# Patient Record
Sex: FEMALE | URBAN - METROPOLITAN AREA
[De-identification: names, ages, dates, MRNs, and addresses within clinical notes are randomized per-mention and may not be internally consistent; named-entity substitution may affect disease eponyms.]

---

## 2017-06-26 DIAGNOSIS — O09.93 SUPERVISION OF HIGH-RISK PREGNANCY, THIRD TRIMESTER: ICD-10-CM

## 2017-06-26 NOTE — TELEPHONE ENCOUNTER
Last clinic visit 6/2016.  Dur for labs  Please make a lab appointment for blood work and follow up clinic appointment in 1 week after that to discuss results.    Please confirm the dose of lantus with pt.  For Now I am sending 35 units/day ( based on last clinic viist) but check with pt.

## 2017-06-26 NOTE — TELEPHONE ENCOUNTER
Novolog Flexpen Inj      Last Written Prescription Date:  6/2/16  Last Fill Quantity: 1,   # refills: 3  Last Office Visit with [facility] or [facility] prescribing provider: 5/25/16  Future Office visit:

## 2017-06-26 NOTE — TELEPHONE ENCOUNTER
Elbert Yu         Last Written Prescription Date: 6/21/16  Last Fill Quantity: 3, # refills: 3  Last Office Visit with [facility], [facility] or [facility] prescribing provider:  06/21/2016        BP Readings from Last 3 Encounters:   06/21/16 118/62   06/14/16 97/62   06/04/16 125/83     Lab Results   Component Value Date    MICROL 20 05/23/2016     Lab Results   Component Value Date    UMALCR 28.39 05/23/2016     Creatinine   Date Value Ref Range Status   06/13/2016 0.42 (L) 0.52 - 1.04 mg/dL Final   ]  GFR Estimate   Date Value Ref Range Status   06/13/2016 >90  Non  GFR Calc   >60 mL/min/1.7m2 Final   06/12/2016 >90  Non  GFR Calc   >60 mL/min/1.7m2 Final   06/11/2016 >90  Non  GFR Calc   >60 mL/min/1.7m2 Final     GFR Estimate If Black   Date Value Ref Range Status   06/13/2016 >90   GFR Calc   >60 mL/min/1.7m2 Final   06/12/2016 >90   GFR Calc   >60 mL/min/1.7m2 Final   06/11/2016 >90   GFR Calc   >60 mL/min/1.7m2 Final     Lab Results   Component Value Date    CHOL 305 05/23/2016     Lab Results   Component Value Date    HDL 57 05/23/2016     Lab Results   Component Value Date     05/23/2016     Lab Results   Component Value Date    TRIG 316 05/23/2016     No results found for: CHOLHDLRATIO  Lab Results   Component Value Date    AST 19 06/12/2016     Lab Results   Component Value Date    ALT 12 06/12/2016     Lab Results   Component Value Date    A1C 9.2 06/01/2016    A1C 9.8 05/23/2016    A1C 7.4 04/23/2016    A1C 7.2 04/19/2016    A1C 8.4 03/02/2016     Potassium   Date Value Ref Range Status   06/13/2016 3.8 3.4 - 5.3 mmol/L Final       Labs showing if normal/abnormal  Lab Results   Component Value Date    UCRR 72 05/23/2016    MICROL 20 05/23/2016     Lab Results   Component Value Date    CHOL 305 (H) 05/23/2016    TRIG 316 (H) 05/23/2016    HDL 57 05/23/2016     (H) 05/23/2016     Lab Results    Component Value Date    A1C 9.2 (H) 06/01/2016    A1C 9.8 (H) 05/23/2016    A1C 7.4 (H) 04/23/2016

## 2017-06-28 NOTE — TELEPHONE ENCOUNTER
Last clinic visit 6/2016.  Dur for labs  Please make a lab appointment for blood work and follow up clinic appointment in 1 week after that to discuss results.     Please confirm the dose of lantus with pt.  For Now I am sending 35 units/day ( based on last clinic viist) but check with pt.       From Dr. Colunga.

## 2017-07-12 NOTE — TELEPHONE ENCOUNTER
Called pt's home and father answer and stated that [place], she left 2 weeks ago and will not be back for 3 months.    Qamar ACHARYA RN

## 2017-09-05 ENCOUNTER — HOSPITAL ENCOUNTER (OUTPATIENT)
Facility: CLINIC | Age: 27
Discharge: HOME | End: 2017-09-05
Attending: OBSTETRICS & GYNECOLOGY | Admitting: OBSTETRICS & GYNECOLOGY
Payer: COMMERCIAL

## 2017-09-05 VITALS — SYSTOLIC BLOOD PRESSURE: 122 MMHG | TEMPERATURE: 98.3 F | DIASTOLIC BLOOD PRESSURE: 75 MMHG

## 2017-09-05 LAB
GLUCOSE BLDC GLUCOMTR-MCNC: 58 MG/DL (ref 70–99)
GLUCOSE BLDC GLUCOMTR-MCNC: 65 MG/DL (ref 70–99)

## 2017-09-05 PROCEDURE — 25000125 ZZHC RX 250: Performed by: OBSTETRICS & GYNECOLOGY

## 2017-09-05 PROCEDURE — 25000132 ZZH RX MED GY IP 250 OP 250 PS 637: Performed by: OBSTETRICS & GYNECOLOGY

## 2017-09-05 PROCEDURE — 82962 GLUCOSE BLOOD TEST: CPT

## 2017-09-05 PROCEDURE — 99213 OFFICE O/P EST LOW 20 MIN: CPT

## 2017-09-05 RX ORDER — ONDANSETRON 4 MG/1
8 TABLET, ORALLY DISINTEGRATING ORAL EVERY 6 HOURS PRN
Status: DISCONTINUED | OUTPATIENT
Start: 2017-09-05 | End: 2017-09-06 | Stop reason: HOSPADM

## 2017-09-05 RX ORDER — ONDANSETRON 2 MG/ML
4 INJECTION INTRAMUSCULAR; INTRAVENOUS EVERY 6 HOURS PRN
Status: DISCONTINUED | OUTPATIENT
Start: 2017-09-05 | End: 2017-09-06 | Stop reason: HOSPADM

## 2017-09-05 RX ADMIN — LIDOCAINE HYDROCHLORIDE 30 ML: 20 SOLUTION ORAL; TOPICAL at 21:55

## 2017-09-05 RX ADMIN — ONDANSETRON 8 MG: 4 TABLET, ORALLY DISINTEGRATING ORAL at 21:51

## 2017-09-05 NOTE — IP AVS SNAPSHOT
MRN:[id number]                      After Visit Summary   9/5/2017    Anne Gunter    MRN: [id number]           Thank you!     Thank you for choosing [facility] for your care. Our goal is always to provide you with excellent care. Hearing back from our patients is one way we can continue to improve our services. Please take a few minutes to complete the written survey that you may receive in the mail after you visit. If you would like to speak to someone directly about your visit please contact Patient Relations at [phone]. Thank you!          Patient Information     Date Of Birth          1990        About your hospital stay     You were admitted on:  September 5, 2017 You last received care in the:  [facility]    You were discharged on:  September 5, 2017       Who to Call     For medical emergencies, please call 911.  For non-urgent questions about your medical care, please call your primary care provider or clinic, [phone]          Attending Provider     Provider Specialty    Romario Engel MD OB/Gyn       Primary Care Provider Office Phone # Fax #    Isiah Naidu [phone]      Further instructions from your care team       Discharge Instruction for Undelivered Patients      You were seen for:  Abdominal pain.  We Consulted: Dr Maren ACHARYA  You had (Test or Medicine):EFM, Zofran and GI Cocktail.    Diet:   Drink 8 to 12 glasses of liquids (milk, juice, water) every day.  To manager your diabetes, follow the guidelines for eating and drinking given to you by your Clinic Provider or Diabetes Educator.       Activity:  Call your doctor or nurse midwife if your baby is moving less than usual.     Call your provider if you notice:  Swelling in your face or increased swelling in your hands or legs.  Headaches that are not relieved by Tylenol (acetaminophen).  Changes in your vision (blurring: seeing spots or  "stars.)  Nausea (sick to your stomach) and vomiting (throwing up).   Weight gain of 5 pounds or more per week.  Heartburn that doesn't go away.  Signs of bladder infection: pain when you urinate (use the toilet), need to go more often and more urgently.  The bag of cobos (rupture of membranes) breaks, or you notice leaking in your underwear.  Bright red blood in your underwear.  Abdominal (lower belly) or stomach pain.  Second (plus) baby: Contractions (tightening) less than 10 minutes apart and getting stronger.  *If less than 34 weeks: Contractions (tightenings) more than 6 times in one hour.  Increase or change in vaginal discharge (note the color and amount)  Other: Establish prenatal care and make sure you see Diabetic Dr.    Follow-up:  Please make appointment to establish preantal care.   List of Dr provided with phone no.          Pending Results     No orders found from 9/3/2017 to 2017.            Admission Information     Date & Time Provider Department Dept. Phone    2017 Romario Engel MD [facility] Labor and Delivery [phone]      Your Vitals Were     Blood Pressure Temperature                122/75 98.3  F (36.8  C) (Oral)          MyChart Information     [employer] lets you send messages to your doctor, view your test results, renew your prescriptions, schedule appointments and more. To sign up, go to [email or web address] . Click on \"Log in\" on the left side of the screen, which will take you to the Welcome page. Then click on \"Sign up Now\" on the right side of the page.     You will be asked to enter the access code listed below, as well as some personal information. Please follow the directions to create your username and password.     Your access code is: [email or web address]  Expires: 2017 11:06 PM     Your access code will  in 90 days. If you need help or a new code, please call your [place] clinic or [phone].        Care EveryWhere ID     This is your Care " EveryWhere ID. This could be used by other organizations to access your [place] medical records  [id number]        Equal Access to Services     VALENTINO ONEILL : Hadii buster Root, walada ludannyadaha, qachristianota kaalmada georginanathanielnila, brea olivarezdeannstacy abreu. So [facility] [phone].    ATENCIÓN: Si habla español, tiene a monreal disposición servicios gratuitos de asistencia lingüística. Llame al [phone].    We comply with applicable federal civil rights laws and [place] laws. We do not discriminate on the basis of race, color, national origin, age, disability sex, sexual orientation or gender identity.               Review of your medicines      UNREVIEWED medicines. Ask your doctor about these medicines        Dose / Directions    folic acid 1 MG tablet   Commonly known as:  FOLVITE   Used for:  High-risk pregnancy, first trimester        Dose:  1 mg   Take 1 tablet (1 mg) by mouth daily   Quantity:  30 tablet   Refills:  0       insulin aspart 100 UNIT/ML injection   Commonly known as:  NovoLOG PEN   Used for:  Supervision of high-risk pregnancy, third trimester        Dose:  10 Units   Inject 10 Units Subcutaneous 3 times daily (with meals)   Quantity:  20 mL   Refills:  1       insulin glargine 100 UNIT/ML injection   Commonly known as:  LANTUS SOLOSTAR   Used for:  Uncontrolled type 1 diabetes mellitus without complication (H)        35 units at bedtime   Quantity:  30 mL   Refills:  1       LEVEMIR FLEXPEN/FLEXTOUCH 100 UNIT/ML injection   Generic drug:  insulin detemir        Dose:  30 Units   Inject 30 Units Subcutaneous At Bedtime   Refills:  0       metoclopramide 10 MG tablet   Commonly known as:  REGLAN   Used for:  Supervision of high-risk pregnancy, third trimester        Dose:  10 mg   Take 1 tablet (10 mg) by mouth 4 times daily (before meals and nightly)   Quantity:  120 tablet   Refills:  3       mirtazapine 15 MG ODT tab   Commonly known as:  REMERON SOL-TAB   Used for:   Supervision of high-risk pregnancy, third trimester        Dose:  15 mg   1 tablet (15 mg) by Orally disintegrating tablet route At Bedtime   Quantity:  60 tablet   Refills:  2       OLANZapine zydis 5 MG ODT tab   Commonly known as:  zyPREXA   Used for:  Supervision of high-risk pregnancy, third trimester        Dose:  5 mg   Take 1 tablet (5 mg) by mouth 2 times daily   Quantity:  60 tablet   Refills:  3       omeprazole 20 MG CR capsule   Commonly known as:  priLOSEC   Used for:  Supervision of high-risk pregnancy, third trimester        Dose:  20 mg   Take 1 capsule (20 mg) by mouth every morning (before breakfast)   Quantity:  30 capsule   Refills:  3       ondansetron 4 MG ODT tab   Commonly known as:  ZOFRAN-ODT        Dose:  4 mg   Take 4 mg by mouth every 8 hours as needed   Refills:  1       oxyCODONE 5 MG capsule   Commonly known as:  OXY-IR   Used for:  S/P         Dose:  5 mg   Take 1 capsule (5 mg) by mouth every 4 hours as needed for moderate to severe pain   Quantity:  24 capsule   Refills:  0       Prenatal Vitamin 27-0.8 MG Tabs   Used for:  High-risk pregnancy, first trimester        Dose:  1 tablet   Take 1 tablet by mouth daily   Quantity:  90 tablet   Refills:  3       prochlorperazine 5 MG tablet   Commonly known as:  COMPAZINE   Used for:  Supervision of high-risk pregnancy, third trimester        Dose:  5 mg   Take 1 tablet (5 mg) by mouth every 6 hours   Quantity:  120 tablet   Refills:  3                Protect others around you: Learn how to safely use, store and throw away your medicines at www.disposemymeds.org.             Medication List: This is a list of all your medications and when to take them. Check marks below indicate your daily home schedule. Keep this list as a reference.      Medications           Morning Afternoon Evening Bedtime As Needed    folic acid 1 MG tablet   Commonly known as:  FOLVITE   Take 1 tablet (1 mg) by mouth daily                                 insulin aspart 100 UNIT/ML injection   Commonly known as:  NovoLOG PEN   Inject 10 Units Subcutaneous 3 times daily (with meals)                                insulin glargine 100 UNIT/ML injection   Commonly known as:  LANTUS SOLOSTAR   35 units at bedtime                                LEVEMIR FLEXPEN/FLEXTOUCH 100 UNIT/ML injection   Inject 30 Units Subcutaneous At Bedtime   Generic drug:  insulin detemir                                metoclopramide 10 MG tablet   Commonly known as:  REGLAN   Take 1 tablet (10 mg) by mouth 4 times daily (before meals and nightly)                                mirtazapine 15 MG ODT tab   Commonly known as:  REMERON SOL-TAB   1 tablet (15 mg) by Orally disintegrating tablet route At Bedtime                                OLANZapine zydis 5 MG ODT tab   Commonly known as:  zyPREXA   Take 1 tablet (5 mg) by mouth 2 times daily                                omeprazole 20 MG CR capsule   Commonly known as:  priLOSEC   Take 1 capsule (20 mg) by mouth every morning (before breakfast)                                ondansetron 4 MG ODT tab   Commonly known as:  ZOFRAN-ODT   Take 4 mg by mouth every 8 hours as needed   Last time this was given:  8 mg on 9/5/2017  9:51 PM                                oxyCODONE 5 MG capsule   Commonly known as:  OXY-IR   Take 1 capsule (5 mg) by mouth every 4 hours as needed for moderate to severe pain                                Prenatal Vitamin 27-0.8 MG Tabs   Take 1 tablet by mouth daily                                prochlorperazine 5 MG tablet   Commonly known as:  COMPAZINE   Take 1 tablet (5 mg) by mouth every 6 hours

## 2017-09-06 NOTE — PLAN OF CARE
"27year old  at 2020 weeks gestation presents to [facility] 3 from ED with C/O upper Abdominal pain. Patient reports\"I'm having upper abdominal pain for last 2 days and today it got worse and having green color vomitus since this morning,and I'm 6 month pregnant\" Patient states\" I see OB Dr in [place]. Patient reports good fetal movement, denies leaking of fluid, vaginal bleeding, and regular contractions. EFM and toco explained and applied. Health history obtained, physical assessment completed. Will update on call and obtain further orders.  "

## 2017-09-06 NOTE — PLAN OF CARE
Data: [facility] at .   Reason for maternal/fetal assessment per patient is Abdominal Pain  . Patient is a .     Obstetric History       T0      L1     SAB0   TAB0   Ectopic0   Multiple0   Live Births0       # Outcome Date GA Lbr Walter/2nd Weight Sex Delivery Anes PTL Lv   2 Current            1  06/10/16 32w5d  2.37 kg (5 lb 3.6 oz) M CS-LTranv Gen           Medical History:   Past Medical History:   Diagnosis Date     Diabetes (H)      Microalbuminuria 2016     Type I diabetes mellitus, uncontrolled (H) 2016   . Gestational Age 24w6d. VSS. Cervix: not examined.  Fetal movement present. Patient denies cramping, backache, vaginal discharge, pelvic pressure, UTI symptoms, bloody show, vaginal bleeding, edema, headache, visual disturbances, epigastric or URQ pain, rupture of membranes.( see writer previous notes). Support persons  present.  Action: Verbal consent for EFM. Triage assessment completed. EFM applied for fetal well being Uterine assessment done .(see flow record). Patient instructed to report change in fetal movement, vaginal leaking of fluid or bleeding, abdominal pain, or any concerns related to the pregnancy to her nurse/physician.  Response: Dr. Engel informed of pt arrival and status. Plan per provider is to DC home and established prenatal care. List of OB Dr with phone no provided.Patient verbalized understanding of education and verbalized agreement with plan. Discharged on wheel chair at 2330.  Discharge teaching done with Hebrew  in person ID # [id number] Taz Henning.

## 2017-09-06 NOTE — PROVIDER NOTIFICATION
09/05/17 2300   Provider Notification   Provider Name/Title Dr Engel   Method of Notification Phone   Request Evaluate - Remote   Notification Reason Other (Comment);Pain   Pt tolerated apple sauce and apple juice,pain rate 1 to none now and feels better.DC orders received and establish prenatal care.POC d/w with pt and her SO.They voiced understandings.

## 2017-09-06 NOTE — PROVIDER NOTIFICATION
09/05/17 2130   Provider Notification   Provider Name/Title Dr Engel   Method of Notification Phone   Request Evaluate - Remote   Notification Reason Patient Arrived;Pain;Status Update;Other (Comment)   Paged MD re pt arrival,pain status, complains;upper abdominal pain for last two days.Pain is dull with spasm rate her pain 7/10,also C/O vomitus with green color started today.H/O Type 1 diabetes and insulin.Currently pt is visiting her family from [place] and had seen OB Dr in [place].MD read her history remotely also reviewed FHT strip remotelyMD gave orders(see orders). MD gave orders to discontinue EFM. POC d/w pt and her SO.They voiced understandings.

## 2017-09-06 NOTE — DISCHARGE INSTRUCTIONS
Discharge Instruction for Undelivered Patients      You were seen for:  Abdominal pain.  We Consulted: Dr Maren ACHARYA  You had (Test or Medicine):EFM, Zofran and GI Cocktail.    Diet:   Drink 8 to 12 glasses of liquids (milk, juice, water) every day.  To manager your diabetes, follow the guidelines for eating and drinking given to you by your Clinic Provider or Diabetes Educator.       Activity:  Call your doctor or nurse midwife if your baby is moving less than usual.     Call your provider if you notice:  Swelling in your face or increased swelling in your hands or legs.  Headaches that are not relieved by Tylenol (acetaminophen).  Changes in your vision (blurring: seeing spots or stars.)  Nausea (sick to your stomach) and vomiting (throwing up).   Weight gain of 5 pounds or more per week.  Heartburn that doesn't go away.  Signs of bladder infection: pain when you urinate (use the toilet), need to go more often and more urgently.  The bag of cobos (rupture of membranes) breaks, or you notice leaking in your underwear.  Bright red blood in your underwear.  Abdominal (lower belly) or stomach pain.  Second (plus) baby: Contractions (tightening) less than 10 minutes apart and getting stronger.  *If less than 34 weeks: Contractions (tightenings) more than 6 times in one hour.  Increase or change in vaginal discharge (note the color and amount)  Other: Establish prenatal care and make sure you see Diabetic Dr.    Follow-up:  Please make appointment to establish preantal care.   List of Dr provided with phone no.

## 2017-09-09 ENCOUNTER — HOSPITAL ENCOUNTER (INPATIENT)
Facility: CLINIC | Age: 27
LOS: 1 days | Discharge: ANOTHER HOSPITAL | End: 2017-09-10
Attending: OBSTETRICS & GYNECOLOGY | Admitting: OBSTETRICS & GYNECOLOGY
Payer: COMMERCIAL

## 2017-09-09 PROBLEM — Z36.89 ENCOUNTER FOR TRIAGE IN PREGNANT PATIENT: Status: ACTIVE | Noted: 2017-09-09

## 2017-09-09 PROBLEM — R73.9 HYPERGLYCEMIA: Status: ACTIVE | Noted: 2017-09-09

## 2017-09-09 LAB
ALBUMIN SERPL-MCNC: 3.2 G/DL (ref 3.4–5)
ALBUMIN UR-MCNC: NEGATIVE MG/DL
ALP SERPL-CCNC: 70 U/L (ref 40–150)
ALT SERPL W P-5'-P-CCNC: 9 U/L (ref 0–50)
ANION GAP SERPL CALCULATED.3IONS-SCNC: 12 MMOL/L (ref 3–14)
APPEARANCE UR: CLEAR
AST SERPL W P-5'-P-CCNC: 8 U/L (ref 0–45)
BASE DEFICIT BLDV-SCNC: 7.7 MMOL/L
BASOPHILS # BLD AUTO: 0 10E9/L (ref 0–0.2)
BASOPHILS NFR BLD AUTO: 0.3 %
BILIRUB SERPL-MCNC: 0.8 MG/DL (ref 0.2–1.3)
BILIRUB UR QL STRIP: NEGATIVE
BUN SERPL-MCNC: 7 MG/DL (ref 7–30)
CALCIUM SERPL-MCNC: 8.9 MG/DL (ref 8.5–10.1)
CHLORIDE SERPL-SCNC: 100 MMOL/L (ref 94–109)
CO2 SERPL-SCNC: 22 MMOL/L (ref 20–32)
COLOR UR AUTO: YELLOW
CREAT SERPL-MCNC: 0.44 MG/DL (ref 0.52–1.04)
DIFFERENTIAL METHOD BLD: ABNORMAL
EOSINOPHIL # BLD AUTO: 0 10E9/L (ref 0–0.7)
EOSINOPHIL NFR BLD AUTO: 0.4 %
ERYTHROCYTE [DISTWIDTH] IN BLOOD BY AUTOMATED COUNT: 15.5 % (ref 10–15)
GFR SERPL CREATININE-BSD FRML MDRD: >90 ML/MIN/1.7M2
GLUCOSE BLDC GLUCOMTR-MCNC: 234 MG/DL (ref 70–99)
GLUCOSE BLDC GLUCOMTR-MCNC: 266 MG/DL (ref 70–99)
GLUCOSE BLDC GLUCOMTR-MCNC: 306 MG/DL (ref 70–99)
GLUCOSE BLDC GLUCOMTR-MCNC: 309 MG/DL (ref 70–99)
GLUCOSE SERPL-MCNC: 250 MG/DL (ref 70–99)
GLUCOSE UR STRIP-MCNC: >499 MG/DL
HCO3 BLDV-SCNC: 17 MMOL/L (ref 21–28)
HCT VFR BLD AUTO: 39.7 % (ref 35–47)
HGB BLD-MCNC: 12.7 G/DL (ref 11.7–15.7)
HGB UR QL STRIP: NEGATIVE
IMM GRANULOCYTES # BLD: 0 10E9/L (ref 0–0.4)
IMM GRANULOCYTES NFR BLD: 0.4 %
KETONES BLD-SCNC: 5.3 MMOL/L (ref 0–0.6)
KETONES UR STRIP-MCNC: 80 MG/DL
LEUKOCYTE ESTERASE UR QL STRIP: NEGATIVE
LYMPHOCYTES # BLD AUTO: 1.6 10E9/L (ref 0.8–5.3)
LYMPHOCYTES NFR BLD AUTO: 14.2 %
MCH RBC QN AUTO: 26.1 PG (ref 26.5–33)
MCHC RBC AUTO-ENTMCNC: 32 G/DL (ref 31.5–36.5)
MCV RBC AUTO: 82 FL (ref 78–100)
MONOCYTES # BLD AUTO: 0.4 10E9/L (ref 0–1.3)
MONOCYTES NFR BLD AUTO: 3.8 %
NEUTROPHILS # BLD AUTO: 9.2 10E9/L (ref 1.6–8.3)
NEUTROPHILS NFR BLD AUTO: 80.9 %
NITRATE UR QL: NEGATIVE
NRBC # BLD AUTO: 0 10*3/UL
NRBC BLD AUTO-RTO: 0 /100
O2/TOTAL GAS SETTING VFR VENT: ABNORMAL %
OXYHGB MFR BLDV: 72 %
PCO2 BLDV: 33 MM HG (ref 40–50)
PH BLDV: 7.33 PH (ref 7.32–7.43)
PH UR STRIP: 5 PH (ref 5–7)
PLATELET # BLD AUTO: 214 10E9/L (ref 150–450)
PO2 BLDV: 40 MM HG (ref 25–47)
POTASSIUM SERPL-SCNC: 3.9 MMOL/L (ref 3.4–5.3)
PROT SERPL-MCNC: 8.2 G/DL (ref 6.8–8.8)
RBC # BLD AUTO: 4.87 10E12/L (ref 3.8–5.2)
RBC #/AREA URNS AUTO: 1 /HPF (ref 0–2)
SODIUM SERPL-SCNC: 134 MMOL/L (ref 133–144)
SOURCE: ABNORMAL
SP GR UR STRIP: 1.03 (ref 1–1.03)
UROBILINOGEN UR STRIP-MCNC: 0 MG/DL (ref 0–2)
WBC # BLD AUTO: 11.4 10E9/L (ref 4–11)
WBC #/AREA URNS AUTO: 1 /HPF (ref 0–2)

## 2017-09-09 PROCEDURE — 25000131 ZZH RX MED GY IP 250 OP 636 PS 637: Performed by: INTERNAL MEDICINE

## 2017-09-09 PROCEDURE — 25000125 ZZHC RX 250: Performed by: OBSTETRICS & GYNECOLOGY

## 2017-09-09 PROCEDURE — 00000146 ZZHCL STATISTIC GLUCOSE BY METER IP

## 2017-09-09 PROCEDURE — 99214 OFFICE O/P EST MOD 30 MIN: CPT | Performed by: OBSTETRICS & GYNECOLOGY

## 2017-09-09 PROCEDURE — 80053 COMPREHEN METABOLIC PANEL: CPT | Performed by: OBSTETRICS & GYNECOLOGY

## 2017-09-09 PROCEDURE — 82805 BLOOD GASES W/O2 SATURATION: CPT | Performed by: INTERNAL MEDICINE

## 2017-09-09 PROCEDURE — 25000128 H RX IP 250 OP 636: Performed by: INTERNAL MEDICINE

## 2017-09-09 PROCEDURE — 81001 URINALYSIS AUTO W/SCOPE: CPT | Performed by: OBSTETRICS & GYNECOLOGY

## 2017-09-09 PROCEDURE — 99222 1ST HOSP IP/OBS MODERATE 55: CPT | Performed by: INTERNAL MEDICINE

## 2017-09-09 PROCEDURE — 82010 KETONE BODYS QUAN: CPT | Performed by: INTERNAL MEDICINE

## 2017-09-09 PROCEDURE — 36415 COLL VENOUS BLD VENIPUNCTURE: CPT | Performed by: INTERNAL MEDICINE

## 2017-09-09 PROCEDURE — 85025 COMPLETE CBC W/AUTO DIFF WBC: CPT | Performed by: OBSTETRICS & GYNECOLOGY

## 2017-09-09 PROCEDURE — 25000132 ZZH RX MED GY IP 250 OP 250 PS 637: Performed by: OBSTETRICS & GYNECOLOGY

## 2017-09-09 PROCEDURE — 25000128 H RX IP 250 OP 636: Performed by: OBSTETRICS & GYNECOLOGY

## 2017-09-09 PROCEDURE — 36416 COLLJ CAPILLARY BLOOD SPEC: CPT | Performed by: OBSTETRICS & GYNECOLOGY

## 2017-09-09 PROCEDURE — 99207 ZZC CONSULT E&M CHANGED TO INITIAL LEVEL: CPT | Performed by: INTERNAL MEDICINE

## 2017-09-09 PROCEDURE — 12000029 ZZH R&B OB INTERMEDIATE

## 2017-09-09 RX ORDER — FENTANYL CITRATE 50 UG/ML
100 INJECTION, SOLUTION INTRAMUSCULAR; INTRAVENOUS ONCE
Status: COMPLETED | OUTPATIENT
Start: 2017-09-09 | End: 2017-09-09

## 2017-09-09 RX ORDER — NICOTINE POLACRILEX 4 MG
15-30 LOZENGE BUCCAL
Status: DISCONTINUED | OUTPATIENT
Start: 2017-09-09 | End: 2017-09-10 | Stop reason: HOSPADM

## 2017-09-09 RX ORDER — SODIUM CHLORIDE, SODIUM LACTATE, POTASSIUM CHLORIDE, CALCIUM CHLORIDE 600; 310; 30; 20 MG/100ML; MG/100ML; MG/100ML; MG/100ML
INJECTION, SOLUTION INTRAVENOUS CONTINUOUS
Status: DISCONTINUED | OUTPATIENT
Start: 2017-09-09 | End: 2017-09-09

## 2017-09-09 RX ORDER — ONDANSETRON 2 MG/ML
4 INJECTION INTRAMUSCULAR; INTRAVENOUS EVERY 6 HOURS PRN
Status: DISCONTINUED | OUTPATIENT
Start: 2017-09-09 | End: 2017-09-10 | Stop reason: HOSPADM

## 2017-09-09 RX ORDER — ACETAMINOPHEN 10 MG/ML
1000 INJECTION, SOLUTION INTRAVENOUS EVERY 6 HOURS
Status: DISCONTINUED | OUTPATIENT
Start: 2017-09-09 | End: 2017-09-10 | Stop reason: HOSPADM

## 2017-09-09 RX ORDER — DEXTROSE MONOHYDRATE 25 G/50ML
25-50 INJECTION, SOLUTION INTRAVENOUS
Status: DISCONTINUED | OUTPATIENT
Start: 2017-09-09 | End: 2017-09-10 | Stop reason: HOSPADM

## 2017-09-09 RX ADMIN — LIDOCAINE HYDROCHLORIDE 30 ML: 20 SOLUTION ORAL; TOPICAL at 19:15

## 2017-09-09 RX ADMIN — ONDANSETRON 4 MG: 2 INJECTION INTRAMUSCULAR; INTRAVENOUS at 15:59

## 2017-09-09 RX ADMIN — ACETAMINOPHEN 1000 MG: 10 INJECTION, SOLUTION INTRAVENOUS at 16:21

## 2017-09-09 RX ADMIN — ONDANSETRON 4 MG: 2 INJECTION INTRAMUSCULAR; INTRAVENOUS at 22:06

## 2017-09-09 RX ADMIN — FENTANYL CITRATE 100 MCG: 50 INJECTION INTRAMUSCULAR; INTRAVENOUS at 22:58

## 2017-09-09 RX ADMIN — SODIUM CHLORIDE 1000 ML: 9 INJECTION, SOLUTION INTRAVENOUS at 23:59

## 2017-09-09 RX ADMIN — ACETAMINOPHEN 1000 MG: 10 INJECTION, SOLUTION INTRAVENOUS at 22:10

## 2017-09-09 RX ADMIN — SODIUM CHLORIDE, POTASSIUM CHLORIDE, SODIUM LACTATE AND CALCIUM CHLORIDE 500 ML: 600; 310; 30; 20 INJECTION, SOLUTION INTRAVENOUS at 16:01

## 2017-09-09 RX ADMIN — SODIUM CHLORIDE 4 UNITS/HR: 9 INJECTION, SOLUTION INTRAVENOUS at 22:24

## 2017-09-09 NOTE — PLAN OF CARE
Pt here from ER @ 1430 with father who speaks english, pt declines  or phone is able to understand and communicate some english. Pt C/O abd pain and vomiting.  Pt's father states that Anne came to [place] 10 days ago and has an appt scheduled with [facility] on the 9/18/17 Dr Carter called for orders

## 2017-09-09 NOTE — PROVIDER NOTIFICATION
09/09/17 1810   Provider Notification   Provider Name/Title Raul   Method of Notification Phone   Notification Reason Lab/Diagnostic Study;Status Update;Pain   Updated that pt continues to moan in pain, states meds have not helped.  She has had appx 700mL of LR so far but still unable to void.  Hospitalist consult entered for uncontrolled BG.  GI cocktail ordered.  Will straight cath if unable to void.

## 2017-09-09 NOTE — PROVIDER NOTIFICATION
09/09/17 1842   Provider Notification   Provider Name/Title Hospitalist   Method of Notification Phone   Notification Reason Status Update   Hospitalist called, writer updated on pt current status in [facility].  Will have her seen this evening.

## 2017-09-09 NOTE — PROVIDER NOTIFICATION
09/09/17 1650   Provider Notification   Provider Name/Title Raul   Method of Notification Phone   Notification Reason Lab/Diagnostic Study;Status Update   Dr Carter updated on recent labs.  Updated that Tylenol and Zofran have been given, IVF still flushing.  Order to recheck BG at 1800.  Will try to obtain urine sample when pt is able to void.  Will update MD as needed.

## 2017-09-09 NOTE — PLAN OF CARE
"Pt's sister is here now and requested to speak with interrater phone.   #[id number] used.  The pt's sister states she has been with the pt in [place] for these \"episodes\"  She states her sister's ketones have been elevated on admissions, the staff kept pt NPO and IV rehydrated.  She could not give much more information.  Writer explained current cares of Zofran, Tylenol, IV flush, awaiting to be able to collect urine when pt can void.  Pt still moaning and occasionally retching, offered repositioning but she declined.  Re-emphasized POC to pt and encouraged to call when able to void.  Will continue to monitor.  "

## 2017-09-10 ENCOUNTER — HOSPITAL ENCOUNTER (INPATIENT)
Facility: CLINIC | Age: 27
LOS: 3 days | Discharge: HOME | End: 2017-09-13
Attending: OBSTETRICS & GYNECOLOGY | Admitting: OBSTETRICS & GYNECOLOGY
Payer: COMMERCIAL

## 2017-09-10 ENCOUNTER — APPOINTMENT (OUTPATIENT)
Dept: ULTRASOUND IMAGING | Facility: CLINIC | Age: 27
End: 2017-09-10
Attending: OBSTETRICS & GYNECOLOGY
Payer: COMMERCIAL

## 2017-09-10 VITALS
BODY MASS INDEX: 26.3 KG/M2 | WEIGHT: 167.55 LBS | OXYGEN SATURATION: 97 % | HEART RATE: 83 BPM | HEIGHT: 67 IN | DIASTOLIC BLOOD PRESSURE: 88 MMHG | RESPIRATION RATE: 18 BRPM | SYSTOLIC BLOOD PRESSURE: 141 MMHG | TEMPERATURE: 98.2 F

## 2017-09-10 DIAGNOSIS — O24.012 TYPE 1 DIABETES MELLITUS IN PREGNANCY, SECOND TRIMESTER: Primary | ICD-10-CM

## 2017-09-10 DIAGNOSIS — O09.93 SUPERVISION OF HIGH-RISK PREGNANCY, THIRD TRIMESTER: ICD-10-CM

## 2017-09-10 DIAGNOSIS — O21.0 HYPEREMESIS GRAVIDARUM: ICD-10-CM

## 2017-09-10 PROBLEM — E11.10 DIABETIC KETO-ACIDOSIS (H): Status: ACTIVE | Noted: 2017-09-10

## 2017-09-10 PROBLEM — E10.10 DKA, TYPE 1 (H): Status: ACTIVE | Noted: 2017-09-10

## 2017-09-10 LAB
ABO + RH BLD: NORMAL
ABO + RH BLD: NORMAL
ALBUMIN SERPL-MCNC: 2.4 G/DL (ref 3.4–5)
ALBUMIN SERPL-MCNC: 2.5 G/DL (ref 3.4–5)
ALP SERPL-CCNC: 58 U/L (ref 40–150)
ALP SERPL-CCNC: 62 U/L (ref 40–150)
ALT SERPL W P-5'-P-CCNC: 10 U/L (ref 0–50)
ALT SERPL W P-5'-P-CCNC: 11 U/L (ref 0–50)
AMYLASE SERPL-CCNC: 22 U/L (ref 30–110)
ANION GAP SERPL CALCULATED.3IONS-SCNC: 10 MMOL/L (ref 3–14)
ANION GAP SERPL CALCULATED.3IONS-SCNC: 10 MMOL/L (ref 3–14)
ANION GAP SERPL CALCULATED.3IONS-SCNC: 12 MMOL/L (ref 3–14)
ANION GAP SERPL CALCULATED.3IONS-SCNC: 13 MMOL/L (ref 3–14)
ANION GAP SERPL CALCULATED.3IONS-SCNC: 16 MMOL/L (ref 3–14)
ANION GAP SERPL CALCULATED.3IONS-SCNC: 18 MMOL/L (ref 3–14)
ANION GAP SERPL CALCULATED.3IONS-SCNC: 8 MMOL/L (ref 3–14)
ANION GAP SERPL CALCULATED.3IONS-SCNC: 9 MMOL/L (ref 3–14)
ANION GAP SERPL CALCULATED.3IONS-SCNC: 9 MMOL/L (ref 3–14)
AST SERPL W P-5'-P-CCNC: 11 U/L (ref 0–45)
AST SERPL W P-5'-P-CCNC: 7 U/L (ref 0–45)
BILIRUB DIRECT SERPL-MCNC: 0.1 MG/DL (ref 0–0.2)
BILIRUB DIRECT SERPL-MCNC: <0.1 MG/DL (ref 0–0.2)
BILIRUB SERPL-MCNC: 0.5 MG/DL (ref 0.2–1.3)
BILIRUB SERPL-MCNC: 0.7 MG/DL (ref 0.2–1.3)
BLD GP AB SCN SERPL QL: NORMAL
BLOOD BANK CMNT PATIENT-IMP: NORMAL
BUN SERPL-MCNC: 2 MG/DL (ref 7–30)
BUN SERPL-MCNC: 3 MG/DL (ref 7–30)
BUN SERPL-MCNC: 4 MG/DL (ref 7–30)
BUN SERPL-MCNC: 6 MG/DL (ref 7–30)
BUN SERPL-MCNC: 7 MG/DL (ref 7–30)
BUN SERPL-MCNC: 8 MG/DL (ref 7–30)
BUN SERPL-MCNC: 9 MG/DL (ref 7–30)
CALCIUM SERPL-MCNC: 7.3 MG/DL (ref 8.5–10.1)
CALCIUM SERPL-MCNC: 7.3 MG/DL (ref 8.5–10.1)
CALCIUM SERPL-MCNC: 7.4 MG/DL (ref 8.5–10.1)
CALCIUM SERPL-MCNC: 7.5 MG/DL (ref 8.5–10.1)
CALCIUM SERPL-MCNC: 7.7 MG/DL (ref 8.5–10.1)
CALCIUM SERPL-MCNC: 7.8 MG/DL (ref 8.5–10.1)
CALCIUM SERPL-MCNC: 8.3 MG/DL (ref 8.5–10.1)
CHLORIDE SERPL-SCNC: 104 MMOL/L (ref 94–109)
CHLORIDE SERPL-SCNC: 107 MMOL/L (ref 94–109)
CHLORIDE SERPL-SCNC: 108 MMOL/L (ref 94–109)
CHLORIDE SERPL-SCNC: 109 MMOL/L (ref 94–109)
CHLORIDE SERPL-SCNC: 110 MMOL/L (ref 94–109)
CHLORIDE SERPL-SCNC: 111 MMOL/L (ref 94–109)
CO2 SERPL-SCNC: 12 MMOL/L (ref 20–32)
CO2 SERPL-SCNC: 12 MMOL/L (ref 20–32)
CO2 SERPL-SCNC: 15 MMOL/L (ref 20–32)
CO2 SERPL-SCNC: 15 MMOL/L (ref 20–32)
CO2 SERPL-SCNC: 18 MMOL/L (ref 20–32)
CO2 SERPL-SCNC: 18 MMOL/L (ref 20–32)
CO2 SERPL-SCNC: 19 MMOL/L (ref 20–32)
CO2 SERPL-SCNC: 19 MMOL/L (ref 20–32)
CO2 SERPL-SCNC: 20 MMOL/L (ref 20–32)
CREAT SERPL-MCNC: 0.26 MG/DL (ref 0.52–1.04)
CREAT SERPL-MCNC: 0.32 MG/DL (ref 0.52–1.04)
CREAT SERPL-MCNC: 0.34 MG/DL (ref 0.52–1.04)
CREAT SERPL-MCNC: 0.36 MG/DL (ref 0.52–1.04)
CREAT SERPL-MCNC: 0.36 MG/DL (ref 0.52–1.04)
CREAT SERPL-MCNC: 0.37 MG/DL (ref 0.52–1.04)
CREAT SERPL-MCNC: 0.37 MG/DL (ref 0.52–1.04)
CREAT SERPL-MCNC: 0.39 MG/DL (ref 0.52–1.04)
CREAT SERPL-MCNC: 0.46 MG/DL (ref 0.52–1.04)
CREAT UR-MCNC: 48 MG/DL
ERYTHROCYTE [DISTWIDTH] IN BLOOD BY AUTOMATED COUNT: 15.5 % (ref 10–15)
GFR SERPL CREATININE-BSD FRML MDRD: >90 ML/MIN/1.7M2
GLUCOSE BLDC GLUCOMTR-MCNC: 133 MG/DL (ref 70–99)
GLUCOSE BLDC GLUCOMTR-MCNC: 136 MG/DL (ref 70–99)
GLUCOSE BLDC GLUCOMTR-MCNC: 137 MG/DL (ref 70–99)
GLUCOSE BLDC GLUCOMTR-MCNC: 137 MG/DL (ref 70–99)
GLUCOSE BLDC GLUCOMTR-MCNC: 138 MG/DL (ref 70–99)
GLUCOSE BLDC GLUCOMTR-MCNC: 138 MG/DL (ref 70–99)
GLUCOSE BLDC GLUCOMTR-MCNC: 140 MG/DL (ref 70–99)
GLUCOSE BLDC GLUCOMTR-MCNC: 141 MG/DL (ref 70–99)
GLUCOSE BLDC GLUCOMTR-MCNC: 141 MG/DL (ref 70–99)
GLUCOSE BLDC GLUCOMTR-MCNC: 142 MG/DL (ref 70–99)
GLUCOSE BLDC GLUCOMTR-MCNC: 143 MG/DL (ref 70–99)
GLUCOSE BLDC GLUCOMTR-MCNC: 143 MG/DL (ref 70–99)
GLUCOSE BLDC GLUCOMTR-MCNC: 144 MG/DL (ref 70–99)
GLUCOSE BLDC GLUCOMTR-MCNC: 144 MG/DL (ref 70–99)
GLUCOSE BLDC GLUCOMTR-MCNC: 149 MG/DL (ref 70–99)
GLUCOSE BLDC GLUCOMTR-MCNC: 159 MG/DL (ref 70–99)
GLUCOSE BLDC GLUCOMTR-MCNC: 161 MG/DL (ref 70–99)
GLUCOSE BLDC GLUCOMTR-MCNC: 163 MG/DL (ref 70–99)
GLUCOSE BLDC GLUCOMTR-MCNC: 164 MG/DL (ref 70–99)
GLUCOSE BLDC GLUCOMTR-MCNC: 168 MG/DL (ref 70–99)
GLUCOSE BLDC GLUCOMTR-MCNC: 168 MG/DL (ref 70–99)
GLUCOSE BLDC GLUCOMTR-MCNC: 172 MG/DL (ref 70–99)
GLUCOSE BLDC GLUCOMTR-MCNC: 221 MG/DL (ref 70–99)
GLUCOSE SERPL-MCNC: 141 MG/DL (ref 70–99)
GLUCOSE SERPL-MCNC: 142 MG/DL (ref 70–99)
GLUCOSE SERPL-MCNC: 143 MG/DL (ref 70–99)
GLUCOSE SERPL-MCNC: 155 MG/DL (ref 70–99)
GLUCOSE SERPL-MCNC: 159 MG/DL (ref 70–99)
GLUCOSE SERPL-MCNC: 169 MG/DL (ref 70–99)
GLUCOSE SERPL-MCNC: 173 MG/DL (ref 70–99)
GLUCOSE SERPL-MCNC: 178 MG/DL (ref 70–99)
GLUCOSE SERPL-MCNC: 257 MG/DL (ref 70–99)
HBA1C MFR BLD: 8.4 % (ref 4.3–6)
HCT VFR BLD AUTO: 31.7 % (ref 35–47)
HGB BLD-MCNC: 10.1 G/DL (ref 11.7–15.7)
KETONES BLD-SCNC: 0.3 MMOL/L (ref 0–0.6)
KETONES BLD-SCNC: 0.7 MMOL/L (ref 0–0.6)
KETONES BLD-SCNC: 0.7 MMOL/L (ref 0–0.6)
KETONES BLD-SCNC: 1.3 MMOL/L (ref 0–0.6)
KETONES BLD-SCNC: 1.9 MMOL/L (ref 0–0.6)
KETONES BLD-SCNC: 2.6 MMOL/L (ref 0–0.6)
KETONES BLD-SCNC: 5 MMOL/L (ref 0–0.6)
LACTATE BLD-SCNC: 1 MMOL/L (ref 0.7–2)
LIPASE SERPL-CCNC: 60 U/L (ref 73–393)
MAGNESIUM SERPL-MCNC: 1.8 MG/DL (ref 1.6–2.3)
MAGNESIUM SERPL-MCNC: 1.8 MG/DL (ref 1.6–2.3)
MCH RBC QN AUTO: 26.6 PG (ref 26.5–33)
MCHC RBC AUTO-ENTMCNC: 31.9 G/DL (ref 31.5–36.5)
MCV RBC AUTO: 84 FL (ref 78–100)
MRSA DNA SPEC QL NAA+PROBE: NEGATIVE
OSMOLALITY SERPL: 290 MMOL/KG (ref 275–295)
PHOSPHATE SERPL-MCNC: 2.8 MG/DL (ref 2.5–4.5)
PLATELET # BLD AUTO: 195 10E9/L (ref 150–450)
POTASSIUM SERPL-SCNC: 3.5 MMOL/L (ref 3.4–5.3)
POTASSIUM SERPL-SCNC: 3.7 MMOL/L (ref 3.4–5.3)
POTASSIUM SERPL-SCNC: 3.8 MMOL/L (ref 3.4–5.3)
POTASSIUM SERPL-SCNC: 3.9 MMOL/L (ref 3.4–5.3)
POTASSIUM SERPL-SCNC: 3.9 MMOL/L (ref 3.4–5.3)
POTASSIUM SERPL-SCNC: 4.1 MMOL/L (ref 3.4–5.3)
POTASSIUM SERPL-SCNC: 4.5 MMOL/L (ref 3.4–5.3)
PROT SERPL-MCNC: 6.4 G/DL (ref 6.8–8.8)
PROT SERPL-MCNC: 6.6 G/DL (ref 6.8–8.8)
PROT UR-MCNC: 0.29 G/L
PROT/CREAT 24H UR: 0.6 G/G CR (ref 0–0.2)
RBC # BLD AUTO: 3.79 10E12/L (ref 3.8–5.2)
SODIUM SERPL-SCNC: 134 MMOL/L (ref 133–144)
SODIUM SERPL-SCNC: 134 MMOL/L (ref 133–144)
SODIUM SERPL-SCNC: 136 MMOL/L (ref 133–144)
SODIUM SERPL-SCNC: 137 MMOL/L (ref 133–144)
SODIUM SERPL-SCNC: 138 MMOL/L (ref 133–144)
SODIUM SERPL-SCNC: 139 MMOL/L (ref 133–144)
SODIUM SERPL-SCNC: 139 MMOL/L (ref 133–144)
SPECIMEN EXP DATE BLD: NORMAL
SPECIMEN SOURCE: NORMAL
SPECIMEN SOURCE: NORMAL
T PALLIDUM IGG+IGM SER QL: NEGATIVE
WBC # BLD AUTO: 13 10E9/L (ref 4–11)
WET PREP SPEC: NORMAL

## 2017-09-10 PROCEDURE — 25800025 ZZH RX 258

## 2017-09-10 PROCEDURE — 93005 ELECTROCARDIOGRAM TRACING: CPT

## 2017-09-10 PROCEDURE — 86706 HEP B SURFACE ANTIBODY: CPT | Performed by: OBSTETRICS & GYNECOLOGY

## 2017-09-10 PROCEDURE — 82010 KETONE BODYS QUAN: CPT | Performed by: INTERNAL MEDICINE

## 2017-09-10 PROCEDURE — 82010 KETONE BODYS QUAN: CPT | Performed by: OBSTETRICS & GYNECOLOGY

## 2017-09-10 PROCEDURE — 82010 KETONE BODYS QUAN: CPT | Performed by: STUDENT IN AN ORGANIZED HEALTH CARE EDUCATION/TRAINING PROGRAM

## 2017-09-10 PROCEDURE — 84156 ASSAY OF PROTEIN URINE: CPT | Performed by: OBSTETRICS & GYNECOLOGY

## 2017-09-10 PROCEDURE — 83930 ASSAY OF BLOOD OSMOLALITY: CPT | Performed by: OBSTETRICS & GYNECOLOGY

## 2017-09-10 PROCEDURE — 25000128 H RX IP 250 OP 636: Performed by: STUDENT IN AN ORGANIZED HEALTH CARE EDUCATION/TRAINING PROGRAM

## 2017-09-10 PROCEDURE — 93010 ELECTROCARDIOGRAM REPORT: CPT | Performed by: INTERNAL MEDICINE

## 2017-09-10 PROCEDURE — 00000146 ZZHCL STATISTIC GLUCOSE BY METER IP

## 2017-09-10 PROCEDURE — 36415 COLL VENOUS BLD VENIPUNCTURE: CPT | Performed by: OBSTETRICS & GYNECOLOGY

## 2017-09-10 PROCEDURE — 80048 BASIC METABOLIC PNL TOTAL CA: CPT | Performed by: OBSTETRICS & GYNECOLOGY

## 2017-09-10 PROCEDURE — 87186 SC STD MICRODIL/AGAR DIL: CPT | Performed by: OBSTETRICS & GYNECOLOGY

## 2017-09-10 PROCEDURE — 83735 ASSAY OF MAGNESIUM: CPT | Performed by: STUDENT IN AN ORGANIZED HEALTH CARE EDUCATION/TRAINING PROGRAM

## 2017-09-10 PROCEDURE — 25000125 ZZHC RX 250: Performed by: PHYSICIAN ASSISTANT

## 2017-09-10 PROCEDURE — 25000128 H RX IP 250 OP 636

## 2017-09-10 PROCEDURE — 36415 COLL VENOUS BLD VENIPUNCTURE: CPT | Performed by: STUDENT IN AN ORGANIZED HEALTH CARE EDUCATION/TRAINING PROGRAM

## 2017-09-10 PROCEDURE — 25800025 ZZH RX 258: Performed by: OBSTETRICS & GYNECOLOGY

## 2017-09-10 PROCEDURE — 86850 RBC ANTIBODY SCREEN: CPT | Performed by: OBSTETRICS & GYNECOLOGY

## 2017-09-10 PROCEDURE — 87653 STREP B DNA AMP PROBE: CPT | Performed by: OBSTETRICS & GYNECOLOGY

## 2017-09-10 PROCEDURE — 87591 N.GONORRHOEAE DNA AMP PROB: CPT | Performed by: OBSTETRICS & GYNECOLOGY

## 2017-09-10 PROCEDURE — 86901 BLOOD TYPING SEROLOGIC RH(D): CPT | Performed by: OBSTETRICS & GYNECOLOGY

## 2017-09-10 PROCEDURE — 83735 ASSAY OF MAGNESIUM: CPT | Performed by: OBSTETRICS & GYNECOLOGY

## 2017-09-10 PROCEDURE — 83605 ASSAY OF LACTIC ACID: CPT | Performed by: OBSTETRICS & GYNECOLOGY

## 2017-09-10 PROCEDURE — 80076 HEPATIC FUNCTION PANEL: CPT | Performed by: OBSTETRICS & GYNECOLOGY

## 2017-09-10 PROCEDURE — 99223 1ST HOSP IP/OBS HIGH 75: CPT | Performed by: HOSPITALIST

## 2017-09-10 PROCEDURE — 87641 MR-STAPH DNA AMP PROBE: CPT | Performed by: SURGERY

## 2017-09-10 PROCEDURE — 25000128 H RX IP 250 OP 636: Performed by: OBSTETRICS & GYNECOLOGY

## 2017-09-10 PROCEDURE — 86900 BLOOD TYPING SEROLOGIC ABO: CPT | Performed by: OBSTETRICS & GYNECOLOGY

## 2017-09-10 PROCEDURE — 86762 RUBELLA ANTIBODY: CPT | Performed by: OBSTETRICS & GYNECOLOGY

## 2017-09-10 PROCEDURE — 80048 BASIC METABOLIC PNL TOTAL CA: CPT | Performed by: INTERNAL MEDICINE

## 2017-09-10 PROCEDURE — 99291 CRITICAL CARE FIRST HOUR: CPT | Mod: GC | Performed by: SURGERY

## 2017-09-10 PROCEDURE — 25000132 ZZH RX MED GY IP 250 OP 250 PS 637: Performed by: OBSTETRICS & GYNECOLOGY

## 2017-09-10 PROCEDURE — 99207 ZZC CONSULT E&M CHANGED TO INITIAL LEVEL: CPT | Performed by: HOSPITALIST

## 2017-09-10 PROCEDURE — 86780 TREPONEMA PALLIDUM: CPT | Performed by: OBSTETRICS & GYNECOLOGY

## 2017-09-10 PROCEDURE — S0028 INJECTION, FAMOTIDINE, 20 MG: HCPCS | Performed by: OBSTETRICS & GYNECOLOGY

## 2017-09-10 PROCEDURE — 87491 CHLMYD TRACH DNA AMP PROBE: CPT | Performed by: OBSTETRICS & GYNECOLOGY

## 2017-09-10 PROCEDURE — 83690 ASSAY OF LIPASE: CPT | Performed by: OBSTETRICS & GYNECOLOGY

## 2017-09-10 PROCEDURE — 36415 COLL VENOUS BLD VENIPUNCTURE: CPT | Performed by: INTERNAL MEDICINE

## 2017-09-10 PROCEDURE — 76700 US EXAM ABDOM COMPLETE: CPT

## 2017-09-10 PROCEDURE — 12000030 ZZH R&B OB INTERMEDIATE UMMC

## 2017-09-10 PROCEDURE — 87640 STAPH A DNA AMP PROBE: CPT | Performed by: SURGERY

## 2017-09-10 PROCEDURE — 84100 ASSAY OF PHOSPHORUS: CPT | Performed by: OBSTETRICS & GYNECOLOGY

## 2017-09-10 PROCEDURE — 25000125 ZZHC RX 250: Performed by: OBSTETRICS & GYNECOLOGY

## 2017-09-10 PROCEDURE — 85027 COMPLETE CBC AUTOMATED: CPT | Performed by: OBSTETRICS & GYNECOLOGY

## 2017-09-10 PROCEDURE — 80048 BASIC METABOLIC PNL TOTAL CA: CPT | Performed by: STUDENT IN AN ORGANIZED HEALTH CARE EDUCATION/TRAINING PROGRAM

## 2017-09-10 PROCEDURE — 40000275 ZZH STATISTIC RCP TIME EA 10 MIN

## 2017-09-10 PROCEDURE — 87389 HIV-1 AG W/HIV-1&-2 AB AG IA: CPT | Performed by: OBSTETRICS & GYNECOLOGY

## 2017-09-10 PROCEDURE — 82150 ASSAY OF AMYLASE: CPT | Performed by: OBSTETRICS & GYNECOLOGY

## 2017-09-10 PROCEDURE — 87040 BLOOD CULTURE FOR BACTERIA: CPT | Performed by: OBSTETRICS & GYNECOLOGY

## 2017-09-10 PROCEDURE — 87210 SMEAR WET MOUNT SALINE/INK: CPT | Performed by: OBSTETRICS & GYNECOLOGY

## 2017-09-10 PROCEDURE — 83036 HEMOGLOBIN GLYCOSYLATED A1C: CPT | Performed by: OBSTETRICS & GYNECOLOGY

## 2017-09-10 RX ORDER — HYDROMORPHONE HYDROCHLORIDE 1 MG/ML
INJECTION, SOLUTION INTRAMUSCULAR; INTRAVENOUS; SUBCUTANEOUS
Status: COMPLETED
Start: 2017-09-10 | End: 2017-09-10

## 2017-09-10 RX ORDER — NALOXONE HYDROCHLORIDE 0.4 MG/ML
.1-.4 INJECTION, SOLUTION INTRAMUSCULAR; INTRAVENOUS; SUBCUTANEOUS
Status: DISCONTINUED | OUTPATIENT
Start: 2017-09-10 | End: 2017-09-13 | Stop reason: HOSPADM

## 2017-09-10 RX ORDER — POTASSIUM CHLORIDE 1.5 G/1.58G
20-40 POWDER, FOR SOLUTION ORAL
Status: DISCONTINUED | OUTPATIENT
Start: 2017-09-10 | End: 2017-09-13 | Stop reason: HOSPADM

## 2017-09-10 RX ORDER — ONDANSETRON 2 MG/ML
4-8 INJECTION INTRAMUSCULAR; INTRAVENOUS EVERY 6 HOURS PRN
Status: DISCONTINUED | OUTPATIENT
Start: 2017-09-10 | End: 2017-09-10

## 2017-09-10 RX ORDER — DEXTROSE MONOHYDRATE 25 G/50ML
25-50 INJECTION, SOLUTION INTRAVENOUS
Status: DISCONTINUED | OUTPATIENT
Start: 2017-09-10 | End: 2017-09-13 | Stop reason: HOSPADM

## 2017-09-10 RX ORDER — POTASSIUM CHLORIDE 1500 MG/1
20-40 TABLET, EXTENDED RELEASE ORAL
Status: DISCONTINUED | OUTPATIENT
Start: 2017-09-10 | End: 2017-09-10

## 2017-09-10 RX ORDER — PROMETHAZINE HYDROCHLORIDE 25 MG/ML
12.5 INJECTION, SOLUTION INTRAMUSCULAR; INTRAVENOUS EVERY 6 HOURS PRN
Status: DISCONTINUED | OUTPATIENT
Start: 2017-09-10 | End: 2017-09-10

## 2017-09-10 RX ORDER — POTASSIUM CHLORIDE 750 MG/1
20-40 TABLET, EXTENDED RELEASE ORAL
Status: DISCONTINUED | OUTPATIENT
Start: 2017-09-10 | End: 2017-09-13 | Stop reason: HOSPADM

## 2017-09-10 RX ORDER — POTASSIUM CHLORIDE 1.5 G/1.58G
20-40 POWDER, FOR SOLUTION ORAL
Status: DISCONTINUED | OUTPATIENT
Start: 2017-09-10 | End: 2017-09-10

## 2017-09-10 RX ORDER — POTASSIUM CL/LIDO/0.9 % NACL 10MEQ/0.1L
10 INTRAVENOUS SOLUTION, PIGGYBACK (ML) INTRAVENOUS
Status: DISCONTINUED | OUTPATIENT
Start: 2017-09-10 | End: 2017-09-10

## 2017-09-10 RX ORDER — POTASSIUM CHLORIDE 7.45 MG/ML
10 INJECTION INTRAVENOUS
Status: DISCONTINUED | OUTPATIENT
Start: 2017-09-10 | End: 2017-09-13 | Stop reason: HOSPADM

## 2017-09-10 RX ORDER — NICOTINE POLACRILEX 4 MG
15-30 LOZENGE BUCCAL
Status: DISCONTINUED | OUTPATIENT
Start: 2017-09-10 | End: 2017-09-13 | Stop reason: HOSPADM

## 2017-09-10 RX ORDER — POTASSIUM CL/LIDO/0.9 % NACL 10MEQ/0.1L
10 INTRAVENOUS SOLUTION, PIGGYBACK (ML) INTRAVENOUS
Status: DISCONTINUED | OUTPATIENT
Start: 2017-09-10 | End: 2017-09-13 | Stop reason: HOSPADM

## 2017-09-10 RX ORDER — POTASSIUM CHLORIDE 7.45 MG/ML
10 INJECTION INTRAVENOUS
Status: DISCONTINUED | OUTPATIENT
Start: 2017-09-10 | End: 2017-09-10

## 2017-09-10 RX ORDER — ONDANSETRON 2 MG/ML
4 INJECTION INTRAMUSCULAR; INTRAVENOUS EVERY 6 HOURS PRN
Status: DISCONTINUED | OUTPATIENT
Start: 2017-09-10 | End: 2017-09-10

## 2017-09-10 RX ORDER — DEXTROSE MONOHYDRATE, SODIUM CHLORIDE, AND POTASSIUM CHLORIDE 50; 1.49; 4.5 G/1000ML; G/1000ML; G/1000ML
INJECTION, SOLUTION INTRAVENOUS
Status: COMPLETED
Start: 2017-09-10 | End: 2017-09-10

## 2017-09-10 RX ORDER — SODIUM CHLORIDE 9 MG/ML
INJECTION, SOLUTION INTRAVENOUS
Status: COMPLETED
Start: 2017-09-10 | End: 2017-09-10

## 2017-09-10 RX ORDER — DEXTROSE MONOHYDRATE, SODIUM CHLORIDE, AND POTASSIUM CHLORIDE 50; 1.49; 9 G/1000ML; G/1000ML; G/1000ML
INJECTION, SOLUTION INTRAVENOUS CONTINUOUS
Status: DISCONTINUED | OUTPATIENT
Start: 2017-09-10 | End: 2017-09-13 | Stop reason: HOSPADM

## 2017-09-10 RX ORDER — DEXTROSE MONOHYDRATE 25 G/50ML
25-50 INJECTION, SOLUTION INTRAVENOUS
Status: DISCONTINUED | OUTPATIENT
Start: 2017-09-10 | End: 2017-09-10 | Stop reason: HOSPADM

## 2017-09-10 RX ORDER — SODIUM CHLORIDE, SODIUM LACTATE, POTASSIUM CHLORIDE, CALCIUM CHLORIDE 600; 310; 30; 20 MG/100ML; MG/100ML; MG/100ML; MG/100ML
INJECTION, SOLUTION INTRAVENOUS
Status: COMPLETED
Start: 2017-09-10 | End: 2017-09-10

## 2017-09-10 RX ORDER — SODIUM CHLORIDE 9 MG/ML
INJECTION, SOLUTION INTRAVENOUS CONTINUOUS
Status: DISCONTINUED | OUTPATIENT
Start: 2017-09-10 | End: 2017-09-13 | Stop reason: HOSPADM

## 2017-09-10 RX ORDER — ONDANSETRON 2 MG/ML
4 INJECTION INTRAMUSCULAR; INTRAVENOUS EVERY 6 HOURS
Status: DISCONTINUED | OUTPATIENT
Start: 2017-09-11 | End: 2017-09-13

## 2017-09-10 RX ORDER — METOCLOPRAMIDE HYDROCHLORIDE 5 MG/ML
10 INJECTION INTRAMUSCULAR; INTRAVENOUS EVERY 6 HOURS PRN
Status: DISCONTINUED | OUTPATIENT
Start: 2017-09-10 | End: 2017-09-12

## 2017-09-10 RX ORDER — PYRIDOXINE HCL (VITAMIN B6) 50 MG
50 TABLET ORAL 2 TIMES DAILY
Status: DISCONTINUED | OUTPATIENT
Start: 2017-09-10 | End: 2017-09-13 | Stop reason: HOSPADM

## 2017-09-10 RX ORDER — HYDROMORPHONE HYDROCHLORIDE 1 MG/ML
0.5 INJECTION, SOLUTION INTRAMUSCULAR; INTRAVENOUS; SUBCUTANEOUS ONCE
Status: COMPLETED | OUTPATIENT
Start: 2017-09-10 | End: 2017-09-10

## 2017-09-10 RX ORDER — ONDANSETRON 2 MG/ML
4 INJECTION INTRAMUSCULAR; INTRAVENOUS EVERY 6 HOURS PRN
Status: DISCONTINUED | OUTPATIENT
Start: 2017-09-10 | End: 2017-09-12

## 2017-09-10 RX ORDER — POTASSIUM CHLORIDE 29.8 MG/ML
20 INJECTION INTRAVENOUS
Status: DISCONTINUED | OUTPATIENT
Start: 2017-09-10 | End: 2017-09-13 | Stop reason: HOSPADM

## 2017-09-10 RX ORDER — POTASSIUM CHLORIDE 29.8 MG/ML
20 INJECTION INTRAVENOUS
Status: DISCONTINUED | OUTPATIENT
Start: 2017-09-10 | End: 2017-09-10

## 2017-09-10 RX ORDER — MAGNESIUM SULFATE HEPTAHYDRATE 40 MG/ML
4 INJECTION, SOLUTION INTRAVENOUS EVERY 4 HOURS PRN
Status: DISCONTINUED | OUTPATIENT
Start: 2017-09-10 | End: 2017-09-13 | Stop reason: HOSPADM

## 2017-09-10 RX ORDER — NICOTINE POLACRILEX 4 MG
15-30 LOZENGE BUCCAL
Status: DISCONTINUED | OUTPATIENT
Start: 2017-09-10 | End: 2017-09-10 | Stop reason: HOSPADM

## 2017-09-10 RX ADMIN — Medication 1 MG: at 11:55

## 2017-09-10 RX ADMIN — POTASSIUM CHLORIDE, DEXTROSE MONOHYDRATE AND SODIUM CHLORIDE 1000 ML: 150; 5; 450 INJECTION, SOLUTION INTRAVENOUS at 02:37

## 2017-09-10 RX ADMIN — HYDROMORPHONE HYDROCHLORIDE 0.5 MG: 1 INJECTION, SOLUTION INTRAMUSCULAR; INTRAVENOUS; SUBCUTANEOUS at 02:02

## 2017-09-10 RX ADMIN — Medication 1000 ML: at 02:00

## 2017-09-10 RX ADMIN — Medication 10 MEQ: at 11:26

## 2017-09-10 RX ADMIN — ONDANSETRON 4 MG: 2 INJECTION INTRAMUSCULAR; INTRAVENOUS at 09:50

## 2017-09-10 RX ADMIN — POTASSIUM CHLORIDE, DEXTROSE MONOHYDRATE AND SODIUM CHLORIDE: 150; 5; 900 INJECTION, SOLUTION INTRAVENOUS at 09:53

## 2017-09-10 RX ADMIN — MAGNESIUM SULFATE HEPTAHYDRATE 3 G: 500 INJECTION, SOLUTION INTRAMUSCULAR; INTRAVENOUS at 11:28

## 2017-09-10 RX ADMIN — HUMAN INSULIN 3 UNITS/HR: 100 INJECTION, SOLUTION SUBCUTANEOUS at 03:43

## 2017-09-10 RX ADMIN — HUMAN INSULIN 1 UNITS/HR: 100 INJECTION, SOLUTION SUBCUTANEOUS at 14:09

## 2017-09-10 RX ADMIN — SODIUM CHLORIDE 1000 ML: 9 INJECTION, SOLUTION INTRAVENOUS at 02:00

## 2017-09-10 RX ADMIN — HYDROMORPHONE HYDROCHLORIDE 0.5 MG: 1 INJECTION, SOLUTION INTRAMUSCULAR; INTRAVENOUS; SUBCUTANEOUS at 20:46

## 2017-09-10 RX ADMIN — ONDANSETRON 4 MG: 2 INJECTION INTRAMUSCULAR; INTRAVENOUS at 11:41

## 2017-09-10 RX ADMIN — HYDROMORPHONE HYDROCHLORIDE 0.5 MG: 1 INJECTION, SOLUTION INTRAMUSCULAR; INTRAVENOUS; SUBCUTANEOUS at 03:37

## 2017-09-10 RX ADMIN — HYDROMORPHONE HYDROCHLORIDE 1 MG: 1 INJECTION, SOLUTION INTRAMUSCULAR; INTRAVENOUS; SUBCUTANEOUS at 11:55

## 2017-09-10 RX ADMIN — FAMOTIDINE 20 MG: 10 INJECTION, SOLUTION INTRAVENOUS at 04:45

## 2017-09-10 RX ADMIN — POTASSIUM CHLORIDE, DEXTROSE MONOHYDRATE AND SODIUM CHLORIDE: 150; 5; 900 INJECTION, SOLUTION INTRAVENOUS at 02:47

## 2017-09-10 RX ADMIN — METOCLOPRAMIDE 10 MG: 5 INJECTION, SOLUTION INTRAMUSCULAR; INTRAVENOUS at 22:18

## 2017-09-10 RX ADMIN — SODIUM CHLORIDE, POTASSIUM CHLORIDE, SODIUM LACTATE AND CALCIUM CHLORIDE 500 ML: 600; 310; 30; 20 INJECTION, SOLUTION INTRAVENOUS at 09:53

## 2017-09-10 RX ADMIN — PANTOPRAZOLE SODIUM 40 MG: 40 INJECTION, POWDER, FOR SOLUTION INTRAVENOUS at 20:34

## 2017-09-10 RX ADMIN — Medication 10 MEQ: at 16:32

## 2017-09-10 RX ADMIN — POTASSIUM CHLORIDE, DEXTROSE MONOHYDRATE AND SODIUM CHLORIDE: 150; 5; 900 INJECTION, SOLUTION INTRAVENOUS at 22:29

## 2017-09-10 RX ADMIN — FAMOTIDINE 20 MG: 10 INJECTION, SOLUTION INTRAVENOUS at 16:23

## 2017-09-10 RX ADMIN — HUMAN INSULIN 1 UNITS/HR: 100 INJECTION, SOLUTION SUBCUTANEOUS at 18:10

## 2017-09-10 RX ADMIN — PANTOPRAZOLE SODIUM 40 MG: 40 INJECTION, POWDER, FOR SOLUTION INTRAVENOUS at 14:22

## 2017-09-10 RX ADMIN — ONDANSETRON 4 MG: 2 INJECTION INTRAMUSCULAR; INTRAVENOUS at 08:13

## 2017-09-10 RX ADMIN — HYDROMORPHONE HYDROCHLORIDE 0.5 MG: 1 INJECTION, SOLUTION INTRAMUSCULAR; INTRAVENOUS; SUBCUTANEOUS at 18:12

## 2017-09-10 RX ADMIN — ONDANSETRON 4 MG: 2 INJECTION INTRAMUSCULAR; INTRAVENOUS at 18:02

## 2017-09-10 RX ADMIN — HUMAN INSULIN 1.5 UNITS/HR: 100 INJECTION, SOLUTION SUBCUTANEOUS at 17:09

## 2017-09-10 RX ADMIN — PANTOPRAZOLE SODIUM 40 MG: 40 INJECTION, POWDER, FOR SOLUTION INTRAVENOUS at 08:11

## 2017-09-10 RX ADMIN — LIDOCAINE HYDROCHLORIDE 30 ML: 20 SOLUTION ORAL; TOPICAL at 04:54

## 2017-09-10 ASSESSMENT — ACTIVITIES OF DAILY LIVING (ADL)
COGNITION: 0 - NO COGNITION ISSUES REPORTED
DRESS: 0-->INDEPENDENT
RETIRED_EATING: 0-->INDEPENDENT
AMBULATION: 0-->INDEPENDENT
FALL_HISTORY_WITHIN_LAST_SIX_MONTHS: NO
BATHING: 0-->INDEPENDENT
SWALLOWING: 0-->SWALLOWS FOODS/LIQUIDS WITHOUT DIFFICULTY
TOILETING: 0-->INDEPENDENT
TRANSFERRING: 0-->INDEPENDENT
RETIRED_COMMUNICATION: 0-->UNDERSTANDS/COMMUNICATES WITHOUT DIFFICULTY

## 2017-09-10 NOTE — PROVIDER NOTIFICATION
09/10/17 0108   Provider Notification   Provider Name/Title Dr. Murillo   Method of Notification Phone   Notification Reason Status Update   MD updated on pt status and transfer of care to [facility].

## 2017-09-10 NOTE — IP AVS SNAPSHOT
MRN:[id number]                      After Visit Summary   9/10/2017    Anne Gunter    MRN: [id number]           Thank you!     Thank you for choosing [facility] for your care. Our goal is always to provide you with excellent care. Hearing back from our patients is one way we can continue to improve our services. Please take a few minutes to complete the written survey that you may receive in the mail after you visit with us. Thank you!        Patient Information     Date Of Birth          1990        About your hospital stay     You were admitted on:  September 10, 2017 You last received care in the:  [facility]    You were discharged on:  September 13, 2017       Who to Call     For medical emergencies, please call 911.  For non-urgent questions about your medical care, please call your primary care provider or clinic, [phone]          Attending Provider     Provider Specialty    Jose Luevano MD OB/Gyn       Primary Care Provider Office Phone # Fax #    Isiah Naidu [phone]      After Care Instructions     Discharge Instructions       Check your blood sugars 30 minutes before meals, 1 hour after each meal, fasting in the morning and before bedtime.    Take Insulin Levemir 18 units twice a day at midnight and at noon daily.    Take 5U of Novolog insulin with each meal.    When checking your blood sugars 30 minutes before a meal you will give yourself insulin for correction prior to the meal in addition to the 5U with meals.  The insulin should be given as follows: 1 unit for every 50 points of blood glucose above 140.  For blood glucose 140-190 give 1 unit of Novolog insulin  191-240 give 2 units of Novolog insulin  241-290 give 3 units of Novolog insulin, etc.    Please call the clinic tomorrow at 8:30 am to make an appointment with MARINO. Call [phone].    Please return to the hospital with any concerns.                  Pending Results     Date and Time  "Order Name Status Description    9/10/2017 0320 Referral sensitivity Preliminary             Admission Information     Date & Time Provider Department Dept. Phone    9/10/2017 Jose Luevano MD [facility] [phone]      Your Vitals Were     Blood Pressure Temperature Respirations Height Weight Pulse Oximetry    105/66 98.7  F (37.1  C) (Oral) 18 1.7 m (5' 6.93\") 76.5 kg (168 lb 10.4 oz) 95%    BMI (Body Mass Index)                   26.47 kg/m2           [employer] customer [email or web address] Information     [employer] lets you send messages to your doctor, view your test results, renew your prescriptions, schedule appointments and more. To sign up, go to [email or web address] . Click on \"Log in\" on the left side of the screen, which will take you to the Welcome page. Then click on \"Sign up Now\" on the right side of the page.     You will be asked to enter the access code listed below, as well as some personal information. Please follow the directions to create your username and password.     Your access code is: [email or web address]  Expires: 2017 11:06 PM     Your access code will  in 90 days. If you need help or a new code, please call your [facility] clinic or [phone].        Care EveryWhere ID     This is your Care EveryWhere ID. This could be used by other organizations to access your [facility] medical records  [id number]        Equal Access to Services     VALENTINO ONEILL AH: Hadii buster souzao Socee, waaxda luqadaha, qaybta kaalmada adeegyada, waxedilson julianna lópez . So [facility] [phone].    ATENCIÓN: Si habla español, tiene a monreal disposición servicios gratuitos de asistencia lingüística. Llame al [phone].    We comply with applicable federal civil rights laws and [place] laws. We do not discriminate on the basis of race, color, national origin, age, disability sex, sexual orientation or gender identity.               Review of your medicines      START taking        Dose / Directions    famotidine 20 MG " tablet   Commonly known as:  PEPCID   Used for:  Type 1 diabetes mellitus in pregnancy, second trimester        Dose:  20 mg   Take 1 tablet (20 mg) by mouth 2 times daily   Quantity:  40 tablet   Refills:  1       pantoprazole 40 MG EC tablet   Commonly known as:  PROTONIX   Used for:  Type 1 diabetes mellitus in pregnancy, second trimester        Dose:  40 mg   Take 1 tablet (40 mg) by mouth 2 times daily   Quantity:  30 tablet   Refills:  1       polyethylene glycol Packet   Commonly known as:  MIRALAX/GLYCOLAX   Used for:  Type 1 diabetes mellitus in pregnancy, second trimester        Dose:  17 g   Take 17 g by mouth daily as needed for constipation (titrate to one bowel movement daily)   Quantity:  7 packet   Refills:  1       senna-docusate 8.6-50 MG per tablet   Commonly known as:  SENOKOT-S;PERICOLACE   Used for:  Hyperemesis gravidarum        Dose:  1 tablet   Take 1 tablet by mouth 2 times daily as needed for constipation   Quantity:  100 tablet   Refills:  0         CONTINUE these medicines which may have CHANGED, or have new prescriptions. If we are uncertain of the size of tablets/capsules you have at home, strength may be listed as something that might have changed.        Dose / Directions    * insulin aspart 100 UNIT/ML injection   Commonly known as:  NovoLOG PEN   This may have changed:  how much to take   Used for:  Supervision of high-risk pregnancy, third trimester        Dose:  5 Units   Inject 5 Units Subcutaneous 3 times daily (with meals)   Quantity:  20 mL   Refills:  0       * insulin aspart 100 UNIT/ML injection   Commonly known as:  NovoLOG PEN   This may have changed:    - how much to take  - when to take this  - additional instructions   Used for:  Type 1 diabetes mellitus in pregnancy, second trimester        Dose:  1-9 Units   Inject 1-9 Units Subcutaneous At Bedtime Correction Scale - custom DOSING    Do Not give Bedtime Correction Insulin if BG less than 200 -190 = 1 unit. BG  191-240 = 2 units. -290 = 3 units. -340 = 4 units.  Notify provider if glucose greater than or equal to 350 mg/dL after administration.   Quantity:  3 mL   Refills:  3       insulin detemir 100 UNIT/ML injection   Commonly known as:  LEVEMIR FLEXPEN/FLEXTOUCH   This may have changed:    - how much to take  - when to take this  - additional instructions  - Another medication with the same name was removed. Continue taking this medication, and follow the directions you see here.   Used for:  Type 1 diabetes mellitus in pregnancy, second trimester        Dose:  18 Units   Inject 18 Units Subcutaneous 2 times daily Take first dose at midnight, next dose at 12 pm.   Quantity:  3 mL   Refills:  3       metoclopramide 10 MG tablet   Commonly known as:  REGLAN   This may have changed:    - when to take this  - reasons to take this   Used for:  Type 1 diabetes mellitus in pregnancy, second trimester        Dose:  10 mg   Take 1 tablet (10 mg) by mouth 4 times daily as needed (4x Daily AC & HS prn nausea and abdominal pain)   Quantity:  60 tablet   Refills:  0       ondansetron 4 MG ODT tab   Commonly known as:  ZOFRAN ODT   This may have changed:  reasons to take this   Used for:  Hyperemesis gravidarum        Dose:  4 mg   Take 1 tablet (4 mg) by mouth every 8 hours as needed for nausea   Quantity:  20 tablet   Refills:  1       * Notice:  This list has 2 medication(s) that are the same as other medications prescribed for you. Read the directions carefully, and ask your doctor or other care provider to review them with you.      CONTINUE these medicines which have NOT CHANGED        Dose / Directions    folic acid 1 MG tablet   Commonly known as:  FOLVITE   Used for:  High-risk pregnancy, first trimester        Dose:  1 mg   Take 1 tablet (1 mg) by mouth daily   Quantity:  30 tablet   Refills:  0       mirtazapine 15 MG ODT tab   Commonly known as:  REMERON SOL-TAB   Used for:  Supervision of high-risk  pregnancy, third trimester        Dose:  15 mg   1 tablet (15 mg) by Orally disintegrating tablet route At Bedtime   Quantity:  60 tablet   Refills:  2       OLANZapine zydis 5 MG ODT tab   Commonly known as:  zyPREXA   Used for:  Supervision of high-risk pregnancy, third trimester        Dose:  5 mg   Take 1 tablet (5 mg) by mouth 2 times daily   Quantity:  60 tablet   Refills:  3       omeprazole 20 MG CR capsule   Commonly known as:  priLOSEC   Used for:  Supervision of high-risk pregnancy, third trimester        Dose:  20 mg   Take 1 capsule (20 mg) by mouth every morning (before breakfast)   Quantity:  30 capsule   Refills:  3       oxyCODONE 5 MG capsule   Commonly known as:  OXY-IR   Used for:  S/P         Dose:  5 mg   Take 1 capsule (5 mg) by mouth every 4 hours as needed for moderate to severe pain   Quantity:  24 capsule   Refills:  0       Prenatal Vitamin 27-0.8 MG Tabs   Used for:  High-risk pregnancy, first trimester        Dose:  1 tablet   Take 1 tablet by mouth daily   Quantity:  90 tablet   Refills:  3         STOP taking     insulin glargine 100 UNIT/ML injection   Commonly known as:  LANTUS SOLOSTAR           prochlorperazine 5 MG tablet   Commonly known as:  COMPAZINE                Where to get your medicines      These medications were sent to [facility] [place], [place] - [address]  [address] [address]     Phone:  [phone]     famotidine 20 MG tablet    insulin aspart 100 UNIT/ML injection    insulin aspart 100 UNIT/ML injection    insulin detemir 100 UNIT/ML injection    metoclopramide 10 MG tablet    ondansetron 4 MG ODT tab    pantoprazole 40 MG EC tablet    polyethylene glycol Packet    senna-docusate 8.6-50 MG per tablet                Protect others around you: Learn how to safely use, store and throw away your medicines at www.disposemymeds.org.             Medication List: This is a list of all your medications and when to  take them. Check marks below indicate your daily home schedule. Keep this list as a reference.      Medications           Morning Afternoon Evening Bedtime As Needed    famotidine 20 MG tablet   Commonly known as:  PEPCID   Take 1 tablet (20 mg) by mouth 2 times daily   Last time this was given:  20 mg on 9/13/2017  4:03 PM                                folic acid 1 MG tablet   Commonly known as:  FOLVITE   Take 1 tablet (1 mg) by mouth daily                                * insulin aspart 100 UNIT/ML injection   Commonly known as:  NovoLOG PEN   Inject 5 Units Subcutaneous 3 times daily (with meals)   Last time this was given:  6 Units on 9/13/2017 11:59 AM                                * insulin aspart 100 UNIT/ML injection   Commonly known as:  NovoLOG PEN   Inject 1-9 Units Subcutaneous At Bedtime Correction Scale - custom DOSING    Do Not give Bedtime Correction Insulin if BG less than 200 -190 = 1 unit. -240 = 2 units. -290 = 3 units. -340 = 4 units.  Notify provider if glucose greater than or equal to 350 mg/dL after administration.   Last time this was given:  6 Units on 9/13/2017 11:59 AM                                insulin detemir 100 UNIT/ML injection   Commonly known as:  LEVEMIR FLEXPEN/FLEXTOUCH   Inject 18 Units Subcutaneous 2 times daily Take first dose at midnight, next dose at 12 pm.   Last time this was given:  20 Units on 9/13/2017 11:09 AM                                metoclopramide 10 MG tablet   Commonly known as:  REGLAN   Take 1 tablet (10 mg) by mouth 4 times daily as needed (4x Daily AC & HS prn nausea and abdominal pain)                                mirtazapine 15 MG ODT tab   Commonly known as:  REMERON SOL-TAB   1 tablet (15 mg) by Orally disintegrating tablet route At Bedtime                                OLANZapine zydis 5 MG ODT tab   Commonly known as:  zyPREXA   Take 1 tablet (5 mg) by mouth 2 times daily                                omeprazole  20 MG CR capsule   Commonly known as:  priLOSEC   Take 1 capsule (20 mg) by mouth every morning (before breakfast)                                ondansetron 4 MG ODT tab   Commonly known as:  ZOFRAN ODT   Take 1 tablet (4 mg) by mouth every 8 hours as needed for nausea                                oxyCODONE 5 MG capsule   Commonly known as:  OXY-IR   Take 1 capsule (5 mg) by mouth every 4 hours as needed for moderate to severe pain                                pantoprazole 40 MG EC tablet   Commonly known as:  PROTONIX   Take 1 tablet (40 mg) by mouth 2 times daily   Last time this was given:  40 mg on 9/13/2017  7:54 PM                                polyethylene glycol Packet   Commonly known as:  MIRALAX/GLYCOLAX   Take 17 g by mouth daily as needed for constipation (titrate to one bowel movement daily)                                Prenatal Vitamin 27-0.8 MG Tabs   Take 1 tablet by mouth daily                                senna-docusate 8.6-50 MG per tablet   Commonly known as:  SENOKOT-S;PERICOLACE   Take 1 tablet by mouth 2 times daily as needed for constipation   Last time this was given:  1 tablet on 9/11/2017  8:19 PM                                * Notice:  This list has 2 medication(s) that are the same as other medications prescribed for you. Read the directions carefully, and ask your doctor or other care provider to review them with you.

## 2017-09-10 NOTE — PLAN OF CARE
Problem:  Labor (Adult,Obstetrics,Pediatric)  Goal: Signs and Symptoms of Listed Potential Problems Will be Absent or Manageable ( Labor)  Signs and symptoms of listed potential problems will be absent or manageable by discharge/transition of care (reference  Labor (Adult,Obstetrics,Pediatric) CPG).   Outcome: No Change  Pt denies feeling ctx or cramping. TOCO quiet majority of shift. Unable to tolerate anything PO. Continuing electrolyte and fluid replacement.

## 2017-09-10 NOTE — PLAN OF CARE
Pt noting pain, points at epigastric region. Abdomen palpates soft, non tender. No contractions noted. ICU nurse to give pain injection. Pt has had 4x episodes of emesis this shift. Plan to continue with transfer to [address] [place] [address].

## 2017-09-10 NOTE — PLAN OF CARE
Data: Patient presented to [facility] at 0154 via ambulance from [facility].  Reason for maternal/fetal assessment per patient is  Labor  .  Patient is a . Prenatal record reviewed.      Obstetric History       T0      L1     SAB0   TAB0   Ectopic0   Multiple0   Live Births1       # Outcome Date GA Lbr Walter/2nd Weight Sex Delivery Anes PTL Lv   2 Current            1  06/10/16 32w5d  2.37 kg (5 lb 3.6 oz) M CS-LTranv Gen  BARBARA      . Medical history:   Past Medical History:   Diagnosis Date     Diabetes (H)      Microalbuminuria 2016     Type I diabetes mellitus, uncontrolled (H) 2016   . Gestational Age 25w4d. VSS. Fetal movement present. Patient denies cramping, backache, vaginal discharge, pelvic pressure, UTI symptoms, GI problems, bloody show, vaginal bleeding, edema, headache, visual disturbances, rupture of membranes. Support persons are not present.  Action: Verbal consent for EFM. Triage assessment completed. EFM applied for FHR monitoring. Uterine assessment by TOCO. Fetal assessment: Presumed adequate fetal oxygenation documented (see flow record).   Response: Dr. Chavez informed of arrival. Plan per provider is admit pt, stabalize, and transfer to ICU on [place]. Patient verbalized agreement with plan. Patient transferred to room [facility] by ambulance, oriented to room and call light. Report given to Shona JOAQUIN at 0515. Update given upon arrival to EVAN at 0620.

## 2017-09-10 NOTE — PROVIDER NOTIFICATION
09/09/17 2035   Provider Notification   Provider Name/Title Hospitalist   Method of Notification At Bedside     Hospitalist at bedside assessing patient. MD plans to order insulin infusion and pain medication.

## 2017-09-10 NOTE — H&P
SURGICAL ICU ADMISSION NOTE  9/10/2017    PMH: DM, type I    ASSESSMENT: Ms. Gunter is a 27-year-old  at 25.4 wga admitted to SICU for management of DKA. Transferred from OSH for DKA early AM 9/10.    MAJOR CHANGES:     PLAN:   Neuro/ pain/ sedation:  - Monitor neurological status. Notify the MD for any acute changes in exam.  - dilaudid IV prn  - 4-8 Zofran and phenergan prn.     Pulmonary care:   - Supplemental oxygen to keep saturation above 92 %.     Cardiovascular:    - Monitor hemodynamic status.      GI care:   - NPO except ice chips and medications.  - Pepcid and protonix  - added doxylamine/pyroxidine with zofran and phenergan IV for nausea     Fluids/ Electrolytes/ Nutrition:   - D5 NS at 150 cc/hr for IV fluid hydration  - Potassium replacement protocol, last 3.7  - ketones 2.6 -> 0.7  - monitor electrolytes and urine output     Renal/ Fluid Balance:    -Urine output is 1615 since midnight  -Will continue to monitor intake and output.  -Will replete lytes. MIVF.     Endocrine:    - on insulin gtt. Glu 141 this AM with labs w/ labs improving. Will attempt to wean insulin gtt.  - hgbA1c 8.4  - Will give 500cc LR bolus for nausea potenitially 2/2 low fluid status.     ID/ Antibiotics:  -No indication for antibiotics.     Heme:     -Hemoglobin stable, 10.1     Prophylaxis:    -Mechanical prophylaxis for DVT.      Lines/ tubes/ drains:  - PIV x2     Disposition:  -Surgical ICU.    Patient seen, findings and plan discussed with surgical ICU staff Ana Haney  Gynecologic Oncology fellow   - - - - - - - - - - - - - - - - - - - - - - - - - - - - - - - - - - - - - - - - - - - - - - - - - - - - - - - - - - - - - - - - - - - - - -     PRIMARY TEAM: Dr. Nash ([facility])  PRIMARY PHYSICIAN: unknown    REASON FOR CRITICAL CARE ADMISSION: DKA at 25.4 wga    ADMITTING PHYSICIAN: Dr. Reich    HISTORY PRESENTING ILLNESS: Ms. Gunter is a 27-year-old  at 25.4 wga admitted to SICU for  management of DKA.  Presented initially at OSH ([facility]) with abdominal pain, n/v,  contractions and was diagnosed in DKA. Stabalized and transferred to [facility] [place] services since viable. Over on [facility], r/o labor and transferred to SICU for management of DKA.    +FM; FHTs: 145, mod, +accels; cat I strip; TOCO: no ctx  - VB  - LOF     She states that she has been feeling poorly for the past 1 week. She has had vomiting and minor abdominal pain for the past week. She denies fever, chills, diarrhea, running nose, cough, sore throat, SOB, chest pain, palpitations. No concerns for headache, vision changes. Positive for epigastric pain. Negative for RUQ pain.      Of note, patient recently [place] the [place] ago.      PREGNANCY HISTORY:  - Poorly controlled Type I DM: current insulin regimen per patient is: 30U levemir QHS, 10U/10U/5U of Novolog.   - History of PLTCS x1 for fetal distress at 32.5 wga    REVIEW OF SYSTEMS: As noted above. No headache, dizziness. No fever, chills. No chest pain or shortness of breath. No diarrhea or constipation. No urinary difficulties. No muscle or body aches.    PAST MEDICAL HISTORY:   Past Medical History:   Diagnosis Date     Diabetes (H)      Microalbuminuria 2016     Type I diabetes mellitus, uncontrolled (H) 2016       SURGICAL HISTORY:   Past Surgical History:   Procedure Laterality Date      SECTION N/A 6/10/2016    Procedure:  SECTION;  Surgeon: Richardson Duran MD;  Location:  OR       SOCIAL HISTORY: Tobacco - denies. Etoh - denies.    FAMILY HISTORY: No bleeding/clotting disorders nor problems with anesthesia.     ALLERGIES:    No Known Allergies    MEDICATIONS:    Current Facility-Administered Medications on File Prior to Encounter:  [DISCONTINUED] glucose 40 % gel 15-30 g   [DISCONTINUED] dextrose 50 % injection 25-50 mL   [DISCONTINUED] glucagon injection 1 mg   [DISCONTINUED] insulin 1 unit/1mL in saline  (NovoLIN, HumuLIN Regular) drip - DKA algorithm   [COMPLETED] lactated ringers BOLUS 500 mL   [COMPLETED] lidocaine (viscous) (XYLOCAINE) 2 % 15 mL, alum & mag hydroxide-simethicone (MYLANTA ES/MAALOX  ES) 15 mL GI Cocktail   [COMPLETED] fentaNYL (PF) (SUBLIMAZE) injection 100 mcg   [COMPLETED] 0.9% sodium chloride BOLUS   [DISCONTINUED] acetaminophen (OFIRMEV) infusion 1,000 mg   [DISCONTINUED] lactated ringers infusion   [DISCONTINUED] ondansetron (ZOFRAN) injection 4 mg   [DISCONTINUED] dextrose 10 % 1,000 mL infusion   [DISCONTINUED] insulin 1 unit/mL in saline (NovoLIN, HumuLIN Regular) drip - ADULT IV Infusion   [DISCONTINUED] glucose 40 % gel 15-30 g   [DISCONTINUED] dextrose 50 % injection 25-50 mL   [DISCONTINUED] glucagon injection 1 mg   [DISCONTINUED] 0.9% sodium chloride BOLUS   [DISCONTINUED] glucose 40 % gel 15-30 g   [DISCONTINUED] dextrose 50 % injection 25-50 mL   [DISCONTINUED] glucagon injection 1 mg   [DISCONTINUED] glucose 40 % gel 15-30 g   [DISCONTINUED] dextrose 50 % injection 25-50 mL   [DISCONTINUED] glucagon injection 1 mg   [DISCONTINUED] NaCl 0.45 % 1,000 mL with potassium chloride 30 mEq/L infusion   [DISCONTINUED] dextrose 5% and 0.45% NaCl 1,000 mL with potassium chloride 30 mEq/L infusion   [DISCONTINUED] insulin 1 unit/1mL in saline (NovoLIN, HumuLIN Regular) drip - DKA algorithm     Current Outpatient Prescriptions on File Prior to Encounter:  insulin detemir (LEVEMIR FLEXPEN/FLEXTOUCH) 100 UNIT/ML injection Inject 30 Units Subcutaneous At Bedtime   insulin aspart (NOVOLOG FLEXPEN) 100 UNIT/ML injection Inject 10 Units Subcutaneous 2 times daily (with meals)   insulin detemir (LEVEMIR FLEXPEN/FLEXTOUCH) 100 UNIT/ML injection Inject 30 Units Subcutaneous At Bedtime   insulin aspart (NOVOLOG PEN) 100 UNIT/ML injection Inject 10 Units Subcutaneous 3 times daily (with meals) (Patient taking differently: Inject 10 Units Subcutaneous 2 times daily (with meals) )   insulin glargine  (LANTUS SOLOSTAR) 100 UNIT/ML injection 35 units at bedtime   oxyCODONE (OXY-IR) 5 MG capsule Take 1 capsule (5 mg) by mouth every 4 hours as needed for moderate to severe pain   ondansetron (ZOFRAN-ODT) 4 MG disintegrating tablet Take 4 mg by mouth every 8 hours as needed    mirtazapine (REMERON SOL-TAB) 15 MG disintegrating tablet 1 tablet (15 mg) by Orally disintegrating tablet route At Bedtime   OLANZapine zydis (ZYPREXA) 5 MG disintegrating tablet Take 1 tablet (5 mg) by mouth 2 times daily   prochlorperazine (COMPAZINE) 5 MG tablet Take 1 tablet (5 mg) by mouth every 6 hours   metoclopramide (REGLAN) 10 MG tablet Take 1 tablet (10 mg) by mouth 4 times daily (before meals and nightly)   omeprazole (PRILOSEC) 20 MG capsule Take 1 capsule (20 mg) by mouth every morning (before breakfast)   Prenatal Vit-Fe Fumarate-FA (PRENATAL VITAMIN) 27-0.8 MG TABS Take 1 tablet by mouth daily   folic acid (FOLVITE) 1 MG tablet Take 1 tablet (1 mg) by mouth daily       PHYSICAL EXAMINATION:  Temp:  [97.8  F (36.6  C)-98.5  F (36.9  C)] 98.1  F (36.7  C)  Pulse:  [83] 83  Heart Rate:  [] 105  Resp:  [12-20] 16  BP: (111-159)/(73-95) 128/85  SpO2:  [97 %-100 %] 100 %     General: Alert, well-appearing in no acute distress.  HEENT: Normocephalic, atraumatic.  Chest wall: Symmetric thorax. No masses or tenderness to palpation.  Respiratory: Non-labored breathing. Lung sounds clear to auscultation bilaterally.   Cardiovascular: mildly tachycardic, regular rhythm.   Gastrointestinal: Gravid, abdomen soft, non-tender to palpation. No organomegaly or masses appreciated.  Genitourinary: No CVA tenderness. Exam per OB on [facility]: cl/th/high  Extremities: Moving all four extremities. No limb deformities. No pedal edema.   Skin: As noted above. No rashes or lesions appreciated.    LABS: Reviewed.   Arterial Blood Gases   No lab results found in last 7 days.  Complete Blood Count     Recent Labs  Lab 09/10/17  0310 09/09/17  1600    WBC 13.0* 11.4*   HGB 10.1* 12.7    214     Basic Metabolic Panel    Recent Labs  Lab 09/10/17  0451 09/10/17  0310 09/10/17  0015 09/09/17  1600    139 134 134   POTASSIUM 4.5 3.8 3.5 3.9   CHLORIDE 110* 111* 104 100   CO2 12* 15* 12* 22   BUN 7 8 9 7   CR 0.37* 0.39* 0.46* 0.44*   * 155* 257* 250*     Liver Function Tests    Recent Labs  Lab 09/10/17  0310 09/09/17  1600   AST 11 8   ALT 10 9   ALKPHOS 58 70   BILITOTAL 0.5 0.8   ALBUMIN 2.4* 3.2*     Pancreatic Enzymes    Recent Labs  Lab 09/10/17  0310   LIPASE 60*   AMYLASE 22*     Coagulation Profile  No lab results found in last 7 days.  Lactate  Invalid input(s): LACTATE    IMAGING: no recent imaging

## 2017-09-10 NOTE — H&P
CHIEF COMPLAINT:  Abdominal pelvic pain, nausea, vomiting intrauterine pregnancy 25 and 3/7 weeks' gestation.      HISTORY OF PRESENT ILLNESS:  Anne Gunter is a 27-year-old, [place] female,  2, para 0-1-0-1 with a longstanding history of type 1 diabetes diagnosed at age 8, last menstrual period reported as 2017, EDC of 2017 reportedly confirmed by ultrasound done in [place], who presents today to Labor and Delivery for evaluation of nausea, vomiting and epigastric abdominal pain that began this morning.  The patient said that she has not eaten since last evening, awoke this morning with nausea and vomiting and has been unable to keep anything down.  She denies fevers or chills.  Her previous pregnancy course was complicated by multiple admissions for DKA with suboptimal glycemic control, questionable compliance.  Currently, the patient states that she is taking NovoLog 10 units in the morning, 10 units at lunch, 5 units at supper and Levemir 30 units at bedtime.  It is difficult to get a reliable answer as to how often she is checking her blood sugars.  The patient states that she recently came back to the [place] from [place].  The patient has seen Dr. Colunga in the past for diabetic control; last office visit was 2016 and a visit on 2016 resulted in a failed appointment.  The patient was evaluated in Labor and Delivery on 2017 for nausea, vomiting and epigastric pain and was discharged home.  Her previous pregnancy was delivered by  section at 32 and 5/7 weeks, because of concern of multiple episodes of DKA, suboptimal and poor glycemic control and complications of diabetes and concerns for fetal well-being.      ALLERGIES:  None.      MEDICATIONS:  The patient states that she takes NovoLog 10 units in the morning, 10 units at lunch, 5 units at supper and Levemir 30 units at bedtime.      SOCIAL HISTORY:  Smoking none.  Alcohol none.  Drug use none.       PREVIOUS SURGERY:   section 06/10/2016 as noted.      FAMILY HISTORY:  Maternal grandmother diabetes.  Paternal grandmother cerebrovascular disease, coronary artery disease.  The patient states that her parents are alive and well.  No other reported history of diabetes in her family.        PAST MEDICAL HISTORY:  Remarkable for type 1 diabetes, uncontrolled, microalbuminuria.        SOCIAL HISTORY:  Unemployed.      OBSTETRICAL HISTORY:  As noted above.      REVIEW OF SYSTEMS:  Head, ears, eyes, nose, throat, heart, lung, abdomen, neuro, musculoskeletal, integumentary and extremity otherwise unremarkable.      PHYSICAL EXAMINATION:   IN GENERAL:  Pleasant female, moaning with upper abdominal discomfort.   VITAL SIGNS:  Her blood pressure is 124/84, temperature is 97.8, pulse is 83, respiratory rate is 20.  She weighs 76 kilograms and is 170 cm tall.   HEENT:  Pupils are equally correct and accommodate.  EOMs intact and symmetrical, there is no adenopathy.  Pharynx and thyroid appear to be normal.  Mucous membranes appear to be dry.  Thyroid is palpably within normal limits.   LUNGS:  Clear to auscultation and percussion, there is no CVA tenderness.   CARDIOVASCULAR:  Audible S1, S2, S3, S4, no murmur, no edema, negative Homans'.   ABDOMEN:  Bowel sounds, gravid uterus consistent with 25 weeks gestation.  There is tenderness in the upper abdomen bilaterally and in the epigastric area.  No rebound or guarding is able to be elicited today.  Gravid uterus appropriate for gestational age.  Fetal heart tones were reassuring.   NEUROLOGIC:  The patient does understand English, it is difficult to get a reliable history as we will ask the patient the same question twice and get different answers.  The patient was able to ambulate into the hospital.      LABORATORY DATA:  Most recent blood sugar at 6:11 this evening was 266.  CBC revealed a white count of 11,400 with hemoglobin of 12.7 with slight increase in  absolute neutrophils.  Comprehensive metabolic panel at 4:00 this afternoon revealed a blood sugar at 250, a creatinine of 0.44, albumin of 3.2, and otherwise was unremarkable.  Urinalysis; the patient was unable to give us a urine specimen; we have since hydrated her and will obtain a cath specimen to evaluate for ketonuria.      ASSESSMENT:  Intrauterine pregnancy 25 and 3/7 weeks gestation in a patient with type 1 diabetes, multiple episodes of diabetic ketoacidosis in prior pregnancy, suboptimal glycemic control, recently returned from [place] with a previous  section at 32 and 5/7 weeks for fetal concerns and recurrent diabetic ketoacidosis, now with abdominal discomfort, nausea and vomiting.      PLAN:  We are going to admit the patient to the hospital.  I will have the Hospitalist see the patient to aid in blood sugar control.  We will evaluate rule out DKA.  The patient has had an extensive GI workup in the past to try to elicit a cause of her abdominal discomfort; mention has been made of diabetic gastroparesis.  We will treat with antiemetic therapy and attempt to get the patient to resume oral intake.  We will recheck ultrasound to assess fetal growth and wellbeing.  The patient is scheduled for a first OB exam she states within the next week or two, although there is no recorded appointment in the Epic notes.         JENSEN CARTER MD             D: 2017 20:00   T: 2017 21:13   MT: [id number]      Name:     LORRAINE DAY   MRN:      [id number]        Account:      [id number]   :      1990           Admitted:     [id number]      Document: [id number]       cc: Jensen Carter MD

## 2017-09-10 NOTE — PROVIDER NOTIFICATION
09/09/17 2940   Provider Notification   Provider Name/Title Dr. Hamilton   Method of Notification Phone   Request Evaluate in Person   Notification Reason SVE   Dr. Hamilton will check pt cervix at bedside.

## 2017-09-10 NOTE — PLAN OF CARE
Initially when coming on shift pt noting some cramping and ctx. Nausea and vomiting relieved with zofran subsequently pt sleeping/resting, notes not feeling any cramping/ctx. [place] quiet. Will continue to monitor.

## 2017-09-10 NOTE — PLAN OF CARE
Problem: Goal Outcome Summary  Goal: Goal Outcome Summary  Outcome: No Change  Patient admitted with DKA.  Nausea and vomiting continues to be a problem despite 12 mg of zofran - 900 cc out this shift alone. Phenergan ordered and waiting for dose from pharmacy.  Mg and K replaced. 500 LR given for volume loss due to vomiting.   Insulin drip running at 2 all shift with bg 130-140s.   At 1145 patient began moaning uncontrollably, telling the  that her upper abdomen hurts. SICU resident notified and 1 mg of dilaudid given with adequate pain relief.   Patient was transferred at 1230 to [facility] with OB nurse.

## 2017-09-10 NOTE — PROVIDER NOTIFICATION
09/10/17 1332   Provider Notification   Provider Name/Title Bassem   Method of Notification At Bedside   Request Evaluate in Person   Notification Reason Patient Arrived;Status Update   Provider present to evaluate pt. In person. Plan per provider to proceed with ongoing maternal and fetal assessment. To add electrolyte, ketone and potassium labs, order abdominal ultrasound tomorrow am, add reglan for N/V, and decrease dilaudid dosage for pain PRN. OB high intensity insulin orders requested. Will proceed with ongoing assessment.

## 2017-09-10 NOTE — H&P
Transfer Note  September 10, 2017  Anne Gunter  [id number]        HPI: Anne Gunter is a 27 year old  at 25w4d by stated dates who presents as transfer of care from SICU. The patient is quite sleepy at time of evaluation but states pain is improved. Denies any currently. Has had continued nausea. No chest pain, SOB. Denies any complaints at this time. Feeling baby move. No vaginal bleeding, leaking fluid, contractions.     Of note further history/subjective information is difficult to obtain at this time given patient's sleepiness.     Her pregnancy is complicated by:  - Poorly controlled Type I DM: per patient report, already two hospitalizations for likely DKA this pregnancy in [place], with prior episodes of DKA in prior pregnancy with delivery at [facility]. Current insulin regimen per patient is: 30U levemir QHS, 10U/10U/5U of Novolog.   - [place] prior prenatal care in [place] - as in [place] at [place] await until delivery  - History of PLTCS x1 for fetal distress  - History of pre-term birth at 32w5d for fetal distress in the situation of DKA  - Hx of abdominal pain likely related to gastroparesis        OBHX:   1)  32w5d LTCS for fetal indications    MedicalHX:   DM  Microalbuminuria      SurgicalHX:   CS      Medications:    folic acid (FOLVITE) 1 MG tablet  Take 1 tablet (1 mg) by mouth daily     insulin aspart (NOVOLOG FLEXPEN) 100 UNIT/ML injection  Inject 10 Units Subcutaneous 2 times daily (with meals)     insulin aspart (NOVOLOG PEN) 100 UNIT/ML injection  Inject 10 Units Subcutaneous 3 times daily (with meals)     insulin detemir (LEVEMIR FLEXPEN/FLEXTOUCH) 100 UNIT/ML injection  Inject 30 Units Subcutaneous At Bedtime     insulin detemir (LEVEMIR FLEXPEN/FLEXTOUCH) 100 UNIT/ML injection  Inject 30 Units Subcutaneous At Bedtime     insulin glargine (LANTUS SOLOSTAR) 100 UNIT/ML injection  35 units at bedtime     metoclopramide (REGLAN) 10 MG tablet  Take 1 tablet (10  "mg) by mouth 4 times daily (before meals and nightly)     mirtazapine (REMERON SOL-TAB) 15 MG disintegrating tablet  1 tablet (15 mg) by Orally disintegrating tablet route At Bedtime     OLANZapine zydis (ZYPREXA) 5 MG disintegrating tablet  Take 1 tablet (5 mg) by mouth 2 times daily     omeprazole (PRILOSEC) 20 MG capsule  Take 1 capsule (20 mg) by mouth every morning (before breakfast)     ondansetron (ZOFRAN-ODT) 4 MG disintegrating tablet  Take 4 mg by mouth every 8 hours as needed      oxyCODONE (OXY-IR) 5 MG capsule  Take 1 capsule (5 mg) by mouth every 4 hours as needed for moderate to severe pain     Prenatal Vit-Fe Fumarate-FA (PRENATAL VITAMIN) 27-0.8 MG TABS  Take 1 tablet by mouth daily     prochlorperazine (COMPAZINE) 5 MG tablet  Take 1 tablet (5 mg) by mouth every 6 hours          Allergies:  No Known Allergies      Family History          FamilyHX:  Family History   Problem Relation Age of Onset     DIABETES Maternal Grandmother       CEREBROVASCULAR DISEASE Paternal Grandmother       Coronary Artery Disease Paternal Grandmother       Hypertension No family hx of       Hyperlipidemia No family hx of       Breast Cancer No family hx of       Colon Cancer No family hx of       Prostate Cancer No family hx of       Other Cancer No family hx of       Depression No family hx of       Anxiety Disorder No family hx of       MENTAL ILLNESS No family hx of       Substance Abuse No family hx of       Anesthesia Reaction No family hx of       Asthma No family hx of       OSTEOPOROSIS No family hx of       Genetic Disorder No family hx of       Thyroid Disease No family hx of       Obesity No family hx of              SocialHX: Denies alcohol, drug or tobacco use.     ROS:   10 pt ROS negative other than HPI    Physical Exam:  /67  Temp 99  F (37.2  C) (Oral)  Resp 18  Ht 1.7 m (5' 6.93\")  Wt 75 kg (165 lb 5.5 oz)  SpO2 98%  BMI 25.95 kg/m2     General: AAOx3, appears in mild distress from pain, " constant moaning while lying in bed  CV: RRR, normal S1/S2, no m/r/g  Lungs: CTAB, non-labored breathing, no wheezes, rales, or rhonchi in upper or lower lobes  Abdomen: soft, gravid, non tender, no guarding   SVE:  Close/long/high, posterior, firm per admitting provider  Extremities: Bilateral LE with trace edema      FHT: 135 baseline , moderate variability, present accels, absent decels  [place]: rare     Prenatal ultrasounds:  None available     Labs:   Admission on 09/09/2017, Discharged on 09/10/2017   Component Date Value Ref Range Status     Sodium 09/09/2017 134  133 - 144 mmol/L Final     Potassium 09/09/2017 3.9  3.4 - 5.3 mmol/L Final     Chloride 09/09/2017 100  94 - 109 mmol/L Final     Carbon Dioxide 09/09/2017 22  20 - 32 mmol/L Final     Anion Gap 09/09/2017 12  3 - 14 mmol/L Final     Glucose 09/09/2017 250* 70 - 99 mg/dL Final     Urea Nitrogen 09/09/2017 7  7 - 30 mg/dL Final     Creatinine 09/09/2017 0.44* 0.52 - 1.04 mg/dL Final     GFR Estimate 09/09/2017 >90  >60 mL/min/1.7m2 Final    Non  GFR Calc     GFR Estimate If Black 09/09/2017 >90  >60 mL/min/1.7m2 Final    African American GFR Calc     Calcium 09/09/2017 8.9  8.5 - 10.1 mg/dL Final     Bilirubin Total 09/09/2017 0.8  0.2 - 1.3 mg/dL Final     Albumin 09/09/2017 3.2* 3.4 - 5.0 g/dL Final     Protein Total 09/09/2017 8.2  6.8 - 8.8 g/dL Final     Alkaline Phosphatase 09/09/2017 70  40 - 150 U/L Final     ALT 09/09/2017 9  0 - 50 U/L Final     AST 09/09/2017 8  0 - 45 U/L Final     WBC 09/09/2017 11.4* 4.0 - 11.0 10e9/L Final     RBC Count 09/09/2017 4.87  3.8 - 5.2 10e12/L Final     Hemoglobin 09/09/2017 12.7  11.7 - 15.7 g/dL Final     Hematocrit 09/09/2017 39.7  35.0 - 47.0 % Final     MCV 09/09/2017 82  78 - 100 fl Final     MCH 09/09/2017 26.1* 26.5 - 33.0 pg Final     MCHC 09/09/2017 32.0  31.5 - 36.5 g/dL Final     RDW 09/09/2017 15.5* 10.0 - 15.0 % Final     Platelet Count 09/09/2017 214  150 - 450 10e9/L Final      Diff Method 09/09/2017 Automated Method   Final     % Neutrophils 09/09/2017 80.9  % Final     % Lymphocytes 09/09/2017 14.2  % Final     % Monocytes 09/09/2017 3.8  % Final     % Eosinophils 09/09/2017 0.4  % Final     % Basophils 09/09/2017 0.3  % Final     % Immature Granulocytes 09/09/2017 0.4  % Final     Nucleated RBCs 09/09/2017 0  0 /100 Final     Absolute Neutrophil 09/09/2017 9.2* 1.6 - 8.3 10e9/L Final     Absolute Lymphocytes 09/09/2017 1.6  0.8 - 5.3 10e9/L Final     Absolute Monocytes 09/09/2017 0.4  0.0 - 1.3 10e9/L Final     Absolute Eosinophils 09/09/2017 0.0  0.0 - 0.7 10e9/L Final     Absolute Basophils 09/09/2017 0.0  0.0 - 0.2 10e9/L Final     Abs Immature Granulocytes 09/09/2017 0.0  0 - 0.4 10e9/L Final     Absolute Nucleated RBC 09/09/2017 0.0   Final     Glucose 09/09/2017 234* 70 - 99 mg/dL Final     Color Urine 09/09/2017 Yellow   Final     Appearance Urine 09/09/2017 Clear   Final     Glucose Urine 09/09/2017 >499* NEG^Negative mg/dL Final     Bilirubin Urine 09/09/2017 Negative  NEG^Negative Final     Ketones Urine 09/09/2017 80* NEG^Negative mg/dL Final     Specific Gravity Urine 09/09/2017 1.028  1.003 - 1.035 Final     Blood Urine 09/09/2017 Negative  NEG^Negative Final     pH Urine 09/09/2017 5.0  5.0 - 7.0 pH Final     Protein Albumin Urine 09/09/2017 Negative  NEG^Negative mg/dL Final     Urobilinogen mg/dL 09/09/2017 0.0  0.0 - 2.0 mg/dL Final     Nitrite Urine 09/09/2017 Negative  NEG^Negative Final     Leukocyte Esterase Urine 09/09/2017 Negative  NEG^Negative Final     Source 09/09/2017 Catheterized Urine   Final     WBC Urine 09/09/2017 1  0 - 2 /HPF Final     RBC Urine 09/09/2017 1  0 - 2 /HPF Final     Glucose 09/09/2017 266* 70 - 99 mg/dL Final    Dr/RN Notified     Ph Venous 09/09/2017 7.33  7.32 - 7.43 pH Final     PCO2 Venous 09/09/2017 33* 40 - 50 mm Hg Final     PO2 Venous 09/09/2017 40  25 - 47 mm Hg Final     Bicarbonate Venous 09/09/2017 17* 21 - 28 mmol/L Final      FIO2 2017 Room Air   Final     Oxyhemoglobin Venous 2017 72  % Final     Base Deficit Venous 2017 7.7  mmol/L Final    Reference range:  -7.7 to 1.9     Ketone Quantitative 2017 5.3* 0.0 - 0.6 mmol/L Final    Comment: Critical Value called to and read back by  CATHERINE SALCEDO RN (LDR) ON 2017 AT 23:19 BY DNT       Glucose 2017 309* 70 - 99 mg/dL Final    /RN Notified     Ketone Quantitative 09/10/2017 5.0* 0.0 - 0.6 mmol/L Final    Comment: Critical Value called to and read back by  Cindi Larios (L&D) on 09.10.17 at 0020 by LA       Sodium 09/10/2017 134  133 - 144 mmol/L Final     Potassium 09/10/2017 3.5  3.4 - 5.3 mmol/L Final     Chloride 09/10/2017 104  94 - 109 mmol/L Final     Carbon Dioxide 09/10/2017 12* 20 - 32 mmol/L Final     Anion Gap 09/10/2017 18* 3 - 14 mmol/L Final     Glucose 09/10/2017 257* 70 - 99 mg/dL Final     Urea Nitrogen 09/10/2017 9  7 - 30 mg/dL Final     Creatinine 09/10/2017 0.46* 0.52 - 1.04 mg/dL Final     GFR Estimate 09/10/2017 >90  >60 mL/min/1.7m2 Final    Non  GFR Calc     GFR Estimate If Black 09/10/2017 >90  >60 mL/min/1.7m2 Final    African American GFR Calc     Calcium 09/10/2017 8.3* 8.5 - 10.1 mg/dL Final     Glucose 2017 306* 70 - 99 mg/dL Final    /RN Notified     Glucose 09/10/2017 221* 70 - 99 mg/dL Final    Dr/RN Notified       Assessment: 27 year old  at 25w4d by stated dates was admitted to [facility] as [facility] from [facility] for DKA in pregnancy with  contractions. She was transferred to the [facility] for ICU care given the DKA and has since had improvements in her labs with no further anion cap and improvement in her bicarb.  Now that she is stable she has returned to L&D for further evaluation and management of her DM, abdominal pain.     Plan:    DM I, diabetic DKA:  -Patient appears to be improving. Anion gap has closed (most recently 8-9 on BMPs). Ketone levels have also decreased from 2.6 at time  of admission to 0.3 on most recent check.   - Patient has remained on an insulin drip. Discussed patient with endocrinology. Plan will be for her to remain on drip overnight. They will review requirements tomorrow AM and make further recommendations for moving forward.  - In the meantime will check BMP and B hydroxybuterate q4h  - ERP for K/Na in place, patient also has dextrose infusion with 20 mEq KCl running at 150 cc/hr, plan to continue overnight  - Nutrition consult ord'd  - Continuous monitoring for now    Abdominal pain  - Do not feel patient is in  labor at this time. Cervix has been closed on exam x2.  - Feel most likely cause of pain is related to DKA/gastroparesis   - Abdominal US pending to rule out other concerning causes of epigastric pain  - Admit Lactate, ALT/AST, Amylase, Lipase unconcerning  - IV protonix/pepcid ord'd with dilaudid bumps q2h  PRN for pain  - Reglan/Zofran ord'd for nausea. Will add additional agents as necessary.      contractions, now improved  - Infectious work-up: GC/Chlamydia pending, Wet Prep, UA neg, GBS pending  - cervix closed on check x2     - FWB:  Cat I tracing, non-reactive, appropriate for gestational age. No concerns for fetal distress at this time                            - PNC: NOB labs collected: Rh pos, Rubella unknown, Hep B pending, HIV pending, GBS pending, placenta unknown, GC/Chlam pending.      Staffed with Dr. Bassem Dockery PGY3  9/10/2017 4:27 PM      Physician Attestation   I, Jose Luevano, saw this patient with the resident and agree with the resident s findings and plan of care as documented in the resident s note.      I personally reviewed vital signs, medications, labs, imaging and EFM.    Key findings: In summary, Anne Gunter is a  at 25w5d with pregnancy complicated by poorly controlled type 1 DM and current admission for DKA.  This was likely precipitated by her chronic nausea and abdominal pain,  inability to eat, and subsequent non-compliance with insulin.  I suspect her epigastric pain and nausea/vomiting is due to diabetic gastroparesis, as no other etiology has been identified.  Will plan to consult GI for further assistance in management, as she has not eaten anything since Friday.  Nutrition will also be consulted.  No current evidence of DKA and therefore will plan transition to SQ insulin once pain improved and patient tolerating PO.      Jose Luevano  Date of Service (when I saw the patient): 9/10/17    Time Spent on this Encounter   I, Jose Luevano, spent a total of 40 minutes bedside and on the inpatient unit today managing the care of Anne Gunter.  Over 50% of my time on the unit was spent counseling the patient and /or coordinating care regarding pregnancy complicated by type 1 DM with DKA during current admission. See note for details.    Jose Luevano

## 2017-09-10 NOTE — PLAN OF CARE
Problem: Goal Outcome Summary  Goal: Goal Outcome Summary  Outcome: No Change  Patient arrived from [facility] on [facility], very sleepy but easily awakened.  She arrived with  Labor and Delivery nurse from [facility].   L&D nurse connected patient to fetal monitor  And is here for continuous monitoring. Patient's vital signs are stable. Her glucose upon arrival  Is 147 and she is on Algorithm #2 of the DKA insulin protocol, with gtt at 2 units/hr currently.   She has a suero with good urine output.  IV gtt is D5NS + 20 meq Kcl at 150 cc/hr.  Labs are just drawn and pending.  Italian , Daniel, just arrived near 0630.  Report given to next nurse, Tram. Awaiting MD orders.

## 2017-09-10 NOTE — PROVIDER NOTIFICATION
09/09/17 0519   Provider Notification   Provider Name/Title Raul   Method of Notification Phone   Request Evaluate - Remote   Notification Reason Pain   Updated that pts pain continues despite IV Tylenol, nausea/emesis continues with Zofran.  Insulin drip just started per Dr Rene.  One dose IV Fentanyl ordered now.  Continuous monitoring ordered now.

## 2017-09-10 NOTE — PROVIDER NOTIFICATION
09/10/17 0101   Provider Notification   Provider Name/Title EMT   Method of Notification At Bedside   EMTs at bedside for pt transfer

## 2017-09-10 NOTE — PLAN OF CARE
Pt transferred off unit with EMS at 0118 headed for [facility] L&D. All belongings with patient and family. EFM discontinued, on transfer contractions palpate mild every 1/5-2.5 minutes, FHT category 1.  Insulin drip infusing at 5 ml/hr. Pt agreeable with plan. Report given to Korin JOAQUIN.

## 2017-09-10 NOTE — PROVIDER NOTIFICATION
09/10/17 0004   Provider Notification   Provider Name/Title Dr. Carter   Method of Notification At Bedside   Request Evaluate in Person   MD evaluating FHT tracing and contraction pattern at bedside.

## 2017-09-10 NOTE — PLAN OF CARE
Problem: Diabetes, Type 1 (Adult)  Goal: Signs and Symptoms of Listed Potential Problems Will be Absent or Manageable (Diabetes, Type 1)  Signs and symptoms of listed potential problems will be absent or manageable by discharge/transition of care (reference Diabetes, Type 1 (Adult) CPG).   Outcome: Improving  Switched to OB high intensity drip. Hourly BG. Pt unable to tolerate any PO liquids or foods. Frequent nausea and vomiting. Anti-emetics continued to be dosed around the clock. Abdominal pain/epigastric pain feeling improved post narcotic pain med administration. Pt reports feeling very weak and tired overall.

## 2017-09-10 NOTE — PROVIDER NOTIFICATION
09/09/17 7711   Provider Notification   Provider Name/Title Dr. Hamilton   Method of Notification At Bedside   SVE done per Dr Carter's request.  Pt is closed.

## 2017-09-10 NOTE — PROVIDER NOTIFICATION
09/09/17 0160   Provider Notification   Provider Name/Title Dr. Carter   Method of Notification Phone   Request Evaluate in Person   Notification Reason Uterine Activity   Dr. Carter will come to evaluate ASAP.  Dr. Carter requesting in house OB to check cervix.

## 2017-09-10 NOTE — H&P
L&D History and Physical   September 10, 2017  Anne Gunter  [id number]      HPI: Anne Gunter is a 27 year old  at 25w4d by stated dates who presents as transfer of care from [facility] for DKA with  contractions.  However on arrival with draining of bladder, contractions completely resolved.  Over 1L drained from bladder.  Patient denies contractions currently and prior to admission.  No vaginal bleeding or loss of fluid.  + fetal movement.     She states that she has been feeling poorly for the past 1 week.  She has had vomiting and minor abdominal pain for the past week.  She denies fever, chills, diarrhea, running nose, cough, sore throat, SOB, chest pain, palpitations.  No concerns for headache, vision changes. Positive for epigastric pain. Negative for RUQ pain.     Of note, patient recently immig[place] to the [place] 10 [place] ago.  States she plan to be here with her family until delivery.  Her mother and  are currently living in [place].  She was seen 2-3 days prior to leaving for the  and had an U/S done that dated her pregnancy.  She was also admitted around 20 days ago for a 1 week in the hospital for abdominal pain in relation to her DM.  She states that in [place], she was admitted twice to the hospital for abdominal pain related to her DM.  She is taking her insulin (see regimen below) and took it up until today.  She does state she brought enough insulin for her trip, however has already scheduled a diabetes/endocrinology appt.  Has yet to schedule OB visit. She plans to deliver at [facility].      Her pregnancy is complicated by:  - Poorly controlled Type I DM: per patient report, already two hospitalizations for likely DKA this pregnancy in [place], with prior episodes of DKA in prior pregnancy with delivery at [facility]. Current insulin regimen per patient is: 30U levemir QHS, 10U/10U/5U of Novolog.   - No prior prenatal care in [place] - as in past pregnancy, moves to [place] at certain time to  await until delivery  - History of PLTCS x1 for fetal distress  - History of pre-term birth at 32w5d for fetal distress in the situation of DKA  - Hx of abdominal pain likely related to gastroparesis        OBHX:   Obstetric History       T0      L1     SAB0   TAB0   Ectopic0   Multiple0   Live Births1       # Outcome Date GA Lbr Walter/2nd Weight Sex Delivery Anes PTL Lv   2 Current            1  06/10/16 32w5d  2.37 kg (5 lb 3.6 oz) M CS-LTranv Gen  BARBARA          MedicalHX:   Past Medical History:   Diagnosis Date     Diabetes (H)      Microalbuminuria 2016     Type I diabetes mellitus, uncontrolled (H) 2016       SurgicalHX:   Past Surgical History:   Procedure Laterality Date      SECTION N/A 6/10/2016    Procedure:  SECTION;  Surgeon: Richardson Duran MD;  Location:  OR       Medications:     Current Facility-Administered Medications on File Prior to Encounter:  glucose 40 % gel 15-30 g   Or   dextrose 50 % injection 25-50 mL   Or   glucagon injection 1 mg   [DISCONTINUED] insulin 1 unit/1mL in saline (NovoLIN, HumuLIN Regular) drip - DKA algorithm   [COMPLETED] lactated ringers BOLUS 500 mL   [COMPLETED] lidocaine (viscous) (XYLOCAINE) 2 % 15 mL, alum & mag hydroxide-simethicone (MYLANTA ES/MAALOX  ES) 15 mL GI Cocktail   [COMPLETED] fentaNYL (PF) (SUBLIMAZE) injection 100 mcg   [COMPLETED] 0.9% sodium chloride BOLUS   [DISCONTINUED] acetaminophen (OFIRMEV) infusion 1,000 mg   [DISCONTINUED] lactated ringers infusion   [DISCONTINUED] ondansetron (ZOFRAN) injection 4 mg   [DISCONTINUED] dextrose 10 % 1,000 mL infusion   [DISCONTINUED] insulin 1 unit/mL in saline (NovoLIN, HumuLIN Regular) drip - ADULT IV Infusion   [DISCONTINUED] glucose 40 % gel 15-30 g   [DISCONTINUED] dextrose 50 % injection 25-50 mL   [DISCONTINUED] glucagon injection 1 mg   [DISCONTINUED] 0.9% sodium chloride BOLUS   [DISCONTINUED] glucose 40 % gel 15-30 g   [DISCONTINUED] dextrose 50 %  injection 25-50 mL   [DISCONTINUED] glucagon injection 1 mg   [DISCONTINUED] glucose 40 % gel 15-30 g   [DISCONTINUED] dextrose 50 % injection 25-50 mL   [DISCONTINUED] glucagon injection 1 mg   [DISCONTINUED] NaCl 0.45 % 1,000 mL with potassium chloride 30 mEq/L infusion   [DISCONTINUED] dextrose 5% and 0.45% NaCl 1,000 mL with potassium chloride 30 mEq/L infusion   [DISCONTINUED] insulin 1 unit/1mL in saline (NovoLIN, HumuLIN Regular) drip - DKA algorithm     Current Outpatient Prescriptions on File Prior to Encounter:  insulin detemir (LEVEMIR FLEXPEN/FLEXTOUCH) 100 UNIT/ML injection Inject 30 Units Subcutaneous At Bedtime   insulin aspart (NOVOLOG FLEXPEN) 100 UNIT/ML injection Inject 10 Units Subcutaneous 2 times daily (with meals)   insulin detemir (LEVEMIR FLEXPEN/FLEXTOUCH) 100 UNIT/ML injection Inject 30 Units Subcutaneous At Bedtime   insulin aspart (NOVOLOG PEN) 100 UNIT/ML injection Inject 10 Units Subcutaneous 3 times daily (with meals) (Patient taking differently: Inject 10 Units Subcutaneous 2 times daily (with meals) )   insulin glargine (LANTUS SOLOSTAR) 100 UNIT/ML injection 35 units at bedtime   oxyCODONE (OXY-IR) 5 MG capsule Take 1 capsule (5 mg) by mouth every 4 hours as needed for moderate to severe pain   ondansetron (ZOFRAN-ODT) 4 MG disintegrating tablet Take 4 mg by mouth every 8 hours as needed    mirtazapine (REMERON SOL-TAB) 15 MG disintegrating tablet 1 tablet (15 mg) by Orally disintegrating tablet route At Bedtime   OLANZapine zydis (ZYPREXA) 5 MG disintegrating tablet Take 1 tablet (5 mg) by mouth 2 times daily   prochlorperazine (COMPAZINE) 5 MG tablet Take 1 tablet (5 mg) by mouth every 6 hours   metoclopramide (REGLAN) 10 MG tablet Take 1 tablet (10 mg) by mouth 4 times daily (before meals and nightly)   omeprazole (PRILOSEC) 20 MG capsule Take 1 capsule (20 mg) by mouth every morning (before breakfast)   Prenatal Vit-Fe Fumarate-FA (PRENATAL VITAMIN) 27-0.8 MG TABS Take 1 tablet  "by mouth daily   folic acid (FOLVITE) 1 MG tablet Take 1 tablet (1 mg) by mouth daily       Allergies:  No Known Allergies    FamilyHX:  Family History   Problem Relation Age of Onset     DIABETES Maternal Grandmother      CEREBROVASCULAR DISEASE Paternal Grandmother      Coronary Artery Disease Paternal Grandmother      Hypertension No family hx of      Hyperlipidemia No family hx of      Breast Cancer No family hx of      Colon Cancer No family hx of      Prostate Cancer No family hx of      Other Cancer No family hx of      Depression No family hx of      Anxiety Disorder No family hx of      MENTAL ILLNESS No family hx of      Substance Abuse No family hx of      Anesthesia Reaction No family hx of      Asthma No family hx of      OSTEOPOROSIS No family hx of      Genetic Disorder No family hx of      Thyroid Disease No family hx of      Obesity No family hx of        SocialHX:   Social History     Social History     Marital status:      Spouse name: N/A     Number of children: N/A     Years of education: N/A     Social History Main Topics     Smoking status: Never Smoker     Smokeless tobacco: Never Used     Alcohol use No     Drug use: No     Sexual activity: Yes     Partners: Male     Other Topics Concern     None     Social History Narrative       ROS:   General: Denies fever, chills  HEENT: Denies headache, blurry vision  CV: Denies chest pain, palpitation  Resp: Denies SOB, cough  GI: Denies constipation, diarrhea  : Denies dysuria, burning, or itching  Neuro: Denies numbness, tingling       Physical Exam:  Patient Vitals for the past 24 hrs:   BP SpO2 Height   09/10/17 0315 136/73 100 % -   09/10/17 0314 - - 1.7 m (5' 6.93\")   09/10/17 0156 111/82 - -     General: AAOx3, appears in mild distress from pain, constant moaning while lying in bed  CV: RRR, normal S1/S2, no m/r/g  Lungs: CTAB, non-labored breathing, no wheezes, rales, or rhonchi in upper or lower lobes  Abdomen: soft, gravid, tender in " epigastric region, no RUQ, no peritoneal signs, non-rigid  SSE: normal appearing genitalia, normal appearing discharge, with cervix visualized, appears slightly closed, but patient did not tolerate further exam, GC/Chlam, wet prep, GBS collected  SVE:  Close/long/high, posterior, firm  Extremities: Bilateral LE with trace edema     FHT: 140-150s, moderate variability, present accels, absent decels  [place]: on admission Ctx Q1-2 mins, following suero placement, no contractions present    Prenatal ultrasounds:  none    Labs:      Lab Results   Component Value Date    ABO A 09/10/2017    RH Pos 09/10/2017    AS Neg 09/10/2017    HEPBANG Nonreactive 01/12/2016    CHPCRT  04/23/2016     Negative   Negative for C. trachomatis rRNA by transcription mediated amplification.   A negative result by transcription mediated amplification does not preclude the   presence of C. trachomatis infection because results are dependent on proper   and adequate collection, absence of inhibitors, and sufficient rRNA to be   detected.      GCPCRT  04/23/2016     Negative   Negative for N. gonorrhoeae rRNA by transcription mediated amplification.   A negative result by transcription mediated amplification does not preclude the   presence of N. gonorrhoeae infection because results are dependent on proper   and adequate collection, absence of inhibitors, and sufficient rRNA to be   detected.      TREPAB Negative 06/10/2016    HGB 10.1 (L) 09/10/2017       GBS Status:   No results found for: GBS    Lab Results   Component Value Date    PAP NIL 01/15/2016       Component      Latest Ref Rng & Units 9/9/2017 9/10/2017 9/10/2017           12:15 AM  3:10 AM   WBC      4.0 - 11.0 10e9/L 11.4 (H)     RBC Count      3.8 - 5.2 10e12/L 4.87     Hemoglobin      11.7 - 15.7 g/dL 12.7     Hematocrit      35.0 - 47.0 % 39.7     MCV      78 - 100 fl 82     MCH      26.5 - 33.0 pg 26.1 (L)     MCHC      31.5 - 36.5 g/dL 32.0     RDW      10.0 - 15.0 % 15.5 (H)      Platelet Count      150 - 450 10e9/L 214     Diff Method       Automated Method     % Neutrophils      % 80.9     % Lymphocytes      % 14.2     % Monocytes      % 3.8     % Eosinophils      % 0.4     % Basophils      % 0.3     % Immature Granulocytes      % 0.4     Nucleated RBCs      0 /100 0     Absolute Neutrophil      1.6 - 8.3 10e9/L 9.2 (H)     Absolute Lymphocytes      0.8 - 5.3 10e9/L 1.6     Absolute Monocytes      0.0 - 1.3 10e9/L 0.4     Absolute Eosinophils      0.0 - 0.7 10e9/L 0.0     Absolute Basophils      0.0 - 0.2 10e9/L 0.0     Abs Immature Granulocytes      0 - 0.4 10e9/L 0.0     Absolute Nucleated RBC       0.0     Sodium      133 - 144 mmol/L 134 134 139   Potassium      3.4 - 5.3 mmol/L 3.9 3.5 3.8   Chloride      94 - 109 mmol/L 100 104 111 (H)   Carbon Dioxide      20 - 32 mmol/L 22 12 (L) 15 (L)   Anion Gap      3 - 14 mmol/L 12 18 (H) 13   Glucose      70 - 99 mg/dL 250 (H) 257 (H) 155 (H)   Urea Nitrogen      7 - 30 mg/dL 7 9 8   Creatinine      0.52 - 1.04 mg/dL 0.44 (L) 0.46 (L) 0.39 (L)   GFR Estimate      >60 mL/min/1.7m2 >90 >90 >90   GFR Estimate If Black      >60 mL/min/1.7m2 >90 >90 >90   Calcium      8.5 - 10.1 mg/dL 8.9 8.3 (L) 7.5 (L)   Bilirubin Total      0.2 - 1.3 mg/dL 0.8     Albumin      3.4 - 5.0 g/dL 3.2 (L)     Protein Total      6.8 - 8.8 g/dL 8.2     Alkaline Phosphatase      40 - 150 U/L 70     ALT      0 - 50 U/L 9     AST      0 - 45 U/L 8     Color Urine       Yellow     Appearance Urine       Clear     Glucose Urine      NEG:Negative mg/dL >499 (A)     Bilirubin Urine      NEG:Negative Negative     Ketones Urine      NEG:Negative mg/dL 80 (A)     Specific Gravity Urine      1.003 - 1.035 1.028     Blood Urine      NEG:Negative Negative     pH Urine      5.0 - 7.0 pH 5.0     Protein Albumin Urine      NEG:Negative mg/dL Negative     Urobilinogen mg/dL      0.0 - 2.0 mg/dL 0.0     Nitrite Urine      NEG:Negative Negative     Leukocyte Esterase Urine       NEG:Negative Negative     Source       Catheterized Urine     WBC Urine      0 - 2 /HPF 1     RBC Urine      0 - 2 /HPF 1     Ketone Quantitative      0.0 - 0.6 mmol/L 5.3 (HH) 5.0 (HH) 1.9 (HH)       Assessment: 27 year old  at 25w4d by stated dates admitted to [facility] as [facility] from [facility] for DKA in pregnancy with  contractions. Now without contractions following suero placement. No complaints of labor.  Cervix unchanged.  Pt with significant abdominal pain likely related to gastroparesis exacerbated by epigastric pain from DKA.  Given DKA in pregnancy that is slightly improving with intervention, will plan to transfer for higher level of care to [facility] ICU.  ICU accepted transfer. Once stabilized, ok to return to labor and delivery.     Plan:  - Plan to transfer to ICU for higher level cares of Diabetic Ketoacidosis in pregnancy: prior history of DKA in prior pregnancies with concern for 3rd episode of DKA in this current pregnancy. Currently on insulin with Hgb A1C 8.4%. Metabolic acidosis noted immediately prior to transfer with AG 18, Bicarb 12, VB with pH 7.3. BG downtrending at this time with insulin drip and IVF. >155.    - SARA to [facility] ICU - transfer accepted and appreciated for management of DKA   - Hospitalist consult to assist with DKA management until ICU transfer   - Insulin drip - at 5U/hr, will manage per DKA protocol   - 1L bolus of NS, followed by continuous infusion of 150mL/hr of D5NS w/20mEq of Potassium IVF   - Potassium replacement PRN (3.9>3.5, downtrending), Sodium replacement with NS PRN (Na low end of normal, 134)   - Q2hr BMPs, ketones until DKA resolves/transfer   - pain management: IV dilaudid 0.5mg Q1 PRN, epigastric pain likely related to history of gastroparesis from DM - IV protonix and pepcid    -  contractions: appears to have resolved with emptying of bladder with suero placement.  Unknown why patient would be retaining urine at this time, however  with >1L of urine drained on suero placement.    - Infectious work-up: GC/Chlamydia pending, Wet Prep, UA neg, GBS pending   - cervix unchanged over course of several hours   - continuous tocometry in ICU, L&D nurse to be present in ICU    -FWB:  Cat I tracing, non-reactive, appropriate for gestational age. No concerns for fetal distress at this time   - continuous FHT, will perform in ICU by L&D RN    - Elevated BP's in setting of abdominal pain with DKA: likely related to pain, however given repeat mild range BP's, UPC ordered. HELLP labs in work-up for DKA have been normal.    - UPC    - PNC: NOB labs collected: Rh pos, Rubella unknown, Hep B pending, HIV pending, GBS collected, placenta unknown, GC/Chlam pending.     Staffed with Dr. Valencia Tovar.     Fabiana Chavez MD MPH  OB/GYN PGY3  9/10/2017  3:31 AM    I personally examined and evaluated Anne Gunter on 9/10/2017 with the resident.  I discussed the patient with Dr. Chavez and agree with the assessment and plan of care as documented in the above note with edits by me. Additional comments: 27 year old  at 25w4d by stated 20 week ultrasound with poorly controlled DMI who presented to outside hospital pain with abdominal pain, nausea and vomiting and found to be in DKA with serum ketones 5.3, blood glucose 234, bicarb 17. She has received 2L IV fluid bolus, now running at 150 mL/hr. Electrolytes within normal limits on serial checks. [place] arrived [place] the [place] from [place] 10 [place] ago with her father and sister. She is known to us with admissions for DKA in prior pregnancy in 2016.   On exam, patient appears uncomfortable though not in any acute distress. She reports pain only in her epigastric area. Her abdomen is soft, gravid, and non-tender. FHR is appropriate for gestational age. She does also have mild range BPs but denies symptoms of preeclampsia and HELLP labs are all normal. Plan transfer to ICU for ongoing management of DKA. Patient to be  transferred with continuous fetal monitoring.     Valencia Tovar MD  8:48 AM

## 2017-09-10 NOTE — PLAN OF CARE
Spoke with Carlene from endocrine.  She will not be able to see the patient today but will put in orders for meal dose insulin and will see patient in the morning.

## 2017-09-10 NOTE — PROGRESS NOTES
There has been some confusion regarding the number of supplemental KCl doses the patient has received by the ERP. Previously had been told by RN that patient had received two doses. However, on chart review it appears that she only received one 10 mEq extra dose above maintenance. Will therefore plan to hold on extra KCl until recheck in two hours (along with LFTs and B hydroxybuterate)    Mariann Dockery PGY3  9/10/2017 5:02 PM

## 2017-09-10 NOTE — CONSULTS
[facility]  Hospitalist consult Note    Name: Anne Gunter      MRN: [id number]  YOB: 1990    Age: 27 year old  Date of admission: 2017  Primary care provider: Isiah Naidu            Assessment and Plan:   Anne Gunter is a 27 year old female with a history of pregnancy and diabetes mellitus type 1 who presents with hyperglycemia    1. Diabetes mellitus type 1.  Urine with ketones.  Noted to have hypoglycemia on .  Check Stat VBG.  If acidotic, would treat as diabetic ketoacidosis.  If not acidotic, would still start insulin drip to monitor blood sugars very closely.      2.  Pregnancy.  OB following.              Chief Complaint:   Abdominal pain         History of Present Illness:   Anne Gunter is a 27 year old female who presents with abdominal pain and nausea.  All information is obtained with an .  She is 25 weeks pregnant.  Having abdominal pain, nausea, and vomiting.  Reports similar previous symptoms when she was told she had ketones in her urine.  Symptoms previously resolved when ketones were treated.  Medications given so far today have not helped much with pain or nausea.  No other complaints.          Past Medical History:     Past Medical History:   Diagnosis Date     Diabetes (H)      Microalbuminuria 2016     Type I diabetes mellitus, uncontrolled (H) 2016             Past Surgical History:     Past Surgical History:   Procedure Laterality Date      SECTION N/A 6/10/2016    Procedure:  SECTION;  Surgeon: Richardson Duran MD;  Location:  OR             Social History:     Social History   Substance Use Topics     Smoking status: Never Smoker     Smokeless tobacco: Never Used     Alcohol use No             Family History:   The family history was fully reviewed and non-contributory in this case.         Allergies:   No Known Allergies          Medications:     Prior to Admission medications    Medication Sig Last  Dose Taking? Auth Provider   insulin detemir (LEVEMIR FLEXPEN/FLEXTOUCH) 100 UNIT/ML injection Inject 30 Units Subcutaneous At Bedtime  Yes Reported, Patient   insulin aspart (NOVOLOG FLEXPEN) 100 UNIT/ML injection Inject 10 Units Subcutaneous 2 times daily (with meals)  Yes Reported, Patient   insulin detemir (LEVEMIR FLEXPEN/FLEXTOUCH) 100 UNIT/ML injection Inject 30 Units Subcutaneous At Bedtime   Reported, Patient   insulin aspart (NOVOLOG PEN) 100 UNIT/ML injection Inject 10 Units Subcutaneous 3 times daily (with meals)  Patient taking differently: Inject 10 Units Subcutaneous 2 times daily (with meals)    Marla Colunga MD   insulin glargine (LANTUS SOLOSTAR) 100 UNIT/ML injection 35 units at bedtime   Marla Colunga MD   oxyCODONE (OXY-IR) 5 MG capsule Take 1 capsule (5 mg) by mouth every 4 hours as needed for moderate to severe pain   Jensen Carter MD   ondansetron (ZOFRAN-ODT) 4 MG disintegrating tablet Take 4 mg by mouth every 8 hours as needed    Reported, Patient   mirtazapine (REMERON SOL-TAB) 15 MG disintegrating tablet 1 tablet (15 mg) by Orally disintegrating tablet route At Bedtime   Valencia Tovar MD   OLANZapine zydis (ZYPREXA) 5 MG disintegrating tablet Take 1 tablet (5 mg) by mouth 2 times daily   Valencia Tovar MD   prochlorperazine (COMPAZINE) 5 MG tablet Take 1 tablet (5 mg) by mouth every 6 hours   Valencia Tovar MD   metoclopramide (REGLAN) 10 MG tablet Take 1 tablet (10 mg) by mouth 4 times daily (before meals and nightly)   Valencia Tovar MD   omeprazole (PRILOSEC) 20 MG capsule Take 1 capsule (20 mg) by mouth every morning (before breakfast)   Valencia Tovar MD   Prenatal Vit-Fe Fumarate-FA (PRENATAL VITAMIN) 27-0.8 MG TABS Take 1 tablet by mouth daily   Isiah Naidu MD   folic acid (FOLVITE) 1 MG tablet Take 1 tablet (1 mg) by mouth daily   Leoncio Linton MD             Review of Systems:   A Comprehensive  "greater than 10 system review of systems was carried out.  Pertinent positives and negatives are noted above.  Otherwise negative for contributory information.           Physical Exam:   Blood pressure 124/84, pulse 83, temperature 97.8  F (36.6  C), temperature source Oral, resp. rate 20, height 1.7 m (5' 6.93\"), weight 76 kg (167 lb 8.8 oz), unknown if currently breastfeeding.  Wt Readings from Last 1 Encounters:   09/09/17 76 kg (167 lb 8.8 oz)     Exam:  GENERAL: No apparent distress. All info through .  Awake, alert, and fully oriented.  HEENT: Normocephalic, atraumatic. Extraocular movements intact.  CARDIOVASCULAR: Regular rate and rhythm without murmurs or rubs. No S3.  PULMONARY: Clear to auscultation bilaterally.  ABDOMINAL: Protuberant.  Bowel sounds normoactive.   EXTREMITIES: No cyanosis or clubbing. No appreciable edema.  NEUROLOGICAL: CN 2-12 grossly intact, no focal neurological deficits.  DERMATOLOGICAL: No rash, ulcer, bruising, nor jaundice.          Data:       Laboratory:    Recent Labs  Lab 09/09/17  1600   WBC 11.4*   HGB 12.7   HCT 39.7   MCV 82          Recent Labs  Lab 09/09/17  1600      POTASSIUM 3.9   CHLORIDE 100   CO2 22   ANIONGAP 12   *   BUN 7   CR 0.44*   GFRESTIMATED >90   GFRESTBLACK >90   LILLIAM 8.9     No results for input(s): CULT in the last 168 hours.    Imaging:  No results found for this or any previous visit (from the past 24 hour(s)).      "

## 2017-09-10 NOTE — PROVIDER NOTIFICATION
09/09/17 5284   Provider Notification   Provider Name/Title Dr. Murillo   Method of Notification Phone   Request Evaluate - Remote   MD notified of contraction pattern, updated that OB needs to reevaluate for possible transfer to a different facility. Recent glucose 306 after insulin at 4 ml/hr for an hour, moved up to second algorithm at 9 ml/hr.     Clarification received to start DKA orders and have lab come draw DKA labs STAT while awaiting OB plan.

## 2017-09-10 NOTE — DISCHARGE SUMMARY
I have discussed this pt with the hospitalist and expressed my concern regarding DKA  He agrees to transfer to ICU.  Within the last 1/2 hr or so the pt began having uterine ctxs q 1.5-2 min that are painful.  I was seeing a pt in the ER and Dr Roth the [facility] in House MD agreed to check this pts cervix (RN unable to feel cervix) and her cervix was noted to be closed.  The uterine ctxs are a new finding.  Because of the concern of DKA I have not given betamethasone nor tocolytics.  I have discussed this pt with Dr Luevano of [facility] who would like to transfer this pt to [facility]  DKA Rx protocal has been started  Findings rev with pt and her sister

## 2017-09-10 NOTE — CONSULTS
[facility] Initial Visit      Anne Gunter MRN# [id number]   YOB: 1990 Age: 27 year old   Date of Admission: 9/10/2017  PCP is Isiah Naidu  Date of Service: 9/10/2017    Referring MD & Reason for Visit: I was asked by Jose Luevano MD, to evaluate this patient for DKA    [facility] Physician: Isatu Greenberg         Assessment and Recommendations:   Anne Gunter is a 27 F who is 25 weeks pregnancy with uncontrolled DM comes in for nausea and vomiting for one week. She had epigastric abdominal pain going to her back. She was noted to be in DKA. Initially anion gap was 12 but later on it widened to 16.     DKA:  Give one to two litres of NS. She got a litre at [facility] ER. She will be on DKA protocol. Then start her on D5% NS with 20KCL 150 ml per hr to increase BG so that we can give her insulin. Replace K with 40 Meq KCL. Monitor electrolytes every 2 hrs in the beginning and then every 4 hrs. Especially K. Check Mg and Phos and replace accordingly.  No new infection noted.     With worsening anion GAP, ideally she should be managed in ICU set up. I recommend primary team to consider sending her to ICU.     Epigastric Abdominal Pain: Likely from DKA. Check lipase. LFT were fine. Get usg abdomen. UA was negative. Start protonix 40 mg IV daily. NPO until DKA resolves.     Urine retention: has  Suero now. UA was negative. Monitor UO with suero. Once improved, given voiding trail.       I personally examined Anne today, and reviewed vital signs, medications and pertinent labs or imaging.  Thank you for this opportunity to be involved in your patient's care.      Isatu Greenberg  Internal Medicine Staff Hospitalist  [facility]  Pager: [phone]  Please page the numbers (based on time of day) found in the sticky note with questions or additional concerns.      Reason for Visit:     Nausea and vomiting, abdominal pain         History of Present  Illness:   History is obtained from the patient, and medical record.     27 F who is 25 weeks pregnant, comes in with nausea and vomiting, epigastric abdominal pain. Vomiting started one week ago. She was noted to have BG of 234, Bicarb of 22, Anion gap of 12 but ketones were positive in the blood. She subsequently started to widen the anion gap. She was having abdominal cramps. She was started on iv fluids and insulin ad she was sent over to labor and deliary for further care. Medicine consulted to manage DKA.     She denies cough or cold or congestion. Denies any fever. Was taking her insulin regularly. Denies dysuria, or diarrhea. No new med changes. Uses levemir and short acting insulin with meal. She does not know the name.               Past Medical History:     Past Medical History:   Diagnosis Date     Diabetes (H)      Microalbuminuria 2016     Type I diabetes mellitus, uncontrolled (H) 2016             Past Surgical History:     Past Surgical History:   Procedure Laterality Date      SECTION N/A 6/10/2016    Procedure:  SECTION;  Surgeon: Richardson Duran MD;  Location:  OR             Social History:     She does not smoke or drink ETOH           Family History:     Family History   Problem Relation Age of Onset     DIABETES Maternal Grandmother      CEREBROVASCULAR DISEASE Paternal Grandmother      Coronary Artery Disease Paternal Grandmother      Hypertension No family hx of      Hyperlipidemia No family hx of      Breast Cancer No family hx of      Colon Cancer No family hx of      Prostate Cancer No family hx of      Other Cancer No family hx of      Depression No family hx of      Anxiety Disorder No family hx of      MENTAL ILLNESS No family hx of      Substance Abuse No family hx of      Anesthesia Reaction No family hx of      Asthma No family hx of      OSTEOPOROSIS No family hx of      Genetic Disorder No family hx of      Thyroid Disease No family hx of       "Obesity No family hx of            Allergies:   No Known Allergies          Medications:     Current Facility-Administered Medications   Medication     dextrose 5% and 0.9% NaCl with potassium chloride 20 mEq infusion     glucose 40 % gel 15-30 g    Or     dextrose 50 % injection 25-50 mL    Or     glucagon injection 1 mg     potassium chloride SA (K-DUR/KLOR-CON M) CR tablet 20-40 mEq     potassium chloride (KLOR-CON) Packet 20-40 mEq     potassium chloride 10 mEq in 100 mL intermittent infusion     potassium chloride 10 mEq in 100 mL intermittent infusion with 10 mg lidocaine     potassium chloride 20 mEq in 50 mL intermittent infusion     0.9% sodium chloride BOLUS     insulin 1 unit/1mL in saline (NovoLIN, HumuLIN Regular) drip - DKA algorithm     Facility-Administered Medications Ordered in Other Encounters   Medication     glucose 40 % gel 15-30 g    Or     dextrose 50 % injection 25-50 mL    Or     glucagon injection 1 mg             Review of Systems:   A complete review of systems was performed and is negative other than what is stated in the HPI.          Physical Exam:   Blood pressure 136/73, height 1.7 m (5' 6.93\"), SpO2 100 %, unknown if currently breastfeeding.    GENERAL: Alert and oriented x 3. NAD.   HEENT: Dry mucosa, Anicteric sclera.   CV: RRR. S1, S2. No murmurs appreciated.   RESPIRATORY: Effort normal. Lungs CTAB with no wheezing, rales, rhonchi.   GI: Distended abdomen from pregnancy, no tenderness noted  NEUROLOGICAL: No focal deficits. Moves all extremities.    EXTREMITIES: No peripheral edema. Intact bilateral pedal pulses.   SKIN: No jaundice. No rashes.             Data:   All laboratory data reviewed.         "

## 2017-09-10 NOTE — PROGRESS NOTES
Pt given IV fentanyl and noted relief.  EFM and toco applied, ctx noted every 2 min that are palpable.  Will update MD.

## 2017-09-11 ENCOUNTER — OFFICE VISIT (OUTPATIENT)
Dept: INTERPRETER SERVICES | Facility: CLINIC | Age: 27
End: 2017-09-11

## 2017-09-11 ENCOUNTER — HOSPITAL ENCOUNTER (INPATIENT)
Dept: ULTRASOUND IMAGING | Facility: CLINIC | Age: 27
End: 2017-09-11
Attending: OBSTETRICS & GYNECOLOGY
Payer: COMMERCIAL

## 2017-09-11 LAB
ANION GAP SERPL CALCULATED.3IONS-SCNC: 8 MMOL/L (ref 3–14)
ANION GAP SERPL CALCULATED.3IONS-SCNC: 9 MMOL/L (ref 3–14)
ANION GAP SERPL CALCULATED.3IONS-SCNC: 9 MMOL/L (ref 3–14)
BUN SERPL-MCNC: 1 MG/DL (ref 7–30)
BUN SERPL-MCNC: 3 MG/DL (ref 7–30)
BUN SERPL-MCNC: 3 MG/DL (ref 7–30)
C TRACH DNA SPEC QL NAA+PROBE: NEGATIVE
CALCIUM SERPL-MCNC: 7.3 MG/DL (ref 8.5–10.1)
CALCIUM SERPL-MCNC: 7.4 MG/DL (ref 8.5–10.1)
CALCIUM SERPL-MCNC: 7.6 MG/DL (ref 8.5–10.1)
CHLORIDE SERPL-SCNC: 106 MMOL/L (ref 94–109)
CHLORIDE SERPL-SCNC: 107 MMOL/L (ref 94–109)
CHLORIDE SERPL-SCNC: 108 MMOL/L (ref 94–109)
CO2 SERPL-SCNC: 20 MMOL/L (ref 20–32)
CO2 SERPL-SCNC: 21 MMOL/L (ref 20–32)
CO2 SERPL-SCNC: 23 MMOL/L (ref 20–32)
CREAT SERPL-MCNC: 0.33 MG/DL (ref 0.52–1.04)
CREAT SERPL-MCNC: 0.33 MG/DL (ref 0.52–1.04)
CREAT SERPL-MCNC: 0.38 MG/DL (ref 0.52–1.04)
GFR SERPL CREATININE-BSD FRML MDRD: >90 ML/MIN/1.7M2
GLUCOSE BLDC GLUCOMTR-MCNC: 106 MG/DL (ref 70–99)
GLUCOSE BLDC GLUCOMTR-MCNC: 111 MG/DL (ref 70–99)
GLUCOSE BLDC GLUCOMTR-MCNC: 112 MG/DL (ref 70–99)
GLUCOSE BLDC GLUCOMTR-MCNC: 112 MG/DL (ref 70–99)
GLUCOSE BLDC GLUCOMTR-MCNC: 114 MG/DL (ref 70–99)
GLUCOSE BLDC GLUCOMTR-MCNC: 114 MG/DL (ref 70–99)
GLUCOSE BLDC GLUCOMTR-MCNC: 119 MG/DL (ref 70–99)
GLUCOSE BLDC GLUCOMTR-MCNC: 125 MG/DL (ref 70–99)
GLUCOSE BLDC GLUCOMTR-MCNC: 129 MG/DL (ref 70–99)
GLUCOSE BLDC GLUCOMTR-MCNC: 133 MG/DL (ref 70–99)
GLUCOSE BLDC GLUCOMTR-MCNC: 136 MG/DL (ref 70–99)
GLUCOSE BLDC GLUCOMTR-MCNC: 138 MG/DL (ref 70–99)
GLUCOSE BLDC GLUCOMTR-MCNC: 138 MG/DL (ref 70–99)
GLUCOSE BLDC GLUCOMTR-MCNC: 144 MG/DL (ref 70–99)
GLUCOSE BLDC GLUCOMTR-MCNC: 159 MG/DL (ref 70–99)
GLUCOSE BLDC GLUCOMTR-MCNC: 161 MG/DL (ref 70–99)
GLUCOSE BLDC GLUCOMTR-MCNC: 165 MG/DL (ref 70–99)
GLUCOSE BLDC GLUCOMTR-MCNC: 175 MG/DL (ref 70–99)
GLUCOSE BLDC GLUCOMTR-MCNC: 181 MG/DL (ref 70–99)
GLUCOSE BLDC GLUCOMTR-MCNC: 185 MG/DL (ref 70–99)
GLUCOSE BLDC GLUCOMTR-MCNC: 56 MG/DL (ref 70–99)
GLUCOSE BLDC GLUCOMTR-MCNC: 71 MG/DL (ref 70–99)
GLUCOSE BLDC GLUCOMTR-MCNC: 89 MG/DL (ref 70–99)
GLUCOSE BLDC GLUCOMTR-MCNC: 94 MG/DL (ref 70–99)
GLUCOSE BLDC GLUCOMTR-MCNC: 94 MG/DL (ref 70–99)
GLUCOSE BLDC GLUCOMTR-MCNC: 96 MG/DL (ref 70–99)
GLUCOSE SERPL-MCNC: 140 MG/DL (ref 70–99)
GLUCOSE SERPL-MCNC: 179 MG/DL (ref 70–99)
GLUCOSE SERPL-MCNC: 87 MG/DL (ref 70–99)
GP B STREP DNA SPEC QL NAA+PROBE: POSITIVE
HBV SURFACE AB SERPL IA-ACNC: 0.39 M[IU]/ML
HIV 1+2 AB+HIV1 P24 AG SERPL QL IA: NONREACTIVE
INTERPRETATION ECG - MUSE: NORMAL
KETONES BLD-SCNC: 0 MMOL/L (ref 0–0.6)
KETONES BLD-SCNC: 0.1 MMOL/L (ref 0–0.6)
KETONES BLD-SCNC: 0.7 MMOL/L (ref 0–0.6)
MAGNESIUM SERPL-MCNC: 2 MG/DL (ref 1.6–2.3)
N GONORRHOEA DNA SPEC QL NAA+PROBE: NEGATIVE
POTASSIUM SERPL-SCNC: 3.5 MMOL/L (ref 3.4–5.3)
POTASSIUM SERPL-SCNC: 3.9 MMOL/L (ref 3.4–5.3)
POTASSIUM SERPL-SCNC: 4 MMOL/L (ref 3.4–5.3)
RUBV IGG SERPL IA-ACNC: 14 IU/ML
SODIUM SERPL-SCNC: 136 MMOL/L (ref 133–144)
SODIUM SERPL-SCNC: 137 MMOL/L (ref 133–144)
SODIUM SERPL-SCNC: 138 MMOL/L (ref 133–144)
SPECIMEN SOURCE: ABNORMAL
SPECIMEN SOURCE: NORMAL
SPECIMEN SOURCE: NORMAL

## 2017-09-11 PROCEDURE — 00000146 ZZHCL STATISTIC GLUCOSE BY METER IP

## 2017-09-11 PROCEDURE — 25000131 ZZH RX MED GY IP 250 OP 636 PS 637: Performed by: PHYSICIAN ASSISTANT

## 2017-09-11 PROCEDURE — 25800025 ZZH RX 258: Performed by: OBSTETRICS & GYNECOLOGY

## 2017-09-11 PROCEDURE — 80048 BASIC METABOLIC PNL TOTAL CA: CPT | Performed by: OBSTETRICS & GYNECOLOGY

## 2017-09-11 PROCEDURE — 36415 COLL VENOUS BLD VENIPUNCTURE: CPT | Performed by: OBSTETRICS & GYNECOLOGY

## 2017-09-11 PROCEDURE — 25000125 ZZHC RX 250: Performed by: OBSTETRICS & GYNECOLOGY

## 2017-09-11 PROCEDURE — 82010 KETONE BODYS QUAN: CPT | Performed by: OBSTETRICS & GYNECOLOGY

## 2017-09-11 PROCEDURE — 83735 ASSAY OF MAGNESIUM: CPT | Performed by: OBSTETRICS & GYNECOLOGY

## 2017-09-11 PROCEDURE — 25000128 H RX IP 250 OP 636: Performed by: OBSTETRICS & GYNECOLOGY

## 2017-09-11 PROCEDURE — T1013 SIGN LANG/ORAL INTERPRETER: HCPCS | Mod: U3

## 2017-09-11 PROCEDURE — 12000032 ZZH R&B OB CRITICAL UMMC

## 2017-09-11 PROCEDURE — 25000128 H RX IP 250 OP 636: Performed by: STUDENT IN AN ORGANIZED HEALTH CARE EDUCATION/TRAINING PROGRAM

## 2017-09-11 PROCEDURE — 25000128 H RX IP 250 OP 636: Performed by: PHYSICIAN ASSISTANT

## 2017-09-11 PROCEDURE — S0028 INJECTION, FAMOTIDINE, 20 MG: HCPCS | Performed by: OBSTETRICS & GYNECOLOGY

## 2017-09-11 PROCEDURE — 25000132 ZZH RX MED GY IP 250 OP 250 PS 637: Performed by: OBSTETRICS & GYNECOLOGY

## 2017-09-11 PROCEDURE — 25000132 ZZH RX MED GY IP 250 OP 250 PS 637: Performed by: STUDENT IN AN ORGANIZED HEALTH CARE EDUCATION/TRAINING PROGRAM

## 2017-09-11 PROCEDURE — 25000128 H RX IP 250 OP 636

## 2017-09-11 PROCEDURE — 25000125 ZZHC RX 250: Performed by: PHYSICIAN ASSISTANT

## 2017-09-11 PROCEDURE — 76811 OB US DETAILED SNGL FETUS: CPT

## 2017-09-11 RX ORDER — DEXTROSE MONOHYDRATE, SODIUM CHLORIDE, AND POTASSIUM CHLORIDE 50; 1.49; 4.5 G/1000ML; G/1000ML; G/1000ML
INJECTION, SOLUTION INTRAVENOUS
Status: DISCONTINUED
Start: 2017-09-11 | End: 2017-09-11 | Stop reason: WASHOUT

## 2017-09-11 RX ORDER — AMOXICILLIN 250 MG
1 CAPSULE ORAL 2 TIMES DAILY PRN
Status: DISCONTINUED | OUTPATIENT
Start: 2017-09-11 | End: 2017-09-13 | Stop reason: HOSPADM

## 2017-09-11 RX ORDER — HYDROMORPHONE HYDROCHLORIDE 1 MG/ML
INJECTION, SOLUTION INTRAMUSCULAR; INTRAVENOUS; SUBCUTANEOUS
Status: COMPLETED
Start: 2017-09-11 | End: 2017-09-11

## 2017-09-11 RX ORDER — HYDROMORPHONE HYDROCHLORIDE 1 MG/ML
.3-.5 INJECTION, SOLUTION INTRAMUSCULAR; INTRAVENOUS; SUBCUTANEOUS
Status: DISCONTINUED | OUTPATIENT
Start: 2017-09-11 | End: 2017-09-13

## 2017-09-11 RX ORDER — POLYETHYLENE GLYCOL 3350 17 G/17G
17 POWDER, FOR SOLUTION ORAL DAILY
Status: DISCONTINUED | OUTPATIENT
Start: 2017-09-11 | End: 2017-09-13 | Stop reason: HOSPADM

## 2017-09-11 RX ORDER — POLYETHYLENE GLYCOL 3350 17 G/17G
17 POWDER, FOR SOLUTION ORAL DAILY PRN
Status: DISCONTINUED | OUTPATIENT
Start: 2017-09-11 | End: 2017-09-13 | Stop reason: HOSPADM

## 2017-09-11 RX ADMIN — HYDROMORPHONE HYDROCHLORIDE 0.5 MG: 1 INJECTION, SOLUTION INTRAMUSCULAR; INTRAVENOUS; SUBCUTANEOUS at 11:17

## 2017-09-11 RX ADMIN — HYDROMORPHONE HYDROCHLORIDE 0.5 MG: 1 INJECTION, SOLUTION INTRAMUSCULAR; INTRAVENOUS; SUBCUTANEOUS at 16:57

## 2017-09-11 RX ADMIN — ONDANSETRON 4 MG: 2 INJECTION INTRAMUSCULAR; INTRAVENOUS at 00:18

## 2017-09-11 RX ADMIN — SENNOSIDES AND DOCUSATE SODIUM 1 TABLET: 8.6; 5 TABLET ORAL at 20:19

## 2017-09-11 RX ADMIN — HYDROMORPHONE HYDROCHLORIDE 0.5 MG: 1 INJECTION, SOLUTION INTRAMUSCULAR; INTRAVENOUS; SUBCUTANEOUS at 03:56

## 2017-09-11 RX ADMIN — HYDROMORPHONE HYDROCHLORIDE 0.5 MG: 1 INJECTION, SOLUTION INTRAMUSCULAR; INTRAVENOUS; SUBCUTANEOUS at 00:40

## 2017-09-11 RX ADMIN — FAMOTIDINE 20 MG: 10 INJECTION, SOLUTION INTRAVENOUS at 16:58

## 2017-09-11 RX ADMIN — POTASSIUM CHLORIDE 20 MEQ: 1.5 POWDER, FOR SOLUTION ORAL at 20:56

## 2017-09-11 RX ADMIN — HUMAN INSULIN 3 UNITS/HR: 100 INJECTION, SOLUTION SUBCUTANEOUS at 21:10

## 2017-09-11 RX ADMIN — DEXTROSE MONOHYDRATE 25 ML: 25 INJECTION, SOLUTION INTRAVENOUS at 22:11

## 2017-09-11 RX ADMIN — PANTOPRAZOLE SODIUM 40 MG: 40 INJECTION, POWDER, FOR SOLUTION INTRAVENOUS at 20:19

## 2017-09-11 RX ADMIN — HYDROMORPHONE HYDROCHLORIDE 0.5 MG: 1 INJECTION, SOLUTION INTRAMUSCULAR; INTRAVENOUS; SUBCUTANEOUS at 09:00

## 2017-09-11 RX ADMIN — POTASSIUM CHLORIDE, DEXTROSE MONOHYDRATE AND SODIUM CHLORIDE: 150; 5; 900 INJECTION, SOLUTION INTRAVENOUS at 14:50

## 2017-09-11 RX ADMIN — Medication 10 MEQ: at 02:39

## 2017-09-11 RX ADMIN — DOXYLAMINE SUCCINATE 25 MG: 25 TABLET ORAL at 13:47

## 2017-09-11 RX ADMIN — PANTOPRAZOLE SODIUM 40 MG: 40 INJECTION, POWDER, FOR SOLUTION INTRAVENOUS at 08:02

## 2017-09-11 RX ADMIN — HYDROMORPHONE HYDROCHLORIDE 0.5 MG: 1 INJECTION, SOLUTION INTRAMUSCULAR; INTRAVENOUS; SUBCUTANEOUS at 22:13

## 2017-09-11 RX ADMIN — POTASSIUM CHLORIDE, DEXTROSE MONOHYDRATE AND SODIUM CHLORIDE: 150; 5; 900 INJECTION, SOLUTION INTRAVENOUS at 21:12

## 2017-09-11 RX ADMIN — ONDANSETRON 4 MG: 2 INJECTION INTRAMUSCULAR; INTRAVENOUS at 23:31

## 2017-09-11 RX ADMIN — INSULIN ASPART 4 UNITS: 100 INJECTION, SOLUTION INTRAVENOUS; SUBCUTANEOUS at 13:41

## 2017-09-11 RX ADMIN — HYDROMORPHONE HYDROCHLORIDE 0.5 MG: 1 INJECTION, SOLUTION INTRAMUSCULAR; INTRAVENOUS; SUBCUTANEOUS at 06:34

## 2017-09-11 RX ADMIN — HUMAN INSULIN 4 UNITS/HR: 100 INJECTION, SOLUTION SUBCUTANEOUS at 09:38

## 2017-09-11 RX ADMIN — Medication 50 MG: at 13:47

## 2017-09-11 RX ADMIN — SODIUM CHLORIDE: 9 INJECTION, SOLUTION INTRAVENOUS at 14:56

## 2017-09-11 RX ADMIN — METOCLOPRAMIDE 10 MG: 5 INJECTION, SOLUTION INTRAMUSCULAR; INTRAVENOUS at 05:00

## 2017-09-11 RX ADMIN — Medication 10 MEQ: at 01:26

## 2017-09-11 RX ADMIN — ONDANSETRON 4 MG: 2 INJECTION INTRAMUSCULAR; INTRAVENOUS at 18:18

## 2017-09-11 RX ADMIN — ONDANSETRON 4 MG: 2 INJECTION INTRAMUSCULAR; INTRAVENOUS at 12:24

## 2017-09-11 RX ADMIN — POTASSIUM CHLORIDE, DEXTROSE MONOHYDRATE AND SODIUM CHLORIDE: 150; 5; 900 INJECTION, SOLUTION INTRAVENOUS at 08:00

## 2017-09-11 RX ADMIN — FAMOTIDINE 20 MG: 10 INJECTION, SOLUTION INTRAVENOUS at 05:01

## 2017-09-11 RX ADMIN — ONDANSETRON 4 MG: 2 INJECTION INTRAMUSCULAR; INTRAVENOUS at 06:28

## 2017-09-11 NOTE — CONSULTS
GASTROENTEROLOGY CONSULTATION       DATE OF SERVICE:  09/11/2017.      REASON FOR CONSULTATION:  Epigastric abdominal pain and hepatomegaly.      CHIEF COMPLAINT:  We were asked by Dr. Luevano of Maternal Fetal Medicine to evaluate this patient with abdominal pain and hepatomegaly.  History is obtained from the patient and the medical record.      HISTORY OF PRESENT ILLNESS:  Anne Gunter is a 27-year-old female with a history of poorly controlled type 1 diabetes complicated by recurrent admissions for diabetic ketoacidosis, 25 weeks pregnant with her second child, now presenting with diabetic ketoacidosis.      The patient presented on 09/05 with nausea, vomiting and abdominal pain.  She was treated supportively and was eventually discharged home from the Emergency Department.  She again presented on 09/09/2017 with similar symptoms though worsening in severity.  Found to have diabetic ketoacidosis and was initiated on an insulin drip, IV fluids, with pain control and antiemetics.  She has since improved from a DKA standpoint.  However, notes persistent abdominal pain for which GI is consulted.        The patient reports a history of chronic abdominal pain during pregnancy.  She notes that with her first child, she experienced severe 10/10 epigastric abdominal pain with associated nausea and vomiting throughout the duration of her pregnancy.  Symptoms required chronic narcotic use during the pregnancy.  She was eventually seen by Gastroenterology and offered an upper endoscopy which she declined given concern regarding risks to her fetus.  She states that her current abdominal pain started after her fourth month gestation.  She notes the symptoms became severe in the last several days, prompting evaluation in the ER.  Symptoms are similar to those experienced during prior pregnancy, though reportedly less severe.  The pain is 5/10, localized to the epigastrium, nonradiating, not consistently associated with meals.  " No change in pain with bowel movements.  Associated nausea with nonbloody, nonbilious emesis last this morning.  Also with chills but no fever.  The patient otherwise denies melena, hematochezia, bright red blood per rectum, hematemesis, odynophagia, dysphagia and fevers.  Her last bowel movement was 2 days ago and the patient states she was constipated for most of last week.  She rarely takes NSAIDs.  Her last use was 2 months ago for tooth pain.  The patient reports that she is reluctant to pursue further testing given her pregnancy.      PAST MEDICAL HISTORY:     1.  Type 1 diabetes, complicated by recurrent DKA.  Hemoglobin A1c 8.4.   2.  Microalbuminuria.       PAST SURGICAL HISTORY:     1.   in 2016.   2.  No prior upper endoscopy or colonoscopy.      SOCIAL HISTORY:  The patient is .  Her  lives in [place].  She has 1 other child who is a boy.  She is visiting her family members here in [place] and was planning to return home in 10 days.  She denies alcohol, smoking and illicit drug use.      FAMILY HISTORY:  No family history of \"colon problems.\"  Otherwise, no known history of liver disease or gastrointestinal malignancies.  No known history of ulcerative colitis or Crohn's disease.      ALLERGIES:  No known drug allergies.      MEDICATIONS:  Insulin.  No other home meds.      REVIEW OF SYSTEMS:  A complete review of systems was performed and is negative except as noted above in the HPI.      PHYSICAL EXAMINATION:   VITALS:   /63  Temp 98.2  F (36.8  C) (Oral)  Resp 16  Ht 1.7 m (5' 6.93\")  Wt 75 kg (165 lb 5.5 oz)  SpO2 99%  BMI 25.95 kg/m2  CONSTITUTIONAL:  Cooperative, pleasant.  Not dyspneic.  Appears in mild distress.   EYES:  Sclerae anicteric.   HEENT:  Normal oropharynx without ulcers or exudate.  Mucous membranes moist.  Hearing intact.   NECK:  Supple.   CARDIOVASCULAR:  No edema.   RESPIRATORY:  Unlabored breathing.   LYMPH:  No lymphadenopathy.   ABDOMEN:  " Gravid, tender to palpation in the epigastrium, positive bowel sounds.  No peritoneal signs.   SKIN:  Warm and perfused.  No jaundice.   NEUROLOGIC:  Alert and oriented x3.  No asterixis.   PSYCHIATRIC:  Normal affect.   MUSCULOSKELETAL:  No gross deformities.      LABORATORY DATA:  Labs  reviewed and notable for a glucose of 179, positive serum ketones of 0.7, anion gap 9.  WBC 13, hemoglobin 10.1, platelets of 195.  Albumin of 2.5.  LFTs otherwise unremarkable.  Lipase of 60.  Amylase 22.      IMAGING:  Reviewed and notable for:   US abdomen Complete  1.  Mild hepatomegaly which is nonspecific.  Liver is not fatty or frankly cirrhotic.  No focal hepatic mass.   2.  Mild bilateral hydronephrosis which may relate to second trimester gestation.      IMPRESSION AND PLAN:  This is a 27-year-old female with a history of poorly controlled type 1 diabetes complicated by recurrent admissions for her diabetic ketoacidosis, currently 25 weeks pregnant with her second child who presents nausea/vomiting, abdominal.  Found to have diabetic ketoacidosis.      The patient's symptoms are most likely secondary to diabetic ketoacidosis which appears improved though not yet resolved. Metabolic acidosis may induce delayed gastric emptying and ileus.  There may be some component of constipation which may be exacerbated by narcotic use and zofran.  I am reassured that her symptoms are similar to those experienced during prior pregnancy.  Anticipate improvement with continued treatment of DKA.       RECOMMENDATIONS:   1.  Gastroparesis diet. Foods that are fatty, acidic, spicy, and roughage-based increase overall symptoms in individuals with gastroparesis.    2.  MiraLax once daily, titrate to bowel movement once daily.    3.  Continue with PPI, antiemetic regimen per primary team   4.  Treatment of DKA per primary team   5.  If no improvement in clinical picture in several days, further evaluation via endoscopy or imaging could be  considered.  However, the patient is reluctant to do so in the setting of pregnancy.   6.  Hepatomegaly, likely congestive in the setting of pregnancy.  No clear evidence of fatty liver though this may also be contributing given longstanding diabetes.  This is most likely an incidental finding.  No further workup is recommended.      Gastroenterology team will follow.  Further recommendations pending clinical course.      Thank you for involving us in this patient's care.  Please do not hesitate to contact the GI service with any questions or concerns.      The patient care plan discussed with Dr. Drew Salcido, GI staff physician.         DREW SALCIDO MD       As dictated by JOANNE DUMONT MD            D: 2017 14:22   T: 2017 15:09   MT: NICHO      Name:     LORRAINE DAY   MRN:      [id number]        Account:       [id number]   :      1990           Consult Date:  2017      Document: [id number]

## 2017-09-11 NOTE — CONSULTS
Diabetes/Hyperglycemia Management Consult    Chief Complaint type 1 diabetes in pregnancy, uncontrolled, admitted with DKA  Consult requested by: Dr. Jose Luevano  History of Present Illness   History obtained from pt using professional Welsh , and from chart review.  Ms. Anne Gunter is a 26 yo woman at 25w4d of pregnancy, with uncontrolled type 1 diabetes and history of multiple episodes of DKA during previous pregnancy, who transferred to [facility] on 9/10/17 [facility] with DKA and  contractions.  It appears that bicarb was in normal range when pt was first admitted to [facility] but within 8 hours had decreased from 22 to 12 and anion gap increased from 12 (closed) to 18.  DKA protocol was started on admission to L&D at [facility]. Contractions stopped.  Pt was transferred from L&D to ICU on [facility] for a brief period yesterday.  DKA protocol was continued during that time and AG closed, bicarb increased to 18-19 (normal during pregnancy).  Pt transferred back to [facility] yesterday afternoon.  At that time IV insulin algorithm was changed from adult DKA protocol to high intensity OB protocol.  Pt has been kept on D5 NS with KCl at 150ml/h.  She has had ongoing N/V and has not tried any po intake (although diet is allowed).  For brief period during the night one of pt's IVs was lost and dextrose fluids went off, but were restarted once IV access was established. Serum ketones did rise slightly this morning to 0.7 (from 0.1), likely related to no dextrose for a few hours.  Bicarb and AG remain in normal limits this morning.  Anne continues to have significant epigastric discomfort, which she says is the same pain she had during first pregnancy (she reports less severe this pregnancy), and nausea.  GI consult pending due to ongoing epigastric pain.  Concern for gastroparesis, pt is now on Reglan and PPI and H2 blocker.    Anne reports that she was following with a doctor (she is not sure if it was an  endocrinologist) in [place], but says she was not seeing him frequently.  She had been admitted to the hospital in [place] a couple times during this pregnancy with similar symptoms, likely DKA.  She says she saw her doctor in [place] after her last hospital admission about 20 days ago.  At that visit he added aspart at supper, which pt had not been taking prior to that.  Her doses prior to her last visit were the same as her pre-pregnancy insulin regimen: detemir 30 units qHS (although when not pregnant pt takes glargine), aspart 10 units with bfast, 10 units with lunch.  20 days ago her doctor added 5 units aspart with supper.  Pt says she takes this dose sometimes even if she doesn't eat supper.  She reports that after adding this evening aspart she has had a lot of low blood sugars.  Glucoses have been as low as the 50s.  But she is also having several high readings.  Her fasting glucoses are usually 120s-130s, which she considered good.  Her post prandial glucoses range mid 100s to 200s, occasionally as high as the 300s. Of note, when pt was admitted for DKA during her first pregnancy at ~25 weeks she required almost double the amount of basal insulin she has been taking outpatient now (her regimen then: detemir 28 units BID, meal aspart slightly lower at 8 units per meal).  When we followed pt during this first pregnancy it was difficult to determine how much insulin she was actually taking for her meals at home- this was an ongoing challenge during her 2 admissions and for her local outpatient endocrinologist, Dr. Colunga.  Her current hemoglobin A1c of 8.4% is higher than A1c at 25weeks during first pregnancy (A1c was then 7.4%).      Recent Labs  Lab 09/11/17  0623 09/11/17  0622 09/11/17  0513 09/11/17  0404 09/11/17  0324 09/11/17  0235 09/11/17  0220 09/11/17  0124  09/10/17  2251  09/10/17  1845  09/10/17  1606  09/10/17  1418   *  --   --   --   --  140*  --   --   --  173*  --  159*  --  169*   --  141*   BGM  --  161* 144* 129* 125*  --  138* 159*  < >  --   < >  --   < >  --   < >  --    < > = values in this interval not displayed.      Diabetes Type:  Type 1 diabetes  Diabetes Duration: 9 years  Usual Diabetes Regimen: prepregnancy: glargine 30 units HS, aspart 10 units with bfast, 10 units with lunch. During pregnancy: detemir 30 units qHS, aspart 10 units with bfast, 10 units with lunch, 5 units with supper  Ability to [place] Prescribed Regimen: unknown- in the past pt has not taken meal insulin consistently.  Diabetes Control:   Lab Results   Component Value Date    A1C 8.4 09/10/2017    A1C 9.2 06/01/2016    A1C 9.8 05/23/2016    A1C 7.4 04/23/2016    A1C 7.2 04/19/2016     Diabetes Complications: peripheral neuropathy.  Possible gastroparesis.  History of DKA: multiple episodes during both pregnancies  Able to Detect Hypoglycemia: yes  Usual Diabetes Care Provider: In the past saw Dr. Colunga outpatient while in [place] (and pt reports having an upcoming appointment with an endocrinologist at [facility]- likely Dr. Colunga again, but pt couldn't recall the name), also followed by doctor (endo?) in [place]  Factors Impacting Glucose Control: ongoing severe nausea and abdominal pain preventing po intake, current pregnancy, possible gastroparesis.      Review of Systems  10 point ROS completed with pertinent positives and negatives noted in the HPI    Past medical, family and social histories are reviewed and updated.    Past Medical History  Past Medical History:   Diagnosis Date     Diabetes (H)      Microalbuminuria 6/21/2016     Type I diabetes mellitus, uncontrolled (H) 6/21/2016       Family History  Family History   Problem Relation Age of Onset     DIABETES Maternal Grandmother      CEREBROVASCULAR DISEASE Paternal Grandmother      Coronary Artery Disease Paternal Grandmother      Hypertension No family hx of      Hyperlipidemia No family hx of      Breast Cancer No family hx of   "    Colon Cancer No family hx of      Prostate Cancer No family hx of      Other Cancer No family hx of      Depression No family hx of      Anxiety Disorder No family hx of      MENTAL ILLNESS No family hx of      Substance Abuse No family hx of      Anesthesia Reaction No family hx of      Asthma No family hx of      OSTEOPOROSIS No family hx of      Genetic Disorder No family hx of      Thyroid Disease No family hx of      Obesity No family hx of        Social History  Social History     Social History     Marital status:      Spouse name: N/A     Number of children: N/A     Years of education: N/A     Social History Main Topics     Smoking status: Never Smoker     Smokeless tobacco: Never Used     Alcohol use No     Drug use: No     Sexual activity: Yes     Partners: Male     Other Topics Concern     None     Social History Narrative   Anne had been living in [place] with her  and child.  She came to the [place] about 10 days ago.  She has some family in the area (but , child and mother are in [place]).      Physical Exam  BP 92/58  Temp 98  F (36.7  C) (Oral)  Resp 16  Ht 1.7 m (5' 6.93\")  Wt 75 kg (165 lb 5.5 oz)  SpO2 98%  BMI 25.95 kg/m2    General:  Resting in bed, at times appears uncomfortable, holding emesis bag.   HEENT: NC/AT, PER and anicteric, non-injected, oral mucous membranes moist.   Lungs: unlabored respiration, no cough  ABD: pregnant abdomen  Skin: warm and dry, no obvious lesions  MSK:  fluid movement of all extremities  Lymp:  no LE edema   Mental status: alert, oriented x3, communicating clearly  Psych:  calm, even mood    Laboratory  Recent Labs   Lab Test  09/11/17   0623  09/11/17   0235   NA  136  138   POTASSIUM  4.0  3.9   CHLORIDE  107  108   CO2  20  21   ANIONGAP  9  9   GLC  179*  140*   BUN  3*  3*   CR  0.38*  0.33*   LILLIAM  7.4*  7.3*     CBC RESULTS:   Recent Labs   Lab Test  09/10/17   0310   WBC  13.0*   RBC  3.79*   HGB  10.1*   HCT  31.7*   MCV  84   MCH "  26.6   MCHC  31.9   RDW  15.5*   PLT  195       Liver Function Studies -   Recent Labs   Lab Test  09/10/17   1845   PROTTOTAL  6.6*   ALBUMIN  2.5*   BILITOTAL  0.7   ALKPHOS  62   AST  7   ALT  11       Active Medications  Current Facility-Administered Medications   Medication     dextrose 5% and 0.9% NaCl with potassium chloride 20 mEq infusion     glucose 40 % gel 15-30 g    Or     dextrose 50 % injection 25-50 mL    Or     glucagon injection 1 mg     famotidine (PEPCID) injection 20 mg     pyridOXINE (VITAMIN B-6) tablet 50 mg     doxylamine (UNISOM) tablet 25 mg     magnesium sulfate 2 g in NS intermittent infusion ([facility]ar[facility]ium or [facility]V Cmpd)     magnesium sulfate 4 g in 100 mL sterile water (premade)     potassium chloride SA (K-DUR/KLOR-CON M) CR tablet 20-40 mEq     potassium chloride (KLOR-CON) Packet 20-40 mEq     potassium chloride 10 mEq in 100 mL intermittent infusion     potassium chloride 10 mEq in 100 mL intermittent infusion with 10 mg lidocaine     potassium chloride 20 mEq in 50 mL intermittent infusion     naloxone (NARCAN) injection 0.1-0.4 mg     ondansetron (ZOFRAN) injection 4 mg     nitrous oxide/oxygen 50/50 blend     No Tdap Needed - Assessment: Patient does not need Tdap vaccine     HYDROmorphone (DILAUDID) injection 0.3-0.5 mg     pantoprazole (PROTONIX) 40 mg IV push injection     metoclopramide (REGLAN) injection 10 mg     dextrose 10 % 1,000 mL infusion     0.9% sodium chloride infusion     insulin 1 unit/mL in saline (novoLIN-Regular) drip - High Intensity Infusion     insulin aspart (NovoLOG) inj (RAPID ACTING)     insulin aspart (NovoLOG) inj (RAPID ACTING)     ondansetron (ZOFRAN) injection 4 mg     No current outpatient prescriptions on file.       Current Diet    Active Diet Order      NPO for Medical/Clinical Reasons Except for: Meds, Ice Chips      Assessment  Ms. Anne Gunter is a 26 yo woman at 25w4d of pregnancy, with uncontrolled type 1 diabetes and history of multiple  episodes of DKA during previous pregnancy, who transferred to [facility] on 9/10/17 from [facility] with DKA and  contractions. DKA precipitated by persistent N/V, decreased po intake, and likely high glucoses (pt reports not missing any insulin doses). DKA has now resolved, however, po intake remains minimal and serum ketone level mo after pt was off dextrose fluids for a brief period only overnight.  IV Insulin rates 1.5-3 units/h today with glucoses near target range.  BG uncontrolled prior to admission (reported regimen significantly less than what she was on at 25 weeks during previous pregnancy).      Plan  -Continue high intensity OB IV insulin infusion.  Once pt is tolerating regular po intake and IV insulin rates are relatively stable with glucoses at/near target range we will transition to SC insulin  -meal aspart 1unit/7g CHO with meals and snacks.    Anne reports that she already has an appointment scheduled with diabetes provider at [facility] (presumably Dr. Colunga)- will need to find out the date of the appointment as she will need very close follow up after discharge.    We will continue to follow.    Carlene Jones [phone]-C [phone]    Diabetes Management Team job code: [phone]

## 2017-09-11 NOTE — PLAN OF CARE
VSS.  Patient complains of epigastric pain, nausea and vomiting.  Symptoms are temporarily relieved with PRN antiemetics and dilaudid but return within 2 hours.  Patient unable to eat or drink due to nausea and vomiting. IV site lost in right hand due to infiltration. New IV placed at 2230 and D5 with potassium resumed at 2245.  Will try to get a PICC line placed Monday.  Yates catheter patent with good output, catheter cares completed.  Patient incontinent of stool.  Patient cleaned up, turned and repositioned.  Blood sugar checked Q 1 hour and insulin gtt titrated per high intensity algorithm 1.  FHT category 1 tracing.  Few contractions seen, patient denies feeling.  Patient comfortable at this time; will continue to monitor.

## 2017-09-11 NOTE — PROGRESS NOTES
"MFM/Antepartum Note:    S: No adverse events o/n. Abdominal pain improved this AM. Is still vomiting, however notes this is improved. Endorses drinking small amount of water, however states after about 2 hours she has abdominal pain followed by vomiting. Endorses a lack of appetite. She feels baby moving, denies contractions, vaginal bleeding or other fluid loss.    O:  /87  Temp 98.7  F (37.1  C) (Oral)  Resp 16  Ht 1.7 m (5' 6.93\")  Wt 75 kg (165 lb 5.5 oz)  SpO2 98%  BMI 25.95 kg/m2  Gen: lying in bed, NAD. Improved engagement with team.  Heart: RRR. No M/R/G  Lungs: CTAB. No W/R/R  Abd: soft, nontender, gravid, non distended  Ext: no edema bilaterally, no calf tenderness    FHT: 135 baseline, moderate variability, accels present, no decels  Newsoms: irritable    Maternal Fetal U/S (17)  1) Single intrauterine pregnancy at 29 2/7 weeks gestation.  2) Appropriate interval fetal growth.  3) Visualized fetal anatomy appears normal.  4) Normal amniotic fluid volume.    Abdominal US (9/10/17)  1.  Mild hepatomegaly, which is nonspecific. The liver is not fatty or  frankly cirrhotic. No focal hepatic mass.  2.  Mild bilateral hydronephrosis/pelvocaliectasis, which may relate  to second trimester gestation.    Assessment: 27 year old  at 25w5d by stated dates was admitted to [facility] as SARA from [facility] for DKA in pregnancy with  contractions. She was transferred to the [facility] for ICU care given the DKA and has since had improvements in her labs with no further anion gap and improvement in her bicarb. Now that she is stable she has returned to L&D for further evaluation and management of her DM, abdominal pain and n/v.     DM I, diabetic DKA:  -Patient stable. Ketones slightly increased to 0.7 from 0.1, however likely 2/2 temporary (approx 3hr) loss of IV access o/n. Anion gap stable at 9, CO2 improved to 20 o/n. BMP stable. Patient has remained on an insulin drip. Discussed patient with " endocrinology. They will see her today and make recommendations regarding insulin.  Recheck labs in 12 hours.  Plan:  -Endo following  -Continue IV insulin per endo, encourage po intake and reassess insulin needs upon improved po intake  -ERP for K/Na in place  -Continue dextrose infusion with 20 mEq KCl running at 150 cc/hr pending endo recs, f/u w/ recs  -Continuous monitoring for now  -Recheck BMP/B hydroxybutyrate AM/PM, will space labs if continued improvement.    Abdominal pain:  Abdominal pain improved this AM. No evidence of  labor at this time. Cervix has been closed on exam x2. Abdominal US 9/10/17 with mild hepatomegaly, otherwise normal and transaminases have been WNL x2. Feel most likely cause of pain is related to DKA/gastroparesis.  Plan:  - IV protonix/pepcid q12h with dilaudid bumps q2h PRN for pain  - Zofran q6h w/ Reglan prn for nausea. Will add additional agents as necessary.  - GI consult for assistance in further work-up/management, assessment of hepatomegaly, f/u w/ recs    Nutrition:  -Patient endorses drinking small amount of water, still no nutrition po. Patient on D5 NS 150mL/hr. Discussed possible need for NJ tube if unable to tolerate PO.  Plan:  -Continue dextrose infusion with 20 mEq KCl running at 150 cc/hr pending endo recs, f/u w/ recs  -Nutrition consult ordered, f/u w/ recs     contractions  -Resolved. GBS (+), wet prep negative, UA clean. Cervix closed on check x2.  Plan:  - Infectious work-up: GC/Chlamydia pending  -Continue to monitor     - FWB:  Cat I tracing, appropriate for gestational age. No concerns for fetal distress at this time      - PNC: NOB labs collected: Rh pos, Rubella IgG positive, Hep B nonreactive, HIV nonreactive, GBS positive, placenta unknown, GC/Chlam pending.  Comprehensive scan completed today 2017 displays single intrauterine pregnancy at 29 2/7 weeks gestation, appropriate interval fetal growth, visualized fetal anatomy appears  normal, normal amniotic fluid volume.    Scribe Disclosure:   I, Satya Patiño, am serving as a scribe; to document services personally performed Dr. Luevano based on data collection and the provider's statements to me.     Provider Disclosure:  I agree with above History, Review of Systems, Physical exam and Plan.  I have reviewed the content of the documentation and have edited it as needed. I have personally performed the services documented here and the documentation accurately represents those services and the decisions I have made.      Electronically signed by:  Jose Luevano    Time Spent on this Encounter   I, Jose Luevano, spent a total of 15 minutes bedside and on the inpatient unit today managing the care of Anne Gunter.  Over 50% of my time on the unit was spent counseling the patient and /or coordinating care regarding pregnancy complicated by type 1 DM and recent DKA. See note for details.    Jose Luevano

## 2017-09-11 NOTE — PLAN OF CARE
Problem: Diabetes, Type 1 (Adult)  Goal: Signs and Symptoms of Listed Potential Problems Will be Absent or Manageable (Diabetes, Type 1)  Signs and symptoms of listed potential problems will be absent or manageable by discharge/transition of care (reference Diabetes, Type 1 (Adult) CPG).   Outcome: Improving  Insulin infusion adjusted to Algorithm 3 this morning and has remained at goal or slightly above today.  Abdominal symptoms ( pain and nausea/emesis) had resolved until 1630 when abdominal pain suddenly reoccurred, pain relieved by IV Dilaudid.  Yates dc'd and voided x1 since discontinued.  Discussion with MFM about PICC line placement and pt refused.  Discussion with GI about upper GI w/u and pt. Declined at this time.   Per STAR Jones PA-C, if able to tolerate PO intake, continue dextrose infusion for 1 h and then switch to non dextrose infusion.

## 2017-09-11 NOTE — PROGRESS NOTES
Strip review    FHR: baseline 135, moderate variability, no accels, no decels  Tulsita: No contractions    Appropriate for gestational age. Continue to monitor.    Jeannie Disla MD  OB/GYN PGY3

## 2017-09-11 NOTE — PROGRESS NOTES
"CLINICAL NUTRITION SERVICES - ASSESSMENT NOTE     Nutrition Prescription    RECOMMENDATIONS FOR MDs/PROVIDERS TO ORDER:  As appropriate per team discretion, recommend advance diet > NPO     Malnutrition Status:    Non-severe malnutrition in the context of acute illness     Recommendations already ordered by Registered Dietitian (RD):  None today    Future/Additional Recommendations:  Once diet adv > NPO, recommend ordered Ensure Clear (pt likes juice) to promote adequacy of PO intakes.      Recommend pt to follow with outpatient diabetes educator for ongoing support and continued education to improve glycemic control.        REASON FOR ASSESSMENT  Anne Gunter is a 27 year old female assessed by the dietitian for Admission Nutrition Risk Screen for new/uncontrolled diabetes and provider consult with the comment - \"DM I, pregnant, presented in DKA\"     NUTRITION HISTORY  Per chart review, PMH includes poorly controlled DM1, hx of abd pain 2/2 gastroparesis. Per pt report, she has had two hospitalizations for likely DKA this pregnancy at an OSH, with previous episode of DKA in previous pregnancy.     Pt was seen by several inpatient dietitians during previous pregnancy (Spring of 2016) due to poorly controlled DM1 and inadequate wt gain/poor PO. She was also followed by outpatient diabetes dietitian, until 5/2016 as pt no showed x3 appointments.    Per pt she was eating very well during the first trimester, paying little attention to BG/carbohydrate counting. However, for the past 2-3 weeks she has experienced worsening nausea inhibiting PO intakes. Lately she has only been eating 1 meal/day (e.g. Salad and soup) otherwise just drinking juice throughout the day.     CURRENT NUTRITION ORDERS  Diet: NPO x 1 day    LABS  HgbA1C 8.4 (H) 9/10/17, improved from 9.2 (H) on 6/1/16  BG improved since admit - 120s-170s over the past 24 hrs     MEDICATIONS  Insulin aspart TID w/ meals (1 Unit : 7 gm CHO)  Insulin drip " "    ANTHROPOMETRICS  Height: 170 cm (5' 6.929\")  Most Recent Weight: 75 kg (165 lb 5.5 oz)    IBW: 61 kg   BMI: Overweight BMI 25-29.9  Weight History: Difficult to determine  wt due to lack of recent wt records. However, pt reports 3-4# wt loss since pre-pregnancy. This is inappropriate weight gain, given would recommend 15-25 # wt gain based on pre-pregnancy BMI of 26.5 kg/m2 based on pt's report.   Wt Readings from Last 10 Encounters:   09/10/17 75 kg (165 lb 5.5 oz)   17 76 kg (167 lb 8.8 oz)   16 68.9 kg (152 lb)   16 79.6 kg (175 lb 7.8 oz)   16 74.4 kg (164 lb)   16 73.6 kg (162 lb 4.8 oz)   16 73 kg (161 lb)   16 73.4 kg (161 lb 14.4 oz)   16 74.2 kg (163 lb 8 oz)   16 71 kg (156 lb 9.6 oz)       Dosing Weight: 75 kg (actual based on admit wt on 9/10/17)    ASSESSED NUTRITION NEEDS  Estimated Energy Needs: 8967-6948 kcals/day (25 - 30 kcals/kg + 340 kcal/day)  Justification: Increased needs 2/2 second trimester of pregnancy and repletion   Estimated Protein Needs:  grams protein/day (1.2 - 1.5 grams of pro/kg)  Justification: Increased needs 2/2 second trimester of pregnancy and repletion  Estimated Fluid Needs: (1 mL/kcal)   Justification: Maintenance    PHYSICAL FINDINGS  See malnutrition section below.    MALNUTRITION  % Intake: </= 50% for >/= 5 days (severe)  % Weight Loss: Weight loss does not meet criteria  Subcutaneous Fat Loss: None observed  Muscle Loss: Temporal/orbital:  Mild  Fluid Accumulation/Edema: Trace BLE edema, Does not meet criteria  Malnutrition Diagnosis: Non-severe malnutrition in the context of acute illness     NUTRITION DIAGNOSIS  Inadequate oral intakes related to nausea inhibiting adequate PO intakes as evidenced by pt eating 1 meal/day for the past 2-3 weeks and 3-4# wt loss since pre-pregnancy.      INTERVENTIONS  Implementation  Collaboration with other providers regarding future recommendations    Nutrition " Education: Offered to review carbohydrate counting education, which pt declines. She reports being knowledgeable of carbohydrate-containing foods, how to count carbs and her insulin regimen, rather the reason she has poor glycemic control simply due to lack of compliance. Encouraged pt to comply with insulin regimen and carb counting for the safety of her pregnancy. Pt verbalized understanding.     Goals  1) BG </= 180    2) Weight maintenance, ideally gains > 75 kg.     3) Patient to consume % of nutritionally adequate meal trays TID, or the equivalent with supplements/snacks.     Monitoring/Evaluation  Progress toward goals will be monitored and evaluated per protocol.    Ernestina Farris RD, LD   Pager: [phone]

## 2017-09-12 ENCOUNTER — OFFICE VISIT (OUTPATIENT)
Dept: INTERPRETER SERVICES | Facility: CLINIC | Age: 27
End: 2017-09-12

## 2017-09-12 LAB
ANION GAP SERPL CALCULATED.3IONS-SCNC: 8 MMOL/L (ref 3–14)
BUN SERPL-MCNC: 2 MG/DL (ref 7–30)
CALCIUM SERPL-MCNC: 7.3 MG/DL (ref 8.5–10.1)
CHLORIDE SERPL-SCNC: 109 MMOL/L (ref 94–109)
CO2 SERPL-SCNC: 21 MMOL/L (ref 20–32)
CREAT SERPL-MCNC: 0.34 MG/DL (ref 0.52–1.04)
GFR SERPL CREATININE-BSD FRML MDRD: >90 ML/MIN/1.7M2
GLUCOSE BLDC GLUCOMTR-MCNC: 100 MG/DL (ref 70–99)
GLUCOSE BLDC GLUCOMTR-MCNC: 100 MG/DL (ref 70–99)
GLUCOSE BLDC GLUCOMTR-MCNC: 101 MG/DL (ref 70–99)
GLUCOSE BLDC GLUCOMTR-MCNC: 104 MG/DL (ref 70–99)
GLUCOSE BLDC GLUCOMTR-MCNC: 106 MG/DL (ref 70–99)
GLUCOSE BLDC GLUCOMTR-MCNC: 111 MG/DL (ref 70–99)
GLUCOSE BLDC GLUCOMTR-MCNC: 117 MG/DL (ref 70–99)
GLUCOSE BLDC GLUCOMTR-MCNC: 117 MG/DL (ref 70–99)
GLUCOSE BLDC GLUCOMTR-MCNC: 118 MG/DL (ref 70–99)
GLUCOSE BLDC GLUCOMTR-MCNC: 129 MG/DL (ref 70–99)
GLUCOSE BLDC GLUCOMTR-MCNC: 134 MG/DL (ref 70–99)
GLUCOSE BLDC GLUCOMTR-MCNC: 139 MG/DL (ref 70–99)
GLUCOSE BLDC GLUCOMTR-MCNC: 159 MG/DL (ref 70–99)
GLUCOSE BLDC GLUCOMTR-MCNC: 166 MG/DL (ref 70–99)
GLUCOSE BLDC GLUCOMTR-MCNC: 54 MG/DL (ref 70–99)
GLUCOSE BLDC GLUCOMTR-MCNC: 56 MG/DL (ref 70–99)
GLUCOSE BLDC GLUCOMTR-MCNC: 66 MG/DL (ref 70–99)
GLUCOSE BLDC GLUCOMTR-MCNC: 68 MG/DL (ref 70–99)
GLUCOSE BLDC GLUCOMTR-MCNC: 79 MG/DL (ref 70–99)
GLUCOSE BLDC GLUCOMTR-MCNC: 94 MG/DL (ref 70–99)
GLUCOSE BLDC GLUCOMTR-MCNC: 98 MG/DL (ref 70–99)
GLUCOSE BLDC GLUCOMTR-MCNC: 98 MG/DL (ref 70–99)
GLUCOSE BLDC GLUCOMTR-MCNC: 99 MG/DL (ref 70–99)
GLUCOSE SERPL-MCNC: 103 MG/DL (ref 70–99)
INTERPRETATION ECG - MUSE: NORMAL
KETONES BLD-SCNC: 0 MMOL/L (ref 0–0.6)
MAGNESIUM SERPL-MCNC: 2 MG/DL (ref 1.6–2.3)
POTASSIUM SERPL-SCNC: 3.7 MMOL/L (ref 3.4–5.3)
SODIUM SERPL-SCNC: 138 MMOL/L (ref 133–144)

## 2017-09-12 PROCEDURE — 25000128 H RX IP 250 OP 636: Performed by: OBSTETRICS & GYNECOLOGY

## 2017-09-12 PROCEDURE — 82010 KETONE BODYS QUAN: CPT | Performed by: OBSTETRICS & GYNECOLOGY

## 2017-09-12 PROCEDURE — 84132 ASSAY OF SERUM POTASSIUM: CPT | Performed by: OBSTETRICS & GYNECOLOGY

## 2017-09-12 PROCEDURE — 25000132 ZZH RX MED GY IP 250 OP 250 PS 637: Performed by: OBSTETRICS & GYNECOLOGY

## 2017-09-12 PROCEDURE — S0028 INJECTION, FAMOTIDINE, 20 MG: HCPCS | Performed by: OBSTETRICS & GYNECOLOGY

## 2017-09-12 PROCEDURE — 25000125 ZZHC RX 250: Performed by: OBSTETRICS & GYNECOLOGY

## 2017-09-12 PROCEDURE — 83735 ASSAY OF MAGNESIUM: CPT | Performed by: OBSTETRICS & GYNECOLOGY

## 2017-09-12 PROCEDURE — 12000032 ZZH R&B OB CRITICAL UMMC

## 2017-09-12 PROCEDURE — 80048 BASIC METABOLIC PNL TOTAL CA: CPT | Performed by: OBSTETRICS & GYNECOLOGY

## 2017-09-12 PROCEDURE — 25000128 H RX IP 250 OP 636: Performed by: PHYSICIAN ASSISTANT

## 2017-09-12 PROCEDURE — 00000146 ZZHCL STATISTIC GLUCOSE BY METER IP

## 2017-09-12 PROCEDURE — 36415 COLL VENOUS BLD VENIPUNCTURE: CPT | Performed by: OBSTETRICS & GYNECOLOGY

## 2017-09-12 PROCEDURE — 25800025 ZZH RX 258: Performed by: OBSTETRICS & GYNECOLOGY

## 2017-09-12 PROCEDURE — 25000131 ZZH RX MED GY IP 250 OP 636 PS 637: Performed by: CLINICAL NURSE SPECIALIST

## 2017-09-12 PROCEDURE — T1013 SIGN LANG/ORAL INTERPRETER: HCPCS | Mod: U3

## 2017-09-12 RX ORDER — METOCLOPRAMIDE HYDROCHLORIDE 5 MG/ML
10 INJECTION INTRAMUSCULAR; INTRAVENOUS EVERY 6 HOURS
Status: DISCONTINUED | OUTPATIENT
Start: 2017-09-12 | End: 2017-09-13

## 2017-09-12 RX ADMIN — HYDROMORPHONE HYDROCHLORIDE 0.5 MG: 1 INJECTION, SOLUTION INTRAMUSCULAR; INTRAVENOUS; SUBCUTANEOUS at 22:54

## 2017-09-12 RX ADMIN — INSULIN ASPART 6 UNITS: 100 INJECTION, SOLUTION INTRAVENOUS; SUBCUTANEOUS at 19:26

## 2017-09-12 RX ADMIN — DOXYLAMINE SUCCINATE 25 MG: 25 TABLET ORAL at 09:57

## 2017-09-12 RX ADMIN — DOXYLAMINE SUCCINATE 25 MG: 25 TABLET ORAL at 21:27

## 2017-09-12 RX ADMIN — Medication 50 MG: at 12:16

## 2017-09-12 RX ADMIN — METOCLOPRAMIDE 10 MG: 5 INJECTION, SOLUTION INTRAMUSCULAR; INTRAVENOUS at 20:25

## 2017-09-12 RX ADMIN — ONDANSETRON 4 MG: 2 INJECTION INTRAMUSCULAR; INTRAVENOUS at 12:14

## 2017-09-12 RX ADMIN — HYDROMORPHONE HYDROCHLORIDE 0.5 MG: 1 INJECTION, SOLUTION INTRAMUSCULAR; INTRAVENOUS; SUBCUTANEOUS at 02:32

## 2017-09-12 RX ADMIN — Medication 50 MG: at 20:24

## 2017-09-12 RX ADMIN — FAMOTIDINE 20 MG: 10 INJECTION, SOLUTION INTRAVENOUS at 17:25

## 2017-09-12 RX ADMIN — SODIUM CHLORIDE: 9 INJECTION, SOLUTION INTRAVENOUS at 15:47

## 2017-09-12 RX ADMIN — HYDROMORPHONE HYDROCHLORIDE 0.5 MG: 1 INJECTION, SOLUTION INTRAMUSCULAR; INTRAVENOUS; SUBCUTANEOUS at 00:14

## 2017-09-12 RX ADMIN — HUMAN INSULIN 5.5 UNITS/HR: 100 INJECTION, SOLUTION SUBCUTANEOUS at 11:40

## 2017-09-12 RX ADMIN — HYDROMORPHONE HYDROCHLORIDE 0.5 MG: 1 INJECTION, SOLUTION INTRAMUSCULAR; INTRAVENOUS; SUBCUTANEOUS at 06:39

## 2017-09-12 RX ADMIN — ONDANSETRON 4 MG: 2 INJECTION INTRAMUSCULAR; INTRAVENOUS at 18:19

## 2017-09-12 RX ADMIN — FAMOTIDINE 20 MG: 10 INJECTION, SOLUTION INTRAVENOUS at 04:40

## 2017-09-12 RX ADMIN — POTASSIUM CHLORIDE, DEXTROSE MONOHYDRATE AND SODIUM CHLORIDE: 150; 5; 900 INJECTION, SOLUTION INTRAVENOUS at 17:14

## 2017-09-12 RX ADMIN — HUMAN INSULIN 5.5 UNITS/HR: 100 INJECTION, SOLUTION SUBCUTANEOUS at 13:15

## 2017-09-12 RX ADMIN — PANTOPRAZOLE SODIUM 40 MG: 40 INJECTION, POWDER, FOR SOLUTION INTRAVENOUS at 20:29

## 2017-09-12 RX ADMIN — PANTOPRAZOLE SODIUM 40 MG: 40 INJECTION, POWDER, FOR SOLUTION INTRAVENOUS at 08:28

## 2017-09-12 RX ADMIN — ONDANSETRON 4 MG: 2 INJECTION INTRAMUSCULAR; INTRAVENOUS at 23:50

## 2017-09-12 RX ADMIN — INSULIN ASPART 3 UNITS: 100 INJECTION, SOLUTION INTRAVENOUS; SUBCUTANEOUS at 12:10

## 2017-09-12 RX ADMIN — INSULIN DETEMIR 25 UNITS: 100 INJECTION, SOLUTION SUBCUTANEOUS at 12:17

## 2017-09-12 RX ADMIN — ONDANSETRON 4 MG: 2 INJECTION INTRAMUSCULAR; INTRAVENOUS at 05:27

## 2017-09-12 RX ADMIN — METOCLOPRAMIDE 10 MG: 5 INJECTION, SOLUTION INTRAMUSCULAR; INTRAVENOUS at 08:52

## 2017-09-12 RX ADMIN — METOCLOPRAMIDE 10 MG: 5 INJECTION, SOLUTION INTRAMUSCULAR; INTRAVENOUS at 15:03

## 2017-09-12 RX ADMIN — HYDROMORPHONE HYDROCHLORIDE 0.5 MG: 1 INJECTION, SOLUTION INTRAMUSCULAR; INTRAVENOUS; SUBCUTANEOUS at 08:41

## 2017-09-12 RX ADMIN — POTASSIUM CHLORIDE, DEXTROSE MONOHYDRATE AND SODIUM CHLORIDE: 150; 5; 900 INJECTION, SOLUTION INTRAVENOUS at 10:28

## 2017-09-12 RX ADMIN — POTASSIUM CHLORIDE, DEXTROSE MONOHYDRATE AND SODIUM CHLORIDE: 150; 5; 900 INJECTION, SOLUTION INTRAVENOUS at 03:32

## 2017-09-12 RX ADMIN — INSULIN DETEMIR 15 UNITS: 100 INJECTION, SOLUTION SUBCUTANEOUS at 23:46

## 2017-09-12 NOTE — PLAN OF CARE
Problem: Goal Outcome Summary  Goal: Goal Outcome Summary  Outcome: Improving  Data: Blood sugars at or slightly above goal level. Currently in algorithm 2 on insulin gtt. Noted 2 episodes of hypoglycemia this shift, resolved with IV dextrose for 1st episode and 120 cc of juice for 2nd episode. Pt continues to complain of nausea and refusing PO medications. Was able to tolerate some PO intake evening 9/11/17. Did not vomit overnight. C/o dull abdominal pain, states decrease in pain with dilaudid.  Interventions: Finger stick blood glucose levels Q1h until x4 readings within . Per protocol, requires 2 additional readings to switch to q2h monitoring. Continue uterine/fetal assessment, provider ok with pt taking a couple hour break from monitoring overnight. Plan to monitor again at approx 0530.  Activity level: Bed rest with bathroom privileges. Preventive measures include: Medications, Positioning and Frequent voiding. Consults for Endocrine RN done, will come back to reassess pt..  Plan: Continue expectant management. Observe for and notify care provider of indications of hypoglycemia or hyperglycemia, or maternal/fetal compromise.

## 2017-09-12 NOTE — PROVIDER NOTIFICATION
"   09/12/17 0159   Provider Notification   Provider Name/Title Naif   Method of Notification Phone   Request Evaluate - Remote   Notification Reason Patient Request     Pt requesting to be off monitor for a couple hours. States monitor bands \"not helping with my pain\". Ok per provider to be off monitor until approx 0530.  "

## 2017-09-12 NOTE — PROVIDER NOTIFICATION
09/12/17 0107   Provider Notification   Provider Name/Title Naif   Method of Notification Phone   Request Evaluate - Remote   Notification Reason Lab/Diagnostic Study     Updated provider on hypoglycemic episode and BG rechecks. Plan to recheck again in 15 min (approx 2 hours after infusion was stopped). If above 80, will restart insulin gtt in algorithm 2 (one lower) and resume Q1h checks.

## 2017-09-12 NOTE — PROGRESS NOTES
Diabetes Consult Daily  Progress Note          Assessment/Plan:   Ms. Anne Gunter is a 28 yo woman at 25w4d of pregnancy, with uncontrolled type 1 diabetes and history of multiple episodes of DKA during previous pregnancy, who transferred to [facility] on 9/10/17 from [facility] with DKA and  contractions. DKA precipitated by persistent N/V, decreased po intake, and likely high glucoses (pt reports not missing any insulin doses). DKA has now resolved, however, po intake remains minimal and serum ketone level mo after pt was off dextrose fluids for only a brief period.    Since she is expected to remain on dextrose fluids for a few days, will start transition to subcutaneous insulin.  Will then need to coordinate the reduction/cessation of dextrose fluids with insulin adjustments.  Insulin needs lower overnight and rising this morning in setting of emesis (stress) and juice intake (not covered w/ aspart).     Plan  -give detemir 25 units now.  Continue the high intensity OB IV insulin infusion.  Planning to give BID detemir, so not stopping insulin infusion until after second dose detemir  -meal aspart 1unit/7g CHO with meals and snacks.  -once off insulin infusion, will order aspart correction scale and revise BG monitoring freq  -ketone monitoring frequency could be decreased while dextrose fluids continue.  Could then increase freq of monitoring when stopping the dextrose fluids.        We will continue to follow.       Outpatient diabetes follow up: likely with endocrinologist Dr. Colunga at [facility]  Plan discussed with patient, bedside RN, and primary team.           Interval History:   The last 24 hours progress and nursing notes reviewed.  Pt interviewed with help of professional .  Receiving D5NS + 20 KCl at 150 cc/h.  Ketones checked q12h and last two values were zero.  Hypoglycemia after drip up-titration following PO last evening.  Pt was  "symptomatic.  Tolerated PO yesterday afternoon/evening.  Emesis this morning with no food or pills for provocation.  Diet downgraded to full liquids.  Still, primary team's assessment is that Anne is doing better.  She is however, expected to continue to need fluids for several days.  Anne denies discomfort from fingersticks.  She is able to fall asleep after receiving sedating medications, but is awakened frequently for monitoring.  She is only up to bathroom, not ambulating further. Continues with abd pain treated with hydromorphone.        Recent Labs  Lab 09/12/17  1043 09/12/17  0941 09/12/17  0835 09/12/17  0630 09/12/17  0610 09/12/17  0530 09/12/17  0431  09/11/17  1855  09/11/17  0623  09/11/17  0235  09/10/17  2251  09/10/17  1845   GLC  --   --   --   --  103*  --   --   --  87  --  179*  --  140*  --  173*  --  159*   * 159* 159* 100*  --  99 98  < >  --   < >  --   < >  --   < >  --   < >  --    < > = values in this interval not displayed.            Review of Systems:   See interval hx          Medications:       Active Diet Order      Full Liquid Diet     Physical Exam:  Gen: Alert, resting in bed, in NAD   HEENT: NC/AT, mucous membranes are moist  Resp: Unlabored  Neuro:oriented x3, communicating clearly  BP 92/56  Temp 98.6  F (37  C) (Oral)  Resp 18  Ht 1.7 m (5' 6.93\")  Wt 76.5 kg (168 lb 10.4 oz)  SpO2 95%  BMI 26.47 kg/m2           Data:     Lab Results   Component Value Date    A1C 8.4 09/10/2017    A1C 9.2 06/01/2016    A1C 9.8 05/23/2016    A1C 7.4 04/23/2016    A1C 7.2 04/19/2016              CBC RESULTS:   Recent Labs   Lab Test  09/10/17   0310   WBC  13.0*   RBC  3.79*   HGB  10.1*   HCT  31.7*   MCV  84   MCH  26.6   MCHC  31.9   RDW  15.5*   PLT  195     Recent Labs   Lab Test  09/12/17   0610  09/11/17   1855   NA  138  137   POTASSIUM  3.7  3.5   CHLORIDE  109  106   CO2  21  23   ANIONGAP  8  8   GLC  103*  87   BUN  2*  1*   CR  0.34*  0.33*   LILLIAM  7.3*  7.6* "     Liver Function Studies -   Recent Labs   Lab Test  09/10/17   1845   PROTTOTAL  6.6*   ALBUMIN  2.5*   BILITOTAL  0.7   ALKPHOS  62   AST  7   ALT  11       I spent a total of 40 minutes bedside and on the inpatient unit managing the glycemic care of Anne Gunter. Over 50% of my time on the unit was spent counseling the patient and/or coordinating care regarding glucose management in pregnancy with poor oral intake.  See note for details.        Melba Hoffman APRN [id number]    Diabetes Management job code [id number]

## 2017-09-12 NOTE — PLAN OF CARE
Problem: Diabetes, Type 1 (Adult)  Goal: Signs and Symptoms of Listed Potential Problems Will be Absent or Manageable (Diabetes, Type 1)  Signs and symptoms of listed potential problems will be absent or manageable by discharge/transition of care (reference Diabetes, Type 1 (Adult) CPG).   Pt no longer in DKA and states she feels better. BG's have remained elevated in Algorithm 3. Tolerated Chicken noodle soup and toast well. Covered with Sub Q Nova log.  No nausea, vomiting or abdominal pain since last dose of dilaudid at 0841. Long acting insulin started in hopes of weaning insulin infusion. Dr Luevano updated. Will continue to monitor BG q 1 hour

## 2017-09-12 NOTE — PROGRESS NOTES
"MFM/Antepartum Note:    S: Endorses continued nausea. No emesis o/n, however patient had episode of vomiting shortly after morning rounds. Patient did eat a couple bites of pizza o/n. Still c/o abdominal pain controlled with dilaudid. 2 episodes of hypoglycemia o/n, resolved with IV dextrose 1st episode and po juice 2nd episode.Monitor was d/c'd for approx 3 hours o/n, resumed this AM. Endorses feeling baby moving, no contractions, no blood or other fluid loss. Denies fevers or chills.    O:  /63  Temp 98.1  F (36.7  C) (Oral)  Resp 18  Ht 1.7 m (5' 6.93\")  Wt 76.5 kg (168 lb 10.4 oz)  SpO2 99%  BMI 26.47 kg/m2  Gen: lying in bed, NAD, however clearly observable nausea.  Heart: RRR. No M/R/G  Lungs: CTAB  Abd: soft, mildly bloated, nontender, gravid  Ext: no edema bilaterally, no calf tenderness    B-hydroxybutyrate: 0.0  Ma.0  Glucose  Lab 17  0630 17  0610 17  0530 17  0431 17  0330 17  0228 17  0124   GLC  --  103*  --   --   --   --   --    *  --  99 98 104* 117* 117*   < > = values in this interval not displayed.    FHT: 130 baseline, minimal-moderate variability, 10x10 accels present, variable decels. Reassuring for gestational age.      Assessment: 27 year old, hospital day 4,  at 25w6d by stated dates was admitted to [facility] as SARA from [facility] for DKA in pregnancy with  contractions. She was transferred to the [place] for ICU care given the DKA and has since had improvements in her labs with no further anion gap and stable bicarb. She is clinically stable. She has been transferred to antepartum for further evaluation and management of her DM, abdominal pain and n/v.      DM I, diabetic DKA:  -Patient stable. Ketones stable at 0.0 x2, anion gap stable at 8, CO2 stable at 21, BMP stable. Patient has remained on an insulin drip. Endocrine following, recommend continued IV insulin until regular po intake and target glucoses reached, " then transition to SC insulin.  Plan:  -Endo following    -frequent gluc checks    -Continue IV insulin, encourage po intake and reassess insulin needs upon improved po intake.  Possible transition to SQ insulin per Endo.    -Continue dextrose infusion with 20 mEq KCl running at 150 cc/hr pending endo recs  -Recheck BMP in AM   -ERP for K/Na  -TID monitoring    Abdominal pain/nausea:  Abdominal pain returned o/n, continued nausea this AM. No evidence of  labor at this time. Cervix has been closed on exam x2. Abdominal US 9/10/17 with mild hepatomegaly, otherwise normal and transaminases have been WNL x2. GI consulted, feel most likely cause of pain is related to DKA/gastroparesis, recommended GP diet, continued anti-emetics, PPI, and begin miralax as tolerated, however patient declining miralax. If no improvement within several days, could consider endoscopy. Further hepatomegaly workup not recommended.  Plan:  - IV protonix/pepcid q12h with dilaudid bumps q2h PRN for pain  - Zofran q6h w/ Reglan prn for nausea. Will add additional agents as necessary.  - Miralax as tolerated, titrated to 1BM/day  - Liquid diet today    Nutrition:  -Patient ate some soup and couple bites of pizza o/n, however still minimal po with continued nausea. Patient on D5 NS 150mL/hr. Discussed possible need for NJ tube if unable to tolerate PO. Nutrition following, recommend continued po challenge.  Plan:  -Nutrition following  -Continue dextrose infusion with 20 mEq KCl running at 150 cc/hr  -Liquid diet     contractions  -Resolved. GBS (+), wet prep negative, UA clean. Cervix closed on check x2, GCchlam negative  Plan:  -Continue to monitor TID     - FWB:  Appropriate for gestational age. No fetal concerns at this time.  TID EFM.      - PNC: NOB labs collected: Rh pos, Rubella IgG positive, Hep B nonreactive, HIV nonreactive, GBS positive, placenta unknown, GC/Chlam negative. Comprehensive scan completed yesterday 2017  displays single intrauterine pregnancy at 29 2/7 weeks gestation, appropriate interval fetal growth, visualized fetal anatomy appears normal, normal amniotic fluid volume.    Scribe Disclosure:   I, Satya Patiño MS3, am serving as a scribe; to document services personally performed by  Jose Luevano MD -based on data collection and the provider's statements to me.     Provider Disclosure:  I agree with above History, Review of Systems, Physical exam and Plan.  I have reviewed the content of the documentation and have edited it as needed. I have personally performed the services documented here and the documentation accurately represents those services and the decisions I have made.      Electronically signed by:  Jose Luevano    Time Spent on this Encounter   I, Jose Luevano, spent a total of 15 minutes bedside and on the inpatient unit today managing the care of Anne Gunter.  Over 50% of my time on the unit was spent counseling the patient and /or coordinating care regarding pregnancy complicated type 1 DM and recent DKA. See note for details.    Jose Luevano

## 2017-09-12 NOTE — PROVIDER NOTIFICATION
09/11/17 2015   Provider Notification   Provider Name/Title MD Naif   Method of Notification In Department   Request Evaluate - Remote   Notification Reason Lab/Diagnostic Study     Notified provider of potassium level at 3.5. Pt tolerating some PO intake. Plan to give ordered amount of potassium replacement PO.

## 2017-09-13 ENCOUNTER — OFFICE VISIT (OUTPATIENT)
Dept: INTERPRETER SERVICES | Facility: CLINIC | Age: 27
End: 2017-09-13

## 2017-09-13 VITALS
RESPIRATION RATE: 18 BRPM | SYSTOLIC BLOOD PRESSURE: 105 MMHG | HEIGHT: 67 IN | TEMPERATURE: 98.7 F | DIASTOLIC BLOOD PRESSURE: 66 MMHG | BODY MASS INDEX: 26.47 KG/M2 | OXYGEN SATURATION: 95 % | WEIGHT: 168.65 LBS

## 2017-09-13 LAB
ABO + RH BLD: NORMAL
ABO + RH BLD: NORMAL
ANION GAP SERPL CALCULATED.3IONS-SCNC: 10 MMOL/L (ref 3–14)
BLD GP AB SCN SERPL QL: NORMAL
BLOOD BANK CMNT PATIENT-IMP: NORMAL
BUN SERPL-MCNC: 2 MG/DL (ref 7–30)
CALCIUM SERPL-MCNC: 7.1 MG/DL (ref 8.5–10.1)
CHLORIDE SERPL-SCNC: 109 MMOL/L (ref 94–109)
CO2 SERPL-SCNC: 19 MMOL/L (ref 20–32)
CREAT SERPL-MCNC: 0.35 MG/DL (ref 0.52–1.04)
GFR SERPL CREATININE-BSD FRML MDRD: >90 ML/MIN/1.7M2
GLUCOSE BLDC GLUCOMTR-MCNC: 101 MG/DL (ref 70–99)
GLUCOSE BLDC GLUCOMTR-MCNC: 104 MG/DL (ref 70–99)
GLUCOSE BLDC GLUCOMTR-MCNC: 109 MG/DL (ref 70–99)
GLUCOSE BLDC GLUCOMTR-MCNC: 109 MG/DL (ref 70–99)
GLUCOSE BLDC GLUCOMTR-MCNC: 110 MG/DL (ref 70–99)
GLUCOSE BLDC GLUCOMTR-MCNC: 110 MG/DL (ref 70–99)
GLUCOSE BLDC GLUCOMTR-MCNC: 149 MG/DL (ref 70–99)
GLUCOSE BLDC GLUCOMTR-MCNC: 59 MG/DL (ref 70–99)
GLUCOSE BLDC GLUCOMTR-MCNC: 69 MG/DL (ref 70–99)
GLUCOSE BLDC GLUCOMTR-MCNC: 73 MG/DL (ref 70–99)
GLUCOSE BLDC GLUCOMTR-MCNC: 83 MG/DL (ref 70–99)
GLUCOSE BLDC GLUCOMTR-MCNC: 90 MG/DL (ref 70–99)
GLUCOSE SERPL-MCNC: 168 MG/DL (ref 70–99)
MAGNESIUM SERPL-MCNC: 1.8 MG/DL (ref 1.6–2.3)
MAGNESIUM SERPL-MCNC: 1.9 MG/DL (ref 1.6–2.3)
POTASSIUM SERPL-SCNC: 3.6 MMOL/L (ref 3.4–5.3)
POTASSIUM SERPL-SCNC: 3.9 MMOL/L (ref 3.4–5.3)
SODIUM SERPL-SCNC: 138 MMOL/L (ref 133–144)
SPECIMEN EXP DATE BLD: NORMAL

## 2017-09-13 PROCEDURE — 25000132 ZZH RX MED GY IP 250 OP 250 PS 637: Performed by: OBSTETRICS & GYNECOLOGY

## 2017-09-13 PROCEDURE — 36415 COLL VENOUS BLD VENIPUNCTURE: CPT | Performed by: OBSTETRICS & GYNECOLOGY

## 2017-09-13 PROCEDURE — 83735 ASSAY OF MAGNESIUM: CPT | Performed by: OBSTETRICS & GYNECOLOGY

## 2017-09-13 PROCEDURE — 25000128 H RX IP 250 OP 636: Performed by: OBSTETRICS & GYNECOLOGY

## 2017-09-13 PROCEDURE — S0028 INJECTION, FAMOTIDINE, 20 MG: HCPCS | Performed by: OBSTETRICS & GYNECOLOGY

## 2017-09-13 PROCEDURE — 25000125 ZZHC RX 250: Performed by: OBSTETRICS & GYNECOLOGY

## 2017-09-13 PROCEDURE — 25800025 ZZH RX 258: Performed by: OBSTETRICS & GYNECOLOGY

## 2017-09-13 PROCEDURE — 86900 BLOOD TYPING SEROLOGIC ABO: CPT | Performed by: OBSTETRICS & GYNECOLOGY

## 2017-09-13 PROCEDURE — 80048 BASIC METABOLIC PNL TOTAL CA: CPT | Performed by: OBSTETRICS & GYNECOLOGY

## 2017-09-13 PROCEDURE — 84132 ASSAY OF SERUM POTASSIUM: CPT | Performed by: OBSTETRICS & GYNECOLOGY

## 2017-09-13 PROCEDURE — 00000146 ZZHCL STATISTIC GLUCOSE BY METER IP

## 2017-09-13 PROCEDURE — 86901 BLOOD TYPING SEROLOGIC RH(D): CPT | Performed by: OBSTETRICS & GYNECOLOGY

## 2017-09-13 PROCEDURE — T1013 SIGN LANG/ORAL INTERPRETER: HCPCS | Mod: U3

## 2017-09-13 PROCEDURE — 86850 RBC ANTIBODY SCREEN: CPT | Performed by: OBSTETRICS & GYNECOLOGY

## 2017-09-13 RX ORDER — HYDROMORPHONE HYDROCHLORIDE 2 MG/1
2 TABLET ORAL EVERY 4 HOURS PRN
Status: DISCONTINUED | OUTPATIENT
Start: 2017-09-13 | End: 2017-09-13 | Stop reason: HOSPADM

## 2017-09-13 RX ORDER — PANTOPRAZOLE SODIUM 40 MG/1
40 TABLET, DELAYED RELEASE ORAL 2 TIMES DAILY
Status: DISCONTINUED | OUTPATIENT
Start: 2017-09-13 | End: 2017-09-13 | Stop reason: HOSPADM

## 2017-09-13 RX ORDER — ONDANSETRON 8 MG/1
8 TABLET, FILM COATED ORAL ONCE
Status: CANCELLED | OUTPATIENT
Start: 2017-09-13 | End: 2017-09-13

## 2017-09-13 RX ORDER — ONDANSETRON 4 MG/1
4 TABLET, ORALLY DISINTEGRATING ORAL EVERY 8 HOURS PRN
Qty: 20 TABLET | Refills: 1 | Status: ON HOLD | OUTPATIENT
Start: 2017-09-13 | End: 2017-10-13

## 2017-09-13 RX ORDER — METOCLOPRAMIDE 10 MG/1
10 TABLET ORAL 4 TIMES DAILY PRN
Status: DISCONTINUED | OUTPATIENT
Start: 2017-09-13 | End: 2017-09-13 | Stop reason: HOSPADM

## 2017-09-13 RX ORDER — ONDANSETRON 8 MG/1
8 TABLET, FILM COATED ORAL 2 TIMES DAILY
Status: DISCONTINUED | OUTPATIENT
Start: 2017-09-13 | End: 2017-09-13 | Stop reason: HOSPADM

## 2017-09-13 RX ORDER — FAMOTIDINE 20 MG/1
20 TABLET, FILM COATED ORAL 2 TIMES DAILY
Qty: 40 TABLET | Refills: 1 | Status: SHIPPED | OUTPATIENT
Start: 2017-09-13 | End: 2017-12-06

## 2017-09-13 RX ORDER — POLYETHYLENE GLYCOL 3350 17 G/17G
17 POWDER, FOR SOLUTION ORAL DAILY PRN
Qty: 7 PACKET | Refills: 1 | Status: SHIPPED | OUTPATIENT
Start: 2017-09-13 | End: 2017-12-06

## 2017-09-13 RX ORDER — METOCLOPRAMIDE 10 MG/1
10 TABLET ORAL 4 TIMES DAILY PRN
Qty: 60 TABLET | Refills: 0 | Status: ON HOLD | OUTPATIENT
Start: 2017-09-13 | End: 2017-10-13

## 2017-09-13 RX ORDER — FAMOTIDINE 10 MG
20 TABLET ORAL 2 TIMES DAILY
Status: DISCONTINUED | OUTPATIENT
Start: 2017-09-13 | End: 2017-09-13 | Stop reason: HOSPADM

## 2017-09-13 RX ORDER — PANTOPRAZOLE SODIUM 40 MG/1
40 TABLET, DELAYED RELEASE ORAL EVERY MORNING
Status: DISCONTINUED | OUTPATIENT
Start: 2017-09-14 | End: 2017-09-13

## 2017-09-13 RX ORDER — FAMOTIDINE 10 MG
20 TABLET ORAL 2 TIMES DAILY
Status: DISCONTINUED | OUTPATIENT
Start: 2017-09-14 | End: 2017-09-13

## 2017-09-13 RX ORDER — AMOXICILLIN 250 MG
1 CAPSULE ORAL 2 TIMES DAILY PRN
Qty: 100 TABLET | Refills: 0 | Status: ON HOLD | OUTPATIENT
Start: 2017-09-13 | End: 2017-10-13

## 2017-09-13 RX ORDER — FAMOTIDINE 10 MG
20 TABLET ORAL DAILY
Status: CANCELLED | OUTPATIENT
Start: 2017-09-13 | End: 2017-09-14

## 2017-09-13 RX ORDER — PANTOPRAZOLE SODIUM 40 MG/1
40 TABLET, DELAYED RELEASE ORAL 2 TIMES DAILY
Qty: 30 TABLET | Refills: 1 | Status: ON HOLD | OUTPATIENT
Start: 2017-09-13 | End: 2017-10-13

## 2017-09-13 RX ADMIN — ONDANSETRON 4 MG: 2 INJECTION INTRAMUSCULAR; INTRAVENOUS at 06:11

## 2017-09-13 RX ADMIN — Medication: at 02:35

## 2017-09-13 RX ADMIN — PANTOPRAZOLE SODIUM 40 MG: 40 INJECTION, POWDER, FOR SOLUTION INTRAVENOUS at 08:15

## 2017-09-13 RX ADMIN — HYDROMORPHONE HYDROCHLORIDE 0.5 MG: 1 INJECTION, SOLUTION INTRAMUSCULAR; INTRAVENOUS; SUBCUTANEOUS at 07:41

## 2017-09-13 RX ADMIN — HYDROMORPHONE HYDROCHLORIDE 0.5 MG: 1 INJECTION, SOLUTION INTRAMUSCULAR; INTRAVENOUS; SUBCUTANEOUS at 02:33

## 2017-09-13 RX ADMIN — SODIUM CHLORIDE: 9 INJECTION, SOLUTION INTRAVENOUS at 14:20

## 2017-09-13 RX ADMIN — FAMOTIDINE 20 MG: 10 TABLET ORAL at 16:03

## 2017-09-13 RX ADMIN — Medication 50 MG: at 08:15

## 2017-09-13 RX ADMIN — METOCLOPRAMIDE 10 MG: 5 INJECTION, SOLUTION INTRAMUSCULAR; INTRAVENOUS at 02:31

## 2017-09-13 RX ADMIN — POTASSIUM CHLORIDE, DEXTROSE MONOHYDRATE AND SODIUM CHLORIDE: 150; 5; 900 INJECTION, SOLUTION INTRAVENOUS at 00:48

## 2017-09-13 RX ADMIN — POTASSIUM CHLORIDE, DEXTROSE MONOHYDRATE AND SODIUM CHLORIDE: 150; 5; 900 INJECTION, SOLUTION INTRAVENOUS at 14:36

## 2017-09-13 RX ADMIN — METOCLOPRAMIDE 10 MG: 5 INJECTION, SOLUTION INTRAMUSCULAR; INTRAVENOUS at 08:15

## 2017-09-13 RX ADMIN — FAMOTIDINE 20 MG: 10 INJECTION, SOLUTION INTRAVENOUS at 04:59

## 2017-09-13 RX ADMIN — Medication 50 MG: at 19:54

## 2017-09-13 RX ADMIN — PANTOPRAZOLE SODIUM 40 MG: 40 TABLET, DELAYED RELEASE ORAL at 19:54

## 2017-09-13 RX ADMIN — POTASSIUM CHLORIDE, DEXTROSE MONOHYDRATE AND SODIUM CHLORIDE: 150; 5; 900 INJECTION, SOLUTION INTRAVENOUS at 08:15

## 2017-09-13 NOTE — PLAN OF CARE
Per Melba Hoffman, pt to remain on drip until 0000 on 9/13, but pt should be managed using algorithm 2 starting at 2020 BG check given incidence of hypoglycemia.

## 2017-09-13 NOTE — PLAN OF CARE
"Problem: Goal Outcome Summary  Goal: Goal Outcome Summary  Outcome: No Change  Pt VSS. BP (!) 84/53  Temp 98.3  F (36.8  C) (Oral)  Resp 16  Ht 1.7 m (5' 6.93\")  Wt 76.5 kg (168 lb 10.4 oz)  SpO2 95%  BMI 26.47 kg/m2. Pt was taken of insulin infusion during shift. D/t BGs had been shut off previously d/t hypoglycemia which was treated with oral intake. See documentation.  Denies LOF/Bleeding. Continues to get scheduled medications for stomach issues. Pt taking Dilaudid for pain. Has had x1 this shift. Will continue to support pt as able.      "

## 2017-09-13 NOTE — PROGRESS NOTES
"MFM/Antepartum Note:    S: No acute events o/n. Notes single episode of abdominal pain w/out emesis 2/2 po intake for episode of hypoglycemia, however denies any abdominal pain, nausea or emesis at this time. Endorses feeling baby moving, no contractions, no blood or other fluid loss. Denies fevers or chills. Overall endorses feeling quite good.    O:  /65  Temp 98.1  F (36.7  C) (Oral)  Resp 16  Ht 1.7 m (5' 6.93\")  Wt 76.5 kg (168 lb 10.4 oz)  SpO2 95%  BMI 26.47 kg/m2  Gen: lying in bed, NAD. Clinical appearance improved.  Heart: RRR. No M/R/G  Lungs: CTAB  Abd: soft, non-distended, nontender, gravid    Labs:  BMP: Na: 138 K: 3.6  Cl: 109  CO2: 19 (L)  BUN: 2  Cr: 0.35 (L)  AG: 10  Ma.8  Glucose:  Lab 17  0634 17  0615 17  0224 17  0120 17  0014 17  2359 17  2344   *  --   --   --   --   --   --    BGM  --  149* 104* 110* 109* 83 69*     FHT: 130 baseline, minimal-moderate variability, 10x10 accels present, no decels. Reassuring for gestational age.  [place]: irritable    Assessment: 27 year old, hospital day 5,  at 26w0d by stated dates was admitted to [facility] as SARA from [facility] for DKA in pregnancy with  contractions. She was transferred to the [facility] for ICU care given the DKA and has since had improvements in her labs with no further anion gap and stable bicarb. She is clinically stable. She has been transferred to antepartum for further evaluation and management of her DM, abdominal pain and n/v. Patient stable, clinical status improving.     DM I, diabetic DKA:  -Mild increase in AG this AM (8-->10) and decrease in CO2 (21-->19). Patient has been transitioned to SQ detemir (received bid dosing yesterday) with d/c of insulin infusion o/n. Gluc 168 this AM and patient has yet to receive AM determir dose schedule for 1100. Expect labs to improve following stabilization of detemir and insulin needs, however will continue to monitor " for worsening of clinical status. Endocrine following, will manage per recs.  Plan:   -Endo following    -frequent gluc checks    -20mg detemir SQ bid per endo, encourage po intake and continuous assessment of insulin needs upon improved po intake    -Continue dextrose infusion with 20 mEq KCl running at 150 cc/hr pending endo recs    -AM B-hydroxybutyrate, increase freq when stopping dextrose fluids per endo  -Recheck BMP in AM  -ERP for K/Na    Abdominal pain/nausea:  Abdominal pain returned o/n 2/2 po intake for hypoglycemic episode, unclear of specific food that caused pain. No pain or nausea during AM rounds, improving po intake, overall improving. No evidence of  labor at this time. GI consult revealed benign work-up, likely 2/2 DKA and gastroparesis, continue to recommend anti-emetics, PPI, miralax as tolerated (patient declining), emphasis on correct dietary choices. Consider endoscopy if status worsens. Further hepatomegaly workup not recommended.  Plan:  - transition to po protonix and pepcid  - transition to po dilaudid q4h PRN for pain  - transition to po Zofran w/ Reglan prn for nausea  - Miralax as tolerated, titrated to 1BM/day  - Regular diet, with special instruction for low-fat, low-fiber foods. Broth based, bland is good.    Nutrition:  PO intake improving. No nausea this AM. Diet advanced to regular diet with special instruction for low-fat, low-fiber foods w/ good tolerance, however some pain o/n following po intake. Patient on D5 NS 150mL/hr, pending endo recs for d/c dextrose.    Plan:  -Nutrition following  -Continue dextrose infusion with 20 mEq KCl running at 150 cc/hr, pending endo recs  -Regular diet, with special instruction for low-fat, low-fiber foods. Broth based, bland is good.     contractions  -Resolved. GBS (+), wet prep negative, UA clean. Cervix closed on check x2, GCchlam negative  Plan:  -Continue to monitor     FWB:  Appropriate for gestational age. No fetal  concerns at this time. TID EFM.      PNC: NOB labs collected 9/10/17: Rh pos, Rubella IgG positive, Hep B nonreactive, HIV nonreactive, GBS positive, placenta posterior, GC/Chlam negative. Comprehensive scan completed 9/11/2017 displays single intrauterine pregnancy at 29 2/7 weeks gestation, appropriate interval fetal growth, visualized fetal anatomy appears normal, normal amniotic fluid volume.    Satya Patiño, MS3  [facility] Service    Scribe Disclosure:   I, Satya Patiño, am serving as a scribe; to document services personally performed by Dr. Jose Luevano- -based on data collection and the provider's statements to me.     Provider Disclosure:  I agree with above History, Review of Systems, Physical exam and Plan.  I have reviewed the content of the documentation and have edited it as needed. I have personally performed the services documented here and the documentation accurately represents those services and the decisions I have made.      Electronically signed by:  Jose Luevano      Time Spent on this Encounter   I, Jose Luevano, spent a total of 15 minutes bedside and on the inpatient unit today managing the care of Anne Gunter.  Over 50% of my time on the unit was spent counseling the patient and /or coordinating care regarding pregnancy complicated type 1 DM and recent DKA. See note for details.    Jose Luevano

## 2017-09-13 NOTE — PROGRESS NOTES
GI Fellow Brief Note    Chart reviewed.    Patient appears to be clinically improving.    GI will sign off at this time.    Continue current cares ( see GI consult note 9/11)    Please page with ? Or change in clinical status.     [place]  GI Fellow

## 2017-09-13 NOTE — PROGRESS NOTES
"                          Diabetes Consult Daily  Progress Note          Assessment/Plan:   Ms. Anne Gunter is a 28 yo woman at 25w4d of pregnancy, with uncontrolled type 1 diabetes and history of multiple episodes of DKA during previous pregnancy, who transferred to [facility] on 9/10/17 from [facility] with DKA and  contractions. DKA precipitated by persistent N/V, decreased po intake, and likely high glucoses (pt reports not missing any insulin doses). DKA has now resolved, however, po intake remains minimal and serum ketone level mo after pt was off dextrose fluids for only a brief period.      Variable glucose control during transition, including hypoglycemia.  Elevated fasting glucose today at least partially related to carb intake not covered w/ aspart.     Plan  -revise detemir to 20 units in AM (1100) and 20 units at night (2300)  -increase meal aspart 1unit/6.5g CHO with meals and snacks.  -pt to trial supplements (for increased nutritional value relative to simple juice)  -aspart 1 per 25 mg/dL correction for BG >100 AC, and >120 at HS  -BG monitor qAC, HS, 0200 and 2 hours post-meal  -may be prudent to repeat BMP this afternoon, particularly if BG trending high  -likely resume ketone monitoring when stopping the dextrose fluids.        We will continue to follow.       Outpatient diabetes follow up: likely with endocrinologist Dr. Colugna at [facility], pending of timing to return home  Plan discussed with Dr. Perez (endocrinology), patient, bedside RN, and dietician, paged to primary team           Interval History:   The last 24 hours progress and nursing notes reviewed.  Pt interviewed with help of professional .  Receiving D5NS + 20 KCl at 150 cc/h.  Bicarb 19 this morning from 21 y esterday.  Able to tolerate juice, soup, salad and chicken yesterday.  For BG low last evening again before midnight pt had several juices, including extra after treatment, per \"pt " "preference\".  Denies any increase abdominal discomfort r/t the volume of intake (different from what was reported to primary team earlier).  No emesis so far today.  Tired and sleeping late.  Some discomfort w/ detemir injection (pressure).  Discussed impact of juice on BG (high and fast spike). Pt points out last night her low BG didn't come up quickly.  She did understand when explained problem w/ juice if BG at target to start.  Willing to try supplements.  Says hasn't had before.  No concern about carb content of supplements to start since main priority is finding calories pt will tolerate.        Recent Labs  Lab 09/13/17  1025 09/13/17  0634 09/13/17  0615 09/13/17  0224 09/13/17  0120 09/13/17  0014 09/12/17  2359  09/12/17  0610  09/11/17  1855  09/11/17  0623  09/11/17  0235  09/10/17  2251   GLC  --  168*  --   --   --   --   --   --  103*  --  87  --  179*  --  140*  --  173*   *  --  149* 104* 110* 109* 83  < >  --   < >  --   < >  --   < >  --   < >  --    < > = values in this interval not displayed.            Review of Systems:   See interval hx          Medications:       Active Diet Order      Regular Diet Adult     Physical Exam:  Gen: Alert, resting in bed, in NAD, appears brighter, more engaged  HEENT: NC/AT, mucous membranes are moist  Resp: Unlabored  Neuro:oriented x3, communicating clearly  /65  Temp 98.1  F (36.7  C) (Oral)  Resp 16  Ht 1.7 m (5' 6.93\")  Wt 76.5 kg (168 lb 10.4 oz)  SpO2 95%  BMI 26.47 kg/m2           Data:     Lab Results   Component Value Date    A1C 8.4 09/10/2017    A1C 9.2 06/01/2016    A1C 9.8 05/23/2016    A1C 7.4 04/23/2016    A1C 7.2 04/19/2016            Recent Labs   Lab Test  09/13/17   0634  09/12/17   0610   NA  138  138   POTASSIUM  3.6  3.7   CHLORIDE  109  109   CO2  19*  21   ANIONGAP  10  8   GLC  168*  103*   BUN  2*  2*   CR  0.35*  0.34*   LILLIAM  7.1*  7.3*     CBC RESULTS:   Recent Labs   Lab Test  09/10/17   0310   WBC  13.0*   RBC  " 3.79*   HGB  10.1*   HCT  31.7*   MCV  84   MCH  26.6   MCHC  31.9   RDW  15.5*   PLT  195     I spent a total of 38 minutes bedside and on the inpatient unit managing the glycemic care of Anne Gunter. Over 50% of my time on the unit was spent counseling the patient and/or coordinating care regarding glucose management in pregnancy with poor oral intake.  See note for details.        Melba Hoffman APRN [id number]    Diabetes Management job code [id number]

## 2017-09-13 NOTE — PROVIDER NOTIFICATION
09/12/17 2338   Provider Notification   Provider Name/Title G3   Method of Notification Electronic Page   Request Evaluate - Remote   Notification Reason Status Update     Updated on BG status. Discussed plan with provider.

## 2017-09-13 NOTE — PROVIDER NOTIFICATION
09/12/17 1914   Provider Notification   Provider Name/Title Melba Hoffman Diabetes CNS   Method of Notification Electronic Page   Notification Reason Other (Comment)     Notified provider of pt hypoglycemia and difficulty returning pt to BG WDL.

## 2017-09-13 NOTE — PROGRESS NOTES
Currently 1:1 with pt treatment for hypoglycemia. Pt receiving oral liquids such as apple juice and oral intake such as crackers as she declined glucose gel. Will continue to monitor q 15 min until above 100 for BG.

## 2017-09-13 NOTE — DISCHARGE SUMMARY
[facility] DISCHARGE SUMMARY  [facility]  Anne Gunter  [id number]    Date of Admission: 9/10/2017  Date of Discharge: 17  Admission Diagnoses:   1. IUP @ 25w6d   2. Poorly controlled type I DM  3. DKA  4. History of C/S x1  5. History of  birth  6. Abdominal pain  7. Scant prenatal care  Discharge Diagnoses:   - Same, resolution of DKA  - Left AMA  - Preeclamsia without severe features    Procedures: IV insulin drip    Admission History: Anne Gunter is a 27 year old  at 25w4d by stated dates who presents as transfer of care from [facility] for DKA with  contractions.  However on arrival with draining of bladder, contractions completely resolved.  Over 1L drained from bladder.  Patient denies contractions currently and prior to admission.  No vaginal bleeding or loss of fluid.  + fetal movement.      She states that she has been feeling poorly for the past 1 week.  She has had vomiting and minor abdominal pain for the past week.  She denies fever, chills, diarrhea, running nose, cough, sore throat, SOB, chest pain, palpitations.  No concerns for headache, vision changes. Positive for epigastric pain. Negative for RUQ pain.      Of note, patient recently immig[place] to the [place] ago.  States she plan to be here with her family until delivery.  Her mother and  are currently living in [place].  She was seen 2-3 days prior to leaving for the  and had an U/S done that dated her pregnancy.  She was also admitted around 20 days ago for a 1 week in the hospital for abdominal pain in relation to her DM.  She states that in [place], she was admitted twice to the hospital for abdominal pain related to her DM.  She is taking her insulin (see regimen below) and took it up until today.  She does state she brought enough insulin for her trip, however has already scheduled a diabetes/endocrinology appt.  Has yet to schedule OB visit. She plans to deliver at [facility].      Pregnancy Complicated by:  - Poorly controlled Type I DM: per patient report, already two hospitalizations for likely DKA this pregnancy in [place], with prior episodes of DKA in prior pregnancy with delivery at [facility]. Current insulin regimen per patient is: 30U levemir QHS, 10U/10U/5U of Novolog.   - [place] prior prenatal care in [place] - as in past [place], [place] at certain [place] to await until delivery  - History of PLTCS x1 for fetal distress  - History of pre-term birth at 32w5d for fetal distress in the situation of DKA  - Hx of abdominal pain likely related to gastroparesis    SUMMARY OF HOSPITAL COURSE:   The patient was transferred to the SICU for management of acute DKA and metabolic acidosis.  Once stable she was transferred back to labor and delivery for further management. She was continued on an insulin drip with IVF and potassium replacement.  She had resolution of her metabolic acidosis and DKA. Endocrine followed along during her course. On HD#4 she was transitioned to subcutaneous insulin.    Abdominal pain: On initial presentation she was noted to be rosy but upon placement of suero contractions resolved.  labor work was negative with negative.   She had persistent epigastric discomfort and nausea. LFTs, amylase and lipase were obtained and noted to be normal. Abdominal US was performed and notable for mild hepatomegaly and mild bilateral hydronephrosis. GI consult was obtained, thought most likely related to DKA/gastroparesis, recommended gastroparesis diet, anti-emetics, PPI, miralax as tolerated and consider endoscopy if no improvement within several days, hepatomegaly nonconcerning. Abdominal pain steadily improved with increased po intake.    Elevated blood pressures:  She was noted to have several mild range blood pressures in the setting of abdominal pain.  HELLP labs were obtained and noted to be normal.  Urine protein to creatinine ratio was noted to be elevated at 0.6.  Meeting criteria for preeclampsia without severe features.    Fetal status: Through her admission fetal status was reassuring.  Growth ultrasound completed 2017 displayed single intrauterine pregnancy at 29 2/7 weeks gestation, appropriate interval fetal growth, visualized fetal anatomy appears normal, normal amniotic fluid volume.    On HD#3 patient decided to leave AMA despite counseling about medical need to stay. Discussed that she would need to sign out AMA as risks are: maternal and fetal morbidity and mortality. She could return in DKA which is an emergency, she could have hypoglycemia episodes at home.  Fetal status with poorly controlled blood sugars is grave as well with increased risk of fetal distress and stillbirth. Patient understood these risks and still insistent on leaving.  Patient signed AMA paperwork understanding these risks.  Discussed that patient could return at any time, either to L&D or the ED with concerns.      In regards to insulin management patient reports she has insulin at home that she is able to take.  Discussed with Endocrine Fellow over the phone recommendations if patient left AMA without an established insulin regimen. Recommendations were made for the followin U Levemir BID  5U Novolog with meals  SSI with premeal BG check. 1U novolog for every blood glucose 50 points higher than 140 (ie. 140-190 = 1 unit, 191-240= 2 units, etc.)     DISCHARGE INSTRUCTIONS:  - Patient to call MFM clinic tomorrow to schedule appointment.  Endocrine and MFM to call patient tomorrow to followup.  - Discussed standard labor precautions as well.      DISCHARGE MEDICATIONS:     Review of your medicines      START taking       Dose / Directions    famotidine 20 MG tablet   Commonly known as:  PEPCID   Used for:  Type 1 diabetes mellitus in pregnancy, second trimester        Dose:  20 mg   Take 1 tablet (20 mg) by mouth 2 times daily   Quantity:  40 tablet   Refills:  1       pantoprazole  40 MG EC tablet   Commonly known as:  PROTONIX   Used for:  Type 1 diabetes mellitus in pregnancy, second trimester        Dose:  40 mg   Take 1 tablet (40 mg) by mouth 2 times daily   Quantity:  30 tablet   Refills:  1       polyethylene glycol Packet   Commonly known as:  MIRALAX/GLYCOLAX   Used for:  Type 1 diabetes mellitus in pregnancy, second trimester        Dose:  17 g   Take 17 g by mouth daily as needed for constipation (titrate to one bowel movement daily)   Quantity:  7 packet   Refills:  1       senna-docusate 8.6-50 MG per tablet   Commonly known as:  SENOKOT-S;PERICOLACE   Used for:  Hyperemesis gravidarum        Dose:  1 tablet   Take 1 tablet by mouth 2 times daily as needed for constipation   Quantity:  100 tablet   Refills:  0         CONTINUE these medicines which may have CHANGED, or have new prescriptions. If we are uncertain of the size of tablets/capsules you have at home, strength may be listed as something that might have changed.       Dose / Directions    * insulin aspart 100 UNIT/ML injection   Commonly known as:  NovoLOG PEN   This may have changed:  how much to take   Used for:  Supervision of high-risk pregnancy, third trimester        Dose:  5 Units   Inject 5 Units Subcutaneous 3 times daily (with meals)   Quantity:  20 mL   Refills:  0       * insulin aspart 100 UNIT/ML injection   Commonly known as:  NovoLOG PEN   This may have changed:    - how much to take  - when to take this  - additional instructions   Used for:  Type 1 diabetes mellitus in pregnancy, second trimester        Dose:  1-9 Units   Inject 1-9 Units Subcutaneous At Bedtime Correction Scale - custom DOSING    Do Not give Bedtime Correction Insulin if BG less than 200 -190 = 1 unit. -240 = 2 units. -290 = 3 units. -340 = 4 units.  Notify provider if glucose greater than or equal to 350 mg/dL after administration.   Quantity:  3 mL   Refills:  3       insulin detemir 100 UNIT/ML injection    Commonly known as:  LEVEMIR FLEXPEN/FLEXTOUCH   This may have changed:    - how much to take  - when to take this  - additional instructions  - Another medication with the same name was removed. Continue taking this medication, and follow the directions you see here.   Used for:  Type 1 diabetes mellitus in pregnancy, second trimester        Dose:  18 Units   Inject 18 Units Subcutaneous 2 times daily Take first dose at midnight, next dose at 12 pm.   Quantity:  3 mL   Refills:  3       metoclopramide 10 MG tablet   Commonly known as:  REGLAN   This may have changed:    - when to take this  - reasons to take this   Used for:  Type 1 diabetes mellitus in pregnancy, second trimester        Dose:  10 mg   Take 1 tablet (10 mg) by mouth 4 times daily as needed (4x Daily AC & HS prn nausea and abdominal pain)   Quantity:  60 tablet   Refills:  0       ondansetron 4 MG ODT tab   Commonly known as:  ZOFRAN ODT   This may have changed:  reasons to take this   Used for:  Hyperemesis gravidarum        Dose:  4 mg   Take 1 tablet (4 mg) by mouth every 8 hours as needed for nausea   Quantity:  20 tablet   Refills:  1       * Notice:  This list has 2 medication(s) that are the same as other medications prescribed for you. Read the directions carefully, and ask your doctor or other care provider to review them with you.      CONTINUE these medicines which have NOT CHANGED       Dose / Directions    folic acid 1 MG tablet   Commonly known as:  FOLVITE   Used for:  High-risk pregnancy, first trimester        Dose:  1 mg   Take 1 tablet (1 mg) by mouth daily   Quantity:  30 tablet   Refills:  0       mirtazapine 15 MG ODT tab   Commonly known as:  REMERON SOL-TAB   Used for:  Supervision of high-risk pregnancy, third trimester        Dose:  15 mg   1 tablet (15 mg) by Orally disintegrating tablet route At Bedtime   Quantity:  60 tablet   Refills:  2       OLANZapine zydis 5 MG ODT tab   Commonly known as:  zyPREXA   Used for:   Supervision of high-risk pregnancy, third trimester        Dose:  5 mg   Take 1 tablet (5 mg) by mouth 2 times daily   Quantity:  60 tablet   Refills:  3       omeprazole 20 MG CR capsule   Commonly known as:  priLOSEC   Used for:  Supervision of high-risk pregnancy, third trimester        Dose:  20 mg   Take 1 capsule (20 mg) by mouth every morning (before breakfast)   Quantity:  30 capsule   Refills:  3       oxyCODONE 5 MG capsule   Commonly known as:  OXY-IR   Used for:  S/P         Dose:  5 mg   Take 1 capsule (5 mg) by mouth every 4 hours as needed for moderate to severe pain   Quantity:  24 capsule   Refills:  0       Prenatal Vitamin 27-0.8 MG Tabs   Used for:  High-risk pregnancy, first trimester        Dose:  1 tablet   Take 1 tablet by mouth daily   Quantity:  90 tablet   Refills:  3         STOP taking          insulin glargine 100 UNIT/ML injection   Commonly known as:  LANTUS SOLOSTAR           prochlorperazine 5 MG tablet   Commonly known as:  COMPAZINE                Where to get your medicines      These medications were sent to [place], [place] - [address]  [address] [address]     Phone:  [phone]      famotidine 20 MG tablet     insulin aspart 100 UNIT/ML injection     insulin aspart 100 UNIT/ML injection     insulin detemir 100 UNIT/ML injection     metoclopramide 10 MG tablet     ondansetron 4 MG ODT tab     pantoprazole 40 MG EC tablet     polyethylene glycol Packet     senna-docusate 8.6-50 MG per tablet             Jeannie Disla MD  OB/GYN PGY3    Jose Luevano

## 2017-09-13 NOTE — PLAN OF CARE
Problem: Diabetes, Type 1 (Adult)  Goal: Signs and Symptoms of Listed Potential Problems Will be Absent or Manageable (Diabetes, Type 1)  Signs and symptoms of listed potential problems will be absent or manageable by discharge/transition of care (reference Diabetes, Type 1 (Adult) CPG).   Outcome: Improving  Patient ate well for lunch today.  Has not asked for medication for pain or nausea since this morning.  Patient was seen by diabetic nnp and agreed to try different snacks from dietary.  Medications have been changed to oral and will continue to monitor blood glucose.

## 2017-09-13 NOTE — PROVIDER NOTIFICATION
09/13/17 0312   Provider Notification   Provider Name/Title Dr. Disla    Method of Notification Electronic Page   Request Evaluate - Remote   Notification Reason Status Update     Updated on BG and status since pt off of insulin infusion.

## 2017-09-13 NOTE — PROGRESS NOTES
Nutrition Brief Note    Spoke with endocrinology and pt would like to trial several different supplements. Sent various items/flavors today for pt to try. Also placed order for pt to order any supplement she would like via room service. Per discussion with endo, we will not restrict pts supplements or diet given need for increased calorie/protein intakes. Will continue to monitor BG trends and may restrict diet at a later date if this becomes necessary.     Will continue to follow per nutrition protocol.       Lucille Machuca RD, LD  Unit Pager: [phone]

## 2017-09-13 NOTE — PLAN OF CARE
Per lab, order in place to draw Mg. Writer spoke with Dr. Disla and per MD plan, would like to wait to draw Mg level until AM with BMP. Lab notified

## 2017-09-13 NOTE — PLAN OF CARE
Problem: Goal Outcome Summary  Goal: Goal Outcome Summary  Outcome: Declining  VSS. Denies ctx, cramping, vaginal bleeding, and leaking of fluid. Episode of hypoglycemia this shift, insulin drip back on following BG above 100. Pt placed on regular diet, however, writer explained the importance of ordering foods that are easy to digest such as broth based soups, toast, fruit and yogurt. Pt refused teaching after lengthy discussion about why high fat and fiber foods may contribute to abdominal pain. Pts family came to visit and brought broth based soup and fried chicken, pt and family again encouraged to avoid having pt eat fried foods and instead choose soup, however pt found to be eating fried chicken upon nurse reentering room 15 minutes later. Abdominal pain negligible throughout shift until 2230 when pt began feeling nauseated and having pain in abdomen, pt given 0.5 mg Dilaudid and had decrease in pain. Plan is for levemir at 0000 and insulin drip off at 0200. Continue with current plan of care.

## 2017-09-13 NOTE — PLAN OF CARE
"Problem: Diabetes, Type 1 (Adult)  Goal: Signs and Symptoms of Listed Potential Problems Will be Absent or Manageable (Diabetes, Type 1)  Signs and symptoms of listed potential problems will be absent or manageable by discharge/transition of care (reference Diabetes, Type 1 (Adult) CPG).   Outcome: No Change  Patient sleeping all morning.  Has not ordered breakfast or had morning insulin to cover meals.  Patient encouraged to order breakfast (lunch) at this time.  Patient states she's \"thinking about it\".      "

## 2017-09-14 DIAGNOSIS — E10.10 DKA, TYPE 1 (H): Primary | ICD-10-CM

## 2017-09-14 LAB
BACTERIA SPEC CULT: ABNORMAL
SPECIMEN SOURCE: ABNORMAL

## 2017-09-14 NOTE — PROGRESS NOTES
"Encounter performed with assistance of Urdu .    Patient requesting to discharge tonight.  Reports she must leave the hospital,\" it is too much pressure.\" Nothing would change her mind. Discussed that leaving tonight would be against medical advice at this point. Patient just transitioned of IV insulin and SQ insulin regimen has not currently been established.  Would require more time to determine her needs with the help of endocrine following. At this point patient is very high risk for return with DKA or consequence of hypoglycemia.  Patient is currently still on D5 IVF and eating only intermittent meals.  Insulin regimen has not been established for discharge to be safe at this point. Additionally patient requiring IV medications for pain as well.  Patient reporting that she will continue insulin at home and will follow up everyday if needed.      Discussed that she would need to sign out AMA as risks are: maternal and fetal morbidity and mortality. She could return in DKA which is an emergency, she could have hypoglycemia episodes at home.  Fetal status with poorly controlled blood sugars is grave as well with increased risk of fetal distress and stillbirth.     Patient understands these risks and still requesting to leave.  Patient signed AMA paperwork understanding these risks.  Discussed that patient could return at any time, either to L&D or the ED with concerns.     In regards to insulin management patient reports she has insulin at home that she is able to take.  Discussed with Endocrine Fellow over the phone recommendations if patient left AMA without an established insulin regimen. Recommendations were made for the followin U Levemir BID  5U Novolog with meals  SSI with premeal BG check. 1U novolog for every blood glucose 50 points higher than 140 (ie. 140-190 = 1 unit, 191-240= 2 units, etc.)    Sent prescriptions for antiemetics, zantac, Prilosec, protonix, miralax, and " ramila.    Patient to call MFM clinic tomorrow to schedule appointment.  Endocrine and MFM to call patient tomorrow to followup.    Discussed with Dr. Luevano and Endocrine Fellow.    Jeannie Disla MD  OB/GYN PGY3

## 2017-09-14 NOTE — PLAN OF CARE
Problem: Goal Outcome Summary  Goal: Goal Outcome Summary  Outcome: No Change  VSS. Denies ctx, bleeding, and LOF. BG WDL. Pt adamant to leave. Dr. Disla notified. Pt given instructions on when to return to clinic/hospital if symptoms that brought pt in persist, LOF, bleeding, ctx, uncontrolled blood glucose levels, nausea, vomiting, and abdominal pain. Pt also instructed on blood glucose management and directions to inpatient pharmacy to  prescribed antiemetics and insulins.  present for AMA paperwork and discharge instructions. Pt agrees to call [facility] clinic in AM and return to be seen on 9/14. Pt discharged ambulatory at 2115 following signing AMA paperwork.

## 2017-09-14 NOTE — PROGRESS NOTES
Diabetes Management Update Note    28yo pregnant female with poorly controlled DMI who was admitted as transfer from OSH for DKA    Paged by primary team with urgent request for discharge diabetes recommendations as patient was leaving AMA. The primary team physician discussed patients regimen with me over the phone, and updated me regarding glucose and insulin requirement trends since she has been off the insulin drip. Per primary team the patient reports that she has sufficient insulin at home and that she has supplies. Based on brief overview of the data, will recommend the following regimen.     -levemir 18u BID  -aspart 5u TID AC   -aspart correction scale 1 u :50mg/dL >140    Will touch base with daytime DM management team who worked with her and ask that they follow up on her via phone tomorrow. Also urged primary team to relay importance of close DM follow up during pregnancy.     Donna Olson MD  Fellow in Diabetes, Endocrinology, and Metabolism  PGY4  Pager [phone]    Will discuss with on call attending Dr. Kathleen

## 2017-09-14 NOTE — PROVIDER NOTIFICATION
09/13/17 2042   Provider Notification   Provider Name/Title Dr. Disla    Method of Notification At Bedside   Notification Reason Other (Comment)   Pt requesting to leave AMA. MD discussed with pt concerns over leaving. Pt signed AMA form

## 2017-09-18 ENCOUNTER — HOSPITAL ENCOUNTER (INPATIENT)
Facility: CLINIC | Age: 27
LOS: 5 days | Discharge: LEFT BEFORE DISCHARGE | End: 2017-09-23
Attending: OBSTETRICS & GYNECOLOGY | Admitting: OBSTETRICS & GYNECOLOGY
Payer: COMMERCIAL

## 2017-09-18 ENCOUNTER — HOSPITAL ENCOUNTER (OUTPATIENT)
Facility: CLINIC | Age: 27
Discharge: ANOTHER HOSPITAL | End: 2017-09-18
Attending: FAMILY MEDICINE | Admitting: FAMILY MEDICINE
Payer: COMMERCIAL

## 2017-09-18 VITALS — SYSTOLIC BLOOD PRESSURE: 130 MMHG | DIASTOLIC BLOOD PRESSURE: 81 MMHG

## 2017-09-18 DIAGNOSIS — O24.012 TYPE 1 DIABETES MELLITUS IN PREGNANCY, SECOND TRIMESTER: ICD-10-CM

## 2017-09-18 PROBLEM — R10.9 ABDOMINAL PAIN AFFECTING PREGNANCY, ANTEPARTUM: Status: ACTIVE | Noted: 2017-09-18

## 2017-09-18 PROBLEM — O26.899 ABDOMINAL PAIN AFFECTING PREGNANCY, ANTEPARTUM: Status: ACTIVE | Noted: 2017-09-18

## 2017-09-18 LAB
ABO + RH BLD: NORMAL
ABO + RH BLD: NORMAL
ALBUMIN SERPL-MCNC: 2.6 G/DL (ref 3.4–5)
ALBUMIN SERPL-MCNC: 3.1 G/DL (ref 3.4–5)
ALP SERPL-CCNC: 64 U/L (ref 40–150)
ALP SERPL-CCNC: 66 U/L (ref 40–150)
ALT SERPL W P-5'-P-CCNC: 12 U/L (ref 0–50)
ALT SERPL W P-5'-P-CCNC: 13 U/L (ref 0–50)
AMYLASE SERPL-CCNC: 44 U/L (ref 30–110)
ANION GAP SERPL CALCULATED.3IONS-SCNC: 10 MMOL/L (ref 3–14)
ANION GAP SERPL CALCULATED.3IONS-SCNC: 8 MMOL/L (ref 3–14)
AST SERPL W P-5'-P-CCNC: 7 U/L (ref 0–45)
AST SERPL W P-5'-P-CCNC: 8 U/L (ref 0–45)
BASOPHILS # BLD AUTO: 0 10E9/L (ref 0–0.2)
BASOPHILS NFR BLD AUTO: 0.1 %
BILIRUB SERPL-MCNC: 0.5 MG/DL (ref 0.2–1.3)
BILIRUB SERPL-MCNC: 0.5 MG/DL (ref 0.2–1.3)
BLD GP AB SCN SERPL QL: NORMAL
BLOOD BANK CMNT PATIENT-IMP: NORMAL
BUN SERPL-MCNC: 8 MG/DL (ref 7–30)
BUN SERPL-MCNC: 8 MG/DL (ref 7–30)
CALCIUM SERPL-MCNC: 7.9 MG/DL (ref 8.5–10.1)
CALCIUM SERPL-MCNC: 8.7 MG/DL (ref 8.5–10.1)
CHLORIDE SERPL-SCNC: 102 MMOL/L (ref 94–109)
CHLORIDE SERPL-SCNC: 105 MMOL/L (ref 94–109)
CO2 SERPL-SCNC: 22 MMOL/L (ref 20–32)
CO2 SERPL-SCNC: 25 MMOL/L (ref 20–32)
CREAT SERPL-MCNC: 0.34 MG/DL (ref 0.52–1.04)
CREAT SERPL-MCNC: 0.36 MG/DL (ref 0.52–1.04)
DIFFERENTIAL METHOD BLD: ABNORMAL
EOSINOPHIL # BLD AUTO: 0 10E9/L (ref 0–0.7)
EOSINOPHIL NFR BLD AUTO: 0 %
ERYTHROCYTE [DISTWIDTH] IN BLOOD BY AUTOMATED COUNT: 15.3 % (ref 10–15)
ERYTHROCYTE [DISTWIDTH] IN BLOOD BY AUTOMATED COUNT: 15.5 % (ref 10–15)
GFR SERPL CREATININE-BSD FRML MDRD: >90 ML/MIN/1.7M2
GFR SERPL CREATININE-BSD FRML MDRD: >90 ML/MIN/1.7M2
GLUCOSE BLDC GLUCOMTR-MCNC: 141 MG/DL (ref 70–99)
GLUCOSE BLDC GLUCOMTR-MCNC: 177 MG/DL (ref 70–99)
GLUCOSE BLDC GLUCOMTR-MCNC: 181 MG/DL (ref 70–99)
GLUCOSE BLDC GLUCOMTR-MCNC: 186 MG/DL (ref 70–99)
GLUCOSE BLDC GLUCOMTR-MCNC: 205 MG/DL (ref 70–99)
GLUCOSE BLDC GLUCOMTR-MCNC: 228 MG/DL (ref 70–99)
GLUCOSE BLDC GLUCOMTR-MCNC: 240 MG/DL (ref 70–99)
GLUCOSE SERPL-MCNC: 196 MG/DL (ref 70–99)
GLUCOSE SERPL-MCNC: 251 MG/DL (ref 70–99)
HCT VFR BLD AUTO: 33.5 % (ref 35–47)
HCT VFR BLD AUTO: 34.4 % (ref 35–47)
HGB BLD-MCNC: 10.7 G/DL (ref 11.7–15.7)
HGB BLD-MCNC: 11.1 G/DL (ref 11.7–15.7)
IMM GRANULOCYTES # BLD: 0 10E9/L (ref 0–0.4)
IMM GRANULOCYTES NFR BLD: 0.3 %
KETONES BLD-SCNC: 0.7 MMOL/L (ref 0–0.6)
KETONES BLD-SCNC: 1.7 MMOL/L (ref 0–0.6)
LIPASE SERPL-CCNC: 100 U/L (ref 73–393)
LYMPHOCYTES # BLD AUTO: 1 10E9/L (ref 0.8–5.3)
LYMPHOCYTES NFR BLD AUTO: 7.4 %
MAGNESIUM SERPL-MCNC: 1.9 MG/DL (ref 1.6–2.3)
MCH RBC QN AUTO: 26 PG (ref 26.5–33)
MCH RBC QN AUTO: 26.6 PG (ref 26.5–33)
MCHC RBC AUTO-ENTMCNC: 31.9 G/DL (ref 31.5–36.5)
MCHC RBC AUTO-ENTMCNC: 32.3 G/DL (ref 31.5–36.5)
MCV RBC AUTO: 81 FL (ref 78–100)
MCV RBC AUTO: 83 FL (ref 78–100)
MONOCYTES # BLD AUTO: 0.2 10E9/L (ref 0–1.3)
MONOCYTES NFR BLD AUTO: 1.7 %
NEUTROPHILS # BLD AUTO: 12.3 10E9/L (ref 1.6–8.3)
NEUTROPHILS NFR BLD AUTO: 90.5 %
NRBC # BLD AUTO: 0 10*3/UL
NRBC BLD AUTO-RTO: 0 /100
PLATELET # BLD AUTO: 228 10E9/L (ref 150–450)
PLATELET # BLD AUTO: 250 10E9/L (ref 150–450)
POTASSIUM SERPL-SCNC: 3.7 MMOL/L (ref 3.4–5.3)
POTASSIUM SERPL-SCNC: 3.8 MMOL/L (ref 3.4–5.3)
PROT SERPL-MCNC: 7.2 G/DL (ref 6.8–8.8)
PROT SERPL-MCNC: 7.9 G/DL (ref 6.8–8.8)
RBC # BLD AUTO: 4.12 10E12/L (ref 3.8–5.2)
RBC # BLD AUTO: 4.17 10E12/L (ref 3.8–5.2)
SODIUM SERPL-SCNC: 134 MMOL/L (ref 133–144)
SODIUM SERPL-SCNC: 138 MMOL/L (ref 133–144)
SPECIMEN EXP DATE BLD: NORMAL
WBC # BLD AUTO: 13.6 10E9/L (ref 4–11)
WBC # BLD AUTO: 13.7 10E9/L (ref 4–11)

## 2017-09-18 PROCEDURE — 96365 THER/PROPH/DIAG IV INF INIT: CPT

## 2017-09-18 PROCEDURE — 40000647 ZZH STATISTIC GLUCOSE MONITORING

## 2017-09-18 PROCEDURE — 82962 GLUCOSE BLOOD TEST: CPT

## 2017-09-18 PROCEDURE — 83690 ASSAY OF LIPASE: CPT | Performed by: OBSTETRICS & GYNECOLOGY

## 2017-09-18 PROCEDURE — 82010 KETONE BODYS QUAN: CPT | Performed by: FAMILY MEDICINE

## 2017-09-18 PROCEDURE — 85025 COMPLETE CBC W/AUTO DIFF WBC: CPT | Performed by: OBSTETRICS & GYNECOLOGY

## 2017-09-18 PROCEDURE — 80053 COMPREHEN METABOLIC PANEL: CPT | Performed by: FAMILY MEDICINE

## 2017-09-18 PROCEDURE — 25000128 H RX IP 250 OP 636

## 2017-09-18 PROCEDURE — 85027 COMPLETE CBC AUTOMATED: CPT | Performed by: FAMILY MEDICINE

## 2017-09-18 PROCEDURE — 86850 RBC ANTIBODY SCREEN: CPT | Performed by: OBSTETRICS & GYNECOLOGY

## 2017-09-18 PROCEDURE — 82010 KETONE BODYS QUAN: CPT | Performed by: OBSTETRICS & GYNECOLOGY

## 2017-09-18 PROCEDURE — 36415 COLL VENOUS BLD VENIPUNCTURE: CPT | Performed by: FAMILY MEDICINE

## 2017-09-18 PROCEDURE — 96376 TX/PRO/DX INJ SAME DRUG ADON: CPT

## 2017-09-18 PROCEDURE — 96366 THER/PROPH/DIAG IV INF ADDON: CPT

## 2017-09-18 PROCEDURE — 25000131 ZZH RX MED GY IP 250 OP 636 PS 637: Performed by: FAMILY MEDICINE

## 2017-09-18 PROCEDURE — 99222 1ST HOSP IP/OBS MODERATE 55: CPT | Performed by: FAMILY MEDICINE

## 2017-09-18 PROCEDURE — 99215 OFFICE O/P EST HI 40 MIN: CPT | Mod: 25

## 2017-09-18 PROCEDURE — 83735 ASSAY OF MAGNESIUM: CPT | Performed by: OBSTETRICS & GYNECOLOGY

## 2017-09-18 PROCEDURE — 96375 TX/PRO/DX INJ NEW DRUG ADDON: CPT

## 2017-09-18 PROCEDURE — 96374 THER/PROPH/DIAG INJ IV PUSH: CPT

## 2017-09-18 PROCEDURE — 36415 COLL VENOUS BLD VENIPUNCTURE: CPT | Performed by: OBSTETRICS & GYNECOLOGY

## 2017-09-18 PROCEDURE — 25000128 H RX IP 250 OP 636: Performed by: OBSTETRICS & GYNECOLOGY

## 2017-09-18 PROCEDURE — 96372 THER/PROPH/DIAG INJ SC/IM: CPT

## 2017-09-18 PROCEDURE — 86901 BLOOD TYPING SEROLOGIC RH(D): CPT | Performed by: OBSTETRICS & GYNECOLOGY

## 2017-09-18 PROCEDURE — 25000128 H RX IP 250 OP 636: Performed by: FAMILY MEDICINE

## 2017-09-18 PROCEDURE — 80053 COMPREHEN METABOLIC PANEL: CPT | Performed by: OBSTETRICS & GYNECOLOGY

## 2017-09-18 PROCEDURE — 00000146 ZZHCL STATISTIC GLUCOSE BY METER IP

## 2017-09-18 PROCEDURE — 25000125 ZZHC RX 250: Performed by: OBSTETRICS & GYNECOLOGY

## 2017-09-18 PROCEDURE — 12000032 ZZH R&B OB CRITICAL UMMC

## 2017-09-18 PROCEDURE — 82150 ASSAY OF AMYLASE: CPT | Performed by: OBSTETRICS & GYNECOLOGY

## 2017-09-18 PROCEDURE — 86900 BLOOD TYPING SEROLOGIC ABO: CPT | Performed by: OBSTETRICS & GYNECOLOGY

## 2017-09-18 PROCEDURE — S0028 INJECTION, FAMOTIDINE, 20 MG: HCPCS | Performed by: OBSTETRICS & GYNECOLOGY

## 2017-09-18 RX ORDER — ONDANSETRON 2 MG/ML
4 INJECTION INTRAMUSCULAR; INTRAVENOUS EVERY 6 HOURS PRN
Status: DISCONTINUED | OUTPATIENT
Start: 2017-09-18 | End: 2017-09-23 | Stop reason: HOSPADM

## 2017-09-18 RX ORDER — POLYETHYLENE GLYCOL 3350 17 G/17G
17 POWDER, FOR SOLUTION ORAL DAILY
Status: DISCONTINUED | OUTPATIENT
Start: 2017-09-19 | End: 2017-09-23 | Stop reason: HOSPADM

## 2017-09-18 RX ORDER — DEXTROSE MONOHYDRATE 25 G/50ML
25-50 INJECTION, SOLUTION INTRAVENOUS
Status: DISCONTINUED | OUTPATIENT
Start: 2017-09-18 | End: 2017-09-18 | Stop reason: HOSPADM

## 2017-09-18 RX ORDER — SODIUM CHLORIDE 9 MG/ML
INJECTION, SOLUTION INTRAVENOUS CONTINUOUS
Status: DISCONTINUED | OUTPATIENT
Start: 2017-09-18 | End: 2017-09-18

## 2017-09-18 RX ORDER — POTASSIUM CHLORIDE 750 MG/1
20-40 TABLET, EXTENDED RELEASE ORAL
Status: DISCONTINUED | OUTPATIENT
Start: 2017-09-18 | End: 2017-09-22

## 2017-09-18 RX ORDER — ACETAMINOPHEN 10 MG/ML
1000 INJECTION, SOLUTION INTRAVENOUS EVERY 6 HOURS
Status: DISCONTINUED | OUTPATIENT
Start: 2017-09-18 | End: 2017-09-23 | Stop reason: HOSPADM

## 2017-09-18 RX ORDER — HYDROMORPHONE HYDROCHLORIDE 1 MG/ML
.3-.5 INJECTION, SOLUTION INTRAMUSCULAR; INTRAVENOUS; SUBCUTANEOUS
Status: DISCONTINUED | OUTPATIENT
Start: 2017-09-18 | End: 2017-09-23 | Stop reason: HOSPADM

## 2017-09-18 RX ORDER — SODIUM CHLORIDE, SODIUM LACTATE, POTASSIUM CHLORIDE, CALCIUM CHLORIDE 600; 310; 30; 20 MG/100ML; MG/100ML; MG/100ML; MG/100ML
INJECTION, SOLUTION INTRAVENOUS CONTINUOUS
Status: DISCONTINUED | OUTPATIENT
Start: 2017-09-18 | End: 2017-09-18 | Stop reason: HOSPADM

## 2017-09-18 RX ORDER — NICOTINE POLACRILEX 4 MG
15-30 LOZENGE BUCCAL
Status: DISCONTINUED | OUTPATIENT
Start: 2017-09-18 | End: 2017-09-23 | Stop reason: HOSPADM

## 2017-09-18 RX ORDER — DEXTROSE, SODIUM CHLORIDE, SODIUM LACTATE, POTASSIUM CHLORIDE, AND CALCIUM CHLORIDE 5; .6; .31; .03; .02 G/100ML; G/100ML; G/100ML; G/100ML; G/100ML
INJECTION, SOLUTION INTRAVENOUS CONTINUOUS
Status: DISCONTINUED | OUTPATIENT
Start: 2017-09-18 | End: 2017-09-18 | Stop reason: HOSPADM

## 2017-09-18 RX ORDER — NALOXONE HYDROCHLORIDE 0.4 MG/ML
.1-.4 INJECTION, SOLUTION INTRAMUSCULAR; INTRAVENOUS; SUBCUTANEOUS
Status: DISCONTINUED | OUTPATIENT
Start: 2017-09-18 | End: 2017-09-23 | Stop reason: HOSPADM

## 2017-09-18 RX ORDER — POTASSIUM CHLORIDE 29.8 MG/ML
20 INJECTION INTRAVENOUS
Status: DISCONTINUED | OUTPATIENT
Start: 2017-09-18 | End: 2017-09-22

## 2017-09-18 RX ORDER — POTASSIUM CL/LIDO/0.9 % NACL 10MEQ/0.1L
10 INTRAVENOUS SOLUTION, PIGGYBACK (ML) INTRAVENOUS
Status: DISCONTINUED | OUTPATIENT
Start: 2017-09-18 | End: 2017-09-22

## 2017-09-18 RX ORDER — MAGNESIUM SULFATE HEPTAHYDRATE 40 MG/ML
4 INJECTION, SOLUTION INTRAVENOUS EVERY 4 HOURS PRN
Status: DISCONTINUED | OUTPATIENT
Start: 2017-09-18 | End: 2017-09-22

## 2017-09-18 RX ORDER — ACETAMINOPHEN 325 MG/1
325-975 TABLET ORAL EVERY 4 HOURS PRN
Status: DISCONTINUED | OUTPATIENT
Start: 2017-09-18 | End: 2017-09-18

## 2017-09-18 RX ORDER — DEXTROSE, SODIUM CHLORIDE, SODIUM LACTATE, POTASSIUM CHLORIDE, AND CALCIUM CHLORIDE 5; .6; .31; .03; .02 G/100ML; G/100ML; G/100ML; G/100ML; G/100ML
INJECTION, SOLUTION INTRAVENOUS CONTINUOUS
Status: DISCONTINUED | OUTPATIENT
Start: 2017-09-18 | End: 2017-09-18

## 2017-09-18 RX ORDER — LIDOCAINE 40 MG/G
CREAM TOPICAL
Status: DISCONTINUED | OUTPATIENT
Start: 2017-09-18 | End: 2017-09-23 | Stop reason: HOSPADM

## 2017-09-18 RX ORDER — HYDROMORPHONE HYDROCHLORIDE 1 MG/ML
.3-.5 INJECTION, SOLUTION INTRAMUSCULAR; INTRAVENOUS; SUBCUTANEOUS
Status: DISCONTINUED | OUTPATIENT
Start: 2017-09-18 | End: 2017-09-18 | Stop reason: HOSPADM

## 2017-09-18 RX ORDER — SODIUM CHLORIDE 9 MG/ML
INJECTION, SOLUTION INTRAVENOUS CONTINUOUS
Status: DISCONTINUED | OUTPATIENT
Start: 2017-09-18 | End: 2017-09-18 | Stop reason: HOSPADM

## 2017-09-18 RX ORDER — SODIUM CHLORIDE 9 MG/ML
INJECTION, SOLUTION INTRAVENOUS CONTINUOUS
Status: DISCONTINUED | OUTPATIENT
Start: 2017-09-18 | End: 2017-09-23 | Stop reason: HOSPADM

## 2017-09-18 RX ORDER — POTASSIUM CHLORIDE 1.5 G/1.58G
20-40 POWDER, FOR SOLUTION ORAL
Status: DISCONTINUED | OUTPATIENT
Start: 2017-09-18 | End: 2017-09-22

## 2017-09-18 RX ORDER — METOCLOPRAMIDE HYDROCHLORIDE 5 MG/ML
10 INJECTION INTRAMUSCULAR; INTRAVENOUS EVERY 6 HOURS PRN
Status: DISCONTINUED | OUTPATIENT
Start: 2017-09-18 | End: 2017-09-20

## 2017-09-18 RX ORDER — DEXTROSE MONOHYDRATE 25 G/50ML
25-50 INJECTION, SOLUTION INTRAVENOUS
Status: DISCONTINUED | OUTPATIENT
Start: 2017-09-18 | End: 2017-09-18

## 2017-09-18 RX ORDER — NICOTINE POLACRILEX 4 MG
15-30 LOZENGE BUCCAL
Status: DISCONTINUED | OUTPATIENT
Start: 2017-09-18 | End: 2017-09-18 | Stop reason: HOSPADM

## 2017-09-18 RX ORDER — ONDANSETRON 2 MG/ML
8 INJECTION INTRAMUSCULAR; INTRAVENOUS ONCE
Status: COMPLETED | OUTPATIENT
Start: 2017-09-18 | End: 2017-09-18

## 2017-09-18 RX ORDER — DEXTROSE MONOHYDRATE 25 G/50ML
25-50 INJECTION, SOLUTION INTRAVENOUS
Status: DISCONTINUED | OUTPATIENT
Start: 2017-09-18 | End: 2017-09-23 | Stop reason: HOSPADM

## 2017-09-18 RX ORDER — NICOTINE POLACRILEX 4 MG
15-30 LOZENGE BUCCAL
Status: DISCONTINUED | OUTPATIENT
Start: 2017-09-18 | End: 2017-09-18

## 2017-09-18 RX ORDER — HYDROMORPHONE HCL/0.9% NACL/PF 0.2MG/0.2
0.2 SYRINGE (ML) INTRAVENOUS
Status: DISCONTINUED | OUTPATIENT
Start: 2017-09-18 | End: 2017-09-18

## 2017-09-18 RX ORDER — LIDOCAINE 40 MG/G
CREAM TOPICAL
Status: DISCONTINUED | OUTPATIENT
Start: 2017-09-18 | End: 2017-09-18

## 2017-09-18 RX ORDER — HYDROMORPHONE HYDROCHLORIDE 1 MG/ML
INJECTION, SOLUTION INTRAMUSCULAR; INTRAVENOUS; SUBCUTANEOUS
Status: COMPLETED
Start: 2017-09-18 | End: 2017-09-18

## 2017-09-18 RX ORDER — POTASSIUM CHLORIDE 7.45 MG/ML
10 INJECTION INTRAVENOUS
Status: DISCONTINUED | OUTPATIENT
Start: 2017-09-18 | End: 2017-09-22

## 2017-09-18 RX ADMIN — SODIUM CHLORIDE 2 UNITS/HR: 9 INJECTION, SOLUTION INTRAVENOUS at 18:37

## 2017-09-18 RX ADMIN — SODIUM CHLORIDE, POTASSIUM CHLORIDE, SODIUM LACTATE AND CALCIUM CHLORIDE: 600; 310; 30; 20 INJECTION, SOLUTION INTRAVENOUS at 16:10

## 2017-09-18 RX ADMIN — HYDROMORPHONE HYDROCHLORIDE 0.5 MG: 1 INJECTION, SOLUTION INTRAMUSCULAR; INTRAVENOUS; SUBCUTANEOUS at 21:05

## 2017-09-18 RX ADMIN — SODIUM CHLORIDE 1000 ML: 9 INJECTION, SOLUTION INTRAVENOUS at 21:41

## 2017-09-18 RX ADMIN — FAMOTIDINE 20 MG: 10 INJECTION, SOLUTION INTRAVENOUS at 21:48

## 2017-09-18 RX ADMIN — ACETAMINOPHEN 1000 MG: 10 INJECTION, SOLUTION INTRAVENOUS at 21:34

## 2017-09-18 RX ADMIN — HYDROMORPHONE HYDROCHLORIDE 0.5 MG: 1 INJECTION, SOLUTION INTRAMUSCULAR; INTRAVENOUS; SUBCUTANEOUS at 16:02

## 2017-09-18 RX ADMIN — HYDROMORPHONE HYDROCHLORIDE 0.5 MG: 1 INJECTION, SOLUTION INTRAMUSCULAR; INTRAVENOUS; SUBCUTANEOUS at 17:05

## 2017-09-18 RX ADMIN — INSULIN ASPART 4 UNITS: 100 INJECTION, SOLUTION INTRAVENOUS; SUBCUTANEOUS at 17:45

## 2017-09-18 RX ADMIN — HYDROMORPHONE HYDROCHLORIDE 0.5 MG: 1 INJECTION, SOLUTION INTRAMUSCULAR; INTRAVENOUS; SUBCUTANEOUS at 18:26

## 2017-09-18 RX ADMIN — HYDROMORPHONE HYDROCHLORIDE 0.5 MG: 1 INJECTION, SOLUTION INTRAMUSCULAR; INTRAVENOUS; SUBCUTANEOUS at 19:42

## 2017-09-18 RX ADMIN — HYDROMORPHONE HYDROCHLORIDE 0.5 MG: 1 INJECTION, SOLUTION INTRAMUSCULAR; INTRAVENOUS; SUBCUTANEOUS at 23:16

## 2017-09-18 RX ADMIN — SODIUM CHLORIDE, SODIUM LACTATE, POTASSIUM CHLORIDE, CALCIUM CHLORIDE AND DEXTROSE MONOHYDRATE: 5; 600; 310; 30; 20 INJECTION, SOLUTION INTRAVENOUS at 18:06

## 2017-09-18 RX ADMIN — SODIUM CHLORIDE: 9 INJECTION, SOLUTION INTRAVENOUS at 21:22

## 2017-09-18 RX ADMIN — SODIUM CHLORIDE: 9 INJECTION, SOLUTION INTRAVENOUS at 18:06

## 2017-09-18 RX ADMIN — INSULIN ASPART 2 UNITS: 100 INJECTION, SOLUTION INTRAVENOUS; SUBCUTANEOUS at 15:52

## 2017-09-18 RX ADMIN — POTASSIUM CHLORIDE: 2 INJECTION, SOLUTION, CONCENTRATE INTRAVENOUS at 21:20

## 2017-09-18 RX ADMIN — SODIUM CHLORIDE 500 ML: 9 INJECTION, SOLUTION INTRAVENOUS at 23:16

## 2017-09-18 RX ADMIN — POTASSIUM CHLORIDE 10 MEQ: 10 INJECTION, SOLUTION INTRAVENOUS at 22:54

## 2017-09-18 RX ADMIN — ONDANSETRON 8 MG: 2 INJECTION INTRAMUSCULAR; INTRAVENOUS at 19:25

## 2017-09-18 NOTE — PROVIDER NOTIFICATION
09/18/17 1628   Provider Notification   Provider Name/Title Dr Mejia   Method of Notification Phone   Notification Reason Lab/Diagnostic Study     Dr Mejia updated re: lab results. MD states she is discussing plan of care with M. Dr Mejia will come to see patient shortly.

## 2017-09-18 NOTE — PLAN OF CARE
1447:  , 26w5d, here for severe pain in upper abdomen.  Patient moaning and writhing in pain.  Patients brother interpreting for her.  Brother of patient states that she was discharged from [facility] 3 days ago for DKA.  Patient states this pain started again this morning.  EUM and EFM placed.  Unable to collect admission database as patient continues to moan consistently in pain.  Patient vomiting dark brown emesis.  Patient asking for pain medication.  Dr. Mejia called to come evaluate patient for her severe pain and hx of DKA last week.    1515:  Dr. Mejia here to see patient.  Orders received to start IV, dilaudid for pain, and to collect blood to rule out DKA.  Report given to Cathi Resendez RN.  Elaine Hines RN

## 2017-09-18 NOTE — H&P
[facility] Labor and Delivery History and Physical    Anne Gunter MRN# [id number]   Age: 27 year old YOB: 1990     Date of Admission:  2017    Primary care provider: Isiah Naidu           Chief Complaint:   Anne Gunter is a 27 year old female who is  @ 26w5d pregnant and being admitted for abdominal pain (history of gastroparesis) with uncontrolled diabetes, history of DKA with multiple admissions.           Pregnancy history:     OBSTETRIC HISTORY:    Obstetric History       T0      L1     SAB0   TAB0   Ectopic0   Multiple0   Live Births1       # Outcome Date GA Lbr Walter/2nd Weight Sex Delivery Anes PTL Lv   2 Current            1  06/10/16 32w5d  2.37 kg (5 lb 3.6 oz) M CS-LTranv Gen  BARBARA          EDC: Estimated Date of Delivery: Dec 20, 2017    Prenatal Labs:   Lab Results   Component Value Date    ABO A 2017    RH Pos 2017    AS Neg 2017    HEPBANG Nonreactive 2016    CHPCRT Negative 09/10/2017    GCPCRT Negative 09/10/2017    TREPAB Negative 09/10/2017    HGB 10.1 (L) 09/10/2017       GBS Status:   Lab Results   Component Value Date    GBS Positive (A) 09/10/2017       Active Problem List  Patient Active Problem List   Diagnosis     High-risk pregnancy, first trimester     Hyperemesis     Hyperemesis gravidarum     DKA (diabetic ketoacidoses) (H)     DKA, type 1, not at goal (H)     Encounter for long-term (current) use of insulin (H)     Nausea with vomiting     Abdominal pain     Hematemesis     Group B Streptococcus urinary tract infection affecting pregnancy in first trimester     Supervision of high-risk pregnancy     Indication for care in labor or delivery     Indication for care in labor and delivery, antepartum     Epigastric pain     S/P      Type 1 diabetes mellitus in pregnancy, second trimester     Microalbuminuria     Indication for care or intervention in labor or delivery     Encounter for  triage in pregnant patient     Hyperglycemia     Diabetic keto-acidosis (H)     DKA, type 1 (H)       Medication Prior to Admission  Prescriptions Prior to Admission   Medication Sig Dispense Refill Last Dose     insulin aspart (NOVOLOG PEN) 100 UNIT/ML injection Inject 5 Units Subcutaneous 3 times daily (with meals) 20 mL 0      insulin aspart (NOVOLOG PEN) 100 UNIT/ML injection Inject 1-9 Units Subcutaneous At Bedtime Correction Scale - custom DOSING     Do Not give Bedtime Correction Insulin if BG less than 200  -190 = 1 unit.  -240 = 2 units.  -290 = 3 units.  -340 = 4 units.    Notify provider if glucose greater than or equal to 350 mg/dL after administration. 3 mL 3      insulin detemir (LEVEMIR FLEXPEN/FLEXTOUCH) 100 UNIT/ML injection Inject 18 Units Subcutaneous 2 times daily Take first dose at midnight, next dose at 12 pm. 3 mL 3      polyethylene glycol (MIRALAX/GLYCOLAX) Packet Take 17 g by mouth daily as needed for constipation (titrate to one bowel movement daily) 7 packet 1      senna-docusate (SENOKOT-S;PERICOLACE) 8.6-50 MG per tablet Take 1 tablet by mouth 2 times daily as needed for constipation 100 tablet 0      metoclopramide (REGLAN) 10 MG tablet Take 1 tablet (10 mg) by mouth 4 times daily as needed (4x Daily AC & HS prn nausea and abdominal pain) 60 tablet 0      famotidine (PEPCID) 20 MG tablet Take 1 tablet (20 mg) by mouth 2 times daily 40 tablet 1      pantoprazole (PROTONIX) 40 MG EC tablet Take 1 tablet (40 mg) by mouth 2 times daily 30 tablet 1      ondansetron (ZOFRAN ODT) 4 MG ODT tab Take 1 tablet (4 mg) by mouth every 8 hours as needed for nausea 20 tablet 1      oxyCODONE (OXY-IR) 5 MG capsule Take 1 capsule (5 mg) by mouth every 4 hours as needed for moderate to severe pain 24 capsule 0      mirtazapine (REMERON SOL-TAB) 15 MG disintegrating tablet 1 tablet (15 mg) by Orally disintegrating tablet route At Bedtime 60 tablet 2 6/1/2016 at Bedtime      OLANZapine zydis (ZYPREXA) 5 MG disintegrating tablet Take 1 tablet (5 mg) by mouth 2 times daily 60 tablet 3 6/1/2016 at PM     omeprazole (PRILOSEC) 20 MG capsule Take 1 capsule (20 mg) by mouth every morning (before breakfast) 30 capsule 3 6/1/2016 at AM     Prenatal Vit-Fe Fumarate-FA (PRENATAL VITAMIN) 27-0.8 MG TABS Take 1 tablet by mouth daily 90 tablet 3 6/1/2016 at AM     folic acid (FOLVITE) 1 MG tablet Take 1 tablet (1 mg) by mouth daily 30 tablet 0 6/1/2016 at AM   .        Maternal Past Medical History:     Past Medical History:   Diagnosis Date     Diabetes (H)      Microalbuminuria 6/21/2016     Type I diabetes mellitus, uncontrolled (H) 6/21/2016                       Family History:   This patient has no significant family history            Social History:   This patient has no significant social history         Review of Systems:   C: NEGATIVE for fever, chills, change in weight  I: NEGATIVE for worrisome rashes, moles or lesions  E: NEGATIVE for vision changes or irritation  E/M: NEGATIVE for ear, mouth and throat problems  R: NEGATIVE for significant cough or SOB  B: NEGATIVE for masses, tenderness or discharge  CV: NEGATIVE for chest pain, palpitations or peripheral edema  GI: NEGATIVE for nausea, abdominal pain, heartburn, or change in bowel habits  : NEGATIVE for frequency, dysuria, or hematuria  M: NEGATIVE for significant arthralgias or myalgia  N: NEGATIVE for weakness, dizziness or paresthesias  E: NEGATIVE for temperature intolerance, skin/hair changes  H: NEGATIVE for bleeding problems  P: NEGATIVE for changes in mood or affect          Physical Exam:   Vitals were reviewed  All vitals stable  No data found.    Constitutional: Awake, alert, cooperative, no apparent distress, and appears stated age.  Eyes: Lids and lashes normal, pupils equal, round and reactive to light, extra ocular muscles intact, sclera clear, conjunctiva normal.  ENT: Normocephalic, without obvious abnormality,  atramatic, sinuses nontender on palpation, external ears without lesions, oral pharynx with moist mucus membranes, tonsils without erythema or exudates, gums normal and good dentition.  Neck: Supple, symmetrical, trachea midline, no adenopathy, thyroid symmetric, not enlarged and no tenderness, skin normal.  Hematologic / Lymphatic: No cervical lymphadenopathy and no supraclavicular lymphadenopathy.  Back: Symmetric, no curvature, spinous processes are non-tender on palpation, paraspinous muscles are non-tender on palpation, no costal vertebral tenderness.  Lungs: No increased work of breathing, good air exchange, clear to auscultation bilaterally, no crackles or wheezing.  Cardiovascular: Regular rate and rhythm, normal S1 and S2, no S3 or S4, and no murmur noted.  Chest / Breast: Breasts symmetrical, skin without lesion(s), no nipple retraction or dimpling, no nipple discharge, no masses palpated, no axillary or supraclavicular adenopathy.  Abdomen: No scars, normal bowel sounds, soft, non-distended, non-tender, no masses palpated, no hepatosplenomegally.  Genitourinary: No urethral discharge, normal external genitalia, no hernia.  Musculoskeletal: No redness, warmth, or swelling of the joints.  Full range of motion noted.  Motor strength is 5 out of 5 all extremities bilaterally.  Tone is normal.  Neurologic: Awake, alert, oriented to name, place and time.  Cranial nerves II-XII are grossly intact.  Motor is 5 out of 5 bilaterally.  Cerebellar finger to nose, heel to shin intact.  Sensory is intact.  Babinski down going, Romberg negative, and gait is normal.  Neuropsychiatric: Normal affect, mood, orientation, memory and insight.  Skin: No rashes, erythema, pallor, petechia or purpura.   Presentation:Cephalic  Fetal Heart Rate Tracing: Tier 1 (normal)  Tocometer: none                       Assessment:   Anne Gunter is a 27 year old female who is  @ 26w5d pregnant and being admitted for abdominal pain  (history of gastroparesis) with uncontrolled diabetes, history of DKA with multiple admissions.           Plan:   Observation  Labs to rule out DKA   IV dilaudid for pain control, add IV famotidine and IV protonix   Depending on labs will consult IM and MFM if relevant.   Consider transfer to [place] if needed.      Sabiha Mejia, DO

## 2017-09-18 NOTE — PLAN OF CARE
discsused plan of care with patient to transfer to [place]. Pt agreeable to transfer. Anne requests that I call her father and let him know she is being transferred. Vinayak ([phone]), Anne's father, was updated and plans to meet her at [place].

## 2017-09-18 NOTE — PROGRESS NOTES
Transfer of care note:        S:  Uncontrolled type 1 diabetes, unable to take insulin and eat 2/2 to chronic abdominal pain   O:/81     FHT's Category 1   A:  26 y/o  @ 26w5d wks EGA with Uncontrolled diabetes, unable to take insulin and eat 2/2 to chronic abdominal pain, history of DKA, now with ketones.     P:  Start insulin gtt prior to transfer, IV dilaudid for pain, Abdominal pain has refused upper GI, continue IV protonix.   Transfer to antepartum unit for continued care, endocrinology on staff      Dr. Sabiha Mejia, DO    Obstetrics and Gynecology  [facility]

## 2017-09-18 NOTE — PLAN OF CARE
Dr Neves at beside to attempt IV placement. Difficulty placing IV- myself nor flying squad RN successful.

## 2017-09-19 ENCOUNTER — OFFICE VISIT (OUTPATIENT)
Dept: INTERPRETER SERVICES | Facility: CLINIC | Age: 27
End: 2017-09-19

## 2017-09-19 LAB
ANION GAP SERPL CALCULATED.3IONS-SCNC: 12 MMOL/L (ref 3–14)
ANION GAP SERPL CALCULATED.3IONS-SCNC: 13 MMOL/L (ref 3–14)
ANION GAP SERPL CALCULATED.3IONS-SCNC: 8 MMOL/L (ref 3–14)
BUN SERPL-MCNC: 4 MG/DL (ref 7–30)
BUN SERPL-MCNC: 5 MG/DL (ref 7–30)
BUN SERPL-MCNC: 7 MG/DL (ref 7–30)
CALCIUM SERPL-MCNC: 6.5 MG/DL (ref 8.5–10.1)
CALCIUM SERPL-MCNC: 7.6 MG/DL (ref 8.5–10.1)
CALCIUM SERPL-MCNC: 7.8 MG/DL (ref 8.5–10.1)
CHLORIDE SERPL-SCNC: 107 MMOL/L (ref 94–109)
CHLORIDE SERPL-SCNC: 107 MMOL/L (ref 94–109)
CHLORIDE SERPL-SCNC: 115 MMOL/L (ref 94–109)
CO2 SERPL-SCNC: 18 MMOL/L (ref 20–32)
CO2 SERPL-SCNC: 19 MMOL/L (ref 20–32)
CO2 SERPL-SCNC: 19 MMOL/L (ref 20–32)
CREAT SERPL-MCNC: 0.33 MG/DL (ref 0.52–1.04)
CREAT SERPL-MCNC: 0.34 MG/DL (ref 0.52–1.04)
CREAT SERPL-MCNC: 0.34 MG/DL (ref 0.52–1.04)
GFR SERPL CREATININE-BSD FRML MDRD: >90 ML/MIN/1.7M2
GLUCOSE BLDC GLUCOMTR-MCNC: 101 MG/DL (ref 70–99)
GLUCOSE BLDC GLUCOMTR-MCNC: 108 MG/DL (ref 70–99)
GLUCOSE BLDC GLUCOMTR-MCNC: 119 MG/DL (ref 70–99)
GLUCOSE BLDC GLUCOMTR-MCNC: 123 MG/DL (ref 70–99)
GLUCOSE BLDC GLUCOMTR-MCNC: 126 MG/DL (ref 70–99)
GLUCOSE BLDC GLUCOMTR-MCNC: 131 MG/DL (ref 70–99)
GLUCOSE BLDC GLUCOMTR-MCNC: 132 MG/DL (ref 70–99)
GLUCOSE BLDC GLUCOMTR-MCNC: 137 MG/DL (ref 70–99)
GLUCOSE BLDC GLUCOMTR-MCNC: 137 MG/DL (ref 70–99)
GLUCOSE BLDC GLUCOMTR-MCNC: 71 MG/DL (ref 70–99)
GLUCOSE BLDC GLUCOMTR-MCNC: 77 MG/DL (ref 70–99)
GLUCOSE BLDC GLUCOMTR-MCNC: 78 MG/DL (ref 70–99)
GLUCOSE BLDC GLUCOMTR-MCNC: 78 MG/DL (ref 70–99)
GLUCOSE BLDC GLUCOMTR-MCNC: 81 MG/DL (ref 70–99)
GLUCOSE BLDC GLUCOMTR-MCNC: 82 MG/DL (ref 70–99)
GLUCOSE BLDC GLUCOMTR-MCNC: 83 MG/DL (ref 70–99)
GLUCOSE BLDC GLUCOMTR-MCNC: 85 MG/DL (ref 70–99)
GLUCOSE BLDC GLUCOMTR-MCNC: 88 MG/DL (ref 70–99)
GLUCOSE BLDC GLUCOMTR-MCNC: 91 MG/DL (ref 70–99)
GLUCOSE BLDC GLUCOMTR-MCNC: 93 MG/DL (ref 70–99)
GLUCOSE SERPL-MCNC: 142 MG/DL (ref 70–99)
GLUCOSE SERPL-MCNC: 361 MG/DL (ref 70–99)
GLUCOSE SERPL-MCNC: 90 MG/DL (ref 70–99)
KETONES BLD-SCNC: 0 MMOL/L (ref 0–0.6)
KETONES BLD-SCNC: 0 MMOL/L (ref 0–0.6)
KETONES BLD-SCNC: 1.5 MMOL/L (ref 0–0.6)
MAGNESIUM SERPL-MCNC: 2.3 MG/DL (ref 1.6–2.3)
POTASSIUM SERPL-SCNC: 3.7 MMOL/L (ref 3.4–5.3)
POTASSIUM SERPL-SCNC: 4 MMOL/L (ref 3.4–5.3)
POTASSIUM SERPL-SCNC: 4.7 MMOL/L (ref 3.4–5.3)
SODIUM SERPL-SCNC: 138 MMOL/L (ref 133–144)
SODIUM SERPL-SCNC: 139 MMOL/L (ref 133–144)
SODIUM SERPL-SCNC: 141 MMOL/L (ref 133–144)

## 2017-09-19 PROCEDURE — 82010 KETONE BODYS QUAN: CPT | Performed by: OBSTETRICS & GYNECOLOGY

## 2017-09-19 PROCEDURE — 25000125 ZZHC RX 250: Performed by: OBSTETRICS & GYNECOLOGY

## 2017-09-19 PROCEDURE — 00000146 ZZHCL STATISTIC GLUCOSE BY METER IP

## 2017-09-19 PROCEDURE — 83735 ASSAY OF MAGNESIUM: CPT | Performed by: OBSTETRICS & GYNECOLOGY

## 2017-09-19 PROCEDURE — 25000128 H RX IP 250 OP 636: Performed by: OBSTETRICS & GYNECOLOGY

## 2017-09-19 PROCEDURE — T1013 SIGN LANG/ORAL INTERPRETER: HCPCS | Mod: U3

## 2017-09-19 PROCEDURE — 80048 BASIC METABOLIC PNL TOTAL CA: CPT | Performed by: OBSTETRICS & GYNECOLOGY

## 2017-09-19 PROCEDURE — 12000032 ZZH R&B OB CRITICAL UMMC

## 2017-09-19 PROCEDURE — 36415 COLL VENOUS BLD VENIPUNCTURE: CPT | Performed by: OBSTETRICS & GYNECOLOGY

## 2017-09-19 PROCEDURE — S0028 INJECTION, FAMOTIDINE, 20 MG: HCPCS | Performed by: OBSTETRICS & GYNECOLOGY

## 2017-09-19 RX ORDER — SODIUM CHLORIDE 9 MG/ML
INJECTION, SOLUTION INTRAVENOUS CONTINUOUS
Status: DISCONTINUED | OUTPATIENT
Start: 2017-09-19 | End: 2017-09-23 | Stop reason: HOSPADM

## 2017-09-19 RX ADMIN — SODIUM CHLORIDE: 9 INJECTION, SOLUTION INTRAVENOUS at 22:58

## 2017-09-19 RX ADMIN — PANTOPRAZOLE SODIUM 40 MG: 40 INJECTION, POWDER, FOR SOLUTION INTRAVENOUS at 07:58

## 2017-09-19 RX ADMIN — METOCLOPRAMIDE HYDROCHLORIDE 10 MG: 5 INJECTION INTRAMUSCULAR; INTRAVENOUS at 11:23

## 2017-09-19 RX ADMIN — ONDANSETRON 4 MG: 2 INJECTION INTRAMUSCULAR; INTRAVENOUS at 14:48

## 2017-09-19 RX ADMIN — HYDROMORPHONE HYDROCHLORIDE 0.5 MG: 1 INJECTION, SOLUTION INTRAMUSCULAR; INTRAVENOUS; SUBCUTANEOUS at 02:02

## 2017-09-19 RX ADMIN — HYDROMORPHONE HYDROCHLORIDE 0.3 MG: 1 INJECTION, SOLUTION INTRAMUSCULAR; INTRAVENOUS; SUBCUTANEOUS at 11:24

## 2017-09-19 RX ADMIN — HUMAN INSULIN 6 UNITS/HR: 100 INJECTION, SOLUTION SUBCUTANEOUS at 18:02

## 2017-09-19 RX ADMIN — HYDROMORPHONE HYDROCHLORIDE 0.3 MG: 1 INJECTION, SOLUTION INTRAMUSCULAR; INTRAVENOUS; SUBCUTANEOUS at 17:33

## 2017-09-19 RX ADMIN — POTASSIUM CHLORIDE: 2 INJECTION, SOLUTION, CONCENTRATE INTRAVENOUS at 11:05

## 2017-09-19 RX ADMIN — Medication 2 G: at 01:21

## 2017-09-19 RX ADMIN — FAMOTIDINE 20 MG: 10 INJECTION, SOLUTION INTRAVENOUS at 09:38

## 2017-09-19 RX ADMIN — METOCLOPRAMIDE HYDROCHLORIDE 10 MG: 5 INJECTION INTRAMUSCULAR; INTRAVENOUS at 02:02

## 2017-09-19 RX ADMIN — POTASSIUM CHLORIDE: 2 INJECTION, SOLUTION, CONCENTRATE INTRAVENOUS at 04:36

## 2017-09-19 RX ADMIN — Medication 10 MEQ: at 04:35

## 2017-09-19 RX ADMIN — Medication 10 MEQ: at 15:37

## 2017-09-19 RX ADMIN — HUMAN INSULIN 6 UNITS/HR: 100 INJECTION, SOLUTION SUBCUTANEOUS at 05:44

## 2017-09-19 RX ADMIN — HYDROMORPHONE HYDROCHLORIDE 0.5 MG: 1 INJECTION, SOLUTION INTRAMUSCULAR; INTRAVENOUS; SUBCUTANEOUS at 06:57

## 2017-09-19 RX ADMIN — FAMOTIDINE 20 MG: 10 INJECTION, SOLUTION INTRAVENOUS at 21:00

## 2017-09-19 RX ADMIN — HYDROMORPHONE HYDROCHLORIDE 0.5 MG: 1 INJECTION, SOLUTION INTRAMUSCULAR; INTRAVENOUS; SUBCUTANEOUS at 19:11

## 2017-09-19 RX ADMIN — POTASSIUM CHLORIDE: 2 INJECTION, SOLUTION, CONCENTRATE INTRAVENOUS at 18:01

## 2017-09-19 RX ADMIN — Medication 10 MEQ: at 16:40

## 2017-09-19 RX ADMIN — METOCLOPRAMIDE HYDROCHLORIDE 10 MG: 5 INJECTION INTRAMUSCULAR; INTRAVENOUS at 17:32

## 2017-09-19 RX ADMIN — HYDROMORPHONE HYDROCHLORIDE 0.5 MG: 1 INJECTION, SOLUTION INTRAMUSCULAR; INTRAVENOUS; SUBCUTANEOUS at 22:58

## 2017-09-19 RX ADMIN — ONDANSETRON 4 MG: 2 INJECTION INTRAMUSCULAR; INTRAVENOUS at 06:57

## 2017-09-19 RX ADMIN — ACETAMINOPHEN 1000 MG: 10 INJECTION, SOLUTION INTRAVENOUS at 09:10

## 2017-09-19 RX ADMIN — ACETAMINOPHEN 1000 MG: 10 INJECTION, SOLUTION INTRAVENOUS at 14:58

## 2017-09-19 RX ADMIN — HYDROMORPHONE HYDROCHLORIDE 0.3 MG: 1 INJECTION, SOLUTION INTRAMUSCULAR; INTRAVENOUS; SUBCUTANEOUS at 14:47

## 2017-09-19 RX ADMIN — METOCLOPRAMIDE HYDROCHLORIDE 10 MG: 5 INJECTION INTRAMUSCULAR; INTRAVENOUS at 23:35

## 2017-09-19 RX ADMIN — ACETAMINOPHEN 1000 MG: 10 INJECTION, SOLUTION INTRAVENOUS at 02:56

## 2017-09-19 RX ADMIN — HYDROMORPHONE HYDROCHLORIDE 0.3 MG: 1 INJECTION, SOLUTION INTRAMUSCULAR; INTRAVENOUS; SUBCUTANEOUS at 09:09

## 2017-09-19 RX ADMIN — Medication 10 MEQ: at 05:43

## 2017-09-19 RX ADMIN — POTASSIUM CHLORIDE 10 MEQ: 10 INJECTION, SOLUTION INTRAVENOUS at 00:01

## 2017-09-19 NOTE — PROGRESS NOTES
[facility]  MATERNAL CHILD HEALTH   SOCIAL WORK PROGRESS NOTE      DATA:     Pt admitted to Antepartum unit.   Pt not feeling well at the moment. Syriac  arranged for 8 am tomorrow. SW will plan to see patient then in hopes of pt feeling better by then.    INTERVENTION:     SW spoke with bedside RN.     ASSESSMENT:     Through consultation with bedside RN it was determined that tomorrow is a more appropriate time to complete antepartum assessment.    PLAN:     SW to follow for needs and support during hospitalization.      Kjerstin Rydeen, [facility]   Social Worker  Maternal Child Health   Direct: [phone]  Pager: [phone]

## 2017-09-19 NOTE — H&P
"[facility]  OB History and Physical      Anne Gunter MRN# [id number]   Age: 27 year old YOB: 1990     CC:  Suspect DKA    HPI:  Ms. Anne Gunter is a 27 year old  at 26w5d by stated dates consistent with 25w5d ultrasound, who presents as S[facility] from [facility] with severe abdominal pain. She reports that for the last few days she has had \"the flu.\"  She reports nausea and vomiting, last able to tolerate PO last night.  She reports stomach/upper abdominal pain. Additionally reports diarrhea with 3 loos stools today. She denies contractions, vaginal bleeding, and loss of fluid.   + normal fetal movement.  Reports fevers and chills at home, but denies currently.  Denies headaches, chest pain, or shortness of breath.    Reports she has been taking her insulin as prescribed when she left AMA. 18 U Levemir BID (last yesterday evening), 5 U aspart with meals, and SSI additionally.    At [facility] arrived with blood glucose 251.  She received 6 units of subcutaneous insulin aspart and then was started on an insulin drip prior to transfer.  Ketones were elevated at 1.7, blood glucose was 171 at last check prior to transfer. Anion gap was 10.    Pregnancy Complications:   - Poorly controlled Type I DM: Recent admission 9/10- for DKA. Patient left AMA on  1 day after transition to SQ insulin.  Discharged on regimen of 18 U Levemir BID, 5 U Novolog with meals, and SSI. Per patient report, already two hospitalizations for likely DKA this pregnancy in [place], with prior episodes of DKA in prior pregnancy with delivery at [facility].   - No prior prenatal care in [place]   - History of  PLTCS x1 at 32w5d for fetal distress in setting of DKA  - Hx of abdominal pain likely related to gastroparesis  - During last admission met criteria for preeclampsia without severe features based on several mild range blood pressures and UPC 0.6.  BPs elevated in the setting of abdominal " pain    Prenatal Labs:   Lab Results   Component Value Date    ABO A 2017    RH Pos 2017    AS Neg 2017    HEPBANG Nonreactive 2016    CHPCRT Negative 09/10/2017    GCPCRT Negative 09/10/2017    TREPAB Negative 09/10/2017    HGB 11.1 (L) 2017     GBS Status:   Lab Results   Component Value Date    GBS Positive (A) 09/10/2017       Ultrasounds  17: GA 25w5d, EDMOND 18.5 cm, cephalic, placenta posterior no previa. EFW 815g 44%ile    OB History  Obstetric History       T0      L1     SAB0   TAB0   Ectopic0   Multiple0   Live Births1       # Outcome Date GA Lbr Walter/2nd Weight Sex Delivery Anes PTL Lv   2 Current            1  06/10/16 32w5d  2.37 kg (5 lb 3.6 oz) M CS-LTranv Gen  BARBARA        PMHx:   Past Medical History:   Diagnosis Date     Diabetes (H)      Microalbuminuria 2016     Type I diabetes mellitus, uncontrolled (H) 2016     PSHx:   Past Surgical History:   Procedure Laterality Date      SECTION N/A 6/10/2016    Procedure:  SECTION;  Surgeon: Richardson Duran MD;  Location:  OR     Meds:   Prescriptions Prior to Admission   Medication Sig Dispense Refill Last Dose     insulin aspart (NOVOLOG PEN) 100 UNIT/ML injection Inject 5 Units Subcutaneous 3 times daily (with meals) 20 mL 0      insulin aspart (NOVOLOG PEN) 100 UNIT/ML injection Inject 1-9 Units Subcutaneous At Bedtime Correction Scale - custom DOSING     Do Not give Bedtime Correction Insulin if BG less than 200  -190 = 1 unit.  -240 = 2 units.  -290 = 3 units.  -340 = 4 units.    Notify provider if glucose greater than or equal to 350 mg/dL after administration. 3 mL 3      insulin detemir (LEVEMIR FLEXPEN/FLEXTOUCH) 100 UNIT/ML injection Inject 18 Units Subcutaneous 2 times daily Take first dose at midnight, next dose at 12 pm. 3 mL 3      polyethylene glycol (MIRALAX/GLYCOLAX) Packet Take 17 g by mouth daily as needed for constipation (titrate  to one bowel movement daily) 7 packet 1      senna-docusate (SENOKOT-S;PERICOLACE) 8.6-50 MG per tablet Take 1 tablet by mouth 2 times daily as needed for constipation 100 tablet 0      metoclopramide (REGLAN) 10 MG tablet Take 1 tablet (10 mg) by mouth 4 times daily as needed (4x Daily AC & HS prn nausea and abdominal pain) 60 tablet 0      famotidine (PEPCID) 20 MG tablet Take 1 tablet (20 mg) by mouth 2 times daily 40 tablet 1      pantoprazole (PROTONIX) 40 MG EC tablet Take 1 tablet (40 mg) by mouth 2 times daily 30 tablet 1      ondansetron (ZOFRAN ODT) 4 MG ODT tab Take 1 tablet (4 mg) by mouth every 8 hours as needed for nausea 20 tablet 1      oxyCODONE (OXY-IR) 5 MG capsule Take 1 capsule (5 mg) by mouth every 4 hours as needed for moderate to severe pain 24 capsule 0      mirtazapine (REMERON SOL-TAB) 15 MG disintegrating tablet 1 tablet (15 mg) by Orally disintegrating tablet route At Bedtime 60 tablet 2 6/1/2016 at Bedtime     OLANZapine zydis (ZYPREXA) 5 MG disintegrating tablet Take 1 tablet (5 mg) by mouth 2 times daily 60 tablet 3 6/1/2016 at PM     omeprazole (PRILOSEC) 20 MG capsule Take 1 capsule (20 mg) by mouth every morning (before breakfast) 30 capsule 3 6/1/2016 at AM     Prenatal Vit-Fe Fumarate-FA (PRENATAL VITAMIN) 27-0.8 MG TABS Take 1 tablet by mouth daily 90 tablet 3 6/1/2016 at AM     folic acid (FOLVITE) 1 MG tablet Take 1 tablet (1 mg) by mouth daily 30 tablet 0 6/1/2016 at AM     Allergies:  No Known Allergies   FmHx:   Family History   Problem Relation Age of Onset     DIABETES Maternal Grandmother      CEREBROVASCULAR DISEASE Paternal Grandmother      Coronary Artery Disease Paternal Grandmother      Hypertension No family hx of      Hyperlipidemia No family hx of      Breast Cancer No family hx of      Colon Cancer No family hx of      Prostate Cancer No family hx of      Other Cancer No family hx of      Depression No family hx of      Anxiety Disorder No family hx of       MENTAL ILLNESS No family hx of      Substance Abuse No family hx of      Anesthesia Reaction No family hx of      Asthma No family hx of      OSTEOPOROSIS No family hx of      Genetic Disorder No family hx of      Thyroid Disease No family hx of      Obesity No family hx of      SocHx: She denies any tobacco, alcohol, or other drug use during this pregnancy.    ROS:   Complete 10-point ROS negative except as noted in HPI.    PE:  Vit: Patient Vitals for the past 4 hrs:   BP Temp Temp src Resp   17 2037 128/87 98.6  F (37  C) Axillary 20      Gen: Lying in bed, appears fatigued, uncomfortable, pale  CV: rrr, no mrg   Pulm: Ctab, no wheezes or crackles  Abd: Soft, gravid, non-tender,   Ext: scant LE edema b/l  Cx: deferred            FHT: Baseline 120, moderate variability, + accelerations, possible 1 isolated ?early decel w/ sporadic contraction   [place]: 0 contractions in 10 minutes      Assessment/Plan  Ms. Anne Gunter is a 27 year old , at 26w5d by stated dates consistent with 25w5d ultrasound, who presents with severe abdominal pain, elevate blood glucose and positive ketones admitted for medical management and stabilization. Improving blood glucose since admission to [facility], plan to continue insulin drip overnight and consult endocrine in AM. Patient has persistent severe abdominal pain, likely more related to gastroparesis and upper GI discomfort. GI consult on previous admission without acute concerns but considered upper GI should symptoms persist, will consult in AM. Does not appear to be labor, pain is epigastric and no contractions on monitoring.    Type I DM:  - Blood glucoses already improving on insulin drip. Continue OB high dose insulin protocol.  - Plan to keep on insulin drip overnight. Endocrine consult in the AM  - Ketones and BMP pending, plan to repeat likely in 4 hours then in AM  - K+ and Mag replacement protocols  - 1L NS bolus then IV D5 NS with 20 mEq KCL at 150 ml/hr      Abdominal Pain: Likely related to gastroparesis in the setting of poorly controlled type DM  - IV tylenol and IV dilaudid PRN  - GI consult in the AM. Discussed with patient that they may consider endoscopy, so NPO overnight.  - IV Protonix and Pepcid  - Nausea and Vomiting: PRN IV Zofran and reglan  - If continued diarrhea, can consider stool sample    ?Preeclampsia without severe features (based on blood pressure elevation and Pr:Cr ratio of 0.6):  - Diagnosed on previous admission, though mild BPs in setting of persistent abdominal pain and unclear if Anne had a baseline Protein:creatinine ratio earlier in pregnancy  - continue to monitor     FWB: Category I currently, appropriate for gestational age  - Will keep on continuous monitoring overnight, can consider TID monitoring tomorrow if stable  - GBS positive    The patient was discussed with Dr. Mckee who is in agreement with the treatment plan.    Jeannie Disla MD  OBGYN PGY-3  9:00 PM 2017    Maternal-Fetal Medicine Admit Attestation Note    I saw and examined the patient and agree with the findings assessment and plan as documented by Dr. Jeannie Disla note.  In brief, 27 year old  at 26w6d admitted for recurrent severe abdominal pain, gastroparesis, hyperglycemia with elevated ketones in the setting of poorly controlled type 1 diabetes melitus.    Plan:  Dextrose infusion with insulin drip  NPO  Narcotic for pain control  Fetal monitoring  Endocrinology consultation  Consider GI consult for upper GI as recommended in the previous admission if patient is agreeable to consider this evaluation    Melba Mckee MD  Specialist in Maternal-Fetal Medicine  2017

## 2017-09-19 NOTE — CONSULTS
"Diabetes/Hyperglycemia Management Consult    Chief Complaint type 1 diabetes in pregnancy, uncontrolled, history of DKA  Consult requested by: Dr. Melba Mckee  History of Present Illness:  Anne Gunter is a 27 year old woman at 26w6d of pregnancy, with uncontrolled type 1 diabetes known to our diabetes service from several past hospitalizations, most recently last week.  She discharged against medical advice last Wednesday night.  She says she left because she was feeling better and because being in the hospital was stressful. She had been on subcutaneous insulin since Tuesday afternoon, but was still receiving D5NS +20KCl at 150 cc/h.  On telephone follow up facilitated by  services on Thursday, Mrs Gunter reported she had not had any hypoglycemia and that she was tolerating food without vomiting.  Today, she reports her glucose was mostly \"good\",  mg/dL.  She recalls glucose dropped to less than 70 on Thursday later in the day and dropped to <70 twice on Friday.  On Friday and Saturday, she measured glucose in the 200s about twice.  She says was eating fine on Thursday, then on Friday felt she had a \"cold\" in her stomach.  Due to the \"cold\".  Her eating declined.  Saturday she felt worse, but then Sunday she was able to eat without vomiting.  Yesterday morning, she felt worse again, so she did not eat and because she couldn't eat, she did not take her noon dose of detemir.    Yesterday afternoon, she presented to [facility] around 1400 with symptoms of abdominal pain and vomiting.  Labs initially concerning for presence of ketonemia and hyperglycemia but not acidotic and anion gap normal.  Still she was transferred to [facility] last night at 26w5d, on IV insulin and fluids.  She continues on D5NS + 20 KCl at 150 cc/h and glucose is in target with 2 units regular insulin per hour.  She does not have an active diet order since she continues with nausea and abdominal pain.  She vomited once this " morning.  Fetal monitoring has been reassuring.  After [facility] rounding with Mrs Gunter this morning, they identified several concerns.  She seems not to understand the acute risk to her and her baby and is, rather, focused on the end of her pregnancy when she believes her GI symptoms will resolve.  She declined a nasojejunal feeding tube.    It is unclear whether her family members are privy to the information she is receiving about the dangers to her and baby's well-being.  TPN had not been discussed.  Mrs Gunter recalls being on IV nutrition in the past, but no records in our system per dietician, so may be only IV fluids patient recalls.  She had orders to try oral nutritional supplements last week, but did not end up trying them before she left [place].        Recent Labs  Lab 09/19/17  1400 09/19/17  1300 09/19/17  1201 09/19/17  1101 09/19/17  1003 09/19/17  0905  09/19/17  0652  09/19/17  0247  09/18/17  2125  09/18/17  1545  09/13/17  0634   GLC  --   --  90  --   --   --   --  361*  --  142*  --  196*  --  251*  --  168*   BGM 93 91 88 101* 119* 123*  < >  --   < >  --   < >  --   < >  --   < >  --    < > = values in this interval not displayed.      Diabetes Type:  Type 1 diabetes  Diabetes Duration: 19 years  Usual Diabetes Regimen: prepregnancy: glargine 30 units HS, aspart 10 units with bfast, 10 units with lunch.   Prior to this admission:  During pregnancy: detemir 18 units at noon and midnight, aspart 5 units with meals, aspart 1 per 50 >140  BG monitor before meals and one hour post-meal  Ability to [place] Prescribed Regimen: unclear, no glucose logbooks or glucometer to review  Diabetes Control:   Lab Results   Component Value Date    A1C 8.4 09/10/2017    A1C 9.2 06/01/2016    A1C 9.8 05/23/2016    A1C 7.4 04/23/2016    A1C 7.2 04/19/2016     Diabetes Complications: peripheral neuropathy.  Possible gastroparesis (no gastric emptying study record here)  History of DKA: multiple episodes during both  pregnancies  Able to Detect Hypoglycemia: yes  Usual Diabetes Care Provider: In the past saw Dr. Colunga outpatient while in [place], also followed by doctor (endo?) in [place]  Factors Impacting Glucose Control:  nausea and abdominal pain preventing po intake, current pregnancy, possible gastroparesis.      Review of Systems  10 point ROS completed with pertinent positives and negatives noted in the HPI    Past medical, family and social histories are reviewed and updated.    Past Medical History  Past Medical History:   Diagnosis Date     Diabetes (H)      Microalbuminuria 6/21/2016     Type I diabetes mellitus, uncontrolled (H) 6/21/2016       Family History  Family History   Problem Relation Age of Onset     DIABETES Maternal Grandmother      CEREBROVASCULAR DISEASE Paternal Grandmother      Coronary Artery Disease Paternal Grandmother      Hypertension No family hx of      Hyperlipidemia No family hx of      Breast Cancer No family hx of      Colon Cancer No family hx of      Prostate Cancer No family hx of      Other Cancer No family hx of      Depression No family hx of      Anxiety Disorder No family hx of      MENTAL ILLNESS No family hx of      Substance Abuse No family hx of      Anesthesia Reaction No family hx of      Asthma No family hx of      OSTEOPOROSIS No family hx of      Genetic Disorder No family hx of      Thyroid Disease No family hx of      Obesity No family hx of        Social History  Social History     Social History     Marital status:      Spouse name: N/A     Number of children: N/A     Years of education: N/A     Social History Main Topics     Smoking status: Never Smoker     Smokeless tobacco: Never Used     Alcohol use No     Drug use: No     Sexual activity: Yes     Partners: Male     Social History Narrative   Anne had been living in [place] with her  and child.  She came to the [place] within the last two weeks.  She has some family in the area (Father and possibly  sister).      Physical Exam  /71  Pulse 83  Temp 98.1  F (36.7  C) (Oral)  Resp 16  Wt 76.5 kg (168 lb 11.2 oz)  BMI 26.48 kg/m2     76 kg per H&P on 9/9      General:  appears uncomfortable, crying at times, pale, holding emesis bag.   HEENT: NC/AT, PER and anicteric, non-injected, oral mucous membranes moist.   Lungs: unlabored respiration, no cough  ABD: gravid abdomen  : Yates catheter in place, clear yellow urine  Skin: warm and dry, no obvious lesions  MSK:  fluid movement of all extremities  Lymp:  no LE edema   Mental status: alert, oriented x3, communicating clearly  Psych:  calm, even mood    Laboratory  Recent Labs   Lab Test  09/19/17   1201  09/19/17   0652   NA  138  141   POTASSIUM  3.7  4.7   CHLORIDE  107  115*   CO2  19*  18*   ANIONGAP  12  8   GLC  90  361*   BUN  4*  5*   CR  0.33*  0.34*   LILLIAM  7.8*  6.5*     CBC RESULTS:   Recent Labs   Lab Test  09/18/17 2125   WBC  13.6*   RBC  4.12   HGB  10.7*   HCT  33.5*   MCV  81   MCH  26.0*   MCHC  31.9   RDW  15.3*   PLT  228     Liver Function Studies -   Recent Labs   Lab Test  09/18/17 2125   PROTTOTAL  7.2   ALBUMIN  2.6*   BILITOTAL  0.5   ALKPHOS  64   AST  7   ALT  13       Active Medications  Current Facility-Administered Medications   Medication     ondansetron (ZOFRAN) injection 4 mg     lidocaine 1 % 1 mL     lidocaine (LMX4) kit     sodium chloride (PF) 0.9% PF flush 3 mL     sodium chloride (PF) 0.9% PF flush 3 mL     dextrose 5% and 0.9% NaCl 1,000 mL with potassium chloride 20 mEq/L infusion     metoclopramide (REGLAN) injection 10 mg     No Tdap Needed - Assessment: Patient does not need Tdap vaccine     pantoprazole (PROTONIX) 40 mg IV push injection     famotidine (PEPCID) injection 20 mg     polyethylene glycol (MIRALAX/GLYCOLAX) Packet 17 g     acetaminophen (OFIRMEV) infusion 1,000 mg     dextrose 10 % 1,000 mL infusion     0.9% sodium chloride infusion     insulin 1 unit/mL in saline (novoLIN-Regular) drip -  High Intensity Infusion     glucose 40 % gel 15-30 g    Or     dextrose 50 % injection 25-50 mL    Or     glucagon injection 1 mg     HYDROmorphone (PF) (DILAUDID) injection 0.3-0.5 mg     naloxone (NARCAN) injection 0.1-0.4 mg     potassium chloride SA (K-DUR/KLOR-CON M) CR tablet 20-40 mEq     potassium chloride (KLOR-CON) Packet 20-40 mEq     potassium chloride 10 mEq in 100 mL intermittent infusion     potassium chloride 10 mEq in 100 mL intermittent infusion with 10 mg lidocaine     potassium chloride 20 mEq in 50 mL intermittent infusion     magnesium sulfate 2 g in NS intermittent infusion (PharMEDium or FV Cmpd)     magnesium sulfate 4 g in 100 mL sterile water (premade)       Current Diet  None        Assessment  Mrs. Anne Gunter is a 28 yo woman at 26w6d of pregnancy, with uncontrolled type 1 diabetes and history of multiple episodes of DKA during previous pregnancy, who transferred to [facility] from [facility] with suspected DKA.  Agree with M that without a durable nutrition plan, Mrs Rodríguez is likely to experience DKA again.    Plan  -Continue to reinforce need to take detemir irrespective of eating, and need to call to report hyperglycemia or hypoglycemia-- doses can be adjusted via telephone.  -If nausea with inability to eat develops, along with hyperglycemia needs to present sooner for treatment.  -Needs consistent nutrition plan, but she has declined enteral feeds so far.  Trial of Ensure/Magic cup when allowed PO.   If TPN is employed, regular insulin would be added to the bag.  -Continue high intensity OB IV insulin infusion.    -When a diet is initiated, start aspart for carbohydrates 1 unit per 6 grams carbohydrate  -Wait for patient to demonstrate tolerance of some nutrition before transitioning to detemir    Outpatient diabetes follow up scheduled with Dr. Colunga on October 12.        I spent a total of 120 minutes bedside and on the inpatient unit managing the glycemic care of Anne Gunter.  Over 50% of my time on the unit was spent counseling the patient and coordinating care regarding acute and future management of diabetes.  See note for details.    Melba Hoffman APRN [id number]    Diabetes Management Team job code: [id number]

## 2017-09-19 NOTE — PHARMACY
Spoke with MD, previously used both protonix and pepcid, wants to continue. Needs to use IV APAP at  This time will reevaluate daily.

## 2017-09-19 NOTE — PROGRESS NOTES
MFM/Antepartum Note:     S:  Patient continues to have significant abdominal pain, nausea and vomiting. States that she was felt well at home for a couple days after discharging but that the pain returned and has continued to worsen. Has not eaten in past couple of days. Does report using Levamir BID as well as Aspart with meals.  Declines any discussion of NJ tube or EGD. States that she is scared and would not accept these interventions. Feeling baby move. No LOF, VB.      O:  /71  Pulse 83  Temp 98.1  F (36.7  C) (Oral)  Resp 16     Gen:                Lying in bed, appears fatigued, uncomfortable, pale  CV/pulm:  No increased work of breathing, well perfused  Abd:                Soft, gravid, mild tenderness in epigastrium  Ext:                 scant LE edema b/l  Cx:                  deferred     Labs:  Ketones neg this AM  BMP: K 4.7, Cr 0.34, AG 8-- glucose of 361 inaccurate as BMP collected from arm with dextrose infusion  Glucose: 82>77>123 this AM    FHT: 130 baseline, moderate variability, 10x10 accels present, no decels. Reassuring for gestational age.  [place]: quiet     Assessment:   Ms. Anne Gunter is a 27 year old , at 26w6d by stated dates consistent with 25w5d ultrasound, admitted overnight with severe abdominal pain, elevated blood glucose and + ketones. She is currently admitted for medical management and stabilization. Improving blood glucose since admission with insulin drip, endocrine will see later today.  Patient does have continued nausea, vomiting and significant abdominal pain, thought to be related to gastroparesis, s/p GI consult with previous admission. No concerns for  labor at this time.     Type I DM:  - Blood glucoses already improving on high dose insulin drip.   - Endocrine consult today and will transition to sub-Q when recommended by their team.  - Ketones negative. BMP overall reassuring. Will repeat this afternoon.   - K+ and Mag replacement protocols  ord'd  - s/p 1L NS bolus with IV D5 NS with 20 mEq KCL at 150 ml/hr      Abdominal Pain: Likely related to gastroparesis in the setting of poorly controlled type DM  - IV tylenol and IV dilaudid PRN  - Discussed patient with GI who recommend behavioral modifications discussed during previous admission (small, frequent meals, low fat/low carb diet,no carbonated drinks, etc). They do not feel EGD is necessary and on discussion with the patient she is not agreeable at this time regardless. Will cancel formal consult to GI and call if worsening symptoms or if patient becomes agreeable to discussion of NJ tube (see below) or EGD.   - Did discuss possibility of NJ tube with patient as this may help nutritional status and provide continuous slow infusion of nutrition. However, she is also not agreeable to this intervention at this time.   - IV Protonix and Pepcid  - Nausea and Vomiting: PRN IV Zofran and reglan  - If continued diarrhea, can consider stool sample  - Given patient's seeming indifference to the severity of her condition (and it's effects on herself and baby), as well as her fear regarding interventions that may improve her and her baby's health will plan to consult  psych as well as social work. Patient had previously been on psychiatric medications with previous pregnancy but declined formal psych consult on previous admit.      ?Preeclampsia without severe features:  - Diagnosed on previous admission, though mild BPs in setting of persistent abdominal pain  - continue to monitor      FWB: Category I currently, appropriate for gestational age  - Will keep on continuous monitoring today and this evening will change to TID monitoring if stable  - GBS positive      PNC: NOB labs collected 9/10/17: Rh pos, Rubella IgG positive, Hep B nonreactive, HIV nonreactive, GBS positive, placenta posterior, GC/Chlam negative. Comprehensive scan completed 2017 displays single intrauterine pregnancy at 29 2/7  weeks gestation, appropriate interval fetal growth, visualized fetal anatomy appears normal, normal amniotic fluid volume.    Mariann Dockery PGY3  2017 10:21 AM     Physician Attestation   I, Melba Mckee, saw this patient with the resident and agree with the resident s findings and plan of care as documented in the resident s note.      I personally reviewed vital signs, medications, labs and imaging.    Key findings: Patient is readmitted in the setting of a repeat episode of severe abdominal pain and hyperglycemia. Not in DKA during this admission, but if had not presented in a timely manner anticipated she would have progressed to DKA. We discussed the very significant concerns that we have regarding the risks of hyperglycemia and DKA in pregnancy. We discussed the high risk for stillbirth associated with DKA. I am concerned that if she is discharged again on the same regimen and dietary and behavioral modifications that she will again return with similar symptoms and DKA. We discussed the option for placement of a NJ tube with tube feeding to provide consistent enteral nutrition and allow for optimization of insulin regimen for treatment of her diabetes. I believe that this would decrease the chance of recurrent severe abdominal pain and the resulting non-compliance with treatment regimen and DKA. Anne states that she is not willing to consider a feeding tube when asked why she stated that she is fearful of interventions. She states that a feeding tube was discussed in the prior pregnancy and she was not willing to consider it at that time either. Anne stated that she knows that her symptoms will improve after pregnancy and she is waiting for that to occur. Anne also declines an EGD for similar reasons.    Plan to continue to address our teams concerns with Anne. Endocrinology,  Psychology and SW consultations at this time. Will continue IV narcotic for pain control.    Melba Singh  Rylee  Date of Service (when I saw the patient): 09/19/17

## 2017-09-19 NOTE — PROGRESS NOTES
CLINICAL NUTRITION SERVICES - ASSESSMENT NOTE     Nutrition Prescription    RECOMMENDATIONS FOR MDs/PROVIDERS TO ORDER:  1. Advance diet as tolerated    2. If unable to adv diet and/or PO intakes inadequate, consider need to place FT and start TF.  If this becomes plan of care, please consult Registered Dietitian to Assess and Order TF per MNT protocol.    -- Would recommend goal TwoCal HN @ 45 ml/hr (1080 ml/day) to provide 2160 kcals (33 kcals/kg), 91 g PRO ( 1.4 g/kg), 756 ml free H2O, 237 g CHO and 5 g Fiber daily  -- Patient is at refeeding risk. Would start TF @ 10 mL/hr and adv by 10 mL q 8-12 hours as tolerated to goal rate.  Would only start and adv TF rate if K+/Mg++ >/= nrml and phos >1.9.    3. If pt is NOT agreeable to TF, would recommend starting TPN as follows:  PN @ 40 mL/hr continuous to provide 960 mL (or volume per MD/pharmacy), 200 g dextrose, GIR 2.1, 90 g AA (1.4 g/kg protein), and 250 mL 20% lipids x 5 days/wk (26% kcals from fat) to provide 1390 kcals (21 kcal/kg) to meet 100% assessed kcal and pro needs.   -- Recommend weekly TG, liver function labs.     Malnutrition Status:    Non-severe malnutrition in the context of acute illness    Recommendations already ordered by Registered Dietitian (RD):  None today    Future/Additional Recommendations:  1. Once diet advances, will send sample tray of various nutritional supplements: Ensure Plus (strawberry), Glucerna (berry), Ensure Clear (mixed berry) and Magic Cup (castillo).       REASON FOR ASSESSMENT  Anne Gunter is a/an 27 year old female assessed by the dietitian for Admission Nutrition Risk Screen for new/uncontrolled diabetes    NUTRITION HISTORY  - Per chart review, PMH includes poorly controlled DM1, hx of abd pain 2/2 gastroparesis. Per pt report, she has had two hospitalizations for likely DKA this pregnancy at an OSH and a third time from 9/10-9/13 when she left AMA.    - Pt reports a poor appetite 2/2 N/V and abdominal pain. Since  "D/C last week, she has been eating mostly vegetables and some fruits. Initially, pt denied eating meat but when asked what she consumes for protein, she stated she may have chicken or seafood. This seems to be her baseline and she was eating well PTA onset of abdominal pain several days ago. She does seem to like ice cream and berry flavored items.   - During her last admission, a variety of nutritional supplements was sent but pt did not try any of them.     CURRENT NUTRITION ORDERS  Diet: NPO  Intake/Tolerance: pt last ate PTA    LABS  Labs reviewed  - Last 24 hr B-361  - A1C 8.4 (9/10)    MEDICATIONS  Medications reviewed  - MIVF D5 @ 150 mL/hr (provides 180 g dextrose, 612 kcal)  - Insulin drip 6 units/hr    ANTHROPOMETRICS  Height: 0 cm (Data Unavailable)  Ht Readings from Last 1 Encounters:   09/10/17 1.7 m (5' 6.93\")   Most Recent Weight: 76.5 kg (168 lb 11.2 oz)    Likely Prepregnancy Weight: 75 kg   IBW: 61.4 kg (122% IBW using prepregnancy wt)  BMI: Overweight BMI 25-29.9  Weight History: pt reports UBW before pregnancy was 70 kg (154 lbs). Per last RD note on , pt weighed 165 lbs at admission and at this time pt felt she had lost 3-4 lbs since becoming pregnant. Difficult to determine prepregnancy weight and actual wt gain so far this pregnancy. Will use 75 kg as most likely prepregnancy weight (also used in previous RD note) and recorded wt hx supports this. Based on this, she has gained 1.5 kg (3.3 lbs) during this pregnancy which is below the recommended wt gain of 15-25 lbs based on likely prepregnancy BMI of 26.0 kg/m2.  Wt Readings from Last 5 Encounters:   17 76.5 kg (168 lb 11.2 oz)   17 76.5 kg (168 lb 10.4 oz)   17 76 kg (167 lb 8.8 oz)   16 68.9 kg (152 lb)   16 79.6 kg (175 lb 7.8 oz)     Dosing Weight: 65 kg - adjusted from prepregnancy wt    ASSESSED NUTRITION NEEDS  Estimated Energy Needs: 8951-5348 kcals/day (25 - 30 kcals/kg + 340 kcal/day; PN energy " needs: 20-25 kcal/kg)  Justification: Increased needs 2/2 second trimester of pregnancy and repletion   Estimated Protein Needs: 78-98 grams protein/day (1.2 - 1.5 grams of pro/kg)  Justification: Increased needs 2/2 second trimester of pregnancy and repletion  Estimated Fluid Needs: 1 mL/kcal  Justification: Maintenance    PHYSICAL FINDINGS  See malnutrition section below.  26w6d pregnant     MALNUTRITION  % Intake: </= 50% for >/= 5 days (severe)  % Weight Loss: Weight loss does not meet criteria, however since pt is pregnant likely non-severe  Subcutaneous Fat Loss: None observed  Muscle Loss: None observed  Fluid Accumulation/Edema: None noted  Malnutrition Diagnosis: Non-severe malnutrition in the context of acute illness    NUTRITION DIAGNOSIS  Inadequate oral intake related to N/V as evidenced by NPO x1 day and inadequate wt gain during pregnancy      INTERVENTIONS  Implementation  Discussed nutrition history and PO since admission. Discussed menu ordering and snacks available on the unit once pt is able to eat. During this visit, pt became nauseous and vomited. Discussed oral nutrition supplements and pt willing to try a variety once she is feeling better. Encouraged adequate PO of food and fluids.    Collaboration with other providers - spoke with endocrinology about POC and potential for nutrition support (pt is currently declining TF and TPN)    Goals  1. Diet adv v nutrition support within 2-3 days.  2. Weight gains of 0.3 kg (0.7 lbs) per week (15-25 lbs total wt gain during this pregnancy)   3. BG </= 180     Monitoring/Evaluation  Progress toward goals will be monitored and evaluated per protocol.      Lucille Machuca RD, LD  Unit Pager: [phone]

## 2017-09-19 NOTE — PLAN OF CARE
"Problem: Diabetes in Pregnancy (Adult,Obstetrics,Pediatric)  Goal: Signs and Symptoms of Listed Potential Problems Will be Absent or Manageable (Diabetes in Pregnancy)  Signs and symptoms of listed potential problems will be absent or manageable by discharge/transition of care (reference Diabetes in Pregnancy (Adult,Obstetrics,Pediatric) CPG).   Outcome: No Change  VSS. Afebrile. FHT- category 1.  Rare contractions. Abd pain controled with scheduled IV acetaminophen and IV dilaudid PRN. N/V episodes decreased overnight -Responded well to IV Reglan. K and Mg replacement given per protocol. D5 NS with K infusing continuously.  Fluid bolus x 2 of NS for a total of 1500 mL. Insulin gtt currently on algorithm 4. Yates draining clear urine. No diarrhea overnight. NPO at this time. Has swabs for oral cares.  Appeared to sleep well in between cares.  Pt continues to believe that current hospitalization is the result of \"the flu\".      "

## 2017-09-19 NOTE — PLAN OF CARE
Problem: Diabetes in Pregnancy (Adult,Obstetrics,Pediatric)  Goal: Signs and Symptoms of Listed Potential Problems Will be Absent or Manageable (Diabetes in Pregnancy)  Signs and symptoms of listed potential problems will be absent or manageable by discharge/transition of care (reference Diabetes in Pregnancy (Adult,Obstetrics,Pediatric) CPG).  Outcome: No Change  Pt arrived via ambulance from OSH at 2023 and was admitted to [facility].  Dr. Disla notified of patient arrival and orders placed.  LR, NS, and insulin were infusing upon arrival.  Pt stated that she had epigastric pain that was intolerable.  Pain relieved after 0.5 dilaudid, and suero placement.  600 mL concentrated urine drained immediately when suero place. FHT- category 1.  Rare contractions.  Will continue to monitor overnight.

## 2017-09-19 NOTE — PLAN OF CARE
Ambulance here. Report given to EMS. EMS took pumps and will return them later this evening. Report given to Adri JOAQUIN on antepartum unit at [place].

## 2017-09-20 ENCOUNTER — OFFICE VISIT (OUTPATIENT)
Dept: INTERPRETER SERVICES | Facility: CLINIC | Age: 27
End: 2017-09-20

## 2017-09-20 LAB
ANION GAP SERPL CALCULATED.3IONS-SCNC: 11 MMOL/L (ref 3–14)
ANION GAP SERPL CALCULATED.3IONS-SCNC: 12 MMOL/L (ref 3–14)
BUN SERPL-MCNC: 2 MG/DL (ref 7–30)
BUN SERPL-MCNC: 2 MG/DL (ref 7–30)
CALCIUM SERPL-MCNC: 5.8 MG/DL (ref 8.5–10.1)
CALCIUM SERPL-MCNC: 8.1 MG/DL (ref 8.5–10.1)
CHLORIDE SERPL-SCNC: 105 MMOL/L (ref 94–109)
CHLORIDE SERPL-SCNC: 93 MMOL/L (ref 94–109)
CO2 SERPL-SCNC: 16 MMOL/L (ref 20–32)
CO2 SERPL-SCNC: 19 MMOL/L (ref 20–32)
CREAT SERPL-MCNC: 0.25 MG/DL (ref 0.52–1.04)
CREAT SERPL-MCNC: 0.43 MG/DL (ref 0.52–1.04)
GFR SERPL CREATININE-BSD FRML MDRD: >90 ML/MIN/1.7M2
GFR SERPL CREATININE-BSD FRML MDRD: >90 ML/MIN/1.7M2
GLUCOSE BLDC GLUCOMTR-MCNC: 100 MG/DL (ref 70–99)
GLUCOSE BLDC GLUCOMTR-MCNC: 103 MG/DL (ref 70–99)
GLUCOSE BLDC GLUCOMTR-MCNC: 103 MG/DL (ref 70–99)
GLUCOSE BLDC GLUCOMTR-MCNC: 108 MG/DL (ref 70–99)
GLUCOSE BLDC GLUCOMTR-MCNC: 109 MG/DL (ref 70–99)
GLUCOSE BLDC GLUCOMTR-MCNC: 110 MG/DL (ref 70–99)
GLUCOSE BLDC GLUCOMTR-MCNC: 111 MG/DL (ref 70–99)
GLUCOSE BLDC GLUCOMTR-MCNC: 114 MG/DL (ref 70–99)
GLUCOSE BLDC GLUCOMTR-MCNC: 114 MG/DL (ref 70–99)
GLUCOSE BLDC GLUCOMTR-MCNC: 122 MG/DL (ref 70–99)
GLUCOSE BLDC GLUCOMTR-MCNC: 124 MG/DL (ref 70–99)
GLUCOSE BLDC GLUCOMTR-MCNC: 134 MG/DL (ref 70–99)
GLUCOSE BLDC GLUCOMTR-MCNC: 69 MG/DL (ref 70–99)
GLUCOSE BLDC GLUCOMTR-MCNC: 74 MG/DL (ref 70–99)
GLUCOSE BLDC GLUCOMTR-MCNC: 78 MG/DL (ref 70–99)
GLUCOSE BLDC GLUCOMTR-MCNC: 79 MG/DL (ref 70–99)
GLUCOSE BLDC GLUCOMTR-MCNC: 79 MG/DL (ref 70–99)
GLUCOSE BLDC GLUCOMTR-MCNC: 84 MG/DL (ref 70–99)
GLUCOSE BLDC GLUCOMTR-MCNC: 85 MG/DL (ref 70–99)
GLUCOSE BLDC GLUCOMTR-MCNC: 87 MG/DL (ref 70–99)
GLUCOSE BLDC GLUCOMTR-MCNC: 91 MG/DL (ref 70–99)
GLUCOSE BLDC GLUCOMTR-MCNC: 92 MG/DL (ref 70–99)
GLUCOSE BLDC GLUCOMTR-MCNC: 92 MG/DL (ref 70–99)
GLUCOSE BLDC GLUCOMTR-MCNC: 97 MG/DL (ref 70–99)
GLUCOSE SERPL-MCNC: 143 MG/DL (ref 70–99)
GLUCOSE SERPL-MCNC: 328 MG/DL (ref 70–99)
POTASSIUM SERPL-SCNC: 3.9 MMOL/L (ref 3.4–5.3)
POTASSIUM SERPL-SCNC: 4.1 MMOL/L (ref 3.4–5.3)
SODIUM SERPL-SCNC: 121 MMOL/L (ref 133–144)
SODIUM SERPL-SCNC: 135 MMOL/L (ref 133–144)

## 2017-09-20 PROCEDURE — 36415 COLL VENOUS BLD VENIPUNCTURE: CPT | Performed by: OBSTETRICS & GYNECOLOGY

## 2017-09-20 PROCEDURE — T1013 SIGN LANG/ORAL INTERPRETER: HCPCS | Mod: U3

## 2017-09-20 PROCEDURE — 25000125 ZZHC RX 250: Performed by: OBSTETRICS & GYNECOLOGY

## 2017-09-20 PROCEDURE — 25800025 ZZH RX 258: Performed by: OBSTETRICS & GYNECOLOGY

## 2017-09-20 PROCEDURE — 25000132 ZZH RX MED GY IP 250 OP 250 PS 637: Performed by: OBSTETRICS & GYNECOLOGY

## 2017-09-20 PROCEDURE — 80048 BASIC METABOLIC PNL TOTAL CA: CPT | Performed by: OBSTETRICS & GYNECOLOGY

## 2017-09-20 PROCEDURE — 80048 BASIC METABOLIC PNL TOTAL CA: CPT | Performed by: STUDENT IN AN ORGANIZED HEALTH CARE EDUCATION/TRAINING PROGRAM

## 2017-09-20 PROCEDURE — 00000146 ZZHCL STATISTIC GLUCOSE BY METER IP

## 2017-09-20 PROCEDURE — 12000032 ZZH R&B OB CRITICAL UMMC

## 2017-09-20 PROCEDURE — 25000128 H RX IP 250 OP 636: Performed by: OBSTETRICS & GYNECOLOGY

## 2017-09-20 PROCEDURE — 90832 PSYTX W PT 30 MINUTES: CPT | Performed by: PSYCHOLOGIST

## 2017-09-20 PROCEDURE — 87340 HEPATITIS B SURFACE AG IA: CPT | Performed by: OBSTETRICS & GYNECOLOGY

## 2017-09-20 PROCEDURE — S0028 INJECTION, FAMOTIDINE, 20 MG: HCPCS | Performed by: OBSTETRICS & GYNECOLOGY

## 2017-09-20 PROCEDURE — 36415 COLL VENOUS BLD VENIPUNCTURE: CPT | Performed by: STUDENT IN AN ORGANIZED HEALTH CARE EDUCATION/TRAINING PROGRAM

## 2017-09-20 RX ORDER — METOCLOPRAMIDE HYDROCHLORIDE 5 MG/ML
10 INJECTION INTRAMUSCULAR; INTRAVENOUS EVERY 6 HOURS
Status: DISCONTINUED | OUTPATIENT
Start: 2017-09-20 | End: 2017-09-23 | Stop reason: HOSPADM

## 2017-09-20 RX ADMIN — SODIUM CHLORIDE: 9 INJECTION, SOLUTION INTRAVENOUS at 21:33

## 2017-09-20 RX ADMIN — HUMAN INSULIN 2 UNITS/HR: 100 INJECTION, SOLUTION SUBCUTANEOUS at 21:53

## 2017-09-20 RX ADMIN — HYDROMORPHONE HYDROCHLORIDE 0.5 MG: 1 INJECTION, SOLUTION INTRAMUSCULAR; INTRAVENOUS; SUBCUTANEOUS at 06:18

## 2017-09-20 RX ADMIN — METOCLOPRAMIDE HYDROCHLORIDE 10 MG: 5 INJECTION INTRAMUSCULAR; INTRAVENOUS at 06:21

## 2017-09-20 RX ADMIN — METOCLOPRAMIDE 10 MG: 5 INJECTION, SOLUTION INTRAMUSCULAR; INTRAVENOUS at 12:33

## 2017-09-20 RX ADMIN — HYDROMORPHONE HYDROCHLORIDE 0.5 MG: 1 INJECTION, SOLUTION INTRAMUSCULAR; INTRAVENOUS; SUBCUTANEOUS at 10:39

## 2017-09-20 RX ADMIN — HYDROMORPHONE HYDROCHLORIDE 0.5 MG: 1 INJECTION, SOLUTION INTRAMUSCULAR; INTRAVENOUS; SUBCUTANEOUS at 01:18

## 2017-09-20 RX ADMIN — SODIUM CHLORIDE: 9 INJECTION, SOLUTION INTRAVENOUS at 12:29

## 2017-09-20 RX ADMIN — FAMOTIDINE 20 MG: 10 INJECTION, SOLUTION INTRAVENOUS at 21:17

## 2017-09-20 RX ADMIN — ACETAMINOPHEN 1000 MG: 10 INJECTION, SOLUTION INTRAVENOUS at 06:24

## 2017-09-20 RX ADMIN — PANTOPRAZOLE SODIUM 40 MG: 40 INJECTION, POWDER, FOR SOLUTION INTRAVENOUS at 07:55

## 2017-09-20 RX ADMIN — ONDANSETRON 4 MG: 2 INJECTION INTRAMUSCULAR; INTRAVENOUS at 17:27

## 2017-09-20 RX ADMIN — HYDROMORPHONE HYDROCHLORIDE 0.5 MG: 1 INJECTION, SOLUTION INTRAMUSCULAR; INTRAVENOUS; SUBCUTANEOUS at 23:40

## 2017-09-20 RX ADMIN — POTASSIUM CHLORIDE: 2 INJECTION, SOLUTION, CONCENTRATE INTRAVENOUS at 22:26

## 2017-09-20 RX ADMIN — PROCHLORPERAZINE EDISYLATE 10 MG: 5 INJECTION INTRAMUSCULAR; INTRAVENOUS at 19:48

## 2017-09-20 RX ADMIN — HYDROMORPHONE HYDROCHLORIDE 0.5 MG: 1 INJECTION, SOLUTION INTRAMUSCULAR; INTRAVENOUS; SUBCUTANEOUS at 17:27

## 2017-09-20 RX ADMIN — POTASSIUM CHLORIDE: 2 INJECTION, SOLUTION, CONCENTRATE INTRAVENOUS at 07:56

## 2017-09-20 RX ADMIN — ONDANSETRON 4 MG: 2 INJECTION INTRAMUSCULAR; INTRAVENOUS at 01:25

## 2017-09-20 RX ADMIN — HUMAN INSULIN 4 UNITS/HR: 100 INJECTION, SOLUTION SUBCUTANEOUS at 09:51

## 2017-09-20 RX ADMIN — ACETAMINOPHEN 1000 MG: 10 INJECTION, SOLUTION INTRAVENOUS at 12:35

## 2017-09-20 RX ADMIN — POTASSIUM CHLORIDE 20 MEQ: 1.5 POWDER, FOR SOLUTION ORAL at 21:17

## 2017-09-20 RX ADMIN — POTASSIUM CHLORIDE: 2 INJECTION, SOLUTION, CONCENTRATE INTRAVENOUS at 01:30

## 2017-09-20 RX ADMIN — SODIUM CHLORIDE: 9 INJECTION, SOLUTION INTRAVENOUS at 01:35

## 2017-09-20 RX ADMIN — SODIUM CHLORIDE: 9 INJECTION, SOLUTION INTRAVENOUS at 03:13

## 2017-09-20 RX ADMIN — ACETAMINOPHEN 1000 MG: 10 INJECTION, SOLUTION INTRAVENOUS at 18:38

## 2017-09-20 RX ADMIN — CALCIUM GLUCONATE 1 G/HR: 94 INJECTION, SOLUTION INTRAVENOUS at 08:27

## 2017-09-20 RX ADMIN — ONDANSETRON 4 MG: 2 INJECTION INTRAMUSCULAR; INTRAVENOUS at 08:14

## 2017-09-20 RX ADMIN — HYDROMORPHONE HYDROCHLORIDE 0.5 MG: 1 INJECTION, SOLUTION INTRAMUSCULAR; INTRAVENOUS; SUBCUTANEOUS at 08:09

## 2017-09-20 RX ADMIN — HYDROMORPHONE HYDROCHLORIDE 0.5 MG: 1 INJECTION, SOLUTION INTRAMUSCULAR; INTRAVENOUS; SUBCUTANEOUS at 19:48

## 2017-09-20 RX ADMIN — ACETAMINOPHEN 1000 MG: 10 INJECTION, SOLUTION INTRAVENOUS at 00:24

## 2017-09-20 RX ADMIN — DEXTROSE MONOHYDRATE 25 ML: 25 INJECTION, SOLUTION INTRAVENOUS at 06:15

## 2017-09-20 RX ADMIN — METOCLOPRAMIDE 10 MG: 5 INJECTION, SOLUTION INTRAMUSCULAR; INTRAVENOUS at 18:35

## 2017-09-20 RX ADMIN — FAMOTIDINE 20 MG: 10 INJECTION, SOLUTION INTRAVENOUS at 09:03

## 2017-09-20 RX ADMIN — POTASSIUM CHLORIDE: 2 INJECTION, SOLUTION, CONCENTRATE INTRAVENOUS at 15:47

## 2017-09-20 NOTE — PLAN OF CARE
Problem: Diabetes in Pregnancy (Adult,Obstetrics,Pediatric)  Goal: Signs and Symptoms of Listed Potential Problems Will be Absent or Manageable (Diabetes in Pregnancy)  Signs and symptoms of listed potential problems will be absent or manageable by discharge/transition of care (reference Diabetes in Pregnancy (Adult,Obstetrics,Pediatric) CPG).   Cont insulin pump continues with periods of turning off. Spoke to endocrine and order placed re: nsg judgment to titrate. Restarted and reduced by 0.5.

## 2017-09-20 NOTE — DISCHARGE SUMMARY
"[facility] DISCHARGE SUMMARY  [facility]  Anne Gunter  [id number]    Date of Admission: 2017  Date of Discharge: 2017, against medical advice  Admission Diagnoses:   1. IUP @ 26w5d   2. Poorly controlled type I DM  3. Elevated blood sugars and elevated ketones  4. History of C/S x1  5. History of  birth  6. Abdominal pain  7. Scant prenatal care  Discharge Diagnoses:   Same, improved but not controlled blood sugars    Procedures: Insulin drip  Primary Care Provider: Isiah Naidu    Admission History:   Ms. Anne Gunter is a 27 year old  at 26w5d by stated dates consistent with 25w5d ultrasound, who presents as [facility] from [facility] with severe abdominal pain. She reports that for the last few days she has had \"the flu.\"  She reports nausea and vomiting, last able to tolerate PO last night.  She reports stomach/upper abdominal pain. Additionally reports diarrhea with 3 loos stools today. She denies contractions, vaginal bleeding, and loss of fluid.   + normal fetal movement.  Reports fevers and chills at home, but denies currently.  Denies headaches, chest pain, or shortness of breath.     Reports she has been taking her insulin as prescribed when she left AMA. 18 U Levemir BID (last yesterday evening), 5 U aspart with meals, and SSI additionally.     At [facility] arrived with blood glucose 251.  She received 6 units of subcutaneous insulin aspart and then was started on an insulin drip prior to transfer.  Ketones were elevated at 1.7, blood glucose was 171 at last check prior to transfer. Anion gap was 10.     Pregnancy Complications:   - Poorly controlled Type I DM: Recent admission 9/10- for DKA. Patient left AMA on  1 day after transition to SQ insulin.  Discharged on regimen of 18 U Levemir BID, 5 U Novolog with meals, and SSI. Per patient report, already two hospitalizations for likely DKA this pregnancy in [place], with prior episodes of DKA in " prior pregnancy with delivery at [facility].   - No prior prenatal care in [place]   - History of  PLTCS x1 at 32w5d for fetal distress in setting of DKA  - Hx of abdominal pain likely related to gastroparesis  - During last admission met criteria for preeclampsia without severe features based on several mild range blood pressures and UPC 0.6.  BPs elevated in the setting of abdominal pain    SUMMARY OF HOSPITAL COURSE:  She was started on an insulin drip at [facility] and transferred to [facility] for further cares.  She was started on an insulin drip at admission. This was discontinued on HD#6. Ketosis resolved. BMP was monitored until day 5, at which point it was consistently normal.     Abdominal Pain: thought to be due to gastroparesis. She was initially treated with IV Dilaudid and tylenol. Eventually she no longer required IV Dilaudid, and the pain improved.     Nausea: treated with Reglan, Zofran. She was also given Pepcid, Protonix. On the day of discharge she was tolerating full meals.      Endocrine was consulted for blood sugar control. Please see day-of-discharge recommendations below. We counseled Ms Gunter that it was not safe for her to discharge at this point, but she insisted on discharge so tentative recommendations were made for against medical advice discharge.     She did not have evidence of refeeding syndrome. Labs were monitored closely and repleted as needed.     She had an elevated urine protein to creatinine ratio but blood pressures were normal-range.     LABS FROM HOSPITAL ADMISSION:  Hemoglobin   Date Value Ref Range Status   2017 10.7 (L) 11.7 - 15.7 g/dL Final   2017 11.1 (L) 11.7 - 15.7 g/dL Final   09/10/2017 10.1 (L) 11.7 - 15.7 g/dL Final     ABO   Date Value Ref Range Status   2017 A  Final       IMAGING FROM HOSPITAL ADMISSION:  None    DISCHARGE INSTRUCTIONS:  If pt leaves AMA, recommend the following insulin regimen for home:  -Levemir 14 units BID-  If unable to  tolerate po intake at home (Sick Day plan) she could reduce dose to 11 units BID (rather than skip a dose)  -Novolog with meals: 5 units per meal  -Check blood sugar fasting and 1 or 2 hours post prandial    DISCHARGE MEDICATIONS:     Review of your medicines      START taking       Dose / Directions    blood glucose monitoring test strip   Commonly known as:  no brand specified   Used for:  Type 1 diabetes mellitus in pregnancy, second trimester        Use to test blood sugars 4 times daily or as directed   Quantity:  100 strip   Refills:  3         CONTINUE these medicines which may have CHANGED, or have new prescriptions. If we are uncertain of the size of tablets/capsules you have at home, strength may be listed as something that might have changed.       Dose / Directions    insulin detemir 100 UNIT/ML injection   Commonly known as:  LEVEMIR FLEXPEN/FLEXTOUCH   This may have changed:  how much to take   Used for:  Type 1 diabetes mellitus in pregnancy, second trimester        Dose:  14 Units   Inject 14 Units Subcutaneous 2 times daily Take first dose at midnight, next dose at 12 pm.   Quantity:  3 mL   Refills:  3         CONTINUE these medicines which have NOT CHANGED       Dose / Directions    famotidine 20 MG tablet   Commonly known as:  PEPCID   Used for:  Type 1 diabetes mellitus in pregnancy, second trimester        Dose:  20 mg   Take 1 tablet (20 mg) by mouth 2 times daily   Quantity:  40 tablet   Refills:  1       folic acid 1 MG tablet   Commonly known as:  FOLVITE   Used for:  High-risk pregnancy, first trimester        Dose:  1 mg   Take 1 tablet (1 mg) by mouth daily   Quantity:  30 tablet   Refills:  0       * insulin aspart 100 UNIT/ML injection   Commonly known as:  NovoLOG PEN   Used for:  Supervision of high-risk pregnancy, third trimester        Dose:  5 Units   Inject 5 Units Subcutaneous 3 times daily (with meals)   Quantity:  20 mL   Refills:  0       * insulin aspart 100 UNIT/ML  injection   Commonly known as:  NovoLOG PEN   Used for:  Type 1 diabetes mellitus in pregnancy, second trimester        Dose:  1-9 Units   Inject 1-9 Units Subcutaneous At Bedtime Correction Scale - custom DOSING    Do Not give Bedtime Correction Insulin if BG less than 200 -190 = 1 unit. -240 = 2 units. -290 = 3 units. -340 = 4 units.  Notify provider if glucose greater than or equal to 350 mg/dL after administration.   Quantity:  3 mL   Refills:  3       metoclopramide 10 MG tablet   Commonly known as:  REGLAN   Used for:  Type 1 diabetes mellitus in pregnancy, second trimester        Dose:  10 mg   Take 1 tablet (10 mg) by mouth 4 times daily as needed (4x Daily AC & HS prn nausea and abdominal pain)   Quantity:  60 tablet   Refills:  0       mirtazapine 15 MG ODT tab   Commonly known as:  REMERON SOL-TAB   Used for:  Supervision of high-risk pregnancy, third trimester        Dose:  15 mg   1 tablet (15 mg) by Orally disintegrating tablet route At Bedtime   Quantity:  60 tablet   Refills:  2       OLANZapine zydis 5 MG ODT tab   Commonly known as:  zyPREXA   Used for:  Supervision of high-risk pregnancy, third trimester        Dose:  5 mg   Take 1 tablet (5 mg) by mouth 2 times daily   Quantity:  60 tablet   Refills:  3       omeprazole 20 MG CR capsule   Commonly known as:  priLOSEC   Used for:  Supervision of high-risk pregnancy, third trimester        Dose:  20 mg   Take 1 capsule (20 mg) by mouth every morning (before breakfast)   Quantity:  30 capsule   Refills:  3       ondansetron 4 MG ODT tab   Commonly known as:  ZOFRAN ODT   Used for:  Hyperemesis gravidarum        Dose:  4 mg   Take 1 tablet (4 mg) by mouth every 8 hours as needed for nausea   Quantity:  20 tablet   Refills:  1       oxyCODONE 5 MG capsule   Commonly known as:  OXY-IR   Used for:  S/P         Dose:  5 mg   Take 1 capsule (5 mg) by mouth every 4 hours as needed for moderate to severe pain   Quantity:  24  capsule   Refills:  0       pantoprazole 40 MG EC tablet   Commonly known as:  PROTONIX   Used for:  Type 1 diabetes mellitus in pregnancy, second trimester        Dose:  40 mg   Take 1 tablet (40 mg) by mouth 2 times daily   Quantity:  30 tablet   Refills:  1       polyethylene glycol Packet   Commonly known as:  MIRALAX/GLYCOLAX   Used for:  Type 1 diabetes mellitus in pregnancy, second trimester        Dose:  17 g   Take 17 g by mouth daily as needed for constipation (titrate to one bowel movement daily)   Quantity:  7 packet   Refills:  1       Prenatal Vitamin 27-0.8 MG Tabs   Used for:  High-risk pregnancy, first trimester        Dose:  1 tablet   Take 1 tablet by mouth daily   Quantity:  90 tablet   Refills:  3       senna-docusate 8.6-50 MG per tablet   Commonly known as:  SENOKOT-S;PERICOLACE   Used for:  Hyperemesis gravidarum        Dose:  1 tablet   Take 1 tablet by mouth 2 times daily as needed for constipation   Quantity:  100 tablet   Refills:  0       * Notice:  This list has 2 medication(s) that are the same as other medications prescribed for you. Read the directions carefully, and ask your doctor or other care provider to review them with you.         Where to get your medicines      These medications were sent to [facility] [place], [place] - [address]  [address] [address]     Phone:  [phone]      blood glucose monitoring test strip     insulin detemir 100 UNIT/ML injection           Criss Jernigan, PGY3  OB/Gyn  9/23/2017, 4:52 PM    I agree with the above discharge summary.   Sonia Hart DO FACOG  Maternal Fetal Medicine Specialist  Pager: [phone]  Mobile: [phone]

## 2017-09-20 NOTE — PLAN OF CARE
Problem: Goal Outcome Summary  Goal: Goal Outcome Summary  Outcome: No Change  Cont to use algorithm 4, pump is off attimes. Pt had episode of N&V x1. Zofran and dilaudid given. Scheduled Reglan and Tylenol continue. Pt did order dinner, but said everything tastes terrible (vegetable soup, brown rice).  Pt still plans on trying green salad. RN encouraged pt to have family bring in some foods from home that might be more appealing.

## 2017-09-20 NOTE — PLAN OF CARE
Problem: Diabetes in Pregnancy (Adult,Obstetrics,Pediatric)  Goal: Signs and Symptoms of Listed Potential Problems Will be Absent or Manageable (Diabetes in Pregnancy)  Signs and symptoms of listed potential problems will be absent or manageable by discharge/transition of care (reference Diabetes in Pregnancy (Adult,Obstetrics,Pediatric) CPG).   Outcome: No Change  Pt with flat affect overnight. Able to get some sleep when not in pain. BPs 90s/50s. Afebrile. O2 upper 90s%. See flowsheets/paper chart for FHR and contraction patterns. Occasional ctx, no bleeding or LOF. +FM. Blood sugars checked per orders overnight and insulin infusion adjusted per protocol. BS of 69 this morning, patient reported feeling dizzy. Insulin stopped and 25 mL D50W given IV (see MAR).  on recheck. Insulin infusion restarted after consulting Dr. Disla, currently running at 4 units/hour. IV tylenol given q6h and dilaudid upon pt request for abdominal pain (see MAR). Reglan and zofran given per patient request for nausea. No episodes of emesis overnight. Adequate output with suero in place. Remains NPO. Up ad ramila but did not move from bed overnight, SCDs in place. Lab drawn this morning, awaiting results. Psych and social work to see patient this morning. Since pt agreeable to GI consult last evening, likely GI consult today.

## 2017-09-20 NOTE — PLAN OF CARE
Problem: Goal Outcome Summary  Goal: Goal Outcome Summary  Outcome: No Change  VSS, afebrile, baby active, Category 1 tracing.  Continues to have abdominal pain, medicated with dilaudid  and Acetaminophen with adequate relief. Held the 2100 dose of Acetaminophen as giving it would have been a 5gm total in 24 hours.  Antiemetics given for nausea, one emesis this evening.  Continues on OB High-intensity  insulin gtt, and NPO.  Her Sister was here tonight and she speaks good english, after discussing the current condition with patient, the patient is now agreeable to having a GI consult.  Notified Dr Disla.

## 2017-09-20 NOTE — PLAN OF CARE
Problem: Goal Outcome Summary  Goal: Goal Outcome Summary  Outcome: No Change  VSS, afebrile, FHT's category 1-2 tracing. Denies S/S of PTL, had some periods of uterine irritability and some Uc's this afternoon.  Abdominal pain controlled with scheduled Acetaminophen, and prn dilaudid. She did have 2 episodes of emesis, on the day shift, continues to need the prn Reglan and Zofran.  She had Potassium replacement and a BMP ordered for the AM.  Endocrine saw, dietary saw patient,  psych consult will be tomorrow am. Interpretors are scheduled for 0800 and 1000 am.  No BM today, SCD's on, NPO, has swabs for oral cares.  This morning she wouldn't consider an endoscopy or NG feeding tube, so GI was not consulted.  States, fear is what is holding her back from considering it. Continue Insulin GTT.  Continue with plan of care.

## 2017-09-20 NOTE — PROGRESS NOTES
Diabetes Consult Daily  Progress Note          Assessment/Plan:   Mrs. Anne Gunter is a 26 yo woman at 26w6d of pregnancy, with uncontrolled type 1 diabetes and history of multiple episodes of DKA during previous pregnancy, who transferred to [facility] from [facility] with suspected DKA.  Agree with MFM that without a durable nutrition plan, Mrs Rodríguez is likely to experience DKA again.    IV insulin continues to be appropriate for pt's current clinical status.     Plan  -Continue high intensity OB IV insulin infusion-- okay for RN to titrate 0.2-0.5 above or below algorithm to help with stability (seems to need between  alg 3 and alg 4).  -When a diet is initiated, start aspart for carbohydrates 1 unit per 6 grams carbohydrate    -Needs consistent nutrition plan, but she has declined enteral feeds so far.  Trial of Ensure/Magic cup when allowed PO.   If TPN is employed, regular insulin would be added to the bag.      -Wait for patient to demonstrate tolerance of some nutrition before transitioning to detemir     Outpatient planning-  -Continue to reinforce need to take detemir irrespective of eating, and need to call to report hyperglycemia or hypoglycemia-- doses can be adjusted via telephone.  -If nausea with inability to eat develops, along with hyperglycemia needs to present sooner for treatment.    Outpatient diabetes follow up scheduled with Dr. Colunga on October 12.             Plan discussed with bedside RN, and primary team .           Interval History:   The last 24 hours progress and nursing notes reviewed.  Pt more amenable to discussion of nutrition support but wants to try PO first.  Vomiting still today, even with multiple antiemetics.  Tolerates 2 units/h for short stretches, then drop off for dropping BG, then up to 4 units/h.  Appears would steadily tolerate just under 2 units/h, to maintain BG .    Note labs again drawn w/ D5NS@150 via PIV this morning.  Recheck  values 1000 much nearer expected.        Recent Labs  Lab 09/20/17  1133 09/20/17  1042 09/20/17  1005 09/20/17  0930 09/20/17  0833 09/20/17  0729 09/20/17  0635 09/20/17  0627  09/19/17  1201  09/19/17  0652  09/19/17  0247  09/18/17 2125   GLC  --   --  143*  --   --   --  328*  --   --  90  --  361*  --  142*  --  196*   * 108*  --  114* 79 91  --  124*  < > 88  < >  --   < >  --   < >  --    < > = values in this interval not displayed.            Review of Systems:   See interval hx          Medications:     None       Physical Exam:  Gen: resting in bed, in NAD   HEENT: NC/AT, mucous membranes are moist  Resp: Unlabored  Neuro: arouse to voice, drowsy  /72  Pulse 102  Temp 98.4  F (36.9  C) (Oral)  Resp 16  Wt 76.5 kg (168 lb 11.2 oz)  SpO2 100%  BMI 26.48 kg/m2           Data:     Lab Results   Component Value Date    A1C 8.4 09/10/2017    A1C 9.2 06/01/2016    A1C 9.8 05/23/2016    A1C 7.4 04/23/2016    A1C 7.2 04/19/2016              CBC RESULTS:   Recent Labs   Lab Test  09/18/17 2125   WBC  13.6*   RBC  4.12   HGB  10.7*   HCT  33.5*   MCV  81   MCH  26.0*   MCHC  31.9   RDW  15.3*   PLT  228     Recent Labs   Lab Test  09/20/17   1005  09/20/17   0635   NA  135  121*   POTASSIUM  3.9  4.1   CHLORIDE  105  93*   CO2  19*  16*   ANIONGAP  11  12   GLC  143*  328*   BUN  2*  2*   CR  0.43*  0.25*   LILLIAM  8.1*  5.8*     Liver Function Studies -   Recent Labs   Lab Test  09/18/17 2125   PROTTOTAL  7.2   ALBUMIN  2.6*   BILITOTAL  0.5   ALKPHOS  64   AST  7   ALT  13             Melba Hoffman APRN -[id number]    Diabetes Management job code [id number]

## 2017-09-20 NOTE — CONSULTS
"                                                                                        Mental Health Inpatient Consult      PATIENT'S NAME: Anne Gunter  MRN:   [id number]  :   1990  DATE OF SERVICE: 2017  START TIME: 10:00  END TIME: 10:35  CONSULT LENGTH: 35 minutes      Identifying Information:  Patient is a 27-year-old, [place],  female.  Patient is 27 weeks pregnant.  Patient was referred for a consultation by Dr. Melba Mckee at [facility]. Patient is currently unemployed. Patient was alone during this consult; however, an [place]  was present to assist.      Reason for Consult:  The reason for this consultation is: \"Poorly controlled Diabetes Mellitus (DM), flat affect, poor involvement in care.\" Client was assessed for the presence of mental health symptoms, risk issues, and recommendations for treatment. Consultation was ended prior to providing the client with psychoeducational handouts and mental health assessments due to the client becoming ill and requesting to end the session.       Patient's Report of Presenting Concern:  Patient reports that she lives in [place] with her  and [id number] son, but came to the [place] approximately 2 weeks ago in preparation for the birth of her child. She reported her family lives in [place] and thus she had planned to stay with them until the birth of her child. Client reported she had not planned on coming to the [place] so soon, but the [employer] requested she come early to attend a meeting. While here, she began to experience physical symptoms including nausea, pain, and uncontrolled DM. She was reportedly informed that she would need to remain in the hospital until she gives birth. Client stated she has been feeling sad and anxious about being away from her  and son for so long. She described this as situational sadness and anxiety based on her current experiences and " "sated that although she feels she is \"sometimes sad,\" she is \"not very depressed.\" She denied feeling hopeless, but did describe feeling overwhelmed. She also reported a history of anxiety that has been relatively consistent for several years and has worsened since being admitted. Client reported she worries about many things, particularly about her son's and 's well-being. She has difficulty controlling the worry which leads to disturbed sleep, decreased appetite, feelings of irritability, and muscle tension. Client stated her current worries are related to being away from her family and worrying that she will get sick again and will be hospitalized again in the future.     Review of Symptoms:    Patient was assessed for the following symptoms and reports the following:    Depression: Feeling Overwhelmed, Sadness, Loss of interest, Sleep disturbance, Appetite decrease  Laura:  No symptoms  Psychosis: No symptoms; reported hearing her mother call her name even though her mother was sleeping; this occurred on two occasions; no other evidence of psychosis  Anxiety: Worries Triggers: being away from family, health issues   Panic:  No symptoms  Post Traumatic Stress Disorder: No symptoms  Obsessive Compulsive Disorder: No symptoms  Eating Disorder: No symptoms  ADD / ADHD: No symptoms      Mental Health History:  Patient previously received the following mental health diagnosis: Postpartum Depression (2016).  Patient has not received mental health services in the past.  She reported she was prescribed Lexapro following her last pregnancy, but did not take the medication. She stated she does not believe in taking medications for mental health issues, but rather believes in counseling as being the most effective. She was reportedly scheduled to participate in individual therapy; however, by the time the appointment was scheduled she was on her way back to [place]. Client denied that symptoms persisted in [place] once " she was with her family. Psychiatric Hospitalizations: None.  Patient is currently receiving the following mental health services: none.  Patient reported no family history of mental health issues.    Chemical Use Review:  Patient denies using alcohol.  Patient denies using tobacco.  Patient denies using marijuana.  Patient denies using caffeine.  Patient denies using street drugs.  Patient denies the non-medical use of prescription or over the counter drugs.    Patient denies a history of substance use problems.   Patient has not received chemical dependency treatment in the past. Patient is not currently receiving any chemical dependency treatment. Patient reported no family history of chemical health issues.      Medical Issues:  Patient reports the following current medical concerns: Type I Diabetes, stomach pain, nausea, vomiting.    Patient reports current meds as:   Current Facility-Administered Medications   Medication     metoclopramide (REGLAN) injection 10 mg     prochlorperazine (COMPAZINE) injection 10 mg     - MEDICATION INSTRUCTIONS -     0.9% sodium chloride infusion     ondansetron (ZOFRAN) injection 4 mg     lidocaine 1 % 1 mL     lidocaine (LMX4) kit     sodium chloride (PF) 0.9% PF flush 3 mL     sodium chloride (PF) 0.9% PF flush 3 mL     dextrose 5% and 0.9% NaCl 1,000 mL with potassium chloride 20 mEq/L infusion     No Tdap Needed - Assessment: Patient does not need Tdap vaccine     pantoprazole (PROTONIX) 40 mg IV push injection     famotidine (PEPCID) injection 20 mg     polyethylene glycol (MIRALAX/GLYCOLAX) Packet 17 g     acetaminophen (OFIRMEV) infusion 1,000 mg     dextrose 10 % 1,000 mL infusion     0.9% sodium chloride infusion     insulin 1 unit/mL in saline (novoLIN-Regular) drip - High Intensity Infusion     glucose 40 % gel 15-30 g    Or     dextrose 50 % injection 25-50 mL    Or     glucagon injection 1 mg     HYDROmorphone (PF) (DILAUDID) injection 0.3-0.5 mg     naloxone  (NARCAN) injection 0.1-0.4 mg     potassium chloride SA (K-DUR/KLOR-CON M) CR tablet 20-40 mEq     potassium chloride (KLOR-CON) Packet 20-40 mEq     potassium chloride 10 mEq in 100 mL intermittent infusion     potassium chloride 10 mEq in 100 mL intermittent infusion with 10 mg lidocaine     potassium chloride 20 mEq in 50 mL intermittent infusion     magnesium sulfate 2 g in NS intermittent infusion (PharMEDium or FV Cmpd)     magnesium sulfate 4 g in 100 mL sterile water (premade)       Patient reports they were not  taking psychiatric medications prior to pregnancy       no known allergies to medications    Medical History:  Type I Diabetes  High risk pregnancy      Medication Adherence:  N/A - Patient does not have prescribed psychiatric medications    Follow-up: NA; Client declines to consider psychiatric medications at this time.    Safety Issues and Plan for Safety and Risk Management:    Patient denies a history of suicidal ideation, suicide attempts, self-injurious behavior, homicidal ideation, homicidal behavior and and other safety concerns    Patient denies current fears or concerns for personal safety.  Patient denies current or recent suicidal ideation or behaviors.  Patient denies current or recent homicidal ideation or behaviors.  Patient denies any current or recent ideation and/or behaviors to harm her baby  Patient denies current or recent self injurious behavior or ideation.  Patient denies other safety concerns.  Patient reports there are no firearms in the house      Report to child / adult protection services was NA.    Mental Status Assessment:  Appearance:   Pale, Appeared to be in pain, Grimaced at times, Tired   Eye Contact:   Fair   Psychomotor Behavior: Hunched over    Attitude:   Cooperative   Orientation:   All  Speech   Rate / Production: Normal    Volume:  Normal   Mood:    Anxious  Sad   Affect:    Appropriate   Thought Content:  Clear   Thought Form:  Coherent  Goal Directed   Logical   Insight:    Fair     Diagnostic Criteria:  A. Excessive anxiety and worry about a number of events or activities (such as work or school performance).   B. The person finds it difficult to control the worry.  C. Select 3 or more symptoms (required for diagnosis). Only one item is required in children.   - Being easily fatigued.    - Difficulty concentrating or mind going blank.    - Irritability.    - Muscle tension.    - Sleep disturbance (difficulty falling or staying asleep, or restless unsatisfying sleep).   D. The focus of the anxiety and worry is not confined to features of an Axis I disorder.  E. The anxiety, worry, or physical symptoms cause clinically significant distress or impairment in social, occupational, or other important areas of functioning.   F. The disturbance is not due to the direct physiological effects of a substance (e.g., a drug of abuse, a medication) or a general medical condition (e.g., hyperthyroidism) and does not occur exclusively during a Mood Disorder, a Psychotic Disorder, or a Pervasive Developmental Disorder.      Functional Status:  Patient's symptoms are causing reduced functional status in the following areas: Follow through with Medical recommendations - see EPIC for more detail      DSM5 Diagnoses: (Sustained by DSM5 Criteria Listed Above)  Behavioral and Medical Diagnosis: 300.02 (F41.1) Generalized Anxiety Disorder;  Psychosocial & Contextual Factors:  and son in [place]; speaks Persian; has some family support here  WHODAS2.0 Score: Did not complete at this session    SUMMARY:    Interventions:  Review of client's medical records  Clinical Interview with   Assessed for safety risk issues  Provided support and validation  Explored current stressors and concerns and assisted client with problem-solving and coping strategies  Provided psychoeducation on  mood and anxiety disorders and treatment  Reviewed treatment recommendations - please  see below for additional information       RECOMMENDATIONS:   1) Individual therapy is recommended to help target symptoms of anxiety and depression  Patient appears to be a good candidate for Cognitive-Behavioral Therapy interventions.  Either myself or one of my colleagues from [facility]'s  Mental Health Team will continue to follow patient and provide weekly individual therapy sessions while patient is antepartum. Client will likely benefit from psychoeducation regarding  mood disorders and anxiety, as well as about Generalized Anxiety Disorder. A Wellness Plan may also be beneficial in the future, as client was unable to complete today due to illness.     2) Continued monitoring of patient's mood and symptoms.  Encourage practice of self-care strategies and distraction. Client indicated she is currently feeling too ill to participate in groups, work on crafts, etc; however, she did indicate she would be interested in distraction techniques once she is feeling better.       3) Recommend and encourage patient continue to participate in individual therapy post discharge from the hospital, given the current symptoms she is reporting, in addition to her history of having experienced previous episodes of depression and anxiety.  If she would like to see a [facility] provider while she remains in the [place], she may schedule outpatient follow-up with [facility] (call [phone] and inform  that you are pregnant or have recently had a baby) or contact insurance company for additional referrals.       4) A medication evaluation to determine if medications may be appropriate to target patient's symptoms is recommended if patient is interested in exploring this option, if symptoms do not improve with therapy, and/or if symptoms worsen.  Patient reported she is not interested in medications at this time, but understands she may talk with her doctor about  this option in the future if she changes her mind.          The following referral(s) will be initiated: regular follow-up from [facility]'s  mental health team for individual therapy while patient is on antepartum.      Provider routed the results of this consultation and treatment recommendations to referring provider.        Michela Gillette, RAJIV   2017

## 2017-09-20 NOTE — PROGRESS NOTES
Met with patient at bedside with  this afternoon. Patient reports mild abdominal pain and mild nausea. She has tolerated water and crackers during the day today. Counseled patient on her poor nutritional status and the risk this poses to her baby and herself. Discussed with patient her ability to eat sufficient calories and if not able, our recommendation to place a NJ tube for nutrition. Patient reports willingness to advance diet as she can overnight and think further about NJ tube placement. Plan to assess intake overnight and discuss further recommendation for NJ tube in the AM if patient continues to be unable to take significant PO.    Mariann Dockery PGY3  9/20/2017 4:33 PM

## 2017-09-20 NOTE — PLAN OF CARE
The EMR was down for 4 hours on 9/20/2017 from 0230 to ~0615.    Maya TERESA RN was responsible for completing the paper charting during this time period.     The following information was re-entered into the system by Maya Vides: I/O, MAR, vital signs, blood sugars, and partial flowsheet data.    The following information will remain in the paper chart: pain evaluation, FHR and contraction monitoring.     Maya Vides  9/20/2017

## 2017-09-20 NOTE — PROGRESS NOTES
MFM/Antepartum Note:      S: Patient continues to have nausea. Denies recent vomiting. Pain is present but improved. Does want to try some water this morning. Is feeling more amenable to considering NJ tube for feeding. Feeling baby move. No LOF, VB.    O:  /72  Pulse 102  Temp 98.4  F (36.9  C) (Oral)  Resp 16  Wt 76.5 kg (168 lb 11.2 oz)  SpO2 100%  BMI 26.48 kg/m2      Gen:               Lying in bed, appears fatigued, uncomfortable, pale  CV/pulm:         No increased work of breathing, well perfused  Abd:               Soft, gravid, mild tenderness in epigastrium  Ext:                scant LE edema b/l  Cx:                 deferred      Labs:  Ketones neg this AM  BMP: unclear accuracy of results given drawn from arm with IVF running. Will therefore repeat after calcium infusion  Glucose: 92>69>124 this AM     FHT: 135 baseline, moderate variability, 10x10 accels present, no decels. Reassuring for gestational age.  [place]: quiet      Assessment:   Ms. Anne Gunter is a 27 year old , at 27w0d by stated dates consistent with 25w5d ultrasound, admitted with severe abdominal pain, elevated blood glucose and + ketones. She is currently admitted for medical management and stabilization. Improving blood glucose since admission with insulin drip, endocrine will make recommendations for insulin today. Patient does have continued nausea, vomiting and significant abdominal pain, thought to be related to gastroparesis, s/p GI consult with previous admission. No concerns for  labor at this time.     Type I DM:  - Blood glucoses improved but labile with drip.   - Endocrine to see today to make recs regarding sub Q insulin.   - Ketones negative. BMP will be repeated after calcium (noted to be critically low this morning on initial study)  - K+ and Mag replacement protocols ord'd  - s/p 1L NS bolus on admission with IV D5 NS with 20 mEq KCL at 150 ml/hr       Abdominal Pain/Malnutrition: Likely  related to gastroparesis in the setting of poorly controlled type DM  - IV tylenol and IV dilaudid PRN  - Again discussed NJ tube with patient who is more agreeable today. She will try PO intake today. If going well will continue to advance. If continues to be unable to tolerate significant PO intake will recommended NJ tube placement with initiation of TF tomorrow. She seems agreeable to this plan.  - IV Protonix and Pepcid  - Nausea and Vomiting: IV Zofran and scheduled reglan  - If continued diarrhea, can consider stool sample  -  psych unable to see yesterday. Will follow up their note today,      Question of Preeclampsia without severe features: Elevated blood pressure was in the setting of abdominal pain. Proteinuria was present leading to diagnosis of preeclampsia, but do not have a baseline from earlier in pregnancy and patient has significant risk of having pre-existing proteinuria (previous value was 0.38 2016 in previous pregnancy without preeclampsia).  - continue to monitor   - Repeat urine protein:creatinine ratio in approximately 1 month, or earlier if there is a change in clinical status      FWB: Category I currently, appropriate for gestational age  - TID monitoring  - GBS positive      PNC: NOB labs collected 9/10/17: Rh pos, Rubella IgG positive, Hep B nonreactive, HIV nonreactive, GBS positive, placenta posterior, GC/Chlam negative. Comprehensive scan completed 2017 displays single intrauterine pregnancy at 29 2/7 weeks gestation, appropriate interval fetal growth, visualized fetal anatomy appears normal, normal amniotic fluid volume.     Mariann Dockery PGY3  2017 9:14 AM     Physician Attestation   I, Melba Mckee, saw this patient with the resident and agree with the resident s findings and plan of care as documented in the resident s note.      I personally reviewed vital signs, medications, labs and imaging.    Key findings: Continue nausea and abdominal  pain, but some improvement since admission. Blood sugars controlled on insulin drip. Labs again drawn today from arm where insulin drip was running and have to be re-drawn - I care to be completed by Apoorva. Appreciate Nutrition and Endocrinology consultation. Will attempt to advance diet today as patient desires a trial of oral intake prior to making a decision of whether to proceed with NJ and enteral nutrition. We discussed the need to remain inpatient until she can either demonstrate tolerance of a regular diet and adherence to the dietary modifications related to her known gastroparesis, as well as a stable insulin regimen. If she is unable to tolerate a full diet due to her gastroparesis then we would strongly recommend proceeding with NJ and TF. If Anne continues to refuse NJ and TF then a last resort would be TPN through central line.     Melba Mckee  Date of Service (when I saw the patient): 09/20/17

## 2017-09-20 NOTE — PLAN OF CARE
Problem: Diabetes in Pregnancy (Adult,Obstetrics,Pediatric)  Goal: Signs and Symptoms of Listed Potential Problems Will be Absent or Manageable (Diabetes in Pregnancy)  Signs and symptoms of listed potential problems will be absent or manageable by discharge/transition of care (reference Diabetes in Pregnancy (Adult,Obstetrics,Pediatric) CPG).   Outcome: No Change  Cont to use algorithm 4 tho pump has been off at times last 12 hours. MDs in to discuss tube for nutrition if pt unable to tolerate po. Pt reports she is thirsty and wants only water due to current nausea. Reglan at 0630 and zofran just given and pt reports some relief of symptoms. Will contact endocrine to determine best algorithm for pt. Cont cares.

## 2017-09-21 ENCOUNTER — OFFICE VISIT (OUTPATIENT)
Dept: INTERPRETER SERVICES | Facility: CLINIC | Age: 27
End: 2017-09-21

## 2017-09-21 LAB
GLUCOSE BLDC GLUCOMTR-MCNC: 101 MG/DL (ref 70–99)
GLUCOSE BLDC GLUCOMTR-MCNC: 104 MG/DL (ref 70–99)
GLUCOSE BLDC GLUCOMTR-MCNC: 108 MG/DL (ref 70–99)
GLUCOSE BLDC GLUCOMTR-MCNC: 111 MG/DL (ref 70–99)
GLUCOSE BLDC GLUCOMTR-MCNC: 114 MG/DL (ref 70–99)
GLUCOSE BLDC GLUCOMTR-MCNC: 123 MG/DL (ref 70–99)
GLUCOSE BLDC GLUCOMTR-MCNC: 132 MG/DL (ref 70–99)
GLUCOSE BLDC GLUCOMTR-MCNC: 137 MG/DL (ref 70–99)
GLUCOSE BLDC GLUCOMTR-MCNC: 141 MG/DL (ref 70–99)
GLUCOSE BLDC GLUCOMTR-MCNC: 151 MG/DL (ref 70–99)
GLUCOSE BLDC GLUCOMTR-MCNC: 155 MG/DL (ref 70–99)
GLUCOSE BLDC GLUCOMTR-MCNC: 56 MG/DL (ref 70–99)
GLUCOSE BLDC GLUCOMTR-MCNC: 56 MG/DL (ref 70–99)
GLUCOSE BLDC GLUCOMTR-MCNC: 57 MG/DL (ref 70–99)
GLUCOSE BLDC GLUCOMTR-MCNC: 58 MG/DL (ref 70–99)
GLUCOSE BLDC GLUCOMTR-MCNC: 58 MG/DL (ref 70–99)
GLUCOSE BLDC GLUCOMTR-MCNC: 61 MG/DL (ref 70–99)
GLUCOSE BLDC GLUCOMTR-MCNC: 63 MG/DL (ref 70–99)
GLUCOSE BLDC GLUCOMTR-MCNC: 64 MG/DL (ref 70–99)
GLUCOSE BLDC GLUCOMTR-MCNC: 65 MG/DL (ref 70–99)
GLUCOSE BLDC GLUCOMTR-MCNC: 66 MG/DL (ref 70–99)
GLUCOSE BLDC GLUCOMTR-MCNC: 70 MG/DL (ref 70–99)
GLUCOSE BLDC GLUCOMTR-MCNC: 72 MG/DL (ref 70–99)
GLUCOSE BLDC GLUCOMTR-MCNC: 76 MG/DL (ref 70–99)
GLUCOSE BLDC GLUCOMTR-MCNC: 82 MG/DL (ref 70–99)
GLUCOSE BLDC GLUCOMTR-MCNC: 89 MG/DL (ref 70–99)
GLUCOSE BLDC GLUCOMTR-MCNC: 92 MG/DL (ref 70–99)
GLUCOSE BLDC GLUCOMTR-MCNC: 93 MG/DL (ref 70–99)
GLUCOSE BLDC GLUCOMTR-MCNC: 93 MG/DL (ref 70–99)
GLUCOSE BLDC GLUCOMTR-MCNC: 95 MG/DL (ref 70–99)
HBV SURFACE AG SERPL QL IA: NONREACTIVE
POTASSIUM SERPL-SCNC: 3.7 MMOL/L (ref 3.4–5.3)

## 2017-09-21 PROCEDURE — 25000125 ZZHC RX 250: Performed by: OBSTETRICS & GYNECOLOGY

## 2017-09-21 PROCEDURE — 12000032 ZZH R&B OB CRITICAL UMMC

## 2017-09-21 PROCEDURE — T1013 SIGN LANG/ORAL INTERPRETER: HCPCS | Mod: U3

## 2017-09-21 PROCEDURE — 25000128 H RX IP 250 OP 636: Performed by: OBSTETRICS & GYNECOLOGY

## 2017-09-21 PROCEDURE — 36416 COLLJ CAPILLARY BLOOD SPEC: CPT | Performed by: OBSTETRICS & GYNECOLOGY

## 2017-09-21 PROCEDURE — 84132 ASSAY OF SERUM POTASSIUM: CPT | Performed by: OBSTETRICS & GYNECOLOGY

## 2017-09-21 PROCEDURE — 25000131 ZZH RX MED GY IP 250 OP 636 PS 637: Performed by: CLINICAL NURSE SPECIALIST

## 2017-09-21 PROCEDURE — S0028 INJECTION, FAMOTIDINE, 20 MG: HCPCS | Performed by: OBSTETRICS & GYNECOLOGY

## 2017-09-21 PROCEDURE — 00000146 ZZHCL STATISTIC GLUCOSE BY METER IP

## 2017-09-21 PROCEDURE — 25000132 ZZH RX MED GY IP 250 OP 250 PS 637: Performed by: OBSTETRICS & GYNECOLOGY

## 2017-09-21 PROCEDURE — 25800025 ZZH RX 258: Performed by: OBSTETRICS & GYNECOLOGY

## 2017-09-21 RX ORDER — NICOTINE POLACRILEX 4 MG
LOZENGE BUCCAL
Status: DISPENSED
Start: 2017-09-21 | End: 2017-09-21

## 2017-09-21 RX ADMIN — METOCLOPRAMIDE 10 MG: 5 INJECTION, SOLUTION INTRAMUSCULAR; INTRAVENOUS at 00:40

## 2017-09-21 RX ADMIN — DEXTROSE 30 G: 15 GEL ORAL at 08:15

## 2017-09-21 RX ADMIN — POTASSIUM CHLORIDE: 2 INJECTION, SOLUTION, CONCENTRATE INTRAVENOUS at 12:15

## 2017-09-21 RX ADMIN — SODIUM CHLORIDE: 9 INJECTION, SOLUTION INTRAVENOUS at 02:30

## 2017-09-21 RX ADMIN — ONDANSETRON 4 MG: 2 INJECTION INTRAMUSCULAR; INTRAVENOUS at 04:28

## 2017-09-21 RX ADMIN — METOCLOPRAMIDE 10 MG: 5 INJECTION, SOLUTION INTRAMUSCULAR; INTRAVENOUS at 06:48

## 2017-09-21 RX ADMIN — HYDROMORPHONE HYDROCHLORIDE 0.5 MG: 1 INJECTION, SOLUTION INTRAMUSCULAR; INTRAVENOUS; SUBCUTANEOUS at 04:28

## 2017-09-21 RX ADMIN — INSULIN ASPART 2 UNITS: 100 INJECTION, SOLUTION INTRAVENOUS; SUBCUTANEOUS at 09:18

## 2017-09-21 RX ADMIN — HYDROMORPHONE HYDROCHLORIDE 0.5 MG: 1 INJECTION, SOLUTION INTRAMUSCULAR; INTRAVENOUS; SUBCUTANEOUS at 05:36

## 2017-09-21 RX ADMIN — FAMOTIDINE 20 MG: 10 INJECTION, SOLUTION INTRAVENOUS at 20:54

## 2017-09-21 RX ADMIN — HUMAN INSULIN 1.5 UNITS/HR: 100 INJECTION, SOLUTION SUBCUTANEOUS at 22:59

## 2017-09-21 RX ADMIN — HYDROMORPHONE HYDROCHLORIDE 0.5 MG: 1 INJECTION, SOLUTION INTRAMUSCULAR; INTRAVENOUS; SUBCUTANEOUS at 10:29

## 2017-09-21 RX ADMIN — PANTOPRAZOLE SODIUM 40 MG: 40 INJECTION, POWDER, FOR SOLUTION INTRAVENOUS at 08:03

## 2017-09-21 RX ADMIN — HUMAN INSULIN 3 UNITS/HR: 100 INJECTION, SOLUTION SUBCUTANEOUS at 13:54

## 2017-09-21 RX ADMIN — DEXTROSE MONOHYDRATE 25 ML: 25 INJECTION, SOLUTION INTRAVENOUS at 05:28

## 2017-09-21 RX ADMIN — METOCLOPRAMIDE 10 MG: 5 INJECTION, SOLUTION INTRAMUSCULAR; INTRAVENOUS at 12:30

## 2017-09-21 RX ADMIN — POTASSIUM CHLORIDE: 2 INJECTION, SOLUTION, CONCENTRATE INTRAVENOUS at 05:28

## 2017-09-21 RX ADMIN — ACETAMINOPHEN 1000 MG: 10 INJECTION, SOLUTION INTRAVENOUS at 00:40

## 2017-09-21 RX ADMIN — ACETAMINOPHEN 1000 MG: 10 INJECTION, SOLUTION INTRAVENOUS at 12:38

## 2017-09-21 RX ADMIN — HYDROMORPHONE HYDROCHLORIDE 0.5 MG: 1 INJECTION, SOLUTION INTRAMUSCULAR; INTRAVENOUS; SUBCUTANEOUS at 17:31

## 2017-09-21 RX ADMIN — DEXTROSE MONOHYDRATE 25 ML: 25 INJECTION, SOLUTION INTRAVENOUS at 00:33

## 2017-09-21 RX ADMIN — FAMOTIDINE 20 MG: 10 INJECTION, SOLUTION INTRAVENOUS at 08:46

## 2017-09-21 RX ADMIN — HYDROMORPHONE HYDROCHLORIDE 0.5 MG: 1 INJECTION, SOLUTION INTRAMUSCULAR; INTRAVENOUS; SUBCUTANEOUS at 23:50

## 2017-09-21 RX ADMIN — ACETAMINOPHEN 1000 MG: 10 INJECTION, SOLUTION INTRAVENOUS at 06:52

## 2017-09-21 RX ADMIN — INSULIN ASPART 5 UNITS: 100 INJECTION, SOLUTION INTRAVENOUS; SUBCUTANEOUS at 20:51

## 2017-09-21 RX ADMIN — ONDANSETRON 4 MG: 2 INJECTION INTRAMUSCULAR; INTRAVENOUS at 17:34

## 2017-09-21 RX ADMIN — ACETAMINOPHEN 1000 MG: 10 INJECTION, SOLUTION INTRAVENOUS at 18:16

## 2017-09-21 RX ADMIN — HYDROMORPHONE HYDROCHLORIDE 0.5 MG: 1 INJECTION, SOLUTION INTRAMUSCULAR; INTRAVENOUS; SUBCUTANEOUS at 18:35

## 2017-09-21 NOTE — PROVIDER NOTIFICATION
09/21/17 0310   Provider Notification   Provider Name/Title Dr. Disla   Method of Notification In Department   Request Evaluate - Remote   Notification Reason Other (Comment)     Discussed with provider about titratable insulin level. If pt becomes hypoglycemic again, plan to restart drip at 1.3 units once BG stabilized above 100 (based on prior rate of 1.5 minus 0.2 and if BG reading between ).

## 2017-09-21 NOTE — PROGRESS NOTES
Diabetes Consult Daily  Progress Note          Assessment/Plan:   Mrs. Anne Gunter is a 26 yo woman at 26w6d of pregnancy, with uncontrolled type 1 diabetes and history of multiple episodes of DKA during previous pregnancy, who transferred to [facility] from [facility] with suspected DKA.  Agree with MFM that without a durable nutrition plan, Mrs Rodríguez is likely to experience DKA again.    IV insulin continues to be appropriate for pt's current clinical status.     Plan  -MFM will reduce fluids to half, D5NS+20KCl at 75 cc/h now, and check BMP and ketones tomorrow morning  -Continue high intensity OB IV insulin infusion-- okay for RN to titrate 0.2-1 units above or below algorithm to help with stability.  Anticipate need to move to lower algorithm with dextrose reduction. (there is a sticky  Note and a med instruction with this information)  -start aspart for carbohydrates 1 unit per 9 grams carbohydrate, less aggressive since dextrose fluids reduced    -Pt plans rial of Ensure/Magic cup today.  If needs enteral feeds, would be managed with subcut insulin adjustment. If TPN is employed, regular insulin would be added to the bag.      -Wait for patient to demonstrate tolerance of nutrition plan before transitioning to detemir     Outpatient planning-  -Continue to reinforce need to take detemir irrespective of eating, and need to call to report hyperglycemia or hypoglycemia-- doses can be adjusted via telephone.  -If nausea with inability to eat develops, along with hyperglycemia needs to present sooner for treatment.    Outpatient diabetes follow up scheduled with Dr. Colunga on October 12.             Plan discussed with bedside RN, and primary team .           Interval History:   The last 24 hours progress and nursing notes reviewed.  Tolerated small bfast without vomiting and only mild pain.  Hungry and will eat again once more time has passed.  No symptoms low when BG 66.  Reviewed  troubleshooting plan:  Verbalizes understanding of need to always take detemir (irrespective of eating), to call if fearful of taking dose (so it may be reviewed and reduced if needed), and to call any day she has BG <70 or >180 twice.    Insulin drip off and on, quite unsteady.  But, since not off more than once hour, algorithm hasn't been reduced.  RN needs more leeway to titrate.        Recent Labs  Lab 09/21/17  1026 09/21/17  0908 09/21/17  0846 09/21/17  0832 09/21/17  0814 09/21/17  0754  09/20/17  1005  09/20/17  0635  09/19/17  1201  09/19/17  0652  09/19/17  0247  09/18/17  2125   GLC  --   --   --   --   --   --   --  143*  --  328*  --  90  --  361*  --  142*  --  196*   * 132* 95 70 56* 63*  < >  --   < >  --   < > 88  < >  --   < >  --   < >  --    < > = values in this interval not displayed.            Review of Systems:   See interval hx          Medications:       Active Diet Order      Regular Diet Adult     Physical Exam:  Gen: resting in bed, in NAD, comfort appears much improved  HEENT: NC/AT, mucous membranes are moist  Resp: Unlabored  Neuro: arouse to voice, oriented x3, engaged  BP (!) 86/51  Pulse 75  Temp 97.8  F (36.6  C) (Oral)  Resp 16  Wt 77.7 kg (171 lb 6.4 oz)  SpO2 100%  BMI 26.9 kg/m2           Data:     Lab Results   Component Value Date    A1C 8.4 09/10/2017    A1C 9.2 06/01/2016    A1C 9.8 05/23/2016    A1C 7.4 04/23/2016    A1C 7.2 04/19/2016              Recent Labs   Lab Test  09/21/17   0742  09/20/17   1005  09/20/17   0635   NA   --   135  121*   POTASSIUM  3.7  3.9  4.1   CHLORIDE   --   105  93*   CO2   --   19*  16*   ANIONGAP   --   11  12   GLC   --   143*  328*   BUN   --   2*  2*   CR   --   0.43*  0.25*   LILLIAM   --   8.1*  5.8*     CBC RESULTS:   Recent Labs   Lab Test  09/18/17   2125   WBC  13.6*   RBC  4.12   HGB  10.7*   HCT  33.5*   MCV  81   MCH  26.0*   MCHC  31.9   RDW  15.3*   PLT  228     I spent a total of 35 minutes bedside and on the  inpatient unit managing the glycemic care of Anne Gunter. Over 50% of my time on the unit was spent counseling the patient and/or coordinating care.  See note for details.      Melba Hoffman APRN -[id number]    Diabetes Management job code [id number]

## 2017-09-21 NOTE — PROVIDER NOTIFICATION
09/21/17 0049   Point of Care Testing   Puncture Site fingertip   Bedside Glucose (mg/dl )  104 mg/dl   Blood Glucose Intervention restart drip     Need to restart insulin drip after hypoglycemic episode. Pt continues to be in algorithm 4 - per Endocrine, RN ok to titrate insulin with 0.2-0.5 units to achieve glucose stability. With these parameters, plan to restart at 1.5 units/hr.

## 2017-09-21 NOTE — PROGRESS NOTES
[facility] Antepartum Progress Note    2017  Hospital Day #4  Anne Gunter  GA: 27w1d    S: Overnight, episodes of hypoglycemia. Continues to have intermittent abdominal pain and nausea. Denies recent vomiting. Ate salad and soup last evening, nothing this morning. She is still considering NJ tube for feeding, but states that she does not wish to proceed with this today and desires continued trial of oral intake. Fetal movement is present. Denies HA, LOF, VB, swelling.    O:  BP (!) 86/51  Pulse 75  Temp 97.8  F (36.6  C) (Oral)  Resp 16  Wt 76.4 kg (168 lb 8 oz)  SpO2 100%  BMI 26.45 kg/m2      Gen:               Lying in bed, appears fatigued, pale  CV/pulm:        No increased work of breathing, well perfused  Abd:               Soft, gravid, mild tenderness in epigastrium  Ext:                Scant LE edema b/l    Recent Labs  Lab 17  0645 17  0544 17  0528 17  0510 17  0453 17  0353  17  1005  17  0635  17  1201  17  0652  17  0247  17  2125   GLC  --   --   --   --   --   --   --  143*  --  328*  --  90  --  361*  --  142*  --  196*   * 137* 65* 58* 65* 93  < >  --   < >  --   < > 88  < >  --   < >  --   < >  --    < > = values in this interval not displayed.     FHT: 135 baseline, moderate variability, 10x10 accels present, no decels. Reassuring for gestational age.  [place]: Quiet      A/P:   Ms. Anne Gunter is a 27 year old , at 27w1d by stated dates consistent with 25w5d ultrasound, admitted with severe abdominal pain, elevated blood glucose, and elevated serum ketones. She is currently admitted for medical management and stabilization. Labile blood glucose since admission, requiring insulin drip with episodes of rescue glucose. Endocrine is following closely. Patient does have continued nausea and abdominal pain likely 2/2 diabetic gastroparesis, which is worsened by hyperglycemia and DKA, as well as with  non-adherence to dietary modifications. GI consult with previous admission. No concerns for  labor at this time.     Type I DM  Serum ketones elevated on admission, now resolved. S/p 1L NS bolus on admission with IV D5 NS with 20 mEq KCL at 150 ml/hr. She has required calcium and potassium replacement. Labile blood glucose since admission, requiring insulin drip with episodes of rescue glucose. She has been between algorithms 3 and 4. Endocrine is following closely.   - Appreciate endocrine recommendations  - Continue IV D5/NS/KCl20 mIVF  - Continue insulin drip algorithm  - BG checks  - Hypoglycemic protocol  - Daily K, Phos, Mag to monitor for refeeding syndrome  - K and Mag replacement protocols      Abdominal Pain/Malnutrition   Likely related to gastroparesis in the setting of poorly controlled type DM. No formal gastric emptying study. We strongly emphasized the importance of adequate, consistent nutrition in pregnancy, especially given her history of Type 1 DM and gastroparesis. We discussed that we are concerned that given her non-adherence to diet that she will not be able to tolerate enough nutrition orally, and that her oral nutrition will continue to be inconsistent, resulting in recurrent DKA. The risks of DKA were discussed with Anne, including the approximate 30% risk for fetal demise.    Again discussed NJ tube, however, Anne will not agree to proceed with this today. We discussed the alternative of TPN if she is un-willing to proceed with NJ tube placement, but she will not consider a PICC line and TPN is therefore not an option at this time. She will try PO intake today, specifically food brought by her sister, as well as supplement shakes. If going well will continue to advance. If continues to be unable to tolerate significant PO intake we will again recommend NJ tube placement with initiation of TF tomorrow.  - Scheduled IV Reglan, Protonix, and Pepcid  - Zofran prn  - Scheduled Tylenol  IV and PRN Dilaudid IV  - Encourage liquid and/or bland, low-fiber, low-fat, low-residue diet  - Continue to assess patient's willingness to placement of NJ with enteral feeding throughout the day    Anxiety   psychology has been consulted (2017). Per their assessment, patient has KELLI and would benefit from CBT.   - Weekly follow up by  psychology    Question of Preeclampsia without severe features   Elevated blood pressure was in the setting of abdominal pain. Proteinuria was present leading to diagnosis of preeclampsia, but do not have a baseline from earlier in pregnancy and patient has significant risk of having pre-existing proteinuria (previous value was 0.38 2016 in previous pregnancy without preeclampsia).  - Continue to monitor BPs  - Repeat urine protein:creatinine ratio in approximately 1 month, or earlier if there is a change in clinical status      FWB  Category I currently, appropriate for gestational age.  - TID monitoring      PNC  NOB labs collected 9/10/17: Rh pos, Rubella IgG positive, Hep B nonreactive, HIV nonreactive, GBS positive, placenta posterior, GC/Chlam negative. Comprehensive scan completed 2017 displays single intrauterine pregnancy at 29 2/7 weeks gestation, appropriate interval fetal growth, visualized fetal anatomy appears normal, normal amniotic fluid volume.  - GBS+, penicillin if delivery imminent    Amanda Jackson  MS-4, [employer]  Maternal Fetal Medicine, [facility]    Scribe Disclosure:   I, Amanda Jackson, am serving as a scribe; to document services personally performed by Dr. Mckee based on data collection and the provider's statements to me.     Provider Disclosure:  I agree with above History, Review of Systems, Physical exam and Plan.  I have reviewed the content of the documentation and have edited it as needed. I have personally performed the services documented here and the documentation accurately  represents those services and the decisions I have made.      Electronically signed by: Mebla Mckee MD  Specialist in Maternal-Fetal Medicine

## 2017-09-21 NOTE — CONSULTS
[facility]  MATERNAL CHILD HEALTH   SOCIAL WORK PROGRESS NOTE      DATA:     SW met with pt yesterday morning, communicating with Korean .   Pt, nAne is currently 27 weeks gestation. She was admitted on 9/18, transferring from [facility] where she transferred due to abdominal pain.     Anne's  and son, just over a year of age, remain in [place]. Anne is staying with her parents and reports good support.   Anne acknowledges that pregnancies are very difficult for her. She states that although she misses her son she would be unable to care for him if he was here due to her health and not feeling well.     Anne states that psychologically she is not doing well. She has concerns with the amount of time that it will be for her to feel well and recover. She denies previous experience with depression and anxiety. She states that her baseline when she is feeling well is a happy energetic person.     INTERVENTION:     This  reviewed the chart and coordinated with the health care team. This  introduced myself and my role as their Maternal-Child Health , including role and scope of practice. I met with the patient today to assess for needs, offer support, assess for coping and review hospital and community resources.   Provided supportive counseling related to extended hospitalization and NICU admission.    Shared information on parking, boarding rooms.  Validated and normalized expressed emotions.   Provided emotional support and active listening.    ASSESSMENT:     Anne appears to not be feeling well with little energy. She seems receptive and appreciative of SW check in. She acknowledged an interest in SW checking in and will plan on verbalizing if she is up for a conversation.   She appears to be coping as best as she can due to the complications of the pregnancy draining on her.     PLAN:     SW to follow for needs  and support during hospitalization.      Kjerstin Rydeen, [facility]   Social Worker  Maternal Child Health   Direct: [phone]  Pager: [phone]

## 2017-09-21 NOTE — PLAN OF CARE
"Problem: Diabetes in Pregnancy (Adult,Obstetrics,Pediatric)  Goal: Signs and Symptoms of Listed Potential Problems Will be Absent or Manageable (Diabetes in Pregnancy)  Signs and symptoms of listed potential problems will be absent or manageable by discharge/transition of care (reference Diabetes in Pregnancy (Adult,Obstetrics,Pediatric) CPG).   Outcome: No Change  Episodes of hypoglycemia today and ins gtt has been off twice. Pt prefers to drink juice as gel was too sweet and had bad taste and pt reports too painful for IV dextrose. \"feels full\" r/t juice but will cont to drink as necessary to raise BG. Ins and IV fluid orders have been adjusted. Pt denies symptoms of hypoglycemia throughout. Cont close monitoring          "

## 2017-09-21 NOTE — PROGRESS NOTES
"CLINICAL NUTRITION SERVICES - BRIEF NOTE      Diet advanced to regular yesterday evening. Per nursing, \"Pt did order dinner, but said everything tastes terrible (vegetable soup, brown rice).  Pt still plans on trying green salad. RN encouraged pt to have family bring in some foods from home that might be more appealing.\"      Insulin drip was restarted this AM 2/2 hypoglycemic episode.    Implementation   Ordered trial of following nutrition supplements - Ensure Clear, Ensure Plus, Magic Cup, Glucerna   PRN nutrition supplement order   Calorie counts 9/21-9/23    Future Recommendations   If pt unable to demonstrate intakes >/= 2/3 assessed needs (i.e. 1500 kcal, 65 gm protein daily) per calorie counts, consider initiation of nutrition support (see 9/19 reassessment note for recs).       Progress toward goals will be monitored and evaluated per protocol.     Ernestina Farris RD, LD   Pager: [phone]    "

## 2017-09-21 NOTE — PROVIDER NOTIFICATION
09/21/17 0545   Provider Notification   Provider Name/Title Dr. Disla   Method of Notification Phone   Request Evaluate - Remote   Notification Reason Other (Comment)     Notified provider of need to restart insulin gtt. Algorithm 4 indicated gtt rate at 6 and Algorithm 3 indicated gtt rate at 4. Will split the difference and run insulin at 5 units.

## 2017-09-21 NOTE — PROVIDER NOTIFICATION
09/21/17 0103   Provider Notification   Provider Name/Title Dr. Disla   Method of Notification Phone   Request Evaluate - Remote   Notification Reason Lab/Diagnostic Study     Notified provider of hypoglycemic episode and resolution with stopping insulin drip and IV dextrose. Informed of restarted insulin drip at 1.5 per endocrine order (titratable per RN discretion). MD aware and OK.

## 2017-09-21 NOTE — PLAN OF CARE
Problem: Goal Outcome Summary  Goal: Goal Outcome Summary  Outcome: No Change  VSS. BPs on low end of normal. Pt is afebrile. Denies ctxs, LOF or bleeding. C/o intermittent abdominal pain overnight - states good relief after dilaudid. C/o 2 episodes of nausea, no vomiting - received both compazine and zofran overnight. Insulin gtt on and off throughout shift. Pt had 2 episodes of hypoglycemia (treated with IV dextrose and juice). Per endocrine order, attempts made to titrate rate closer for BG stability. MD approving all rates outside of standard algorithm protocol. Received potassium replacement x1 r/t lab level at 1000 being 3.9. See flowsheets for FHT. Noted flat affect from pt.  Pt sleeping for several hours overnight. Will continue to monitor.

## 2017-09-22 ENCOUNTER — OFFICE VISIT (OUTPATIENT)
Dept: INTERPRETER SERVICES | Facility: CLINIC | Age: 27
End: 2017-09-22

## 2017-09-22 LAB
ANION GAP SERPL CALCULATED.3IONS-SCNC: 9 MMOL/L (ref 3–14)
BUN SERPL-MCNC: 4 MG/DL (ref 7–30)
CALCIUM SERPL-MCNC: 7.6 MG/DL (ref 8.5–10.1)
CHLORIDE SERPL-SCNC: 108 MMOL/L (ref 94–109)
CO2 SERPL-SCNC: 21 MMOL/L (ref 20–32)
CREAT SERPL-MCNC: 0.42 MG/DL (ref 0.52–1.04)
GFR SERPL CREATININE-BSD FRML MDRD: >90 ML/MIN/1.7M2
GLUCOSE BLDC GLUCOMTR-MCNC: 101 MG/DL (ref 70–99)
GLUCOSE BLDC GLUCOMTR-MCNC: 105 MG/DL (ref 70–99)
GLUCOSE BLDC GLUCOMTR-MCNC: 106 MG/DL (ref 70–99)
GLUCOSE BLDC GLUCOMTR-MCNC: 112 MG/DL (ref 70–99)
GLUCOSE BLDC GLUCOMTR-MCNC: 122 MG/DL (ref 70–99)
GLUCOSE BLDC GLUCOMTR-MCNC: 124 MG/DL (ref 70–99)
GLUCOSE BLDC GLUCOMTR-MCNC: 126 MG/DL (ref 70–99)
GLUCOSE BLDC GLUCOMTR-MCNC: 126 MG/DL (ref 70–99)
GLUCOSE BLDC GLUCOMTR-MCNC: 127 MG/DL (ref 70–99)
GLUCOSE BLDC GLUCOMTR-MCNC: 134 MG/DL (ref 70–99)
GLUCOSE BLDC GLUCOMTR-MCNC: 141 MG/DL (ref 70–99)
GLUCOSE BLDC GLUCOMTR-MCNC: 142 MG/DL (ref 70–99)
GLUCOSE BLDC GLUCOMTR-MCNC: 143 MG/DL (ref 70–99)
GLUCOSE BLDC GLUCOMTR-MCNC: 143 MG/DL (ref 70–99)
GLUCOSE BLDC GLUCOMTR-MCNC: 145 MG/DL (ref 70–99)
GLUCOSE BLDC GLUCOMTR-MCNC: 146 MG/DL (ref 70–99)
GLUCOSE BLDC GLUCOMTR-MCNC: 156 MG/DL (ref 70–99)
GLUCOSE BLDC GLUCOMTR-MCNC: 164 MG/DL (ref 70–99)
GLUCOSE BLDC GLUCOMTR-MCNC: 183 MG/DL (ref 70–99)
GLUCOSE BLDC GLUCOMTR-MCNC: 43 MG/DL (ref 70–99)
GLUCOSE BLDC GLUCOMTR-MCNC: 43 MG/DL (ref 70–99)
GLUCOSE BLDC GLUCOMTR-MCNC: 52 MG/DL (ref 70–99)
GLUCOSE BLDC GLUCOMTR-MCNC: 56 MG/DL (ref 70–99)
GLUCOSE BLDC GLUCOMTR-MCNC: 61 MG/DL (ref 70–99)
GLUCOSE BLDC GLUCOMTR-MCNC: 67 MG/DL (ref 70–99)
GLUCOSE BLDC GLUCOMTR-MCNC: 74 MG/DL (ref 70–99)
GLUCOSE BLDC GLUCOMTR-MCNC: 79 MG/DL (ref 70–99)
GLUCOSE BLDC GLUCOMTR-MCNC: 92 MG/DL (ref 70–99)
GLUCOSE BLDC GLUCOMTR-MCNC: 93 MG/DL (ref 70–99)
GLUCOSE BLDC GLUCOMTR-MCNC: 95 MG/DL (ref 70–99)
GLUCOSE SERPL-MCNC: 141 MG/DL (ref 70–99)
KETONES BLD-SCNC: 0 MMOL/L (ref 0–0.6)
MAGNESIUM SERPL-MCNC: 1.9 MG/DL (ref 1.6–2.3)
PHOSPHATE SERPL-MCNC: 3.1 MG/DL (ref 2.5–4.5)
POTASSIUM SERPL-SCNC: 4 MMOL/L (ref 3.4–5.3)
SODIUM SERPL-SCNC: 138 MMOL/L (ref 133–144)

## 2017-09-22 PROCEDURE — 25000128 H RX IP 250 OP 636: Performed by: OBSTETRICS & GYNECOLOGY

## 2017-09-22 PROCEDURE — 25000131 ZZH RX MED GY IP 250 OP 636 PS 637: Performed by: PHYSICIAN ASSISTANT

## 2017-09-22 PROCEDURE — 36415 COLL VENOUS BLD VENIPUNCTURE: CPT | Performed by: OBSTETRICS & GYNECOLOGY

## 2017-09-22 PROCEDURE — 25000125 ZZHC RX 250: Performed by: OBSTETRICS & GYNECOLOGY

## 2017-09-22 PROCEDURE — 80048 BASIC METABOLIC PNL TOTAL CA: CPT | Performed by: OBSTETRICS & GYNECOLOGY

## 2017-09-22 PROCEDURE — 00000146 ZZHCL STATISTIC GLUCOSE BY METER IP

## 2017-09-22 PROCEDURE — 82010 KETONE BODYS QUAN: CPT | Performed by: OBSTETRICS & GYNECOLOGY

## 2017-09-22 PROCEDURE — 25800025 ZZH RX 258: Performed by: OBSTETRICS & GYNECOLOGY

## 2017-09-22 PROCEDURE — 84100 ASSAY OF PHOSPHORUS: CPT | Performed by: OBSTETRICS & GYNECOLOGY

## 2017-09-22 PROCEDURE — S0028 INJECTION, FAMOTIDINE, 20 MG: HCPCS | Performed by: OBSTETRICS & GYNECOLOGY

## 2017-09-22 PROCEDURE — 12000032 ZZH R&B OB CRITICAL UMMC

## 2017-09-22 PROCEDURE — 83735 ASSAY OF MAGNESIUM: CPT | Performed by: OBSTETRICS & GYNECOLOGY

## 2017-09-22 PROCEDURE — T1013 SIGN LANG/ORAL INTERPRETER: HCPCS | Mod: U3

## 2017-09-22 RX ADMIN — PANTOPRAZOLE SODIUM 40 MG: 40 INJECTION, POWDER, FOR SOLUTION INTRAVENOUS at 06:38

## 2017-09-22 RX ADMIN — INSULIN DETEMIR 10 UNITS: 100 INJECTION, SOLUTION SUBCUTANEOUS at 13:22

## 2017-09-22 RX ADMIN — FAMOTIDINE 20 MG: 10 INJECTION, SOLUTION INTRAVENOUS at 21:10

## 2017-09-22 RX ADMIN — METOCLOPRAMIDE 10 MG: 5 INJECTION, SOLUTION INTRAMUSCULAR; INTRAVENOUS at 08:47

## 2017-09-22 RX ADMIN — DEXTROSE MONOHYDRATE 25 ML: 25 INJECTION, SOLUTION INTRAVENOUS at 03:37

## 2017-09-22 RX ADMIN — METOCLOPRAMIDE 10 MG: 5 INJECTION, SOLUTION INTRAMUSCULAR; INTRAVENOUS at 02:05

## 2017-09-22 RX ADMIN — INSULIN ASPART 10 UNITS: 100 INJECTION, SOLUTION INTRAVENOUS; SUBCUTANEOUS at 17:53

## 2017-09-22 RX ADMIN — METOCLOPRAMIDE 10 MG: 5 INJECTION, SOLUTION INTRAMUSCULAR; INTRAVENOUS at 14:53

## 2017-09-22 RX ADMIN — ACETAMINOPHEN 1000 MG: 10 INJECTION, SOLUTION INTRAVENOUS at 20:57

## 2017-09-22 RX ADMIN — FAMOTIDINE 20 MG: 10 INJECTION, SOLUTION INTRAVENOUS at 10:50

## 2017-09-22 RX ADMIN — ACETAMINOPHEN 1000 MG: 10 INJECTION, SOLUTION INTRAVENOUS at 13:20

## 2017-09-22 RX ADMIN — HYDROMORPHONE HYDROCHLORIDE 0.5 MG: 1 INJECTION, SOLUTION INTRAMUSCULAR; INTRAVENOUS; SUBCUTANEOUS at 14:49

## 2017-09-22 RX ADMIN — HYDROMORPHONE HYDROCHLORIDE 0.5 MG: 1 INJECTION, SOLUTION INTRAMUSCULAR; INTRAVENOUS; SUBCUTANEOUS at 04:02

## 2017-09-22 RX ADMIN — Medication: at 23:52

## 2017-09-22 RX ADMIN — HUMAN INSULIN 3 UNITS/HR: 100 INJECTION, SOLUTION SUBCUTANEOUS at 00:59

## 2017-09-22 RX ADMIN — POTASSIUM CHLORIDE: 2 INJECTION, SOLUTION, CONCENTRATE INTRAVENOUS at 06:18

## 2017-09-22 RX ADMIN — HYDROMORPHONE HYDROCHLORIDE 0.5 MG: 1 INJECTION, SOLUTION INTRAMUSCULAR; INTRAVENOUS; SUBCUTANEOUS at 06:51

## 2017-09-22 RX ADMIN — HYDROMORPHONE HYDROCHLORIDE 0.5 MG: 1 INJECTION, SOLUTION INTRAMUSCULAR; INTRAVENOUS; SUBCUTANEOUS at 08:44

## 2017-09-22 RX ADMIN — ACETAMINOPHEN 1000 MG: 10 INJECTION, SOLUTION INTRAVENOUS at 00:04

## 2017-09-22 RX ADMIN — INSULIN ASPART 3 UNITS: 100 INJECTION, SOLUTION INTRAVENOUS; SUBCUTANEOUS at 11:36

## 2017-09-22 RX ADMIN — ACETAMINOPHEN 1000 MG: 10 INJECTION, SOLUTION INTRAVENOUS at 06:56

## 2017-09-22 RX ADMIN — METOCLOPRAMIDE 10 MG: 5 INJECTION, SOLUTION INTRAMUSCULAR; INTRAVENOUS at 21:05

## 2017-09-22 RX ADMIN — SODIUM CHLORIDE: 9 INJECTION, SOLUTION INTRAVENOUS at 00:58

## 2017-09-22 RX ADMIN — HYDROMORPHONE HYDROCHLORIDE 0.5 MG: 1 INJECTION, SOLUTION INTRAMUSCULAR; INTRAVENOUS; SUBCUTANEOUS at 02:04

## 2017-09-22 RX ADMIN — SODIUM CHLORIDE: 9 INJECTION, SOLUTION INTRAVENOUS at 10:01

## 2017-09-22 NOTE — PROVIDER NOTIFICATION
09/21/17 1941   Provider Notification   Provider Name/Title Dr. Disla   Method of Notification In Department   Request Evaluate - Remote   Notification Reason Lab/Diagnostic Study;Other (Comment)   Dr. Disla notified of low BG check of 54.  Per MD continue to check per protocol, if insulin needs to be restarted in 1 hour, start one Algorithm lower at Algorithm 3.

## 2017-09-22 NOTE — PROGRESS NOTES
[facility] Antepartum Progress Note    2017  Hospital Day #5  Anne Gunter  GA: 27w2d    S: Still with intermittent episodes of hypoglycemia on insulin drip.  Feeling some nausea this morning, but able to eat 3 meals yesterday without nausea or pain.  Continues to request pain medication via IV every 2-3 hours.  Denies abdominal pain currently.  Denies chest pain, shortness of breath, leg pain, headache.  Feels improved over the last several days and starting to inquire about going home.  Feeling fetal movement.  Denies contractions, vaginal bleeding, loss of fluid.    O:  /56  Pulse 76  Temp 97.7  F (36.5  C) (Oral)  Resp 16  Wt 77.7 kg (171 lb 6.4 oz)  SpO2 100%  BMI 26.9 kg/m2      Gen:               Lying in bed, appears fatigued, pale  CV/pulm:        No increased work of breathing, well perfused  Abd:               Soft, gravid, mild tenderness in epigastrium  Ext:                Scant LE edema b/l    Recent Labs  Lab 17  0735 17  0644 17  0632 17  0551 17  0532 17  0503 17  0424  17  1005  17  0635  17  1201  17  0652  17  0247   GLC  --   --  141*  --   --   --   --   --  143*  --  328*  --  90  --  361*  --  142*   * 156*  --  92 67* 74 124*  < >  --   < >  --   < > 88  < >  --   < >  --    < > = values in this interval not displayed.     FHT: 125 baseline, moderate variability, 10x10 accels present, no decels. Reassuring for gestational age.  Halls: Quiet      A/P:   Ms. Anne Gunter is a 27 year old , at 27w2d by stated dates consistent with 25w5d ultrasound, admitted with severe abdominal pain, elevated blood glucose, and elevated serum ketones. She is currently admitted for medical management and stabilization. Labile blood glucose since admission, requiring insulin drip with episodes of rescue glucose. Endocrine is following closely. Patient does have continued nausea and abdominal pain likely 2/2 diabetic  gastroparesis, which is worsened by hyperglycemia and DKA, as well as with non-adherence to dietary modifications. GI consult with previous admission. No concerns for  labor at this time.     1.  Type I DM  Serum ketones elevated on admission, now resolved.  Endocrine is following closely.   - Appreciate endocrine recommendations  - Will work with endocrine to dc insulin drip today and change to subcutaneous insulin regimen  - DC D5 now to better calculate insulin needs as the patient eats throughout the day  - BG checks at least fasting, qAC, post prandial and qhs  - Hypoglycemic protocol  - Daily K, Phos, Mag to monitor for refeeding syndrome for now until endocrine ok with DC   - K and Mag replacement protocols      2.  Abdominal Pain/Malnutrition   Likely related to gastroparesis in the setting of poorly controlled type DM. No formal gastric emptying study. We strongly emphasized the importance of adequate, consistent nutrition in pregnancy, especially given her history of Type 1 DM and gastroparesis. We discussed that we are concerned that given her non-adherence to diet that she will not be able to tolerate enough nutrition orally, and that her oral nutrition will continue to be inconsistent, resulting in recurrent DKA. The risks of DKA were discussed with Anne, including the approximate 30% risk for fetal demise.    - As pt able to tolerate food yesterday, will continue to advance diet for now.  If intolerance of oral intake will revisit plans for NJ tube  - Scheduled IV Reglan, Protonix, and Pepcid; aim to change this to oral later this afternoon or tomorrow  - Zofran prn; last line for nausea as this will slow GI motility adding constipation to the picture.  - Scheduled Tylenol IV and PRN Dilaudid IV; also working toward changing these to po in the afternoon today vs tomorrow AM  - Encourage liquid and/or bland, low-fiber, low-fat, low-residue diet; reiterated the importance of easy-to-digest foods  with the patient    2.  Anxiety   psychology has been consulted (2017). Per their assessment, patient has KELLI and would benefit from CBT.   - Weekly follow up by  psychology  - Social work recommends working toward relationship with family as well to have trusted health care advocate for the patient.  Will continue to reach out to sister and father as they are living here with her and offer to facetime or conference call with her  in [place] as he is able    3.  Question of Preeclampsia without severe features   Elevated blood pressure was in the setting of abdominal pain. Proteinuria was present leading to diagnosis of preeclampsia, but do not have a baseline from earlier in pregnancy and patient has significant risk of having pre-existing proteinuria (previous value was 0.38 2016 in previous pregnancy without preeclampsia).  - Continue to monitor BPs  - Repeat urine protein:creatinine ratio in approximately 1 month, or earlier if there is a change in clinical status      4.  FWB  Reassuring for gestational age  - TID monitoring      5. PNC  NOB labs collected 9/10/17: Rh pos, Rubella IgG positive, Hep B nonreactive, HIV nonreactive, GBS positive, placenta posterior, GC/Chlam negative.   - GBS+, penicillin if delivery imminent  - begin weekly BPP next week  - Tdap next week if still inpatient  - reiterated the importance of inpatient admission at this time, and encouraged the patient to have patience as her clinical situation continues to improve to avoid risk of readmission    The patient was seen and evaluated with Dr. Melba Mckee.    Adri Hernandez MD  [facility] Fellow  17  09:51    Physician Attestation   I, Melba Mckee, saw this patient with the resident and agree with the resident s findings and plan of care as documented in the resident s note.      I personally reviewed vital signs, medications and labs.    Key findings: Tolerated approximately 2000 calories by mouth  yesterday. States she plans to continue to attempt to eat enough today. Will plan to transition off of insulin drip today (change to sq insulin). Emphasized the importance of continued inpatient management to optimize her nutrition and diabetic regimen in order to decrease the risk for recurrent DKA. Anne states that she will agree to continue with inpatient management at this time.    Melba Mckee  Date of Service (when I saw the patient): 09/22/17

## 2017-09-22 NOTE — PROVIDER NOTIFICATION
09/22/17 0508   Provider Notification   Provider Name/Title Dr Disla   Method of Notification Phone   Notification Reason Other (Comment)   Blood glucose levels reviewed. Pt continues to bottom out- treated- restarted in algorithm 3 per protocol ( but titrated down per endocrines ok for RN to titrate) - and then bottoms out again. Per Naif- ok to start in algorithm 1 when pump is restarted.

## 2017-09-22 NOTE — PROVIDER NOTIFICATION
09/22/17 0045   Provider Notification   Provider Name/Title Dr Disla   Method of Notification Phone   Notification Reason Lab/Diagnostic Study   informed of low BGs

## 2017-09-22 NOTE — PROVIDER NOTIFICATION
09/22/17 0130   Provider Notification   Provider Name/Title Dr Disla   Method of Notification Phone   Order clarification- Continue [id number] even when insulin drip is off.

## 2017-09-22 NOTE — PLAN OF CARE
Problem: Goal Outcome Summary  Goal: Goal Outcome Summary  Outcome: No Change  Pt Bg noted to be 61 when RN went in for hourly BG check. Insulin pump was turned off. Pt drank 15CHO of apple juice ( Pt does not like the taste of glucose gel- declines). Upon 12 minute recheck- BG was 43. Pt ingested 43 CHO (2 apple juice, ashlie crackers, and half peanut butter cup- ashlie crackers and peanut butter per Pt request) and provider was notified. 15 minute recheck BG was still at 43- provider was notified. Pt continued to decline glucose gel so 25ml of glucose via IV was given. Recheck of BG was 127- insulin pump restarted in algorithm 3 but using endocrines ok to allow titration of rate 0.5-1 ml/hr above or below algorythm, RN restarted insulin infusion at 3 units/hr. Pt denied feeling symptomatic at any point during this hypoglycemic episode. FHTs checked to be in the 140s

## 2017-09-22 NOTE — PROGRESS NOTES
"                          Diabetes Consult Daily  Progress Note          Assessment/Plan:   Mrs. Anne Gunter is a 26 yo woman at 26w6d of pregnancy, with uncontrolled type 1 diabetes and history of multiple episodes of DKA during previous pregnancy, who transferred to [facility] from [facility] with suspected DKA.  Agree with M that without a durable nutrition plan, Mrs Rodríguez is likely to experience DKA again.    IV insulin rates currently 0.2-1 unit/h this am, no intake yet.  Overall po intake improving, so no plans for FT.  Will start to transition off IV insulin.     Plan  - Dextrose removed from IV fluids this morning.  -detemir 10 units to be given around 12pm, IV insulin infusion will continue after pt gets this first dose of detemir. We will order second dose of detemir tonight (she usually takes around MN at home), then IV insulin can stop 2 hours after this second dose.  -meal aspart: decreased to 1unit/10g CHO with meals and snacks (will determine fixed dose at the time of discharge.    Discussed with pt having a \"sick day\" plan for her detemir-- giving her some guidance on how to decrease detemir doses if she is not tolerating po intake at home.  Emphasized the need for her to always take her detemir to avoid DKA, but she can decrease dose if needed.    Outpatient diabetes follow up scheduled with Dr. Colunga on October 12.      Plan discussed with bedside RN, and primary team .  ADDENDUM: IV insulin rates since getting detemir 10 units is only 0.2units/h with BG trending down to the 90s.  We will finish transition off IV Insulin tonight (trying to get close to pt's usual dosing times for detemir, 12pm and MN):  -detemir 4 units ordered for 10pm with instructions to stop IV insulin 2hours after detemir 4 units is given.  -tomorrow will adjust dose if needed, otherwise continue detemir 10 units q24h at 12pm  -continue meal aspart 1unit/10g CHO with meals and snacks  -correction aspart: 1unit/50 for BG >100 " ac, >120 at HS-- this will start tomorrow given that IV insulin will run until MN.  -glucose monitoring once off IV Insulin: ac, 2 hrs post prandial, hs and 0200 (hourly BG checks until then).    Plan reviewed with bedside RN.           Interval History:   The last 24 hours progress and nursing notes reviewed.  Anne was less nauseated yesterday and was able to eat quite a bit.  Because of this, decision was made not to pursue feeding tube at this time, and primary team would like to work towards transitioning off IV insulin.  Dextrose fluids were stopped this morning (D5NS with KCl at 75ml/h).  Anne reports that nausea is a little worse today, but improved with medication and she was sipping broth when I visited.  IV insulin rates were quite variable yesterday- appears like algorithm was too high so pt would have low glucoses, gtt would go off, then BG would spike high and pt would get high amount of IV insulin.  Today she is in algorithm 1 and glucoses and IV insulin rates a bit more stable-- overall needing less insulin than anticipated (based on pt tolerating detemir 18 units BID + aspart prior to admission).    BMP- bicarb and AG normal this morning, serum ketones 0.        Recent Labs  Lab 09/22/17  0957 09/22/17  0855 09/22/17  0735 09/22/17  0644 09/22/17  0632 09/22/17  0551 09/22/17  0532  09/20/17  1005  09/20/17  0635  09/19/17  1201  09/19/17  0652  09/19/17  0247   GLC  --   --   --   --  141*  --   --   --  143*  --  328*  --  90  --  361*  --  142*   * 134* 141* 156*  --  92 67*  < >  --   < >  --   < > 88  < >  --   < >  --    < > = values in this interval not displayed.            Review of Systems:   See interval hx          Medications:       Active Diet Order      Regular Diet Adult     Physical Exam:  Gen: Alert, engaged, resting in bed, in NAD  HEENT: NC/AT, mucous membranes are moist  Resp: Unlabored  Neuro: arouse to voice, oriented x3, engaged  /56  Pulse 76  Temp 97.3  F  (36.3  C) (Oral)  Resp 20  Wt 80.2 kg (176 lb 14.4 oz)  SpO2 100%  BMI 27.77 kg/m2           Data:     Lab Results   Component Value Date    A1C 8.4 09/10/2017    A1C 9.2 06/01/2016    A1C 9.8 05/23/2016    A1C 7.4 04/23/2016    A1C 7.2 04/19/2016            Recent Labs   Lab Test  09/22/17   0632  09/21/17   0742  09/20/17   1005   NA  138   --   135   POTASSIUM  4.0  3.7  3.9   CHLORIDE  108   --   105   CO2  21   --   19*   ANIONGAP  9   --   11   GLC  141*   --   143*   BUN  4*   --   2*   CR  0.42*   --   0.43*   LILLIAM  7.6*   --   8.1*     CBC RESULTS:   Recent Labs   Lab Test  09/18/17 2125   WBC  13.6*   RBC  4.12   HGB  10.7*   HCT  33.5*   MCV  81   MCH  26.0*   MCHC  31.9   RDW  15.3*   PLT  228                                I spent a total of 35 minutes bedside and on the inpatient unit managing the glycemic care of Anne Gunter. Over 50% of my time on the unit was spent counseling the patient and coordinating care with bedside RN and primary team regarding glucose management in pregnancy.  See note for details.      Carlene Jones PA-C [phone]  Diabetes Management job code [id number]

## 2017-09-22 NOTE — PLAN OF CARE
Problem: Patient Care Overview  Goal: Plan of Care/Patient Progress Review  Outcome: Improving  Since moving to algorithm 1 glucoses have been more stable. Insulin at .2u now. IVs changed to NS. Nausea and pain resolved now with dilaudid and reglan. Encouraged to order food. Sleeping. Exhausted from being all night. Plan is to switch to SQ insulin.

## 2017-09-22 NOTE — PLAN OF CARE
Patient reporting increase in pain.  IV Dilaudid administered into IV line.  Patient reporting pain at site.  IV infiltrated, and removed; reported not feeling any relief. Provider notified of loss of IV site, will start new site.  Pain medication as needed.

## 2017-09-22 NOTE — PLAN OF CARE
Problem: Diabetes in Pregnancy (Adult,Obstetrics,Pediatric)  Goal: Signs and Symptoms of Listed Potential Problems Will be Absent or Manageable (Diabetes in Pregnancy)  Signs and symptoms of listed potential problems will be absent or manageable by discharge/transition of care (reference Diabetes in Pregnancy (Adult,Obstetrics,Pediatric) CPG).   Outcome: No Change  Patient with labile blood sugar checks. Two episodes of hypoglycemia this evening, insulin pump off and restarted (see Results Review and MAR).  Patient denies symptoms of hypoglycemia.  Pain well controlled, received 2 bumps of IV Dilaudid (see MAR). Sister here to visit this evening, brought patient food.  FHR WNL.  Continue with cares as ordered.

## 2017-09-22 NOTE — PLAN OF CARE
Problem: Patient Care Overview  Goal: Plan of Care/Patient Progress Review  Outcome: Improving  Dilaudid and reglan given for upper abdominal pain and nausea with relief.

## 2017-09-22 NOTE — PLAN OF CARE
Problem: Goal Outcome Summary  Goal: Goal Outcome Summary  VSS. BGs labile- see previous notes. Pt reports IV dilaudid and IV tylenol effective for pain. FHTs AGA. Uterus quiet on toco. Continue to monitor blood sugars carefully and titrate as needed.

## 2017-09-23 VITALS
RESPIRATION RATE: 20 BRPM | WEIGHT: 171.8 LBS | HEART RATE: 79 BPM | OXYGEN SATURATION: 97 % | SYSTOLIC BLOOD PRESSURE: 101 MMHG | BODY MASS INDEX: 26.96 KG/M2 | TEMPERATURE: 97.5 F | DIASTOLIC BLOOD PRESSURE: 59 MMHG

## 2017-09-23 LAB
GLUCOSE BLDC GLUCOMTR-MCNC: 146 MG/DL (ref 70–99)
GLUCOSE BLDC GLUCOMTR-MCNC: 161 MG/DL (ref 70–99)
GLUCOSE BLDC GLUCOMTR-MCNC: 169 MG/DL (ref 70–99)
GLUCOSE BLDC GLUCOMTR-MCNC: 182 MG/DL (ref 70–99)
GLUCOSE BLDC GLUCOMTR-MCNC: 206 MG/DL (ref 70–99)
GLUCOSE BLDC GLUCOMTR-MCNC: 230 MG/DL (ref 70–99)
GLUCOSE BLDC GLUCOMTR-MCNC: 242 MG/DL (ref 70–99)
MAGNESIUM SERPL-MCNC: 1.8 MG/DL (ref 1.6–2.3)

## 2017-09-23 PROCEDURE — 00000146 ZZHCL STATISTIC GLUCOSE BY METER IP

## 2017-09-23 PROCEDURE — S0028 INJECTION, FAMOTIDINE, 20 MG: HCPCS | Performed by: OBSTETRICS & GYNECOLOGY

## 2017-09-23 PROCEDURE — 25000128 H RX IP 250 OP 636: Performed by: OBSTETRICS & GYNECOLOGY

## 2017-09-23 PROCEDURE — 25000125 ZZHC RX 250: Performed by: OBSTETRICS & GYNECOLOGY

## 2017-09-23 PROCEDURE — 36415 COLL VENOUS BLD VENIPUNCTURE: CPT | Performed by: OBSTETRICS & GYNECOLOGY

## 2017-09-23 PROCEDURE — 83735 ASSAY OF MAGNESIUM: CPT | Performed by: OBSTETRICS & GYNECOLOGY

## 2017-09-23 RX ORDER — BLOOD-GLUCOSE METER
EACH MISCELLANEOUS
Qty: 1 KIT | Refills: 0 | Status: SHIPPED | OUTPATIENT
Start: 2017-09-23 | End: 2017-11-17

## 2017-09-23 RX ADMIN — METOCLOPRAMIDE 10 MG: 5 INJECTION, SOLUTION INTRAMUSCULAR; INTRAVENOUS at 02:51

## 2017-09-23 RX ADMIN — HYDROMORPHONE HYDROCHLORIDE 0.5 MG: 1 INJECTION, SOLUTION INTRAMUSCULAR; INTRAVENOUS; SUBCUTANEOUS at 01:41

## 2017-09-23 RX ADMIN — ONDANSETRON 4 MG: 2 INJECTION INTRAMUSCULAR; INTRAVENOUS at 00:01

## 2017-09-23 RX ADMIN — ACETAMINOPHEN 1000 MG: 10 INJECTION, SOLUTION INTRAVENOUS at 15:24

## 2017-09-23 RX ADMIN — ACETAMINOPHEN 1000 MG: 10 INJECTION, SOLUTION INTRAVENOUS at 02:52

## 2017-09-23 RX ADMIN — HYDROMORPHONE HYDROCHLORIDE 0.5 MG: 1 INJECTION, SOLUTION INTRAMUSCULAR; INTRAVENOUS; SUBCUTANEOUS at 08:37

## 2017-09-23 RX ADMIN — HYDROMORPHONE HYDROCHLORIDE 0.5 MG: 1 INJECTION, SOLUTION INTRAMUSCULAR; INTRAVENOUS; SUBCUTANEOUS at 00:01

## 2017-09-23 RX ADMIN — ACETAMINOPHEN 1000 MG: 10 INJECTION, SOLUTION INTRAVENOUS at 09:21

## 2017-09-23 RX ADMIN — ONDANSETRON 4 MG: 2 INJECTION INTRAMUSCULAR; INTRAVENOUS at 08:40

## 2017-09-23 RX ADMIN — METOCLOPRAMIDE 10 MG: 5 INJECTION, SOLUTION INTRAMUSCULAR; INTRAVENOUS at 09:33

## 2017-09-23 RX ADMIN — PANTOPRAZOLE SODIUM 40 MG: 40 INJECTION, POWDER, FOR SOLUTION INTRAVENOUS at 08:40

## 2017-09-23 RX ADMIN — SODIUM CHLORIDE: 9 INJECTION, SOLUTION INTRAVENOUS at 09:21

## 2017-09-23 RX ADMIN — HYDROMORPHONE HYDROCHLORIDE 0.5 MG: 1 INJECTION, SOLUTION INTRAMUSCULAR; INTRAVENOUS; SUBCUTANEOUS at 03:26

## 2017-09-23 RX ADMIN — FAMOTIDINE 20 MG: 10 INJECTION, SOLUTION INTRAVENOUS at 09:32

## 2017-09-23 NOTE — PROGRESS NOTES
Diabetes Consult Daily  Progress Note          Assessment/Plan:   Mrs. Anne Gunter is a 26 yo woman at 26w6d of pregnancy, with uncontrolled type 1 diabetes and history of multiple episodes of DKA during previous pregnancy, who transferred to [facility] from [facility] with suspected DKA.  Agree with M that without a durable nutrition plan, Mrs Rodríguez is likely to experience DKA again.    Insufficient basal insulin doses leading to glucose of 206 this morning.  IV insulin requirements yesterday were quite low during the day and then suddenly mo just before supper.  We based basal insulin doses on lower insulin rates during the day, but clearly she needs more.  Uncertain if the increase in pain last night contributed to higher glucoses.     Plan  - Daytime detemir 10 units increased to 14 units q24h and dosing time moved from 1300 to 1000 today, Evening detemir: 4 units currently ordered, we will adjust this dose based on glucoses this afternoon/evening.  -meal aspart: increased from 1unit/10g CHO to 1unit/8g CHO with meals and snacks  -correction aspart: continue 1unit/50 for BG >100 ac, >120 HS-- may need to increase if glucoses not coming down.  -monitor glucose ac, 2 hours post prandial, HS and 0200.    Outpatient diabetes follow up scheduled with Dr. Colunga on October 12.      Plan discussed with pt and bedside RN.  ADDENDUM: Per RN pt wants to leave, resident met with pt to explain why she needs to stay.  Last admission pt left AMA. BG now up to 230 post prandial, but pt had extra carbs (~16g CHO) and did not inform RN.  If pt leaves AMA, recommend the following insulin regimen for home:  -Levemir 14 units BID-  If unable to tolerate po intake at home (Sick Day plan) she could reduce dose to 11 units BID (rather than skip a dose)  -Novolog with meals: 5 units per meal  -Check blood sugar fasting and 1 or 2 hours post prandial           Interval History:   The last 24 hours progress and  nursing notes reviewed.  IV insulin rates were only at 0.2 units/h yesterday afternoon with glucoses trending down to the 90s after pt got detemir 10 units yesterday at 1300.  Based on these rates evening dose of detemir 4 units was ordered. Around suppertime (pre-supper), glucoses started running higher, and pt tolerating 1+ units/h until the IV insulin went off around MN.  Uncertain what caused this discrepancy in requirements from afternoon to evening.  She did have an increase in pain the first half of the night per RN notes.  When I saw her this morning she reported that pain was much improved and she was going to drink some juice.  Glucose was up to 206 this morning.    Anne had broth yesterday morning, and then a larger lunch and supper.   So far today just juice (but seems like she tends to not eat much until midday).        Recent Labs  Lab 09/23/17  0819 09/23/17  0144 09/23/17  0005 09/22/17  2307 09/22/17  2205 09/22/17  2108  09/22/17  0632  09/20/17  1005  09/20/17  0635  09/19/17  1201  09/19/17  0652  09/19/17  0247   GLC  --   --   --   --   --   --   --  141*  --  143*  --  328*  --  90  --  361*  --  142*   * 161* 146* 143* 143* 146*  < >  --   < >  --   < >  --   < > 88  < >  --   < >  --    < > = values in this interval not displayed.            Review of Systems:   See interval hx          Medications:       Active Diet Order      Regular Diet Adult     Physical Exam:  Gen: Alert, resting in bed, in NAD  HEENT: NC/AT, mucous membranes are moist  Resp: Unlabored  Neuro: arouse to voice, oriented x3  /58  Pulse 79  Temp 97.8  F (36.6  C) (Oral)  Resp 20  Wt 80.2 kg (176 lb 14.4 oz)  SpO2 97%  BMI 27.77 kg/m2           Data:     Lab Results   Component Value Date    A1C 8.4 09/10/2017    A1C 9.2 06/01/2016    A1C 9.8 05/23/2016    A1C 7.4 04/23/2016    A1C 7.2 04/19/2016            Recent Labs   Lab Test  09/22/17   0632  09/21/17   0742  09/20/17   1005   NA  138   --   135    POTASSIUM  4.0  3.7  3.9   CHLORIDE  108   --   105   CO2  21   --   19*   ANIONGAP  9   --   11   GLC  141*   --   143*   BUN  4*   --   2*   CR  0.42*   --   0.43*   LILLIAM  7.6*   --   8.1*     CBC RESULTS:   Recent Labs   Lab Test  09/18/17 2125   WBC  13.6*   RBC  4.12   HGB  10.7*   HCT  33.5*   MCV  81   MCH  26.0*   MCHC  31.9   RDW  15.3*   PLT  228       Carlene Jones PA-C [phone]  Diabetes Management job code [id number]

## 2017-09-23 NOTE — PROGRESS NOTES
[facility] Antepartum Progress Note    2017  Hospital Day #6  Anne Gunter  GA: 27w3d    S: No acute events overnight. Did not sleep well. Feeling some nausea this morning, but able to eat 3 meals yesterday without nausea or pain.  Continues to request pain medication via IV every 1.5 - 4 hours.  Denies abdominal pain currently. Denies chest pain, shortness of breath, leg pain, headache. Feeling fetal movement. Denies contractions, vaginal bleeding, loss of fluid.    O:  /58  Pulse 79  Temp 97.8  F (36.6  C) (Oral)  Resp 20  Wt 80.2 kg (176 lb 14.4 oz)  SpO2 97%  BMI 27.77 kg/m2      Gen:               Lying in bed, appears fatigued, pale  CV/pulm:        No increased work of breathing, well perfused  Abd:               Soft, gravid, mild tenderness in epigastrium  Ext:                Scant LE edema b/l    Recent Labs  Lab 17  0819 17  0144 17  0005 17  2307 17  2205 17  2108  17  0632  17  1005  17  0635  17  1201  17  0652  17  0247   GLC  --   --   --   --   --   --   --  141*  --  143*  --  328*  --  90  --  361*  --  142*   * 161* 146* 143* 143* 146*  < >  --   < >  --   < >  --   < > 88  < >  --   < >  --    < > = values in this interval not displayed.     FHT: 125 baseline, moderate variability, 10x10 accels present, no decels. Reassuring for gestational age.  [place]: Quiet      A/P:   Ms. Anne Gunter is a 27 year old  at 27w3d by stated dates consistent with 25w5d ultrasound, admitted with severe abdominal pain, elevated blood glucose, and elevated serum ketones. She is currently admitted for medical management and stabilization. Labile blood glucose since admission requiring insulin drip with episodes of rescue glucose, now off insulin drip. Endocrine is following closely. Patient does have continued nausea and abdominal pain likely 2/2 diabetic gastroparesis, which is worsened by hyperglycemia and DKA, as well  as with non-adherence to dietary modifications. GI consult with previous admission. No concerns for  labor at this time.     1.  Type I DM  Serum ketones elevated on admission, now resolved. Endocrine is following closely. D5 stopped , started subq Detemir , IV drip stopped .  - Appreciate endocrine recommendations  - BG checks at least fasting, qAC, post prandial and qhs  - Detemir 10 U q24h at 12pm per endo, start   - Meal aspart 1 U/10 g CHO w/food, per endo  - Correction aspart: 1U/50 BG >100 AC, >120 HS, start  per endo  - Hypoglycemic protocol  - K and Mag replacement protocols      2.  Abdominal Pain/Malnutrition   Likely related to gastroparesis in the setting of poorly controlled type DM. No formal gastric emptying study. We strongly emphasized the importance of adequate, consistent nutrition in pregnancy, especially given her history of Type 1 DM and gastroparesis. We discussed that we are concerned that given her non-adherence to diet that she will not be able to tolerate enough nutrition orally, and that her oral nutrition will continue to be inconsistent, resulting in recurrent DKA. The risks of DKA were discussed with Anne, including the approximate 30% risk for fetal demise. Reiterated the importance of easy-to-digest foods with the patient  - As pt able to tolerate food yesterday, will continue to advance diet for now.  If intolerance of oral intake will revisit plans for [place] tube  - Aim to change meds to oral  - Scheduled IV Reglan, Protonix, and Pepcid  - Zofran prn (last line for nausea as this will slow GI motility adding constipation to the picture)  - Scheduled Tylenol IV and PRN Dilaudid IV  - Encourage liquid and/or bland, low-fiber, low-fat, low-residue diet     2.  Anxiety   psychology has been consulted (2017). Per their assessment, patient has KELLI and would benefit from CBT. Reiterated the importance of inpatient admission at this time, and  encouraged the patient to have patience as her clinical situation continues to improve to avoid risk of readmission.  - Weekly follow up by  psychology  - Social work recommends working toward relationship with family as well to have trusted health care advocate for the patient.  Will continue to reach out to sister and father as they are living here with her and offer to facetime or conference call with her  in [place] as he is able    3.  Question of Preeclampsia without severe features   Elevated blood pressure on admission was in the setting of abdominal pain. Proteinuria was present leading to diagnosis of preeclampsia, but do not have a baseline from earlier in pregnancy and patient has significant risk of having pre-existing proteinuria (previous value was 0.38 2016 in previous pregnancy without preeclampsia).  - Continue to monitor BPs  - Repeat urine protein:creatinine ratio in approximately 1 month, or earlier if there is a change in clinical status      4.  FWB  Reassuring for gestational age  - TID monitoring      5. [facility]  NOB labs collected 9/10/17: Rh pos, Rubella IgG positive, Hep B nonreactive, HIV nonreactive, GBS positive, placenta posterior, GC/Chlam negative.   - GBS+, penicillin if delivery imminent  - Begin weekly BPP next week @28w  - Tdap next week if still inpatient    Amanda Jackson  MS-4, [employer]  Maternal Fetal Medicine, [facility]    Scribe Disclosure:   I, Amanda Jackson, am serving as a scribe; to document services personally performed by Dr. Hart based on data collection and the provider's statements to me.     Attestation:   The documentation recorded by the scribe accurately reflects the services I personally performed and the decisions made by me.   Sonia Hart DO  Maternal Fetal Medicine Specialist         Time Spent on this Encounter   Sonia THOMPSON DO, spent a total of 15 minutes face to face or  coordinating care of Anne Gunter.  Over 50% of my time on the unit was spent counseling the patient and/or coordinating care regarding difficult to manage type 1 DM, gastroparesis.

## 2017-09-23 NOTE — PLAN OF CARE
Problem: Diabetes in Pregnancy (Adult,Obstetrics,Pediatric)  Goal: Signs and Symptoms of Listed Potential Problems Will be Absent or Manageable (Diabetes in Pregnancy)  Signs and symptoms of listed potential problems will be absent or manageable by discharge/transition of care (reference Diabetes in Pregnancy (Adult,Obstetrics,Pediatric) CPG).   Outcome: Declining  Data: Patient VSS, afebrile today. EFM normal baseline with moderate variability with accelerations present today this mornig. Hyperglycemia present with every fingerstick. Patient mood was pleasant and cooperative in the morning, but patient became upset and wanting to go home this afternoon.  Assessment/Intervention Insulin Coverage provided as ordered for blood glucose. Provider notified of patient desire to leave. Provider came to talk with patient using iPad . Patient refusing monitoring and other nursing cares at the end of day shift [id number].   Plan: New shift nurse arrived and patient seems more amenable to cares but still desires to be discharged home. Doctor Delon will return to talk with patient. See notes by Carlene from Endocrine regarding plan if patient discharged AMA.

## 2017-09-23 NOTE — PROVIDER NOTIFICATION
09/23/17 1337   Point of Care Testing   Puncture Site fingertip   Bedside Glucose (mg/dl )  230 mg/dl   Blood Glucose Intervention 2hr pp   Patient ate more carbs than she received coverage for during the meal. Patient was going to save some of the carbs for later, but did not wait to eat. Then she also ate something that wasn't from the current meal tray.

## 2017-09-23 NOTE — PROVIDER NOTIFICATION
09/23/17 1405   Provider Notification   Provider Name/Title Dr. Jernigan   Method of Notification At Bedside   Request Evaluate in Person   Notification Reason Patient Request   Provider here to attend to concern of patient and using iPad .

## 2017-09-23 NOTE — PLAN OF CARE
Problem: Diabetes in Pregnancy (Adult,Obstetrics,Pediatric)  Goal: Signs and Symptoms of Listed Potential Problems Will be Absent or Manageable (Diabetes in Pregnancy)  Signs and symptoms of listed potential problems will be absent or manageable by discharge/transition of care (reference Diabetes in Pregnancy (Adult,Obstetrics,Pediatric) CPG).   Outcome: No Change  Patient still desires to leave AMA; MD and charge RN aware.  present to discuss home care information. Patient encouraged to return or call with any further symptoms of DKA, decreased fetal movement, or other pregnancy complications. Patient received new blood glucose meter and strips for home testing. Patient reports she is aware of the risks of leaving AMA. Leaving unit ambulatory with sister at 1856.

## 2017-09-23 NOTE — PLAN OF CARE
Problem: Diabetes in Pregnancy (Adult,Obstetrics,Pediatric)  Goal: Signs and Symptoms of Listed Potential Problems Will be Absent or Manageable (Diabetes in Pregnancy)  Signs and symptoms of listed potential problems will be absent or manageable by discharge/transition of care (reference Diabetes in Pregnancy (Adult,Obstetrics,Pediatric) CPG).   Outcome: No Change  Resident, Arabic , and this RN present for lengthy discussion about patient's desire to leave AMA this evening. Despite care team's recommendations and expressed concern for the safety of the patient and her baby, patient continues to state that she does not want to remain in the hospital for further treatment and that she understands the risks she is taking. She does agree to stay through her last postprandial blood glucose check around 1800. RN will review discharge plan with patient while  is still present.

## 2017-09-23 NOTE — DISCHARGE INSTRUCTIONS
Discharge Instruction for Undelivered Patients      You were seen for: high blood sugar  We Consulted: Maternal Fetal Medicine (Dr. Hart, attending, and Dr. Jernigan, resident)  You had (Test or Medicine): blood sugar monitoring, fetal monitoring     Diet:   Drink 8 to 12 glasses of liquids (milk, juice, water) every day.  You may eat meals and snacks.  To manager your diabetes, follow the guidelines for eating and drinking given to you by your Clinic Provider or Diabetes Educator.       Activity:  Count fetal kicks everyday (see handout)  Call your doctor or nurse midwife if your baby is moving less than usual.     Call your provider if you notice:  Swelling in your face or increased swelling in your hands or legs.  Headaches that are not relieved by Tylenol (acetaminophen).  Changes in your vision (blurring: seeing spots or stars.)  Nausea (sick to your stomach) and vomiting (throwing up).   Weight gain of 5 pounds or more per week.  Heartburn that doesn't go away.  Signs of bladder infection: pain when you urinate (use the toilet), need to go more often and more urgently.  The bag of cobos (rupture of membranes) breaks, or you notice leaking in your underwear.  Bright red blood in your underwear.  Abdominal (lower belly) or stomach pain.  Second (plus) baby: Contractions (tightening) less than 10 minutes apart and getting stronger.  *If less than 34 weeks: Contractions (tightenings) more than 6 times in one hour.  Increase or change in vaginal discharge (note the color and amount)  Other: See diabetes education sheet  -Levemir 14 units BID-  If unable to tolerate po intake at home (Sick Day plan) you could reduce dose to 11 units BID (rather than skip a dose)  -Novolog with meals: 5 units per meal  -Check blood sugar fasting and 1 or 2 hours post prandial    Follow-up:  Make an appointment to be seen in clinic this week

## 2017-09-23 NOTE — PROVIDER NOTIFICATION
09/23/17 7448   Provider Notification   Provider Name/Title Dr. Jernigan   Method of Notification Phone   Request Evaluate in Person   Notification Reason Patient Request   Patient is crying and reports wanting to go home. Requested provider to see patient and requested .

## 2017-09-23 NOTE — PLAN OF CARE
Problem: Diabetes in Pregnancy (Adult,Obstetrics,Pediatric)  Goal: Signs and Symptoms of Listed Potential Problems Will be Absent or Manageable (Diabetes in Pregnancy)  Signs and symptoms of listed potential problems will be absent or manageable by discharge/transition of care (reference Diabetes in Pregnancy (Adult,Obstetrics,Pediatric) CPG).   Outcome: Improving  Patient up much of first half of night with pain and nausea. Denies contractions, bleeding, loss of fluid. EFM not done yet as patient requesting to sleep longer. Provider aware. Will call out when ready. VSS. Insulin drip off with switch to subcutaneous insulin. Continue expectant management.

## 2017-09-23 NOTE — PROVIDER NOTIFICATION
09/23/17 0930   Provider Notification   Provider Name/Title Dr. Hart   Method of Notification At Bedside   Request Evaluate in Person   Notification Reason Status Update   Provider here for morning rounds and EFM strip review.

## 2017-09-24 ENCOUNTER — HOSPITAL ENCOUNTER (INPATIENT)
Facility: CLINIC | Age: 27
LOS: 5 days | Discharge: LEFT BEFORE DISCHARGE | End: 2017-09-29
Attending: OBSTETRICS & GYNECOLOGY | Admitting: OBSTETRICS & GYNECOLOGY
Payer: COMMERCIAL

## 2017-09-24 DIAGNOSIS — E10.10 DIABETIC KETOACIDOSIS WITHOUT COMA ASSOCIATED WITH TYPE 1 DIABETES MELLITUS (H): ICD-10-CM

## 2017-09-24 DIAGNOSIS — O09.91 HIGH-RISK PREGNANCY, FIRST TRIMESTER: Primary | ICD-10-CM

## 2017-09-24 DIAGNOSIS — R11.2 NAUSEA AND VOMITING, INTRACTABILITY OF VOMITING NOT SPECIFIED, UNSPECIFIED VOMITING TYPE: ICD-10-CM

## 2017-09-24 PROBLEM — O24.919 DIABETES IN PREGNANCY: Status: ACTIVE | Noted: 2017-09-24

## 2017-09-24 LAB
ABO + RH BLD: NORMAL
ABO + RH BLD: NORMAL
ALBUMIN SERPL-MCNC: 2.4 G/DL (ref 3.4–5)
ALBUMIN UR-MCNC: 10 MG/DL
ALP SERPL-CCNC: 58 U/L (ref 40–150)
ALT SERPL W P-5'-P-CCNC: 13 U/L (ref 0–50)
ANION GAP SERPL CALCULATED.3IONS-SCNC: 10 MMOL/L (ref 3–14)
ANION GAP SERPL CALCULATED.3IONS-SCNC: 10 MMOL/L (ref 3–14)
APPEARANCE UR: CLEAR
AST SERPL W P-5'-P-CCNC: 6 U/L (ref 0–45)
BACTERIA #/AREA URNS HPF: ABNORMAL /HPF
BASOPHILS # BLD AUTO: 0 10E9/L (ref 0–0.2)
BASOPHILS NFR BLD AUTO: 0 %
BILIRUB SERPL-MCNC: 0.3 MG/DL (ref 0.2–1.3)
BILIRUB UR QL STRIP: NEGATIVE
BLD GP AB SCN SERPL QL: NORMAL
BLOOD BANK CMNT PATIENT-IMP: NORMAL
BUN SERPL-MCNC: 7 MG/DL (ref 7–30)
BUN SERPL-MCNC: 8 MG/DL (ref 7–30)
CALCIUM SERPL-MCNC: 7.4 MG/DL (ref 8.5–10.1)
CALCIUM SERPL-MCNC: 7.7 MG/DL (ref 8.5–10.1)
CHLORIDE SERPL-SCNC: 107 MMOL/L (ref 94–109)
CHLORIDE SERPL-SCNC: 109 MMOL/L (ref 94–109)
CO2 SERPL-SCNC: 22 MMOL/L (ref 20–32)
CO2 SERPL-SCNC: 22 MMOL/L (ref 20–32)
COLOR UR AUTO: YELLOW
CREAT SERPL-MCNC: 0.39 MG/DL (ref 0.52–1.04)
CREAT SERPL-MCNC: 0.46 MG/DL (ref 0.52–1.04)
DIFFERENTIAL METHOD BLD: ABNORMAL
EOSINOPHIL # BLD AUTO: 0 10E9/L (ref 0–0.7)
EOSINOPHIL NFR BLD AUTO: 0.2 %
ERYTHROCYTE [DISTWIDTH] IN BLOOD BY AUTOMATED COUNT: 15.6 % (ref 10–15)
GFR SERPL CREATININE-BSD FRML MDRD: >90 ML/MIN/1.7M2
GFR SERPL CREATININE-BSD FRML MDRD: >90 ML/MIN/1.7M2
GLUCOSE BLDC GLUCOMTR-MCNC: 102 MG/DL (ref 70–99)
GLUCOSE BLDC GLUCOMTR-MCNC: 104 MG/DL (ref 70–99)
GLUCOSE BLDC GLUCOMTR-MCNC: 109 MG/DL (ref 70–99)
GLUCOSE BLDC GLUCOMTR-MCNC: 146 MG/DL (ref 70–99)
GLUCOSE BLDC GLUCOMTR-MCNC: 153 MG/DL (ref 70–99)
GLUCOSE BLDC GLUCOMTR-MCNC: 156 MG/DL (ref 70–99)
GLUCOSE BLDC GLUCOMTR-MCNC: 171 MG/DL (ref 70–99)
GLUCOSE BLDC GLUCOMTR-MCNC: 175 MG/DL (ref 70–99)
GLUCOSE BLDC GLUCOMTR-MCNC: 178 MG/DL (ref 70–99)
GLUCOSE BLDC GLUCOMTR-MCNC: 184 MG/DL (ref 70–99)
GLUCOSE BLDC GLUCOMTR-MCNC: 189 MG/DL (ref 70–99)
GLUCOSE SERPL-MCNC: 116 MG/DL (ref 70–99)
GLUCOSE SERPL-MCNC: 184 MG/DL (ref 70–99)
GLUCOSE UR STRIP-MCNC: >1000 MG/DL
HCT VFR BLD AUTO: 28.3 % (ref 35–47)
HGB BLD-MCNC: 9.2 G/DL (ref 11.7–15.7)
HGB UR QL STRIP: NEGATIVE
HYALINE CASTS #/AREA URNS LPF: 3 /LPF (ref 0–2)
IMM GRANULOCYTES # BLD: 0 10E9/L (ref 0–0.4)
IMM GRANULOCYTES NFR BLD: 0.2 %
KETONES BLD-SCNC: 0.1 MMOL/L (ref 0–0.6)
KETONES BLD-SCNC: 0.6 MMOL/L (ref 0–0.6)
KETONES UR STRIP-MCNC: 10 MG/DL
LEUKOCYTE ESTERASE UR QL STRIP: NEGATIVE
LYMPHOCYTES # BLD AUTO: 1.7 10E9/L (ref 0.8–5.3)
LYMPHOCYTES NFR BLD AUTO: 19.2 %
MAGNESIUM SERPL-MCNC: 1.9 MG/DL (ref 1.6–2.3)
MCH RBC QN AUTO: 26.7 PG (ref 26.5–33)
MCHC RBC AUTO-ENTMCNC: 32.5 G/DL (ref 31.5–36.5)
MCV RBC AUTO: 82 FL (ref 78–100)
MONOCYTES # BLD AUTO: 0.4 10E9/L (ref 0–1.3)
MONOCYTES NFR BLD AUTO: 4 %
NEUTROPHILS # BLD AUTO: 6.8 10E9/L (ref 1.6–8.3)
NEUTROPHILS NFR BLD AUTO: 76.4 %
NITRATE UR QL: POSITIVE
NRBC # BLD AUTO: 0 10*3/UL
NRBC BLD AUTO-RTO: 0 /100
PH UR STRIP: 7 PH (ref 5–7)
PLATELET # BLD AUTO: 357 10E9/L (ref 150–450)
POTASSIUM SERPL-SCNC: 3.7 MMOL/L (ref 3.4–5.3)
POTASSIUM SERPL-SCNC: 3.9 MMOL/L (ref 3.4–5.3)
PROT SERPL-MCNC: 6.7 G/DL (ref 6.8–8.8)
RBC # BLD AUTO: 3.45 10E12/L (ref 3.8–5.2)
RBC #/AREA URNS AUTO: 1 /HPF (ref 0–2)
SODIUM SERPL-SCNC: 139 MMOL/L (ref 133–144)
SODIUM SERPL-SCNC: 141 MMOL/L (ref 133–144)
SOURCE: ABNORMAL
SP GR UR STRIP: 1.02 (ref 1–1.03)
SPECIMEN EXP DATE BLD: NORMAL
TRANS CELLS #/AREA URNS HPF: <1 /HPF (ref 0–1)
TROPONIN I SERPL-MCNC: <0.015 UG/L (ref 0–0.04)
UROBILINOGEN UR STRIP-MCNC: NORMAL MG/DL (ref 0–2)
WBC # BLD AUTO: 8.9 10E9/L (ref 4–11)
WBC #/AREA URNS AUTO: 2 /HPF (ref 0–2)

## 2017-09-24 PROCEDURE — 25000125 ZZHC RX 250: Performed by: OBSTETRICS & GYNECOLOGY

## 2017-09-24 PROCEDURE — 36415 COLL VENOUS BLD VENIPUNCTURE: CPT | Performed by: OBSTETRICS & GYNECOLOGY

## 2017-09-24 PROCEDURE — 93005 ELECTROCARDIOGRAM TRACING: CPT

## 2017-09-24 PROCEDURE — 12000032 ZZH R&B OB CRITICAL UMMC

## 2017-09-24 PROCEDURE — 86850 RBC ANTIBODY SCREEN: CPT | Performed by: OBSTETRICS & GYNECOLOGY

## 2017-09-24 PROCEDURE — S0028 INJECTION, FAMOTIDINE, 20 MG: HCPCS | Performed by: OBSTETRICS & GYNECOLOGY

## 2017-09-24 PROCEDURE — 80048 BASIC METABOLIC PNL TOTAL CA: CPT | Performed by: OBSTETRICS & GYNECOLOGY

## 2017-09-24 PROCEDURE — 81001 URINALYSIS AUTO W/SCOPE: CPT | Performed by: OBSTETRICS & GYNECOLOGY

## 2017-09-24 PROCEDURE — 84484 ASSAY OF TROPONIN QUANT: CPT | Performed by: OBSTETRICS & GYNECOLOGY

## 2017-09-24 PROCEDURE — 93010 ELECTROCARDIOGRAM REPORT: CPT | Performed by: INTERNAL MEDICINE

## 2017-09-24 PROCEDURE — 87088 URINE BACTERIA CULTURE: CPT | Performed by: OBSTETRICS & GYNECOLOGY

## 2017-09-24 PROCEDURE — 25800025 ZZH RX 258: Performed by: OBSTETRICS & GYNECOLOGY

## 2017-09-24 PROCEDURE — 87186 SC STD MICRODIL/AGAR DIL: CPT | Performed by: OBSTETRICS & GYNECOLOGY

## 2017-09-24 PROCEDURE — 82010 KETONE BODYS QUAN: CPT | Performed by: OBSTETRICS & GYNECOLOGY

## 2017-09-24 PROCEDURE — 85025 COMPLETE CBC W/AUTO DIFF WBC: CPT | Performed by: OBSTETRICS & GYNECOLOGY

## 2017-09-24 PROCEDURE — 25000128 H RX IP 250 OP 636: Performed by: OBSTETRICS & GYNECOLOGY

## 2017-09-24 PROCEDURE — 25000125 ZZHC RX 250

## 2017-09-24 PROCEDURE — 86901 BLOOD TYPING SEROLOGIC RH(D): CPT | Performed by: OBSTETRICS & GYNECOLOGY

## 2017-09-24 PROCEDURE — 86900 BLOOD TYPING SEROLOGIC ABO: CPT | Performed by: OBSTETRICS & GYNECOLOGY

## 2017-09-24 PROCEDURE — 83735 ASSAY OF MAGNESIUM: CPT | Performed by: OBSTETRICS & GYNECOLOGY

## 2017-09-24 PROCEDURE — 80053 COMPREHEN METABOLIC PANEL: CPT | Performed by: OBSTETRICS & GYNECOLOGY

## 2017-09-24 PROCEDURE — 00000146 ZZHCL STATISTIC GLUCOSE BY METER IP

## 2017-09-24 PROCEDURE — 87086 URINE CULTURE/COLONY COUNT: CPT | Performed by: OBSTETRICS & GYNECOLOGY

## 2017-09-24 RX ORDER — DEXTROSE MONOHYDRATE, SODIUM CHLORIDE, AND POTASSIUM CHLORIDE 50; 1.49; 9 G/1000ML; G/1000ML; G/1000ML
INJECTION, SOLUTION INTRAVENOUS CONTINUOUS
Status: DISPENSED | OUTPATIENT
Start: 2017-09-24 | End: 2017-09-28

## 2017-09-24 RX ORDER — ACETAMINOPHEN 10 MG/ML
1000 INJECTION, SOLUTION INTRAVENOUS EVERY 8 HOURS
Status: DISCONTINUED | OUTPATIENT
Start: 2017-09-24 | End: 2017-09-28

## 2017-09-24 RX ORDER — METOCLOPRAMIDE HYDROCHLORIDE 5 MG/ML
10 INJECTION INTRAMUSCULAR; INTRAVENOUS EVERY 6 HOURS
Status: DISCONTINUED | OUTPATIENT
Start: 2017-09-24 | End: 2017-09-29

## 2017-09-24 RX ORDER — POTASSIUM CHLORIDE 29.8 MG/ML
20 INJECTION INTRAVENOUS
Status: DISCONTINUED | OUTPATIENT
Start: 2017-09-24 | End: 2017-09-29 | Stop reason: HOSPADM

## 2017-09-24 RX ORDER — HYDROMORPHONE HYDROCHLORIDE 1 MG/ML
.3-.5 INJECTION, SOLUTION INTRAMUSCULAR; INTRAVENOUS; SUBCUTANEOUS
Status: DISCONTINUED | OUTPATIENT
Start: 2017-09-24 | End: 2017-09-25

## 2017-09-24 RX ORDER — PROMETHAZINE HYDROCHLORIDE 25 MG/ML
25 INJECTION INTRAMUSCULAR; INTRAVENOUS ONCE
Status: DISCONTINUED | OUTPATIENT
Start: 2017-09-24 | End: 2017-09-24

## 2017-09-24 RX ORDER — LIDOCAINE 40 MG/G
CREAM TOPICAL
Status: DISCONTINUED | OUTPATIENT
Start: 2017-09-24 | End: 2017-09-29 | Stop reason: HOSPADM

## 2017-09-24 RX ORDER — SODIUM CHLORIDE 9 MG/ML
INJECTION, SOLUTION INTRAVENOUS CONTINUOUS
Status: DISCONTINUED | OUTPATIENT
Start: 2017-09-24 | End: 2017-09-29 | Stop reason: HOSPADM

## 2017-09-24 RX ORDER — ONDANSETRON 2 MG/ML
4 INJECTION INTRAMUSCULAR; INTRAVENOUS EVERY 6 HOURS PRN
Status: DISCONTINUED | OUTPATIENT
Start: 2017-09-24 | End: 2017-09-29 | Stop reason: HOSPADM

## 2017-09-24 RX ORDER — DEXTROSE, SODIUM CHLORIDE, SODIUM LACTATE, POTASSIUM CHLORIDE, AND CALCIUM CHLORIDE 5; .6; .31; .03; .02 G/100ML; G/100ML; G/100ML; G/100ML; G/100ML
INJECTION, SOLUTION INTRAVENOUS CONTINUOUS
Status: DISCONTINUED | OUTPATIENT
Start: 2017-09-24 | End: 2017-09-24

## 2017-09-24 RX ORDER — MAGNESIUM SULFATE HEPTAHYDRATE 40 MG/ML
4 INJECTION, SOLUTION INTRAVENOUS EVERY 4 HOURS PRN
Status: DISCONTINUED | OUTPATIENT
Start: 2017-09-24 | End: 2017-09-29 | Stop reason: HOSPADM

## 2017-09-24 RX ORDER — ACETAMINOPHEN 650 MG/1
650 SUPPOSITORY RECTAL EVERY 6 HOURS PRN
Status: DISCONTINUED | OUTPATIENT
Start: 2017-09-24 | End: 2017-09-29 | Stop reason: HOSPADM

## 2017-09-24 RX ORDER — POTASSIUM CHLORIDE 7.45 MG/ML
10 INJECTION INTRAVENOUS
Status: DISCONTINUED | OUTPATIENT
Start: 2017-09-24 | End: 2017-09-29 | Stop reason: HOSPADM

## 2017-09-24 RX ORDER — DEXTROSE MONOHYDRATE 25 G/50ML
25-50 INJECTION, SOLUTION INTRAVENOUS
Status: DISCONTINUED | OUTPATIENT
Start: 2017-09-24 | End: 2017-09-29 | Stop reason: HOSPADM

## 2017-09-24 RX ORDER — POTASSIUM CL/LIDO/0.9 % NACL 10MEQ/0.1L
10 INTRAVENOUS SOLUTION, PIGGYBACK (ML) INTRAVENOUS
Status: DISCONTINUED | OUTPATIENT
Start: 2017-09-24 | End: 2017-09-29 | Stop reason: HOSPADM

## 2017-09-24 RX ORDER — PROMETHAZINE HYDROCHLORIDE 25 MG/1
25 SUPPOSITORY RECTAL EVERY 8 HOURS PRN
Status: DISCONTINUED | OUTPATIENT
Start: 2017-09-24 | End: 2017-09-25

## 2017-09-24 RX ORDER — HYDROMORPHONE HCL/0.9% NACL/PF 0.2MG/0.2
0.2 SYRINGE (ML) INTRAVENOUS
Status: DISCONTINUED | OUTPATIENT
Start: 2017-09-24 | End: 2017-09-24

## 2017-09-24 RX ORDER — HYDROMORPHONE HYDROCHLORIDE 1 MG/ML
.3-.5 INJECTION, SOLUTION INTRAMUSCULAR; INTRAVENOUS; SUBCUTANEOUS
Status: DISCONTINUED | OUTPATIENT
Start: 2017-09-24 | End: 2017-09-24

## 2017-09-24 RX ORDER — POTASSIUM CHLORIDE 1.5 G/1.58G
20-40 POWDER, FOR SOLUTION ORAL
Status: DISCONTINUED | OUTPATIENT
Start: 2017-09-24 | End: 2017-09-29 | Stop reason: HOSPADM

## 2017-09-24 RX ORDER — POTASSIUM CHLORIDE 750 MG/1
20-40 TABLET, EXTENDED RELEASE ORAL
Status: DISCONTINUED | OUTPATIENT
Start: 2017-09-24 | End: 2017-09-29 | Stop reason: HOSPADM

## 2017-09-24 RX ORDER — MORPHINE SULFATE 10 MG/ML
10 INJECTION, SOLUTION INTRAMUSCULAR; INTRAVENOUS ONCE
Status: COMPLETED | OUTPATIENT
Start: 2017-09-24 | End: 2017-09-24

## 2017-09-24 RX ORDER — POLYETHYLENE GLYCOL 3350 17 G/17G
17 POWDER, FOR SOLUTION ORAL DAILY PRN
Status: DISCONTINUED | OUTPATIENT
Start: 2017-09-24 | End: 2017-09-29 | Stop reason: HOSPADM

## 2017-09-24 RX ORDER — HYDROMORPHONE HCL/0.9% NACL/PF 0.2MG/0.2
0.2 SYRINGE (ML) INTRAVENOUS ONCE
Status: COMPLETED | OUTPATIENT
Start: 2017-09-24 | End: 2017-09-24

## 2017-09-24 RX ORDER — NICOTINE POLACRILEX 4 MG
15-30 LOZENGE BUCCAL
Status: DISCONTINUED | OUTPATIENT
Start: 2017-09-24 | End: 2017-09-29 | Stop reason: HOSPADM

## 2017-09-24 RX ORDER — NALOXONE HYDROCHLORIDE 0.4 MG/ML
.1-.4 INJECTION, SOLUTION INTRAMUSCULAR; INTRAVENOUS; SUBCUTANEOUS
Status: DISCONTINUED | OUTPATIENT
Start: 2017-09-24 | End: 2017-09-29 | Stop reason: HOSPADM

## 2017-09-24 RX ADMIN — Medication 0.2 MG: at 19:00

## 2017-09-24 RX ADMIN — DEXTROSE MONOHYDRATE, SODIUM CHLORIDE, AND POTASSIUM CHLORIDE: 50; 9; 1.49 INJECTION, SOLUTION INTRAVENOUS at 17:09

## 2017-09-24 RX ADMIN — LIDOCAINE HYDROCHLORIDE: 20 JELLY TOPICAL at 15:59

## 2017-09-24 RX ADMIN — SODIUM CHLORIDE: 9 INJECTION, SOLUTION INTRAVENOUS at 14:31

## 2017-09-24 RX ADMIN — MORPHINE SULFATE 10 MG: 10 INJECTION, SOLUTION INTRAMUSCULAR; INTRAVENOUS at 13:58

## 2017-09-24 RX ADMIN — HYDROMORPHONE HYDROCHLORIDE 0.5 MG: 1 INJECTION, SOLUTION INTRAMUSCULAR; INTRAVENOUS; SUBCUTANEOUS at 21:24

## 2017-09-24 RX ADMIN — FAMOTIDINE 20 MG: 10 INJECTION, SOLUTION INTRAVENOUS at 15:59

## 2017-09-24 RX ADMIN — HYDROMORPHONE HYDROCHLORIDE 0.5 MG: 1 INJECTION, SOLUTION INTRAMUSCULAR; INTRAVENOUS; SUBCUTANEOUS at 14:52

## 2017-09-24 RX ADMIN — HUMAN INSULIN 1.5 UNITS/HR: 100 INJECTION, SOLUTION SUBCUTANEOUS at 14:59

## 2017-09-24 RX ADMIN — ONDANSETRON 4 MG: 2 INJECTION INTRAMUSCULAR; INTRAVENOUS at 18:04

## 2017-09-24 RX ADMIN — POTASSIUM CHLORIDE 10 MEQ: 10 INJECTION, SOLUTION INTRAVENOUS at 21:55

## 2017-09-24 RX ADMIN — HUMAN INSULIN 1.5 UNITS/HR: 100 INJECTION, SOLUTION SUBCUTANEOUS at 15:33

## 2017-09-24 RX ADMIN — ACETAMINOPHEN 1000 MG: 10 INJECTION, SOLUTION INTRAVENOUS at 19:16

## 2017-09-24 RX ADMIN — SODIUM CHLORIDE 1000 ML: 9 INJECTION, SOLUTION INTRAVENOUS at 16:54

## 2017-09-24 RX ADMIN — METOCLOPRAMIDE 10 MG: 5 INJECTION, SOLUTION INTRAMUSCULAR; INTRAVENOUS at 21:51

## 2017-09-24 RX ADMIN — SODIUM CHLORIDE: 9 INJECTION, SOLUTION INTRAVENOUS at 16:03

## 2017-09-24 RX ADMIN — Medication 0.2 MG: at 18:02

## 2017-09-24 RX ADMIN — METOCLOPRAMIDE 10 MG: 5 INJECTION, SOLUTION INTRAMUSCULAR; INTRAVENOUS at 15:44

## 2017-09-24 NOTE — PROGRESS NOTES
Attempted to start PIV three times without success. Will call CRNA. Patient had large bile colored emesis while this nurse writer in the room. RN caring for patient informed.

## 2017-09-24 NOTE — LETTER
September 28, 2017       TO: Anne Gunter  [address]    [place] [address]           Dear ,    We are writing to inform you of your test results.    Your duodenal biopsies are normal. We do not see any evidence of Celiac Disease on these specimens.    Please review these findings with your PCP further for now.    It has been a pleasure participating in your care - please be in touch if there are any further questions that arise.  During business hours, you may reach the Clinic Nurse Triage Line at [phone]  For urgent/emergent questions after business hours, you may reach the on-call GI Fellow by contacting the [facility]  at [phone].    I recommend signing up for [employer] access if you have not already done so and are comfortable with using a computer. This allows for online access to your lab results and also helps you communicate efficiently with my clinic should any questions arise in your care.    Sincerely,    Nile Sanchez MD    [facility] - Department of Medicine  Division of Gastroenterology

## 2017-09-24 NOTE — PROGRESS NOTES
Pt. Became cold and clammy.  Blood sugar 171.  Pt was hypotensive and tachycardia.  IV flush of Na Cl started.  Pt orientated to person, place and date.  She is drowsy and pale.  Dr. Hart and Dr. Disla in the room during this process.

## 2017-09-24 NOTE — PROGRESS NOTES
Diabetes Consult Daily  Progress Note          Assessment/Plan:   Mrs. Anne Gunter is a 28 yo woman at 26w6d of pregnancy, with uncontrolled type 1 diabetes and history of multiple episodes of DKA during previous pregnancy, who transferred to [facility] from [facility] with suspected DKA.  Agree with MFM that without a durable nutrition plan, Mrs Rodríguez is likely to experience DKA again.    After leaving AMA last night, pt reports that glucoses were very high this morning (300s) after following insulin plan for home (detemir 14 units BID, aspart 5 units/meal), this was in context of recurrence of acute abdominal pain and nausea this morning.  On readmission BG in high 100s, pt is NPO.     Plan  -Restarted IV insulin infusion this afternoon.  She will stay on this for now in addition to D5 NS at 125ml/h.  Request that IV Insulin infusion continue until pt is clinically improved, tolerating po, off dextrose fluids and we have a stable stretch of insulin data to inform what doses pt requires in form of SC injections.  -Once diet is restarted we will need to order meal aspart: 1unit/6g CHO with meals and snacks.    Outpatient diabetes follow up scheduled with Dr. Colunga on October 12.      Plan discussed with pt and primary team.             Interval History:   The last 24 hours progress and nursing notes reviewed.  Visited with pt using professional .  Anne left AMA last night, explained to primary team that she just feels much more depressed in hospital than at home.  There was much discussion and Dr. Jernigan reviewed risks to pt and the baby if she left, including recurrence of DKA and risk of death.  Pt still chose to leave.  Just prior to leaving she mentioned to Dr. Jernigan that she did not have test strips at home and had not checked glucoses while at home 9/13-18. This is counter to what she had told our team earlier this admission (she reported having a couple low glucoses and some  >200 while home).  Primary team prescribed test strips and new glucometer when she left last night.  Pt reports that she took detemir 14 units last night and aspart 5 units with her supper (as recommended by our team).  She had salad, vanessa bread, hummus for supper.  When she woke up at 10am this morning the pain and nausea were back.  She reports that glucose was up to 364, so she took aspart 8 units (did not question how she chose this dose), glucose came down to 324 per pt.  Given worsening symptoms at home, and inability to tolerate po her sister brought her back to the hospital midday.  Glucsoe on admission was 184.  Bicarb, AG and ketones in normal range.  Pt feels extremely thirsty.  She had episode of dizziness and hypotension after receiving pain meds.  When I saw her the pain was gone, she had just had episode of vomiting earlier. She is NPO.    It took several attempts to get IV placement.  IV insulin is now running with D5NS at 125ml/h.  Pt is not eating.  Per Dr. Hart, they will likely reconsult GI, and they will also consider PICC placement given difficulty with IV placement.        Recent Labs  Lab 09/24/17  1532 09/24/17  1522 09/24/17  1452 09/24/17  1255 09/24/17  1241 09/23/17  1752 09/23/17  1549  09/22/17  0632  09/20/17  1005  09/20/17  0635  09/19/17  1201  09/19/17  0652   GLC  --  184*  --   --   --   --   --   --  141*  --  143*  --  328*  --  90  --  361*   *  --  171* 189* 184* 169* 242*  < >  --   < >  --   < >  --   < > 88  < >  --    < > = values in this interval not displayed.            Review of Systems:   See interval hx          Medications:       Active Diet Order      NPO for Medical/Clinical Reasons Except for: Meds     Physical Exam:  Gen: Drowsy, appears comfortable currently,  at bedside.  HEENT: NC/AT, mucous membranes are moist  Resp: Unlabored  Neuro: alert and oriented, able to answer questions appropriately  BP (!) 87/57  Pulse 97  Temp 97.5  F  (36.4  C) (Oral)  Resp 14  SpO2 99%           Data:     Lab Results   Component Value Date    A1C 8.4 09/10/2017    A1C 9.2 06/01/2016    A1C 9.8 05/23/2016    A1C 7.4 04/23/2016    A1C 7.2 04/19/2016            Recent Labs   Lab Test  09/24/17   1522  09/22/17   0632   NA  139  138   POTASSIUM  3.9  4.0   CHLORIDE  107  108   CO2  22  21   ANIONGAP  10  9   GLC  184*  141*   BUN  8  4*   CR  0.46*  0.42*   LILLIAM  7.7*  7.6*     CBC RESULTS:   Recent Labs   Lab Test  09/24/17   1522   WBC  8.9   RBC  3.45*   HGB  9.2*   HCT  28.3*   MCV  82   MCH  26.7   MCHC  32.5   RDW  15.6*   PLT  357       I spent a total of 35 minutes bedside and on the inpatient unit managing the glycemic care of Anne Gunter. Over 50% of my time on the unit was spent counseling the patient and coordinating care regarding management of type 1 diabetes in pregnancy with ongoing N/V and inability to tolerate po intake.  See note for details.      Carlene Jones PA-C [phone]  Diabetes Management job code [id number]

## 2017-09-24 NOTE — PROGRESS NOTES
Met with patient given significant ongoing pain. Discussed with patient that given her sleepiness and O2 desats with previous narcotic doses this afternoon, we are being more cautious with dosing. Patient had been on 0.3-0.5 mg q2h with previous admission. Will plan to give an extra dose of 0.2 mg now as she recently received 0.2 mg and then otherwise resume previous dosing. Will also give dose of IV tylenol as well. Patient states understanding and is agreeable with plan. VSS currently with improvement to BPs to normal range from hypotensive episode this afternoon.     Mariann Dockery PGY3  9/24/2017 7:00 PM     RN reports that pharmacy has lost IV tylenol in tube system and does not have more to send at this time. Will order rectal tylenol PRN. Patient is not agreeable to this at this time but knows it is available to her if needed.    Mariann Dockery PGY3  9/24/2017 7:13 PM
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
## 2017-09-24 NOTE — IP AVS SNAPSHOT
MRN:[id number]                      After Visit Summary   9/24/2017    Anne Gunter    MRN: [id number]           Thank you!     Thank you for choosing [facility] for your care. Our goal is always to provide you with excellent care. Hearing back from our patients is one way we can continue to improve our services. Please take a few minutes to complete the written survey that you may receive in the mail after you visit with us. Thank you!        Patient Information     Date Of Birth          1990        Designated Caregiver       Most Recent Value    Caregiver    Will someone help with your care after discharge? no      About your hospital stay     You were admitted on:  September 24, 2017 You last received care in the:  [facility]    You were discharged on:  September 29, 2017       Who to Call     For medical emergencies, please call 911.  For non-urgent questions about your medical care, please call your primary care provider or clinic, [phone]  For questions related to your surgery, please call your surgery clinic        Attending Provider     Provider Specialty    Sonia Hart DO OB/Gyn    Edd Lopez MD OB/Gyn       Primary Care Provider Office Phone # Fax #    Isiah Jakob Naiud [phone]      After Care Instructions     Diet       Follow this diet upon discharge: Orders Placed This Encounter      Room Service      Adult Formula Drip Feeding: Continuous TwoCal HN; Nasoduodenal tube; Goal Rate: 50; mL/hr; Medication - Tube Feeding Flush Frequency: At least 15-30 mL water before and after medication administration and with tube clogging;       Snacks/Supplements Adult: Glucerna (Adult); With Meals      Regular Diet Adult            Discharge Instructions       Take the following insulin:  -Levemir 14 units BID-  If unable to tolerate po intake at home (Sick Day plan) she could reduce dose to 11 units BID (rather than skip a dose)  -Novolog with meals:  5 units per meal  -Check blood sugar fasting and 1 or 2 hours post prandial  - For any blood sugars greater than 180 call or present to L&D.    Tube Feeds: Home Infusion with visit to teach you how to run your tube feeds Saturday or Sunday.  In the meantime please flush your tube as directed once in the morning and once at night with 30 mL of water.                  Follow-up Appointments     Adult [facility] Follow-up and recommended labs and tests       Follow-up with Endocrinology and MFM early next week.    Appointments on [place] and/or [facility] (with [facility] or [facility] provider or service). Call [phone] if you haven't heard regarding these appointments within 7 days of discharge.                  Your next 10 appointments already scheduled     Oct 02, 2017  2:00 PM CDT   Enteral Tube Feeding - [address] with Adri Garcia RN   [facility], [place], Patient [facility] ([facility]    [address] [address]              Appointment is located at [address] [place] [address]            Oct 12, 2017  2:30 PM CDT   [facility] Visit with Marla Colunga MD   [facility]    [address]  [facility] [address] [phone]              Additional Services     Home infusion referral       [facility]  Phone # [phone]  Fax # [phone]     1 feeding pump device   1 month supply feeding bags for administration of the formula   TwoCal HN @ 50 mL/hr    Agency to assess for nursing needs. Skilled nursing visits for education and assessment of diabetic control.                  Further instructions from your care team         Patient leaving against medical advice.    Vital instructions for care:      Diet:   Gastroparesis Diet (liquids, low-fiber, low-fat foods), Tube feeds -TwoCal @50 mL/hr continuous     Activity:  As  tolerated    Call your provider if you notice:  Swelling in your face or increased swelling in your hands or legs.  Headaches that are not relieved by Tylenol (acetaminophen).  Changes in your vision (blurring: seeing spots or stars.)  Nausea (sick to your stomach) and vomiting (throwing up).   Weight gain of 5 pounds or more per week.  Heartburn that doesn't go away.  Signs of bladder infection: pain when you urinate (use the toilet), need to go more often and more urgently.  The bag of cobos (rupture of membranes) breaks, or you notice leaking in your underwear.  Bright red blood in your underwear.  Abdominal (lower belly) or stomach pain.  For first baby: Contractions (tightening) less than 5 minutes apart for one hour or more.  Second (plus) baby: Contractions (tightening) less than 10 minutes apart and getting stronger.  *If less than 34 weeks: Contractions (tightenings) more than 6 times in one hour.  Increase or change in vaginal discharge (note the color and amount)    -Levemir 14 units BID-  If unable to tolerate oral intake at home (Sick Day plan) reduce dose to 11 units BID (rather than skip a dose)  -Novolog with meals: 5 units per meal  -Check blood sugar fasting and 1 or 2 hours post prandial    Follow-up:  With MFM and endocrinology  Home care will contact you to arrange education and care for NJ tube          Pending Results     No orders found from 9/22/2017 to 9/25/2017.            Statement of Approval     Ordered          09/29/17 1941  I have reviewed and agree with all the recommendations and orders detailed in this document.  EFFECTIVE NOW     Comments:  Patient is leaving against medical advice   Approved and electronically signed by:  Melissa Michele MD             Admission Information     Date & Time Provider Department Dept. Phone    9/24/2017 Edd Lopez MD  [facility] [phone]      Your Vitals Were     Blood Pressure Pulse Temperature Respirations Weight Pulse Oximetry     " 86 98.2  F (36.8  C) (Oral) 18 75 kg (165 lb 6.4 oz) 100%    BMI (Body Mass Index)                   25.96 kg/m2           [facility] Information     [facility] lets you send messages to your doctor, view your test results, renew your prescriptions, schedule appointments and more. To sign up, go to [email or web address] . Click on \"Log in\" on the left side of the screen, which will take you to the Welcome page. Then click on \"Sign up Now\" on the right side of the page.     You will be asked to enter the access code listed below, as well as some personal information. Please follow the directions to create your username and password.     Your access code is: [email or web address]  Expires: 2017 11:06 PM     Your access code will  in 90 days. If you need help or a new code, please call your [facility] clinic or [phone].        Care EveryWhere ID     This is your Care EveryWhere ID. This could be used by other organizations to access your [facility] medical records  [id number]        Equal Access to Services     JUANA ONEILL : Hadii buster Root, walada jarrett, qaybta kaalmada adecele, brea lópez . So [facility] [phone].    ATENCIÓN: Si habla español, tiene a monreal disposición servicios gratuitos de asistencia lingüística. Llame al [phone].    We comply with applicable federal civil rights laws and [place] laws. We do not discriminate on the basis of race, color, national origin, age, disability, sex, sexual orientation, or gender identity.               Review of your medicines      START taking        Dose / Directions    alum & mag hydroxide-simethicone 400-400-40 MG/5ML Susp suspension   Commonly known as:  MYLANTA ES/MAALOX  ES   Used for:  Nausea and vomiting, intractability of vomiting not specified, unspecified vomiting type        Dose:  30 mL   Take 30 mLs by mouth 4 times daily (with meals and nightly)   Quantity:  355 mL   Refills:  1         CONTINUE " these medicines which may have CHANGED, or have new prescriptions. If we are uncertain of the size of tablets/capsules you have at home, strength may be listed as something that might have changed.        Dose / Directions    insulin aspart 100 UNIT/ML injection   Commonly known as:  NovoLOG PEN   This may have changed:  Another medication with the same name was removed. Continue taking this medication, and follow the directions you see here.   Used for:  Supervision of high-risk pregnancy, third trimester        Dose:  5 Units   Inject 5 Units Subcutaneous 3 times daily (with meals)   Quantity:  20 mL   Refills:  0         CONTINUE these medicines which have NOT CHANGED        Dose / Directions    blood glucose monitoring lancets   Used for:  Type 1 diabetes mellitus in pregnancy, second trimester        Use to test blood sugar 4 times daily or as directed.   Quantity:  100 each   Refills:  3       blood glucose monitoring meter device kit   Used for:  Type 1 diabetes mellitus in pregnancy, second trimester        Use to test blood sugar 4 times daily or as directed.   Quantity:  1 kit   Refills:  0       * blood glucose monitoring test strip   Commonly known as:  no brand specified   Used for:  Type 1 diabetes mellitus in pregnancy, second trimester        Use to test blood sugars 4 times daily or as directed   Quantity:  100 strip   Refills:  3       * blood glucose monitoring test strip   Commonly known as:  no brand specified   Used for:  Type 1 diabetes mellitus in pregnancy, second trimester        Use to test blood sugar 4 times daily or as directed.   Quantity:  100 strip   Refills:  3       famotidine 20 MG tablet   Commonly known as:  PEPCID   Used for:  Type 1 diabetes mellitus in pregnancy, second trimester        Dose:  20 mg   Take 1 tablet (20 mg) by mouth 2 times daily   Quantity:  40 tablet   Refills:  1       folic acid 1 MG tablet   Commonly known as:  FOLVITE   Used for:  High-risk pregnancy,  first trimester        Dose:  1 mg   Take 1 tablet (1 mg) by mouth daily   Quantity:  30 tablet   Refills:  0       insulin detemir 100 UNIT/ML injection   Commonly known as:  LEVEMIR FLEXPEN/FLEXTOUCH   Used for:  Type 1 diabetes mellitus in pregnancy, second trimester        Dose:  14 Units   Inject 14 Units Subcutaneous 2 times daily Take first dose at midnight, next dose at 12 pm.   Quantity:  3 mL   Refills:  3       metoclopramide 10 MG tablet   Commonly known as:  REGLAN   Used for:  Type 1 diabetes mellitus in pregnancy, second trimester        Dose:  10 mg   Take 1 tablet (10 mg) by mouth 4 times daily as needed (4x Daily AC & HS prn nausea and abdominal pain)   Quantity:  60 tablet   Refills:  0       mirtazapine 15 MG ODT tab   Commonly known as:  REMERON SOL-TAB   Used for:  Supervision of high-risk pregnancy, third trimester        Dose:  15 mg   1 tablet (15 mg) by Orally disintegrating tablet route At Bedtime   Quantity:  60 tablet   Refills:  2       OLANZapine zydis 5 MG ODT tab   Commonly known as:  zyPREXA   Used for:  Supervision of high-risk pregnancy, third trimester        Dose:  5 mg   Take 1 tablet (5 mg) by mouth 2 times daily   Quantity:  60 tablet   Refills:  3       omeprazole 20 MG CR capsule   Commonly known as:  priLOSEC   Used for:  Supervision of high-risk pregnancy, third trimester        Dose:  20 mg   Take 1 capsule (20 mg) by mouth every morning (before breakfast)   Quantity:  30 capsule   Refills:  3       ondansetron 4 MG ODT tab   Commonly known as:  ZOFRAN ODT   Used for:  Hyperemesis gravidarum        Dose:  4 mg   Take 1 tablet (4 mg) by mouth every 8 hours as needed for nausea   Quantity:  20 tablet   Refills:  1       pantoprazole 40 MG EC tablet   Commonly known as:  PROTONIX   Used for:  Type 1 diabetes mellitus in pregnancy, second trimester        Dose:  40 mg   Take 1 tablet (40 mg) by mouth 2 times daily   Quantity:  30 tablet   Refills:  1       polyethylene  glycol Packet   Commonly known as:  MIRALAX/GLYCOLAX   Used for:  Type 1 diabetes mellitus in pregnancy, second trimester        Dose:  17 g   Take 17 g by mouth daily as needed for constipation (titrate to one bowel movement daily)   Quantity:  7 packet   Refills:  1       Prenatal Vitamin 27-0.8 MG Tabs   Used for:  High-risk pregnancy, first trimester        Dose:  1 tablet   Take 1 tablet by mouth daily   Quantity:  90 tablet   Refills:  3       senna-docusate 8.6-50 MG per tablet   Commonly known as:  SENOKOT-S;PERICOLACE   Used for:  Hyperemesis gravidarum        Dose:  1 tablet   Take 1 tablet by mouth 2 times daily as needed for constipation   Quantity:  100 tablet   Refills:  0       * Notice:  This list has 2 medication(s) that are the same as other medications prescribed for you. Read the directions carefully, and ask your doctor or other care provider to review them with you.      STOP taking     oxyCODONE 5 MG capsule   Commonly known as:  OXY-IR                Where to get your medicines      Some of these will need a paper prescription and others can be bought over the counter. Ask your nurse if you have questions.     Bring a paper prescription for each of these medications     alum & mag hydroxide-simethicone 400-400-40 MG/5ML Susp suspension                Protect others around you: Learn how to safely use, store and throw away your medicines at www.disposemymeds.org.             Medication List: This is a list of all your medications and when to take them. Check marks below indicate your daily home schedule. Keep this list as a reference.      Medications           Morning Afternoon Evening Bedtime As Needed    alum & mag hydroxide-simethicone 400-400-40 MG/5ML Susp suspension   Commonly known as:  MYLANTA ES/MAALOX  ES   Take 30 mLs by mouth 4 times daily (with meals and nightly)                                blood glucose monitoring lancets   Use to test blood sugar 4 times daily or as  directed.                                blood glucose monitoring meter device kit   Use to test blood sugar 4 times daily or as directed.                                * blood glucose monitoring test strip   Commonly known as:  no brand specified   Use to test blood sugars 4 times daily or as directed                                * blood glucose monitoring test strip   Commonly known as:  no brand specified   Use to test blood sugar 4 times daily or as directed.                                famotidine 20 MG tablet   Commonly known as:  PEPCID   Take 1 tablet (20 mg) by mouth 2 times daily                                folic acid 1 MG tablet   Commonly known as:  FOLVITE   Take 1 tablet (1 mg) by mouth daily                                insulin aspart 100 UNIT/ML injection   Commonly known as:  NovoLOG PEN   Inject 5 Units Subcutaneous 3 times daily (with meals)   Last time this was given:  5 Units on 9/29/2017  2:06 PM                                insulin detemir 100 UNIT/ML injection   Commonly known as:  LEVEMIR FLEXPEN/FLEXTOUCH   Inject 14 Units Subcutaneous 2 times daily Take first dose at midnight, next dose at 12 pm.                                metoclopramide 10 MG tablet   Commonly known as:  REGLAN   Take 1 tablet (10 mg) by mouth 4 times daily as needed (4x Daily AC & HS prn nausea and abdominal pain)                                mirtazapine 15 MG ODT tab   Commonly known as:  REMERON SOL-TAB   1 tablet (15 mg) by Orally disintegrating tablet route At Bedtime                                OLANZapine zydis 5 MG ODT tab   Commonly known as:  zyPREXA   Take 1 tablet (5 mg) by mouth 2 times daily                                omeprazole 20 MG CR capsule   Commonly known as:  priLOSEC   Take 1 capsule (20 mg) by mouth every morning (before breakfast)                                ondansetron 4 MG ODT tab   Commonly known as:  ZOFRAN ODT   Take 1 tablet (4 mg) by mouth every 8 hours as needed  for nausea                                pantoprazole 40 MG EC tablet   Commonly known as:  PROTONIX   Take 1 tablet (40 mg) by mouth 2 times daily                                polyethylene glycol Packet   Commonly known as:  MIRALAX/GLYCOLAX   Take 17 g by mouth daily as needed for constipation (titrate to one bowel movement daily)                                Prenatal Vitamin 27-0.8 MG Tabs   Take 1 tablet by mouth daily                                senna-docusate 8.6-50 MG per tablet   Commonly known as:  SENOKOT-S;PERICOLACE   Take 1 tablet by mouth 2 times daily as needed for constipation                                * Notice:  This list has 2 medication(s) that are the same as other medications prescribed for you. Read the directions carefully, and ask your doctor or other care provider to review them with you.

## 2017-09-24 NOTE — H&P
[facility]  Antepartum History and Physical  2017      Anne Gunter MRN# [id number]   Age: 27 year old YOB: 1990     CC: Abdominal pain    HPI:  Ms. Anne Gunter is a 27 year old  at 27w4d by stated dates consistent with 25w5d ultrasound, who presents to [facility] antepartum with severe abdominal pain. She ate salad and vanessa bread last night, gave herself 14U levemir insulin plus 5U for meal coverage, and slept throughout the night. She woke at 10 am with nausea and began vomiting. She vomited (bilious, non-bloody) three times and developed severe epigastric abdominal pain. Her sister checked her BG at 11:30 am, which was in the 364, gave 14U + 8U, then brought her straight to [facility] antepartum. She also reports constipation and low back pain. Denies fever, chills, headache, chest pain, shortness of breath, diarrhea. Also denies contractions, vaginal bleeding, and loss of fluid. Positive fetal movement.    Of note, patient left AMA last evening less than <24 hours after being transitioned off insulin drip.    Due to difficulty in obtaining IV access (required MDA with ultrasound guidance after several failed attempts) and patient in significant pain, patient received IM morphine for pain control.  It did nothing to relieve patient's pain.  Once IV access was established the patient received 0.5mg of IV dilaudid which worked well, but patient also became hypotensive and diaphoretic.  She had normal respirations and was never obtunded.  O2 sat 100%. Symptoms resolved with IV fluid bolus and time.      Pregnancy Complications:   - Poorly controlled Type I DM: Recent admission 9/10- for DKA (patient left AMA on , one day after transition to SQ insulin) and again, - for abdominal pain, but left AMA for unknown reasons. Discharged on regimen of 14U Levemir BID, 5 U Novolog with meals, and SSI. Per patient report, already two hospitalizations for likely  DKA this pregnancy in [place], with prior episodes of DKA in prior pregnancy with delivery at [facility].   - No prior prenatal care in [place]   - History of  PLTCS x1 at 32w5d for fetal distress in setting of DKA  - Hx of abdominal pain likely related to gastroparesis  - During recent admission met criteria for preeclampsia without severe features based on several mild range blood pressures and UPC 0.6.  BPs at that time were elevated in the setting of abdominal pain.    Prenatal Labs:   Lab Results   Component Value Date    ABO A 2017    RH Pos 2017    AS Neg 2017    HEPBANG Nonreactive 2017    CHPCRT Negative 09/10/2017    GCPCRT Negative 09/10/2017    TREPAB Negative 09/10/2017    HGB 10.7 (L) 2017     Component      Latest Ref Rng & Units 2017   WBC      4.0 - 11.0 10e9/L 8.9   RBC Count      3.8 - 5.2 10e12/L 3.45 (L)   Hemoglobin      11.7 - 15.7 g/dL 9.2 (L)   Hematocrit      35.0 - 47.0 % 28.3 (L)   MCV      78 - 100 fl 82   MCH      26.5 - 33.0 pg 26.7   MCHC      31.5 - 36.5 g/dL 32.5   RDW      10.0 - 15.0 % 15.6 (H)   Platelet Count      150 - 450 10e9/L 357   Diff Method       Automated Method   % Neutrophils      % 76.4   % Lymphocytes      % 19.2   % Monocytes      % 4.0   % Eosinophils      % 0.2   % Basophils      % 0.0   % Immature Granulocytes      % 0.2   Nucleated RBCs      0 /100 0   Absolute Neutrophil      1.6 - 8.3 10e9/L 6.8   Absolute Lymphocytes      0.8 - 5.3 10e9/L 1.7   Absolute Monocytes      0.0 - 1.3 10e9/L 0.4   Absolute Eosinophils      0.0 - 0.7 10e9/L 0.0   Absolute Basophils      0.0 - 0.2 10e9/L 0.0   Abs Immature Granulocytes      0 - 0.4 10e9/L 0.0   Absolute Nucleated RBC       0.0   Sodium      133 - 144 mmol/L 139   Potassium      3.4 - 5.3 mmol/L 3.9   Chloride      94 - 109 mmol/L 107   Carbon Dioxide      20 - 32 mmol/L 22   Anion Gap      3 - 14 mmol/L 10   Glucose      70 - 99 mg/dL 184 (H)   Urea Nitrogen      7 - 30 mg/dL 8    Creatinine      0.52 - 1.04 mg/dL 0.46 (L)   GFR Estimate      >60 mL/min/1.7m2 >90   GFR Estimate If Black      >60 mL/min/1.7m2 >90   Calcium      8.5 - 10.1 mg/dL 7.7 (L)   Bilirubin Total      0.2 - 1.3 mg/dL 0.3   Albumin      3.4 - 5.0 g/dL 2.4 (L)   Protein Total      6.8 - 8.8 g/dL 6.7 (L)   Alkaline Phosphatase      40 - 150 U/L 58   ALT      0 - 50 U/L 13   AST      0 - 45 U/L 6   Ketone Quantitative      0.0 - 0.6 mmol/L 0.6       GBS Status:   Lab Results   Component Value Date    GBS Positive (A) 09/10/2017     Ultrasounds  17: GA 25w5d, EDMOND 18.5 cm, cephalic, placenta posterior no previa. EFW 815g 44%ile    OB History  Obstetric History       T0      L1     SAB0   TAB0   Ectopic0   Multiple0   Live Births1       # Outcome Date GA Lbr Walter/2nd Weight Sex Delivery Anes PTL Lv   2 Current            1  06/10/16 32w5d  2.37 kg (5 lb 3.6 oz) M CS-LTranv Gen  BARBARA        PMHx:   Past Medical History:   Diagnosis Date     Diabetes (H)      Microalbuminuria 2016     Type I diabetes mellitus, uncontrolled (H) 2016     PSHx:   Past Surgical History:   Procedure Laterality Date      SECTION N/A 6/10/2016    Procedure:  SECTION;  Surgeon: Richardson Duran MD;  Location:  OR     Meds:   Prescriptions Prior to Admission   Medication Sig Dispense Refill Last Dose     insulin detemir (LEVEMIR FLEXPEN/FLEXTOUCH) 100 UNIT/ML injection Inject 14 Units Subcutaneous 2 times daily Take first dose at midnight, next dose at 12 pm. 3 mL 3 2017 at 1100     blood glucose monitoring (NO BRAND SPECIFIED) test strip Use to test blood sugars 4 times daily or as directed 100 strip 3 2017 at Unknown time     blood glucose monitoring (NO BRAND SPECIFIED) test strip Use to test blood sugar 4 times daily or as directed. 100 strip 3 2017 at Unknown time     blood glucose monitoring (ONETOUCH VERIO) meter device kit Use to test blood sugar 4 times daily or as directed.  1 kit 0 9/23/2017 at Unknown time     insulin aspart (NOVOLOG PEN) 100 UNIT/ML injection Inject 5 Units Subcutaneous 3 times daily (with meals) 20 mL 0 9/24/2017 at 1100     polyethylene glycol (MIRALAX/GLYCOLAX) Packet Take 17 g by mouth daily as needed for constipation (titrate to one bowel movement daily) 7 packet 1 9/23/2017 at Unknown time     senna-docusate (SENOKOT-S;PERICOLACE) 8.6-50 MG per tablet Take 1 tablet by mouth 2 times daily as needed for constipation 100 tablet 0 9/23/2017 at Unknown time     metoclopramide (REGLAN) 10 MG tablet Take 1 tablet (10 mg) by mouth 4 times daily as needed (4x Daily AC & HS prn nausea and abdominal pain) 60 tablet 0 9/23/2017 at Unknown time     pantoprazole (PROTONIX) 40 MG EC tablet Take 1 tablet (40 mg) by mouth 2 times daily 30 tablet 1 9/23/2017 at Unknown time     ondansetron (ZOFRAN ODT) 4 MG ODT tab Take 1 tablet (4 mg) by mouth every 8 hours as needed for nausea 20 tablet 1 9/23/2017 at Unknown time     oxyCODONE (OXY-IR) 5 MG capsule Take 1 capsule (5 mg) by mouth every 4 hours as needed for moderate to severe pain 24 capsule 0 9/23/2017 at Unknown time     OLANZapine zydis (ZYPREXA) 5 MG disintegrating tablet Take 1 tablet (5 mg) by mouth 2 times daily 60 tablet 3 9/23/2017 at Unknown time     omeprazole (PRILOSEC) 20 MG capsule Take 1 capsule (20 mg) by mouth every morning (before breakfast) 30 capsule 3 9/23/2017 at Unknown time     Prenatal Vit-Fe Fumarate-FA (PRENATAL VITAMIN) 27-0.8 MG TABS Take 1 tablet by mouth daily 90 tablet 3 9/23/2017 at Unknown time     folic acid (FOLVITE) 1 MG tablet Take 1 tablet (1 mg) by mouth daily 30 tablet 0 9/23/2017 at Unknown time     blood glucose monitoring (ONE TOUCH DELICA) lancets Use to test blood sugar 4 times daily or as directed. 100 each 3      insulin aspart (NOVOLOG PEN) 100 UNIT/ML injection Inject 1-9 Units Subcutaneous At Bedtime Correction Scale - custom DOSING     Do Not give Bedtime Correction Insulin  if BG less than 200  -190 = 1 unit.  -240 = 2 units.  -290 = 3 units.  -340 = 4 units.    Notify provider if glucose greater than or equal to 350 mg/dL after administration. (Patient taking differently: Inject 1 Units Subcutaneous At Bedtime Correction Scale - custom DOSING     Do Not give Bedtime Correction Insulin if BG less than 200  -190 = 1 unit.  -240 = 2 units.  -290 = 3 units.  -340 = 4 units.    Notify provider if glucose greater than or equal to 350 mg/dL after administration.) 3 mL 3 t at 0200     famotidine (PEPCID) 20 MG tablet Take 1 tablet (20 mg) by mouth 2 times daily 40 tablet 1      mirtazapine (REMERON SOL-TAB) 15 MG disintegrating tablet 1 tablet (15 mg) by Orally disintegrating tablet route At Bedtime 60 tablet 2 6/1/2016 at Bedtime     Allergies:  No Known Allergies   FmHx:   Family History   Problem Relation Age of Onset     DIABETES Maternal Grandmother      CEREBROVASCULAR DISEASE Paternal Grandmother      Coronary Artery Disease Paternal Grandmother      Hypertension No family hx of      Hyperlipidemia No family hx of      Breast Cancer No family hx of      Colon Cancer No family hx of      Prostate Cancer No family hx of      Other Cancer No family hx of      Depression No family hx of      Anxiety Disorder No family hx of      MENTAL ILLNESS No family hx of      Substance Abuse No family hx of      Anesthesia Reaction No family hx of      Asthma No family hx of      OSTEOPOROSIS No family hx of      Genetic Disorder No family hx of      Thyroid Disease No family hx of      Obesity No family hx of      SocHx: She denies any tobacco, alcohol, or other drug use during this pregnancy    ROS:   Complete 10-point ROS negative except as noted in HPI    PE: Vitals: /79  Pulse 97  Resp 22  SpO2 97%     Gen: Lying in bed, writhing and moaning, appears in moderate distress   CV: RRR, no MRG  Pulm: LS clear, no wheezes or crackles  Abd: Tender  to epigastrium, voluntary guarding, soft, gravid, non-distended  Ext: B/l peripheral pulses present, non-edematous  Cx: Deferred            FHT: Baseline 120, moderate variability, accelerations present, appropriate for gestational age  [place]: Irritable    Assessment/Plan:  Ms. Anne Gunter is a 27 year old  at 27w4d by stated dates consistent with 25w5d ultrasound, admitted with severe abdominal pain and elevated blood glucose. She is currently admitted for medical management and stabilization. Patient has abdominal pain likely 2/2 diabetic gastroparesis, which is worsened by hyperglycemia as well as with non-adherence to dietary modifications. GI consult with previous admission. No concerns for  labor at this time. GI consult on previous admission without acute concerns but considered upper GI should symptoms persist, will consult in AM. Does not appear to be labor.  No signs of DKA this admission.      Type I DM  Started IV x 2 (with extreme difficulty, will need to consider alternative access with midline/PICC, etc).  and NS fluids, insulin drip, then D5/NS/KCl. Clinically stable. Will address PICC access in the morning. Will obtain labs to rule out DKA and other causes of abdominal pain (see below).  - Endocrine consult  - D5/NS/KCl  - Insulin drip  - Ketones and CMP  - BG checks q hour while not taking oral intake and on insulin drip.   - Hypoglycemic protocol  - K and Mag replacement protocols  - Consider PICC in AM    Abdominal Pain/Malnutrition   Likely related to gastroparesis in the setting of poorly controlled type DM. No formal gastric emptying study. Will consult GI again tomorrow for further recommendations on gastroparesis treatment as well as feeding options and other possible causes of her abdominal pain such as PUD, ischemia, etc. In the meantime, patient should remain NPO. She responded well to IV Dilaudid during recent admissions, so will continue pain management. Prefer Zofran as  last line anti-emetic due to decreased motility side effect.  - GI consult in AM  - NPO  - EKG  - Troponin  - IV tylenol and IV dilaudid PRN  - IV Protonix and Pepcid  - IV Reglan  - Nausea and Vomiting: PRN IV Compazine, Phenergan, Zofran    Anxiety   psychology has been consulted (2017) on recent admission. Per their assessment, patient has KELLI and would benefit from CBT. Social work recommends working toward relationship with family as well to have trusted health care advocate for the patient.   - Consult  psychology (for follow up)  - Will continue to reach out to sister and father as they are living here with her and offer to facetime or conference call with her  in [place] as he is able      FWB  Reassuring for gestational age.  - TID monitoring      PNC  NOB labs collected 9/10/17: Rh pos, Rubella IgG positive, Hep B nonreactive, HIV nonreactive, GBS positive, placenta posterior, GC/Chlam negative.   - GBS+, penicillin if delivery imminent  - Begin weekly BPP next week @28w  - Tdap next week if still inpatient    Amanda Jackson  MS-4, [employer]  Maternal Fetal Medicine, [facility]    Scribe Disclosure:   I, Amanda Jackson, am serving as a scribe; to document services personally performed by Dr. Hart based on data collection and the provider's statements to me.     Attestation:   The documentation recorded by the scribe accurately reflects the services I personally performed and the decisions made by me.   Sonia Hart DO  Maternal Fetal Medicine Specialist           Time Spent on this Encounter   ISonia DO, spent a total of 45 minutes face to face or coordinating care of Anne Gunter.  Over 50% of my time on the unit was spent counseling the patient and/or coordinating care regarding poorly controlled type 1 DM, gastroparesis, high risk pregnancy.

## 2017-09-25 ENCOUNTER — APPOINTMENT (OUTPATIENT)
Dept: ULTRASOUND IMAGING | Facility: CLINIC | Age: 27
End: 2017-09-25
Payer: COMMERCIAL

## 2017-09-25 LAB
ANION GAP SERPL CALCULATED.3IONS-SCNC: 9 MMOL/L (ref 3–14)
BUN SERPL-MCNC: 5 MG/DL (ref 7–30)
CALCIUM SERPL-MCNC: 7.2 MG/DL (ref 8.5–10.1)
CHLORIDE SERPL-SCNC: 109 MMOL/L (ref 94–109)
CO2 SERPL-SCNC: 21 MMOL/L (ref 20–32)
CREAT SERPL-MCNC: 0.38 MG/DL (ref 0.52–1.04)
GFR SERPL CREATININE-BSD FRML MDRD: >90 ML/MIN/1.7M2
GLUCOSE BLDC GLUCOMTR-MCNC: 103 MG/DL (ref 70–99)
GLUCOSE BLDC GLUCOMTR-MCNC: 103 MG/DL (ref 70–99)
GLUCOSE BLDC GLUCOMTR-MCNC: 104 MG/DL (ref 70–99)
GLUCOSE BLDC GLUCOMTR-MCNC: 104 MG/DL (ref 70–99)
GLUCOSE BLDC GLUCOMTR-MCNC: 107 MG/DL (ref 70–99)
GLUCOSE BLDC GLUCOMTR-MCNC: 108 MG/DL (ref 70–99)
GLUCOSE BLDC GLUCOMTR-MCNC: 109 MG/DL (ref 70–99)
GLUCOSE BLDC GLUCOMTR-MCNC: 112 MG/DL (ref 70–99)
GLUCOSE BLDC GLUCOMTR-MCNC: 117 MG/DL (ref 70–99)
GLUCOSE BLDC GLUCOMTR-MCNC: 122 MG/DL (ref 70–99)
GLUCOSE BLDC GLUCOMTR-MCNC: 132 MG/DL (ref 70–99)
GLUCOSE BLDC GLUCOMTR-MCNC: 78 MG/DL (ref 70–99)
GLUCOSE BLDC GLUCOMTR-MCNC: 86 MG/DL (ref 70–99)
GLUCOSE BLDC GLUCOMTR-MCNC: 88 MG/DL (ref 70–99)
GLUCOSE BLDC GLUCOMTR-MCNC: 88 MG/DL (ref 70–99)
GLUCOSE BLDC GLUCOMTR-MCNC: 89 MG/DL (ref 70–99)
GLUCOSE BLDC GLUCOMTR-MCNC: 93 MG/DL (ref 70–99)
GLUCOSE BLDC GLUCOMTR-MCNC: 94 MG/DL (ref 70–99)
GLUCOSE BLDC GLUCOMTR-MCNC: 95 MG/DL (ref 70–99)
GLUCOSE BLDC GLUCOMTR-MCNC: 97 MG/DL (ref 70–99)
GLUCOSE BLDC GLUCOMTR-MCNC: 97 MG/DL (ref 70–99)
GLUCOSE BLDC GLUCOMTR-MCNC: 98 MG/DL (ref 70–99)
GLUCOSE BLDC GLUCOMTR-MCNC: 98 MG/DL (ref 70–99)
GLUCOSE SERPL-MCNC: 95 MG/DL (ref 70–99)
INTERPRETATION ECG - MUSE: NORMAL
KETONES BLD-SCNC: 0.2 MMOL/L (ref 0–0.6)
POTASSIUM SERPL-SCNC: 3.6 MMOL/L (ref 3.4–5.3)
SODIUM SERPL-SCNC: 139 MMOL/L (ref 133–144)

## 2017-09-25 PROCEDURE — 90715 TDAP VACCINE 7 YRS/> IM: CPT | Performed by: OBSTETRICS & GYNECOLOGY

## 2017-09-25 PROCEDURE — 25000128 H RX IP 250 OP 636: Performed by: OBSTETRICS & GYNECOLOGY

## 2017-09-25 PROCEDURE — 25000125 ZZHC RX 250: Performed by: OBSTETRICS & GYNECOLOGY

## 2017-09-25 PROCEDURE — 80048 BASIC METABOLIC PNL TOTAL CA: CPT | Performed by: OBSTETRICS & GYNECOLOGY

## 2017-09-25 PROCEDURE — 12000032 ZZH R&B OB CRITICAL UMMC

## 2017-09-25 PROCEDURE — 25800025 ZZH RX 258: Performed by: OBSTETRICS & GYNECOLOGY

## 2017-09-25 PROCEDURE — S0028 INJECTION, FAMOTIDINE, 20 MG: HCPCS | Performed by: OBSTETRICS & GYNECOLOGY

## 2017-09-25 PROCEDURE — 00000146 ZZHCL STATISTIC GLUCOSE BY METER IP

## 2017-09-25 PROCEDURE — 82010 KETONE BODYS QUAN: CPT | Performed by: OBSTETRICS & GYNECOLOGY

## 2017-09-25 PROCEDURE — 76700 US EXAM ABDOM COMPLETE: CPT

## 2017-09-25 PROCEDURE — 36415 COLL VENOUS BLD VENIPUNCTURE: CPT | Performed by: OBSTETRICS & GYNECOLOGY

## 2017-09-25 RX ORDER — HYDROMORPHONE HCL/0.9% NACL/PF 0.2MG/0.2
.1-.2 SYRINGE (ML) INTRAVENOUS EVERY 12 HOURS PRN
Status: DISCONTINUED | OUTPATIENT
Start: 2017-09-25 | End: 2017-09-25

## 2017-09-25 RX ORDER — HYDROMORPHONE HYDROCHLORIDE 1 MG/ML
0.3 INJECTION, SOLUTION INTRAMUSCULAR; INTRAVENOUS; SUBCUTANEOUS
Status: DISCONTINUED | OUTPATIENT
Start: 2017-09-25 | End: 2017-09-25

## 2017-09-25 RX ORDER — HYDROMORPHONE HCL/0.9% NACL/PF 0.2MG/0.2
.1-.2 SYRINGE (ML) INTRAVENOUS
Status: DISCONTINUED | OUTPATIENT
Start: 2017-09-25 | End: 2017-09-29 | Stop reason: HOSPADM

## 2017-09-25 RX ORDER — PROMETHAZINE HYDROCHLORIDE 25 MG/1
25 SUPPOSITORY RECTAL EVERY 8 HOURS
Status: DISCONTINUED | OUTPATIENT
Start: 2017-09-25 | End: 2017-09-29

## 2017-09-25 RX ADMIN — Medication 10 MEQ: at 12:23

## 2017-09-25 RX ADMIN — Medication 0.2 MG: at 22:52

## 2017-09-25 RX ADMIN — ONDANSETRON 4 MG: 2 INJECTION INTRAMUSCULAR; INTRAVENOUS at 13:42

## 2017-09-25 RX ADMIN — ONDANSETRON 4 MG: 2 INJECTION INTRAMUSCULAR; INTRAVENOUS at 06:00

## 2017-09-25 RX ADMIN — Medication 10 MEQ: at 10:38

## 2017-09-25 RX ADMIN — ACETAMINOPHEN 1000 MG: 10 INJECTION, SOLUTION INTRAVENOUS at 17:59

## 2017-09-25 RX ADMIN — Medication 0.2 MG: at 19:45

## 2017-09-25 RX ADMIN — METOCLOPRAMIDE 10 MG: 5 INJECTION, SOLUTION INTRAMUSCULAR; INTRAVENOUS at 03:34

## 2017-09-25 RX ADMIN — METOCLOPRAMIDE 10 MG: 5 INJECTION, SOLUTION INTRAMUSCULAR; INTRAVENOUS at 16:18

## 2017-09-25 RX ADMIN — HYDROMORPHONE HYDROCHLORIDE 0.5 MG: 1 INJECTION, SOLUTION INTRAMUSCULAR; INTRAVENOUS; SUBCUTANEOUS at 02:43

## 2017-09-25 RX ADMIN — ONDANSETRON 4 MG: 2 INJECTION INTRAMUSCULAR; INTRAVENOUS at 00:10

## 2017-09-25 RX ADMIN — METOCLOPRAMIDE 10 MG: 5 INJECTION, SOLUTION INTRAMUSCULAR; INTRAVENOUS at 09:38

## 2017-09-25 RX ADMIN — HYDROMORPHONE HYDROCHLORIDE 0.3 MG: 1 INJECTION, SOLUTION INTRAMUSCULAR; INTRAVENOUS; SUBCUTANEOUS at 18:18

## 2017-09-25 RX ADMIN — FAMOTIDINE 20 MG: 10 INJECTION, SOLUTION INTRAVENOUS at 02:37

## 2017-09-25 RX ADMIN — DEXTROSE MONOHYDRATE, SODIUM CHLORIDE, AND POTASSIUM CHLORIDE: 50; 9; 1.49 INJECTION, SOLUTION INTRAVENOUS at 20:25

## 2017-09-25 RX ADMIN — HUMAN INSULIN 1 UNITS/HR: 100 INJECTION, SOLUTION SUBCUTANEOUS at 14:03

## 2017-09-25 RX ADMIN — HYDROMORPHONE HYDROCHLORIDE 0.3 MG: 1 INJECTION, SOLUTION INTRAMUSCULAR; INTRAVENOUS; SUBCUTANEOUS at 13:46

## 2017-09-25 RX ADMIN — ONDANSETRON 4 MG: 2 INJECTION INTRAMUSCULAR; INTRAVENOUS at 19:43

## 2017-09-25 RX ADMIN — ACETAMINOPHEN 1000 MG: 10 INJECTION, SOLUTION INTRAVENOUS at 02:50

## 2017-09-25 RX ADMIN — PANTOPRAZOLE SODIUM 40 MG: 40 INJECTION, POWDER, FOR SOLUTION INTRAVENOUS at 07:56

## 2017-09-25 RX ADMIN — HUMAN INSULIN 3 UNITS/HR: 100 INJECTION, SOLUTION SUBCUTANEOUS at 20:30

## 2017-09-25 RX ADMIN — HUMAN INSULIN 2 UNITS/HR: 100 INJECTION, SOLUTION SUBCUTANEOUS at 16:28

## 2017-09-25 RX ADMIN — ACETAMINOPHEN 1000 MG: 10 INJECTION, SOLUTION INTRAVENOUS at 10:10

## 2017-09-25 RX ADMIN — DEXTROSE MONOHYDRATE, SODIUM CHLORIDE, AND POTASSIUM CHLORIDE: 50; 9; 1.49 INJECTION, SOLUTION INTRAVENOUS at 01:39

## 2017-09-25 RX ADMIN — HYDROMORPHONE HYDROCHLORIDE 0.5 MG: 1 INJECTION, SOLUTION INTRAMUSCULAR; INTRAVENOUS; SUBCUTANEOUS at 00:06

## 2017-09-25 RX ADMIN — SODIUM CHLORIDE: 9 INJECTION, SOLUTION INTRAVENOUS at 15:30

## 2017-09-25 RX ADMIN — Medication 0.2 MG: at 21:36

## 2017-09-25 RX ADMIN — HYDROMORPHONE HYDROCHLORIDE 0.3 MG: 1 INJECTION, SOLUTION INTRAMUSCULAR; INTRAVENOUS; SUBCUTANEOUS at 05:58

## 2017-09-25 RX ADMIN — FAMOTIDINE 20 MG: 10 INJECTION, SOLUTION INTRAVENOUS at 14:53

## 2017-09-25 RX ADMIN — HYDROMORPHONE HYDROCHLORIDE 0.3 MG: 1 INJECTION, SOLUTION INTRAMUSCULAR; INTRAVENOUS; SUBCUTANEOUS at 09:32

## 2017-09-25 RX ADMIN — METOCLOPRAMIDE 10 MG: 5 INJECTION, SOLUTION INTRAMUSCULAR; INTRAVENOUS at 21:41

## 2017-09-25 RX ADMIN — CLOSTRIDIUM TETANI TOXOID ANTIGEN (FORMALDEHYDE INACTIVATED), CORYNEBACTERIUM DIPHTHERIAE TOXOID ANTIGEN (FORMALDEHYDE INACTIVATED), BORDETELLA PERTUSSIS TOXOID ANTIGEN (GLUTARALDEHYDE INACTIVATED), BORDETELLA PERTUSSIS FILAMENTOUS HEMAGGLUTININ ANTIGEN (FORMALDEHYDE INACTIVATED), BORDETELLA PERTUSSIS PERTACTIN ANTIGEN, AND BORDETELLA PERTUSSIS FIMBRIAE 2/3 ANTIGEN 0.5 ML: 5; 2; 2.5; 5; 3; 5 INJECTION, SUSPENSION INTRAMUSCULAR at 10:53

## 2017-09-25 RX ADMIN — HYDROMORPHONE HYDROCHLORIDE 0.3 MG: 1 INJECTION, SOLUTION INTRAMUSCULAR; INTRAVENOUS; SUBCUTANEOUS at 16:17

## 2017-09-25 RX ADMIN — DEXTROSE MONOHYDRATE, SODIUM CHLORIDE, AND POTASSIUM CHLORIDE: 50; 9; 1.49 INJECTION, SOLUTION INTRAVENOUS at 09:40

## 2017-09-25 NOTE — CONSULTS
GASTROENTEROLOGY CONSULTATION      Date of Admission:  2017  Consulting physician: Adri Hernandez MD          ASSESSMENT AND RECOMMENDATIONS:     Assessment:  26 yo F   at 27 wks with h/o DM1, DKA presenting with abdominal pain for 1 day. Most likely gastritis, PUD, esophagitis. Possible component of gastroparesis too, but unlikely for gastroparesis to cause that degree of pain. Never had EGD before. Does have history of iron deficiency anemia. LFTs normal, no h/o gallstone disease.      Recommendations    Check CBC w diff, amylase, lipase,  iron studies - ferritin, TIBC, folate, vitamin B12, vitamin A, D, E and K, TSH, tTG     USG abdomen with doppler - assess gallbladder, liver and mesenteric flow.     Will consider EGD under general anesthesia at [place] if patient agrees, will coordinate with primary team.     Continue PRN and scheduled antiemetics, PPI IV BID.     Nutrition consult for gastroparesis diet.     Will avoid NJ tube if agrees to EGD, consider it after the procedure.       Thank you for involving us in this patient's care. Please do not hesitate to contact the GI service with any questions or concerns.   Pt care plan discussed with Dr. Sanchez and primary team, GI staff physician.    Felice Persaud  GI fellow    ATTENDING ATTESTATION:    REFERRING PROVIDER: Tanner  DATE SEEN: 17    Patient was discussed, seen, and examined by me, Nile Sanchez. The plan of care and pertinent data/imaging were also reviewed with the GI Consult team and GI Fellow as well. Agree with the joint assessment and plan as delineated above.    Please contact me with any further questions.    Nile Sanchez MD    [facility] - Department of Medicine  Division of Gastroenterology  [phone]           Chief Complaint:     Abdominal pain.          HPI:     26 yo F   at 27 wks with h/o DM1, DKA presenting with abdominal pain for 1 day. Pain is located in epigastric  location, around 10/10, no radiation, no relieving or aggravating factors. Denied any melena, having normal BM daily. Denied any NSAIDs, h/o PUD or gallstones. She had multiple admission for DKA and similar abdominal pain. She had similar abdominal pain around 2 wks ago when admitted with DKA. She thinks she had similar pain during her last pregnancy. She multiple visits to ED and left AMA too. Currently she is on IV Dilaudid for pain control. She is not able to eat anything for past couple of days, currently refusing NJ tube placement and PICC line.            Past Medical History:     Reviewed and edited as appropriate  Past Medical History:   Diagnosis Date     Diabetes (H)      Microalbuminuria 2016     Type I diabetes mellitus, uncontrolled (H) 2016            Past Surgical History:   Reviewed and edited as appropriate   Past Surgical History:   Procedure Laterality Date      SECTION N/A 6/10/2016    Procedure:  SECTION;  Surgeon: Richardson Duran MD;  Location: [facility] OR            Previous Endoscopy:   No results found for this or any previous visit.         Social History:   Reviewed and edited as appropriate  Social History     Social History     Marital status:      Spouse name: N/A     Number of children: N/A     Years of education: N/A     Occupational History     Not on file.     Social History Main Topics     Smoking status: Never Smoker     Smokeless tobacco: Never Used     Alcohol use No     Drug use: No     Sexual activity: Yes     Partners: Male     Other Topics Concern     Not on file     Social History Narrative            Family History:   Reviewed and edited as appropriate  Family History   Problem Relation Age of Onset     DIABETES Maternal Grandmother      CEREBROVASCULAR DISEASE Paternal Grandmother      Coronary Artery Disease Paternal Grandmother      Hypertension No family hx of      Hyperlipidemia No family hx of      Breast Cancer No family hx of       Colon Cancer No family hx of      Prostate Cancer No family hx of      Other Cancer No family hx of      Depression No family hx of      Anxiety Disorder No family hx of      MENTAL ILLNESS No family hx of      Substance Abuse No family hx of      Anesthesia Reaction No family hx of      Asthma No family hx of      OSTEOPOROSIS No family hx of      Genetic Disorder No family hx of      Thyroid Disease No family hx of      Obesity No family hx of            Allergies:   Reviewed and edited as appropriate   No Known Allergies         Medications:     Current Facility-Administered Medications   Medication     HYDROmorphone (PF) (DILAUDID) injection 0.3 mg     promethazine (PHENERGAN) Suppository 25 mg     [START ON 9/26/2017] enoxaparin (LOVENOX) injection 40 mg     lidocaine 1 % 1 mL     lidocaine (LMX4) kit     sodium chloride (PF) 0.9% PF flush 3 mL     sodium chloride (PF) 0.9% PF flush 3 mL     pantoprazole (PROTONIX) 40 mg IV push injection     metoclopramide (REGLAN) injection 10 mg     polyethylene glycol (MIRALAX/GLYCOLAX) Packet 17 g     dextrose 10 % 1,000 mL infusion     0.9% sodium chloride infusion     insulin 1 unit/mL in saline (novoLIN-Regular) drip - High Intensity Infusion     glucose 40 % gel 15-30 g    Or     dextrose 50 % injection 25-50 mL    Or     glucagon injection 1 mg     naloxone (NARCAN) injection 0.1-0.4 mg     famotidine (PEPCID) injection 20 mg     ondansetron (ZOFRAN) injection 4 mg     prochlorperazine (COMPAZINE) injection 5-10 mg     dextrose 5% and 0.9% NaCl with potassium chloride 20 mEq infusion     potassium chloride SA (K-DUR/KLOR-CON M) CR tablet 20-40 mEq     potassium chloride (KLOR-CON) Packet 20-40 mEq     potassium chloride 10 mEq in 100 mL intermittent infusion     potassium chloride 10 mEq in 100 mL intermittent infusion with 10 mg lidocaine     potassium chloride 20 mEq in 50 mL intermittent infusion     magnesium sulfate 2 g in NS intermittent infusion (PharMEDium or  FV Cmpd)     magnesium sulfate 4 g in 100 mL sterile water (premade)     acetaminophen (OFIRMEV) infusion 1,000 mg     acetaminophen (TYLENOL) Suppository 650 mg             Review of Systems:   A complete review of systems was performed and is negative except as noted in the HPI           Physical Exam:     /63  Pulse 97  Temp 98.9  F (37.2  C) (Oral)  Resp 16  Wt 75.5 kg (166 lb 6.4 oz)  SpO2 100%  BMI 26.12 kg/m2  Wt:   Wt Readings from Last 2 Encounters:   09/25/17 75.5 kg (166 lb 6.4 oz)   09/23/17 77.9 kg (171 lb 12.8 oz)        Constitutional: cooperative, mild distress  Eyes: Sclera anicteric/ no pallor  Ears/nose/mouth/throat:  mucus membranes moist,  CV: Normal S1, S2, no murmurs.  Respiratory: clear to auscultation b/l, no murmur  Abd: Nondistended, +bs, mild tenderness in epigastric region, no peritoneal signs.  Skin: warm, perfused, no jaundice  Neuro: AAO x 3, no focal motor sensory deficits           Data:   Labs and imaging below were independently reviewed and interpreted    BMP  Recent Labs  Lab 09/25/17  0617 09/24/17  2048 09/24/17  1522 09/22/17  0632    141 139 138   POTASSIUM 3.6 3.7 3.9 4.0   CHLORIDE 109 109 107 108   LILLIAM 7.2* 7.4* 7.7* 7.6*   CO2 21 22 22 21   BUN 5* 7 8 4*   CR 0.38* 0.39* 0.46* 0.42*   GLC 95 116* 184* 141*     CBC  Recent Labs  Lab 09/24/17  1522 09/18/17  2125 09/18/17  1545   WBC 8.9 13.6* 13.7*   RBC 3.45* 4.12 4.17   HGB 9.2* 10.7* 11.1*   HCT 28.3* 33.5* 34.4*   MCV 82 81 83   MCH 26.7 26.0* 26.6   MCHC 32.5 31.9 32.3   RDW 15.6* 15.3* 15.5*    228 250     INRNo lab results found in last 7 days.  LFTs  Recent Labs  Lab 09/24/17  1522 09/18/17  2125 09/18/17  1545   ALKPHOS 58 64 66   AST 6 7 8   ALT 13 13 12   BILITOTAL 0.3 0.5 0.5   PROTTOTAL 6.7* 7.2 7.9   ALBUMIN 2.4* 2.6* 3.1*      PANC  Recent Labs  Lab 09/18/17  2125   LIPASE 100   AMYLASE 44       Imaging: reviewed

## 2017-09-25 NOTE — PROGRESS NOTES
"[facility] Antepartum Progress Note    9/25/2017  Hospital Day #2  Anne Gunter  GA: 27w5d    S: Patient did not sleep well due to nausea, vomiting, and abdominal pain. She requests water to drink, then vomits after. She reports she would rather have water to vomit rather than retching/vomiting bile. She is not willing to use mouth swabs. She reports the pain medication helps control her nausea and vomiting. She requests max dose IV Dilaudid every 2-3 hours on top of IV scheduled Tylenol. When asked what would help her feel better, she replies \"nothing.\" She reports she does not have stomach problems outside of pregnancy. Denies fever, headache, chest pain, shortness of breath, leg pain. Feeling fetal movement. Denies contractions, vaginal bleeding, loss of fluid.    O:  BP (!) 88/46  Pulse 97  Temp 98.4  F (36.9  C) (Oral)  Resp 16  Wt 75.5 kg (166 lb 6.4 oz)  SpO2 100%  BMI 26.12 kg/m2    Gen:                Sitting up in bed, vomiting, pale, in moderate distress  CV:                 B/l peripheral pulses present  Pulm:              Breathing comfortably on RA  Abd:                Tender to epigastrium, voluntary guarding, soft, gravid, non-distended  Ext:                 Non-edematous    Recent Labs  Lab 09/25/17  0945 09/25/17  0836 09/25/17  0735 09/25/17  0617 09/25/17  0539 09/25/17  0436 09/25/17  0334  09/24/17  2048  09/24/17  1522  09/22/17  0632  09/20/17  1005  09/20/17  0635   GLC  --   --   --  95  --   --   --   --  116*  --  184*  --  141*  --  143*  --  328*   * 88 97  --  95 104* 103*  < >  --   < >  --   < >  --   < >  --   < >  --    < > = values in this interval not displayed.    BMP 9/252017   Na 139  K 3.6  Cl 109  Bicarb 21  AG 9  BUN 5  Cr. 0.38  Ca 7.2  Ketone (serum) 0.2    Medications:  Infusion  - Insulin 1u/hr  - D5/NS/KCL 20 @ 125 cc/hr  - KCL 10 mEq   Scheduled   - Reglan  - Pepcid  - Phenergen suppository  - Protonix  - IV acetaminophen  PRN  - IV hydromorphone  - IV " ondansetron  - IV prochlorperazine    FHT:               Baseline 120, moderate variability, accelerations present, appropriate for gestational age  [place]:              Irritable     Assessment/Plan:  Ms. Anne Gunter is a 27 year old  at 27w5d by stated dates consistent with 25w5d ultrasound admitted to antepartum with severe abdominal pain and elevated blood glucose. She is admitted for medical management and stabilization. Patient has abdominal pain likely 2/2 diabetic gastroparesis, which is worsened by hyperglycemia as well as with non-adherence to dietary modifications. GI consult on previous admission without acute concerns but considered upper GI should symptoms persist.      Type I DM  On arrival, started IV x 2 (with extreme difficulty, will need to consider alternative access with midline/PICC, etc.) and NS fluids, insulin drip, then D5/NS/KCl.  Received 10 mEq KCl IV replacement overnight and another today. Will address PICC this afternoon when patient is able to participate in conversation. Not in DKA, ketones have normalized, electrolytes WNL. BGs 90s-100s. Requiring 1U/hr overnight and today. Clinically stable. Endocrine is following closely.  - Appreciate endocrine recommendations  - Nutrition consult  - D5/NS/KCl  - Insulin drip until clinically improved, tolerating PO, off D5, per endo  - BG checks q hour while not taking oral intake and on insulin drip  - Hypoglycemic protocol  - K and Mag replacement protocols  - Consider PICC     Abdominal Pain/Malnutrition   Likely related to gastroparesis in the setting of poorly controlled type DM. No formal gastric emptying study. Awaiting GI recommendations for further direction on gastroparesis treatment as well as feeding options and other possible causes of her abdominal pain such as PUD, etc. In the meantime, patient should remain NPO. She responded well to IV Dilaudid during recent admissions, so will continue this pain management. Prefer  Zofran as last line anti-emetic due to decreased motility side effect.   - Please do not allow to drink water (NPO) as she vomits after this intake, which further increases her abdominal pain.  - GI consult   - IV tylenol and IV dilaudid PRN  - IV Protonix and Pepcid  - IV Reglan  - Nausea and Vomiting: PRN IV Compazine, Phenergan, Zofran     Anxiety   psychology was consulted (2017) on recent admission and re-consulted for follow up for this admission. Per their assessment, patient has KELLI and would benefit from CBT. Social work recommends working toward relationship with family as well to have trusted health care advocate for the patient.   - Consult  psychology (for follow up)  - Appreciate social work recommendations  - Will continue to reach out to sister and father as they are living here with her and offer to facetime or conference call with her  in [place] as he is able      FWB  Reassuring for gestational age.  - TID monitoring      PNC  NOB labs collected 9/10/17: Rh pos, Rubella IgG positive, Hep B nonreactive, HIV nonreactive, GBS positive, placenta posterior, GC/Chlam negative.   - GBS+, penicillin if delivery imminent  - Begin weekly BPP next week @28w  - Tdap this week if still inpatient     Amanda Jackson  MS-4, [employer]  Maternal Fetal Medicine, [facility]    Scribe Disclosure:   I, Amanda Jackson, am serving as a scribe; to document services personally performed by Dr. Hart based on data collection and the provider's statements to me.     Attestation:   The documentation recorded by the scribe accurately reflects the services I personally performed and the decisions made by me.   Sonia Hart DO  Maternal Fetal Medicine Specialist           Time Spent on this Encounter   ISonia DO, spent a total of 20 minutes face to face or coordinating care of Anne Gunter.  Over 50% of my time on the unit was spent  counseling the patient and/or coordinating care regarding management of abd pain, N/V, dehyration.

## 2017-09-25 NOTE — PLAN OF CARE
Problem: Diabetes, Type 1 (Adult)  Goal: Signs and Symptoms of Listed Potential Problems Will be Absent, Minimized or Managed (Diabetes, Type 1)  Signs and symptoms of listed potential problems will be absent, minimized or managed by discharge/transition of care (reference Diabetes, Type 1 (Adult) CPG).  Outcome: Improving  Pt blood glucose well managed with insulin infusion.      Abdominal pain managed with IV dilaudid and IV tylenol, pt with inconsistent pain control.      N/V continue intermittently.      VSS.      EFM category 1, [place] with irritability.      Continue to monitor closely per protocol.

## 2017-09-25 NOTE — PROGRESS NOTES
SW unable to see today due to pt not feeling well and other consulting providers needing time. SW extended [place]  until 9:30am tomorrow so I can complete my assessment with her.     Marilu Hernandez [facility]   Social Worker  Maternal Child Health    Phone: [phone]  Pager:  [phone]

## 2017-09-25 NOTE — PROGRESS NOTES
CLINICAL NUTRITION SERVICES - ASSESSMENT NOTE     Nutrition Prescription    RECOMMENDATIONS FOR MDs/PROVIDERS TO ORDER:  1. As appropriate per team discretion, recommend advance diet > NPO     2. Highly recommend pt to follow with outpatient diabetes educator/RD for ongoing support and continued education to improve glycemic control.     Malnutrition Status:    Non-severe malnutrition in the context of acute illness.     Recommendations already ordered by Registered Dietitian (RD):  None today.     Future/Additional Recommendations:  1. Once diet adv > NPO, recommend ordering Glucerna (vanilla) @ lunch and Magic Cup (vanilla) @ dinner to promote po intake. If/when diet advancement > NPO, recommend ordering calorie counts to assess adequacy of po, given pts hx of inadequate po during pregnancy with wt loss.     2. If unable to adv diet and/or PO intakes inadequate, consider need to place FT and start TF.  If this becomes plan of care, please consult Registered Dietitian to Assess and Order TF per MNT protocol.    -- Would recommend goal TwoCal HN @ 50 ml/hr (1200 ml/day) to provide 2400 kcals (32 Kacls/kg/day), 101 g PRO (1.3 gms/kg/day), 840 ml free H2O, 263 g CHO and 6 g Fiber daily.  -- Given wt loss and likely inadequate po, patient is at refeeding risk. Would start TF @ 10 mL/hr and adv by 10 mL q 8-12 hours as tolerated to goal rate.  Would only start and adv TF rate if K+/Mg++ >/= nrml and phos >1.9.      3. If pt is NOT agreeable to TF, would recommend starting TPN as follows:  PN @ 40 mL/hr continuous to provide 960 mL (or volume per MD/pharmacy), 200 g dextrose, GIR 2.1, 90 g AA (1.4 g/kg protein), and 250 mL 20% lipids x 5 days/wk (26% kcals from fat) to provide 1390 kcals (21 kcal/kg) to meet 100% assessed kcal and pro needs.   -- Recommend weekly TG, liver function labs.     4. Provide education for carb counting/ gestational diabetes diet education.      REASON FOR ASSESSMENT  Anne Gunter is a/an 27  "year old female assessed by the dietitian for Admission Nutrition Risk Screen for new/uncontrolled diabetes and Provider Order - \"calorie counting. Needs consistent diet. Has been hospitalized several times with DKA in pregnancy.     NUTRITION HISTORY  Pt with hx of  at 27w4d, who presents to [facility] antepartum with severe abdominal pain and elevated BG. Per chart, pt ate salad and vanessa bread  evening, gave insulin and woke up with nausea and vomiting. Chart reports, vomited three times last night with severe abdominal pian. Per chart, pt with hx of abdominal pain likely r/t diabetic gastroparesis, which is worsened by hyperglycemia as well as non-adherence to dietary modifications. Pt also with poorly controlled type 1 DM. Recent admission 9/10- for DKA, pt left  and admitted again - for abdominal pain, BG management and left AMA for unknown reasons. Discharged on diabetes medication regimen of 14 U Levemir BID, 5 U Novolog with meals, and SSI. Per chart, pt reports already two hospitalizations for likely DKA this pregnancy in [place] with episodes of DKA in prior pregnancy with delivery at [facility].     Nutrition hx includes: Per past RD note , pt was seen by several inpatient dietitians during previous pregnancy (Spring of 2016) due to poorly controlled DM1 and inadequate wt gain/poor PO. She was also followed by outpatient diabetes dietitian, until 2016 as pt no showed x3 appointments. Patient also seen by RD during recent previous hospital admissions in 2017. Pt declined review of carb counting education and reports she is knowledgeable of CHO-containing foods, how to count carbs, and her insulin regimen, rather the reason for poor glycemic control simply due to lack of compliance. Pt was encouraged to comply with insulin regimen and carb counting for safety of pregnancy. In past hospitalization -, pt also with poor appetite, inadequate po intake, and was ordered trial " "of supplements, Ensure Clear, Ensure Plus, Magic Cup, and Glucerna. Per discussion with Anne via , she reported she liked them all and would be open to trying any of them again in vanilla flavor. Pt was also on calorie counts during past hospitalization given poor po and recs for consideration of nutrition support.     Per discussion with Anne, she has been taking her insulin at meal times and checking her BG, but she is not counting her carbs. She also reports her appetite/po intake is variable based on how she is feeling. She reports in the hospital her appetite is poor, and when she is at home her appetite comes and goes. She reports consuming 1-2 meals/day and rarely snacks between meals. For beverages, she likes to drink juice.     CURRENT NUTRITION ORDERS  Diet: NPO   Intake/Tolerance: Pt recently admitted yesterday, NPO since admission.     LABS  Labs reviewed  BG  mg/dL   Hgb A1c 8.4 (H)     MEDICATIONS  Medications reviewed  Insulin drip, endocrine consulted; Per chart, once diet advanced will restart meal aspart: 1 unit/6g CHO  D5 IVF @ 125 mL/hr to provide 3000 mL, 150 g dex to provide 510 kcals (7 kcal/kg)    ANTHROPOMETRICS  Height: No height obtained; Per RD note 9/19: Ht: 1.7 m (5' 6.93\")   Most Recent Weight: 75.5 kg (166 lb 6.4 oz)    Suspected Pre-Pregnancy Wt: 75 kg   IBW: 61.4 kg (122% IBW using pre-pregnancy wt)   BMI: Overweight BMI 25-29.9  Weight History: Past RD notes 9/19 using 75 kg as most likely pre-pregnancy weight and recorded wt hx supported this. Based on pre-pregnancy wt of 75 kg, pt has net gained 0.5 kg (1.1 lbs) during this pregnancy which is below the recommended wt gain of 15-25 lbs based on likely prepregnancy BMI of 26.0 kg/m2. Pt also with 2# wt loss (1.5%) over the past week since past RD note 9/19.   Wt Readings from Last 10 Encounters:   09/25/17 75.5 kg (166 lb 6.4 oz)   09/23/17 77.9 kg (171 lb 12.8 oz)   09/12/17 76.5 kg (168 lb 10.4 oz)   09/09/17 " 76 kg (167 lb 8.8 oz)   06/21/16 68.9 kg (152 lb)   06/11/16 79.6 kg (175 lb 7.8 oz)   06/03/16 74.4 kg (164 lb)   05/25/16 73.6 kg (162 lb 4.8 oz)   05/24/16 73 kg (161 lb)   05/23/16 73.4 kg (161 lb 14.4 oz)     Dosing Weight: 75 kg - based on UBW prior to pregnancy      ASSESSED NUTRITION NEEDS  Estimated Energy Needs: 0660-9495 kcals/day (25 kcal/kg + 340-450 kcal/day; PN energy needs: 20-25 kcal/kg)  Justification: Increased needs 2/2 second trimester of pregnancy and repletion   Estimated Protein Needs: 78-98 grams protein/day (1.2 - 1.5 grams of pro/kg)  Justification: Increased needs 2/2 second trimester of pregnancy and repletion  Estimated Fluid Needs: 1 mL/kcal  Justification: Maintenance    PHYSICAL FINDINGS  See malnutrition section below.    MALNUTRITION  % Intake: </= 50% for >/= 5 days (severe)  % Weight Loss: Weight loss does not meet criteria, however since pt is pregnant likely non-severe  Subcutaneous Fat Loss: None observed  Muscle Loss: None observed  Fluid Accumulation/Edema: None noted  Malnutrition Diagnosis: Non-severe malnutrition in the context of acute illness.     NUTRITION DIAGNOSIS  Inadequate oral intake related to nausea/vomiting, abdominal pain and current nturition orders as evidenced by NPO diet order and inadequate weight gain during pregnancy.       INTERVENTIONS  Implementation  Nutrition Education: Pt declined review of carb counting and diabetes diet education today as she was feeling unwell (nauseous/vomiting during visit with writer). Discussed nutrition hx. Discussed once diet advances, trying nutritional supplements to increase po intake with nausea/vomiting and hx of wt loss during pregnancy. Pt agreed to try any of the supplements in vanilla flavor.   Collaboration with other providers - Spoke with RN about POC and discussed pt declined education today, but will try back when pt is feeling better.      Goals  1. Diet adv v nutrition support within 2-3 days.  2. Weight  gains of 0.3 kg (0.7 lbs) per week (15-25 lbs total wt gain during this pregnancy)   3. BG </= 180     Monitoring/Evaluation  Progress toward goals will be monitored and evaluated per protocol.    Yesenia Covarrubias RD, LD  Unit Pager: [phone]

## 2017-09-25 NOTE — PROGRESS NOTES
Brief GI consult note:    Patient seen and examined.       26 yo F  presented with abdominal pain and vomiting. She has h/o DM1 and is non compliant.     Recommendations:    Check CBC w diff, amylase, lipase,  iron studies - ferritin, TIBC, folate, vitamin B12, vitamin A, D, E and K, TSH, tTG .    USG abdomen with doppler - assess gallbladder, liver and mesenteric flow.     Will consider EGD under general anesthesia at [place] if patient agrees, will coordinate with primary team.     Continue PRN and scheduled antiemetics, PPI IV BID.     Will avoid NJ tube if agrees to EGD, consider it after the procedure.     Felice Persaud  GI fellow.

## 2017-09-25 NOTE — PLAN OF CARE
Problem: Diabetes, Type 1 (Adult)  Goal: Signs and Symptoms of Listed Potential Problems Will be Absent, Minimized or Managed (Diabetes, Type 1)  Signs and symptoms of listed potential problems will be absent, minimized or managed by discharge/transition of care (reference Diabetes, Type 1 (Adult) CPG).   Outcome: No Change  Data: Blood sugars within normal limits.  Interventions: Finger stick blood glucose levels every hour. Continue uterine/fetal assessment TID. Activity level: Bed rest. Preventive measures include: Medications and Positioning. Consults for Dietary/Nutrition Services pending.  Plan: Continue expectant management. Observe for and notify care provider of indications of hypoglycemia or hyperglycemia, or maternal/fetal compromise.     Pain controlled with dilaudid and IV Tylenol. Dilaudid dose adjusted r/t asymptomatic low BP. Pt reports some relief and periodically resting.      Blood sugars well controlled on Algorithm 2.      Nausea and vomiting intermittently with IV reglan, pepcid and zofran providing some relief.      FHT category 1 with irritability. Will continue to monitor closely per protocol

## 2017-09-25 NOTE — PLAN OF CARE
Problem: Diabetes, Type 1 (Adult)  Goal: Signs and Symptoms of Listed Potential Problems Will be Absent, Minimized or Managed (Diabetes, Type 1)  Signs and symptoms of listed potential problems will be absent, minimized or managed by discharge/transition of care (reference Diabetes, Type 1 (Adult) CPG).   Outcome: No Change  Pt remained on 1 unit/hour with BG's . Continues to have occasional abdominal pain, nausea and vomiting. Pt slept most all of shift. Asking for water to rinse mouth and drinking some water if not monitored closely. Ice pack to face, instead of ice-water glass. Seems to have abdominal pain flair-ups when providers come in, this AM at 0930 and afternoon at 1340. GI consult with [place]  present now in progress.

## 2017-09-25 NOTE — PROGRESS NOTES
Diabetes Consult Daily  Progress Note          Assessment/Plan:   Mrs. Anne Gunter is a 26 yo woman at 26w6d of pregnancy, with uncontrolled type 1 diabetes and history of multiple episodes of DKA during previous pregnancy, who transferred to [facility] from [facility] with suspected DKA.  Agree with M that without a durable nutrition plan, Mrs Rodríguez is likely to experience DKA again.    IV insulin rates stable at 1unit/h all day today with BG 90s-low 100s, pt NPO and D5NS at 125ml/h.     Plan  -Continue IV insulin infusion.  Request that IV Insulin infusion continue until pt is clinically improved, tolerating po, off dextrose fluids and we have a stable stretch of insulin data to inform what doses pt requires in form of SC injections.  -Once diet is restarted we will need to order meal aspart: 1unit/6g CHO with meals and snacks.    Outpatient diabetes follow up scheduled with Dr. Colunga on October 12.      Plan discussed with pt, bedside RN, and primary team.             Interval History:   The last 24 hours progress and nursing notes reviewed.  Visited with pt using professional .  Anne remains on IV Insulin infusion with D5NS at 125ml/h and NPO.  Per RN and notes, she sipped on water intermittently during the night and this usually lead to vomiting.  Today told she cannot have water, just ice.  GI consulting now.  When I visited she reported that pain was controlled and nausea was gone, but she admitted that it had been worse earlier.         Recent Labs  Lab 09/25/17  1351 09/25/17  1250 09/25/17  1148 09/25/17  1047 09/25/17  0945 09/25/17  0836  09/25/17  0617  09/24/17  2048  09/24/17  1522  09/22/17  0632  09/20/17  1005  09/20/17  0635   GLC  --   --   --   --   --   --   --  95  --  116*  --  184*  --  141*  --  143*  --  328*   * 98 86 109* 103* 88  < >  --   < >  --   < >  --   < >  --   < >  --   < >  --    < > = values in this interval not displayed.             Review of Systems:   See interval hx          Medications:       Active Diet Order      NPO for Medical/Clinical Reasons Except for: Meds     Physical Exam:  Gen: Drowsy, appears comfortable, NAD.  HEENT: NC/AT, mucous membranes are moist  Resp: Unlabored  Neuro: alert and oriented, able to answer questions appropriately  /63  Pulse 97  Temp 98.9  F (37.2  C) (Oral)  Resp 16  Wt 75.5 kg (166 lb 6.4 oz)  SpO2 100%  BMI 26.12 kg/m2           Data:     Lab Results   Component Value Date    A1C 8.4 09/10/2017    A1C 9.2 06/01/2016    A1C 9.8 05/23/2016    A1C 7.4 04/23/2016    A1C 7.2 04/19/2016            Recent Labs   Lab Test  09/25/17   0617  09/24/17   2048   NA  139  141   POTASSIUM  3.6  3.7   CHLORIDE  109  109   CO2  21  22   ANIONGAP  9  10   GLC  95  116*   BUN  5*  7   CR  0.38*  0.39*   LILLIAM  7.2*  7.4*     CBC RESULTS:   Recent Labs   Lab Test  09/24/17   1522   WBC  8.9   RBC  3.45*   HGB  9.2*   HCT  28.3*   MCV  82   MCH  26.7   MCHC  32.5   RDW  15.6*   PLT  357       Carlene Jones PA-C [phone]  Diabetes Management job code [id number]

## 2017-09-25 NOTE — PLAN OF CARE
Problem: Patient Care Overview  Goal: Plan of Care/Patient Progress Review  Outcome: No Change  Patient refusing to have suero catheter taken out. Dr. Hernandez notified and talked with patient. Compromise made to have suero catheter taken out at 0600 Tuesday morning when she goes on monitor. Patient in agreement.

## 2017-09-25 NOTE — DISCHARGE SUMMARY
Antepartum Discharge Summary  Anne Gunter   [id number]     Date of Admit: 17  Date of DC: 2017, left against medical advice    Admit Diagnosis:  IUP at 27w4d  Type I diabetes  Gastroparesis  Nausea vomiting in pregnancy  H/o DKA in pregnancy  Scant prenatal care    Discharge Diagnosis:  Same  Esophagitis, grade C-D mucosal tears    Primary MFM: Sonia Hart    Procedures:  EGD  NJ tube placement  Abdominal US  EKG x2    Brief History of Admission:  Ms. Anne Gunter is a 27 year old  at 27w4d by stated dates consistent with 25w5d ultrasound, who presents to [facility] antepartum with severe abdominal pain. She ate salad and vanessa bread last night, gave herself 14U levemir insulin plus 5U for meal coverage, and slept throughout the night. She woke at 10 am with nausea and began vomiting. She vomited (bilious, non-bloody) three times and developed severe epigastric abdominal pain. Her sister checked her BG at 11:30 am, which was in the 364, gave 14U + 8U, then brought her straight to [facility] antepartum. She also reports constipation and low back pain. Denies fever, chills, headache, chest pain, shortness of breath, diarrhea. Also denies contractions, vaginal bleeding, and loss of fluid. Positive fetal movement.     Of note the patient has had multiple admissions in the past two weeks with uncontrolled abdominal pain, nausea, vomiting and elevated blood sugars. She most recently left AMA <24 hours prior to this admission.      Pregnancy Complications:   - Poorly controlled Type I DM: Recent admission 9/10- for DKA. Patient left AMA on  1 day after transition to SQ insulin.  Discharged on regimen of 18 U Levemir BID, 5 U Novolog with meals, and SSI. Again admitted from - when the patient left AMA. Per patient report, already two hospitalizations for likely DKA this pregnancy in [place], with prior episodes of DKA in prior pregnancy with delivery at [facility].   - No prior prenatal care in [facility]    - History of  PLTCS x1 at 32w5d for fetal distress in setting of DKA  - Hx of abdominal pain likely related to gastroparesis  - During last admission met criteria for preeclampsia without severe features based on several mild range blood pressures and UPC 0.6.  BPs elevated in the setting of abdominal pain    Hospital course:  Type I DM: Blood glucoses were elevated on admission but no ketones were noted. She was started on an an insulin drip that was titrated as needed for blood sugars. Endocrine was consulted and followed closely.    Abdominal Pain: Thought to be due to gastroparesis. She was initially treated with IV Dilaudid and tylenol. Abdominal ultrasound was notable for hepatosplenomegaly. GI was consulted and recommended EGD. Underwent EGD on HD#4. EGD was notable for grade B/C esophagitis in the distal esophagus. After the procedure abdominal pain and nausea significantly improved. Eventually she no longer required IV Dilaudid.      Nausea: Had significant nausea with emesis on admission. Treated with IV Reglan, Zofran. She was also given Pepcid, Protonix. Nausea improved with placement of NJ tube. Nutrition was introduced with tube feeds after NJ placement. Tube feeds were increased to goal by day of discharge. In addition, she was tolerating full liquid diet. Plan for discharge was for the patient to continue tube feeds and additionally eat appropriate meals.    FEN: Electrolytes were replaced during her stay.     On HD#6 patient adamantly requesting to leave. See progress note regarding extensive counseling to patient.  Patient signed AMA paperwork and left the hospital     Imaging:  Abdominal US 17:  1. Mild increase in hepatomegaly without evidence for hepatic mass, hepatic steatosis, or cirrhosis. 2. Mild splenomegaly. 3. Mild stable bilateral pelvocaliectasis which can be seen in a gravid state.    Transabdominal US 17: BPP 8/8, normal amiotic fluid, cephalic presentation,  posterior placenta    AXR 9/27/17: Feeding tube placement confirmation. Impression: Successful postpyloric feeding tube placement with tip in  the fourth portion of the duodenum    Transabdominal US 9/29/17: BPP 8/8, normal amiotic fluid, breech presentation, posterior placenta    Discharge medications: Patient left AMA    Instructions:  In discussion with Endocrine:  Pt left AMA, recommend the following insulin regimen for home:  -Levemir 14 units BID-  If unable to tolerate po intake at home (Sick Day plan) she could reduce dose to 11 units BID (rather than skip a dose)  -Novolog with meals: 5 units per meal  -Check blood sugar fasting and 1 or 2 hours post prandial    Diet: Gastroparesis Diet (liquids, low-fiber, low-fat foods), Tube feeds -TwoCal @50 mL/hr continuous  Activity: As tolerated  Follow up: With MFM and endocrinology    Jeannie Disla MD  OBGYN PGY3

## 2017-09-26 ENCOUNTER — ANESTHESIA EVENT (OUTPATIENT)
Dept: SURGERY | Facility: CLINIC | Age: 27
End: 2017-09-26
Payer: COMMERCIAL

## 2017-09-26 LAB
AMYLASE SERPL-CCNC: 50 U/L (ref 30–110)
BASOPHILS # BLD AUTO: 0 10E9/L (ref 0–0.2)
BASOPHILS NFR BLD AUTO: 0.2 %
DEPRECATED CALCIDIOL+CALCIFEROL SERPL-MC: 5 UG/L (ref 20–75)
DIFFERENTIAL METHOD BLD: ABNORMAL
DIFFERENTIAL METHOD BLD: NORMAL
EOSINOPHIL # BLD AUTO: 0.1 10E9/L (ref 0–0.7)
EOSINOPHIL NFR BLD AUTO: 0.8 %
ERYTHROCYTE [DISTWIDTH] IN BLOOD BY AUTOMATED COUNT: 15.3 % (ref 10–15)
ERYTHROCYTE [DISTWIDTH] IN BLOOD BY AUTOMATED COUNT: NORMAL % (ref 10–15)
FERRITIN SERPL-MCNC: 5 NG/ML (ref 12–150)
FOLATE SERPL-MCNC: 12 NG/ML
GLUCOSE BLDC GLUCOMTR-MCNC: 102 MG/DL (ref 70–99)
GLUCOSE BLDC GLUCOMTR-MCNC: 102 MG/DL (ref 70–99)
GLUCOSE BLDC GLUCOMTR-MCNC: 104 MG/DL (ref 70–99)
GLUCOSE BLDC GLUCOMTR-MCNC: 118 MG/DL (ref 70–99)
GLUCOSE BLDC GLUCOMTR-MCNC: 132 MG/DL (ref 70–99)
GLUCOSE BLDC GLUCOMTR-MCNC: 74 MG/DL (ref 70–99)
GLUCOSE BLDC GLUCOMTR-MCNC: 77 MG/DL (ref 70–99)
GLUCOSE BLDC GLUCOMTR-MCNC: 82 MG/DL (ref 70–99)
GLUCOSE BLDC GLUCOMTR-MCNC: 84 MG/DL (ref 70–99)
GLUCOSE BLDC GLUCOMTR-MCNC: 85 MG/DL (ref 70–99)
GLUCOSE BLDC GLUCOMTR-MCNC: 87 MG/DL (ref 70–99)
GLUCOSE BLDC GLUCOMTR-MCNC: 89 MG/DL (ref 70–99)
GLUCOSE BLDC GLUCOMTR-MCNC: 91 MG/DL (ref 70–99)
GLUCOSE BLDC GLUCOMTR-MCNC: 94 MG/DL (ref 70–99)
GLUCOSE BLDC GLUCOMTR-MCNC: 95 MG/DL (ref 70–99)
HCT VFR BLD AUTO: 28.6 % (ref 35–47)
HCT VFR BLD AUTO: NORMAL % (ref 35–47)
HGB BLD-MCNC: 9.4 G/DL (ref 11.7–15.7)
HGB BLD-MCNC: NORMAL G/DL (ref 11.7–15.7)
IMM GRANULOCYTES # BLD: 0 10E9/L (ref 0–0.4)
IMM GRANULOCYTES NFR BLD: 0.3 %
IRON SATN MFR SERPL: 9 % (ref 15–46)
IRON SERPL-MCNC: 40 UG/DL (ref 35–180)
LIPASE SERPL-CCNC: 119 U/L (ref 73–393)
LYMPHOCYTES # BLD AUTO: 1.6 10E9/L (ref 0.8–5.3)
LYMPHOCYTES NFR BLD AUTO: 26.3 %
MCH RBC QN AUTO: 26.2 PG (ref 26.5–33)
MCH RBC QN AUTO: NORMAL PG (ref 26.5–33)
MCHC RBC AUTO-ENTMCNC: 32.9 G/DL (ref 31.5–36.5)
MCHC RBC AUTO-ENTMCNC: NORMAL G/DL (ref 31.5–36.5)
MCV RBC AUTO: 80 FL (ref 78–100)
MCV RBC AUTO: NORMAL FL (ref 78–100)
MONOCYTES # BLD AUTO: 0.3 10E9/L (ref 0–1.3)
MONOCYTES NFR BLD AUTO: 5.2 %
NEUTROPHILS # BLD AUTO: 4.1 10E9/L (ref 1.6–8.3)
NEUTROPHILS NFR BLD AUTO: 67.2 %
NRBC # BLD AUTO: 0 10*3/UL
NRBC BLD AUTO-RTO: 0 /100
PLATELET # BLD AUTO: 233 10E9/L (ref 150–450)
PLATELET # BLD AUTO: NORMAL 10E9/L (ref 150–450)
POTASSIUM SERPL-SCNC: 3.3 MMOL/L (ref 3.4–5.3)
RBC # BLD AUTO: 3.59 10E12/L (ref 3.8–5.2)
RBC # BLD AUTO: NORMAL 10E12/L (ref 3.8–5.2)
TIBC SERPL-MCNC: 446 UG/DL (ref 240–430)
TSH SERPL DL<=0.005 MIU/L-ACNC: 3.64 MU/L (ref 0.4–4)
VIT B12 SERPL-MCNC: 167 PG/ML (ref 193–986)
WBC # BLD AUTO: 6.1 10E9/L (ref 4–11)
WBC # BLD AUTO: NORMAL 10E9/L (ref 4–11)

## 2017-09-26 PROCEDURE — 25000125 ZZHC RX 250: Performed by: OBSTETRICS & GYNECOLOGY

## 2017-09-26 PROCEDURE — 36415 COLL VENOUS BLD VENIPUNCTURE: CPT | Performed by: OBSTETRICS & GYNECOLOGY

## 2017-09-26 PROCEDURE — S0028 INJECTION, FAMOTIDINE, 20 MG: HCPCS | Performed by: OBSTETRICS & GYNECOLOGY

## 2017-09-26 PROCEDURE — 12000032 ZZH R&B OB CRITICAL UMMC

## 2017-09-26 PROCEDURE — 83540 ASSAY OF IRON: CPT | Performed by: OBSTETRICS & GYNECOLOGY

## 2017-09-26 PROCEDURE — 82607 VITAMIN B-12: CPT | Performed by: OBSTETRICS & GYNECOLOGY

## 2017-09-26 PROCEDURE — 84446 ASSAY OF VITAMIN E: CPT | Performed by: OBSTETRICS & GYNECOLOGY

## 2017-09-26 PROCEDURE — 00000146 ZZHCL STATISTIC GLUCOSE BY METER IP

## 2017-09-26 PROCEDURE — 84597 ASSAY OF VITAMIN K: CPT | Performed by: OBSTETRICS & GYNECOLOGY

## 2017-09-26 PROCEDURE — 84132 ASSAY OF SERUM POTASSIUM: CPT | Performed by: OBSTETRICS & GYNECOLOGY

## 2017-09-26 PROCEDURE — 25800025 ZZH RX 258: Performed by: OBSTETRICS & GYNECOLOGY

## 2017-09-26 PROCEDURE — 83516 IMMUNOASSAY NONANTIBODY: CPT | Performed by: OBSTETRICS & GYNECOLOGY

## 2017-09-26 PROCEDURE — 85025 COMPLETE CBC W/AUTO DIFF WBC: CPT | Performed by: OBSTETRICS & GYNECOLOGY

## 2017-09-26 PROCEDURE — 83690 ASSAY OF LIPASE: CPT | Performed by: OBSTETRICS & GYNECOLOGY

## 2017-09-26 PROCEDURE — 25000128 H RX IP 250 OP 636: Performed by: OBSTETRICS & GYNECOLOGY

## 2017-09-26 PROCEDURE — 83550 IRON BINDING TEST: CPT | Performed by: OBSTETRICS & GYNECOLOGY

## 2017-09-26 PROCEDURE — 82746 ASSAY OF FOLIC ACID SERUM: CPT | Performed by: OBSTETRICS & GYNECOLOGY

## 2017-09-26 PROCEDURE — 82306 VITAMIN D 25 HYDROXY: CPT | Performed by: OBSTETRICS & GYNECOLOGY

## 2017-09-26 PROCEDURE — 84443 ASSAY THYROID STIM HORMONE: CPT | Performed by: OBSTETRICS & GYNECOLOGY

## 2017-09-26 PROCEDURE — 82150 ASSAY OF AMYLASE: CPT | Performed by: OBSTETRICS & GYNECOLOGY

## 2017-09-26 PROCEDURE — 84590 ASSAY OF VITAMIN A: CPT | Performed by: OBSTETRICS & GYNECOLOGY

## 2017-09-26 PROCEDURE — 82728 ASSAY OF FERRITIN: CPT | Performed by: OBSTETRICS & GYNECOLOGY

## 2017-09-26 RX ADMIN — DEXTROSE MONOHYDRATE, SODIUM CHLORIDE, AND POTASSIUM CHLORIDE: 50; 9; 1.49 INJECTION, SOLUTION INTRAVENOUS at 08:31

## 2017-09-26 RX ADMIN — Medication 0.2 MG: at 11:01

## 2017-09-26 RX ADMIN — METOCLOPRAMIDE 10 MG: 5 INJECTION, SOLUTION INTRAMUSCULAR; INTRAVENOUS at 21:00

## 2017-09-26 RX ADMIN — Medication 0.2 MG: at 18:41

## 2017-09-26 RX ADMIN — Medication 0.2 MG: at 12:21

## 2017-09-26 RX ADMIN — Medication 0.2 MG: at 00:36

## 2017-09-26 RX ADMIN — ONDANSETRON 4 MG: 2 INJECTION INTRAMUSCULAR; INTRAVENOUS at 11:01

## 2017-09-26 RX ADMIN — Medication 0.2 MG: at 22:08

## 2017-09-26 RX ADMIN — Medication 0.2 MG: at 14:58

## 2017-09-26 RX ADMIN — ACETAMINOPHEN 1000 MG: 10 INJECTION, SOLUTION INTRAVENOUS at 17:30

## 2017-09-26 RX ADMIN — Medication 10 MEQ: at 20:53

## 2017-09-26 RX ADMIN — HUMAN INSULIN 1.5 UNITS/HR: 100 INJECTION, SOLUTION SUBCUTANEOUS at 05:37

## 2017-09-26 RX ADMIN — ACETAMINOPHEN 1000 MG: 10 INJECTION, SOLUTION INTRAVENOUS at 09:49

## 2017-09-26 RX ADMIN — ENOXAPARIN SODIUM 40 MG: 40 INJECTION SUBCUTANEOUS at 07:50

## 2017-09-26 RX ADMIN — ACETAMINOPHEN 1000 MG: 10 INJECTION, SOLUTION INTRAVENOUS at 02:56

## 2017-09-26 RX ADMIN — Medication 10 MEQ: at 19:48

## 2017-09-26 RX ADMIN — Medication 0.2 MG: at 23:33

## 2017-09-26 RX ADMIN — Medication 0.2 MG: at 06:08

## 2017-09-26 RX ADMIN — METOCLOPRAMIDE 10 MG: 5 INJECTION, SOLUTION INTRAMUSCULAR; INTRAVENOUS at 09:50

## 2017-09-26 RX ADMIN — Medication 0.2 MG: at 07:36

## 2017-09-26 RX ADMIN — PANTOPRAZOLE SODIUM 40 MG: 40 INJECTION, POWDER, FOR SOLUTION INTRAVENOUS at 07:44

## 2017-09-26 RX ADMIN — Medication 10 MEQ: at 17:36

## 2017-09-26 RX ADMIN — ONDANSETRON 4 MG: 2 INJECTION INTRAMUSCULAR; INTRAVENOUS at 01:39

## 2017-09-26 RX ADMIN — PROCHLORPERAZINE EDISYLATE 10 MG: 5 INJECTION INTRAMUSCULAR; INTRAVENOUS at 03:36

## 2017-09-26 RX ADMIN — Medication 0.2 MG: at 02:55

## 2017-09-26 RX ADMIN — DEXTROSE MONOHYDRATE, SODIUM CHLORIDE, AND POTASSIUM CHLORIDE: 50; 9; 1.49 INJECTION, SOLUTION INTRAVENOUS at 04:37

## 2017-09-26 RX ADMIN — FAMOTIDINE 20 MG: 10 INJECTION, SOLUTION INTRAVENOUS at 02:57

## 2017-09-26 RX ADMIN — Medication 0.2 MG: at 21:00

## 2017-09-26 RX ADMIN — METOCLOPRAMIDE 10 MG: 5 INJECTION, SOLUTION INTRAMUSCULAR; INTRAVENOUS at 15:50

## 2017-09-26 RX ADMIN — FAMOTIDINE 20 MG: 10 INJECTION, SOLUTION INTRAVENOUS at 14:50

## 2017-09-26 RX ADMIN — METOCLOPRAMIDE 10 MG: 5 INJECTION, SOLUTION INTRAMUSCULAR; INTRAVENOUS at 04:36

## 2017-09-26 RX ADMIN — HUMAN INSULIN 3 UNITS/HR: 100 INJECTION, SOLUTION SUBCUTANEOUS at 03:34

## 2017-09-26 RX ADMIN — Medication 0.2 MG: at 19:48

## 2017-09-26 RX ADMIN — SODIUM CHLORIDE: 9 INJECTION, SOLUTION INTRAVENOUS at 16:42

## 2017-09-26 RX ADMIN — DEXTROSE MONOHYDRATE, SODIUM CHLORIDE, AND POTASSIUM CHLORIDE: 50; 9; 1.49 INJECTION, SOLUTION INTRAVENOUS at 16:43

## 2017-09-26 RX ADMIN — Medication 10 MEQ: at 18:41

## 2017-09-26 NOTE — PROGRESS NOTES
GASTROENTEROLOGY PROGRESS NOTE    ASSESSMENT:  26 yo F   at 27 wks with h/o DM1, DKA presenting with abdominal pain for 1 day. Most likely gastritis, PUD, esophagitis. Possible component of gastroparesis too, but unlikely for gastroparesis to cause that degree of pain. Never had EGD before. Does have history of iron deficiency anemia. LFTs normal, no h/o gallstone disease. US abdomen within normal limits, TSH normal. Discussion with patient and primary team in conjunction with the patient and we agree to proceed with upper endoscopy for further evaluation. Patient agreed with the plan.    RECOMMENDATIONS:  --EGD tomorrow under general anesthesia, Dr. Hackett from [facility] will be present during the procedure   --Patient ok with the plan  --Patient schedule for 10:15am in OR 18. Please transferred her to be here 2 hours before the procedure   --Please call with questions  --Continue ongoing supportive cares      The patient was discussed and plan agreed upon with GI staff.    Ernesto Colmenares MD  GI Fellow  _______________________________________________________________  S: Patient continues with ongoing nausea, and abdominal pain     O:  Blood pressure 104/70, pulse 83, temperature 98.1  F (36.7  C), temperature source Oral, resp. rate 18, weight 75.5 kg (166 lb 6.4 oz), SpO2 97 %, unknown if currently breastfeeding.    Gen:Alert and oriented  HEENT: Mucosa moist  CV: RRR  Lungs: Clear to auscultation   Abd: Slightly tender to palpation  Skin: No jaundice   MS: Normal ROM in 4 ext  Neuro: No focal deficits  Psych: Flat affect       LABS:  BMP  Recent Labs  Lab 17  0638 17  0617 17  2048 17  1522 17  0632   NA  --  139 141 139 138   POTASSIUM 3.3* 3.6 3.7 3.9 4.0   CHLORIDE  --  109 109 107 108   LILLIAM  --  7.2* 7.4* 7.7* 7.6*   CO2  --  21 22 22 21   BUN  --  5* 7 8 4*   CR  --  0.38* 0.39* 0.46* 0.42*   GLC  --  95 116* 184* 141*     CBC  Recent Labs  Lab 17  0808 17  0638  09/24/17  1522   WBC 6.1 Canceled, Test credited 8.9   RBC 3.59* Canceled, Test credited 3.45*   HGB 9.4* Canceled, Test credited 9.2*   HCT 28.6* Canceled, Test credited 28.3*   MCV 80 Canceled, Test credited 82   MCH 26.2* Canceled, Test credited 26.7   MCHC 32.9 Canceled, Test credited 32.5   RDW 15.3* Canceled, Test credited 15.6*    Canceled, Test credited 357     INRNo lab results found in last 7 days.  LFTs  Recent Labs  Lab 09/24/17  1522   ALKPHOS 58   AST 6   ALT 13   BILITOTAL 0.3   PROTTOTAL 6.7*   ALBUMIN 2.4*      PANC  Recent Labs  Lab 09/26/17  0638   LIPASE 119   AMYLASE 50

## 2017-09-26 NOTE — PLAN OF CARE
Problem: Patient Care Overview  Goal: Plan of Care/Patient Progress Review  Outcome: No Change  Patient has good pain relief from Dilaudid for about 1 hour and then she is asking for more pain medication. Is sleeping not long after Dilaudid given. Continue to monitor.

## 2017-09-26 NOTE — PROGRESS NOTES
MFM Antepartum Progress Note    9/26/2017  Hospital Day #3  Anne Gunter  GA: 27w6d    S: Patient did not sleep well due to nausea, vomiting, and abdominal pain. Overnight she requested IV Dilaudid every 1-3 hours. The IV Dilaudid helps her pain and makes her sleep. When asked if she would accept a feeding tube today, she agreed. She reports heartburn and abdominal pain, and denies appetite. Denies fever, headache, chest pain, shortness of breath, leg pain. Feeling fetal movement. Denies contractions, vaginal bleeding, loss of fluid.    O:  Vitals:    09/26/17 0605 09/26/17 0759 09/26/17 1306 09/26/17 1532   BP: 119/66 121/77 104/70 117/75   Pulse:   83    Resp: 18   18   Temp: 97.9  F (36.6  C)  98.1  F (36.7  C) 97.6  F (36.4  C)   TempSrc: Oral  Oral Oral   SpO2:       Weight:          Gen:               Awake, but drowsy, pale, sitting up in bed  CV:                 B/l peripheral pulses present  Pulm:              Breathing comfortably on RA  Abd:                Tender to epigastrium, soft, gravid  Ext:                 Trace edema of distal extremities    Recent Labs  Lab 09/26/17  0742 09/26/17  0632 09/26/17  0534 09/26/17  0434 09/26/17  0333 09/26/17  0235  09/25/17  0617  09/24/17  2048  09/24/17  1522  09/22/17  0632  09/20/17  1005  09/20/17  0635   GLC  --   --   --   --   --   --   --  95  --  116*  --  184*  --  141*  --  143*  --  328*   BGM 85 94 102* 132* 118* 77  < >  --   < >  --   < >  --   < >  --   < >  --   < >  --    < > = values in this interval not displayed.    Labs 09/26/2017  K 3.3 L  Amylase 50  Lipase 119  TSH 3.64  WBC 6.1  HGB 9.4    MCV 80  RDW 15.3    Pending Labs  Tissue transglutaminase  Vitamins A,D,E,K  Vitamin B12  Folate  Ferritin  Iron panel    Medications  Infusion  - Insulin 1.5 U/hr  - D5/NS/KCL 20 @ 125 cc/hr  - KCL 10 mEq   Scheduled   - Reglan  - Pepcid  - Phenergen suppository  - Protonix  - IV acetaminophen  - Enoxaparin  PRN  - IV hydromorphone  - IV  ondansetron  - IV prochlorperazine    FHT:               Baseline 120, moderate variability, accelerations present, appropriate for gestational age  [place]:              Irritable     Assessment/Plan:  Ms. Anne Gunter is a 27 year old  at 27w6d by stated dates consistent with 25w5d ultrasound admitted to antepartum with severe abdominal pain and elevated blood glucose. She is admitted for medical management and stabilization. Patient has abdominal pain likely 2/2 diabetic gastroparesis, which is worsened by hyperglycemia as well as with non-adherence to dietary modifications. GI and Endocrine are following.      Type I DM  On arrival, started IV x 2 (with extreme difficulty, will need to consider alternative access with midline/PICC, etc.) and NS fluids, insulin drip, then D5/NS/KCl.  Received 10 mEq KCl IV replacement x 2. Will continues to address PICC when patient is able to participate in conversation. Not in DKA, ketones have normalized, electrolytes WNL. BGs 70s-130s. Requiring insulin IV at 1.5U/hr currently. Clinically stable. Endocrine is following closely.  - Appreciate endocrine recommendations  - D5/NS/KCl  - Insulin drip until clinically improved, tolerating PO or off D5, per endo. Plan to continue drip until full NJ tube feeds are established.  - BG checks q hour while not taking oral intake and on insulin drip  - Hypoglycemic protocol  - K and Mag replacement protocols  - Consider PICC     Abdominal Pain/Malnutrition   Likely related to gastroparesis in the setting of poorly controlled type DM. No formal gastric emptying study. Awaiting GI recommendations for further direction on gastroparesis treatment as well as feeding options and other possible causes of her abdominal pain such as PUD, etc. In the meantime, patient should remain NPO. Will start tube feeds as tolerated once GI weighs in. She responded well to IV Dilaudid during recent admissions, so will continue this pain management, but  attempt to decrease her need for narcotic medications. Prefer Zofran as last line anti-emetic due to decreased motility side effect. GI continues to follow.  - Please do not allow to drink water (NPO) as she vomits after this intake, which further increases her abdominal pain.  - Appreciate GI recommendations  - Appreciate nutrition recommendations  - IV tylenol and IV dilaudid PRN  - IV Protonix and Pepcid  - IV Reglan  - Nausea and Vomiting: PRN IV Compazine, Phenergan, Zofran     Anxiety   psychology was consulted (2017) on recent admission and re-consulted for follow up for this admission. Per their assessment, patient has KELLI and would benefit from CBT. Social work recommends working toward relationship with family as well to have trusted health care advocate for the patient.   - Consult  psychology (for follow up)  - Appreciate social work recommendations  - Will continue to reach out to sister and father as they are living here with her and offer to facetime or conference call with her  in [place] as he is able      FWB  Reassuring for gestational age.  - TID monitoring      PNC  NOB labs collected 9/10/17: Rh pos, Rubella IgG positive, Hep B nonreactive, HIV nonreactive, GBS positive, placenta posterior, GC/Chlam negative.   - GBS+, penicillin if delivery imminent  - Begin weekly BPP @28w (17)  - Tdap this week if still inpatient    GI team at bedside today to discuss EGD.  Patient agrees and signs informed consent with .  Plan will be for EGD tomorrow on [place] at 10:15 with Dr. Sanchez.  Anesthesia plan likely general.  Discussed placement of NJ tube with Dr. Win after EGD.       Amanda Jackson  MS-4, [employer]  Maternal Fetal Medicine, [facility]    Scribe Disclosure:   I, Amanda Jackson, am serving as a scribe; to document services personally performed by Dr. Hart based on data collection and the provider's statements to  me.     Attestation:   The documentation recorded by the scribe accurately reflects the services I personally performed and the decisions made by me.   Sonia Hart DO  Maternal Fetal Medicine Specialist           Time Spent on this Encounter   I, Sonia Hart DO, spent a total of 30 minutes face to face or coordinating care of Anne Gunter.  Over 50% of my time on the unit was spent counseling the patient and/or coordinating care regarding planning for EGD, coordinating NJ tube, discussion of goals for improving patient's health.

## 2017-09-26 NOTE — ANESTHESIA PREPROCEDURE EVALUATION
Anesthesia Evaluation     . Pt has had prior anesthetic. Type: General           ROS/MED HX    ENT/Pulmonary:  - neg pulmonary ROS     Neurologic:  - neg neurologic ROS     Cardiovascular:     (+) hypertension----. : . . . :. . Previous cardiac testing [place]:results:Interpretation Summary  Left ventricular function, chamber size, wall motion, and wall thickness are  normal.The EF is 60-65%.  Right ventricular function, chamber size, wall motion, and thickness are  normal.date: results:ECG reviewed date: results:Sinus tachy date: results:          METS/Exercise Tolerance:     Hematologic:  - neg hematologic  ROS       Musculoskeletal:  - neg musculoskeletal ROS       GI/Hepatic:  - neg GI/hepatic ROS       Renal/Genitourinary:  - ROS Renal section negative       Endo: Comment: Pump is running at 4U/Hr    (+) type I DM, Using insulin - using insulin pump Previously admitted for DM/DKA Diabetic complications: gastroparesis, .      Psychiatric: Comment: Narcotic abuser    (+) psychiatric history anxiety, depression and other (comment)      Infectious Disease:  - neg infectious disease ROS       Malignancy:      - no malignancy   Other:    (+) Possibly pregnant C-spine cleared: N/A, no H/O Chronic Pain,H/O chronic opiod use , no other significant disability                  Procedure: Procedure(s):  Upper Endoscopy  - Wound Class:     HPI: Anne Gunter is a 27 year old female with the following history who presents with gastroparesis and is now undergoing EGD.    PMHx/PSHx:  Past Medical History:   Diagnosis Date     Diabetes (H)      Microalbuminuria 2016     Type I diabetes mellitus, uncontrolled (H) 2016       Past Surgical History:   Procedure Laterality Date      SECTION N/A 6/10/2016    Procedure:  SECTION;  Surgeon: Richardson Duran MD;  Location:  OR         No current facility-administered medications on file prior to encounter.   Current Outpatient Prescriptions on File Prior  to Encounter:  insulin detemir (LEVEMIR FLEXPEN/FLEXTOUCH) 100 UNIT/ML injection Inject 14 Units Subcutaneous 2 times daily Take first dose at midnight, next dose at 12 pm.   blood glucose monitoring (NO BRAND SPECIFIED) test strip Use to test blood sugars 4 times daily or as directed   blood glucose monitoring (NO BRAND SPECIFIED) test strip Use to test blood sugar 4 times daily or as directed.   blood glucose monitoring (ONETOUCH VERIO) meter device kit Use to test blood sugar 4 times daily or as directed.   insulin aspart (NOVOLOG PEN) 100 UNIT/ML injection Inject 5 Units Subcutaneous 3 times daily (with meals)   polyethylene glycol (MIRALAX/GLYCOLAX) Packet Take 17 g by mouth daily as needed for constipation (titrate to one bowel movement daily)   senna-docusate (SENOKOT-S;PERICOLACE) 8.6-50 MG per tablet Take 1 tablet by mouth 2 times daily as needed for constipation   metoclopramide (REGLAN) 10 MG tablet Take 1 tablet (10 mg) by mouth 4 times daily as needed (4x Daily AC & HS prn nausea and abdominal pain)   pantoprazole (PROTONIX) 40 MG EC tablet Take 1 tablet (40 mg) by mouth 2 times daily   ondansetron (ZOFRAN ODT) 4 MG ODT tab Take 1 tablet (4 mg) by mouth every 8 hours as needed for nausea   oxyCODONE (OXY-IR) 5 MG capsule Take 1 capsule (5 mg) by mouth every 4 hours as needed for moderate to severe pain   OLANZapine zydis (ZYPREXA) 5 MG disintegrating tablet Take 1 tablet (5 mg) by mouth 2 times daily   omeprazole (PRILOSEC) 20 MG capsule Take 1 capsule (20 mg) by mouth every morning (before breakfast)   Prenatal Vit-Fe Fumarate-FA (PRENATAL VITAMIN) 27-0.8 MG TABS Take 1 tablet by mouth daily   folic acid (FOLVITE) 1 MG tablet Take 1 tablet (1 mg) by mouth daily   blood glucose monitoring (ONE TOUCH DELICA) lancets Use to test blood sugar 4 times daily or as directed.   insulin aspart (NOVOLOG PEN) 100 UNIT/ML injection Inject 1-9 Units Subcutaneous At Bedtime Correction Scale - custom DOSING   Do Not  give Bedtime Correction Insulin if BG less than 200BG 140-190 = 1 unit.-240 = 2 units.-290 = 3 units.-340 = 4 units.Notify provider if glucose greater than or equal to 350 mg/dL after administration. (Patient taking differently: Inject 1 Units Subcutaneous At Bedtime Correction Scale - custom DOSING     Do Not give Bedtime Correction Insulin if BG less than 200  -190 = 1 unit.  -240 = 2 units.  -290 = 3 units.  -340 = 4 units.    Notify provider if glucose greater than or equal to 350 mg/dL after administration.)   famotidine (PEPCID) 20 MG tablet Take 1 tablet (20 mg) by mouth 2 times daily   mirtazapine (REMERON SOL-TAB) 15 MG disintegrating tablet 1 tablet (15 mg) by Orally disintegrating tablet route At Bedtime       Social Hx:   Social History   Substance Use Topics     Smoking status: Never Smoker     Smokeless tobacco: Never Used     Alcohol use No       Allergies: No Known Allergies      NPO Status: Per ASA Guidelines    Labs:    Blood Bank:  Lab Results   Component Value Date    ABO A 09/24/2017    RH Pos 09/24/2017    AS Neg 09/24/2017     BMP:  Recent Labs   Lab Test  09/26/17   0638  09/25/17   0617   NA   --   139   POTASSIUM  3.3*  3.6   CHLORIDE   --   109   CO2   --   21   BUN   --   5*   CR   --   0.38*   GLC   --   95   LILLIAM   --   7.2*     CBC:   Recent Labs   Lab Test  09/26/17   0808   WBC  6.1   RBC  3.59*   HGB  9.4*   HCT  28.6*   MCV  80   MCH  26.2*   MCHC  32.9   RDW  15.3*   PLT  233     Coags:  No results for input(s): INR, PTT, FIBR in the last 35243 hours.                           Anesthesia Plan      History & Physical Review  History and physical reviewed and following examination; no interval change.    ASA Status:  3 .    NPO Status:  > 8 hours    Plan for General, RSI and ETT with Intravenous induction. Maintenance will be Balanced.    PONV prophylaxis:  Ondansetron (or other 5HT-3) (propofol gtt)  Additional equipment: Videolaryngoscope  LTA kit for intubation      Postoperative Care  Postoperative pain management:  IV analgesics and Oral pain medications.      Consents  Anesthetic plan, risks, benefits and alternatives discussed with:  Patient.  Use of blood products discussed: Yes.   Blood product refusal consent signed.   Reason for refusal: .

## 2017-09-26 NOTE — PROGRESS NOTES
Brief MFM update:    Discussed with GI team (Dr. Sanchez). The team is willing to offer EGD for complete work up of severe epigastric pain in the setting of pregnancy, poorly controlled type 1 DM, gastroparesis.      GI team reviewed R/B of procedure with patient through in person Lao .  The procedure will take place on the [facility] OR tomorrow at 10:15 am.          I am trying to coordinate how patient can get an NJ tube placed while in OR or on [facility].  Dr. Sanchez does not place NJ tubes.  Discussed with General Radiology and Dr. Win has OR privileges and may be willing to perform the procedure.     Paged Dr. Win ([phone]) and he will work on arranging an NJ placement while the patient is in the OR tomorrow on [place].     Sonia Hart DO FACOG  Maternal Fetal Medicine Specialist  Pager: [phone]  Mobile: [phone]

## 2017-09-26 NOTE — PROGRESS NOTES
Brief MFM note:    Patient has been without nutrition since Sunday.  Again encouraged patient that a feeding tube is necessary at this point.  Patient was continually declining yesterday despite counseling.  She appears weak, pale, fatigued and still having abdominal pain and bilious vomiting throughout the night.      Through the assistance of an in person Telugu  the patient agreed to the placement of an NJ tube.     Sonia Hart DO [facility]  Maternal Fetal Medicine Specialist  Pager: [phone]  Mobile: [phone]

## 2017-09-26 NOTE — PROVIDER NOTIFICATION
09/26/17 0155   Provider Notification   Provider Name/Title Dr. Dockery   Method of Notification Phone   Notification Reason Other (Comment)   Clarification of IV fluid orders with insulin infusion, NPO status. Plan to continue with current IV fluids D5LR + KCl, NS w/ insulin infusion and hold conditional order for D10 at time being.

## 2017-09-26 NOTE — PLAN OF CARE
Problem: Patient Care Overview  Goal: Plan of Care/Patient Progress Review  Outcome: No Change  Pt stable, VS WDL. Blood glucose monitored q1hr throughout night while on insulin drip, maintained on algorithm 3. Pt c/o upper abdominal pain, nausea/vomiting throughout night. Utilizing IV dilaudid for pain management, able to sleep throughout night, requested pain medication q2-3 hours with addition of antiemetics. Yates catheter removed at 0610. FHR appropriate for gestational age.

## 2017-09-26 NOTE — CONSULTS
Mental Health Inpatient Consult      PATIENT'S NAME: Anne Gunter  MRN:   [id number]  :   1990  DATE OF SERVICE: 2017      Called over to antepartum unit to inform care team that I would come over at 1:00 with an  to provide follow-up  consult. Spoke with Apoorva, the patient's nurse, who stated the patient was feeling very unwell and unable to speak much. She also reported that the patient would be going down for a procedure at 12:30 and thus 1:00 would not work to provide follow-up. Nurse recommended to follow-up tomorrow. Will attempt again tomorrow and continue to check in with care team about patient's ability to meet. In the meanwhile, if an urgent mental health need arises, please call [phone] to inform staff of urgent need and staff will respond as soon as possible.  was cancelled for today.      Michela Gillette LP   2017

## 2017-09-26 NOTE — PROVIDER NOTIFICATION
09/25/17 1931   Provider Notification   Provider Name/Title Dr. Dockery   Method of Notification In Department   Request Evaluate - Remote   Notification Reason Pain   Patient seems to be needing more pain medication about 1.25 hours after giving Dilauded 0.3 mg. Will change to 0.1-0.2 mg every hour and see if that works better. Patient informed of new dosing and timing.

## 2017-09-26 NOTE — PLAN OF CARE
Problem: Pain, Acute (Adult)  Goal: Identify Related Risk Factors and Signs and Symptoms  Related risk factors and signs and symptoms are identified upon initiation of Human Response Clinical Practice Guideline (CPG).   Outcome: No Change  Ltd communication this morning. Attempts to engage pt in conversation unsuccessful. Enc pt to get OOB to void, pt thus far declining. Pt noted to be in pain earlier, but denied shortly after dose of dilaudid. Has been sleeping and appears more comfortable since that time. Exhausted, pale, allow for rest, discuss meds as given on schedule. Pt will call for pain meds if necessary. Interp here to discuss NG tube placement scheduled for 1300 and interp will be present for procedure.

## 2017-09-26 NOTE — PROGRESS NOTES
Diabetes Consult Daily  Progress Note          Assessment/Plan:    Anne Gunter is a 26 yo woman at 26w6d of pregnancy, with uncontrolled type 1 diabetes and history of multiple episodes of DKA during previous pregnancy, who transferred to [facility] from [facility] with suspected DKA.  Agree with M that without a durable nutrition plan, Mrs Rodríguez is likely to experience DKA again.       Type 1 diabetic:   pt NPO and D5NS at 125ml/h.   Plan  -Continue IV insulin infusion, d/c dextrose fluids once TF have started   -Will transitioned to Sub-q once TF at goal and tolerating  -Once diet is restarted we will need to order meal aspart: 1unit/6g CHO with meals and snacks.         Outpatient diabetes follow up scheduled with Dr. Colunga on October 12.       Plan discussed with pt, bedside RN, and primary team.                                Interval History:   The last 24 hours progress and nursing notes reviewed.  Continues on IV insulin, continues to have abdominal pain, nausea and vomiting  Attending care conference with Arabic  , primary team and GI  Plan for EGD ( for complete work-up of severe epigastric pain in the setting of pregnancy, poorly controlled type 1 diabetes and gastroparesis) and feeding tube tomorrow with the starting of Enteral feedings  Recommended that feeding tube be bridled in place.       Recent Labs  Lab 09/26/17  1252 09/26/17  1046 09/26/17  0948 09/26/17  0847 09/26/17  0742 09/26/17  0632  09/25/17  0617  09/24/17  2048  09/24/17  1522  09/22/17  0632  09/20/17  1005  09/20/17  0635   GLC  --   --   --   --   --   --   --  95  --  116*  --  184*  --  141*  --  143*  --  328*   * 104* 89 91 85 94  < >  --   < >  --   < >  --   < >  --   < >  --   < >  --    < > = values in this interval not displayed.            Review of Systems:   See interval hx          Medications:       Active Diet Order      NPO for Medical/Clinical Reasons Except for: Meds      Physical Exam:  Gen: Alert, resting in bed, in NAD, pale   HEENT:  mucous membranes are dry  Resp: Unlabored  Neuro:oriented x3, communicating clearly  /70  Pulse 83  Temp 98.1  F (36.7  C) (Oral)  Resp 18  Wt 75.5 kg (166 lb 6.4 oz)  SpO2 97%  BMI 26.12 kg/m2           Data:     Lab Results   Component Value Date    A1C 8.4 09/10/2017    A1C 9.2 06/01/2016    A1C 9.8 05/23/2016    A1C 7.4 04/23/2016    A1C 7.2 04/19/2016              CBC RESULTS:   Recent Labs   Lab Test  09/26/17   0808   WBC  6.1   RBC  3.59*   HGB  9.4*   HCT  28.6*   MCV  80   MCH  26.2*   MCHC  32.9   RDW  15.3*   PLT  233     Recent Labs   Lab Test  09/26/17   0638  09/25/17   0617  09/24/17   2048   NA   --   139  141   POTASSIUM  3.3*  3.6  3.7   CHLORIDE   --   109  109   CO2   --   21  22   ANIONGAP   --   9  10   GLC   --   95  116*   BUN   --   5*  7   CR   --   0.38*  0.39*   LILLIAM   --   7.2*  7.4*     Liver Function Studies -   Recent Labs   Lab Test  09/24/17   1522   PROTTOTAL  6.7*   ALBUMIN  2.4*   BILITOTAL  0.3   ALKPHOS  58   AST  6   ALT  13     No results found for: INR      I spent a total of 35minutes bedside and on the inpatient unit managing the glycemic care of Anne Gunter. Over 50% of my time on the unit was spent counseling the patient  and/or coordinating care.      Fouzia Solorio [phone]  Diabetes Management job code [id number]

## 2017-09-26 NOTE — PROVIDER NOTIFICATION
09/26/17 7217   Comments   Comments updated Dr. Gillette re: today's procedure rescheduled for tomorrow at main campus and unknown time of return to [facility]

## 2017-09-27 ENCOUNTER — ANESTHESIA (OUTPATIENT)
Dept: SURGERY | Facility: CLINIC | Age: 27
End: 2017-09-27
Payer: COMMERCIAL

## 2017-09-27 ENCOUNTER — OFFICE VISIT (OUTPATIENT)
Dept: INTERPRETER SERVICES | Facility: CLINIC | Age: 27
End: 2017-09-27

## 2017-09-27 ENCOUNTER — APPOINTMENT (OUTPATIENT)
Dept: GENERAL RADIOLOGY | Facility: CLINIC | Age: 27
End: 2017-09-27
Attending: OBSTETRICS & GYNECOLOGY
Payer: COMMERCIAL

## 2017-09-27 ENCOUNTER — HOSPITAL ENCOUNTER (INPATIENT)
Dept: ULTRASOUND IMAGING | Facility: CLINIC | Age: 27
End: 2017-09-27
Attending: OBSTETRICS & GYNECOLOGY
Payer: COMMERCIAL

## 2017-09-27 LAB
BACTERIA SPEC CULT: ABNORMAL
BUN SERPL-MCNC: 2 MG/DL (ref 7–30)
CALCIUM SERPL-MCNC: 7.8 MG/DL (ref 8.5–10.1)
CHLORIDE SERPL-SCNC: 106 MMOL/L (ref 94–109)
CREAT SERPL-MCNC: 0.34 MG/DL (ref 0.52–1.04)
GFR SERPL CREATININE-BSD FRML MDRD: >90 ML/MIN/1.7M2
GLUCOSE BLDC GLUCOMTR-MCNC: 102 MG/DL (ref 70–99)
GLUCOSE BLDC GLUCOMTR-MCNC: 104 MG/DL (ref 70–99)
GLUCOSE BLDC GLUCOMTR-MCNC: 105 MG/DL (ref 70–99)
GLUCOSE BLDC GLUCOMTR-MCNC: 110 MG/DL (ref 70–99)
GLUCOSE BLDC GLUCOMTR-MCNC: 112 MG/DL (ref 70–99)
GLUCOSE BLDC GLUCOMTR-MCNC: 115 MG/DL (ref 70–99)
GLUCOSE BLDC GLUCOMTR-MCNC: 115 MG/DL (ref 70–99)
GLUCOSE BLDC GLUCOMTR-MCNC: 118 MG/DL (ref 70–99)
GLUCOSE BLDC GLUCOMTR-MCNC: 125 MG/DL (ref 70–99)
GLUCOSE BLDC GLUCOMTR-MCNC: 131 MG/DL (ref 70–99)
GLUCOSE BLDC GLUCOMTR-MCNC: 137 MG/DL (ref 70–99)
GLUCOSE BLDC GLUCOMTR-MCNC: 152 MG/DL (ref 70–99)
GLUCOSE BLDC GLUCOMTR-MCNC: 153 MG/DL (ref 70–99)
GLUCOSE BLDC GLUCOMTR-MCNC: 169 MG/DL (ref 70–99)
GLUCOSE BLDC GLUCOMTR-MCNC: 69 MG/DL (ref 70–99)
GLUCOSE BLDC GLUCOMTR-MCNC: 77 MG/DL (ref 70–99)
GLUCOSE BLDC GLUCOMTR-MCNC: 78 MG/DL (ref 70–99)
GLUCOSE BLDC GLUCOMTR-MCNC: 79 MG/DL (ref 70–99)
GLUCOSE BLDC GLUCOMTR-MCNC: 84 MG/DL (ref 70–99)
GLUCOSE BLDC GLUCOMTR-MCNC: 85 MG/DL (ref 70–99)
GLUCOSE BLDC GLUCOMTR-MCNC: 87 MG/DL (ref 70–99)
GLUCOSE BLDC GLUCOMTR-MCNC: 88 MG/DL (ref 70–99)
GLUCOSE BLDC GLUCOMTR-MCNC: 92 MG/DL (ref 70–99)
GLUCOSE BLDC GLUCOMTR-MCNC: 99 MG/DL (ref 70–99)
GLUCOSE SERPL-MCNC: 131 MG/DL (ref 70–99)
Lab: ABNORMAL
MAGNESIUM SERPL-MCNC: 1.8 MG/DL (ref 1.6–2.3)
PHOSPHATE SERPL-MCNC: 2.6 MG/DL (ref 2.5–4.5)
POTASSIUM SERPL-SCNC: 3.6 MMOL/L (ref 3.4–5.3)
SODIUM SERPL-SCNC: 137 MMOL/L (ref 133–144)
SPECIMEN SOURCE: ABNORMAL
TTG IGA SER-ACNC: <1 U/ML
TTG IGG SER-ACNC: <1 U/ML
UPPER GI ENDOSCOPY: NORMAL

## 2017-09-27 PROCEDURE — 36415 COLL VENOUS BLD VENIPUNCTURE: CPT | Performed by: OBSTETRICS & GYNECOLOGY

## 2017-09-27 PROCEDURE — 25000125 ZZHC RX 250: Performed by: INTERNAL MEDICINE

## 2017-09-27 PROCEDURE — 12000032 ZZH R&B OB CRITICAL UMMC

## 2017-09-27 PROCEDURE — 00000146 ZZHCL STATISTIC GLUCOSE BY METER IP

## 2017-09-27 PROCEDURE — 25000125 ZZHC RX 250: Performed by: STUDENT IN AN ORGANIZED HEALTH CARE EDUCATION/TRAINING PROGRAM

## 2017-09-27 PROCEDURE — 84295 ASSAY OF SERUM SODIUM: CPT | Performed by: OBSTETRICS & GYNECOLOGY

## 2017-09-27 PROCEDURE — 71000016 ZZH RECOVERY PHASE 1 LEVEL 3 FIRST HR: Performed by: INTERNAL MEDICINE

## 2017-09-27 PROCEDURE — 36000053 ZZH SURGERY LEVEL 2 EA 15 ADDTL MIN - UMMC: Performed by: INTERNAL MEDICINE

## 2017-09-27 PROCEDURE — 0DB98ZX EXCISION OF DUODENUM, VIA NATURAL OR ARTIFICIAL OPENING ENDOSCOPIC, DIAGNOSTIC: ICD-10-PCS | Performed by: INTERNAL MEDICINE

## 2017-09-27 PROCEDURE — 84132 ASSAY OF SERUM POTASSIUM: CPT | Performed by: OBSTETRICS & GYNECOLOGY

## 2017-09-27 PROCEDURE — 44500 INTRO GASTROINTESTINAL TUBE: CPT

## 2017-09-27 PROCEDURE — 37000009 ZZH ANESTHESIA TECHNICAL FEE, EACH ADDTL 15 MIN: Performed by: INTERNAL MEDICINE

## 2017-09-27 PROCEDURE — 25000132 ZZH RX MED GY IP 250 OP 250 PS 637: Performed by: OBSTETRICS & GYNECOLOGY

## 2017-09-27 PROCEDURE — S0028 INJECTION, FAMOTIDINE, 20 MG: HCPCS | Performed by: OBSTETRICS & GYNECOLOGY

## 2017-09-27 PROCEDURE — 82947 ASSAY GLUCOSE BLOOD QUANT: CPT | Performed by: OBSTETRICS & GYNECOLOGY

## 2017-09-27 PROCEDURE — 82310 ASSAY OF CALCIUM: CPT | Performed by: OBSTETRICS & GYNECOLOGY

## 2017-09-27 PROCEDURE — T1013 SIGN LANG/ORAL INTERPRETER: HCPCS | Mod: U3

## 2017-09-27 PROCEDURE — 25000128 H RX IP 250 OP 636: Performed by: OBSTETRICS & GYNECOLOGY

## 2017-09-27 PROCEDURE — 25000125 ZZHC RX 250: Performed by: OBSTETRICS & GYNECOLOGY

## 2017-09-27 PROCEDURE — 76819 FETAL BIOPHYS PROFIL W/O NST: CPT

## 2017-09-27 PROCEDURE — 25000125 ZZHC RX 250: Performed by: RADIOLOGY

## 2017-09-27 PROCEDURE — 25800025 ZZH RX 258: Performed by: OBSTETRICS & GYNECOLOGY

## 2017-09-27 PROCEDURE — 37000008 ZZH ANESTHESIA TECHNICAL FEE, 1ST 30 MIN: Performed by: INTERNAL MEDICINE

## 2017-09-27 PROCEDURE — 27210794 ZZH OR GENERAL SUPPLY STERILE: Performed by: INTERNAL MEDICINE

## 2017-09-27 PROCEDURE — 84100 ASSAY OF PHOSPHORUS: CPT | Performed by: OBSTETRICS & GYNECOLOGY

## 2017-09-27 PROCEDURE — 27210995 ZZH RX 272: Performed by: INTERNAL MEDICINE

## 2017-09-27 PROCEDURE — 82565 ASSAY OF CREATININE: CPT | Performed by: OBSTETRICS & GYNECOLOGY

## 2017-09-27 PROCEDURE — 82435 ASSAY OF BLOOD CHLORIDE: CPT | Performed by: OBSTETRICS & GYNECOLOGY

## 2017-09-27 PROCEDURE — 84520 ASSAY OF UREA NITROGEN: CPT | Performed by: OBSTETRICS & GYNECOLOGY

## 2017-09-27 PROCEDURE — 40000170 ZZH STATISTIC PRE-PROCEDURE ASSESSMENT II: Performed by: INTERNAL MEDICINE

## 2017-09-27 PROCEDURE — 88305 TISSUE EXAM BY PATHOLOGIST: CPT | Performed by: INTERNAL MEDICINE

## 2017-09-27 PROCEDURE — 83735 ASSAY OF MAGNESIUM: CPT | Performed by: OBSTETRICS & GYNECOLOGY

## 2017-09-27 PROCEDURE — 25000128 H RX IP 250 OP 636: Performed by: STUDENT IN AN ORGANIZED HEALTH CARE EDUCATION/TRAINING PROGRAM

## 2017-09-27 PROCEDURE — 36000051 ZZH SURGERY LEVEL 2 1ST 30 MIN - UMMC: Performed by: INTERNAL MEDICINE

## 2017-09-27 RX ORDER — PROPOFOL 10 MG/ML
INJECTION, EMULSION INTRAVENOUS PRN
Status: DISCONTINUED | OUTPATIENT
Start: 2017-09-27 | End: 2017-09-27

## 2017-09-27 RX ORDER — FENTANYL CITRATE 50 UG/ML
25-50 INJECTION, SOLUTION INTRAMUSCULAR; INTRAVENOUS
Status: DISCONTINUED | OUTPATIENT
Start: 2017-09-27 | End: 2017-09-27 | Stop reason: HOSPADM

## 2017-09-27 RX ORDER — HYDROMORPHONE HYDROCHLORIDE 1 MG/ML
.3-.5 INJECTION, SOLUTION INTRAMUSCULAR; INTRAVENOUS; SUBCUTANEOUS EVERY 5 MIN PRN
Status: DISCONTINUED | OUTPATIENT
Start: 2017-09-27 | End: 2017-09-27 | Stop reason: HOSPADM

## 2017-09-27 RX ORDER — FENTANYL CITRATE 50 UG/ML
INJECTION, SOLUTION INTRAMUSCULAR; INTRAVENOUS PRN
Status: DISCONTINUED | OUTPATIENT
Start: 2017-09-27 | End: 2017-09-27

## 2017-09-27 RX ORDER — ONDANSETRON 2 MG/ML
INJECTION INTRAMUSCULAR; INTRAVENOUS PRN
Status: DISCONTINUED | OUTPATIENT
Start: 2017-09-27 | End: 2017-09-27

## 2017-09-27 RX ORDER — ONDANSETRON 4 MG/1
4 TABLET, ORALLY DISINTEGRATING ORAL EVERY 30 MIN PRN
Status: DISCONTINUED | OUTPATIENT
Start: 2017-09-27 | End: 2017-09-27 | Stop reason: HOSPADM

## 2017-09-27 RX ORDER — HYDRALAZINE HYDROCHLORIDE 20 MG/ML
2.5-5 INJECTION INTRAMUSCULAR; INTRAVENOUS EVERY 10 MIN PRN
Status: DISCONTINUED | OUTPATIENT
Start: 2017-09-27 | End: 2017-09-27 | Stop reason: HOSPADM

## 2017-09-27 RX ORDER — LABETALOL HYDROCHLORIDE 5 MG/ML
5-10 INJECTION, SOLUTION INTRAVENOUS
Status: DISCONTINUED | OUTPATIENT
Start: 2017-09-27 | End: 2017-09-27 | Stop reason: HOSPADM

## 2017-09-27 RX ORDER — CYANOCOBALAMIN 1000 UG/ML
1000 INJECTION, SOLUTION INTRAMUSCULAR; SUBCUTANEOUS
Status: DISCONTINUED | OUTPATIENT
Start: 2017-09-27 | End: 2017-09-29 | Stop reason: HOSPADM

## 2017-09-27 RX ORDER — SODIUM CHLORIDE 9 MG/ML
INJECTION, SOLUTION INTRAVENOUS CONTINUOUS
Status: DISCONTINUED | OUTPATIENT
Start: 2017-09-28 | End: 2017-09-29 | Stop reason: HOSPADM

## 2017-09-27 RX ORDER — LIDOCAINE HYDROCHLORIDE 20 MG/ML
INJECTION, SOLUTION INFILTRATION; PERINEURAL PRN
Status: DISCONTINUED | OUTPATIENT
Start: 2017-09-27 | End: 2017-09-27

## 2017-09-27 RX ORDER — ONDANSETRON 2 MG/ML
4 INJECTION INTRAMUSCULAR; INTRAVENOUS EVERY 30 MIN PRN
Status: DISCONTINUED | OUTPATIENT
Start: 2017-09-27 | End: 2017-09-27 | Stop reason: HOSPADM

## 2017-09-27 RX ORDER — SODIUM CHLORIDE, SODIUM LACTATE, POTASSIUM CHLORIDE, CALCIUM CHLORIDE 600; 310; 30; 20 MG/100ML; MG/100ML; MG/100ML; MG/100ML
INJECTION, SOLUTION INTRAVENOUS CONTINUOUS PRN
Status: DISCONTINUED | OUTPATIENT
Start: 2017-09-27 | End: 2017-09-27

## 2017-09-27 RX ORDER — PROPOFOL 10 MG/ML
INJECTION, EMULSION INTRAVENOUS CONTINUOUS PRN
Status: DISCONTINUED | OUTPATIENT
Start: 2017-09-27 | End: 2017-09-27

## 2017-09-27 RX ORDER — ALUMINA, MAGNESIA, AND SIMETHICONE 2400; 2400; 240 MG/30ML; MG/30ML; MG/30ML
30 SUSPENSION ORAL
Status: DISCONTINUED | OUTPATIENT
Start: 2017-09-27 | End: 2017-09-29 | Stop reason: HOSPADM

## 2017-09-27 RX ORDER — SODIUM CHLORIDE, SODIUM LACTATE, POTASSIUM CHLORIDE, CALCIUM CHLORIDE 600; 310; 30; 20 MG/100ML; MG/100ML; MG/100ML; MG/100ML
INJECTION, SOLUTION INTRAVENOUS CONTINUOUS
Status: DISCONTINUED | OUTPATIENT
Start: 2017-09-27 | End: 2017-09-27 | Stop reason: HOSPADM

## 2017-09-27 RX ADMIN — ONDANSETRON 4 MG: 2 INJECTION INTRAMUSCULAR; INTRAVENOUS at 07:50

## 2017-09-27 RX ADMIN — DEXTROSE MONOHYDRATE 25 ML: 25 INJECTION, SOLUTION INTRAVENOUS at 16:18

## 2017-09-27 RX ADMIN — PROPOFOL 150 MCG/KG/MIN: 10 INJECTION, EMULSION INTRAVENOUS at 11:19

## 2017-09-27 RX ADMIN — Medication 0.2 MG: at 03:12

## 2017-09-27 RX ADMIN — PROPOFOL 150 MG: 10 INJECTION, EMULSION INTRAVENOUS at 11:17

## 2017-09-27 RX ADMIN — PROPOFOL 60 MG: 10 INJECTION, EMULSION INTRAVENOUS at 12:52

## 2017-09-27 RX ADMIN — FENTANYL CITRATE 50 MCG: 50 INJECTION, SOLUTION INTRAMUSCULAR; INTRAVENOUS at 13:07

## 2017-09-27 RX ADMIN — Medication 0.2 MG: at 07:30

## 2017-09-27 RX ADMIN — Medication 80 MG: at 11:17

## 2017-09-27 RX ADMIN — Medication 0.2 MG: at 01:10

## 2017-09-27 RX ADMIN — SODIUM CHLORIDE, POTASSIUM CHLORIDE, SODIUM LACTATE AND CALCIUM CHLORIDE: 600; 310; 30; 20 INJECTION, SOLUTION INTRAVENOUS at 11:39

## 2017-09-27 RX ADMIN — HUMAN INSULIN 4 UNITS/HR: 100 INJECTION, SOLUTION SUBCUTANEOUS at 17:13

## 2017-09-27 RX ADMIN — CYANOCOBALAMIN 1000 MCG: 1000 INJECTION, SOLUTION INTRAMUSCULAR at 21:02

## 2017-09-27 RX ADMIN — Medication 0.2 MG: at 02:10

## 2017-09-27 RX ADMIN — FENTANYL CITRATE 75 MCG: 50 INJECTION, SOLUTION INTRAMUSCULAR; INTRAVENOUS at 11:17

## 2017-09-27 RX ADMIN — Medication 0.1 MG: at 19:45

## 2017-09-27 RX ADMIN — LIDOCAINE HYDROCHLORIDE 5 ML: 20 SOLUTION ORAL; TOPICAL at 13:21

## 2017-09-27 RX ADMIN — MULTIVITAMIN 15 ML: LIQUID ORAL at 18:27

## 2017-09-27 RX ADMIN — DEXTROSE MONOHYDRATE, SODIUM CHLORIDE, AND POTASSIUM CHLORIDE 125 ML/HR: 50; 9; 1.49 INJECTION, SOLUTION INTRAVENOUS at 00:57

## 2017-09-27 RX ADMIN — Medication 0.1 MG: at 19:47

## 2017-09-27 RX ADMIN — METOCLOPRAMIDE 10 MG: 5 INJECTION, SOLUTION INTRAMUSCULAR; INTRAVENOUS at 22:46

## 2017-09-27 RX ADMIN — ONDANSETRON 4 MG: 2 INJECTION INTRAMUSCULAR; INTRAVENOUS at 01:17

## 2017-09-27 RX ADMIN — HUMAN INSULIN 1.5 UNITS/HR: 100 INJECTION, SOLUTION SUBCUTANEOUS at 11:53

## 2017-09-27 RX ADMIN — Medication 0.2 MG: at 04:12

## 2017-09-27 RX ADMIN — SODIUM CHLORIDE 10 ML/HR: 9 INJECTION, SOLUTION INTRAVENOUS at 03:50

## 2017-09-27 RX ADMIN — Medication 0.2 MG: at 08:37

## 2017-09-27 RX ADMIN — PHENYLEPHRINE HYDROCHLORIDE 150 MCG: 10 INJECTION, SOLUTION INTRAMUSCULAR; INTRAVENOUS; SUBCUTANEOUS at 12:04

## 2017-09-27 RX ADMIN — Medication 0.2 MG: at 06:19

## 2017-09-27 RX ADMIN — FAMOTIDINE 20 MG: 10 INJECTION, SOLUTION INTRAVENOUS at 03:01

## 2017-09-27 RX ADMIN — LIDOCAINE HYDROCHLORIDE 80 MG: 20 INJECTION, SOLUTION INFILTRATION; PERINEURAL at 11:17

## 2017-09-27 RX ADMIN — PROPOFOL 40 MG: 10 INJECTION, EMULSION INTRAVENOUS at 12:57

## 2017-09-27 RX ADMIN — PANTOPRAZOLE SODIUM 40 MG: 40 INJECTION, POWDER, FOR SOLUTION INTRAVENOUS at 07:34

## 2017-09-27 RX ADMIN — DEXTROSE MONOHYDRATE, SODIUM CHLORIDE, AND POTASSIUM CHLORIDE: 50; 9; 1.49 INJECTION, SOLUTION INTRAVENOUS at 08:22

## 2017-09-27 RX ADMIN — HUMAN INSULIN 4 UNITS/HR: 100 INJECTION, SOLUTION SUBCUTANEOUS at 00:43

## 2017-09-27 RX ADMIN — PHENYLEPHRINE HYDROCHLORIDE 100 MCG: 10 INJECTION, SOLUTION INTRAMUSCULAR; INTRAVENOUS; SUBCUTANEOUS at 12:14

## 2017-09-27 RX ADMIN — PROPOFOL 40 MG: 10 INJECTION, EMULSION INTRAVENOUS at 12:31

## 2017-09-27 RX ADMIN — ACETAMINOPHEN 1000 MG: 10 INJECTION, SOLUTION INTRAVENOUS at 18:23

## 2017-09-27 RX ADMIN — ACETAMINOPHEN 1000 MG: 10 INJECTION, SOLUTION INTRAVENOUS at 02:04

## 2017-09-27 RX ADMIN — FENTANYL CITRATE 25 MCG: 50 INJECTION, SOLUTION INTRAMUSCULAR; INTRAVENOUS at 11:53

## 2017-09-27 RX ADMIN — Medication 0.2 MG: at 05:13

## 2017-09-27 RX ADMIN — METOCLOPRAMIDE 10 MG: 5 INJECTION, SOLUTION INTRAMUSCULAR; INTRAVENOUS at 16:34

## 2017-09-27 RX ADMIN — DEXTROSE MONOHYDRATE, SODIUM CHLORIDE, AND POTASSIUM CHLORIDE: 50; 9; 1.49 INJECTION, SOLUTION INTRAVENOUS at 18:11

## 2017-09-27 RX ADMIN — Medication 0.2 MG: at 22:51

## 2017-09-27 RX ADMIN — PHENYLEPHRINE HYDROCHLORIDE 100 MCG: 10 INJECTION, SOLUTION INTRAMUSCULAR; INTRAVENOUS; SUBCUTANEOUS at 11:58

## 2017-09-27 RX ADMIN — ENOXAPARIN SODIUM 40 MG: 40 INJECTION SUBCUTANEOUS at 18:44

## 2017-09-27 RX ADMIN — METOCLOPRAMIDE 10 MG: 5 INJECTION, SOLUTION INTRAMUSCULAR; INTRAVENOUS at 03:52

## 2017-09-27 RX ADMIN — ONDANSETRON 4 MG: 2 INJECTION INTRAMUSCULAR; INTRAVENOUS at 12:58

## 2017-09-27 RX ADMIN — Medication 0.2 MG: at 21:12

## 2017-09-27 RX ADMIN — PROPOFOL 50 MG: 10 INJECTION, EMULSION INTRAVENOUS at 11:38

## 2017-09-27 ASSESSMENT — PAIN DESCRIPTION - DESCRIPTORS: DESCRIPTORS: CONSTANT;CRAMPING

## 2017-09-27 NOTE — PLAN OF CARE
Problem: Patient Care Overview  Goal: Plan of Care/Patient Progress Review   To [facility] for EGD procedure and placement of NJ procedure.  NST reactive in PACU and returned to PSCU at 1540 via EMS. Abdominal pain  improved upon return, has required no further Dilaudid.  Blood glucose labile since procedure.  Continue with q 1h BG and adjustment of insulin infusion.    Report to AGNIESZKA Damian RN.

## 2017-09-27 NOTE — PROVIDER NOTIFICATION
09/27/17 0708   FHR   Monitor External US   Variability 6-25 BPM   Baseline Rate (Fetus A) 125 bpm   Baseline Classification Normal   Accelerations Present   Decelerations None   20 minute period of time when patient was high fowlers and having emesis. Will monitor 20 extra minutes because of the lapse in time.

## 2017-09-27 NOTE — PROGRESS NOTES
MFM Antepartum Progress Note    9/26/2017  Hospital Day #4  Anne Gunter  GA: 28w0d    S: Patient had a rough night with N/V of bilious material.  She is also requesting pain meds q 1 hour.  +FM.  Has not had BM since prior to re-admission on 9/24/17.      Understands plan for procedures today.   present.     O:  Vitals:    09/27/17 1400 09/27/17 1415 09/27/17 1430 09/27/17 1500   BP: 119/80 112/77 124/61 (!) 130/93   Pulse:       Resp: 16 18 16 16   Temp:  97.9  F (36.6  C)     TempSrc:  Oral     SpO2: 99% 100% 100% 100%   Weight:          Gen:               Awake, but drowsy, pale, sitting up in bed  CV:                 B/l peripheral pulses present  Pulm:              Breathing comfortably on RA  Abd:                Tender to epigastrium, soft, gravid  Ext:                 Trace edema of distal extremities    Recent Labs  Lab 09/27/17  1433 09/27/17  1408 09/27/17  1334 09/27/17  1239 09/27/17  1148 09/27/17  0956  09/27/17  0025  09/25/17  0617  09/24/17  2048  09/24/17  1522  09/22/17  0632   GLC  --   --   --   --   --   --   --  131*  --  95  --  116*  --  184*  --  141*   * 115* 152* 169* 153* 78  < >  --   < >  --   < >  --   < >  --   < >  --    < > = values in this interval not displayed.    Component      Latest Ref Rng & Units 9/27/2017 9/27/2017          12:25 AM  1:34 PM   Creatinine      0.52 - 1.04 mg/dL 0.34 (L)    GFR Estimate      >60 mL/min/1.7m2 >90    GFR Estimate If Black      >60 mL/min/1.7m2 >90    Potassium      3.4 - 5.3 mmol/L 3.6    Magnesium      1.6 - 2.3 mg/dL 1.8    Phosphorus      2.5 - 4.5 mg/dL 2.6    Urea Nitrogen      7 - 30 mg/dL 2 (L)    Calcium      8.5 - 10.1 mg/dL 7.8 (L)    Chloride      94 - 109 mmol/L 106    Glucose      70 - 99 mg/dL 131 (H) 152 (H)       Pending Labs  Tissue transglutaminase  Vitamins A,D,E,K  Vitamin B12  Folat  Ferritin  Iron panel    Medications  Infusion  - Insulin 1.5 U/hr  - D5/NS/KCL 20 @ 125 cc/hr  - KCL 10 mEq   Scheduled    - Reglan  - Pepcid  - Phenergen suppository  - Protonix  - IV acetaminophen  - Enoxaparin  PRN  - IV hydromorphone  - IV ondansetron  - IV prochlorperazine    FHT:               Baseline 120, moderate variability, accelerations present, appropriate for gestational age  Disputanta:              Irritable     Assessment/Plan:  Ms. Anne Gunter is a 27 year old  at 28w0d by stated dates consistent with 25w5d ultrasound admitted to antepartum with severe abdominal pain and elevated blood glucose. She is admitted for medical management and stabilization. Patient has abdominal pain likely 2/2 diabetic gastroparesis, which is worsened by hyperglycemia as well as with non-adherence to dietary modifications. GI and Endocrine are following.      Type I DM  On arrival, started IV x 2 (with extreme difficulty, will need to consider alternative access with midline/PICC, etc.) and NS fluids, insulin drip, then D5/NS/KCl.  Received 10 mEq KCl IV replacement x 2. Will continues to address PICC when patient is able to participate in conversation. Not in DKA, ketones have normalized, electrolytes WNL. BGs 70s-130s. Requiring insulin IV at 1.5U/hr currently. Clinically stable. Endocrine is following closely.  - Appreciate endocrine recommendations  - D5/NS/KCl  - Insulin drip until clinically improved, tolerating PO or off D5, per endo. Plan to continue drip until full NJ tube feeds are established.  - BG checks q hour while not taking oral intake and on insulin drip  - Hypoglycemic protocol  - K and Mag replacement protocols  - Consider PICC     Abdominal Pain/Malnutrition   Likely related to gastroparesis in the setting of poorly controlled type DM. No formal gastric emptying study. Awaiting GI recommendations for further direction on gastroparesis treatment as well as feeding options and other possible causes of her abdominal pain such as PUD, etc. In the meantime, patient should remain NPO. Will start tube feeds as tolerated  once GI weighs in. She responded well to IV Dilaudid during recent admissions, so will continue this pain management, but attempt to decrease her need for narcotic medications. Prefer Zofran as last line anti-emetic due to decreased motility side effect. GI continues to follow.  - Please do not allow to drink water (NPO) as she vomits after this intake, which further increases her abdominal pain.  - Appreciate GI recommendations  - Appreciate nutrition recommendations  - IV tylenol and IV dilaudid PRN  - IV Protonix and Pepcid  - IV Reglan  - Nausea and Vomiting: PRN IV Compazine, Phenergan, Zofran     Anxiety   psychology was consulted (2017) on recent admission and re-consulted for follow up for this admission. Per their assessment, patient has KELLI and would benefit from CBT. Social work recommends working toward relationship with family as well to have trusted health care advocate for the patient.   - Consult  psychology (for follow up)  - Appreciate social work recommendations  - Will continue to reach out to sister and father as they are living here with her and offer to facetime or conference call with her  in [place] as he is able      FWB  Reassuring for gestational age.  - TID monitoring      PNC  NOB labs collected 9/10/17: Rh pos, Rubella IgG positive, Hep B nonreactive, HIV nonreactive, GBS positive, placenta posterior, GC/Chlam negative.   - GBS+, penicillin if delivery imminent  - Begin weekly BPP @28w (17)  - Tdap this week if still inpatient      Patient is scheduled for upper GI and NJ tube placement under general anesthesia.  Plan to start tube feeds per nutrition/pharmacy.     Time Spent on this Encounter   I, Sonia Hart DO, spent a total of 15 minutes face to face or coordinating care of Anne Gunter.  Over 50% of my time on the unit was spent counseling the patient and/or coordinating care regarding planning for procedures today.     Sonia  DO JESSICA Hart  Maternal Fetal Medicine Specialist  Pager: [phone]  Mobile: [phone]

## 2017-09-27 NOTE — CONSULTS
Consulted with  mental health team regarding follow-up for patient. Will continue to attempt to provide weekly therapy sessions with the understanding that these meetings may depend on client's current medical status. Called over to unit today to inform them that I will attempt to meet with the patient tomorrow since she had medical procedures today. Will schedule an  for sometime in the afternoon. Will call unit prior to meeting to determine if client is on the unit and able and willing to meet.

## 2017-09-27 NOTE — PROGRESS NOTES
"GASTROENTEROLOGY PROGRESS NOTE    ASSESSMENT:  26 yo F   at 27 wks with h/o DM1, DKA presenting with abdominal pain for 1 day. Most likely gastritis, PUD, esophagitis. Possible component of gastroparesis too, but unlikely for gastroparesis to cause that degree of pain. Never had EGD before. Does have history of iron deficiency anemia. LFTs normal, no h/o gallstone disease. US abdomen within normal limits, TSH normal. Underwent EGD  with findings c/w LA grade B-C esophagitis in distal esophagus. IR will place NJ tube.    RECOMMENDATIONS:  - Await pathology results.   - Recommend continued optimization of acid suppression   regimen. May require high-dose PPI BID for at least 2-3 months given the severity of esophagitis noted today (partially healing), and may require ongoing acid suppression of some magnitude during duration of pregnancy (can attempt progressive weaning in the months following delivery).   - Recommend consideration for a \"coating\" medication after PO intake to help provide additional mucosal protection as esophagitis heals over the next 6-8 weeks; typically sucralfate suspension, Maalox, or Pepto-Bismol used BID-QID after meals is recommended. Will defer to OB/MFM team regarding risk/safety of use in pregnancy.   - Could also consider using viscous lidocaine QID or as-needed after PO intakes for comfort as well (and if low-risk for fetus), particularly as this might provide some additional comfort and reduce narcotic pain medication dependence (worsening propensity for reflux with narcotic-induced intestinal dysmotilty).   - Recommend weaning narcotic pain medications as able, consider adjunct multimodal therapies with Pain and Health Psychology as able.   - Plan for fluoroscopic placement of NJ feeding tube in OR under GETA following diagnostic EGD by Radiology, enteral feeding recommendations per Nutrition. Continue to encourage soft/liquid bland foods (i.e. Boost/Ensure) as able/tolerated. "   - Noted background B12 deficiency on recent lab testing, concerning for other micronutrient deficiencies (particularly B vitamins) if present. Recommend MVI and B vitamin replacement accordingly, ongoing efforts by primary team and Nutrition.   - The findings and recommendations were discussed with the referring physician immediately following procedure, present in OR for entirety of case.       The patient was discussed and plan agreed upon with GI staff.    Ernesto Colmenares MD  GI Fellow  _______________________________________________________________  S: Patient continues with ongoing nausea/vomiting, and abdominal pain     O:  Blood pressure 137/87, pulse 85, temperature 98.1  F (36.7  C), temperature source Oral, resp. rate 20, weight 74.3 kg (163 lb 12.8 oz), SpO2 98 %, unknown if currently breastfeeding.    Gen:Alert and oriented  HEENT: Mucosa moist  CV: RRR  Lungs: Clear to auscultation   Abd: Slightly tender to palpation  Skin: No jaundice   MS: Normal ROM in 4 ext  Neuro: No focal deficits  Psych: Flat affect       LABS:  BMP    Recent Labs  Lab 09/27/17  0025 09/26/17  0638 09/25/17  0617 09/24/17  2048 09/24/17  1522 09/22/17  0632     --  139 141 139 138   POTASSIUM 3.6 3.3* 3.6 3.7 3.9 4.0   CHLORIDE 106  --  109 109 107 108   LILLIAM 7.8*  --  7.2* 7.4* 7.7* 7.6*   CO2  --   --  21 22 22 21   BUN 2*  --  5* 7 8 4*   CR 0.34*  --  0.38* 0.39* 0.46* 0.42*   *  --  95 116* 184* 141*     CBC    Recent Labs  Lab 09/26/17  0808 09/26/17  0638 09/24/17  1522   WBC 6.1 Canceled, Test credited 8.9   RBC 3.59* Canceled, Test credited 3.45*   HGB 9.4* Canceled, Test credited 9.2*   HCT 28.6* Canceled, Test credited 28.3*   MCV 80 Canceled, Test credited 82   MCH 26.2* Canceled, Test credited 26.7   MCHC 32.9 Canceled, Test credited 32.5   RDW 15.3* Canceled, Test credited 15.6*    Canceled, Test credited 357     INRNo lab results found in last 7 days.  LFTs    Recent Labs  Lab  09/24/17  1522   ALKPHOS 58   AST 6   ALT 13   BILITOTAL 0.3   PROTTOTAL 6.7*   ALBUMIN 2.4*      PANC    Recent Labs  Lab 09/26/17  0638   LIPASE 119   AMYLASE 50

## 2017-09-27 NOTE — PLAN OF CARE
Problem: Patient Care Overview  Goal: Plan of Care/Patient Progress Review  Outcome: No Change  Type 1 Diabetes Mellitus Note  Data: Patient continues to be on and off insulin drip overnight-blood sugars still unstable at this time.  No episodes of hypoglycemia during night. Other VS WNL overnight.  Patient requested pain medication almost on the dot every hour overnight for upper abdominal pain-see MAR. Also has had 4 episodes of vomiting bile overnight at this time.   Interventions:  Finger stick blood glucose levels Q1H while on drip. Diet NPO.  Continue uterine/fetal assessment TID. Activity level: Bed rest with bathroom privileges.  labor preventive measures include: Positioning and Frequent voiding. Consults for Endocrine RN and Dietary/Nutrition Services completed however, may continue to be seen by both services after endoscopy today  Plan:  Going to [place] at 0815 for endoscopy and possible NG/NJ tube placement. Continue expectant management. Observe for and notify care provider of indications of hypoglycemia or hyperglycemia, or maternal/fetal compromise.

## 2017-09-27 NOTE — PROGRESS NOTES
Gastroenterology Endoscopy Suite Brief Operative Note    Procedure:  Upper endoscopy   Post-operative diagnosis:  LA grade B-C Esophagitis in distal esophagus   Staff Physician:  Dr. Nile Sanchez   Fellow/Assistant(s):  Dr. Ernesto Colmenares    Specimens:  Please see final procedure note for further details.   Findings:   -LA Grade B-C esophagitis in distal esophagus   -emetogenic patch in the proximal stomach but otherwise normal   -Normal duodenum, biopsies taken to r/o celiac disease in the setting of MARIYA   Complications:  None.   Condition:  Stable   Recommendations  Diet:  ADAT  PPI:  PPI IV BID  Anti-coagulants/platelets:  N/A  Octreotide:  N/A  Discharge Planning:   -Ok to ADAT   -Consider carafate QID if ok with OB/GYN, to improve symptoms from esophagitis  -IR will place NJ tube  -Please refer to procedure note for further recommendations  -Findings discussed with patient through an , she understand our recommendations and agreed with the plan

## 2017-09-27 NOTE — PROGRESS NOTES
Dr. Hart paged to confirm intra op monitoring. Dr. Hart will be present in OR room. 4 mg Zofran given IV for Nausea/vomitting.

## 2017-09-27 NOTE — PROGRESS NOTES
Pt arrived to  via [facility] transport with OB RN and . Hourly glucoses resumed and Insulin infusion stopped per OB high intensity protocol. (RN has protocol with her) PACU notified about needing Fetal heart tone Monitor for post op recovery.

## 2017-09-27 NOTE — PLAN OF CARE
Problem: Patient Care Overview  Goal: Plan of Care/Patient Progress Review  Outcome: No Change  Pt with good blood sugar control with insulin infusion.      Continues to have inconsistent control of abdominal pain with IV dilaudid and IV tylenol.      Pt continues to show knowledge deficit in caring for herself.      Plan for endoscopy tomorrow.

## 2017-09-27 NOTE — PLAN OF CARE
Problem: Patient Care Overview  Goal: Plan of Care/Patient Progress Review  Outcome: No Change  Patient drank about 120 cc of water and then threw it up. RN explained to her that she is throwing up the water that she drank. She said the doctor this morning said I could have water. She was referring to Gastroenterology. Offered patient antiemetic and she declined. Continue to monitor.

## 2017-09-27 NOTE — ANESTHESIA POSTPROCEDURE EVALUATION
Patient: Anne Gunter    Procedure(s):  Upper Endoscopy with biopsies - Wound Class: II-Clean Contaminated  nasojejunal feeding tube placement with upper endoscopy assistance by Dr. Sanchez and Dr. Cristino Bennett, Radiology - Wound Class: I-Clean    Diagnosis:Severe Abdominal Pain   Diagnosis Additional Information: No value filed.    Anesthesia Type:  General, RSI, ETT    Note:  Anesthesia Post Evaluation    Patient location during evaluation: PACU  Patient participation: Able to fully participate in evaluation  Level of consciousness: awake and alert  Pain management: adequate  Airway patency: patent  Cardiovascular status: acceptable  Respiratory status: acceptable and room air  Hydration status: acceptable  PONV: none     Anesthetic complications: None    Comments: Denies N/V post-op, denies epigastric pain        Last vitals:  Vitals:    09/27/17 1320 09/27/17 1330 09/27/17 1345   BP: 116/81 105/65 93/78   Pulse:      Resp: 18 20 20   Temp: 36.8  C (98.3  F)     SpO2: (!) 81% 100% 100%         Electronically Signed By: Peri Saleh MD  September 27, 2017  2:16 PM

## 2017-09-27 NOTE — PHARMACY-CONSULT NOTE
Pharmacy Tube Feeding Consult    Medication reviewed for administration by feeding tube and for potential food/drug interactions.    Recommendation: All medications are ordered IV or in liquid form currently. Recommend flushing before and after Maalox if given.     Pharmacy will continue to follow as new medications are ordered.

## 2017-09-27 NOTE — PROGRESS NOTES
Pt seen at bedside in N OR Pre-op area this morning, reviewed indications/risks/benefits for diagnostic EGD for severe abd pain + persistent N/V/malnutrition in the midst of pregnancy again today. Informed consent for diagnostic EGD confirmed again via [place] , consent available in chart. M Dr. Hart will be available in OR during the procedure as well for patient safety.    OR brief completed w/ team, awaiting final assessments and transfer to OR 18. [facility] will be coordinating with Radiology re: NJT placement following procedure, further recommendations/advisement from their groups re: arrangement for placement following EGD.    Noted low B12 level which warrants concern for other B-vitamin deficiences w/ could potentiate N/V (particularly in light of overall malnutrition picture). Would advise considering IV MVI replacement + formal B1/B6/B12 replacement while admitted (could be done in conjunction w/ guidance from Nutrition).    Nile Sanchez MD   of Medicine  [facility] - Department of Medicine  Division of Gastroenterology

## 2017-09-27 NOTE — PLAN OF CARE
Transport arranged for patient going to [facility] 3C @ 0815 through [facility] at [facility].   0800  scheduled; she&RN will accompany pt on transport for procedure.   for night shift is Wendy @ phone extension #[phone]

## 2017-09-28 ENCOUNTER — OFFICE VISIT (OUTPATIENT)
Dept: INTERPRETER SERVICES | Facility: CLINIC | Age: 27
End: 2017-09-28

## 2017-09-28 LAB
A-TOCOPHEROL VIT E SERPL-MCNC: 11.1 MG/L (ref 5.5–18)
ANION GAP SERPL CALCULATED.3IONS-SCNC: 11 MMOL/L (ref 3–14)
ANION GAP SERPL CALCULATED.3IONS-SCNC: 9 MMOL/L (ref 3–14)
ANNOTATION COMMENT IMP: NORMAL
BETA+GAMMA TOCOPHEROL SERPL-MCNC: 0.6 MG/L (ref 0–6)
BUN SERPL-MCNC: 2 MG/DL (ref 7–30)
BUN SERPL-MCNC: 3 MG/DL (ref 7–30)
CALCIUM SERPL-MCNC: 7.3 MG/DL (ref 8.5–10.1)
CALCIUM SERPL-MCNC: 7.8 MG/DL (ref 8.5–10.1)
CHLORIDE SERPL-SCNC: 105 MMOL/L (ref 94–109)
CHLORIDE SERPL-SCNC: 106 MMOL/L (ref 94–109)
CO2 SERPL-SCNC: 22 MMOL/L (ref 20–32)
CO2 SERPL-SCNC: 22 MMOL/L (ref 20–32)
COPATH REPORT: NORMAL
CREAT SERPL-MCNC: 0.37 MG/DL (ref 0.52–1.04)
CREAT SERPL-MCNC: 0.4 MG/DL (ref 0.52–1.04)
GFR SERPL CREATININE-BSD FRML MDRD: >90 ML/MIN/1.7M2
GFR SERPL CREATININE-BSD FRML MDRD: >90 ML/MIN/1.7M2
GLUCOSE BLDC GLUCOMTR-MCNC: 100 MG/DL (ref 70–99)
GLUCOSE BLDC GLUCOMTR-MCNC: 104 MG/DL (ref 70–99)
GLUCOSE BLDC GLUCOMTR-MCNC: 115 MG/DL (ref 70–99)
GLUCOSE BLDC GLUCOMTR-MCNC: 125 MG/DL (ref 70–99)
GLUCOSE BLDC GLUCOMTR-MCNC: 129 MG/DL (ref 70–99)
GLUCOSE BLDC GLUCOMTR-MCNC: 141 MG/DL (ref 70–99)
GLUCOSE BLDC GLUCOMTR-MCNC: 144 MG/DL (ref 70–99)
GLUCOSE BLDC GLUCOMTR-MCNC: 150 MG/DL (ref 70–99)
GLUCOSE BLDC GLUCOMTR-MCNC: 151 MG/DL (ref 70–99)
GLUCOSE BLDC GLUCOMTR-MCNC: 178 MG/DL (ref 70–99)
GLUCOSE BLDC GLUCOMTR-MCNC: 65 MG/DL (ref 70–99)
GLUCOSE BLDC GLUCOMTR-MCNC: 68 MG/DL (ref 70–99)
GLUCOSE BLDC GLUCOMTR-MCNC: 72 MG/DL (ref 70–99)
GLUCOSE BLDC GLUCOMTR-MCNC: 73 MG/DL (ref 70–99)
GLUCOSE BLDC GLUCOMTR-MCNC: 75 MG/DL (ref 70–99)
GLUCOSE BLDC GLUCOMTR-MCNC: 80 MG/DL (ref 70–99)
GLUCOSE BLDC GLUCOMTR-MCNC: 84 MG/DL (ref 70–99)
GLUCOSE BLDC GLUCOMTR-MCNC: 86 MG/DL (ref 70–99)
GLUCOSE BLDC GLUCOMTR-MCNC: 88 MG/DL (ref 70–99)
GLUCOSE BLDC GLUCOMTR-MCNC: 90 MG/DL (ref 70–99)
GLUCOSE BLDC GLUCOMTR-MCNC: 92 MG/DL (ref 70–99)
GLUCOSE BLDC GLUCOMTR-MCNC: 95 MG/DL (ref 70–99)
GLUCOSE SERPL-MCNC: 107 MG/DL (ref 70–99)
GLUCOSE SERPL-MCNC: 72 MG/DL (ref 70–99)
INTERPRETATION ECG - MUSE: NORMAL
MAGNESIUM SERPL-MCNC: 1.9 MG/DL (ref 1.6–2.3)
PHOSPHATE SERPL-MCNC: 2.8 MG/DL (ref 2.5–4.5)
PHYTONADIONE SERPL-MCNC: 0.27 NMOL/L (ref 0.22–4.88)
POTASSIUM SERPL-SCNC: 3.2 MMOL/L (ref 3.4–5.3)
POTASSIUM SERPL-SCNC: 3.5 MMOL/L (ref 3.4–5.3)
POTASSIUM SERPL-SCNC: 3.6 MMOL/L (ref 3.4–5.3)
RETINYL PALMITATE SERPL-MCNC: <0.02 MG/L (ref 0–0.1)
SODIUM SERPL-SCNC: 136 MMOL/L (ref 133–144)
SODIUM SERPL-SCNC: 139 MMOL/L (ref 133–144)
VIT A SERPL-MCNC: 0.3 MG/L (ref 0.3–1.2)

## 2017-09-28 PROCEDURE — 36416 COLLJ CAPILLARY BLOOD SPEC: CPT | Performed by: OBSTETRICS & GYNECOLOGY

## 2017-09-28 PROCEDURE — 93010 ELECTROCARDIOGRAM REPORT: CPT | Performed by: INTERNAL MEDICINE

## 2017-09-28 PROCEDURE — 93005 ELECTROCARDIOGRAM TRACING: CPT

## 2017-09-28 PROCEDURE — 12000032 ZZH R&B OB CRITICAL UMMC

## 2017-09-28 PROCEDURE — 25000128 H RX IP 250 OP 636: Performed by: OBSTETRICS & GYNECOLOGY

## 2017-09-28 PROCEDURE — 00000146 ZZHCL STATISTIC GLUCOSE BY METER IP

## 2017-09-28 PROCEDURE — 83735 ASSAY OF MAGNESIUM: CPT | Performed by: OBSTETRICS & GYNECOLOGY

## 2017-09-28 PROCEDURE — 25000125 ZZHC RX 250: Performed by: OBSTETRICS & GYNECOLOGY

## 2017-09-28 PROCEDURE — 80048 BASIC METABOLIC PNL TOTAL CA: CPT | Performed by: OBSTETRICS & GYNECOLOGY

## 2017-09-28 PROCEDURE — 90834 PSYTX W PT 45 MINUTES: CPT | Performed by: PSYCHOLOGIST

## 2017-09-28 PROCEDURE — 84132 ASSAY OF SERUM POTASSIUM: CPT | Performed by: OBSTETRICS & GYNECOLOGY

## 2017-09-28 PROCEDURE — 25000132 ZZH RX MED GY IP 250 OP 250 PS 637: Performed by: OBSTETRICS & GYNECOLOGY

## 2017-09-28 PROCEDURE — T1013 SIGN LANG/ORAL INTERPRETER: HCPCS | Mod: U3

## 2017-09-28 PROCEDURE — 84100 ASSAY OF PHOSPHORUS: CPT | Performed by: OBSTETRICS & GYNECOLOGY

## 2017-09-28 RX ORDER — POTASSIUM CHLORIDE 1.5 G/1.58G
20 POWDER, FOR SOLUTION ORAL ONCE
Status: COMPLETED | OUTPATIENT
Start: 2017-09-28 | End: 2017-09-28

## 2017-09-28 RX ADMIN — METOCLOPRAMIDE 10 MG: 5 INJECTION, SOLUTION INTRAMUSCULAR; INTRAVENOUS at 10:13

## 2017-09-28 RX ADMIN — ACETAMINOPHEN 1000 MG: 10 INJECTION, SOLUTION INTRAVENOUS at 02:34

## 2017-09-28 RX ADMIN — Medication 0.2 MG: at 00:32

## 2017-09-28 RX ADMIN — MULTIVITAMIN 15 ML: LIQUID ORAL at 09:30

## 2017-09-28 RX ADMIN — SODIUM CHLORIDE: 9 INJECTION, SOLUTION INTRAVENOUS at 04:16

## 2017-09-28 RX ADMIN — HUMAN INSULIN 10 UNITS/HR: 100 INJECTION, SOLUTION SUBCUTANEOUS at 19:28

## 2017-09-28 RX ADMIN — METOCLOPRAMIDE 10 MG: 5 INJECTION, SOLUTION INTRAMUSCULAR; INTRAVENOUS at 04:19

## 2017-09-28 RX ADMIN — HUMAN INSULIN 1.5 UNITS/HR: 100 INJECTION, SOLUTION SUBCUTANEOUS at 06:04

## 2017-09-28 RX ADMIN — SODIUM CHLORIDE: 9 INJECTION, SOLUTION INTRAVENOUS at 04:40

## 2017-09-28 RX ADMIN — PANTOPRAZOLE SODIUM 40 MG: 40 INJECTION, POWDER, FOR SOLUTION INTRAVENOUS at 09:42

## 2017-09-28 RX ADMIN — HUMAN INSULIN 1.5 UNITS/HR: 100 INJECTION, SOLUTION SUBCUTANEOUS at 02:11

## 2017-09-28 RX ADMIN — ENOXAPARIN SODIUM 40 MG: 40 INJECTION SUBCUTANEOUS at 21:31

## 2017-09-28 RX ADMIN — ACETAMINOPHEN 1000 MG: 10 INJECTION, SOLUTION INTRAVENOUS at 10:18

## 2017-09-28 RX ADMIN — POTASSIUM CHLORIDE 20 MEQ: 1.5 POWDER, FOR SOLUTION ORAL at 17:31

## 2017-09-28 RX ADMIN — HUMAN INSULIN 6 UNITS/HR: 100 INJECTION, SOLUTION SUBCUTANEOUS at 23:05

## 2017-09-28 NOTE — PROVIDER NOTIFICATION
09/28/17 0604   Provider Notification   Provider Name/Title Dr. Dockery   Method of Notification In Department   Request Evaluate - Remote   Notification Reason Other (Comment)   Patient refusing to be placed on toco/EFM. Encouraged patient and educated about monitoring and importance. Patient refusing and will be monitored upon awaking.

## 2017-09-28 NOTE — CONSULTS
"[facility]   Mental Health Inpatient Consult Follow-up      Client Name: Anne Gunter                 Date:   2017                Consult Start Time:  3:05       Session End Time:   3:45                Session Length:        40    Attendees:    Client and                     DATA    Current Stressors / Issues:  This is a follow-up from patient's initial  mental health inpatient consult on 17, which was requested by Dr. Melba Mckee at [facility], due to concerns about \"poorly controlled Diabetes Mellitus (DM), flat affect, poor involvement in care,\" where it was recommended that the client receive weekly individual therapy sessions. Patient reported feeling better physically, but continued to report anxiety and now is also reporting severe depression.    Treatment Objective(s) Addressed in This Session:  Reassess symptoms and risk issues  Provide education on relaxation strategies  Provide education regarding  mental health disorders  Attempt to elicit commitment to following medical recommendations    Intervention and Summary of Session:    Interventions: Psychoeducation on  mood and anxiety disorders, Validation, Cognitive Restructuring, Pros/Cons, Diaphragmatic breathing, PHQ-9=22 (severe depression) and KELLI-7=20 (severe anxiety)     Patient reported that she is feeling a bit better physically as she had an EGD and NJ tube placement yesterday. She reported, however, that she has been feeling increasingly depressed and anxious due to being in the hospital and wanting to go home. Patient stated her belief that if she just was able to go home that her depression and anxiety would remit. Educated on the nature of  mood and anxiety disorders and the risk of her symptoms not subsiding when she goes home. Discussed her pattern of feeling better, leaving the hospital AMA, and then returning after only a few days. Patient stated she " "is aware that this is a risk to her and the baby, but said she did this during her first pregnancy and just sees it as a normal part of her pregnancy process. She also reported that she does not believe anything that can be done in the hospital will prevent future episodes of illness, so \"what's the point?\" of staying in the hospital. Encouraged her to speak more to her care team about this as this may be an incorrect assumption. Attempted to elicit what might help her to stay in the hospital, but patient stated \"nothing.\" She repeatedly stated she would be leaving the hospital either today or tomorrow if able. She acknowledged that her medical team, , and other family members all would like her to stay in the hospital, but that she \"doesn't care.\" She stated she wants the \"feeling of freedom. The ability to do whatever I want.\"  Attempted to engage client in problem-solving around ways to feel more comfortable in the hospital and to assist to reduce her feelings of anxiety and depression while she is here. Offered strategies for distraction and for calming. In response, patient stated \"nothing will make me happy while I'm in the hospital. I will be depressed and stressed the whole time I am here. I don't like it here. There is nothing anyone can do. I will leave.\" Patient acknowledged she enjoys activities such as drawing, music, and reading when she is not in the hospital, but stated she was unwilling to try these at this time. She was open to reviewing diaphragmatic breathing strategies, as well as to guided imagery in the future. She also reported she is willing to continue receiving weekly mental health support while she is here.           ASSESSMENT: Current Emotional / Mental Status (status of significant symptoms):                          Risk status (Self / Other harm or suicidal ideation)    Client denies current fears or concerns for personal safety.  Client reports the following current or recent " "suicidal ideation or behaviors: had passive thoughts of \"being better off dead\" when she was experiencing extreme pain. She indicated she has not had any suicidal thoughts since her pain has decreased. She also adamantly denied that she has ever had plans or intent to harm herself. She has no history suicidal ideation, plans, intent, or attempts. Client stated her commitment to safety..  Client denies current or recent homicidal ideation or behaviors.  Client denies current or recent self injurious behavior or ideation.  Client denies other safety concerns.    Writer has reviewed safety planning with patient. Reviewed with patient that if she develops any thoughts to hurt herself or others, that she should promptly inform her care team providers (if in the hospital) or once discharged, go to the emergency department or call 911. Client expressed understanding of this and agreed to access emergency resources if safety concerns arise.      Appearance:   Appropriate    Eye Contact:   Fair    Psychomotor Behavior: Normal    Attitude:   Cooperative with the session, but defiant regarding medical recommendations   Orientation:   All   Speech    Rate / Production: Normal     Volume:  Normal    Mood:    Anxious  Depressed  Normal   Affect:    Constricted    Thought Content:  Clear    Thought Form:  Coherent    Insight:    Fair                 Collateral Reports Completed:              Routed note to Care Team Member(s)         Recommendations and Plan: (Homework, other)  Continued individual therapy follow-up is recommended to help monitor patient's mood and target symptoms of depression and anxiety, which patient is agreeable to.  Either this writer or one of my colleagues from [facility]'s  Mental Health Team will continue to follow patient and provide weekly individual therapy sessions while patient is antepartum. It was recommended that she seek counseling if she chooses to leave the hospital. " Client is unwilling to consider medication for anxiety or depression at this time as she believes her symptoms are situational.     Please do not hesitate to contact [facility]'s  mental health inpatient consult team if you have any questions or concerns.  We may be reached via placing a  mental health inpatient consult order, or by contacting [phone].         Michela Gillette LP        2017  [facility]    50 minutes was spent providing this follow-up consult.  Over 80% of the time was spent in direct contact with patient, 20% of the time was spent providing documentation.

## 2017-09-28 NOTE — PLAN OF CARE
Problem: Patient Care Overview  Goal: Plan of Care/Patient Progress Review  Outcome: Improving  Pt recently had a low BG, 65, after apple juice it was 68, last check: 115. No reports of pain or nausea. Trying a full liquid diet at this time.

## 2017-09-28 NOTE — PROGRESS NOTES
CLINICAL NUTRITION SERVICES - Brief note (see assessment 9/25 for full details)     Received Provider Consult - Registered Dietitian to Assess and Order TF per Medical Nutrition Therapy Protocol    Postpyloric feeding tube (ND-tube) placed 9/27, placement confirmed, and tube feeds initiated/ordered as follows: TwoCal HN @ 10 mL/hr with advancement schedule to increase by 10 mL q 8 hrs as tolerated to goal of 50 mL/hr, continuous and pending lytes of K+ and Mg++ >/= normal and Phos >1.9 as pt at high risk for refeeding given inadequate nutrition and wt loss over the past week.   -Goal feeds of TwoCal HN @ 50 mL/hr to provide 1200 ml/day, 2400 kcals (32 kcals/kg/day), 101 g PRO (1.3 gms/kg/day), 840 ml free H2O, 263 g CHO and 6 g Fiber daily to meet 100% est kcal and protein needs  -TF flushes of 30 mL q 4 hrs and receiving IVF of NaCl @ 50 mL/hr, Flushes + TF + IVF = 2220 mL total/day to meet hydration needs  -Per RN notes, pt currently tolerating feeds at 20 mL/hr     Labs as of 9/28:  K+ 3.2 (L) - replacement protocols in place   Mg++ 1.9 WNL   Phos 2.8 WNL    Medications:  15 mL Certavite via TF ordered to meet micronutrient needs   Insulin drip continues at 1.5 mL/hr   IVF NaCl @ 50 mL/hr = 1200 mL/day     Diet Order:  Remains NPO     Procedures:  Upper endoscopy 9/27. Per GI note, esophagitis in distal esophagus, awaiting pathology results.   Per GI note, recommending encouraging soft/liquid diet, bland foods as able/tolerated    Nutritional Recommendations:  -Continue tube feeds as ordered. Continue to increase tube feeds per advancement schedule of Two Jorge Luis HN 10 mL/hr q 8 hrs to goal of 50 mL/hr, continuous pending tolerance and acceptable lytes   -Continue to replace lytes (K+, Mg++, Phos) per protocol as needed and do not advance TF if K+/Mg++ are < normal and/or Phos <2 mg/dL.   -Continue 15 mL Certavite to meet micronutrient needs vs prenatal vitamin   -Recommend endocrine to continue to follow for insulin  recommendations with feeds    Future Recommendations:  -If IVF are discontinued and pt with poor po intake of fluid, recommend increasing free water flushes via TF to 100-115 mL q 2 hrs pending tolerance and adjust as needed pending hydration status/po intake of fluid. TF + free water flushes would total 1394-3527 mL/day to meet hydration needs.   -As appropriate per team discretion, advance diet > NPO as tolerated  -If able to advance diet, recommend ordering Glucerna (vanilla) @ lunch and Magic Cup (vanilla) @ dinner to promote po intake. -If/when diet advancement > NPO and pt consuming at least 50% of meals TID, recommend ordering calorie counts to assess adequacy of po and ability to wean/adjust tube feeds.   -When able to advance diet, provide education for carb counting/gestational diabetes diet education.     Yesenia Covarrubias RD, LD  Unit Pager: [phone]

## 2017-09-28 NOTE — PROGRESS NOTES
Diabetes Consult Daily  Progress Note          Assessment/Plan:    Anne Gunter is a 26 yo woman at 26w6d of pregnancy, with uncontrolled type 1 diabetes and history of multiple episodes of DKA during previous pregnancy, who transferred to [facility] from [facility] with suspected DKA.  Agree with MFM that without a durable nutrition plan, Mrs Rodríguez is likely to experience DKA again.         Type 1 diabetic:      Plan  -Continue IV insulin, will transition to sub-q once TF at goal and tolerating for ~ 24 hours   -Continuous TF: TwoCal HN at 20 cc/hour, rate increased to 30 cc at ~ 1200 today  -Once diet is restarted we will need to order meal aspart: 1unit/6g CHO with meals and snacks.          Outpatient diabetes follow up scheduled with Dr. Colunga on October 12.( may need to be cancelled if patient remains inpatient)                             Plan discussed with bedside RN            Interval History:   The last 24 hours progress and nursing notes reviewed.  Underwent EGD 9/27 with findings c/w LA grade B-C esophagitis in distal esophagus. Pathology results pending  Continues on IV insulin as TF is increased to goal    Postpyloric feeding tube( ND), started at 10 cc/hour with advancement scheduled to increase by 10 cc every 8 hours to goal.  Goal feeds of TwoCal HN @ 50 mL/hr to provide 1200 ml/day, 2400 kcals (32 kcals/kg/day), 101 g PRO (1.3 gms/kg/day), 840 ml free H2O, 263 g CHO and 6 g Fiber daily to meet 100% est kcal and protein needs    Recent Labs  Lab 09/28/17  1117 09/28/17  1026 09/28/17  0918 09/28/17  0803 09/28/17  0703 09/28/17  0657 09/28/17  0603  09/28/17  0102  09/27/17  0025  09/25/17  0617  09/24/17  2048  09/24/17  1522   GLC  --   --   --   --   --  107*  --   --  72  --  131*  --  95  --  116*  --  184*   * 129* 75 86 80  --  88  < >  --   < >  --   < >  --   < >  --   < >  --    < > = values in this interval not displayed.            Review of Systems:   See  interval hx          Medications:       Active Diet Order      Clear Liquid Diet     Physical Exam:  Gen: NAD   HEENT: nasal feeding tube  Resp: Unlabored  Neuro: sleeping  BP 90/57  Pulse 85  Temp 97.7  F (36.5  C) (Oral)  Resp 20  Wt 74.3 kg (163 lb 12.8 oz)  SpO2 100%  BMI 25.71 kg/m2           Data:     Lab Results   Component Value Date    A1C 8.4 09/10/2017    A1C 9.2 06/01/2016    A1C 9.8 05/23/2016    A1C 7.4 04/23/2016    A1C 7.2 04/19/2016              CBC RESULTS:   Recent Labs   Lab Test  09/26/17   0808   WBC  6.1   RBC  3.59*   HGB  9.4*   HCT  28.6*   MCV  80   MCH  26.2*   MCHC  32.9   RDW  15.3*   PLT  233     Recent Labs   Lab Test  09/28/17   0657  09/28/17   0102   NA  139  136   POTASSIUM  3.2*  3.5   CHLORIDE  106  105   CO2  22  22   ANIONGAP  11  9   GLC  107*  72   BUN  3*  2*   CR  0.40*  0.37*   LILLIAM  7.3*  7.8*     Liver Function Studies -   Recent Labs   Lab Test  09/24/17   1522   PROTTOTAL  6.7*   ALBUMIN  2.4*   BILITOTAL  0.3   ALKPHOS  58   AST  6   ALT  13     No results found for: INR      I spent a total of 25 minutes bedside and on the inpatient unit managing the glycemic care of Anne Gunter. Over 50% of my time on the unit was spent counseling the patient  and/or coordinating care.      Fouzia Solorio -[phone]  Diabetes Management job code [id number]

## 2017-09-28 NOTE — PLAN OF CARE
Problem: Diabetes, Type 1 (Adult)  Goal: Signs and Symptoms of Listed Potential Problems Will be Absent, Minimized or Managed (Diabetes, Type 1)  Signs and symptoms of listed potential problems will be absent, minimized or managed by discharge/transition of care (reference Diabetes, Type 1 (Adult) CPG).   Patient complaining of abdominal pain and sore throat. IV diluadid given and relief. Notified provider due to irregular heart rhythm and EKG and labs drawn. No new orders and continue to observe for symptoms of hypoglycemia, hyperglycemia, pain and intolerance of feeds. Increased rate to 20ml/hr and patient tolerating NJ feedings. Insulin drip and checking blood glucose levels every hour. Titrating drip as needed and per algorithm. Will continue to monitor and update provider with any changes or concerns.

## 2017-09-28 NOTE — PROGRESS NOTES
Called to patient's room for irregular heart beat. Patient is complaining of abdominal pain. No chest pain, SOB. Heart does generally irregular with occasional skipped beats, suspected PVCs. Given this new finding EKG and BMP were ordered. BMP un concerning. EKG with PVCs and short GA. Discussed with cardiology who does not have acute concerns about these findings. Will continue to monitor. Suspect upper abdominal discomfort is related to difficult placement of NJ tube.    Mariann Dockery PGY3  9/28/2017 1:41 AM

## 2017-09-28 NOTE — PROGRESS NOTES
In to discuss options for IV access with patient after right PIV failed.  Used telephone [place] .  Patient agreeable to PICC line placement.    Regi Parrish MD  PGY2 OBGYN

## 2017-09-28 NOTE — PLAN OF CARE
Problem: Patient Care Overview  Goal: Plan of Care/Patient Progress Review  Outcome: Declining  Pt returned from procedure on [place], denied pain and nausea. Confirmed correct NJ placement with radiology note/x-ray report and verbal confirmation from Dr. Hart on unit. Used NJ to give medication and flush with water and Pt ate small amount of ice chips and then reported nausea. Held tube feeding at that time. Started tube feedings at 10ml/hr at 1945 and pt has been tolerating without nausea or vomiting. Pt began to feel abdominal pain return this evening, has taken dilaudid x3, each dose about 90 minutes apart. Pt has refused oral maalox and lidocaine doses as well as phenergan suppository. Blood sugars have ranged from 69 (treated with IV dextrose) to 137 and has been in algorithm 3 throughout shift. Pt requesting to eat food tonight but explained that she is NPO and would likely make abd pain and nausea worse. Continue to monitor.

## 2017-09-28 NOTE — PLAN OF CARE
Problem: Patient Care Overview  Goal: Plan of Care/Patient Progress Review  Outcome: Improving  Pt NJ feedings currently at 20ml, will increase at noon. Has been sleeping on and off. Denies any pain at this time. Has drank water this AM and denies any nausea. BG has ranged from  in algorithm 3 currently. Pt said that she would be okay to be monitored when she woke up.

## 2017-09-28 NOTE — PROGRESS NOTES
[facility]te[facility] Progress Note    9/28/2017  Hospital Day #5  Anne Gunter  GA: 28w1d    S: Patient had EGD and NJ tube placement. She tolerated the procedure and had not vomiting since. She denies abdominal pain and nausea. She has an appetite and is requesting to eat drink water and eat food. She requested IV Dilaudid q1-2 hours last evening, but did not request any from 0300h until 0700h. +FM. No CNX, VB, or LOF. Has not had BM since prior to re-admission on 9/24/17.    O:  Vitals:    09/28/17 0010 09/28/17 0434 09/28/17 0842 09/28/17 0852   BP: 104/64 115/69 (!) 88/55 90/57   Pulse:       Resp: 20  20    Temp: 98.5  F (36.9  C) 98.5  F (36.9  C) 97.7  F (36.5  C)    TempSrc: Oral Oral Oral    SpO2:       Weight:          Gen:               Awake, alert, sitting up in bed, less pale  CV:                 B/l peripheral pulses present  Pulm:              Breathing comfortably on RA  Abd:                Non-tender, non-distended, soft, gravid  Ext:                 Trace edema of distal extremities    Recent Labs  Lab 09/28/17  0803 09/28/17  0703 09/28/17  0657 09/28/17  0603 09/28/17  0505 09/28/17  0405 09/28/17  0303  09/28/17  0102  09/27/17  0025  09/25/17  0617  09/24/17  2048  09/24/17  1522   GLC  --   --  107*  --   --   --   --   --  72  --  131*  --  95  --  116*  --  184*   BGM 86 80  --  88 72 100* 115*  < >  --   < >  --   < >  --   < >  --   < >  --    < > = values in this interval not displayed.    Component      Latest Ref Rng & Units 9/28/2017   Sodium      133 - 144 mmol/L 139   Potassium      3.4 - 5.3 mmol/L 3.2 (L)   Chloride      94 - 109 mmol/L 106   Carbon Dioxide      20 - 32 mmol/L 22   Anion Gap      3 - 14 mmol/L 11   Glucose      70 - 99 mg/dL 107 (H)   Urea Nitrogen      7 - 30 mg/dL 3 (L)   Creatinine      0.52 - 1.04 mg/dL 0.40 (L)   GFR Estimate      >60 mL/min/1.7m2 >90   GFR Estimate If Black      >60 mL/min/1.7m2 >90   Calcium      8.5 - 10.1 mg/dL 7.3 (L)   Phosphorus      2.5 -  4.5 mg/dL 2.8   Magnesium      1.6 - 2.3 mg/dL 1.9         Medications  Infusion  - Insulin 0-4 U/hr    Scheduled   - IV Reglan  - IV Pepcid  - Phenergen suppository prn  - IV Protonix  - Mylanta/Maalox PO  - Viscous lidocaine PO  - B12 inj q30d  - IV acetaminophen  - Enoxaparin  - Cerovite susp    PRN  - IV hydromorphone  - IV ondansetron  - IV prochlorperazine    FHT:               Baseline 120, moderate variability, accelerations present, appropriate for gestational age  [facility]:              Irritable     Assessment/Plan:  Ms. Anne Gunter is a 27 year old  at 28w1d by stated dates consistent with 25w5d ultrasound admitted to antepartum with severe abdominal pain and elevated blood glucose. She is admitted for medical management and stabilization. Patient has had epigastric abdominal pain 2/2 esophagitis with distal mucosal tears (s/p EGD 17) and diabetic gastroparesis. Her gastroparesis is worsened by hyperglycemia as well as with non-adherence to dietary modifications. GI and Endocrine are following. She started TF (2017) and tolerating the feeds well, not tolerating the NJ discomfort well. Will allow very slow advancement of liquid diet.       Type I DM  On arrival, started IV x 2 (with extreme difficulty, will need to consider alternative access with midline/PICC, etc.) and NS fluids, insulin drip, then D5/NS/KCl. Has received KCl replacement. Not in DKA, ketones have normalized, electrolytes WNL. BGs 70s-130s. Requiring insulin IV at 0-4 U/hr currently since starting TF. Clinically stable. Endocrine is following closely.  - Appreciate endocrine recommendations  - D5/NS/KCl  - Insulin drip until clinically improved, tolerating PO or off D5, per endo. Plan to continue drip until full NJ tube feeds are established.  - BG checks q hour while not taking oral intake and on insulin drip  - Hypoglycemic protocol  - K and Mag replacement protocols  - Consider PICC if recurrent trouble with  PIV     Abdominal Pain/Malnutrition   POD#1 s/p EGD and NJ tube placement. Evidence of esophagitis with grade C-D mucosal tears in distal esophagus. Epigastric pain likely 2/2 esophagitis, mucosal tears, and gastroparesis in the setting of poorly controlled type DM. No formal gastric emptying study. TF started, which patient is tolerating. Epigastric pain has improved, not requiring Diluadid at this time. Will advance liquid diet slowly (patient likely needs to be on liquid diet for extended time as she recovers from esophagitis and gastroparesis flare). GI continues to follow.  - Allow advancing liquid diet, start with sips of water q15 minutes  - Appreciate GI recommendations  - Appreciate nutrition recommendations  - IV tylenol and IV dilaudid PRN  - IV Protonix and Pepcid  - IV Reglan  - Scheduled Maalox and viscous lidocaine for esophageal/epigastric discomfort  - Nausea and Vomiting: PRN IV Compazine, Phenergan, Zofran     Anxiety   psychology was consulted (2017) on recent admission and re-consulted for follow up for this admission. Per their assessment, patient has KELLI and would benefit from CBT. Social work recommends working toward relationship with family as well to have trusted health care advocate for the patient.   - Consult  psychology (for follow up)  - Appreciate social work recommendations  - Will continue to reach out to sister and father as they are living here with her and offer to facetime or conference call with her  in [place] as he is able      FWB  Reassuring for gestational age.  - TID monitoring      PNC  NOB labs collected 9/10/17: Rh pos, Rubella IgG positive, Hep B nonreactive, HIV nonreactive, GBS positive, placenta posterior, GC/Chlam negative.   - GBS+, penicillin if delivery imminent  - Begin twice weekly BPP @28w (17)- Tu/Fri.  - Tdap this week if still inpatient    Sonia Hart,  FACOG  Maternal Fetal Medicine Specialist  Pager:  [phone]  Mobile: [phone]      Time Spent on this Encounter   I, Sonia Hart DO, spent a total of 15 minutes face to face or coordinating care of Anne Gunter.  Over 50% of my time on the unit was spent counseling the patient and/or coordinating care regarding plan for care for advancing diet.

## 2017-09-28 NOTE — PROVIDER NOTIFICATION
09/28/17 0029   Provider Notification   Provider Name/Title Dr. Dockery   Method of Notification At Bedside   Request Evaluate in Person   Notification Reason Other (Comment)   Provider at bedside due to notification of irregular heart rhythm. At bedside and ordered EKG and electrolyte panel.

## 2017-09-28 NOTE — PHARMACY
Prescriber Notification Note    The pharmacist has communicated with this patient's provider regarding a concern or therapy recommendation.    Notified Person: Dr. Parrish  Date/Time of Notification: 1500  Interaction: phone  Concern/Recommendation:  Tylenol IV is no formulary, can we change it to oral soln? Patient has tube feed or can be given suppository.      Comments/Additional Details:  Will discuss with the team and switch to oral soln if appropriate.

## 2017-09-28 NOTE — PROGRESS NOTES
Brief GI note      Discussed with primary team and patient abdominal pain pretty much resolved and no further nausea after procedure yesterday. Patient tolerating TF. At this point no further recommendations from the GI team and we will sign off. Please notify us if questions arise.      Patient discussed with Dr. Daniel Colmenares  GI fellow

## 2017-09-28 NOTE — PLAN OF CARE
Problem: Patient Care Overview  Goal: Plan of Care/Patient Progress Review  Outcome: No Change  Pt IV leaking in R arm. Dr Parrish at bedside talking with pt via  phone about the necessity of a PICC line. Pt agreed to  PICC line. Now using algorithm 4 for patients insulin management. Currently only have IV access for insulin. Finishing FHR monitoring.

## 2017-09-28 NOTE — PLAN OF CARE
Problem: Diabetes, Type 1 (Adult)  Goal: Signs and Symptoms of Listed Potential Problems Will be Absent, Minimized or Managed (Diabetes, Type 1)  Signs and symptoms of listed potential problems will be absent, minimized or managed by discharge/transition of care (reference Diabetes, Type 1 (Adult) CPG).   Outcome: Improving  Pt on insulin drip, currently on algorithm 4. See flow record and lab for further details. Will continue to closely monitor.

## 2017-09-29 ENCOUNTER — OFFICE VISIT (OUTPATIENT)
Dept: INTERPRETER SERVICES | Facility: CLINIC | Age: 27
End: 2017-09-29

## 2017-09-29 ENCOUNTER — HOSPITAL ENCOUNTER (INPATIENT)
Dept: ULTRASOUND IMAGING | Facility: CLINIC | Age: 27
End: 2017-09-29
Payer: COMMERCIAL

## 2017-09-29 VITALS
BODY MASS INDEX: 25.96 KG/M2 | TEMPERATURE: 98.2 F | SYSTOLIC BLOOD PRESSURE: 102 MMHG | WEIGHT: 165.4 LBS | DIASTOLIC BLOOD PRESSURE: 62 MMHG | RESPIRATION RATE: 18 BRPM | OXYGEN SATURATION: 100 % | HEART RATE: 86 BPM

## 2017-09-29 LAB
ANION GAP SERPL CALCULATED.3IONS-SCNC: 10 MMOL/L (ref 3–14)
BUN SERPL-MCNC: 6 MG/DL (ref 7–30)
CALCIUM SERPL-MCNC: 7.4 MG/DL (ref 8.5–10.1)
CHLORIDE SERPL-SCNC: 107 MMOL/L (ref 94–109)
CO2 SERPL-SCNC: 23 MMOL/L (ref 20–32)
CREAT SERPL-MCNC: 0.39 MG/DL (ref 0.52–1.04)
GFR SERPL CREATININE-BSD FRML MDRD: >90 ML/MIN/1.7M2
GLUCOSE BLDC GLUCOMTR-MCNC: 100 MG/DL (ref 70–99)
GLUCOSE BLDC GLUCOMTR-MCNC: 102 MG/DL (ref 70–99)
GLUCOSE BLDC GLUCOMTR-MCNC: 103 MG/DL (ref 70–99)
GLUCOSE BLDC GLUCOMTR-MCNC: 108 MG/DL (ref 70–99)
GLUCOSE BLDC GLUCOMTR-MCNC: 111 MG/DL (ref 70–99)
GLUCOSE BLDC GLUCOMTR-MCNC: 112 MG/DL (ref 70–99)
GLUCOSE BLDC GLUCOMTR-MCNC: 117 MG/DL (ref 70–99)
GLUCOSE BLDC GLUCOMTR-MCNC: 118 MG/DL (ref 70–99)
GLUCOSE BLDC GLUCOMTR-MCNC: 125 MG/DL (ref 70–99)
GLUCOSE BLDC GLUCOMTR-MCNC: 127 MG/DL (ref 70–99)
GLUCOSE BLDC GLUCOMTR-MCNC: 129 MG/DL (ref 70–99)
GLUCOSE BLDC GLUCOMTR-MCNC: 132 MG/DL (ref 70–99)
GLUCOSE BLDC GLUCOMTR-MCNC: 146 MG/DL (ref 70–99)
GLUCOSE BLDC GLUCOMTR-MCNC: 147 MG/DL (ref 70–99)
GLUCOSE BLDC GLUCOMTR-MCNC: 152 MG/DL (ref 70–99)
GLUCOSE BLDC GLUCOMTR-MCNC: 53 MG/DL (ref 70–99)
GLUCOSE BLDC GLUCOMTR-MCNC: 55 MG/DL (ref 70–99)
GLUCOSE BLDC GLUCOMTR-MCNC: 62 MG/DL (ref 70–99)
GLUCOSE BLDC GLUCOMTR-MCNC: 85 MG/DL (ref 70–99)
GLUCOSE BLDC GLUCOMTR-MCNC: 89 MG/DL (ref 70–99)
GLUCOSE BLDC GLUCOMTR-MCNC: 92 MG/DL (ref 70–99)
GLUCOSE BLDC GLUCOMTR-MCNC: 95 MG/DL (ref 70–99)
GLUCOSE SERPL-MCNC: 106 MG/DL (ref 70–99)
MAGNESIUM SERPL-MCNC: 2 MG/DL (ref 1.6–2.3)
PHOSPHATE SERPL-MCNC: 2.2 MG/DL (ref 2.5–4.5)
PLATELET # BLD AUTO: 179 10E9/L (ref 150–450)
POTASSIUM SERPL-SCNC: 3.8 MMOL/L (ref 3.4–5.3)
SODIUM SERPL-SCNC: 140 MMOL/L (ref 133–144)

## 2017-09-29 PROCEDURE — T1013 SIGN LANG/ORAL INTERPRETER: HCPCS | Mod: U3

## 2017-09-29 PROCEDURE — 36415 COLL VENOUS BLD VENIPUNCTURE: CPT | Performed by: OBSTETRICS & GYNECOLOGY

## 2017-09-29 PROCEDURE — 83735 ASSAY OF MAGNESIUM: CPT | Performed by: OBSTETRICS & GYNECOLOGY

## 2017-09-29 PROCEDURE — 76819 FETAL BIOPHYS PROFIL W/O NST: CPT

## 2017-09-29 PROCEDURE — 85049 AUTOMATED PLATELET COUNT: CPT | Performed by: OBSTETRICS & GYNECOLOGY

## 2017-09-29 PROCEDURE — 00000146 ZZHCL STATISTIC GLUCOSE BY METER IP

## 2017-09-29 PROCEDURE — 25000125 ZZHC RX 250: Performed by: OBSTETRICS & GYNECOLOGY

## 2017-09-29 PROCEDURE — 25000132 ZZH RX MED GY IP 250 OP 250 PS 637: Performed by: STUDENT IN AN ORGANIZED HEALTH CARE EDUCATION/TRAINING PROGRAM

## 2017-09-29 PROCEDURE — 80048 BASIC METABOLIC PNL TOTAL CA: CPT | Performed by: OBSTETRICS & GYNECOLOGY

## 2017-09-29 PROCEDURE — 25000132 ZZH RX MED GY IP 250 OP 250 PS 637: Performed by: OBSTETRICS & GYNECOLOGY

## 2017-09-29 PROCEDURE — 25000131 ZZH RX MED GY IP 250 OP 636 PS 637: Performed by: OBSTETRICS & GYNECOLOGY

## 2017-09-29 PROCEDURE — 25000131 ZZH RX MED GY IP 250 OP 636 PS 637: Performed by: NURSE PRACTITIONER

## 2017-09-29 PROCEDURE — 84100 ASSAY OF PHOSPHORUS: CPT | Performed by: OBSTETRICS & GYNECOLOGY

## 2017-09-29 PROCEDURE — 25000128 H RX IP 250 OP 636: Performed by: OBSTETRICS & GYNECOLOGY

## 2017-09-29 RX ORDER — METOCLOPRAMIDE HYDROCHLORIDE 5 MG/5ML
10 SOLUTION ORAL
Status: DISCONTINUED | OUTPATIENT
Start: 2017-09-29 | End: 2017-09-29 | Stop reason: HOSPADM

## 2017-09-29 RX ORDER — PROMETHAZINE HYDROCHLORIDE 25 MG/1
25 SUPPOSITORY RECTAL EVERY 6 HOURS PRN
Status: DISCONTINUED | OUTPATIENT
Start: 2017-09-29 | End: 2017-09-29 | Stop reason: HOSPADM

## 2017-09-29 RX ORDER — NICOTINE POLACRILEX 4 MG
15-30 LOZENGE BUCCAL
Status: DISCONTINUED | OUTPATIENT
Start: 2017-09-29 | End: 2017-09-29

## 2017-09-29 RX ORDER — DEXTROSE MONOHYDRATE 25 G/50ML
25-50 INJECTION, SOLUTION INTRAVENOUS
Status: DISCONTINUED | OUTPATIENT
Start: 2017-09-29 | End: 2017-09-29

## 2017-09-29 RX ORDER — ALUMINA, MAGNESIA, AND SIMETHICONE 2400; 2400; 240 MG/30ML; MG/30ML; MG/30ML
30 SUSPENSION ORAL
Qty: 355 ML | Refills: 1 | Status: ON HOLD | OUTPATIENT
Start: 2017-09-29 | End: 2017-10-13

## 2017-09-29 RX ADMIN — HUMAN INSULIN 2 UNITS/HR: 100 INJECTION, SOLUTION SUBCUTANEOUS at 13:20

## 2017-09-29 RX ADMIN — HUMAN INSULIN 6 UNITS/HR: 100 INJECTION, SOLUTION SUBCUTANEOUS at 07:04

## 2017-09-29 RX ADMIN — INSULIN ASPART 5 UNITS: 100 INJECTION, SOLUTION INTRAVENOUS; SUBCUTANEOUS at 14:06

## 2017-09-29 RX ADMIN — PANTOPRAZOLE SODIUM 40 MG: 40 TABLET, DELAYED RELEASE ORAL at 11:10

## 2017-09-29 RX ADMIN — SODIUM CHLORIDE: 9 INJECTION, SOLUTION INTRAVENOUS at 02:28

## 2017-09-29 RX ADMIN — Medication 650 MG: at 02:27

## 2017-09-29 RX ADMIN — METOCLOPRAMIDE HYDROCHLORIDE 10 MG: 5 SOLUTION ORAL at 11:10

## 2017-09-29 NOTE — PROGRESS NOTES
MFM Antepartum Progress Note    9/29/2017  Hospital Day #6  Anne Gunter  GA: 28w2d    S: Slept poorly overngith due to hourly BG checks. Has not had not nausea, vomiting, or abdominal pain since the EGD procedure and NJ placement. She has an appetite and has tolerated advancement of liquid diet. Has not had BM since Sunday (day of readmission).  She has not requested Dilaudid for >24h. Has been up in wheelchair with family. +FM. No CNX, VB, or LOF.   O:  Vitals:    09/28/17 1935 09/29/17 0206 09/29/17 0605 09/29/17 0610   BP: 90/51   98/62   Pulse:       Resp: 20  18    Temp: 98.3  F (36.8  C) 98.4  F (36.9  C) 98.2  F (36.8  C)    TempSrc: Oral Oral Oral    SpO2:       Weight:    75 kg (165 lb 6.4 oz)      Gen:               Awakes from sleep to voice, in NAD  CV:                 B/l peripheral pulses present  Pulm:              Breathing comfortably on RA  Abd:                Non-tender, non-distended, soft, gravid  Ext:                Trace edema of distal extremities    FHR: Baseline 135 bpm, moderate variability, accels, no decels  [place]: no contractions    Recent Labs  Lab 09/29/17  0658 09/29/17  0607 09/29/17  0606 09/29/17  0506 09/29/17  0405 09/29/17  0308 09/29/17  0205  09/28/17  0657  09/28/17  0102  09/27/17  0025  09/25/17  0617  09/24/17  2048   GLC  --  106*  --   --   --   --   --   --  107*  --  72  --  131*  --  95  --  116*   *  --  103* 85 111* 125* 108*  < >  --   < >  --   < >  --   < >  --   < >  --    < > = values in this interval not displayed.    Results for ANNE GUNTER (MRN [id number]) as of 9/29/2017 08:16   Ref. Range 9/29/2017 06:07   Sodium Latest Ref Range: 133 - 144 mmol/L 140   Potassium Latest Ref Range: 3.4 - 5.3 mmol/L 3.8   Chloride Latest Ref Range: 94 - 109 mmol/L 107   Carbon Dioxide Latest Ref Range: 20 - 32 mmol/L 23   Urea Nitrogen Latest Ref Range: 7 - 30 mg/dL 6 (L)   Creatinine Latest Ref Range: 0.52 - 1.04 mg/dL 0.39 (L)   GFR Estimate Latest Ref Range:  >60 mL/min/1.7m2 >90   GFR Estimate If Black Latest Ref Range: >60 mL/min/1.7m2 >90   Calcium Latest Ref Range: 8.5 - 10.1 mg/dL 7.4 (L)   Anion Gap Latest Ref Range: 3 - 14 mmol/L 10   Magnesium Latest Ref Range: 1.6 - 2.3 mg/dL 2.0   Phosphorus Latest Ref Range: 2.5 - 4.5 mg/dL 2.2 (L)     Medications  Infusion  - Insulin 0-6 U/hr    Scheduled **denied by patient over last 24 hours  - IV Reglan **  - IV Pepcid **  - Phenergen suppository prn **  - IV Protonix **  - Mylanta/Maalox PO **  - Viscous lidocaine PO **  - B12 inj q30d  - IV acetaminophen  - Enoxaparin  - Cerovite susp **  - KCl replacement     PRN  - IV hydromorphone **  - IV ondansetron **  - IV prochlorperazine **    FHT: Baseline 120, moderate variability, accelerations present, appropriate for gestational age  [place]: Irritable    Transabdominal US 17: BPP 8/8, normal EDMOND     Assessment/Plan:  Ms. Anne Gunter is a 27 year old  at 28w2d by stated dates consistent with 25w5d ultrasound admitted to antepartum with severe abdominal pain and elevated blood glucose. She is admitted for medical management and stabilization. Patient has had epigastric abdominal pain 2/2 esophagitis with distal mucosal tears (s/p EGD 17) and diabetic gastroparesis. Her gastroparesis is worsened by hyperglycemia as well as with non-adherence to dietary modifications. GI has signed off as her abdominal pain and epigastric symptoms have improved. Endocrine is following.       Type I DM  On arrival, started IV x 2 (with extreme difficulty, will need to consider alternative access with midline/PICC, etc.) and NS fluids, insulin drip, then D5/NS/KCl. Has received KCl replacement. Not in DKA, ketones have normalized, electrolytes WNL. BGs 70s-130s. Requiring insulin IV at 0-4 U/hr currently since starting TF. Clinically stable. Endocrine is following closely.  - Appreciate endocrine recommendations  - D5/NS/KCl  - Insulin drip until clinically improved, tolerating  PO or off D5, per endo. Plan to continue drip until full NJ tube feeds are established.  - BG checks q hour while not taking oral intake and on insulin drip  - Hypoglycemic protocol  - K and Mag replacement protocols  - Consider PICC if recurrent trouble with PIV     Abdominal Pain/Malnutrition   POD#1 s/p EGD and NJ tube placement. Evidence of esophagitis with grade C-D mucosal tears in distal esophagus. Epigastric pain likely 2/2 esophagitis, mucosal tears, and gastroparesis in the setting of poorly controlled type DM. No formal gastric emptying study. She started TF (2017) and tolerating the feeds well, almost at goal TF (50/hr). Epigastric pain has improved, not requiring Diluadid. GI has signed off as her abdominal pain and epigastric symptoms have improved. Denied many GI medications over the last 24 hours.  - Will advance liquid diet slowly (patient likely needs to be on liquid diet for extended time as she recovers from esophagitis and gastroparesis flare).   - Recommend Reglan and/or Protonix if she will accept meds  - Appreciate nutrition recommendations  - IV tylenol and IV dilaudid PRN  - IV Protonix and Pepcid  - IV Reglan  - Scheduled Maalox and viscous lidocaine for esophageal/epigastric discomfort  - Nausea and Vomiting: PRN IV Compazine, Phenergan, Zofran     Anxiety   psychology was consulted (2017) on recent admission and re-consulted for follow up for this admission. Per their assessment, patient has KELLI and would benefit from CBT. Social work recommends working toward relationship with family as well to have trusted health care advocate for the patient. Family visits have helped improve patient's mood and attitude.  - Consult  psychology (for follow up)  - Appreciate social work recommendations  - Will continue to reach out to sister and father as they are living here with her and offer to facetime or conference call with her  in [place] as he is  able      FWB  Reassuring for gestational age.  - TID monitoring      PNC  NOB labs collected 9/10/17: Rh pos, Rubella IgG positive, Hep B nonreactive, HIV nonreactive, GBS positive, placenta posterior, GC/Chlam negative.   - GBS+, penicillin if delivery imminent  - Twice weekly (T/F) BPP (next 10/2)  - Tdap this week if still inpatient      Time Spent on this Encounter   I, Sonia Hart DO, spent a total of 20 minutes face to face or coordinating care of Anne Gunter.  Over 50% of my time on the unit was spent counseling the patient and/or coordinating care regarding coordinating care for discharge planning, tube feeds, transition from insulin drip to SC insulin.       Sonia Hart DO FACOG  Maternal Fetal Medicine Specialist  Pager: [phone]  Mobile: [phone]

## 2017-09-29 NOTE — PROGRESS NOTES
Diabetes Consult Daily  Progress Note          Assessment/Plan:     Anne Gunter is a 28 yo woman at 26w6d of pregnancy, with uncontrolled type 1 diabetes and history of multiple episodes of DKA during previous pregnancy, who transferred to [facility] from [facility] with suspected DKA.  Agree with MFM that without a durable nutrition plan, Mrs Rodríguez is likely to experience DKA again.          Type 1 diabetic:      Plan  -Continue IV insulin, will transition to sub-q once TF at goal and there is a clear nutrition plan.   -Continuous TF: TwoCal HN at 20 cc/hour, rate increased to 30 cc at ~ 1200 today  - ordered meal aspart: 1unit/6g CHO with meals and snacks.  -goal glcuose during continuous TF 90-< 140  -Postpyloric feeding tube( ND): TwoCal HN @ 50 mL/hr    -full liquid diet  -IV fluids, NS @ 10 cc/hour          Outpatient diabetes follow up scheduled with Dr. Colunga on October 12.( may need to be cancelled if patient remains inpatient)                                                  Plan discussed with bedside RN, and primary team.           Interval History:   The last 24 hours progress and nursing notes reviewed.    TF reached goal during the night, seems to be tolerating the TF at goal rate.  Continues on IV insulin, not well controlled.range is from 0-8 units per hour.   Talked with Primary team, clear liquid diet was started on Thursday and diet is advancing to full liquid today.  Plus, primary team would like to d/c patient once RD determines a nutrition plan for discharge ( Considering to cycle TF over night).  Discussed the difficulty in glucose control and concern that Ms. Gunter will take her insulin with the addition of cycled TF and/or being discharged on TF.    Patient reports tolerating juice    Underwent EGD 9/27 with findings c/w LA grade B-C esophagitis in distal esophagus. Pathology results pending      Recent Labs  Lab 09/29/17  1310 09/29/17  1222 09/29/17  1107  09/29/17  1000 09/29/17  0857 09/29/17  0803  09/29/17  0607  09/28/17  0657  09/28/17  0102  09/27/17  0025  09/25/17  0617  09/24/17  2048   GLC  --   --   --   --   --   --   --  106*  --  107*  --  72  --  131*  --  95  --  116*   BGM 92 89 100* 147* 146* 152*  < >  --   < >  --   < >  --   < >  --   < >  --   < >  --    < > = values in this interval not displayed.            Review of Systems:   See interval hx          Medications:       Active Diet Order      Full Liquid Diet Decaf beverages     Physical Exam:  Gen: Alert, resting in bed, in NAD, pale   HEENT: NC/AT, mucous membranes are moist  Resp: Unlabored  Ext: moving all extremities  Neuro:oriented x3, communicating clearly  /68  Pulse 86  Temp 98.4  F (36.9  C) (Oral)  Resp 16  Wt 75 kg (165 lb 6.4 oz)  SpO2 100%  BMI 25.96 kg/m2           Data:     Lab Results   Component Value Date    A1C 8.4 09/10/2017    A1C 9.2 06/01/2016    A1C 9.8 05/23/2016    A1C 7.4 04/23/2016    A1C 7.2 04/19/2016              CBC RESULTS:   Recent Labs   Lab Test  09/29/17   0607  09/26/17   0808   WBC   --   6.1   RBC   --   3.59*   HGB   --   9.4*   HCT   --   28.6*   MCV   --   80   MCH   --   26.2*   MCHC   --   32.9   RDW   --   15.3*   PLT  179  233     Recent Labs   Lab Test  09/29/17   0607  09/28/17   2115  09/28/17   0657   NA  140   --   139   POTASSIUM  3.8  3.6  3.2*   CHLORIDE  107   --   106   CO2  23   --   22   ANIONGAP  10   --   11   GLC  106*   --   107*   BUN  6*   --   3*   CR  0.39*   --   0.40*   LILLIAM  7.4*   --   7.3*     Liver Function Studies -   Recent Labs   Lab Test  09/24/17   1522   PROTTOTAL  6.7*   ALBUMIN  2.4*   BILITOTAL  0.3   ALKPHOS  58   AST  6   ALT  13     No results found for: INR      I spent a total of 35 minutes bedside and on the inpatient unit managing the glycemic care of Anne Gunter. Over 50% of my time on the unit was spent counseling the patient  and/or coordinating care.      Fouzia Solorio CNP  [phone]  Diabetes Management job code [phone]

## 2017-09-29 NOTE — PLAN OF CARE
Problem: Patient Care Overview  Goal: Plan of Care/Patient Progress Review  Outcome: Improving  Overall good night. Pt reports headache overnight resolved with tylenol. No other complaints of pain. VSS. No N/V. Tube feeding at goal rate 50/ml hour. Tolerating well. Insulin drip running- see doc flow. Biggest complaint- lack of sleep.

## 2017-09-29 NOTE — PROGRESS NOTES
CLINICAL NUTRITION SERVICES - REASSESSMENT NOTE     Nutrition Prescription    RECOMMENDATIONS FOR MDs/PROVIDERS TO ORDER:  -Recommend replacing phos given low phos value of 2.2 mg/dL on 9/29    -Monitor hydration status/labs, now that IVF decreased and pt able to drink fluids via po. If suspect inadequate hydration or if poor po intake of fluids, recommend increasing free water flushes via TF to meet hydration needs.     -Continue to recommend endocrine to follow for insulin recommendations with tube feeds/po intake     -Continue to advance diet as tolerated from full liquids     Malnutrition Status:    Non-severe malnutrition in the context of acute illness    Recommendations already ordered by Registered Dietitian (RD):  -Ordered nutritional supplements (Glucerna @ lunch and dinner)    -Continue tube feeds as ordered at goal of TwoCal HN @ 50 mL/hr, continuous to meet nutrition needs (provisions below)    -Continue 15 mL Certavite daily to meet micronutrient needs    Future/Additional Recommendations:  -Once diet advancement > full liquids and pt consuming at least 25-50% of meals TID, recommend starting calorie counts to assess adequacy of po intake and ability to wean/adjust TF. If able to wean or d/c tube feeds, recommend resume prenatal multivitamin via PO and d/c Certavite.     -Once diet advancement > full liquids and pt starting to consume a regular diet, recommend starting to cycle feeds as tolerated. Would recommend cycling as tolerated as follows: 65 mL/hr x 18 hrs, if tolerated 85 mL/hr x 14 hrs, if tolerated 100 mL/hr x 12 hrs.     -Pending tolerance to diet advancement and food choices, may consider low-fat/low-fiber diet given hx of gastroparesis    -Discussed with team providing carb counting/diabetes diet education prior to discharge.      EVALUATION OF THE PROGRESS TOWARD GOALS   Diet: Full Liquid (Previously NPO 9/24-9/28, diet advanced to clear liquids 9/28, currently up to full liquids 9/29)      Nutrition Support: ND-tube placed 9/27 and feeds initiated. Currently on TwoCal HN @ 50 mL/hr, continuous (1200 ml/day) to provide 2400 kcals (32 Kacls/kg/day), 101 g PRO (1.3 gms/kg/day), 840 ml free H2O, 263 g CHO and 6 g Fiber daily.  -Free Water Flushes 30 mL q 4 hrs (TF + Free Water = 1020 mL/day)   -15 mL Certavite to meet micronutrient needs   -Tube feeds reached goal this AM @ 50 mL/hr, continuous   -Per pt report and RN notes, tolerating feeds at goal rate. Pt denies signs of intolerance including nausea/vomiting, abdominal pain, constipation, diarrhea, etc.     Intake: Pt previously NPO for 4 days early this admission 9/24-9/28, diet only recently advanced to clear liquid and currently at full liquids. Per pt, she has not had any signs/symptoms of nausea/vomiting over the past 2 days. She reports she has been able to tolerate clear liquids and full liquids well. Anne also reports she feels she is drinking well. She is unsure of how much she drinks per day, but drinks when she is thirsty.      NEW FINDINGS   Weight: Current wt of 75 kg, down 0.5 kg (~1 lb) x 4 days, likely r/t inadequate nutrition intake as pt was NPO and getting feeding tube placed. Pt with 2% wt loss x 1-2 weeks     Meds: Reglan, Certavite  IVF @ 10 ml/hr, continuous to provide 240 mL/day. (TF + Flushes + IVF = 1260 mL/day + po intake)   Insulin drip continues 2 mL/hr     Labs: K+, Mg++ = WNL; Phos 2.2 (L) 9/29 (replacement protocol in place); -152 mg/dL     Procedures: Upper endoscopy 9/27 and feeding tube placement (ND-tube). Per GI note, esophagitis in distal esophagus, awaiting pathology results. Per GI note, recommending encouraging soft/liquid diet, bland foods as able/tolerated.     MALNUTRITION  % Intake: </= 50% for >/= 5 days (severe)  Weight loss does not meet criteria, however since pt is pregnant likely non-severe  Subcutaneous Fat Loss: None observed  Muscle Loss: None observed  Fluid Accumulation/Edema: None  noted  Malnutrition Diagnosis: Non-severe malnutrition in the context of acute illness    Previous Goals   1. Diet adv v nutrition support within 2-3 days.  Evaluation: Met  2. Weight gains of 0.3 kg (0.7 lbs) per week (15-25 lbs total wt gain during this pregnancy)   Evaluation: In Progress (currently not met)  3. BG </= 180  Evaluation: Met    Previous Nutrition Diagnosis  Inadequate oral intake related to nausea/vomiting, abdominal pain and current nturition orders as evidenced by NPO diet order and inadequate weight gain during pregnancy.     Evaluation: No longer applicable, pt started on TF to meet nutrition needs, updated below     CURRENT NUTRITION DIAGNOSIS  Inadequate protein-energy intake related to slow diet advancement/restrictive diet order hindering po intake and need for initiation of enteral nutrition to meet nutrition needs with slow advancement to goal given risk for refeeding as evidenced by previously NPO x 4 days with recent diet advancement to clear/full liquids the past 1-2 days (total 6 days) and receiving EN x 2 days meeting 53% est kcal needs and 66% est pro needs.      INTERVENTIONS  Implementation  Discussed PO intake and nutrition POC regarding tube feeds with pt. Also provided nutritional tips for coping with nausea/vomiting during pregnancy and discussed foods generally better tolerated during pregnancy. Discussed upon diet advancement, recommend trying more bland foods such as crackers, toast, dry cereal, yogurt, rice, chicken, etc. and avoiding high-fat foods (given hx of gastroparesis/nausea). Encouraged small, frequent meals with diet advancement. Encouraged adequate po intake of fluid.   Collaboration with other providers - Discussed nutrition POC with team. Discussed considering low-fat/low-fiber with hx of gastroparesis pending po intake with diet advancement.     Goals  1. Total avg nutritional intake to meet a minimum of 30 kcal/kg and 1.2 g PRO/kg daily (per dosing wt 75  kg).  2. BG </= 180 mg/dL    Monitoring/Evaluation  Progress toward goals will be monitored and evaluated per protocol.    Yesenia Covarrubias RD, LD  Unit Pager: [phone]

## 2017-09-29 NOTE — PROVIDER NOTIFICATION
09/28/17 2223   Provider Notification   Provider Name/Title Dr Hart   Method of Notification Phone   Notification Reason Status Update   Pt is overall improving. She denies pain, nausea, or vomiting. She left the unit in a w/c with her parents for about an hour this evening and came back in good spirits. Pt is declining all medications with the exception of the insulin drip and her lovenox after some education. Potassium levels are WNL at 3.6, TF was able to be advanced to 40ml/hour and Pt is tolerating well. Pt tolerating liquid diet- questioned if card coverage needed to be added- plan to continue to use insulin drip to control blood sugars overnight until endocrine can consult in the morning.

## 2017-09-29 NOTE — PLAN OF CARE
"1530, at bedside to check on patient after declining to have a PICC placed. Upon arrival, she was very tearful and expressed the desire to \"go home now\". Remained with Anne and she expressed this over and over again, saying that she needs to sleep in her own bed tonight. When asked if there is anything that could be done differently tonight, or asked if there is anything we could do better for her comfort, she stated no and asked to discuss leaving with the MD. Called Dr Disla to room, and soon after the Spanish  and Dr Hart arrived. She was very tearful, and still expressed the need to go home right now. She spoke with her  in [place] who apparently encouraged her to \"wait\". She briefly threatened to take out her PIV, until staff offered to take it out for her. After PIV was removed, she was calmer but still tearful.   Low blood sugars  Since the pt was still on an insulin gtt, at 1615 POCT was checked and it was 53, asymptomatic. Pt instructed to drink some juice and insulin infusion was stopped immediately. She stated that she did not feel as though her blood sugar was that low. Checked POCT every 15 minutes until it mo to 95.   Tube feedings  Tube feedings stopped at about 1700 and disconnected per pt request at. Pt was taught how to flush the NG tube, how much to flush, and how often to flush with Spanish  present. She used teach back to verify understanding.   Discharge insulin instructions  Pt was instructed as to how much insulin to take and when if she decided to go home tonight. This will be written down and given to her as well until she follows up with Endocrine. She knows she should come back to the hospital if the blood sugars get above 180. All education was done with  present. She was also instructed to eat small but frequent meals to minimize gastroparesis.     Upon discharge, vitals stable and WNL.  Last fetal monitoring: reactive and one contraction in an " hour.   Denies pain.

## 2017-09-29 NOTE — PLAN OF CARE
Anne Gunter is stable today. Denies pain, nausea or vomiting.   Vitals WNL.  Insulin gtt stable this afternoon. Currently at 2unit/hour. Endocrine consulted today.   FHT: cat 1  [place]: one contractions/hour  Tube feedings, continuous. Tolerating 50ml/hour.   Declined all meds today except reglan and protonix.   Will continue to monitor.

## 2017-09-29 NOTE — PROVIDER NOTIFICATION
09/28/17 1955   Comments   Comments Pt off unit for w/c ride w/family- of w/Dr Hart   Pt and family informed it is very important for her to be back on the unit at 2030 for BG check seeing as she has continuous insulin running and we don't want her glucose to get too low.

## 2017-09-29 NOTE — CONSULTS
[facility]  MATERNAL CHILD HEALTH   SOCIAL WORK PROGRESS NOTE      DATA:     Jesus continues her stay in the PSCU. She had a NJ tube placed with GI yesterday, and per the medical team, is presenting better physically and mentally today.     Anne does report wanting to go home and leave the hospital. She denies any additional barriers to staying, reporting that there is nothing pressing at home or in the community which is making it difficult to stay inpatient. Anne denied any cultural barriers to staying in the hospital and denied any cultural considerations the staff could accommodate to make her stay easier on her. Anne reports that she just doesn't want to be here, that is hard for her to sit in the hospital.     Anne directly denied having difficulty following the recommended diet at home detailed by the nutrition/MD team. She reported that she followed the doctor's dietary orders at home, but then would \"just get sick again\" and have to come back in. Anne directly denied that there are any cultural/That being said, the medical team has concerns that she is either not understanding or choosing not to follow at home dietary recommendations for her GI/diabetes concerns.     INTERVENTION:     Chart review. Met with Anne along with an [place] in person . Introduced self and role. Assessed for needs. Discussed and assessed for cultural needs being met. Assessed for barriers to her remaining hospitalized and following through with MD's recommendations. Assessed for home life considerations and any barriers to treatment. Provided supportive counseling around hospitalization and feeling unwell during pregnancy.     ASSESSMENT:     Anne was unable to link her behavior at home (eating/diet, diabetes medication management) with the direct result of getting sick and re-hospitalization. She voices that she is following MD's directives at home, but this is contradicted by " what the MD's state when she is re-hospitalized. It is unclear at this time if this is because she is understanding the directions or choosing not to follow them when she discharges (which has been AMA in the past).     PLAN:     This  will continue to follow throughout pt's Maternal-Child Health Journey as needs arise.       Marilu Hernandez [facility]   Social Worker  Maternal Child Health    Phone: [phone]  Pager:  [phone]

## 2017-09-29 NOTE — PROGRESS NOTES
"  Care Coordinator Progress Note     Admission Date/Time:  9/24/2017  Attending MD:  Sonia Hart,*     Data  Chart reviewed, discussed with interdisciplinary team.   Patient was admitted for: Data Unavailable.    Concerns with insurance coverage for discharge needs: None.  Current Living Situation: Patient lives with family.  Support System: Supportive and Involved  Transportation: Family or Friend will provide  Barriers to Discharge: Medical Fragility    Coordination of Care and Referrals: Provided patient/family with options for Home Infusion.        Assessment  Received referral that patient will d/c to home with NJ in place. PLC ordered for patient. Met with patient, offered choice of providers. She is agreeable to referral to [facility]. Referral sent. Anticipate she will remain inpatient until next week. However, if she were to request discharge this weekend, please contact the weekend care coordinator for assistance.     Please have MD sign discharge orders as soon as possible on day of discharge to allow time for delivery of enteral pump to the hospital (often 4-6 hours AFTER d/c orders are signed).     Any questions regarding d/c coordination on the weekend should be directed to the weekend RN Care Coordinator. To reach this person, call the  and ask for the \"weekend care coordinator\" to be paged.        Plan  Anticipated Discharge Date:  TBD  Anticipated Discharge Plan:  Home with NJ/enteral feeds.     Melba Victoria, JEMAL      Addendum:   Received call at 4:25 this afternoon that patient wishes to leave AMA. Updated JEMAL Badillo at [facility]. They will try to get an Yoruba  set up for tomorrow (not available tonight) for delivery/education regarding enteral feeds. Updated MD team, they are agreeable. Nursing to please teach patient how to flush NJ with tap water to ensure it will remain unclogged.     Feel free to call [facility] at [phone] with any " questions.

## 2017-09-29 NOTE — PROGRESS NOTES
"To bed side as patient is asking to leave against medical advice.  She states, in the presence of the , that she is emotionally tired and unable to stay even one more day.  Despite multiple efforts from Dr. Disla and Dr. Hart, as well as her nurse Negra, she continued to insist through the  that she will not stay.  She did give me permission to call her family, and we did this.  The family plans to come in and discuss the situation with her.  She did insist upon having her IV removed and this was done.  She specifically asked for \"against medical advice\" paperwork.  We described to Anne that we don't have an insulin regimen yet as she remains on an IV insulin infusion, and that we do not have her tube feeds spaced, nor do we have the home feeding supplies available for her yet.  Nevertheless, she insists upon leaving.  We will touch base with the RN coordinator to try and get home health as soon as possible to reinitiate tube feeds if she persists in leaving the hospital.  After talking with endocrine they have advised the continued use of her previous home subcutaneous insulin regimen.  We will go through instructions on flushing her NJ tube and her subcutaneous insulin regimen in the presence of the .    Adri Hernandez MD  [facility] Fellow  "

## 2017-09-29 NOTE — PROVIDER NOTIFICATION
09/28/17 2037   Provider Notification   Provider Name/Title Dr Dockery   Method of Notification Phone   Notification Reason (order clarification)   Per orders- do not advance TF if potassium is low. Pt received replacement potassium at 1730. Plan for stat draw when Pt is back up on the unit and will advance based on results

## 2017-09-29 NOTE — PROVIDER NOTIFICATION
09/29/17 0115   Provider Notification   Provider Name/Title Dr Dockery   Method of Notification Electronic Page   Notification Reason Status Update   Pt refused VS check because she is sleeping- will attempt again later

## 2017-09-30 ENCOUNTER — HOSPITAL ENCOUNTER (INPATIENT)
Facility: CLINIC | Age: 27
LOS: 8 days | Discharge: HOME | End: 2017-10-08
Attending: OBSTETRICS & GYNECOLOGY | Admitting: OBSTETRICS & GYNECOLOGY
Payer: COMMERCIAL

## 2017-09-30 DIAGNOSIS — O09.93 HIGH-RISK PREGNANCY IN THIRD TRIMESTER: ICD-10-CM

## 2017-09-30 DIAGNOSIS — R10.13 EPIGASTRIC PAIN: ICD-10-CM

## 2017-09-30 DIAGNOSIS — R11.2 NAUSEA AND VOMITING, INTRACTABILITY OF VOMITING NOT SPECIFIED, UNSPECIFIED VOMITING TYPE: ICD-10-CM

## 2017-09-30 DIAGNOSIS — O24.013: Primary | ICD-10-CM

## 2017-09-30 DIAGNOSIS — O21.0 HYPEREMESIS GRAVIDARUM: ICD-10-CM

## 2017-09-30 PROBLEM — O09.90 HIGH-RISK PREGNANCY: Status: ACTIVE | Noted: 2017-09-30

## 2017-09-30 LAB
ABO + RH BLD: NORMAL
ABO + RH BLD: NORMAL
ALBUMIN SERPL-MCNC: 2.5 G/DL (ref 3.4–5)
ALP SERPL-CCNC: 70 U/L (ref 40–150)
ALT SERPL W P-5'-P-CCNC: 11 U/L (ref 0–50)
ANION GAP SERPL CALCULATED.3IONS-SCNC: 10 MMOL/L (ref 3–14)
ANION GAP SERPL CALCULATED.3IONS-SCNC: 12 MMOL/L (ref 3–14)
AST SERPL W P-5'-P-CCNC: 7 U/L (ref 0–45)
BILIRUB SERPL-MCNC: 0.4 MG/DL (ref 0.2–1.3)
BLD GP AB SCN SERPL QL: NORMAL
BLOOD BANK CMNT PATIENT-IMP: NORMAL
BUN SERPL-MCNC: 6 MG/DL (ref 7–30)
BUN SERPL-MCNC: 7 MG/DL (ref 7–30)
CALCIUM SERPL-MCNC: 8 MG/DL (ref 8.5–10.1)
CALCIUM SERPL-MCNC: 8 MG/DL (ref 8.5–10.1)
CHLORIDE SERPL-SCNC: 103 MMOL/L (ref 94–109)
CHLORIDE SERPL-SCNC: 103 MMOL/L (ref 94–109)
CO2 SERPL-SCNC: 22 MMOL/L (ref 20–32)
CO2 SERPL-SCNC: 23 MMOL/L (ref 20–32)
CREAT SERPL-MCNC: 0.3 MG/DL (ref 0.52–1.04)
CREAT SERPL-MCNC: 0.33 MG/DL (ref 0.52–1.04)
ERYTHROCYTE [DISTWIDTH] IN BLOOD BY AUTOMATED COUNT: 15.6 % (ref 10–15)
GFR SERPL CREATININE-BSD FRML MDRD: >90 ML/MIN/1.7M2
GFR SERPL CREATININE-BSD FRML MDRD: >90 ML/MIN/1.7M2
GLUCOSE BLDC GLUCOMTR-MCNC: 102 MG/DL (ref 70–99)
GLUCOSE BLDC GLUCOMTR-MCNC: 142 MG/DL (ref 70–99)
GLUCOSE BLDC GLUCOMTR-MCNC: 147 MG/DL (ref 70–99)
GLUCOSE BLDC GLUCOMTR-MCNC: 161 MG/DL (ref 70–99)
GLUCOSE BLDC GLUCOMTR-MCNC: 165 MG/DL (ref 70–99)
GLUCOSE BLDC GLUCOMTR-MCNC: 188 MG/DL (ref 70–99)
GLUCOSE BLDC GLUCOMTR-MCNC: 192 MG/DL (ref 70–99)
GLUCOSE SERPL-MCNC: 136 MG/DL (ref 70–99)
GLUCOSE SERPL-MCNC: 166 MG/DL (ref 70–99)
HCT VFR BLD AUTO: 31 % (ref 35–47)
HGB BLD-MCNC: 10.2 G/DL (ref 11.7–15.7)
KETONES BLD-SCNC: 0.2 MMOL/L (ref 0–0.6)
KETONES BLD-SCNC: 1.3 MMOL/L (ref 0–0.6)
MAGNESIUM SERPL-MCNC: 1.9 MG/DL (ref 1.6–2.3)
MCH RBC QN AUTO: 27.3 PG (ref 26.5–33)
MCHC RBC AUTO-ENTMCNC: 32.9 G/DL (ref 31.5–36.5)
MCV RBC AUTO: 83 FL (ref 78–100)
PHOSPHATE SERPL-MCNC: 2.9 MG/DL (ref 2.5–4.5)
PLATELET # BLD AUTO: 205 10E9/L (ref 150–450)
POTASSIUM SERPL-SCNC: 3.7 MMOL/L (ref 3.4–5.3)
POTASSIUM SERPL-SCNC: 3.7 MMOL/L (ref 3.4–5.3)
PROT SERPL-MCNC: 7.1 G/DL (ref 6.8–8.8)
RBC # BLD AUTO: 3.73 10E12/L (ref 3.8–5.2)
SODIUM SERPL-SCNC: 136 MMOL/L (ref 133–144)
SODIUM SERPL-SCNC: 137 MMOL/L (ref 133–144)
SPECIMEN EXP DATE BLD: NORMAL
WBC # BLD AUTO: 13.4 10E9/L (ref 4–11)

## 2017-09-30 PROCEDURE — 86901 BLOOD TYPING SEROLOGIC RH(D): CPT | Performed by: OBSTETRICS & GYNECOLOGY

## 2017-09-30 PROCEDURE — 86850 RBC ANTIBODY SCREEN: CPT | Performed by: OBSTETRICS & GYNECOLOGY

## 2017-09-30 PROCEDURE — 25000125 ZZHC RX 250: Performed by: PHYSICIAN ASSISTANT

## 2017-09-30 PROCEDURE — 86900 BLOOD TYPING SEROLOGIC ABO: CPT | Performed by: OBSTETRICS & GYNECOLOGY

## 2017-09-30 PROCEDURE — 12000028 ZZH R&B OB UMMC

## 2017-09-30 PROCEDURE — 83735 ASSAY OF MAGNESIUM: CPT | Performed by: OBSTETRICS & GYNECOLOGY

## 2017-09-30 PROCEDURE — 00000146 ZZHCL STATISTIC GLUCOSE BY METER IP

## 2017-09-30 PROCEDURE — 85027 COMPLETE CBC AUTOMATED: CPT | Performed by: OBSTETRICS & GYNECOLOGY

## 2017-09-30 PROCEDURE — 99215 OFFICE O/P EST HI 40 MIN: CPT

## 2017-09-30 PROCEDURE — 82010 KETONE BODYS QUAN: CPT | Performed by: OBSTETRICS & GYNECOLOGY

## 2017-09-30 PROCEDURE — 80048 BASIC METABOLIC PNL TOTAL CA: CPT | Performed by: OBSTETRICS & GYNECOLOGY

## 2017-09-30 PROCEDURE — 80053 COMPREHEN METABOLIC PANEL: CPT | Performed by: OBSTETRICS & GYNECOLOGY

## 2017-09-30 PROCEDURE — 25000128 H RX IP 250 OP 636: Performed by: OBSTETRICS & GYNECOLOGY

## 2017-09-30 PROCEDURE — 36415 COLL VENOUS BLD VENIPUNCTURE: CPT | Performed by: OBSTETRICS & GYNECOLOGY

## 2017-09-30 PROCEDURE — 25000125 ZZHC RX 250: Performed by: OBSTETRICS & GYNECOLOGY

## 2017-09-30 PROCEDURE — 84100 ASSAY OF PHOSPHORUS: CPT | Performed by: OBSTETRICS & GYNECOLOGY

## 2017-09-30 PROCEDURE — S0028 INJECTION, FAMOTIDINE, 20 MG: HCPCS | Performed by: OBSTETRICS & GYNECOLOGY

## 2017-09-30 RX ORDER — ONDANSETRON 2 MG/ML
4 INJECTION INTRAMUSCULAR; INTRAVENOUS EVERY 6 HOURS PRN
Status: DISCONTINUED | OUTPATIENT
Start: 2017-09-30 | End: 2017-10-08 | Stop reason: HOSPADM

## 2017-09-30 RX ORDER — LIDOCAINE 40 MG/G
CREAM TOPICAL
Status: DISCONTINUED | OUTPATIENT
Start: 2017-09-30 | End: 2017-10-08 | Stop reason: HOSPADM

## 2017-09-30 RX ORDER — SODIUM CHLORIDE 9 MG/ML
INJECTION, SOLUTION INTRAVENOUS CONTINUOUS
Status: DISCONTINUED | OUTPATIENT
Start: 2017-09-30 | End: 2017-10-08 | Stop reason: HOSPADM

## 2017-09-30 RX ORDER — DEXTROSE MONOHYDRATE 25 G/50ML
25-50 INJECTION, SOLUTION INTRAVENOUS
Status: DISCONTINUED | OUTPATIENT
Start: 2017-09-30 | End: 2017-10-08 | Stop reason: HOSPADM

## 2017-09-30 RX ORDER — NALOXONE HYDROCHLORIDE 0.4 MG/ML
.1-.4 INJECTION, SOLUTION INTRAMUSCULAR; INTRAVENOUS; SUBCUTANEOUS
Status: DISCONTINUED | OUTPATIENT
Start: 2017-09-30 | End: 2017-10-08 | Stop reason: HOSPADM

## 2017-09-30 RX ORDER — AMOXICILLIN 250 MG
1 CAPSULE ORAL 2 TIMES DAILY PRN
Status: DISCONTINUED | OUTPATIENT
Start: 2017-09-30 | End: 2017-10-08 | Stop reason: HOSPADM

## 2017-09-30 RX ORDER — METOCLOPRAMIDE HYDROCHLORIDE 5 MG/ML
10 INJECTION INTRAMUSCULAR; INTRAVENOUS EVERY 6 HOURS
Status: DISCONTINUED | OUTPATIENT
Start: 2017-09-30 | End: 2017-10-08 | Stop reason: HOSPADM

## 2017-09-30 RX ORDER — HYDROMORPHONE HYDROCHLORIDE 1 MG/ML
.3-.5 INJECTION, SOLUTION INTRAMUSCULAR; INTRAVENOUS; SUBCUTANEOUS
Status: DISCONTINUED | OUTPATIENT
Start: 2017-09-30 | End: 2017-09-30

## 2017-09-30 RX ORDER — HYDROMORPHONE HYDROCHLORIDE 1 MG/ML
.3-.5 INJECTION, SOLUTION INTRAMUSCULAR; INTRAVENOUS; SUBCUTANEOUS
Status: DISCONTINUED | OUTPATIENT
Start: 2017-09-30 | End: 2017-10-02

## 2017-09-30 RX ORDER — NICOTINE POLACRILEX 4 MG
15-30 LOZENGE BUCCAL
Status: DISCONTINUED | OUTPATIENT
Start: 2017-09-30 | End: 2017-10-08 | Stop reason: HOSPADM

## 2017-09-30 RX ORDER — HYDROMORPHONE HCL/0.9% NACL/PF 0.2MG/0.2
0.2 SYRINGE (ML) INTRAVENOUS
Status: DISCONTINUED | OUTPATIENT
Start: 2017-09-30 | End: 2017-09-30

## 2017-09-30 RX ADMIN — METOCLOPRAMIDE HYDROCHLORIDE 10 MG: 5 INJECTION INTRAMUSCULAR; INTRAVENOUS at 21:12

## 2017-09-30 RX ADMIN — ONDANSETRON 4 MG: 2 INJECTION INTRAMUSCULAR; INTRAVENOUS at 17:09

## 2017-09-30 RX ADMIN — HYDROMORPHONE HYDROCHLORIDE 0.5 MG: 1 INJECTION, SOLUTION INTRAMUSCULAR; INTRAVENOUS; SUBCUTANEOUS at 15:45

## 2017-09-30 RX ADMIN — DEXTROSE AND SODIUM CHLORIDE: 5; 900 INJECTION, SOLUTION INTRAVENOUS at 17:19

## 2017-09-30 RX ADMIN — FAMOTIDINE 20 MG: 10 INJECTION, SOLUTION INTRAVENOUS at 18:49

## 2017-09-30 RX ADMIN — HYDROMORPHONE HYDROCHLORIDE 0.4 MG: 1 INJECTION, SOLUTION INTRAMUSCULAR; INTRAVENOUS; SUBCUTANEOUS at 20:49

## 2017-09-30 RX ADMIN — METOCLOPRAMIDE HYDROCHLORIDE 10 MG: 5 INJECTION INTRAMUSCULAR; INTRAVENOUS at 14:49

## 2017-09-30 RX ADMIN — HYDROMORPHONE HYDROCHLORIDE 0.5 MG: 1 INJECTION, SOLUTION INTRAMUSCULAR; INTRAVENOUS; SUBCUTANEOUS at 23:03

## 2017-09-30 RX ADMIN — HYDROMORPHONE HYDROCHLORIDE 0.5 MG: 1 INJECTION, SOLUTION INTRAMUSCULAR; INTRAVENOUS; SUBCUTANEOUS at 19:46

## 2017-09-30 RX ADMIN — HUMAN INSULIN 1.5 UNITS/HR: 100 INJECTION, SOLUTION SUBCUTANEOUS at 17:25

## 2017-09-30 RX ADMIN — HYDROMORPHONE HYDROCHLORIDE 0.5 MG: 1 INJECTION, SOLUTION INTRAMUSCULAR; INTRAVENOUS; SUBCUTANEOUS at 17:44

## 2017-09-30 RX ADMIN — Medication 0.2 MG: at 14:49

## 2017-09-30 RX ADMIN — HUMAN INSULIN 2 UNITS/HR: 100 INJECTION, SOLUTION SUBCUTANEOUS at 18:28

## 2017-09-30 NOTE — IP AVS SNAPSHOT
[facility]    [address]    [place] [address]    Phone:  [phone]                                       After Visit Summary   9/30/2017    Anne Gunter    MRN: [id number]           After Visit Summary Signature Page     I have received my discharge instructions, and my questions have been answered. I have discussed any challenges I see with this plan with the nurse or doctor.    ..........................................................................................................................................  Patient/Patient Representative Signature      ..........................................................................................................................................  Patient Representative Print Name and Relationship to Patient    ..................................................               ................................................  Date                                            Time    ..........................................................................................................................................  Reviewed by Signature/Title    ...................................................              ..............................................  Date                                                            Time

## 2017-09-30 NOTE — PROVIDER NOTIFICATION
09/30/17 1349   Provider Notification   Provider Name/Title Dr. Chetan Villagomez   Method of Notification In Department   Notification Reason Patient Arrived   Pt arrived at L&D desk complaining of severe abdominal pain which started this AM, reports vomiting x5 episodes.

## 2017-09-30 NOTE — IP AVS SNAPSHOT
MRN:[id number]                      After Visit Summary   9/30/2017    Anne Gunter    MRN: [id number]           Thank you!     Thank you for choosing [facility] for your care. Our goal is always to provide you with excellent care. Hearing back from our patients is one way we can continue to improve our services. Please take a few minutes to complete the written survey that you may receive in the mail after you visit with us. Thank you!        Patient Information     Date Of Birth          1990        About your hospital stay     You were admitted on:  September 30, 2017 You last received care in the:  [facility] 4[facility]    You were discharged on:  October 8, 2017        Reason for your hospital stay       Abdominal pain                  Who to Call     For medical emergencies, please call 911.  For non-urgent questions about your medical care, please call your primary care provider or clinic, [phone]          Attending Provider     Provider Specialty    Edd Lopez MD OB/Gyn    Mateo Verde MD Obstetrics & Gynecology, Maternal & Fetal Medicine       Primary Care Provider Office Phone # Fax #    Isiah Naidu [phone]       When to contact your care team       Worsening abdominal pain, nausea/vomiting, dehydration, contractions/cramping, vaginal bleeding, leakage of fluid, decreased fetal movement                  After Care Instructions     Activity       Your activity upon discharge: activity as tolerated            Diet       Follow this diet upon discharge: Orders Placed This Encounter      Snacks/Supplements Adult: Glucerna (Adult); Between Meals      Low Fiber Diet            Discharge Instructions       Insulin instructions:   - Lantus 11 units twice daily and Novolog 6 units with meals three times per day     Continue Novolog sliding scale three time a day  If glucose 141-190, give Novolog 1units   If glucose 191-240, give Novolog 2 units    If glucose  241-290, give Novolog 3 units    If glucose 291-340, give Novolog 4 units    If glucose 341-290, give Novolog 5 units  If glucose >390, give Novolog 6 units            IV access       **Ordering Provider MUST call/page Care Coordinator/ to discuss arranging this service**    You are going home with the following vascular access device: PICC line.            Monitor and record       Check your blood sugars 4 times daily after meals. Record these values and bring your log to clinic visits.            Tubes and drains       You are going home with the following tubes or drains: PICC line.  Please keep clean - change dressing once weekly and flush weekly.  We are working to set up a home nurse for this.                  Follow-up Appointments     Adult [facility] Follow-up and recommended labs and tests       Follow-up in [facility] clinic Monday or Tuesday (10/9-10/10/17).  We will call you to set up an appointment.    Follow-up with Endocrinology Tuesday (10/10/17).  Please call [phone] and tell them you need to be seen then from your previous hospital visit    Appointments on [facility] and/or [facility] (with [facility] provider or service). Call [phone] if you haven't heard regarding these appointments within 7 days of discharge.                  Your next 10 appointments already scheduled     Oct 12, 2017 11:45 AM CDT   M BPP SINGLE with [id number]   [facility] [place] ([facility])    [address]  [facility] [address]   [phone]            Oct 12, 2017 12:30 PM CDT   Consult [facility] with UR EXAM RM 1   [facility] [place] ([facility])    [address]  [facility] [address]   [phone]            Oct 12, 2017  2:30 PM CDT   [facility] Visit with Marla [facility]unga MD   [facility]    [address] [facility] [address]  [facility] [address] [phone]              Additional Services     Home Care Referral       **Order classes of: [place] [facility], [facility] and [facility] will route to the Home Care and Hospice Referral Pool.  Home Care or Hospice will then contact the patient to schedule their appointment.**    If you do not hear from Home Care and Hospice, or you would like to call to schedule, please call the referring place of service that your provider has listed below.  ______________________________________________________________________    Your provider has referred you to: FM[facility]: [facility] [place] [phone]   [email or web address]  [facility]: [facility] [phone]   [email or web address]  [facility] [phone]   [email or web address]    Extended Emergency Contact Information  Primary Emergency Contact: Vinayak Gunter  Address: [address]           [place], [place] [address] [place] [address]  Home Phone: [phone]  Work Phone: none  Mobile Phone: [phone]  Relation: Father  Secondary Emergency Contact: Vianey Ruby  Address: [place]  Home Phone: none  Relation: Spouse  Mother: Rima Gunter  Address: [address]           [place], [place] [address] [place] [address]  Home Phone: [phone]  Work Phone: none  Mobile Phone: [phone]    Patient Anticipated Discharge Date: 10/08/17    RN, PT, HHA to begin 24 - 48 hours after discharge.  PLEASE EVALUATE AND TREAT (Evaluation timeline is 24 - 48 hrs. Please call if there is need for a variance to this timeline).    REASON FOR REFERRAL: PICC line cares once weekly     ADDITIONAL SERVICES NEEDED: None    OTHER PERTINENT INFORMATION: Patient was last seen by provider on 10/08/17  for  Type 1 DM, severe abdominal pain and gastroparesis, malnutrition.  This patient has been admitted 4 times over the last month and needs PICC line in place for IV access.  She is unable to perform dressing changes and weekly flushes herself and has no support person to perform it.  She needs a RN to come once a week to perform these functions.     Current Outpatient Prescriptions:  insulin aspart (NOVOLOG PEN) 100 UNIT/ML injection, Inject 1 Units Subcutaneous 3 times daily (with meals), Disp: 9 mL, Rfl: 1  insulin glargine (LANTUS) 100 UNIT/ML injection, Inject 11 Units Subcutaneous 2 times daily, Disp: 6.6 mL, Rfl: 1  insulin aspart (NOVOLOG PEN) 100 UNIT/ML injection, Inject 6 Units Subcutaneous 3 times daily (with meals), Disp: 5.4 mL, Rfl: 1  phosphorus tablet 250 mg (K PHOS NEUTRAL) 250 MG per tablet, Take 1 tablet (250 mg) by mouth 2 times daily, Disp: 60 tablet, Rfl: 1  oxyCODONE (ROXICODONE) 5 MG IR tablet, Take 1-2 tablets (5-10 mg) by mouth every 4 hours as needed for pain maximum 12 tablet(s) per day, Disp: 25 tablet, Rfl: 0  [DISCONTINUED] insulin aspart (NOVOLOG PEN) 100 UNIT/ML injection, Inject 1 Units Subcutaneous 3 times daily (with meals), Disp: 9 mL, Rfl: 1  [DISCONTINUED] insulin aspart (NOVOLOG PEN) 100 UNIT/ML injection, Inject 6 Units Subcutaneous 3 times daily (with meals), Disp: 5.4 mL, Rfl: 1  [DISCONTINUED] insulin glargine (LANTUS) 100 UNIT/ML injection, Inject 11 Units Subcutaneous 2 times daily, Disp: 6.6 mL, Rfl: 1      Patient Active Problem List:     High-risk pregnancy, first trimester     Hyperemesis     Hyperemesis gravidarum     DKA (diabetic ketoacidoses) (H)     DKA, type 1, not at goal (H)     Encounter for long-term (current) use of insulin (H)     Nausea with vomiting     Abdominal pain     Hematemesis     Group B Streptococcus urinary tract infection affecting pregnancy in first trimester     Type 1 diabetes mellitus in third trimester, antepartum     Supervision of  high-risk pregnancy     Indication for care in labor or delivery     Indication for care in labor and delivery, antepartum     Epigastric pain     S/P      Type 1 diabetes mellitus in pregnancy, second trimester     Microalbuminuria     Indication for care or intervention in labor or delivery     Encounter for triage in pregnant patient     Hyperglycemia     Diabetic keto-acidosis (H)     DKA, type 1 (H)     Abdominal pain affecting pregnancy, antepartum     Diabetes in pregnancy     High-risk pregnancy      Documentation of Face to Face and Certification for Home Health Services    I certify that patient, Anne Gunter is under my care and that I, or a Nurse Practitioner or Physician's Assistant working with me, had a face-to-face encounter that meets the physician face-to-face encounter requirements with this patient on: 10/8/2017.    This encounter with the patient was in whole, or in part, for the following medical condition, which is the primary reason for Home Health Care: High risk pregnancy, malnutrition.    I certify that, based on my findings, the following services are medically necessary Home Health Services: Nursing    My clinical findings support the need for the above services because: Nurse is needed: For complex aftercare of surgical procedures because the patient needs instruction and cannot perform care on their own due to: limited ability to perform self-PICC dressing changes..    Further, I certify that my clinical findings support that this patient is homebound (i.e. absences from home require considerable and taxing effort and are for medical reasons or [employer] services or infrequently or of short duration when for other reasons) because: Requires assistance of another person or specialized equipment to access medical services because patient:  Unable to perform by self.    Based on the above findings, I certify that this patient is confined to the home and needs intermittent  skilled nursing care, physical therapy and/or speech therapy.  The patient is under my care, and I have initiated the establishment of the plan of care.  This patient will be followed by a physician who will periodically review the plan of care.    Physician/Provider to provide follow up care: Isiah Naidu    Responsible PECOS certified Physician at time of discharge: Mateo Verde MD    Please be aware that coverage of these services is subject to the terms and limitations of your health insurance plan.  Call member services at your health plan with any benefit or coverage questions.            Home infusion referral       Your provider has referred you to: [facility]: [facility] [phone]   [email or web address]    Local Address (if different from home address): N/A    Anticipated Length of Therapy: During Pregnancy     Home Infusion Pharmacist to adjust therapy based on labs and clinical assessments: PICC is not being used at this time    Labs:  May draw labs from Venous Catheter: Yes  Home Infusion Pharmacist to order labs based on therapy type and clinical assessments: Yes  Call/Fax Lab Results to: Dr. Mateo Verde    Agency Staff to assess nursing needs for Infusion Therapy.    Access Device Management:  IV Access Type: PICC  Flush with Heparin and Normal Saline IVP PRN and routine site care (per agency protocol) to maintain access device? Yes  PICC in place, not being used at this time. Patient requires once a week dressing changes and PICC flushes. PICC line cares to start 10/12/2017.                  Pending Results     No orders found from 9/28/2017 to 10/1/2017.            Admission Information     Date & Time Provider Department Dept. Phone    9/30/2017 Mateo Verde MD [facility] 4BOB [phone]      Your Vitals Were     Blood Pressure Pulse Temperature Respirations Weight Pulse Oximetry    104/56 106 97.8  F (36.6  C) (Oral) 18 74.1 kg  (163 lb 6.4 oz) 97%    BMI (Body Mass Index)                   25.65 kg/m2           Care EveryWhere ID     This is your Care EveryWhere ID. This could be used by other organizations to access your [facility] medical records  [id number]        Equal Access to Services     VALENTINO ONEILL : Hadii aad ku hadallyndiana Ivett, walada luqadaha, qaybta kadontrellda jonah, waxedilson julianna carlostabby sunsharonda colonstacy abreu. So [facility] [phone].    ATENCIÓN: Si habla español, tiene a monreal disposición servicios gratuitos de asistencia lingüística. Llame al [phone].    We comply with applicable federal civil rights laws and [place] laws. We do not discriminate on the basis of race, color, national origin, age, disability, sex, sexual orientation, or gender identity.               Review of your medicines      START taking        Dose / Directions    insulin glargine 100 UNIT/ML injection   Commonly known as:  LANTUS   Used for:  Type 1 diabetes mellitus in third trimester, antepartum        Dose:  11 Units   Inject 11 Units Subcutaneous 2 times daily   Quantity:  6.6 mL   Refills:  1       oxyCODONE 5 MG IR tablet   Commonly known as:  ROXICODONE   Used for:  Epigastric pain        Dose:  5-10 mg   Take 1-2 tablets (5-10 mg) by mouth every 4 hours as needed for pain maximum 12 tablet(s) per day   Quantity:  25 tablet   Refills:  0       phosphorus tablet 250 mg 250 MG per tablet   Commonly known as:  K PHOS NEUTRAL        Dose:  250 mg   Take 1 tablet (250 mg) by mouth 2 times daily   Quantity:  60 tablet   Refills:  1         CONTINUE these medicines which may have CHANGED, or have new prescriptions. If we are uncertain of the size of tablets/capsules you have at home, strength may be listed as something that might have changed.        Dose / Directions    * insulin aspart 100 UNIT/ML injection   Commonly known as:  NovoLOG PEN   This may have changed:  how much to take   Used for:  Type 1 diabetes mellitus in third trimester,  antepartum        Dose:  1 Units   Inject 1 Units Subcutaneous 3 times daily (with meals)   Quantity:  9 mL   Refills:  1       * insulin aspart 100 UNIT/ML injection   Commonly known as:  NovoLOG PEN   This may have changed:  You were already taking a medication with the same name, and this prescription was added. Make sure you understand how and when to take each.   Used for:  Type 1 diabetes mellitus in third trimester, antepartum        Dose:  6 Units   Inject 6 Units Subcutaneous 3 times daily (with meals)   Quantity:  5.4 mL   Refills:  1       * Notice:  This list has 2 medication(s) that are the same as other medications prescribed for you. Read the directions carefully, and ask your doctor or other care provider to review them with you.      CONTINUE these medicines which have NOT CHANGED        Dose / Directions    alum & mag hydroxide-simethicone 400-400-40 MG/5ML Susp suspension   Commonly known as:  MYLANTA ES/MAALOX  ES   Used for:  Nausea and vomiting, intractability of vomiting not specified, unspecified vomiting type        Dose:  30 mL   Take 30 mLs by mouth 4 times daily (with meals and nightly)   Quantity:  355 mL   Refills:  1       blood glucose monitoring lancets   Used for:  Type 1 diabetes mellitus in pregnancy, second trimester        Use to test blood sugar 4 times daily or as directed.   Quantity:  100 each   Refills:  3       blood glucose monitoring meter device kit   Used for:  Type 1 diabetes mellitus in pregnancy, second trimester        Use to test blood sugar 4 times daily or as directed.   Quantity:  1 kit   Refills:  0       * blood glucose monitoring test strip   Commonly known as:  no brand specified   Used for:  Type 1 diabetes mellitus in pregnancy, second trimester        Use to test blood sugars 4 times daily or as directed   Quantity:  100 strip   Refills:  3       * blood glucose monitoring test strip   Commonly known as:  no brand specified   Used for:  Type 1 diabetes  mellitus in pregnancy, second trimester        Use to test blood sugar 4 times daily or as directed.   Quantity:  100 strip   Refills:  3       famotidine 20 MG tablet   Commonly known as:  PEPCID   Used for:  Type 1 diabetes mellitus in pregnancy, second trimester        Dose:  20 mg   Take 1 tablet (20 mg) by mouth 2 times daily   Quantity:  40 tablet   Refills:  1       folic acid 1 MG tablet   Commonly known as:  FOLVITE   Used for:  High-risk pregnancy, first trimester        Dose:  1 mg   Take 1 tablet (1 mg) by mouth daily   Quantity:  30 tablet   Refills:  0       metoclopramide 10 MG tablet   Commonly known as:  REGLAN   Used for:  Type 1 diabetes mellitus in pregnancy, second trimester        Dose:  10 mg   Take 1 tablet (10 mg) by mouth 4 times daily as needed (4x Daily AC & HS prn nausea and abdominal pain)   Quantity:  60 tablet   Refills:  0       mirtazapine 15 MG ODT tab   Commonly known as:  REMERON SOL-TAB   Used for:  Supervision of high-risk pregnancy, third trimester        Dose:  15 mg   1 tablet (15 mg) by Orally disintegrating tablet route At Bedtime   Quantity:  60 tablet   Refills:  2       OLANZapine zydis 5 MG ODT tab   Commonly known as:  zyPREXA   Used for:  Supervision of high-risk pregnancy, third trimester        Dose:  5 mg   Take 1 tablet (5 mg) by mouth 2 times daily   Quantity:  60 tablet   Refills:  3       ondansetron 4 MG ODT tab   Commonly known as:  ZOFRAN ODT   Used for:  Hyperemesis gravidarum        Dose:  4 mg   Take 1 tablet (4 mg) by mouth every 8 hours as needed for nausea   Quantity:  20 tablet   Refills:  1       pantoprazole 40 MG EC tablet   Commonly known as:  PROTONIX   Used for:  Type 1 diabetes mellitus in pregnancy, second trimester        Dose:  40 mg   Take 1 tablet (40 mg) by mouth 2 times daily   Quantity:  30 tablet   Refills:  1       polyethylene glycol Packet   Commonly known as:  MIRALAX/GLYCOLAX   Used for:  Type 1 diabetes mellitus in pregnancy,  second trimester        Dose:  17 g   Take 17 g by mouth daily as needed for constipation (titrate to one bowel movement daily)   Quantity:  7 packet   Refills:  1       Prenatal Vitamin 27-0.8 MG Tabs   Used for:  High-risk pregnancy, first trimester        Dose:  1 tablet   Take 1 tablet by mouth daily   Quantity:  90 tablet   Refills:  3       senna-docusate 8.6-50 MG per tablet   Commonly known as:  SENOKOT-S;PERICOLACE   Used for:  Hyperemesis gravidarum        Dose:  1 tablet   Take 1 tablet by mouth 2 times daily as needed for constipation   Quantity:  100 tablet   Refills:  0       * Notice:  This list has 2 medication(s) that are the same as other medications prescribed for you. Read the directions carefully, and ask your doctor or other care provider to review them with you.      STOP taking     insulin detemir 100 UNIT/ML injection   Commonly known as:  LEVEMIR FLEXPEN/FLEXTOUCH           omeprazole 20 MG CR capsule   Commonly known as:  priLOSEC                Where to get your medicines      Some of these will need a paper prescription and others can be bought over the counter. Ask your nurse if you have questions.     Bring a paper prescription for each of these medications     insulin aspart 100 UNIT/ML injection    insulin aspart 100 UNIT/ML injection    insulin glargine 100 UNIT/ML injection    oxyCODONE 5 MG IR tablet    phosphorus tablet 250 mg 250 MG per tablet                Protect others around you: Learn how to safely use, store and throw away your medicines at www.disposemymeds.org.             Medication List: This is a list of all your medications and when to take them. Check marks below indicate your daily home schedule. Keep this list as a reference.      Medications           Morning Afternoon Evening Bedtime As Needed    alum & mag hydroxide-simethicone 400-400-40 MG/5ML Susp suspension   Commonly known as:  MYLANTA ES/MAALOX  ES   Take 30 mLs by mouth 4 times daily (with meals and  nightly)                                blood glucose monitoring lancets   Use to test blood sugar 4 times daily or as directed.                                blood glucose monitoring meter device kit   Use to test blood sugar 4 times daily or as directed.                                * blood glucose monitoring test strip   Commonly known as:  no brand specified   Use to test blood sugars 4 times daily or as directed                                * blood glucose monitoring test strip   Commonly known as:  no brand specified   Use to test blood sugar 4 times daily or as directed.                                famotidine 20 MG tablet   Commonly known as:  PEPCID   Take 1 tablet (20 mg) by mouth 2 times daily                                folic acid 1 MG tablet   Commonly known as:  FOLVITE   Take 1 tablet (1 mg) by mouth daily                                * insulin aspart 100 UNIT/ML injection   Commonly known as:  NovoLOG PEN   Inject 1 Units Subcutaneous 3 times daily (with meals)   Last time this was given:  4 Units on 10/8/2017  2:05 PM                                * insulin aspart 100 UNIT/ML injection   Commonly known as:  NovoLOG PEN   Inject 6 Units Subcutaneous 3 times daily (with meals)   Last time this was given:  4 Units on 10/8/2017  2:05 PM                                insulin glargine 100 UNIT/ML injection   Commonly known as:  LANTUS   Inject 11 Units Subcutaneous 2 times daily   Last time this was given:  10 Units on 10/8/2017  8:52 AM                                metoclopramide 10 MG tablet   Commonly known as:  REGLAN   Take 1 tablet (10 mg) by mouth 4 times daily as needed (4x Daily AC & HS prn nausea and abdominal pain)                                mirtazapine 15 MG ODT tab   Commonly known as:  REMERON SOL-TAB   1 tablet (15 mg) by Orally disintegrating tablet route At Bedtime                                OLANZapine zydis 5 MG ODT tab   Commonly known as:  zyPREXA   Take 1  tablet (5 mg) by mouth 2 times daily                                ondansetron 4 MG ODT tab   Commonly known as:  ZOFRAN ODT   Take 1 tablet (4 mg) by mouth every 8 hours as needed for nausea                                oxyCODONE 5 MG IR tablet   Commonly known as:  ROXICODONE   Take 1-2 tablets (5-10 mg) by mouth every 4 hours as needed for pain maximum 12 tablet(s) per day                                pantoprazole 40 MG EC tablet   Commonly known as:  PROTONIX   Take 1 tablet (40 mg) by mouth 2 times daily                                phosphorus tablet 250 mg 250 MG per tablet   Commonly known as:  K PHOS NEUTRAL   Take 1 tablet (250 mg) by mouth 2 times daily   Last time this was given:  250 mg on 10/8/2017  8:30 AM                                polyethylene glycol Packet   Commonly known as:  MIRALAX/GLYCOLAX   Take 17 g by mouth daily as needed for constipation (titrate to one bowel movement daily)                                Prenatal Vitamin 27-0.8 MG Tabs   Take 1 tablet by mouth daily                                senna-docusate 8.6-50 MG per tablet   Commonly known as:  SENOKOT-S;PERICOLACE   Take 1 tablet by mouth 2 times daily as needed for constipation                                * Notice:  This list has 4 medication(s) that are the same as other medications prescribed for you. Read the directions carefully, and ask your doctor or other care provider to review them with you.

## 2017-09-30 NOTE — DISCHARGE INSTRUCTIONS
Patient leaving against medical advice.    Vital instructions for care:      Diet:   Gastroparesis Diet (liquids, low-fiber, low-fat foods), Tube feeds -TwoCal @50 mL/hr continuous     Activity:  As tolerated    Call your provider if you notice:  Swelling in your face or increased swelling in your hands or legs.  Headaches that are not relieved by Tylenol (acetaminophen).  Changes in your vision (blurring: seeing spots or stars.)  Nausea (sick to your stomach) and vomiting (throwing up).   Weight gain of 5 pounds or more per week.  Heartburn that doesn't go away.  Signs of bladder infection: pain when you urinate (use the toilet), need to go more often and more urgently.  The bag of cobos (rupture of membranes) breaks, or you notice leaking in your underwear.  Bright red blood in your underwear.  Abdominal (lower belly) or stomach pain.  For first baby: Contractions (tightening) less than 5 minutes apart for one hour or more.  Second (plus) baby: Contractions (tightening) less than 10 minutes apart and getting stronger.  *If less than 34 weeks: Contractions (tightenings) more than 6 times in one hour.  Increase or change in vaginal discharge (note the color and amount)    -Levemir 14 units BID-  If unable to tolerate oral intake at home (Sick Day plan) reduce dose to 11 units BID (rather than skip a dose)  -Novolog with meals: 5 units per meal  -Check blood sugar fasting and 1 or 2 hours post prandial    Follow-up:  With MFM and endocrinology  Home care will contact you to arrange education and care for NJ tube

## 2017-09-30 NOTE — PROVIDER NOTIFICATION
09/30/17 1500   Provider Notification   Provider Name/Title Dr Lopez   Method of Notification In Department   Notification Reason Pain   Dr Lopez at bedside. Pt had PIV placed in left forearm by anesthesiologist under U/S guidance. Received 0.2mg Dilaudid and 10mg Reglan at 1450 but continues to moan in pain and states it did not help. Pt currently refusing stat lab draw.

## 2017-09-30 NOTE — H&P
[facility]  OB History and Physical      Anne Gunter MRN# [id number]   Age: 27 year old YOB: 1990     CC:  Abdominal pain, nausea, vomiting    HPI:  Performed with I-pad Estonian . Ms. Anne Gunter is a 27 year old  at 28w3d by 25w5d US, who presents with severe upper abdominal pain, nausea, and vomiting.  The patient's medical history is notable for poorly controlled T1DM with multiple admissions for DKA, severe gastroparesis with post-pyloric feeding tube in place, limited prenatal care in [place], history of PLTCS x1 at 32 weeks for fetal indications in the setting of PTD, preeclampsia without severe features.  She has been admitted multiple times, most recently signed out AMA on the evening of .  She presents today with similar symptoms after eating macaroni and cheese last night.  She notes that she took the recommended insulin overnight and her last BG was 84.  She notes that the pain is the same with sharp pain in her upper abdomen.  She denies lower abdominal pain, contractions, vaginal bleeding, and loss of fluid.   Notes normal fetal movement.  Denies chest pain, SOB, headache, vision changes, urinary symptoms, diarrhea, constipation.  Last BM was yesterday.  She is requesting pain medications as soon as possible.      Pregnancy Complications:  - Poorly controlled Type 1 DM: multiple recent admissions for DKA, most recently from - when she signed out AMA   - History of PLTCS x1 at 32w5d for fetal indications in the setting of DKA   - Gastroparesis, recurrent abdominal pain   - Pre-eclampsia without severe features based on mild range Bps, UPC 0.6   - Malnutrition, post-pyloric feeding tube in place   - Anxiety, s/p SW and  psych consult last admission   - GBS positive     Prenatal Labs:   Lab Results   Component Value Date    ABO PENDING 2017    RH Pos 2017    AS PENDING 2017    HEPBANG Nonreactive 2017     CHPCRT Negative 09/10/2017    GCPCRT Negative 09/10/2017    TREPAB Negative 09/10/2017    HGB 10.2 (L) 2017       GBS Status:   Lab Results   Component Value Date    GBS Positive (A) 09/10/2017         OB History  Obstetric History       T0      L1     SAB0   TAB0   Ectopic0   Multiple0   Live Births1       # Outcome Date GA Lbr Walter/2nd Weight Sex Delivery Anes PTL Lv   2 Current            1  06/10/16 32w5d  2.37 kg (5 lb 3.6 oz) M CS-LTranv Gen  BARBARA          PMHx:   Past Medical History:   Diagnosis Date     Diabetes (H)      Microalbuminuria 2016     Type I diabetes mellitus, uncontrolled (H) 2016     PSHx:   Past Surgical History:   Procedure Laterality Date      SECTION N/A 6/10/2016    Procedure:  SECTION;  Surgeon: Richardson Duran MD;  Location: [facility] OR     ESOPHAGOSCOPY, GASTROSCOPY, DUODENOSCOPY (EGD), COMBINED N/A 2017    Procedure: COMBINED ESOPHAGOSCOPY, GASTROSCOPY, DUODENOSCOPY (EGD);  Upper Endoscopy with biopsies;  Surgeon: Nile Sanchez MD;  Location: [facility] OR     INSERT TUBE NASOJEJUNOSTOMY  2017    Procedure: INSERT TUBE NASOJEJUNOSTOMY;  nasojejunal feeding tube placement with upper endoscopy assistance by Dr. Sanchez and Dr. Cristino Bennett, Radiology;  Surgeon: Nile Sanchez MD;  Location: [facility] OR     Meds:   Prescriptions Prior to Admission   Medication Sig Dispense Refill Last Dose     insulin detemir (LEVEMIR FLEXPEN/FLEXTOUCH) 100 UNIT/ML injection Inject 14 Units Subcutaneous 2 times daily Take first dose at midnight, next dose at 12 pm. 3 mL 3 2017 at 1000     insulin aspart (NOVOLOG PEN) 100 UNIT/ML injection Inject 5 Units Subcutaneous 3 times daily (with meals) 20 mL 0 2017 at 1000     senna-docusate (SENOKOT-S;PERICOLACE) 8.6-50 MG per tablet Take 1 tablet by mouth 2 times daily as needed for constipation 100 tablet 0 Past Week at Unknown time     Prenatal Vit-Fe Fumarate-FA (PRENATAL VITAMIN) 27-0.8 MG TABS Take 1  tablet by mouth daily 90 tablet 3 Past Week at Unknown time     alum & mag hydroxide-simethicone (MYLANTA ES/MAALOX  ES) 400-400-40 MG/5ML SUSP suspension Take 30 mLs by mouth 4 times daily (with meals and nightly) 355 mL 1      blood glucose monitoring (NO BRAND SPECIFIED) test strip Use to test blood sugars 4 times daily or as directed 100 strip 3 9/24/2017 at Unknown time     blood glucose monitoring (NO BRAND SPECIFIED) test strip Use to test blood sugar 4 times daily or as directed. 100 strip 3 9/24/2017 at Unknown time     blood glucose monitoring (ONE TOUCH DELICA) lancets Use to test blood sugar 4 times daily or as directed. 100 each 3      blood glucose monitoring (ONETOUCH VERIO) meter device kit Use to test blood sugar 4 times daily or as directed. 1 kit 0 9/23/2017 at Unknown time     polyethylene glycol (MIRALAX/GLYCOLAX) Packet Take 17 g by mouth daily as needed for constipation (titrate to one bowel movement daily) 7 packet 1 9/23/2017 at Unknown time     metoclopramide (REGLAN) 10 MG tablet Take 1 tablet (10 mg) by mouth 4 times daily as needed (4x Daily AC & HS prn nausea and abdominal pain) 60 tablet 0 9/23/2017 at Unknown time     famotidine (PEPCID) 20 MG tablet Take 1 tablet (20 mg) by mouth 2 times daily 40 tablet 1      pantoprazole (PROTONIX) 40 MG EC tablet Take 1 tablet (40 mg) by mouth 2 times daily 30 tablet 1 9/23/2017 at Unknown time     ondansetron (ZOFRAN ODT) 4 MG ODT tab Take 1 tablet (4 mg) by mouth every 8 hours as needed for nausea 20 tablet 1 9/23/2017 at Unknown time     mirtazapine (REMERON SOL-TAB) 15 MG disintegrating tablet 1 tablet (15 mg) by Orally disintegrating tablet route At Bedtime 60 tablet 2 6/1/2016 at Bedtime     OLANZapine zydis (ZYPREXA) 5 MG disintegrating tablet Take 1 tablet (5 mg) by mouth 2 times daily 60 tablet 3 9/23/2017 at Unknown time     omeprazole (PRILOSEC) 20 MG capsule Take 1 capsule (20 mg) by mouth every morning (before breakfast) 30 capsule 3  9/23/2017 at Unknown time     folic acid (FOLVITE) 1 MG tablet Take 1 tablet (1 mg) by mouth daily 30 tablet 0 9/23/2017 at Unknown time     Allergies:  No Known Allergies   FmHx:   Family History   Problem Relation Age of Onset     DIABETES Maternal Grandmother      CEREBROVASCULAR DISEASE Paternal Grandmother      Coronary Artery Disease Paternal Grandmother      Hypertension No family hx of      Hyperlipidemia No family hx of      Breast Cancer No family hx of      Colon Cancer No family hx of      Prostate Cancer No family hx of      Other Cancer No family hx of      Depression No family hx of      Anxiety Disorder No family hx of      MENTAL ILLNESS No family hx of      Substance Abuse No family hx of      Anesthesia Reaction No family hx of      Asthma No family hx of      OSTEOPOROSIS No family hx of      Genetic Disorder No family hx of      Thyroid Disease No family hx of      Obesity No family hx of      SocHx: She denies any tobacco, alcohol, or other drug use during this pregnancy.    ROS:   Complete 10-point ROS negative except as noted in HPI.    PE:  Vit:   Patient Vitals for the past 4 hrs:   BP Temp Temp src Resp   09/30/17 1500 129/73 98  F (36.7  C) Oral 18   09/30/17 1349 117/75 98.1  F (36.7  C) Oral 16      Gen: Appears uncomfortable with pain, thin female   CV: rrr, no mrg   Pulm: Ctab, no wheezes or crackles   Abd: Soft, gravid, + tenderness in upper abdomen, no rebound or guarding noted, +BS  Ext: no LE edema b/l, warm, well-perfused, non-tender     Pres:  Breech by BSUS    FHT: Baseline 120s, moderate variability, + accelerations, no decelerations   [place]: Uterine irritability     Labs:   Results for LORRAINE DAY (MRN [id number]) as of 9/30/2017 17:28   Ref. Range 9/30/2017 16:03   Sodium Latest Ref Range: 133 - 144 mmol/L 137   Potassium Latest Ref Range: 3.4 - 5.3 mmol/L 3.7   Chloride Latest Ref Range: 94 - 109 mmol/L 103   Carbon Dioxide Latest Ref Range: 20 - 32 mmol/L 22   Urea  Nitrogen Latest Ref Range: 7 - 30 mg/dL 6 (L)   Creatinine Latest Ref Range: 0.52 - 1.04 mg/dL 0.33 (L)   GFR Estimate Latest Ref Range: >60 mL/min/1.7m2 >90   GFR Estimate If Black Latest Ref Range: >60 mL/min/1.7m2 >90   Calcium Latest Ref Range: 8.5 - 10.1 mg/dL 8.0 (L)   Anion Gap Latest Ref Range: 3 - 14 mmol/L 12   Magnesium Latest Ref Range: 1.6 - 2.3 mg/dL 1.9   Phosphorus Latest Ref Range: 2.5 - 4.5 mg/dL 2.9   Albumin Latest Ref Range: 3.4 - 5.0 g/dL 2.5 (L)   Protein Total Latest Ref Range: 6.8 - 8.8 g/dL 7.1   Bilirubin Total Latest Ref Range: 0.2 - 1.3 mg/dL 0.4   Alkaline Phosphatase Latest Ref Range: 40 - 150 U/L 70   ALT Latest Ref Range: 0 - 50 U/L 11   AST Latest Ref Range: 0 - 45 U/L 7   Ketone Quantitative Latest Ref Range: 0.0 - 0.6 mmol/L 1.3 (H)   Glucose Latest Ref Range: 70 - 99 mg/dL 136 (H)   WBC Latest Ref Range: 4.0 - 11.0 10e9/L 13.4 (H)   Hemoglobin Latest Ref Range: 11.7 - 15.7 g/dL 10.2 (L)   Hematocrit Latest Ref Range: 35.0 - 47.0 % 31.0 (L)   Platelet Count Latest Ref Range: 150 - 450 10e9/L 205   RBC Count Latest Ref Range: 3.8 - 5.2 10e12/L 3.73 (L)   MCV Latest Ref Range: 78 - 100 fl 83   MCH Latest Ref Range: 26.5 - 33.0 pg 27.3   MCHC Latest Ref Range: 31.5 - 36.5 g/dL 32.9   RDW Latest Ref Range: 10.0 - 15.0 % 15.6 (H)   ABO Unknown PENDING   Antibody Screen Unknown PENDING   Test Valid Only At Latest Units:     [facility].   Specimen Expires Unknown 10/03/2017       Assessment/Plan  Anne Gunter is a 27 year old , at 28w3d by stated JUANJO c/w 25w5d US, who presents with severe abdominal pain, nausea, and vomiting.  Given recent elopement from hospital before medical optimization, patient presents with similar issues from last admission. PMHx is notable for poorly controlled T1DM with multiple admissions for DKA, severe gastroparesis with post-pyloric feeding tube in place, limited prenatal care in [place], history of PLTCS x1 at 32 weeks for fetal indications in  the setting of PTD, preeclampsia without severe features.    1. Poorly controlled T1DM, recurrent DKA:   - Multiple recent admissions for poorly controlled T1DM and DKA.  Serum ketones elevated on admission to 1.3, although BG was 102 without gap and normal bicarb, not c/w DKA at this time.   - Plan for formal endocrinology consult tomorrow, but per phone discussion, plan for IV insulin gtt while patient NPO and through TF titration.  Plan for D5NS at 125 cc/hr per endocrinology.    - Need to contact endo with any changes in diet in order to add coverage.   - Close BG monitoring. NPO until pain, nausea/vomiting improve.   - Plan to repeat ketones, BMP in 4 hours.  Replete lytes PRN.     2. Gastroparesis, recurrent abdominal pain, malnutrition:  - Scheduled IV reglan.  Continue daily protonix, pepcid. PRN IV dilaudid.  Unable to do IV tylenol at this time due to limited IV access.   Patient declines PICC line. Can readdress throughout admission.   - S/p GI consult during previous admission.   - NJ tube in place, nutrition consult with plans to resume tube feeds working back to goal slowly when pain/symptoms are improved.   - Will need to resume bowel regimen when taking PO meds.     3. Preeclampsia without severe features:   - BPs normotensive on admission.  Diagnosed last admission due to mild range BPs and UPC 0.6, with UPC of 0.38 in 2016.  Continue to monitor BPs, HELLP labs wnl on admission.     4. Anxiety:   - S/p  psych/SW consult last admission. Continue to monitor.     5. PNC:   - Rh positive, Rubella immune, GBS positive- will need PCN if delivery is imminent, placenta posterior     6. FWB:   - Category 1 FHT, reactive. Plan for continuous monitoring overnight, consider changing to TID when improves.   - Plan for weekly BPPs, last .   - Tdap if inpatient next week.    - Need to sign C/S consent/discuss delivery mode when appropriate. Patient unwilling to discuss with this writer today due to  pain.    The patient was seen and discussed with Dr. Lopez who is in agreement with the treatment plan.    Denise Benton MD  Ob/Gyn, PGY-3  9/30/2017, 1:51 PM

## 2017-09-30 NOTE — LETTER
Transition Communication Hand-off for Care Transitions to Next Level of Care Provider    Name: Anne Gunter  MRN #: [id number]  Primary Care Provider: Isiah Naidu MD     [facility] [address]     Reason for Hospitalization:  High-risk pregnancy  Admit Date/Time: 9/30/2017  1:42 PM  Discharge Date: 10/8/2017  Payor Source: Payor: UCARE / Plan: UCARE [place] / Product Type: HMO /          Reason for Communication Hand-off Referral: Avoidable readmission within 30 days    Discharge Plan: Home with HHA and RN for PICC line cares.    Follow-up plan:  Future Appointments  Date Time Provider Department Center   10/9/2017 8:00 AM Isidro Paredes [facility]   10/10/2017 8:00 AM Kelin Calabrese [facility]   10/12/2017 11:45 AM [id number] [facility]   10/12/2017 12:30 PM UR EXAM RM 1 [facility]   10/12/2017 2:30 PM Marla Colunga MD [facility]       Any outstanding tests or procedures:        Referrals     Future Labs/Procedures    Home Care Referral     Comments:    **Order classes of: [facility], [facility] and [facility] will route to the Home Care and Hospice Referral Pool.  Home Care or Hospice will then contact the patient to schedule their appointment.**    If you do not hear from Home Care and Hospice, or you would like to call to schedule, please call the referring place of service that your provider has listed below.  ______________________________________________________________________    Your provider has referred you to: [facility]: [facility] [place] [phone]   [email or web address]  [facility]: [facility] [place] [phone]   [email or web address]  [facility] [place] [phone]   [email or web address]    Extended Emergency Contact Information  Primary  Emergency Contact: Vinayak Gunter  Address: [address]           [place] [address] [facility]  Home Phone: [phone]  Work Phone: none  Mobile Phone: [phone]  Relation: Father  Secondary Emergency Contact: Vianey Ruby  Address: [place]  Home Phone: none  Relation: Spouse  Mother: Rima Gunter  Address: [address]           [place] [address] [facility]  Home Phone: [phone]  Work Phone: none  Mobile Phone: [phone]    Patient Anticipated Discharge Date: 10/08/17    RN, PT, HHA to begin 24 - 48 hours after discharge.  PLEASE EVALUATE AND TREAT (Evaluation timeline is 24 - 48 hrs. Please call if there is need for a variance to this timeline).    REASON FOR REFERRAL: PICC line cares once weekly     ADDITIONAL SERVICES NEEDED: None    OTHER PERTINENT INFORMATION: Patient was last seen by provider on 10/08/17  for Type 1 DM, severe abdominal pain and gastroparesis, malnutrition.  This patient has been admitted 4 times over the last month and needs PICC line in place for IV access.  She is unable to perform dressing changes and weekly flushes herself and has no support person to perform it.  She needs a RN to come once a week to perform these functions.     Current Outpatient Prescriptions:  insulin aspart (NOVOLOG PEN) 100 UNIT/ML injection, Inject 1 Units Subcutaneous 3 times daily (with meals), Disp: 9 mL, Rfl: 1  insulin glargine (LANTUS) 100 UNIT/ML injection, Inject 11 Units Subcutaneous 2 times daily, Disp: 6.6 mL, Rfl: 1  insulin aspart (NOVOLOG PEN) 100 UNIT/ML injection, Inject 6 Units Subcutaneous 3 times daily (with meals), Disp: 5.4 mL, Rfl: 1  phosphorus tablet 250 mg (K PHOS NEUTRAL) 250 MG per tablet, Take 1 tablet (250 mg) by mouth 2 times daily, Disp: 60 tablet, Rfl: 1  oxyCODONE (ROXICODONE) 5 MG IR tablet, Take 1-2 tablets (5-10 mg) by mouth every 4 hours as needed for pain maximum 12 tablet(s) per day, Disp:  25 tablet, Rfl: 0  [DISCONTINUED] insulin aspart (NOVOLOG PEN) 100 UNIT/ML injection, Inject 1 Units Subcutaneous 3 times daily (with meals), Disp: 9 mL, Rfl: 1  [DISCONTINUED] insulin aspart (NOVOLOG PEN) 100 UNIT/ML injection, Inject 6 Units Subcutaneous 3 times daily (with meals), Disp: 5.4 mL, Rfl: 1  [DISCONTINUED] insulin glargine (LANTUS) 100 UNIT/ML injection, Inject 11 Units Subcutaneous 2 times daily, Disp: 6.6 mL, Rfl: 1      Patient Active Problem List:     High-risk pregnancy, first trimester     Hyperemesis     Hyperemesis gravidarum     DKA (diabetic ketoacidoses) (H)     DKA, type 1, not at goal (H)     Encounter for long-term (current) use of insulin (H)     Nausea with vomiting     Abdominal pain     Hematemesis     Group B Streptococcus urinary tract infection affecting pregnancy in first trimester     Type 1 diabetes mellitus in third trimester, antepartum     Supervision of high-risk pregnancy     Indication for care in labor or delivery     Indication for care in labor and delivery, antepartum     Epigastric pain     S/P      Type 1 diabetes mellitus in pregnancy, second trimester     Microalbuminuria     Indication for care or intervention in labor or delivery     Encounter for triage in pregnant patient     Hyperglycemia     Diabetic keto-acidosis (H)     DKA, type 1 (H)     Abdominal pain affecting pregnancy, antepartum     Diabetes in pregnancy     High-risk pregnancy      Documentation of Face to Face and Certification for Home Health Services    I certify that patientAnne is under my care and that I, or a Nurse Practitioner or Physician's Assistant working with me, had a face-to-face encounter that meets the physician face-to-face encounter requirements with this patient on: 10/8/2017.    This encounter with the patient was in whole, or in part, for the following medical condition, which is the primary reason for Home Health Care: High risk pregnancy,  malnutrition.    I certify that, based on my findings, the following services are medically necessary Home Health Services: Nursing    My clinical findings support the need for the above services because: Nurse is needed: For complex aftercare of surgical procedures because the patient needs instruction and cannot perform care on their own due to: limited ability to perform self-PICC dressing changes..    Further, I certify that my clinical findings support that this patient is homebound (i.e. absences from home require considerable and taxing effort and are for medical reasons or Islam services or infrequently or of short duration when for other reasons) because: Requires assistance of another person or specialized equipment to access medical services because patient:  Unable to perform by self.    Based on the above findings, I certify that this patient is confined to the home and needs intermittent skilled nursing care, physical therapy and/or speech therapy.  The patient is under my care, and I have initiated the establishment of the plan of care.  This patient will be followed by a physician who will periodically review the plan of care.    Physician/Provider to provide follow up care: Isiah Naidu    [facility] certified Physician at time of discharge: Mateo Verde MD    Please be aware that coverage of these services is subject to the terms and limitations of your health insurance plan.  Call member services at your health plan with any benefit or coverage questions.    Home infusion referral     Comments:    Your provider has referred you to: PRAF[facility]: [facility] [phone]   [email or web address]    Local Address (if different from home address): N/A    Anticipated Length of Therapy: TBD    Home Infusion Pharmacist to adjust therapy based on labs and clinical assessments: PICC is not being used at this time    Labs:  May draw labs  from Venous Catheter: Yes  Home Infusion Pharmacist to order labs based on therapy type and clinical assessments: Yes  Call/Fax Lab Results to: Dr. Mateo Verde    Agency Staff to assess nursing needs for Infusion Therapy.    Access Device Management:  IV Access Type: PICC  Flush with Heparin and Normal Saline IVP PRN and routine site care (per agency protocol) to maintain access device? Yes  PICC in place, not being used at this time. Patient requires once a week dressing changes and PICC flushes.            Anjali Gomez RN, BSN  Care Coordinator, 8A  Phone [phone]  Pager [phone]    AVS/Discharge Summary is the source of truth; this is a helpful guide for improved communication of patient story

## 2017-09-30 NOTE — PROGRESS NOTES
"Discussed with patient and reviewed the leaving hospital against medical advice form.    Explained that her blood sugar is not yet optimized and we recommend that she remain under our care as an inpatient to continue to work on blood glucose control.  Her parents, who she lives with, were present for this conversation.  The patient and her parents voiced understanding that we recommend staying in the hospital.  Her father states \"She knows she should stay, but wants to go home so she is coming home.\"  I reviewed the maternal risks of leaving AMA include uncontrolled blood glucose, diabetic ketoacidosis, injury, and death.  Fetal risks including unrecognized fetal distress, fetal compromise, and intrauterine death. I also reviewed return to the hospital precautions with the patient and her parents.  She again voiced her desire to leave despite all of this information.  She signed A paperwork and Melba JOAQUIN was the witness.    Melissa Michele MD  OBGYN PGY3  "

## 2017-09-30 NOTE — PLAN OF CARE
Patient's family arrived at 1925.  Discussed discharge with Dr. Michele and signed AMA paperwork at 1939. Patient was given information to scheduled appointments with M and endocrine.  Home infusion to follow up with patient for TF at home.  Patient left unit at 1955.

## 2017-09-30 NOTE — PROGRESS NOTES
Brief Diabetes Management Team Note    We have been following Ms. Anne Gunter for type 1 diabetes management during pregnancy. She left AMA last night, TFs were stopped at that time and pt was told to take detemir 14 units BID (decrease to 11 units BID if unable to tolerate po intake), aspart 5 units with meals.  She followed this insulin regimen, had spaghetti last night and had recurrent abdominal pain and vomiting today.  She presented to L&D unit this afternoon for re-admission.  Glucose on admission was 102, she reports she was following the insulin guidelines given at discharge yesterday.  She is now NPO, plan is to eventually restart enteral feeds at low rate and ramp up as tolerated.  For now, she will get dextrose fluids.  IV access is again an issue- currently has 1 peripheral IV.    Plan:  -High intensity OB IV insulin infusion is ordered.  RN will page me if this cannot be started due to IV access issue and we will try starting SC insulin doses based on IV insulin rates last week when she was NPO and on D5NS at 125ml/h.  Given the brittle nature of her diabetes, IV Insulin would be safest if it is possible.  Plan discussed with Dr. Benton and bedside RN, Radha.    Full consult to follow tomorrow.    Carlene Jones PA-C [phone]

## 2017-09-30 NOTE — PROGRESS NOTES
CLINICAL NUTRITION SERVICES - ASSESSMENT NOTE     Nutrition Prescription    RECOMMENDATIONS FOR MDs/PROVIDERS TO ORDER:  1. Within the next 24-48 hours, start TF as follows:    - Initiate TwoCal HN via NDT @ 20 mL with advancement schedule to increase by 10 mL q 8 hrs as tolerated to goal of 50 mL/hr, continuous (1200 ml/day) to provide 2400 kcals (32 Kacls/kg/day), 101 g PRO (1.3 gms/kg/day), 840 ml free H2O, 263 g CHO and 6 g Fiber daily.    - Free Water Flushes 30 mL q 4 hrs (TF + Free Water = 1020 mL/day)     - 15 mL Certavite to meet micronutrient needs    2. Recommend obtaining new serum phos given low phos value of 2.2 mg/dL on 9/29 and replace if still low.     3.  Advance diet as tolerated to clear liquids then full liquids if tolerated. Advance to regular diet if full liquid diet is tolerated well.    Malnutrition Status:   Non-severe malnutrition in the context of acute illness.    Recommendations already ordered by Registered Dietitian (RD):  None today    Future/Additional Recommendations:  -If IVF are discontinued and pt with poor po intake of fluid, recommend increasing free water flushes via TF to 100-115 mL q 2 hrs pending tolerance and adjust as needed pending hydration status/po intake of fluid. TF + free water flushes would total 1818-7505 mL/day to meet hydration needs.   - Once diet advances, recommend ordering Glucerna (vanilla) @ lunch and Magic Cup (vanilla) @ dinner to promote po intake. -If/when diet advancement > NPO and pt consuming at least 50% of meals TID, recommend ordering calorie counts to assess adequacy of po and ability to wean/adjust tube feeds.   -Pending tolerance to diet advancement and food choices, may consider low-fat/low-fiber diet given hx of gastroparesis  - Once pt starts to consume a regular diet, recommend starting to cycle feeds as tolerated. Would recommend cycling as tolerated as follows: 65 mL/hr x 18 hrs, if tolerated 85 mL/hr x 14 hrs, if tolerated 100 mL/hr x  12 hrs.   -When able to advance diet, provide education for carb counting/gestational diabetes diet education.      REASON FOR ASSESSMENT  Anne Gunter is a/an 27 year old female assessed by the dietitian for Provider Order - Tube feeds    NUTRITION HISTORY  - Pt with hx of  at 27w4d, who presents to [facility] antepartum with severe abdominal pain and vomiting. Per pt, pt ate spaghetti  evening, gave insulin and woke up with nausea and vomiting. Chart reports, vomited 5 times this morning with severe abdominal pain.   - Per chart, pt with hx of abdominal pain likely r/t diabetic gastroparesis, which is worsened by hyperglycemia as well as non-adherence to dietary modifications. Pt also with poorly controlled type 1 DM. Recent admission 9/10- for DKA, pt left  and admitted again - for abdominal pain, BG management and left AMA for unknown reasons. Most recently admitted from - and she D/C'ed AMA again. She reached TF goal of 50 mL/hr of TwoCal HN yesterday but this was turned off at D/C. Please see last full RD assessment on  for more hx.   - Patient also seen by RD during recent previous hospital admissions in 2017. Pt declined review of carb counting education and reports she is knowledgeable of CHO-containing foods, how to count carbs, and her insulin regimen, rather the reason for poor glycemic control simply due to lack of compliance. Pt was encouraged to comply with insulin regimen and carb counting for safety of pregnancy. In past hospitalization -, pt also with poor appetite, inadequate po intake, and was ordered trial of supplements, Ensure Clear, Ensure Plus, Magic Cup, and Glucerna. She reported she liked them all and would be open to trying any of them again in vanilla flavor. Pt was also on calorie counts during past hospitalization given poor po and nutrition support was started on  after a NDT was placed. Advancement was slow given risk of  "refeeding syndrome and she reached goal volume on 9/29 briefly before being stopped for D/C.   - Pt previously NPO from 9/24-9/28, diet advanced to full liquids on 9/28. Pt tolerated clear liquids and full liquids well. Diet was then advanced to regular on 9/29 shortly before pt D/C'ed.    CURRENT NUTRITION ORDERS  Diet: NPO  Intake/Tolerance: pt ate spaghetti last night and drank some water this morning before vomiting.    LABS  Labs reviewed  - BG at admission was 102  - Na+, K+, Mg++ all wnl on 9/29  - Phos 2.2 (L) on 9/29    MEDICATIONS  Medications reviewed  - IVF D5 125 mL/hr     ANTHROPOMETRICS  Height: 0 cm (Data Unavailable); Per RD note 9/19: Ht: 1.7 m (5' 6.93\")   Most Recent Weight: 75 kg (165 lb 6.4 oz)  Suspected Pre-Pregnancy Wt: 75 kg      IBW: 61.4 kg (122% IBW using pre-pregnancy wt)   BMI: Overweight BMI 25-29.9  Weight History: Past RD notes 9/19 and 9/25 using 75 kg as most likely pre-pregnancy weight and recorded wt hx supported this. Based on pre-pregnancy wt of 75 kg, pt has gained 0 lbs during this pregnancy. which is below the recommended wt gain of 15-25 lbs based on likely prepregnancy BMI of 26.0 kg/m2. Note pt has lost 2.9 kg over the last 1 week, likely d/t NPO status and slow advancement of TF and diet.   Wt Readings from Last 5 Encounters:   09/29/17 75 kg (165 lb 6.4 oz)   09/23/17 77.9 kg (171 lb 12.8 oz)   09/12/17 76.5 kg (168 lb 10.4 oz)   09/09/17 76 kg (167 lb 8.8 oz)   06/21/16 68.9 kg (152 lb)     Dosing Weight: 75 kg - based on UBW prior to pregnancy       ASSESSED NUTRITION NEEDS  Estimated Energy Needs: 7337-6264 kcals/day (25 kcal/kg + 340-450 kcal/day; PN energy needs: 20-25 kcal/kg)  Justification: Increased needs 2/2 second trimester of pregnancy and repletion   Estimated Protein Needs: 78-98 grams protein/day (1.2 - 1.5 grams of pro/kg)  Justification: Increased needs 2/2 second trimester of pregnancy and repletion  Estimated Fluid Needs: 1 mL/kcal  Justification: " Maintenance    PHYSICAL FINDINGS  See malnutrition section below.    MALNUTRITION  % Intake: </= 50% for >/= 5 days (severe)  % Weight Loss: Weight loss does not meet criteria, however since pt is pregnant likely non-severe  Subcutaneous Fat Loss: None observed  Muscle Loss: None observed  Fluid Accumulation/Edema: None noted  Malnutrition Diagnosis: Non-severe malnutrition in the context of acute illness.    NUTRITION DIAGNOSIS  Inadequate oral intake related to N/V and restrictive diet as evidenced by pt vomiting >5 times today and unable to keep food/fluids down and current NPO status      INTERVENTIONS  Implementation  Pt was recently given medication and was unable to wake up for this writer. Discussed POC with RN. Current plan is to keep strict NPO and no fluids and start MIVF and D5 today. Hopeful to start TF tomorrow. Placed recommendations for MD above.     Goals  1. Total avg nutritional intake to meet a minimum of 30 kcal/kg and 1.2 g PRO/kg daily (per dosing wt 75 kg).  2. Weight gains of 0.3 kg (0.7 lbs) per week (15-25 lbs total wt gain during this pregnancy)   3. BG </= 180    Monitoring/Evaluation  Progress toward goals will be monitored and evaluated per protocol.      Lucille Machuca RD, LD  Unit Pager: [phone]

## 2017-10-01 LAB
ANION GAP SERPL CALCULATED.3IONS-SCNC: 11 MMOL/L (ref 3–14)
BUN SERPL-MCNC: 6 MG/DL (ref 7–30)
CALCIUM SERPL-MCNC: 7.7 MG/DL (ref 8.5–10.1)
CHLORIDE SERPL-SCNC: 106 MMOL/L (ref 94–109)
CO2 SERPL-SCNC: 22 MMOL/L (ref 20–32)
CREAT SERPL-MCNC: 0.3 MG/DL (ref 0.52–1.04)
CREAT UR-MCNC: 64 MG/DL
GFR SERPL CREATININE-BSD FRML MDRD: >90 ML/MIN/1.7M2
GLUCOSE BLDC GLUCOMTR-MCNC: 100 MG/DL (ref 70–99)
GLUCOSE BLDC GLUCOMTR-MCNC: 107 MG/DL (ref 70–99)
GLUCOSE BLDC GLUCOMTR-MCNC: 110 MG/DL (ref 70–99)
GLUCOSE BLDC GLUCOMTR-MCNC: 116 MG/DL (ref 70–99)
GLUCOSE BLDC GLUCOMTR-MCNC: 121 MG/DL (ref 70–99)
GLUCOSE BLDC GLUCOMTR-MCNC: 123 MG/DL (ref 70–99)
GLUCOSE BLDC GLUCOMTR-MCNC: 125 MG/DL (ref 70–99)
GLUCOSE BLDC GLUCOMTR-MCNC: 126 MG/DL (ref 70–99)
GLUCOSE BLDC GLUCOMTR-MCNC: 129 MG/DL (ref 70–99)
GLUCOSE BLDC GLUCOMTR-MCNC: 132 MG/DL (ref 70–99)
GLUCOSE BLDC GLUCOMTR-MCNC: 135 MG/DL (ref 70–99)
GLUCOSE BLDC GLUCOMTR-MCNC: 137 MG/DL (ref 70–99)
GLUCOSE BLDC GLUCOMTR-MCNC: 140 MG/DL (ref 70–99)
GLUCOSE BLDC GLUCOMTR-MCNC: 79 MG/DL (ref 70–99)
GLUCOSE BLDC GLUCOMTR-MCNC: 88 MG/DL (ref 70–99)
GLUCOSE BLDC GLUCOMTR-MCNC: 94 MG/DL (ref 70–99)
GLUCOSE BLDC GLUCOMTR-MCNC: 95 MG/DL (ref 70–99)
GLUCOSE BLDC GLUCOMTR-MCNC: 96 MG/DL (ref 70–99)
GLUCOSE BLDC GLUCOMTR-MCNC: 98 MG/DL (ref 70–99)
GLUCOSE SERPL-MCNC: 114 MG/DL (ref 70–99)
POTASSIUM SERPL-SCNC: 3.9 MMOL/L (ref 3.4–5.3)
PROT UR-MCNC: 0.27 G/L
PROT/CREAT 24H UR: 0.42 G/G CR (ref 0–0.2)
SODIUM SERPL-SCNC: 139 MMOL/L (ref 133–144)

## 2017-10-01 PROCEDURE — 25000125 ZZHC RX 250: Performed by: PHYSICIAN ASSISTANT

## 2017-10-01 PROCEDURE — 12000028 ZZH R&B OB UMMC

## 2017-10-01 PROCEDURE — 25000125 ZZHC RX 250: Performed by: OBSTETRICS & GYNECOLOGY

## 2017-10-01 PROCEDURE — 80048 BASIC METABOLIC PNL TOTAL CA: CPT | Performed by: OBSTETRICS & GYNECOLOGY

## 2017-10-01 PROCEDURE — 25000128 H RX IP 250 OP 636: Performed by: OBSTETRICS & GYNECOLOGY

## 2017-10-01 PROCEDURE — 25000131 ZZH RX MED GY IP 250 OP 636 PS 637: Performed by: PHYSICIAN ASSISTANT

## 2017-10-01 PROCEDURE — 00000146 ZZHCL STATISTIC GLUCOSE BY METER IP

## 2017-10-01 PROCEDURE — 84156 ASSAY OF PROTEIN URINE: CPT | Performed by: OBSTETRICS & GYNECOLOGY

## 2017-10-01 PROCEDURE — S0028 INJECTION, FAMOTIDINE, 20 MG: HCPCS | Performed by: OBSTETRICS & GYNECOLOGY

## 2017-10-01 PROCEDURE — 36415 COLL VENOUS BLD VENIPUNCTURE: CPT | Performed by: OBSTETRICS & GYNECOLOGY

## 2017-10-01 RX ADMIN — HYDROMORPHONE HYDROCHLORIDE 0.5 MG: 1 INJECTION, SOLUTION INTRAMUSCULAR; INTRAVENOUS; SUBCUTANEOUS at 02:25

## 2017-10-01 RX ADMIN — DEXTROSE AND SODIUM CHLORIDE: 5; 900 INJECTION, SOLUTION INTRAVENOUS at 08:54

## 2017-10-01 RX ADMIN — FAMOTIDINE 20 MG: 10 INJECTION, SOLUTION INTRAVENOUS at 06:21

## 2017-10-01 RX ADMIN — ONDANSETRON 4 MG: 2 INJECTION INTRAMUSCULAR; INTRAVENOUS at 16:59

## 2017-10-01 RX ADMIN — DEXTROSE AND SODIUM CHLORIDE: 5; 900 INJECTION, SOLUTION INTRAVENOUS at 01:10

## 2017-10-01 RX ADMIN — METOCLOPRAMIDE HYDROCHLORIDE 10 MG: 5 INJECTION INTRAMUSCULAR; INTRAVENOUS at 18:52

## 2017-10-01 RX ADMIN — METOCLOPRAMIDE HYDROCHLORIDE 10 MG: 5 INJECTION INTRAMUSCULAR; INTRAVENOUS at 02:58

## 2017-10-01 RX ADMIN — INSULIN ASPART 4 UNITS: 100 INJECTION, SOLUTION INTRAVENOUS; SUBCUTANEOUS at 22:07

## 2017-10-01 RX ADMIN — ONDANSETRON 4 MG: 2 INJECTION INTRAMUSCULAR; INTRAVENOUS at 08:54

## 2017-10-01 RX ADMIN — PANTOPRAZOLE SODIUM 40 MG: 40 INJECTION, POWDER, FOR SOLUTION INTRAVENOUS at 08:46

## 2017-10-01 RX ADMIN — HYDROMORPHONE HYDROCHLORIDE 0.5 MG: 1 INJECTION, SOLUTION INTRAMUSCULAR; INTRAVENOUS; SUBCUTANEOUS at 01:07

## 2017-10-01 RX ADMIN — HYDROMORPHONE HYDROCHLORIDE 0.5 MG: 1 INJECTION, SOLUTION INTRAMUSCULAR; INTRAVENOUS; SUBCUTANEOUS at 21:41

## 2017-10-01 RX ADMIN — HYDROMORPHONE HYDROCHLORIDE 0.5 MG: 1 INJECTION, SOLUTION INTRAMUSCULAR; INTRAVENOUS; SUBCUTANEOUS at 13:47

## 2017-10-01 RX ADMIN — FAMOTIDINE 20 MG: 10 INJECTION, SOLUTION INTRAVENOUS at 18:53

## 2017-10-01 RX ADMIN — HYDROMORPHONE HYDROCHLORIDE 0.5 MG: 1 INJECTION, SOLUTION INTRAMUSCULAR; INTRAVENOUS; SUBCUTANEOUS at 16:59

## 2017-10-01 RX ADMIN — DEXTROSE AND SODIUM CHLORIDE: 5; 900 INJECTION, SOLUTION INTRAVENOUS at 16:01

## 2017-10-01 RX ADMIN — HYDROMORPHONE HYDROCHLORIDE 0.5 MG: 1 INJECTION, SOLUTION INTRAMUSCULAR; INTRAVENOUS; SUBCUTANEOUS at 09:01

## 2017-10-01 RX ADMIN — HYDROMORPHONE HYDROCHLORIDE 0.5 MG: 1 INJECTION, SOLUTION INTRAMUSCULAR; INTRAVENOUS; SUBCUTANEOUS at 19:07

## 2017-10-01 RX ADMIN — HUMAN INSULIN 1 UNITS/HR: 100 INJECTION, SOLUTION SUBCUTANEOUS at 13:57

## 2017-10-01 RX ADMIN — METOCLOPRAMIDE HYDROCHLORIDE 10 MG: 5 INJECTION INTRAMUSCULAR; INTRAVENOUS at 10:50

## 2017-10-01 RX ADMIN — HYDROMORPHONE HYDROCHLORIDE 0.5 MG: 1 INJECTION, SOLUTION INTRAMUSCULAR; INTRAVENOUS; SUBCUTANEOUS at 04:47

## 2017-10-01 RX ADMIN — HYDROMORPHONE HYDROCHLORIDE 0.5 MG: 1 INJECTION, SOLUTION INTRAMUSCULAR; INTRAVENOUS; SUBCUTANEOUS at 06:38

## 2017-10-01 RX ADMIN — INSULIN ASPART 4 UNITS: 100 INJECTION, SOLUTION INTRAVENOUS; SUBCUTANEOUS at 19:53

## 2017-10-01 RX ADMIN — INSULIN ASPART 2 UNITS: 100 INJECTION, SOLUTION INTRAVENOUS; SUBCUTANEOUS at 14:14

## 2017-10-01 RX ADMIN — INSULIN ASPART 2 UNITS: 100 INJECTION, SOLUTION INTRAVENOUS; SUBCUTANEOUS at 14:18

## 2017-10-01 RX ADMIN — HYDROMORPHONE HYDROCHLORIDE 0.5 MG: 1 INJECTION, SOLUTION INTRAMUSCULAR; INTRAVENOUS; SUBCUTANEOUS at 00:03

## 2017-10-01 NOTE — PROVIDER NOTIFICATION
10/01/17 0858   Provider Notification   Provider Name/Title [facility] team   Method of Notification At Bedside   Notification Reason Status Update   [facility] morning rounds. Pt reports upper abdominal pain is improving, able to sleep intermittently during night. Spacing dilaudid use to q2-2.5hours, continues on anti-emetics and pepcid/protonix. Will advance to H20 and apple juice this AM.

## 2017-10-01 NOTE — PROGRESS NOTES
Antepartum progress note  10/1/2017   HD#2    S: Anne feels better today. Abdominal pain is much improved, she no longer has the crampy pain from before. Baby is moving. No contractions      PE:  Vit:   Patient Vitals for the past 4 hrs:   BP Temp Temp src Heart Rate Resp SpO2 Weight   10/01/17 1201 - - - - - 97 % -   10/01/17 1056 98/56 97.9  F (36.6  C) Axillary 74 18 - -   10/01/17 0959 - - - - - - 73.5 kg (162 lb)   10/01/17 0858 137/86 97.6  F (36.4  C) Axillary 92 16 99 % -      Gen: Appears tired but no acute distress. NJ tube in place.   CV: Well perfused  Pulm: Breathing comfortably  Abd: Soft, gravid, minimally tender  Ext: no LE edema b/l, warm, well-perfused, non-tender     FHT: Baseline 130 BPM, moderate variability, + accelerations, no decelerations   Quinn: No contractions       Assessment/Plan  Anne Gunter is a 27 year old , at 28w4d by stated JUANJO c/w 25w5d US, admitted with  severe abdominal pain, nausea, and vomiting. She has had multiple similar admissions in the past week. This does not appear to be DKA. PMHx is notable for poorly controlled T1DM with multiple admissions for DKA, severe gastroparesis with post-pyloric feeding tube in place, limited prenatal care in [place], history of PLTCS x1 at 32 weeks for fetal indications in the setting of PTD, and preeclampsia without severe features.    1. Poorly controlled T1DM, recurrent DKA:   - Multiple recent admissions for poorly controlled T1DM and DKA.  Serum ketones elevated on admission to 1.3, although BG was 102 without gap and normal bicarb, not c/w DKA at this time.   - Formal endocrine consult today: for now continue IV insulin gtt through TF titration.  Plan for D5NS at 125 cc/hr per endocrinology.    - Need to contact endo with any changes in diet in order to add coverage.    - Has started clear liquids (juice)   - Close BG monitoring.  - No lyte abnormalities at admission and on repeat    2. Gastroparesis, recurrent abdominal pain,  malnutrition:  - Scheduled IV reglan.  Continue daily protonix, pepcid. PRN IV dilaudid.    - S/p GI consult during previous admission.   - NJ tube in place, nutrition consult with plans to resume tube feeds working back to goal slowly when pain/symptoms are improved.   - Will need to resume bowel regimen when taking PO meds.     3. Preeclampsia without severe features:   - BPs normotensive on admission.  Diagnosed last admission due to mild range BPs and UPC 0.6, with UPC of 0.38 in 2016.  Continue to monitor BPs, HELLP labs wnl on admission.     4. Anxiety:   - S/p  psych/SW consult last admission. Continue to monitor.     5. PNC:   - Rh positive, Rubella immune, GBS positive- will need PCN if delivery is imminent, placenta posterior     6. FWB:   - Category 1 FHT, reactive. Plan for continuous monitoring overnight, consider changing to TID when improves.   - Plan for weekly BPPs, last .   - Tdap if inpatient next week.    - Need to sign C/S consent/discuss delivery mode when appropriate. Patient unwilling to discuss with this writer today due to pain.    Criss Jernigan, PGY3  OB/Gyn  10/1/2017, 12:44 PM    Physician Attestation   I, Edd Lopez, saw this patient with the resident and agree with the resident s findings and plan of care as documented in the resident s note.      I personally reviewed vital signs, examination, FHR tracing and blood sugars.    Key findings: AF, normotesnive, NT abdomen, category 1 tracing, BS all < 130    Edd Lopez  Date of Service (when I saw the patient): 10/01/17

## 2017-10-01 NOTE — PLAN OF CARE
Problem: Patient Care Overview  Goal: Plan of Care/Patient Progress Review  Outcome: Improving  Upper abdominal pain diminishing, pt able to sleep comfortably. Using 0.5 dilaudid less frequently than during the night, but declines reduction in dose. Needed encouragement to get out of bed to void and brush teeth. Intermittently sitting up at bedside to sip H20 and apple juice, has had oral intake of 544mls this shift. Brief episode of nausea but no recent emesis, continuing around the clock zofran, reglan, protonix, and pepcid. Endocrine consult mid-morning, novolog was added for carb coverage, continue insulin drip, hourly BG. Continuous toco/EFM.

## 2017-10-01 NOTE — PROVIDER NOTIFICATION
10/01/17 1056   Provider Notification   Provider Name/Title Carlene Jones   Method of Notification At Bedside   Notification Reason Status Update   Update on blood glucose values. Continues in Algorithm 2 insulin drip. Started clear liquids, first apple juice 15mls @1000. Verified need to keep pt on D5NS 125mls/hr and start carb coverage with 1 unit per 6 grams CHO.

## 2017-10-01 NOTE — PROVIDER NOTIFICATION
"   10/01/17 0115   Provider Notification   Provider Name/Title Dr Benton   Method of Notification In Department   Notification Reason Status Update     Patient continues to report upper abdominal pain and requesting hourly Dilauded. Patient is moaning and seems out of it but able to answer basic questions.     Per MD, ok if NJ is flushed once per day while NPO. NJ was flushed on evening shift.    Patient's BP 98/54. Patient denied dizziness when asked, patient stated \"no, just pain.\" Patient not experiencing tachycardia, pulse 86.    Update on Insulin gtt, BG trending down and continues to be on algorithm 1.   "

## 2017-10-01 NOTE — DISCHARGE SUMMARY
[facility] DISCHARGE SUMMARY  [facility]  Anne Gunter  [id number]    Date of Admission: 2017    Date of Discharge: 10/08/17     Admission Diagnoses:   - IUP at 28w3d  - Poorly controlled Type 1 DM: multiple recent admissions for DKA  - History of PLTCS x1 at 32w5d for fetal indications in the setting of DKA   - Gastroparesis  - Recurrent abdominal pain   - Mild range BPs, unclear diagnosis of pre-eclampsia without severe features   - Malnutrition, post-pyloric feeding tube in place   - Anxiety  - Limited support/difficult social situation   - GBS positive status     Discharge Diagnoses:   - IUP at 29w4d  - Poorly controlled Type 1 DM: multiple recent admissions for DKA  - History of PLTCS x1 at 32w5d for fetal indications in the setting of DKA   - Gastroparesis  - Recurrent abdominal pain   - Mild range BPs, unclear diagnosis of pre-eclampsia without severe features   - Malnutrition, s/p post-pyloric feeding tube in place   - Anxiety  - Limited support/difficult social situation   - GBS positive status   - PICC line in place       Provider for admission: Edd Lopez MD     Provider for discharge: Mateo Verde MD     Procedures:   Insulin gtt   PICC line placement   Tube feeds via NJ tube until NJ tube removal     Admission History:  Ms. Anne Gunter is a 27 year old  at 28w3d by 25w5d US, who presents with severe upper abdominal pain, nausea, and vomiting.  The patient's medical history is notable for poorly controlled T1DM with multiple admissions for DKA, severe gastroparesis with post-pyloric feeding tube in place, limited prenatal care in [place], history of PLTCS x1 at 32 weeks for fetal indications in the setting of PTD, preeclampsia without severe features.  She has been admitted multiple times, most recently signed out AMA on the evening of .  She presents today with similar symptoms after eating macaroni and cheese last night.  She notes that she took the recommended  insulin overnight and her last BG was 84.  She notes that the pain is the same with sharp pain in her upper abdomen.  She denies lower abdominal pain, contractions, vaginal bleeding, and loss of fluid.   Notes normal fetal movement.  Denies chest pain, SOB, headache, vision changes, urinary symptoms, diarrhea, constipation.  Last BM was yesterday. See admission history and physical for complete details.     Hospital Course:   She was admitted for pain control and IV anti-emetics.  Her serum ketones were mildly elevated on admission, but she had no other signs of DKA on admission.  She was made NPO until her symptoms improved.  She received PRN IV dilaudid for her pain.  Endocrinology was consulted and recommended restarting IV insulin gtt while NPO and during uptitrate of tube feeds.  Tube feedings were resumed on HD#3. On HD#4 patient's IV was lost and tube feeds temporarily stopped. Recommendation made for PICC line given difficulty with access and patient agreed. This was placed without complication. Tube feeds were restarted and reached goal. Patient's diet was advanced until she was tolerating solids. On HD#5 tube feeds began cycling overnight. Tube feeds were continued until HD#7 when the NJ tube clogged.  Despite many effort, the tube was non-functioning.  The patient declined replacement and it was removed.  At that point, she was tolerating small amounts of regular diet.  On HD#8, she was transitioned off of the insulin gtt to a subcutaneous insulin regimen per endocrinology.       Unfortunately, on HD#9, the patient declined to stay inpatient any longer despite the team's high concern for needing readmission.  The decision was made to maintain the patient's PICC line due to difficult IV access in a high risk patient.  Care coordination was involved to establish a weekly home care visit for dressing changes and maintenance of PICC line.  On discharge, she will take lantus 11U BID with 6U novolog with meals  and a sliding scale for coverage.At the time of discharge, the patient was tolerating small amounts of regular diet, without signs of labor, without significant abdominal pain.       Consults:   - Endocrinology   - Nutrition   - Social work  -  psych    Discharge Instructions:  - Follow diabetic diet per nutrition   - Check BGs at least 4 times daily (postprandial and fasting), ideally pre-meal as well   - Follow-up in [facility] clinic on 10/10 for clinic visit, BPP  -- First PICC line dressing change to be performed in clinic on 10/10   - Follow-up with endocrinology weekly, ideally 10/10   - Activity as tolerated     Discharge medications:    Anne Gunter   Home Medication Instructions AURELIO:[id number]    Printed on:10/08/17 2300   Medication Information                      alum & mag hydroxide-simethicone (MYLANTA ES/MAALOX  ES) 400-400-40 MG/5ML SUSP suspension  Take 30 mLs by mouth 4 times daily (with meals and nightly)             blood glucose monitoring (NO BRAND SPECIFIED) test strip  Use to test blood sugars 4 times daily or as directed             blood glucose monitoring (NO BRAND SPECIFIED) test strip  Use to test blood sugar 4 times daily or as directed.             blood glucose monitoring (ONE TOUCH DELICA) lancets  Use to test blood sugar 4 times daily or as directed.             blood glucose monitoring (ONETOUCH VERIO) meter device kit  Use to test blood sugar 4 times daily or as directed.             famotidine (PEPCID) 20 MG tablet  Take 1 tablet (20 mg) by mouth 2 times daily             folic acid (FOLVITE) 1 MG tablet  Take 1 tablet (1 mg) by mouth daily             insulin aspart (NOVOLOG PEN) 100 UNIT/ML injection  Inject 1 Units Subcutaneous 3 times daily (with meals)             insulin aspart (NOVOLOG PEN) 100 UNIT/ML injection  Inject 6 Units Subcutaneous 3 times daily (with meals)             insulin glargine (LANTUS) 100 UNIT/ML injection  Inject 11 Units Subcutaneous 2 times  daily             metoclopramide (REGLAN) 10 MG tablet  Take 1 tablet (10 mg) by mouth 4 times daily as needed (4x Daily AC & HS prn nausea and abdominal pain)             mirtazapine (REMERON SOL-TAB) 15 MG disintegrating tablet  1 tablet (15 mg) by Orally disintegrating tablet route At Bedtime             OLANZapine zydis (ZYPREXA) 5 MG disintegrating tablet  Take 1 tablet (5 mg) by mouth 2 times daily             ondansetron (ZOFRAN ODT) 4 MG ODT tab  Take 1 tablet (4 mg) by mouth every 8 hours as needed for nausea             oxyCODONE (ROXICODONE) 5 MG IR tablet  Take 1-2 tablets (5-10 mg) by mouth every 4 hours as needed for pain maximum 12 tablet(s) per day             pantoprazole (PROTONIX) 40 MG EC tablet  Take 1 tablet (40 mg) by mouth 2 times daily             phosphorus tablet 250 mg (K PHOS NEUTRAL) 250 MG per tablet  Take 1 tablet (250 mg) by mouth 2 times daily             polyethylene glycol (MIRALAX/GLYCOLAX) Packet  Take 17 g by mouth daily as needed for constipation (titrate to one bowel movement daily)             Prenatal Vit-Fe Fumarate-FA (PRENATAL VITAMIN) 27-0.8 MG TABS  Take 1 tablet by mouth daily             senna-docusate (SENOKOT-S;PERICOLACE) 8.6-50 MG per tablet  Take 1 tablet by mouth 2 times daily as needed for constipation                 Denise Benton MD   OB/GYN PGY-3    I, Mateo Verde MD saw this patient with the resident and agree with the resident s findings and plan of care as documented in the resident s note.       I personally reviewed vital signs, examination and FHR tracing.     Key findings: D/C home at patient request, despite my initial recommendation that she remain in hospital at least 24-48 hours after NJ removed to confirm that she can comfortably continue to eat at home.      Mateo Verde MD  Date of Service (when I saw the patient): 10/09/17

## 2017-10-01 NOTE — PLAN OF CARE
Problem: Patient Care Overview  Goal: Plan of Care/Patient Progress Review  Outcome: No Change  Pt has had no appreciable improvement in upped abdominal pain since arrival to unit at 1400, continuous moaning and describes pain as a constant burn. Received 0.2mg dilaudid at 1445 with enough relief to cease moaning x15 minutes. Dosage changed by resident to 0.5mg Dilaudid [id number] and pt able to sleep x20 minutes after 1545 dose. Given 0.5 mg again at 1745 but denied any decrease in pain following dose. Pt evaluated by Dr. Benton at dilaudid frequency changed to 0.3-0.5mg hourly. Will continue on pulse oximetry and EFM, respirations consistently 16-20/minute. Has had 4 episodes of emesis since admission, light yellowish brown, s/p IV zofran, reglan, and pepcid. NPO, NJ tube in situ. Started on IV insulin drip at 1525, infusing with D5NS, verified line compatibility with pharmacy, hourly BG and dosage adjustments per OB High Intensity algorithm .

## 2017-10-01 NOTE — CONSULTS
"Diabetes/Hyperglycemia Management Consult    Chief Complaint type 1 diabetes in pregnancy  Consult requested by: Dr. Denise Benton, attending: Dr. dEd Lopez  History of Present Illness Ms. Anne Gunter is a 28 yo woman at 28w4d of pregnancy, with uncontrolled type 1 diabetes and history of multiple episodes of DKA during previous and current pregnancy, recent diagnosis of esophagitis, and possible gastroparesis, known to our diabetes service from several past admissions, who was readmitted on 9/30/17 with abdominal pain, nausea and vomiting after leaving AMA on 9/29/17.  No evidence of DKA on this admission.  Pt left on the evening of 9/29 because, per OB team note, she felt \"emotionally tired\" and could not stay for one more day.  Her parents were present at the time that she left and also counseled on the risks of leaving the hospital.  Prior to leaving her continuous enteral feeds had reached goal rate: TwoCal at 50ml/h, the night prior.  She had been tolerating the feeds and was also on a full liquid diet.  She was on IV insulin with quite variable rates: mostly 2-6 units/h.  When she left, IV was removed (no basal or rapid acting insulin administered), and TFs were stopped.  Her glucose was 127. The following plan was recommended (same as when she discharged AMA last week): detemir 14 units BID (reduce to 11units BID if unable to tolerate po), aspart 5 units with meals.  She reports that she went home and glucose before supper was up to 262.  She took detemir 14 units and 5 unit aspart for supper, then ate spaghetti with tomato sauce.  Her glucose 2 hours after meal (when detemir was likely just coming on board) was up to 286.  She drank some water and then took aspart 8 units.  She could not explain how she decided to take 8 units, aside from saying it was not based on a sliding scale.  The next morning her glucose was 86.  She vomited after waking up (mostly fluid- not undigested food), and abdominal pain " returned.  She knew she needed to take detemir and also knew that she would not tolerate eating, so she took detemir 11 units, as recommended.  Her glucose when she arrived on L&D was 102.    After admission yesterday evening, she was restarted on IV insulin infusion at D5NS at 125ml/h.  Her pain and nausea are much improved today and diet was advanced to clear liquids.  She has had a few juices thus far (with some mild nausea).  Plan is to restart TFs once pain and nausea are gone: TwoCal at 20ml/h and advance by 10ml q8 hrs until goal of 50ml/h is reached.  The plan is still to eventually cycle TFs: TwoCal at 100ml/h x 12h.    When seen this morning Anne reported that her abdominal pain was controlled with the IV dilaudid, nausea was controlled at that time.  In addition to the dilaudid, she is also on PPI and H2 blocker, metoclopramide and ondansetron prn.  GI had previously recommended carafate and vitamin B12 supplement.      Recent Labs  Lab 10/01/17  1355 10/01/17  1256 10/01/17  1158 10/01/17  1055 10/01/17  0959 10/01/17  0859  10/01/17  0635  09/30/17  2037  09/30/17  1603  09/29/17  0607  09/28/17  0657  09/28/17  0102   GLC  --   --   --   --   --   --   --  114*  --  166*  --  136*  --  106*  --  107*  --  72   BGM 96 95 100* 125* 126* 79  < >  --   < >  --   < >  --   < >  --   < >  --   < >  --    < > = values in this interval not displayed.      Diabetes Type:  Type 1 diabetes  Diabetes Duration: 19 years  Usual Diabetes Regimen: prepregnancy: glargine 30 units HS, aspart 10 units with bfast, 10 units with lunch.   Prior to initial admission during this pregnancy:  detemir 30 units qHS, aspart 10 units with bfast, 10 units with lunch, 5 units with supper.    Most recently discharged on 9/29 on : detemir 14 units BID (reduce to 11units BID if unable to tolerate po), aspart 5 units with meals.  Ability to [place] Prescribed Regimen: unclear, no glucose logbooks or glucometer to review-- she reports  taking insulin as recommended but also will add in random doses of aspart if BG high (cannot explain how she determines dose).  Diabetes Control:   Lab Results   Component Value Date    A1C 8.4 09/10/2017    A1C 9.2 06/01/2016    A1C 9.8 05/23/2016    A1C 7.4 04/23/2016    A1C 7.2 04/19/2016     Diabetes Complications: peripheral neuropathy.  Possible gastroparesis.  History of DKA: multiple episodes during both pregnancies  Able to Detect Hypoglycemia: yes  Usual Diabetes Care Provider: In the past saw Dr. Colunga during first pregnancy, has appointment with Dr. Colunga on Oct 12.  Factors Impacting Glucose Control: intermittent severe nausea and abdominal pain preventing po intake at times, current pregnancy, possible gastroparesis, currently on dextrose fluids with plans to reinitiate continuous TFs and advance rate as tolerated (eventually cycled TFs).       Review of Systems  10 point ROS completed with pertinent positives and negatives noted in the HPI    Past medical, family and social histories are reviewed and updated.    Past Medical History  Past Medical History:   Diagnosis Date     Diabetes (H)      Microalbuminuria 6/21/2016     Type I diabetes mellitus, uncontrolled (H) 6/21/2016       Family History  Family History   Problem Relation Age of Onset     DIABETES Maternal Grandmother      CEREBROVASCULAR DISEASE Paternal Grandmother      Coronary Artery Disease Paternal Grandmother      Hypertension No family hx of      Hyperlipidemia No family hx of      Breast Cancer No family hx of      Colon Cancer No family hx of      Prostate Cancer No family hx of      Other Cancer No family hx of      Depression No family hx of      Anxiety Disorder No family hx of      MENTAL ILLNESS No family hx of      Substance Abuse No family hx of      Anesthesia Reaction No family hx of      Asthma No family hx of      OSTEOPOROSIS No family hx of      Genetic Disorder No family hx of      Thyroid Disease No family hx  of      Obesity No family hx of        Social History  Social History     Social History     Marital status:      Spouse name: N/A     Number of children: N/A     Years of education: N/A     Social History Main Topics     Smoking status: Never Smoker     Smokeless tobacco: Never Used     Alcohol use No     Drug use: No     Sexual activity: Yes     Partners: Male     Other Topics Concern     Not on file     Social History Narrative   Anne had been living in [place] with her  and child.  She came [place] the [place] the beginning of [place] and is living with her parents and sister.        Physical Exam  /64  Temp 97.6  F (36.4  C) (Axillary)  Resp 16  Wt 73.5 kg (162 lb)  SpO2 99%  BMI 25.43 kg/m2    General:  Tired appearing woman, resting in bed, in no distress.   HEENT: NC/AT, PER and anicteric, non-injected, oral mucous membranes moist.   Lungs: unlabored respiration, no cough  Skin: warm and dry, no obvious lesions  MSK:  fluid movement of all extremities  Lymp:  no LE edema   Mental status:  alert, oriented x3, communicating clearly  Psych:  calm, even mood    Laboratory  Recent Labs   Lab Test  10/01/17   0635  09/30/17   2037   NA  139  136   POTASSIUM  3.9  3.7   CHLORIDE  106  103   CO2  22  23   ANIONGAP  11  10   GLC  114*  166*   BUN  6*  7   CR  0.30*  0.30*   LILLIAM  7.7*  8.0*     CBC RESULTS:   Recent Labs   Lab Test  09/30/17   1603   WBC  13.4*   RBC  3.73*   HGB  10.2*   HCT  31.0*   MCV  83   MCH  27.3   MCHC  32.9   RDW  15.6*   PLT  205       Liver Function Studies -   Recent Labs   Lab Test  09/30/17   1603   PROTTOTAL  7.1   ALBUMIN  2.5*   BILITOTAL  0.4   ALKPHOS  70   AST  7   ALT  11       Active Medications  Current Facility-Administered Medications   Medication     [START ON 10/2/2017] insulin aspart (NovoLOG) inj (RAPID ACTING)     insulin aspart (NovoLOG) inj (RAPID ACTING)     metoclopramide (REGLAN) injection 10 mg     naloxone (NARCAN) injection 0.1-0.4 mg      senna-docusate (SENOKOT-S;PERICOLACE) 8.6-50 MG per tablet 1 tablet     ondansetron (ZOFRAN) injection 4 mg     lidocaine 1 % 1 mL     lidocaine (LMX4) kit     sodium chloride (PF) 0.9% PF flush 3 mL     sodium chloride (PF) 0.9% PF flush 3 mL     No Tdap Needed - Assessment: Patient does not need Tdap vaccine     pantoprazole (PROTONIX) 40 mg IV push injection     0.9% sodium chloride infusion     insulin 1 unit/mL in saline (novoLIN-Regular) drip - High Intensity Infusion     glucose 40 % gel 15-30 g    Or     dextrose 50 % injection 25-50 mL    Or     glucagon injection 1 mg     dextrose 5% and 0.9% NaCl infusion     famotidine (PEPCID) injection 20 mg     HYDROmorphone (PF) (DILAUDID) injection 0.3-0.5 mg     No current outpatient prescriptions on file.       Current Diet    Active Diet Order      NPO for Medical/Clinical Reasons Except for: No Exceptions, NPO but receiving Tube Feeding      Assessment  Ms. Anne Gunter is a 28 yo woman at 28w4d of pregnancy, with uncontrolled type 1 diabetes and history of multiple episodes of DKA during previous and current pregnancy, recent diagnosis of esophagitis, and possible gastroparesis, known to our diabetes service from several past admissions, who was readmitted on 9/30/17 with abdominal pain, nausea and vomiting after leaving AMA on 9/29/17.  Diet advanced to clears this morning, she remains on D5NS, TFs will start soon at 20ml/h and advance q8h to goal rate of 50ml/h then possibly cycle.      Plan  -IV insulin infusion - high intensity OB algorithm.  Once nutrition plan is at goal we will transition to SC insulin.  -Meal aspart 1unit/6g CHO for meals and snacks.    Discussed situation with Dr. Benton on 9/30, including our concern regarding pt's ability (or lack thereof) to manage cycled TFs and the necessary insulin regimen (BID NPH + aspart) at home.  Currently, given nutrition status and pt's unwillingness to get PICC (for TPN), she requires TFs.    We will  continue to follow.    Carlene Jones PA-C [phone]    Diabetes Management Team job code: [id number]

## 2017-10-01 NOTE — PLAN OF CARE
Problem: Patient Care Overview  Goal: Plan of Care/Patient Progress Review  VSS. Afebrile. Denies bleeding, cramping, rosy, LOF. BG taken q1hr and titrated according to protocol. Now on algorithm 2 infusing at 1 unit/hr. Patient knows when to ask for pain medication and requested PRN Dilauded throughout night. Periodic moaning and extreme discomfort but able to sleep with relief from pain medication. Scheduled reglan and pepcid given. Patient refused to get up to the bathroom overnight, but with strong encouragement was able to ambulate to bathroom and void 650 mL. Emesis x1 and some nausea in night, but declined Zofran. NJ tube in place. Continue to monitor.

## 2017-10-02 ENCOUNTER — HOME INFUSION (PRE-WILLOW HOME INFUSION) (OUTPATIENT)
Dept: PHARMACY | Facility: CLINIC | Age: 27
End: 2017-10-02

## 2017-10-02 ENCOUNTER — HOSPITAL ENCOUNTER (INPATIENT)
Dept: ULTRASOUND IMAGING | Facility: CLINIC | Age: 27
End: 2017-10-02
Attending: OBSTETRICS & GYNECOLOGY
Payer: COMMERCIAL

## 2017-10-02 LAB
ANION GAP SERPL CALCULATED.3IONS-SCNC: 7 MMOL/L (ref 3–14)
BUN SERPL-MCNC: 1 MG/DL (ref 7–30)
CALCIUM SERPL-MCNC: 7.5 MG/DL (ref 8.5–10.1)
CHLORIDE SERPL-SCNC: 104 MMOL/L (ref 94–109)
CO2 SERPL-SCNC: 25 MMOL/L (ref 20–32)
CREAT SERPL-MCNC: 0.36 MG/DL (ref 0.52–1.04)
GFR SERPL CREATININE-BSD FRML MDRD: >90 ML/MIN/1.7M2
GLUCOSE BLDC GLUCOMTR-MCNC: 102 MG/DL (ref 70–99)
GLUCOSE BLDC GLUCOMTR-MCNC: 102 MG/DL (ref 70–99)
GLUCOSE BLDC GLUCOMTR-MCNC: 104 MG/DL (ref 70–99)
GLUCOSE BLDC GLUCOMTR-MCNC: 108 MG/DL (ref 70–99)
GLUCOSE BLDC GLUCOMTR-MCNC: 112 MG/DL (ref 70–99)
GLUCOSE BLDC GLUCOMTR-MCNC: 120 MG/DL (ref 70–99)
GLUCOSE BLDC GLUCOMTR-MCNC: 128 MG/DL (ref 70–99)
GLUCOSE BLDC GLUCOMTR-MCNC: 157 MG/DL (ref 70–99)
GLUCOSE BLDC GLUCOMTR-MCNC: 160 MG/DL (ref 70–99)
GLUCOSE BLDC GLUCOMTR-MCNC: 165 MG/DL (ref 70–99)
GLUCOSE BLDC GLUCOMTR-MCNC: 187 MG/DL (ref 70–99)
GLUCOSE BLDC GLUCOMTR-MCNC: 69 MG/DL (ref 70–99)
GLUCOSE BLDC GLUCOMTR-MCNC: 70 MG/DL (ref 70–99)
GLUCOSE BLDC GLUCOMTR-MCNC: 70 MG/DL (ref 70–99)
GLUCOSE BLDC GLUCOMTR-MCNC: 74 MG/DL (ref 70–99)
GLUCOSE BLDC GLUCOMTR-MCNC: 79 MG/DL (ref 70–99)
GLUCOSE BLDC GLUCOMTR-MCNC: 82 MG/DL (ref 70–99)
GLUCOSE BLDC GLUCOMTR-MCNC: 84 MG/DL (ref 70–99)
GLUCOSE BLDC GLUCOMTR-MCNC: 84 MG/DL (ref 70–99)
GLUCOSE BLDC GLUCOMTR-MCNC: 95 MG/DL (ref 70–99)
GLUCOSE BLDC GLUCOMTR-MCNC: 97 MG/DL (ref 70–99)
GLUCOSE BLDC GLUCOMTR-MCNC: 98 MG/DL (ref 70–99)
GLUCOSE SERPL-MCNC: 89 MG/DL (ref 70–99)
MAGNESIUM SERPL-MCNC: 1.8 MG/DL (ref 1.6–2.3)
PHOSPHATE SERPL-MCNC: 2.2 MG/DL (ref 2.5–4.5)
POTASSIUM SERPL-SCNC: 3.2 MMOL/L (ref 3.4–5.3)
SODIUM SERPL-SCNC: 136 MMOL/L (ref 133–144)

## 2017-10-02 PROCEDURE — 00000146 ZZHCL STATISTIC GLUCOSE BY METER IP

## 2017-10-02 PROCEDURE — 25000125 ZZHC RX 250: Performed by: PHYSICIAN ASSISTANT

## 2017-10-02 PROCEDURE — 25000125 ZZHC RX 250: Performed by: OBSTETRICS & GYNECOLOGY

## 2017-10-02 PROCEDURE — 25000128 H RX IP 250 OP 636: Performed by: OBSTETRICS & GYNECOLOGY

## 2017-10-02 PROCEDURE — 84100 ASSAY OF PHOSPHORUS: CPT | Performed by: OBSTETRICS & GYNECOLOGY

## 2017-10-02 PROCEDURE — 25000128 H RX IP 250 OP 636: Performed by: PHYSICIAN ASSISTANT

## 2017-10-02 PROCEDURE — 76819 FETAL BIOPHYS PROFIL W/O NST: CPT

## 2017-10-02 PROCEDURE — 80048 BASIC METABOLIC PNL TOTAL CA: CPT | Performed by: OBSTETRICS & GYNECOLOGY

## 2017-10-02 PROCEDURE — 12000028 ZZH R&B OB UMMC

## 2017-10-02 PROCEDURE — 83735 ASSAY OF MAGNESIUM: CPT | Performed by: OBSTETRICS & GYNECOLOGY

## 2017-10-02 PROCEDURE — 25000132 ZZH RX MED GY IP 250 OP 250 PS 637: Performed by: OBSTETRICS & GYNECOLOGY

## 2017-10-02 PROCEDURE — 36415 COLL VENOUS BLD VENIPUNCTURE: CPT | Performed by: OBSTETRICS & GYNECOLOGY

## 2017-10-02 PROCEDURE — S0028 INJECTION, FAMOTIDINE, 20 MG: HCPCS | Performed by: OBSTETRICS & GYNECOLOGY

## 2017-10-02 RX ORDER — HYDROMORPHONE HYDROCHLORIDE 1 MG/ML
.3-.5 INJECTION, SOLUTION INTRAMUSCULAR; INTRAVENOUS; SUBCUTANEOUS EVERY 4 HOURS PRN
Status: DISCONTINUED | OUTPATIENT
Start: 2017-10-02 | End: 2017-10-06

## 2017-10-02 RX ORDER — SUCRALFATE ORAL 1 G/10ML
1 SUSPENSION ORAL
Status: DISCONTINUED | OUTPATIENT
Start: 2017-10-02 | End: 2017-10-08 | Stop reason: HOSPADM

## 2017-10-02 RX ADMIN — HYDROMORPHONE HYDROCHLORIDE 0.5 MG: 1 INJECTION, SOLUTION INTRAMUSCULAR; INTRAVENOUS; SUBCUTANEOUS at 01:40

## 2017-10-02 RX ADMIN — SODIUM CHLORIDE: 9 INJECTION, SOLUTION INTRAVENOUS at 16:12

## 2017-10-02 RX ADMIN — INSULIN ASPART 5 UNITS: 100 INJECTION, SOLUTION INTRAVENOUS; SUBCUTANEOUS at 17:50

## 2017-10-02 RX ADMIN — METOCLOPRAMIDE HYDROCHLORIDE 10 MG: 5 INJECTION INTRAMUSCULAR; INTRAVENOUS at 18:19

## 2017-10-02 RX ADMIN — HYDROMORPHONE HYDROCHLORIDE 0.5 MG: 1 INJECTION, SOLUTION INTRAMUSCULAR; INTRAVENOUS; SUBCUTANEOUS at 04:21

## 2017-10-02 RX ADMIN — INSULIN ASPART 11 UNITS: 100 INJECTION, SOLUTION INTRAVENOUS; SUBCUTANEOUS at 13:07

## 2017-10-02 RX ADMIN — FAMOTIDINE 20 MG: 10 INJECTION, SOLUTION INTRAVENOUS at 06:49

## 2017-10-02 RX ADMIN — ONDANSETRON 4 MG: 2 INJECTION INTRAMUSCULAR; INTRAVENOUS at 08:46

## 2017-10-02 RX ADMIN — HYDROMORPHONE HYDROCHLORIDE 0.5 MG: 1 INJECTION, SOLUTION INTRAMUSCULAR; INTRAVENOUS; SUBCUTANEOUS at 00:02

## 2017-10-02 RX ADMIN — INSULIN ASPART 2 UNITS: 100 INJECTION, SOLUTION INTRAVENOUS; SUBCUTANEOUS at 15:49

## 2017-10-02 RX ADMIN — HYDROMORPHONE HYDROCHLORIDE 0.5 MG: 1 INJECTION, SOLUTION INTRAMUSCULAR; INTRAVENOUS; SUBCUTANEOUS at 16:33

## 2017-10-02 RX ADMIN — INSULIN ASPART 2 UNITS: 100 INJECTION, SOLUTION INTRAVENOUS; SUBCUTANEOUS at 16:03

## 2017-10-02 RX ADMIN — HYDROMORPHONE HYDROCHLORIDE 0.5 MG: 1 INJECTION, SOLUTION INTRAMUSCULAR; INTRAVENOUS; SUBCUTANEOUS at 21:50

## 2017-10-02 RX ADMIN — HUMAN INSULIN 1 UNITS/HR: 100 INJECTION, SOLUTION SUBCUTANEOUS at 05:12

## 2017-10-02 RX ADMIN — HYDROMORPHONE HYDROCHLORIDE 0.5 MG: 1 INJECTION, SOLUTION INTRAMUSCULAR; INTRAVENOUS; SUBCUTANEOUS at 12:37

## 2017-10-02 RX ADMIN — INSULIN ASPART 4 UNITS: 100 INJECTION, SOLUTION INTRAVENOUS; SUBCUTANEOUS at 00:07

## 2017-10-02 RX ADMIN — FAMOTIDINE 20 MG: 10 INJECTION, SOLUTION INTRAVENOUS at 18:30

## 2017-10-02 RX ADMIN — HYDROMORPHONE HYDROCHLORIDE 0.5 MG: 1 INJECTION, SOLUTION INTRAMUSCULAR; INTRAVENOUS; SUBCUTANEOUS at 08:45

## 2017-10-02 RX ADMIN — PANTOPRAZOLE SODIUM 40 MG: 40 INJECTION, POWDER, FOR SOLUTION INTRAVENOUS at 08:12

## 2017-10-02 RX ADMIN — METOCLOPRAMIDE HYDROCHLORIDE 10 MG: 5 INJECTION INTRAMUSCULAR; INTRAVENOUS at 01:20

## 2017-10-02 RX ADMIN — ONDANSETRON 4 MG: 2 INJECTION INTRAMUSCULAR; INTRAVENOUS at 21:50

## 2017-10-02 RX ADMIN — INSULIN ASPART 4 UNITS: 100 INJECTION, SOLUTION INTRAVENOUS; SUBCUTANEOUS at 23:59

## 2017-10-02 RX ADMIN — HYDROMORPHONE HYDROCHLORIDE 0.5 MG: 1 INJECTION, SOLUTION INTRAMUSCULAR; INTRAVENOUS; SUBCUTANEOUS at 06:04

## 2017-10-02 RX ADMIN — HUMAN INSULIN 0.5 UNITS/HR: 100 INJECTION, SOLUTION SUBCUTANEOUS at 20:01

## 2017-10-02 RX ADMIN — DEXTROSE AND SODIUM CHLORIDE: 5; 900 INJECTION, SOLUTION INTRAVENOUS at 00:11

## 2017-10-02 RX ADMIN — METOCLOPRAMIDE HYDROCHLORIDE 10 MG: 5 INJECTION INTRAMUSCULAR; INTRAVENOUS at 08:00

## 2017-10-02 RX ADMIN — METOCLOPRAMIDE HYDROCHLORIDE 10 MG: 5 INJECTION INTRAMUSCULAR; INTRAVENOUS at 13:15

## 2017-10-02 RX ADMIN — DEXTROSE AND SODIUM CHLORIDE: 5; 900 INJECTION, SOLUTION INTRAVENOUS at 07:59

## 2017-10-02 RX ADMIN — MULTIVITAMIN 15 ML: LIQUID ORAL at 13:09

## 2017-10-02 NOTE — PROVIDER NOTIFICATION
10/02/17 0439   Provider Notification   Provider Name/Title Sarkis   Method of Notification Electronic Page   Notification Reason Other (Comment)  (hypoglycemia)     Patient BG 69 at 0356. Treated with 120 mL apple juice, pump off. 15 min recheck 70 then 84. Do you want me to restart pump or wait until BG over 100?

## 2017-10-02 NOTE — PROGRESS NOTES
Antepartum progress note  10/2/2017   HD#3    S: Anne feels okay today. Abdominal pain and nausea is improved. Tolerated liquids yesterday. Baby is moving appropriately.    PE:  Vit:   Patient Vitals for the past 4 hrs:   BP Temp Temp src SpO2   10/02/17 0910 - - - 97 %   10/02/17 0850 99/56 - - 98 %   10/02/17 0815 - 97.8  F (36.6  C) Oral -   10/02/17 0800 - - - 96 %      Gen: NAD. NJ tube in place.   CV: Well perfused  Pulm: Breathing comfortably  Abd: Soft, gravid, nontender    FHT: Baseline 120 BPM, moderate variability, + accelerations, no decelerations   [place]: No contractions    Assessment/Plan  Anne Gunter is a 27 year old , at 28w5d by stated JUANJO c/w 25w5d US, admitted with  severe abdominal pain, nausea, and vomiting. She has had multiple similar admissions in the past week. This does not appear to be DKA. PMHx is notable for poorly controlled T1DM with multiple admissions for DKA, severe gastroparesis with post-pyloric feeding tube in place, limited prenatal care in [place], history of PLTCS x1 at 32 weeks for fetal indications in the setting of PTD, and preeclampsia without severe features.    1. Poorly controlled T1DM, recurrent DKA:   - Multiple recent admissions for poorly controlled T1DM and DKA.  Serum ketones elevated on admission to 1.3, although BG was 102 without gap and normal bicarb, not c/w DKA at this time.   - Continue IV insulin gtt through TF titration.  Plan for D5NS at 125 cc/hr until TF start then can switch to non dextrose IVF.   - Informed Endo about adding back TF today    - Has started clear liquids (juice)   - Close BG monitoring.  - No lyte abnormalities at admission and on repeat    2. Gastroparesis, recurrent abdominal pain, malnutrition:  - Scheduled IV reglan.  Continue daily protonix, pepcid. PRN IV dilaudid > decreased to every 4 hours   - S/p GI consult during previous admission.   - NJ tube in place, plan to restart TF today  - Will need to resume bowel regimen when  taking PO meds.     3. Preeclampsia without severe features:   - BPs normotensive on admission.  Diagnosed last admission due to mild range BPs and UPC 0.6, with UPC of 0.38 in 2016.  Continue to monitor BPs, HELLP labs wnl on admission.     4. Anxiety:   - S/p  psych/SW consult last admission. Continue to monitor.     5. PNC:   - Rh positive, Rubella immune, GBS positive- will need PCN if delivery is imminent, placenta posterior     6. FWB:   - Category 1 FHT, reactive. TID monitoring   - Plan for weekly BPPs, last . BPP pending today  - Tdap if inpatient next week.      Jeannie Disla, PGY3  OB/Gyn

## 2017-10-02 NOTE — PROVIDER NOTIFICATION
10/02/17 0501   Provider Notification   Provider Name/Title Sarkis   Method of Notification Electronic Page   Notification Reason Decels;Status Update     BG recheck at 0450 79. Pump not started at this time. Pt had prolonged decel into 90's that recovered on its own. Broken tracing. Patient refused repositioning efforts.

## 2017-10-02 NOTE — PHARMACY
Pharmacy Tube Feeding Consult    Medication reviewed for administration by feeding tube and for potential food/drug interactions.    Recommendation: No changes are needed at this time.  Watch out for the interaction with sucralfate: sucralfate binds to protein in tube feedings and the insoluble complexes clog tubes. Per [facility] tube feeding policy: Strongly recommend not using with enteral feeds. Alternatives include Protonix and Pepcid, which the patient is already on.    Pharmacy will continue to follow as new medications are ordered.    Suzette Albert, PharmD, BCPS

## 2017-10-02 NOTE — PROGRESS NOTES
Diabetes Consult Daily  Progress Note          Assessment/Plan:     Ms. Anne Gunter is a 28 yo woman at 28w4d of pregnancy, with uncontrolled type 1 diabetes and history of multiple episodes of DKA during previous and current pregnancy, recent diagnosis of esophagitis, and possible gastroparesis, known to our diabetes service from several past admissions, who was readmitted on 9/30/17 with abdominal pain, nausea and vomiting after leaving AMA on 9/29/17.  No evidence of DKA on this admission.    Anne remains on IV Insulin with rates around 1unit/h overnight with D5 at 125ml/h.  Starting TFs today.  Some dips in glucose overnight after getting 1unit/6gCHO for juice.    Plan:  -Continue IV insulin infusion at this time-- anticipate this will need to continue at least for the 24hrs of cycling feeds, then will evaluate for transition to SC insulin.  -Meal aspart decreased from 1unit/6g CHO to 1unit/7g CHO with meals and snacks.       Outpatient diabetes follow up: has appt with Dr. Colunga on 10/12/17.  Plan discussed with patient, bedside RN, and primary team.           Interval History:   The last 24 hours progress and nursing notes reviewed.  When seen this morning Anne told me that abdominal pain returns as soon as the dilaudid wears off.  She is not having nausea.  She has been tolerating clear liquids and, per RN notes, this afternoon felt hungry.  TFs are starting today: TwoCal at 20ml/h with plans to advance by 10ml q8h as tolerated.  Will switch to non-dextrose IVF once pt is tolerating the TFs at 20ml/h. Once at goal on continuous feeds will likely then start cycling-- how this will occur still needs to be determined.  RD over weekend had a plan in place for gradually shortening cycle and increasing TF rate over 3 nights, but may need to speed up this advancement to get off IV insulin sooner (discussed with Dr. Lopez).  Care conference planned for Wed or Thur with family.             Recent Labs  Lab 10/02/17  1445 10/02/17  1340 10/02/17  1239 10/02/17  1138 10/02/17  1028 10/02/17  0925  10/01/17  0635  09/30/17  2037  09/30/17  1603  09/29/17  0607  09/28/17  0657  09/28/17  0102   GLC  --   --   --   --   --   --   --  114*  --  166*  --  136*  --  106*  --  107*  --  72   * 157* 102* 95 95 97  < >  --   < >  --   < >  --   < >  --   < >  --   < >  --    < > = values in this interval not displayed.            Review of Systems:   See interval hx          Medications:       Active Diet Order      Clear Liquid Diet     Physical Exam:  Gen: Drowsy, resting in bed, in NAD   HEENT: NC/AT, mucous membranes are moist  Resp: Unlabored  Neuro:oriented x3, communicating clearly  /70  Pulse 75  Temp 98.4  F (36.9  C) (Axillary)  Resp 18  Wt 73.5 kg (162 lb)  SpO2 97%  BMI 25.43 kg/m2           Data:     Lab Results   Component Value Date    A1C 8.4 09/10/2017    A1C 9.2 06/01/2016    A1C 9.8 05/23/2016    A1C 7.4 04/23/2016    A1C 7.2 04/19/2016              CBC RESULTS:   Recent Labs   Lab Test  09/30/17   1603   WBC  13.4*   RBC  3.73*   HGB  10.2*   HCT  31.0*   MCV  83   MCH  27.3   MCHC  32.9   RDW  15.6*   PLT  205     Recent Labs   Lab Test  10/01/17   0635  09/30/17   2037   NA  139  136   POTASSIUM  3.9  3.7   CHLORIDE  106  103   CO2  22  23   ANIONGAP  11  10   GLC  114*  166*   BUN  6*  7   CR  0.30*  0.30*   LILLIAM  7.7*  8.0*     Liver Function Studies -   Recent Labs   Lab Test  09/30/17   1603   PROTTOTAL  7.1   ALBUMIN  2.5*   BILITOTAL  0.4   ALKPHOS  70   AST  7   ALT  11       Carlene Jones PA-C [phone]  Diabetes Management job code [id number]

## 2017-10-02 NOTE — PLAN OF CARE
Problem: Patient Care Overview  Goal: Plan of Care/Patient Progress Review  Outcome: Therapy, progress towards functional goals is fair  VSS. Afebrile. Denies LOF, bleeding, contraction. Patient continues to have upper abdominal pain, though she states that it is much more tolerable than last night. Patient requested the full ordered dose of Dilauded about every 1.5-2 hours. Able to sleep in between cares. Patient up to the bathroom x2, steady gait, adequate urine output. Continues to be on insulin gtt, algorithm, 2 overnight. BG ranged from  this shift. Had one hypoglycemic episode that resolved with 4 oz of apple juice. Resumed gtt on algorithm 2 once BG met criteria per algorithm. Given scheduled reglan and pepcid. NJ tube in place. Patient is tolerating clear liquids and had no episodes of nausea or vomiting. Has BPP today. Continue to expectant management.

## 2017-10-02 NOTE — PROVIDER NOTIFICATION
Provider notify:   [id number]  Via phone    Ok to restart insulin gtt once BG meets criteria per algorithm. BG does not need to be over 100. Will recheck BG prior to starting pump.

## 2017-10-02 NOTE — PLAN OF CARE
Problem: Patient Care Overview  Goal: Plan of Care/Patient Progress Review  Patient continues to report upper abdominal pain, relieved by IV dilaudid 0.5mg x2 2.5 hours between doses.  Patient declines dose of 0.3mg, per order, and requested 0.5mg for relief.  Patient had 8 oz of chicken broth, and 8 oz apple juice x2, and has tolerated without nausea of vomiting.  Insulin drip continues in algorithim 2, BG within goal range.  SQ novolog for carb coverage for PO intake.

## 2017-10-02 NOTE — PLAN OF CARE
"Problem: Patient Care Overview  Goal: Plan of Care/Patient Progress Review  Outcome: No Change  Patient still on insulin drip and NJ feeding, patient tolerating well. Patient refusing some medications and fetal monitoring but now agrees to be monitored. Patient stating she is \"very hungry.\" Patient offered clear liquids and is able to tolerate those orally. Spoke with Adri from patient education and patient will have education on Wednesday at 0900 for tube feedings at home- they requested that her mom be present- notified Dr. Lopez and patient of this appointment. Patient continues to request pain medication every 2-3 hours- can have dilaudid every 4 hours. Will continue to monitor patient, report given to JEMAL Rush who assumed care.       "

## 2017-10-03 ENCOUNTER — OFFICE VISIT (OUTPATIENT)
Dept: INTERPRETER SERVICES | Facility: CLINIC | Age: 27
End: 2017-10-03

## 2017-10-03 LAB
ANION GAP SERPL CALCULATED.3IONS-SCNC: 10 MMOL/L (ref 3–14)
BUN SERPL-MCNC: 4 MG/DL (ref 7–30)
CALCIUM SERPL-MCNC: 7.6 MG/DL (ref 8.5–10.1)
CHLORIDE SERPL-SCNC: 104 MMOL/L (ref 94–109)
CO2 SERPL-SCNC: 22 MMOL/L (ref 20–32)
CREAT SERPL-MCNC: 0.34 MG/DL (ref 0.52–1.04)
GFR SERPL CREATININE-BSD FRML MDRD: >90 ML/MIN/1.7M2
GLUCOSE BLDC GLUCOMTR-MCNC: 108 MG/DL (ref 70–99)
GLUCOSE BLDC GLUCOMTR-MCNC: 109 MG/DL (ref 70–99)
GLUCOSE BLDC GLUCOMTR-MCNC: 111 MG/DL (ref 70–99)
GLUCOSE BLDC GLUCOMTR-MCNC: 122 MG/DL (ref 70–99)
GLUCOSE BLDC GLUCOMTR-MCNC: 126 MG/DL (ref 70–99)
GLUCOSE BLDC GLUCOMTR-MCNC: 128 MG/DL (ref 70–99)
GLUCOSE BLDC GLUCOMTR-MCNC: 131 MG/DL (ref 70–99)
GLUCOSE BLDC GLUCOMTR-MCNC: 132 MG/DL (ref 70–99)
GLUCOSE BLDC GLUCOMTR-MCNC: 133 MG/DL (ref 70–99)
GLUCOSE BLDC GLUCOMTR-MCNC: 133 MG/DL (ref 70–99)
GLUCOSE BLDC GLUCOMTR-MCNC: 137 MG/DL (ref 70–99)
GLUCOSE BLDC GLUCOMTR-MCNC: 152 MG/DL (ref 70–99)
GLUCOSE BLDC GLUCOMTR-MCNC: 153 MG/DL (ref 70–99)
GLUCOSE BLDC GLUCOMTR-MCNC: 159 MG/DL (ref 70–99)
GLUCOSE BLDC GLUCOMTR-MCNC: 161 MG/DL (ref 70–99)
GLUCOSE BLDC GLUCOMTR-MCNC: 164 MG/DL (ref 70–99)
GLUCOSE BLDC GLUCOMTR-MCNC: 170 MG/DL (ref 70–99)
GLUCOSE BLDC GLUCOMTR-MCNC: 182 MG/DL (ref 70–99)
GLUCOSE BLDC GLUCOMTR-MCNC: 188 MG/DL (ref 70–99)
GLUCOSE BLDC GLUCOMTR-MCNC: 74 MG/DL (ref 70–99)
GLUCOSE BLDC GLUCOMTR-MCNC: 94 MG/DL (ref 70–99)
GLUCOSE BLDC GLUCOMTR-MCNC: 94 MG/DL (ref 70–99)
GLUCOSE SERPL-MCNC: 75 MG/DL (ref 70–99)
POTASSIUM SERPL-SCNC: 3.6 MMOL/L (ref 3.4–5.3)
SODIUM SERPL-SCNC: 136 MMOL/L (ref 133–144)

## 2017-10-03 PROCEDURE — 25000125 ZZHC RX 250: Performed by: OBSTETRICS & GYNECOLOGY

## 2017-10-03 PROCEDURE — 25000128 H RX IP 250 OP 636: Performed by: OBSTETRICS & GYNECOLOGY

## 2017-10-03 PROCEDURE — T1013 SIGN LANG/ORAL INTERPRETER: HCPCS | Mod: U3

## 2017-10-03 PROCEDURE — 25000128 H RX IP 250 OP 636: Performed by: PHYSICIAN ASSISTANT

## 2017-10-03 PROCEDURE — 36416 COLLJ CAPILLARY BLOOD SPEC: CPT | Performed by: OBSTETRICS & GYNECOLOGY

## 2017-10-03 PROCEDURE — 12000032 ZZH R&B OB CRITICAL UMMC

## 2017-10-03 PROCEDURE — 00000146 ZZHCL STATISTIC GLUCOSE BY METER IP

## 2017-10-03 PROCEDURE — 27210196 ZZH KIT POWER PICC SINGLE LUMEN

## 2017-10-03 PROCEDURE — 36569 INSJ PICC 5 YR+ W/O IMAGING: CPT

## 2017-10-03 PROCEDURE — 12000030 ZZH R&B OB INTERMEDIATE UMMC

## 2017-10-03 PROCEDURE — 80048 BASIC METABOLIC PNL TOTAL CA: CPT | Performed by: OBSTETRICS & GYNECOLOGY

## 2017-10-03 PROCEDURE — 90834 PSYTX W PT 45 MINUTES: CPT | Performed by: PSYCHOLOGIST

## 2017-10-03 PROCEDURE — 25000125 ZZHC RX 250: Performed by: PHYSICIAN ASSISTANT

## 2017-10-03 RX ORDER — HYDROMORPHONE HCL/0.9% NACL/PF 0.2MG/0.2
0.2 SYRINGE (ML) INTRAVENOUS ONCE
Status: COMPLETED | OUTPATIENT
Start: 2017-10-03 | End: 2017-10-03

## 2017-10-03 RX ADMIN — HUMAN INSULIN 1 UNITS/HR: 100 INJECTION, SOLUTION SUBCUTANEOUS at 10:41

## 2017-10-03 RX ADMIN — INSULIN ASPART 5 UNITS: 100 INJECTION, SOLUTION INTRAVENOUS; SUBCUTANEOUS at 05:19

## 2017-10-03 RX ADMIN — METOCLOPRAMIDE HYDROCHLORIDE 10 MG: 5 INJECTION INTRAMUSCULAR; INTRAVENOUS at 11:32

## 2017-10-03 RX ADMIN — Medication 2 ML: at 09:20

## 2017-10-03 RX ADMIN — METOCLOPRAMIDE HYDROCHLORIDE 10 MG: 5 INJECTION INTRAMUSCULAR; INTRAVENOUS at 00:58

## 2017-10-03 RX ADMIN — SODIUM CHLORIDE: 9 INJECTION, SOLUTION INTRAVENOUS at 09:52

## 2017-10-03 RX ADMIN — HYDROMORPHONE HYDROCHLORIDE 0.5 MG: 1 INJECTION, SOLUTION INTRAMUSCULAR; INTRAVENOUS; SUBCUTANEOUS at 05:24

## 2017-10-03 RX ADMIN — INSULIN ASPART 5 UNITS: 100 INJECTION, SOLUTION INTRAVENOUS; SUBCUTANEOUS at 15:43

## 2017-10-03 RX ADMIN — HYDROMORPHONE HYDROCHLORIDE 0.5 MG: 1 INJECTION, SOLUTION INTRAMUSCULAR; INTRAVENOUS; SUBCUTANEOUS at 09:44

## 2017-10-03 RX ADMIN — METOCLOPRAMIDE HYDROCHLORIDE 10 MG: 5 INJECTION INTRAMUSCULAR; INTRAVENOUS at 18:18

## 2017-10-03 RX ADMIN — HYDROMORPHONE HYDROCHLORIDE 0.5 MG: 1 INJECTION, SOLUTION INTRAMUSCULAR; INTRAVENOUS; SUBCUTANEOUS at 00:58

## 2017-10-03 RX ADMIN — INSULIN ASPART 2 UNITS: 100 INJECTION, SOLUTION INTRAVENOUS; SUBCUTANEOUS at 18:53

## 2017-10-03 RX ADMIN — INSULIN ASPART 2 UNITS: 100 INJECTION, SOLUTION INTRAVENOUS; SUBCUTANEOUS at 01:59

## 2017-10-03 RX ADMIN — PANTOPRAZOLE SODIUM 40 MG: 40 INJECTION, POWDER, FOR SOLUTION INTRAVENOUS at 11:31

## 2017-10-03 RX ADMIN — ONDANSETRON 4 MG: 2 INJECTION INTRAMUSCULAR; INTRAVENOUS at 23:18

## 2017-10-03 RX ADMIN — METOCLOPRAMIDE HYDROCHLORIDE 10 MG: 5 INJECTION INTRAMUSCULAR; INTRAVENOUS at 23:32

## 2017-10-03 RX ADMIN — HYDROMORPHONE HYDROCHLORIDE 0.3 MG: 1 INJECTION, SOLUTION INTRAMUSCULAR; INTRAVENOUS; SUBCUTANEOUS at 19:52

## 2017-10-03 RX ADMIN — HUMAN INSULIN 1 UNITS/HR: 100 INJECTION, SOLUTION SUBCUTANEOUS at 20:51

## 2017-10-03 RX ADMIN — INSULIN ASPART 2 UNITS: 100 INJECTION, SOLUTION INTRAVENOUS; SUBCUTANEOUS at 11:51

## 2017-10-03 RX ADMIN — HYDROMORPHONE HYDROCHLORIDE 0.3 MG: 1 INJECTION, SOLUTION INTRAMUSCULAR; INTRAVENOUS; SUBCUTANEOUS at 23:40

## 2017-10-03 RX ADMIN — INSULIN ASPART 6 UNITS: 100 INJECTION, SOLUTION INTRAVENOUS; SUBCUTANEOUS at 22:30

## 2017-10-03 RX ADMIN — Medication 0.2 MG: at 11:33

## 2017-10-03 RX ADMIN — ONDANSETRON 4 MG: 2 INJECTION INTRAMUSCULAR; INTRAVENOUS at 09:44

## 2017-10-03 RX ADMIN — HYDROMORPHONE HYDROCHLORIDE 0.3 MG: 1 INJECTION, SOLUTION INTRAMUSCULAR; INTRAVENOUS; SUBCUTANEOUS at 15:52

## 2017-10-03 RX ADMIN — INSULIN ASPART 2 UNITS: 100 INJECTION, SOLUTION INTRAVENOUS; SUBCUTANEOUS at 14:45

## 2017-10-03 NOTE — PROGRESS NOTES
Antepartum progress note  10/3/2017   HD#4    S: Anne feels okay today. Reporting a small amount of upper abdominal pain and nausea this morning. Still tolerating liquids. Baby is moving appropriately. Denies any cramping or contractions.  Denies vaginal bleeding or leaking of fluid. IV infiltrated this morning.    PE:  Vit:   Vitals:    10/02/17 1635 10/02/17 2144 10/03/17 0110 10/03/17 0640   BP: 102/60 103/64 100/59 105/58   Pulse:       Resp:     Temp: 98.1  F (36.7  C) 98.1  F (36.7  C) 98.3  F (36.8  C)    TempSrc: Oral Oral Oral    SpO2:       Weight:         Gen: NAD. NJ tube in place.   CV: Well perfused  Pulm: Breathing comfortably  Abd: Soft, gravid, nontender    FHT: Baseline 130 BPM, moderate variability, + accelerations, possibly 1 late deceleration   [place]: 1 contraction in 20 minutes    10/2 BPP: 8/8, EDMOND 16.4 cm, breech    Assessment/Plan  Anne Gunter is a 27 year old , at 28w6d by stated JUANJO c/w 25w5d US, admitted with  severe abdominal pain, nausea, and vomiting. She has had multiple similar admissions in the past week. This does not appear to be DKA. PMHx is notable for poorly controlled T1DM with multiple admissions for DKA, severe gastroparesis with post-pyloric feeding tube in place, limited prenatal care in [place], history of PLTCS x1 at 32 weeks for fetal indications in the setting of PTD, and preeclampsia without severe features.    1. Poorly controlled T1DM, recurrent DKA:   - Multiple recent admissions for poorly controlled T1DM and DKA.  Serum ketones elevated on admission to 1.3, although BG was 102 without gap and normal bicarb, not c/w DKA at this time.   - Continue IV insulin gtt through TF titration.  - Restarted TF yesterday, additional taking in clear liquids (juice).   - Close BG monitoring.  - No lyte abnormalities at admission and on repeat  - Lost IV access this morning. Discussed PICC and patient agreeable.    2. Gastroparesis, recurrent abdominal pain,  malnutrition:  - Scheduled IV reglan.  Continue daily protonix, pepcid. PRN IV dilaudid > decreased to every 4 hours   - S/p GI consult during previous admission.   - NJ tube in place, restarted tube feeds yesterday. Consider increasing diet to full liquids  - Will need to resume bowel regimen when taking PO meds.     3. Preeclampsia without severe features:   - BPs normotensive on admission.  Diagnosed last admission due to mild range BPs and UPC 0.6, with UPC of 0.38 in 2016.  Continue to monitor BPs, HELLP labs wnl on admission.     4. Anxiety:   - S/p  psych/SW consult last admission. Continue to monitor.     5. PNC:   - Rh positive, Rubella immune, GBS positive- will need PCN if delivery is imminent, placenta posterior     6. FWB:   - Category II FHT, reactive. TID monitoring   - Plan for weekly BPPs, last 10/2. Last noted to be breech  - Tdap if inpatient next week.      Jeannie Disla, PGY3  OB/Gyn

## 2017-10-03 NOTE — PLAN OF CARE
Problem: Pain, Acute (Adult)  Goal: Identify Related Risk Factors and Signs and Symptoms  Related risk factors and signs and symptoms are identified upon initiation of Human Response Clinical Practice Guideline (CPG).   Outcome: Improving  Patient went 5 hours between Dilaudid doses. She asked for medication upon returning to floor, but did not appear to be in much distress. Pain medication administered. Also given Zofran. Continue to monitor.

## 2017-10-03 NOTE — CONSULTS
"[facility]   Mental Health Inpatient Consult Follow-up      Client Name: Anne Gunter                 Date:   10/03/17                Consult Start Time:  1:05pm       Session End Time:   1:45pm                Session Length:        40min    Attendees:    Patient                  DATA    Current Stressors / Issues:  This is a follow-up from patient's initial  mental health inpatient consult on 17, which was requested by Dr. Melba Mckee at [facility]te[facility], due to concerns about \"poorly controlled Diabetes Mellitus (DM), flat affect, poor involvement in care,\" where it was recommended that the client receive weekly individual therapy sessions.     Pt. reported understanding this provider's Belarusian dialect/Romanian and verbalized agreement to proceed without waiting for an Romanian interpretor. She described feeling physically better, but explained that her mood and anxiety get worse during pregnancy and especially when she is in the hospital. Described feeling \"imprisoned\" (\"masjoona\") when she is in the hospital, and gets the urge to go home for a sense of freedom. Indicated that she knows she will feel better after she delivers, as her previous pregnancy was similarly complicated by diabetes and GI issues and she was in and out of the hospital. Reflected though on feeling down-having a sense of emotional heaviness, and stressed during the process. Upon inquiry on coping, she stated that \"nothing\" (\"wala chi\") helps: no music, shows/series, nor other activities she typically enjoys or that usually make her feel better. However, she was open to practicing deep breathing exercises and seemed interested in this.    Treatment Objective(s) Addressed in This Session:  Reassess symptoms and risk issues  Provide education on relaxation strategies  Provide education regarding  mental health disorders  Attempt to elicit commitment to following medical " recommendations    Intervention and Summary of Session:    Interventions: Psychoeducation on  mood and anxiety disorders, Validation, Cognitive Restructuring, Pros/Cons, Diaphragmatic breathing/Imagery,   (will administer the PHQ-9 & KELLI-7 once every 2 weeks; most recent date was 17 please see consult psychology note by Dr. Michela Gillette)    Provided psychoeducation on untreated effects of depression and anxiety sxs on mom and baby, and MH literature/ recommendations on this. Encouraged her to consider psychotropic medication evaluation if depression and anxiety sxs scores remain in the severe range (will recheck next week PHQ-9 & KELLI-7). Discussed potential benefits for mom and baby if mood and anxiety sxs are better managed.     Explored possible benefits of not leaving the hospital AMA this time around, until her medical condition is stable and she is discharged with in home care (infusion or other txs as recommended). She said she will try.    Explored whether pt. would like to have spiritual health services involved, someone to pray with, or even a Quran in her room. She stated none of this would help.    Provided psychoeducation on the benefits of relaxation strategies & exercises. Guided her through a 10min exercise of imagery and deep breathing, which pt. stated feeling very relaxed after (pt. participated and responded well to this exercise). Encouraged daily 5min practice at least, twice a day if possible to help manage stress & anxiety sxs. Also identified possible online resources she can access for guided relaxation and deep breathing (encouraged her to explore if she can find it in Polish, but if not, she indicated that she can definitely try it in English as this might be relaxing as well). She also reported she is willing to continue receiving weekly mental health support while she is here.         ASSESSMENT: Current Emotional / Mental Status (status of significant  symptoms):                          Risk status (Self / Other harm or suicidal ideation)    Client denies current fears or concerns for personal safety.  Client denies current or recent suicidal ideation or behaviors. Firmly denied any SI or passive wishes of death since last week.  Client denies current or recent homicidal ideation or behaviors.  Client denies current or recent self injurious behavior or ideation.  Client denies other safety concerns.    Writer has reviewed safety planning with patient. Reviewed with patient that if she develops any thoughts to hurt herself or others, that she should promptly inform her care team providers (if in the hospital) or once discharged, go to the emergency department or call 911. Client expressed understanding of this and agreed to access emergency resources if safety concerns arise.      Appearance:   Appropriate    Eye Contact:   Good    Psychomotor Behavior: Normal    Attitude:   Cooperative with the session   Orientation:   All   Speech    Rate / Production: Normal     Volume:  Normal    Mood:    Anxious  Depressed    Affect:    Constricted (half smiled a few times)   Thought Content:  Clear    Thought Form:  Coherent    Insight:    Fair                 Collateral Reports Completed:              Routed note to Care Team Member(s)         Recommendations and Plan: (Homework, other)  Continued individual therapy follow-up is recommended to help monitor patient's mood and target symptoms of depression and anxiety, which patient is agreeable to.  Either this writer or one of my colleagues from [facility]'s  Mental Health Team will continue to follow patient and provide weekly individual therapy sessions while patient is antepartum. It was recommended that she seek counseling if she chooses to leave the hospital.     Pt. was encouraged to consider medication evaluation for anxiety and depression should sxs remain in the severe range (per PHQ-9 &  KELLI-7 scores). Encouraged her to inform her nurse or medical providers if and when she is open for a psychotropic medication evaluation.    Please do not hesitate to contact [facility]'s  mental health inpatient consult team if you have any questions or concerns.  We may be reached via placing a  mental health inpatient consult order, or by contacting [phone].         Candy Alegre PsyD, RAJIV        10/03/17  [facility]    40 minutes was spent providing this follow-up consult.  Over 80% of the time was spent providing intervention/counseling, and 20% of the time was spent in assessing for sxs and safety

## 2017-10-03 NOTE — PROGRESS NOTES
Infusion Therapy-[facility] Nurse Liaison-Informational Meeting  Met with Pt at bedside with Spanish Interpretor. Pt is expected to D/C next wk at the earliest, per MD. . Pt has not done home infusion in the past. She is tearful during this visit.    Provided info on [facility] Services, Including-Administration methods, RN visits, RPH/RN on call 24/7, supplies, and delivery process, Nursing Visits and Teaching Support.    Educated on how to contact [facility]. Pt in agreement with plan and willing and able to learn. Pt stated they are will be comfortable doing infusion in home environment.    Pt has not been to [facility], this RN recommended to Melba LARSON 5th Floor Coordinator.   Will continue to follow until DC for any changes or additional needs     Rhea Motta, [facility]-Nurse Liaison  Pager:  [phone]  Cell:  [phone]  Email to text:  [email or web address]  [email or web address]

## 2017-10-03 NOTE — PLAN OF CARE
Problem: Diabetes in Pregnancy (Adult,Obstetrics,Pediatric)  Goal: Signs and Symptoms of Listed Potential Problems Will be Absent, Minimized or Managed (Diabetes in Pregnancy)  Signs and symptoms of listed potential problems will be absent, minimized or managed by discharge/transition of care (reference Diabetes in Pregnancy (Adult,Obstetrics,Pediatric) CPG).   Outcome: Declining  Patient returned from being off the floor for an hour.  When she returned her blood sugar was 165. I asked her if she ate or drank and she said no. RN told her that nothing changed with her fluids/drips so patient must have had something. She confessed to drinking 2 apple juices. This RN had no idea what time she drank them, so did not cover her with Novolog. Explained to her that she needs Novolog coverage when she has carbohydrates. Continue to monitor.

## 2017-10-03 NOTE — PLAN OF CARE
Problem: Patient Care Overview  Goal: Plan of Care/Patient Progress Review  Outcome: No Change  No change overall. Feeding was increased to 30cc and patient seems to be tolerating it well. Is refusing fetal monitoring at this time, as she said she needs to sleep. Was monitored at beginning of shift.

## 2017-10-03 NOTE — PROGRESS NOTES
Pt stable, VS WDL. Pt requested pain medication prior to it being available, but went 4.5 hours between doses in middle of night. Tolerating NJ feeding pump, increased to 40 ml/hr at 0400. BG ranged , insulin gtt adjusted per protocol, using Algorithm 1. Snacks of apple juice & chicken broth covered with SC insulin pen. IV access lost at 0545, IV fluid and insulin drip turned off. NJ tube feeding turned off at 0630 after discussing with Dr. Dockery. 0700 BG of 94. Pt reports not feeling any ctx, one picked up on toco. FHR appropriate for gestational age with one variable decel.

## 2017-10-03 NOTE — CONSULTS
"    [facility]   Mental Health Inpatient Consult Follow-up        Client Name: Anne Gunter                 Date:   10/03/17      Consult Start Time:  1:05pm       Session End Time:   1:45pm      Session Length:        40min     Attendees:    Patient         DATA     Current Stressors / Issues:  This is a follow-up from patient's initial  mental health inpatient consult on 17, which was requested by Dr. Melba Mckee at [facility], due to concerns about \"poorly controlled Diabetes Mellitus (DM), flat affect, poor involvement in care,\" where it was recommended that the client receive weekly individual therapy sessions.      Pt. reported understanding this provider's Thai dialect/Albanian and verbalized agreement to proceed without waiting for an Albanian interpretor. She described feeling physically better, but explained that her mood and anxiety get worse during pregnancy and especially when she is in the hospital. Described feeling \"imprisoned\" (\"masjoona\") when she is in the hospital, and gets the urge to go home for a sense of freedom. Indicated that she knows she will feel better after she delivers, as her previous pregnancy was similarly complicated by diabetes and GI issues and she was in and out of the hospital. Reflected though on feeling down-having a sense of emotional heaviness, and stressed during the process. Upon inquiry on coping, she stated that \"nothing\" (\"wala chi\") helps: no music, shows/series, nor other activities she typically enjoys or that usually make her feel better. However, she was open to practicing deep breathing exercises and seemed interested in this.     Treatment Objective(s) Addressed in This Session:  Reassess symptoms and risk issues  Provide education on relaxation strategies  Provide education regarding  mental health disorders  Attempt to elicit commitment to following medical recommendations     Intervention and Summary " of Session:     Interventions: Psychoeducation on  mood and anxiety disorders, Validation, Cognitive Restructuring, Pros/Cons, Diaphragmatic breathing/Imagery,   (will administer the PHQ-9 & KELLI-7 once every 2 weeks; most recent date was 17 please see consult psychology note by Dr. Michela Gillette)     Provided psychoeducation on untreated effects of depression and anxiety sxs on mom and baby, and MH literature/ recommendations on this. Encouraged her to consider psychotropic medication evaluation if depression and anxiety sxs scores remain in the severe range (will recheck next week PHQ-9 & KELLI-7). Discussed potential benefits for mom and baby if mood and anxiety sxs are better managed.      Explored possible benefits of not leaving the hospital AMA this time around, until her medical condition is stable and she is discharged with in home care (infusion or other txs as recommended). She said she will try.     Explored whether pt. would like to have spiritual health services involved, someone to pray with, or even a Quran in her room. She stated none of this would help.     Provided psychoeducation on the benefits of relaxation strategies & exercises. Guided her through a 10min exercise of imagery and deep breathing, which pt. stated feeling very relaxed after (pt. participated and responded well to this exercise). Encouraged daily 5min practice at least, twice a day if possible to help manage stress & anxiety sxs. Also identified possible online resources she can access for guided relaxation and deep breathing (encouraged her to explore if she can find it in Estonian, but if not, she indicated that she can definitely try it in English as this might be relaxing as well). She also reported she is willing to continue receiving weekly mental health support while she is here.         ASSESSMENT: Current Emotional / Mental Status (status of significant symptoms):                          Risk status (Self /  Other harm or suicidal ideation)     Client denies current fears or concerns for personal safety.  Client denies current or recent suicidal ideation or behaviors. Firmly denied any SI or passive wishes of death since last week.  Client denies current or recent homicidal ideation or behaviors.  Client denies current or recent self injurious behavior or ideation.  Client denies other safety concerns.     Writer has reviewed safety planning with patient. Reviewed with patient that if she develops any thoughts to hurt herself or others, that she should promptly inform her care team providers (if in the hospital) or once discharged, go to the emergency department or call 911. Client expressed understanding of this and agreed to access emergency resources if safety concerns arise.                            Appearance:                                                          Appropriate                         Eye Contact:                                                         Good                         Psychomotor Behavior:                    Normal                         Attitude:                                                                 Cooperative with the session                        Orientation:                                                           All                        Speech                                              Rate / Production:                           Normal                                               Volume:                                           Normal                         Mood:                                                                     Anxious  Depressed                         Affect:                                                                    Constricted (half smiled a few times)                        Thought Content:                                                  Clear                         Thought Form:                                                       Coherent                         Insight:                                                                   Fair                  Collateral Reports Completed:              Routed note to Care Team Member(s)          Recommendations and Plan: (Homework, other)  Continued individual therapy follow-up is recommended to help monitor patient's mood and target symptoms of depression and anxiety, which patient is agreeable to.  Either this writer or one of my colleagues from [facility]'s  Mental Health Team will continue to follow patient and provide weekly individual therapy sessions while patient is antepartum. It was recommended that she seek counseling if she chooses to leave the hospital.      Pt. was encouraged to consider medication evaluation for anxiety and depression should sxs remain in the severe range (per PHQ-9 & KELLI-7 scores). Encouraged her to inform her nurse or medical providers if and when she is open for a psychotropic medication evaluation.     Please do not hesitate to contact [facility]'s  mental health inpatient consult team if you have any questions or concerns.  We may be reached via placing a  mental health inpatient consult order, or by contacting [phone].         Candy Alegre PsyD, LP        10/03/17  [facility]     40 minutes was spent providing this follow-up consult.  Over 80% of the time was spent providing intervention/counseling, and 20% of the time was spent in assessing for sxs and safety

## 2017-10-03 NOTE — PLAN OF CARE
Tube feedings increased to 50ml/hour at 1530 and pt is tolerating the feedings.   Blood sugars stable: Using algorithm 2 currently.  Pain: doing OK on dilaudid every 4 hours today. Encouraged adjacent therapy such as carafate, reglan, prevacid, but declines many of these options. See MAR for given meds.   No nausea/vomiting today.   Vitals WNL.  OB, stable. No contractions. FHT reactive.

## 2017-10-03 NOTE — PLAN OF CARE
Anne Gunter tolerated the PICC placement and is now resting. Requested dilaudid and zofran after PICC placement at 0940. No emesis.   Last POCT blood sugar was 111.  Tube feedings restarted at 40ml/hour at 0950.  NS restarted at 10ml/hour and 0950.  Will continue to check blood sugars every hour and restart insulin gtt when it meets criteria.

## 2017-10-03 NOTE — PROCEDURES
#4 single lumen Bard Solo Power PICC placed in right medial brachial vein on 2nd attempt.  Per ECG, tip terminates in SVC/RA junction.  Report to 4 antepartum RNMilagro: PICC ok to use.  Flush orders entered into EPIC.  This product requires NS flush pre/post med infusion or blood draw and q 7 days, if dormant.  If medical staff deems heparin is necessary for locking, 10u/ml dilution is sufficient.  Securacath securement device used.  This will stay in place for the life of the PICC.  Information on removal was left with patient info and with nursing staff.  For other questions, please call adult infusion (M-F ) at [phone]

## 2017-10-04 LAB
GLUCOSE BLDC GLUCOMTR-MCNC: 112 MG/DL (ref 70–99)
GLUCOSE BLDC GLUCOMTR-MCNC: 112 MG/DL (ref 70–99)
GLUCOSE BLDC GLUCOMTR-MCNC: 118 MG/DL (ref 70–99)
GLUCOSE BLDC GLUCOMTR-MCNC: 119 MG/DL (ref 70–99)
GLUCOSE BLDC GLUCOMTR-MCNC: 120 MG/DL (ref 70–99)
GLUCOSE BLDC GLUCOMTR-MCNC: 122 MG/DL (ref 70–99)
GLUCOSE BLDC GLUCOMTR-MCNC: 125 MG/DL (ref 70–99)
GLUCOSE BLDC GLUCOMTR-MCNC: 126 MG/DL (ref 70–99)
GLUCOSE BLDC GLUCOMTR-MCNC: 127 MG/DL (ref 70–99)
GLUCOSE BLDC GLUCOMTR-MCNC: 128 MG/DL (ref 70–99)
GLUCOSE BLDC GLUCOMTR-MCNC: 128 MG/DL (ref 70–99)
GLUCOSE BLDC GLUCOMTR-MCNC: 132 MG/DL (ref 70–99)
GLUCOSE BLDC GLUCOMTR-MCNC: 133 MG/DL (ref 70–99)
GLUCOSE BLDC GLUCOMTR-MCNC: 138 MG/DL (ref 70–99)
GLUCOSE BLDC GLUCOMTR-MCNC: 140 MG/DL (ref 70–99)
GLUCOSE BLDC GLUCOMTR-MCNC: 147 MG/DL (ref 70–99)
GLUCOSE BLDC GLUCOMTR-MCNC: 151 MG/DL (ref 70–99)
GLUCOSE BLDC GLUCOMTR-MCNC: 170 MG/DL (ref 70–99)
GLUCOSE BLDC GLUCOMTR-MCNC: 83 MG/DL (ref 70–99)
GLUCOSE BLDC GLUCOMTR-MCNC: 86 MG/DL (ref 70–99)
GLUCOSE BLDC GLUCOMTR-MCNC: 89 MG/DL (ref 70–99)
GLUCOSE BLDC GLUCOMTR-MCNC: 94 MG/DL (ref 70–99)

## 2017-10-04 PROCEDURE — 00000146 ZZHCL STATISTIC GLUCOSE BY METER IP

## 2017-10-04 PROCEDURE — 25000128 H RX IP 250 OP 636: Performed by: OBSTETRICS & GYNECOLOGY

## 2017-10-04 PROCEDURE — 25000125 ZZHC RX 250: Performed by: PHYSICIAN ASSISTANT

## 2017-10-04 PROCEDURE — S0028 INJECTION, FAMOTIDINE, 20 MG: HCPCS | Performed by: OBSTETRICS & GYNECOLOGY

## 2017-10-04 PROCEDURE — 12000032 ZZH R&B OB CRITICAL UMMC

## 2017-10-04 PROCEDURE — 25000132 ZZH RX MED GY IP 250 OP 250 PS 637: Performed by: OBSTETRICS & GYNECOLOGY

## 2017-10-04 PROCEDURE — 25000125 ZZHC RX 250: Performed by: OBSTETRICS & GYNECOLOGY

## 2017-10-04 PROCEDURE — 12000030 ZZH R&B OB INTERMEDIATE UMMC

## 2017-10-04 PROCEDURE — T1013 SIGN LANG/ORAL INTERPRETER: HCPCS | Mod: U3

## 2017-10-04 RX ADMIN — INSULIN ASPART 5 UNITS: 100 INJECTION, SOLUTION INTRAVENOUS; SUBCUTANEOUS at 16:39

## 2017-10-04 RX ADMIN — INSULIN ASPART 2 UNITS: 100 INJECTION, SOLUTION INTRAVENOUS; SUBCUTANEOUS at 19:28

## 2017-10-04 RX ADMIN — HYDROMORPHONE HYDROCHLORIDE 0.3 MG: 1 INJECTION, SOLUTION INTRAMUSCULAR; INTRAVENOUS; SUBCUTANEOUS at 23:06

## 2017-10-04 RX ADMIN — FAMOTIDINE 20 MG: 10 INJECTION, SOLUTION INTRAVENOUS at 06:35

## 2017-10-04 RX ADMIN — INSULIN ASPART 10 UNITS: 100 INJECTION, SOLUTION INTRAVENOUS; SUBCUTANEOUS at 17:15

## 2017-10-04 RX ADMIN — METOCLOPRAMIDE HYDROCHLORIDE 10 MG: 5 INJECTION INTRAMUSCULAR; INTRAVENOUS at 05:42

## 2017-10-04 RX ADMIN — HUMAN INSULIN 3 UNITS/HR: 100 INJECTION, SOLUTION SUBCUTANEOUS at 16:18

## 2017-10-04 RX ADMIN — HYDROMORPHONE HYDROCHLORIDE 0.3 MG: 1 INJECTION, SOLUTION INTRAMUSCULAR; INTRAVENOUS; SUBCUTANEOUS at 03:41

## 2017-10-04 RX ADMIN — PANTOPRAZOLE SODIUM 40 MG: 40 INJECTION, POWDER, FOR SOLUTION INTRAVENOUS at 08:04

## 2017-10-04 RX ADMIN — METOCLOPRAMIDE HYDROCHLORIDE 10 MG: 5 INJECTION INTRAMUSCULAR; INTRAVENOUS at 12:09

## 2017-10-04 RX ADMIN — MULTIVITAMIN 15 ML: LIQUID ORAL at 08:48

## 2017-10-04 RX ADMIN — HYDROMORPHONE HYDROCHLORIDE 0.3 MG: 1 INJECTION, SOLUTION INTRAMUSCULAR; INTRAVENOUS; SUBCUTANEOUS at 13:00

## 2017-10-04 RX ADMIN — METOCLOPRAMIDE HYDROCHLORIDE 10 MG: 5 INJECTION INTRAMUSCULAR; INTRAVENOUS at 23:51

## 2017-10-04 RX ADMIN — INSULIN ASPART 8 UNITS: 100 INJECTION, SOLUTION INTRAVENOUS; SUBCUTANEOUS at 22:00

## 2017-10-04 RX ADMIN — METOCLOPRAMIDE HYDROCHLORIDE 10 MG: 5 INJECTION INTRAMUSCULAR; INTRAVENOUS at 17:12

## 2017-10-04 RX ADMIN — FAMOTIDINE 20 MG: 10 INJECTION, SOLUTION INTRAVENOUS at 17:54

## 2017-10-04 RX ADMIN — HYDROMORPHONE HYDROCHLORIDE 0.3 MG: 1 INJECTION, SOLUTION INTRAMUSCULAR; INTRAVENOUS; SUBCUTANEOUS at 08:31

## 2017-10-04 RX ADMIN — HUMAN INSULIN 4 UNITS/HR: 100 INJECTION, SOLUTION SUBCUTANEOUS at 05:40

## 2017-10-04 RX ADMIN — HYDROMORPHONE HYDROCHLORIDE 0.3 MG: 1 INJECTION, SOLUTION INTRAMUSCULAR; INTRAVENOUS; SUBCUTANEOUS at 17:55

## 2017-10-04 RX ADMIN — INSULIN ASPART 5 UNITS: 100 INJECTION, SOLUTION INTRAVENOUS; SUBCUTANEOUS at 09:44

## 2017-10-04 NOTE — PROGRESS NOTES
"CLINICAL NUTRITION SERVICES - Brief Note (see assessment 9/30 for full details and recommendations)     Pt as well as her parents were in the room and contributed to her diet hx. She has started eating breakfast in the morning and may have some hummus with vanessa bread (1 round) or labaneh on bread (concentrated yogurt spreadable like soft cheese). For lunch and dinner, her mother said pt really likes tabbouleh (bulgur, tomatoes, onion, parsley, mint, lemon, olive oil and salt). She may also have fattoush (vanessa bread fried in olive oil over leafy greens). Other foods Anne eats include salad, vegetables, grilled meats, green beans, selvin beans, yogurt, stuffed grape leaves, and \"a lot\" of pasta (with marinara sauce). She enjoys apple juice and will drink 16-20+ oz at a time. She also likes chocolate milk and tea.     Provided gastroparesis diet education and encouraged Anne to eat small, frequent meals that are lower in fiber and fat. Provided Diet for Gastroparesis handout and discussed content. Also discussed pts goals for TF and hopeful progression of cycling TF and potentially weaning down total volume as pt begins eating more. Pt was unable to identify foods that trigger her N/V and feels that it is not related to her diabetes or food choices. Pt expressed she was hungry and wanted to start eating solid foods.     Nutrition Interventions:  - Recommend advancing to a regular diet today. Consider restricting diet to a low fat/low fiber diet if pt is continuously ordering high fat/high fiber foods. Spoke with RN about diet recommendations since RN/ assist with ordering.      Lucille Machuca, CHRIS, LD  Unit Pager: [phone]    "

## 2017-10-04 NOTE — PLAN OF CARE
Anne and her parents were seen bedside for tube feeding education. Her mom stated she was scared to do all medical cares but did participate and ask good questions throughout class. They were taught tube cares, med administration and use of the juan pump for feeds. Anne watched but would not practice skills today. Her mom did well with all skills. They were encouraged to do as many cares themselves while here to make transition to home easier and less scary. Anne's nurse was asked to continue teaching as much as possible .

## 2017-10-04 NOTE — PLAN OF CARE
Problem: Patient Care Overview  Goal: Plan of Care/Patient Progress Review  Outcome: No Change  AFVSS. Pt. Sleeping between cares overnight. Stated that she was hungry. Explained full liquid diet and that pt. could not yet eat solids (although did eat sandwich last lisy.but was told to not do this again until diet order  changed). She chose to have some warm chicken broth. She tolerated this without nausea or vomiting.Has taken most GI meds;refused Carafate. Continues on tube feeding as ordered. BGs labile, continues on insulin drip as ordered. Requesting pain meds near the 4 hour hodan when can have again. Does not show signs of distress, says feeling pain in stomach. States relief with dilaudid. FHT reactive with no contractions on monitor. Continue present cares. Continue to reinforce diet, food choices and letting staff know when eating/drinking for need for insulin coverage.

## 2017-10-04 NOTE — PLAN OF CARE
Problem: Diabetes in Pregnancy (Adult,Obstetrics,Pediatric)  Goal: Signs and Symptoms of Listed Potential Problems Will be Absent, Minimized or Managed (Diabetes in Pregnancy)  Signs and symptoms of listed potential problems will be absent, minimized or managed by discharge/transition of care (reference Diabetes in Pregnancy (Adult,Obstetrics,Pediatric) CPG).   Blood sugars range between 89 -128 . Insulin gtt running between 1.5 u/hr and 3u/hr in algorythem #3. Patient covered with SQ insulin at 0940 for broth and apple juice. Dietician talked with pt about what to order for meals today.

## 2017-10-04 NOTE — PLAN OF CARE
Problem: Diabetes in Pregnancy (Adult,Obstetrics,Pediatric)  Goal: Signs and Symptoms of Listed Potential Problems Will be Absent, Minimized or Managed (Diabetes in Pregnancy)  Signs and symptoms of listed potential problems will be absent, minimized or managed by discharge/transition of care (reference Diabetes in Pregnancy (Adult,Obstetrics,Pediatric) CPG).   Patient had dietician here with  and parents  present to teach what foods are good for pt to order. Patient states she isn't hungry right now. Also had NJ tube feeding education.

## 2017-10-04 NOTE — PROGRESS NOTES
Antepartum progress note  10/4/2017   HD#5     S:  Anne has no complaints today. She did eat a sandwich last from [employer] brought in by the family around 8:00 pm. According to nursing staff she refused to eat dinner in her room so her insulin was given in the lobby where she ate. She did not complain of any stomach pain, nausea or vomiting over night. She continues to request her pain medications every 4 hours. Baby is moving well. No vaginal bleeding, loss of fluid or contractions over night.        PE:  Vitals:    10/03/17 1825 10/03/17 1945 10/03/17 2334 10/04/17 0630   BP: 109/64  90/51 100/59   Pulse:       Resp:  18   Temp: 97.9  F (36.6  C)  98  F (36.7  C) 98.2  F (36.8  C)   TempSrc: Oral  Oral Oral   SpO2:       Weight:  75.3 kg (165 lb 14.4 oz)            Gen:                NAD. NJ tube in place.   CV:                 Well perfused  Pulm:              Breathing comfortably  Abd:                Soft, gravid, nontender     FHT:               Baseline 120 BPM, moderate variability, + accelerations -decelerations                   Hecker:             absent     10/2 BPP: 8/8, EDMOND 16.4 cm, breech     Assessment/Plan  Anne Gunter is a 27 year old , at 29w0d by stated JUANJO c/w 25w5d US, admitted with  severe abdominal pain, nausea, and vomiting. She has had multiple similar admissions in the past week. This does not appear to be DKA. PMHx is notable for poorly controlled T1DM with multiple admissions for DKA, severe gastroparesis with post-pyloric feeding tube in place, limited prenatal care in , history of PLTCS x1 at 32 weeks for fetal indications in the setting of PTD, and preeclampsia without severe features.     1. Poorly controlled T1DM, recurrent DKA:   - Multiple recent admissions for poorly controlled T1DM and DKA.  Serum ketones were elevated on admission to 1.3, although BG was 102 without gap and normal bicarb, not c/w DKA at this time.   - On insulin drip at this time managed by  endocrinology. Plan to administer insulin SQ for meals. When patient transitions to cycling with tube feeding will adjust insulin management with goal for SQ.  - Close BG monitoring.      2. Gastroparesis, recurrent abdominal pain, malnutrition:  -TF restarted on 10/2/17 and advanced to full liquid diet yesterday. Nutrition states that tube feeding can be begin to cycle after she has trialed one or two meals on full diet. Nutrition recommended ordering full diet and encouraging patient to order food options that coincide with her gastroparesis education given this morning. If patient is able to eat >50% of meals TId then can begin cycling. Endocrine needs to be called when tube feeds being to cycle. Follow cycling recommendations outlined in nutritions note.   - Scheduled IV reglan.  Continue daily protonix, pepcid. PRN IV dilaudid > decreased to every 4 hours   - S/p GI consult during previous admission.   - NJ tube in place, will try to advance diet today and if eating >50% of meals TID and if successful will cycle tube feeds either this evening or tomorrow morning.   - Will need to resume bowel regimen when taking PO meds.  - Endocrine called about changes to diet today. Call endocrine when cycling begins.    - Follow cycling instructions outline in nutritions : begin with 65ml/hr for 18 hours followed by 85ml/hr for 14 hours followed by 100mls/hr for 12 hours. Refer to Yesenia Covarrubias's Note from 17.     3. Preeclampsia without severe features:   - BPs normotensive on admission.  Diagnosed last admission due to mild range BPs and UPC 0.6, with UPC of 0.38 in 2016.  Continue to monitor BPs, HELLP labs wnl on admission. Dx is unclear given pain at time of previous elevated BP and baseline proteinuria.     4. Anxiety:   - S/p  psych/SW consult last admission. Seen weekly by  psychologist. Continue to monitor.      5. PNC:             - Rh positive, Rubella immune, GBS positive- will need PCN  if delivery is imminent, placenta posterior      6. FWB:                         - Category II FHT, reactive. TID monitoring   - Plan for twice weekly BPPs, last 10/2. Last noted to be breech  - Tdap if inpatient next week.       I, Judy Carmichael, acted as a scribe for Dr. Lopez.     This note, as scribed, accurately reflects the examination, my impressions and plan as discussed with the patient.    Edd Lopez MD  Maternal-Fetal Medicine

## 2017-10-04 NOTE — PROGRESS NOTES
[facility]  MATERNAL CHILD HEALTH   SOCIAL WORK PROGRESS NOTE      DATA:     SW met with Anne yesterday late morning, communicating with an .     Anne had left AMA on Friday and was readmitted with recurrent vomiting, nausea. Per patient review rounds and documentation in the chart Anne ate foods following her discharge that was not recommended by medical team.     Anne signed KINGA's for her parents (Nati and Vinayak Gunter) and her  (Vianey Godinez). She states they have flexibility in timing for having a care conference. Anne plans on communicating the time with her  once it is known.  Anne's sister will be travelling back to [place] to be with her  and Anne states she will not be a continued presence in her care. KINGA's were placed in her chart.       INTERVENTION:     This  reviewed the chart and coordinated with the health care team.  Offered support with the ups and downs of feeling better and getting sicker again.   Encouraged and offered care conference with supporting family members (her parents and her ). SW acknowledged that there are many people (family and medical team) that are providing support and care for her when she is in and out of the hospital. SW advised that the support system and medical team have a clear plan and conversation about her care.       ASSESSMENT:     Anne was awakened by the nurse for checking her glucose. Anne appeared tired as her sleep continues to be disrupted with medical care needs.   Anne seemed agreeable and understanding to the recommended care conference with her family.     PLAN:     SW to assist in coordinating family care conference.       Kjerstin Rydeen, [facility]   Social Worker  Maternal Child Health   Direct: [phone]  Pager: [phone]

## 2017-10-04 NOTE — PROGRESS NOTES
Diabetes Consult Daily  Progress Note          Assessment/Plan:     Ms. Anne Gunter is a 28 yo woman at 28w4d of pregnancy, with uncontrolled type 1 diabetes and history of multiple episodes of DKA during previous and current pregnancy, recent diagnosis of esophagitis, and possible gastroparesis, known to our diabetes service from several past admissions, who was readmitted on 9/30/17 with abdominal pain, nausea and vomiting after leaving AMA on 9/29/17.  No evidence of DKA on this admission.    Better glucose stability this morning.  Uncertain TF advancement plan for today.     Plan:  -Continue IV insulin infusion -- anticipate this will need to continue at least for the 24hrs of cycling feeds, then will evaluate for transition to SC insulin.  -Meal aspart increased to 1unit/6g CHO with meals and snacks        Outpatient diabetes follow up: has appt with Dr. Colunga on 10/12/17.  Discussed with patient, bedside RN, and primary team.           Interval History:   The last 24 hours progress and nursing notes reviewed.    TwoCal restarted around 0930 yesterday and to goal rate (50 cc/h) at 1530.  Says she's tolerating this well.  And that she had no problems eating a sandwich late last night.  Spending time off unit with family, so RNs documenting times pt not getting aspart or getting it late for foods eaten when she's away.  Anne got some rest last night.  Overall feeling better today.  Per RD note 9/29, next step is to increase TF to 65cc/h (18 h cycle).  There's also written discussion of decreasing goal volume depending on diet tolerance.      Recent Labs  Lab 10/04/17  0850 10/04/17  0758 10/04/17  0634 10/04/17  0537 10/04/17  0432 10/04/17  0330  10/03/17  0754  10/02/17  1516  10/01/17  0635  09/30/17  2037  09/30/17  1603  09/29/17  0607   GLC  --   --   --   --   --   --   --  75  --  89  --  114*  --  166*  --  136*  --  106*   BGM 89 128* 132* 133* 112* 128*  < >  --   < >   --   < >  --   < >  --   < >  --   < >  --    < > = values in this interval not displayed.            Review of Systems:   See interval hx          Medications:       Active Diet Order      Regular Diet Adult     Physical Exam:  Gen: alert, resting in bed, in NAD, much more comfortable appearing than yesterday  HEENT: NC/AT, mucous membranes are moist, NJ feeding tube bridled  Resp: Unlabored  Neuro: alert, answering in English despite  presence  /59  Pulse 75  Temp 98.2  F (36.8  C) (Oral)  Resp 18  Wt 75.3 kg (165 lb 14.4 oz)  SpO2 97%  BMI 26.04 kg/m2           Data:     Lab Results   Component Value Date    A1C 8.4 09/10/2017    A1C 9.2 06/01/2016    A1C 9.8 05/23/2016    A1C 7.4 04/23/2016    A1C 7.2 04/19/2016            Recent Labs   Lab Test  10/03/17   0754  10/02/17   1516   NA  136  136   POTASSIUM  3.6  3.2*   CHLORIDE  104  104   CO2  22  25   ANIONGAP  10  7   GLC  75  89   BUN  4*  1*   CR  0.34*  0.36*   LILLIAM  7.6*  7.5*     CBC RESULTS:   Recent Labs   Lab Test  09/30/17   1603   WBC  13.4*   RBC  3.73*   HGB  10.2*   HCT  31.0*   MCV  83   MCH  27.3   MCHC  32.9   RDW  15.6*   PLT  205       Melba Hoffman APRN -[id number]    Diabetes Management job code [id number]

## 2017-10-04 NOTE — PROGRESS NOTES
[facility]  MATERNAL CHILD HEALTH   SOCIAL WORK PROGRESS NOTE      DATA:     SW met with Anne while her parents were bedside. Yi  present to assist with communication.   Parents agree they are available to attend a care conference tomorrow at 11 am. Anne's father needs to bring her sister to the airport by 1pm so is somewhat concerned about the timing and may need to leave early. Anne states that she will be communicating with her , Vianey, today and informing him of the time. Anne has an Ipad and phone that can be utilized to contact Vianey.    Anne's mom, Rima, requests a letter advocating for a visa to be provided to Anne's , Vianey, so he is able to be in [place] with their son to provide additional support to Anne during this pregnancy.     INTERVENTION:     This  reviewed the chart and coordinated with the health care team. This  introduced myself and my role as their Maternal-Child Health , to Anne's parents.  Coordinated scheduling for care conference tomorrow.     ASSESSMENT:     Anne appeared awake and appreciative of SW visit. Parents appeared receptive and appreciative of medical team having the care conference.     PLAN:     SW to write letter of advocacy for Anne's  requesting a visa per family request.   SW to attend care conference tomorrow at 11 am.      Kjerstin Rydeen, [facility]   Social Worker  Maternal Child Health   Direct: [phone]  Pager: [phone]

## 2017-10-04 NOTE — PLAN OF CARE
Problem: Pain, Acute (Adult)  Goal: Acceptable Pain Control/Comfort Level  Patient will demonstrate the desired outcomes by discharge/transition of care.   VSS  Patient declined EFM at 1300 and 1400. States she will do it after her nap. She has been requesting IV pain meds every 4.5-5 hours today. IV diladid 0.3mg given. Encouraged patient to walk in room. She did do this x2 when up to BR. Had chicken broth and apple juice orally today without any problems. Patient told she can order regular diet now but she would like to wait until this afternoon. Plans to order rice and beans. No c/o of nausea or vomiting today.

## 2017-10-05 ENCOUNTER — HOSPITAL ENCOUNTER (INPATIENT)
Dept: ULTRASOUND IMAGING | Facility: CLINIC | Age: 27
End: 2017-10-05
Attending: OBSTETRICS & GYNECOLOGY
Payer: COMMERCIAL

## 2017-10-05 LAB
GLUCOSE BLDC GLUCOMTR-MCNC: 102 MG/DL (ref 70–99)
GLUCOSE BLDC GLUCOMTR-MCNC: 108 MG/DL (ref 70–99)
GLUCOSE BLDC GLUCOMTR-MCNC: 114 MG/DL (ref 70–99)
GLUCOSE BLDC GLUCOMTR-MCNC: 117 MG/DL (ref 70–99)
GLUCOSE BLDC GLUCOMTR-MCNC: 120 MG/DL (ref 70–99)
GLUCOSE BLDC GLUCOMTR-MCNC: 120 MG/DL (ref 70–99)
GLUCOSE BLDC GLUCOMTR-MCNC: 146 MG/DL (ref 70–99)
GLUCOSE BLDC GLUCOMTR-MCNC: 152 MG/DL (ref 70–99)
GLUCOSE BLDC GLUCOMTR-MCNC: 153 MG/DL (ref 70–99)
GLUCOSE BLDC GLUCOMTR-MCNC: 155 MG/DL (ref 70–99)
GLUCOSE BLDC GLUCOMTR-MCNC: 157 MG/DL (ref 70–99)
GLUCOSE BLDC GLUCOMTR-MCNC: 160 MG/DL (ref 70–99)
GLUCOSE BLDC GLUCOMTR-MCNC: 163 MG/DL (ref 70–99)
GLUCOSE BLDC GLUCOMTR-MCNC: 173 MG/DL (ref 70–99)
GLUCOSE BLDC GLUCOMTR-MCNC: 180 MG/DL (ref 70–99)
GLUCOSE BLDC GLUCOMTR-MCNC: 188 MG/DL (ref 70–99)
GLUCOSE BLDC GLUCOMTR-MCNC: 190 MG/DL (ref 70–99)
GLUCOSE BLDC GLUCOMTR-MCNC: 253 MG/DL (ref 70–99)
GLUCOSE BLDC GLUCOMTR-MCNC: 77 MG/DL (ref 70–99)
GLUCOSE BLDC GLUCOMTR-MCNC: 85 MG/DL (ref 70–99)
GLUCOSE BLDC GLUCOMTR-MCNC: 87 MG/DL (ref 70–99)
GLUCOSE BLDC GLUCOMTR-MCNC: 87 MG/DL (ref 70–99)
GLUCOSE BLDC GLUCOMTR-MCNC: 91 MG/DL (ref 70–99)

## 2017-10-05 PROCEDURE — S0028 INJECTION, FAMOTIDINE, 20 MG: HCPCS | Performed by: OBSTETRICS & GYNECOLOGY

## 2017-10-05 PROCEDURE — 25000125 ZZHC RX 250: Performed by: PHYSICIAN ASSISTANT

## 2017-10-05 PROCEDURE — 25000132 ZZH RX MED GY IP 250 OP 250 PS 637: Performed by: OBSTETRICS & GYNECOLOGY

## 2017-10-05 PROCEDURE — 12000030 ZZH R&B OB INTERMEDIATE UMMC

## 2017-10-05 PROCEDURE — 00000146 ZZHCL STATISTIC GLUCOSE BY METER IP

## 2017-10-05 PROCEDURE — 76819 FETAL BIOPHYS PROFIL W/O NST: CPT

## 2017-10-05 PROCEDURE — 25000128 H RX IP 250 OP 636: Performed by: OBSTETRICS & GYNECOLOGY

## 2017-10-05 PROCEDURE — 12000032 ZZH R&B OB CRITICAL UMMC

## 2017-10-05 PROCEDURE — 25000125 ZZHC RX 250: Performed by: OBSTETRICS & GYNECOLOGY

## 2017-10-05 RX ADMIN — HYDROMORPHONE HYDROCHLORIDE 0.3 MG: 1 INJECTION, SOLUTION INTRAMUSCULAR; INTRAVENOUS; SUBCUTANEOUS at 03:14

## 2017-10-05 RX ADMIN — METOCLOPRAMIDE HYDROCHLORIDE 10 MG: 5 INJECTION INTRAMUSCULAR; INTRAVENOUS at 19:21

## 2017-10-05 RX ADMIN — HYDROMORPHONE HYDROCHLORIDE 0.3 MG: 1 INJECTION, SOLUTION INTRAMUSCULAR; INTRAVENOUS; SUBCUTANEOUS at 17:14

## 2017-10-05 RX ADMIN — FAMOTIDINE 20 MG: 10 INJECTION, SOLUTION INTRAVENOUS at 18:20

## 2017-10-05 RX ADMIN — INSULIN ASPART 3 UNITS: 100 INJECTION, SOLUTION INTRAVENOUS; SUBCUTANEOUS at 12:25

## 2017-10-05 RX ADMIN — HYDROMORPHONE HYDROCHLORIDE 0.5 MG: 1 INJECTION, SOLUTION INTRAMUSCULAR; INTRAVENOUS; SUBCUTANEOUS at 21:11

## 2017-10-05 RX ADMIN — HUMAN INSULIN 3 UNITS/HR: 100 INJECTION, SOLUTION SUBCUTANEOUS at 18:58

## 2017-10-05 RX ADMIN — METOCLOPRAMIDE HYDROCHLORIDE 10 MG: 5 INJECTION INTRAMUSCULAR; INTRAVENOUS at 06:57

## 2017-10-05 RX ADMIN — INSULIN ASPART 5 UNITS: 100 INJECTION, SOLUTION INTRAVENOUS; SUBCUTANEOUS at 18:15

## 2017-10-05 RX ADMIN — METOCLOPRAMIDE HYDROCHLORIDE 10 MG: 5 INJECTION INTRAMUSCULAR; INTRAVENOUS at 14:28

## 2017-10-05 RX ADMIN — HYDROMORPHONE HYDROCHLORIDE 0.3 MG: 1 INJECTION, SOLUTION INTRAMUSCULAR; INTRAVENOUS; SUBCUTANEOUS at 06:58

## 2017-10-05 RX ADMIN — FAMOTIDINE 20 MG: 10 INJECTION, SOLUTION INTRAVENOUS at 05:58

## 2017-10-05 RX ADMIN — HUMAN INSULIN 1 UNITS/HR: 100 INJECTION, SOLUTION SUBCUTANEOUS at 12:10

## 2017-10-05 RX ADMIN — MULTIVITAMIN 15 ML: LIQUID ORAL at 08:54

## 2017-10-05 RX ADMIN — PANTOPRAZOLE SODIUM 40 MG: 40 INJECTION, POWDER, FOR SOLUTION INTRAVENOUS at 07:51

## 2017-10-05 RX ADMIN — HYDROMORPHONE HYDROCHLORIDE 0.3 MG: 1 INJECTION, SOLUTION INTRAMUSCULAR; INTRAVENOUS; SUBCUTANEOUS at 12:51

## 2017-10-05 RX ADMIN — HUMAN INSULIN 1.5 UNITS/HR: 100 INJECTION, SOLUTION SUBCUTANEOUS at 03:45

## 2017-10-05 NOTE — CARE CONFERENCE
[facility]  MATERNAL CHILD HEALTH   SOCIAL WORK PROGRESS NOTE      DATA:     Care conference held today with multidisciplinary team with Korean .   In attendance included: Anne, her , Vianey via Ipad, and her parents, Rima and Vinayak.    ObGyn team: Dr. Lopez (attending), Dr. Disla (resident)   Endocrinology/Diabetes: Melba Kingston Practice Nurse: Doris Muhammad   Dietician: Yesenia Covarrubias   Psychologist: Dr. Candy Alegre   RN Care Coordinator: Melba Hilton   Bedside RN: Apoorva   Social Work: Kjerstin Rydeen Obgyn, Dr. Lopez outlined goals for Anne as: being home, eating meals, and giving herself insulin as needed.   Dr. Lopez identifies that her issues of being in the hospital can be negated if she is able to follow the recommended dietary plan.   Anne has issues managing her diabetes when she's eaten foods that her stomach doesn't tolerate.     Recommendations are to eat smaller more frequent meals, foods low in fat and fiber. Minimal amounts of raw, uncooked fruits and vegetables. If she is able to follow these recommendations it is expected that Anne will be able to continue her     Dietary had provided a list of possible foods to eat yesterday, this was going to be translated into Korean for family to have on hand. Bedside RN to assist with this. Anne's mother, Rima, commented that she is more aware of what foods she can prepare after that discussion.      Psychologist, Dr. Candy Alegre, discussed Anne's mental health and strategies for coping with these difficult circumstances of medical need, and being far from her  and son.  Rima describes Anne's behavior, even prior to pregnancy, as having emotional responses that are exaggerated to the circumstances. She states that people have kept their distances based on not being able to know how Anne will react. Dr. Candy Alegre discusses Anne having generalized anxiety disorder and also  having the compounding factor of adjustment disorder during this pregnancy, being away from her family. She recommended Anne having ongoing support to gain skills in relaxation to assist in coping with her anxiety and emotional reactivity.    Anne seemed interested in receiving her prenatal care through [facility] and is able to receive psychology services with her weekly appointments.     Anne's , Vianey, able to comment on emotional difficulty for their family, particularly Anne and their 15 month old son, Carmelo, with being away from each other.     After the completion of the care conference this writer had a conversation with the family regarding letter requesting a visa be granted to Anne's , Vianey.     INTERVENTION:     This  reviewed the chart and coordinated with the health care team.   Assisted in coordination and facilitation of care conference.  Provided supportive counseling related to extended hospitalization and NICU admission.    Validated and normalized expressed emotions.   Provided emotional support and active listening.  Provided letter advocating for Anne's 's visa.    ASSESSMENT:     Anne and parents appeared interested in discussion and appreciative of multidisciplinary team. Parents arrived close to 11:30 so the conference had already started. Family appears to understand recommendations given by medical team.     Family appears able to verbalize thoughts, questions and needs. They are receptive and appreciative of SW support.     PLAN:     SW to follow for needs and support during Maternal Child Health Journey.      Kjerstin Rydeen, [facility]   Social Worker  Maternal Child Health   Direct: [phone]  Pager: [phone]

## 2017-10-05 NOTE — PLAN OF CARE
Problem: Diabetes in Pregnancy (Adult,Obstetrics,Pediatric)  Intervention: Optimize Glycemic Control  Pt has good appetite. Full dinner ordered with 75% consumed. No abdominal pain noted. Pt given tips on low fiber diet ordered. Questions answered.

## 2017-10-05 NOTE — PROGRESS NOTES
Diabetes Consult Daily  Progress Note          Assessment/Plan:     Ms. Anne Gunter is a 28 yo woman at 28w4d of pregnancy, with uncontrolled type 1 diabetes and history of multiple episodes of DKA during previous and current pregnancy, recent diagnosis of esophagitis, and possible gastroparesis, known to our diabetes service from several past admissions, who was readmitted on 9/30/17 with abdominal pain, nausea and vomiting after leaving AMA on 9/29/17.  No evidence of DKA on this admission.    Glucose controlled with variability around PO intake and significant insulin rate changes.    Plan:  -Continue IV insulin infusion (Ok for RN to titrate above or below algorithm rate) -- anticipate this will need to continue at least for the 24hrs of cycling feeds, then will evaluate for transition to subcutaneous insulin.  -Meal aspart  1unit/6g CHO with meals and snacks     Expect to devise a regimen of detemir in the AM (dosed for basal needs) along with NPH in the evening with cycled TFs.  Aspart doses will be pre-figured for small, frequent meals (maybe including Glucerna, Magic Cup)       Outpatient diabetes follow up: has appt with Dr. Colunga on 10/12/17.  Discussed with patient, bedside RN, dietician and primary team.           Interval History:   The last 24 hours progress and nursing notes reviewed.  Anne had the TF teaching yesterday- her mom did the hands-on practice.    TwoCal restarted has been tolerated at goal rate (50 cc/h).  Two meals tolerated also, in last 24h.  Plan to cycle feeds today (12 h versus 14h, depending on tolerance--  And total volume may be lower eventually, pending food tolerance).  During care conference Dr. Lopez emphasized the goal to get Anne out of hospital and on a successful nutrition plan, with her taking her own insulin.  This involves sticking to gastroparesis diet and TFs-- he gave examples of well-tolerated foods.  Anne said she likes to have  salads.  She did not offer low fiber foods she could eat.  Her mother said she would help Anne with food choices.  Anne's mother also expressed that Anne's worry and stress also raise glucose (namely missing her son and knowing her son misses her).    Anne said nothing helps her sadness, but she was willing to come weekly and see psychologist when she sees [facility].  Her  asked after the advocacy letter for the embassy.  [facility] team estimates discharge on Monday would be reasonable.    Insulin drip advanced to algorithm 3 overnight. Some high BGs following PO intake.  Drip rates up to 7 units/h, then to 0.  The 1 per 6 grams aspart for carbs seems to help stabilize.        Recent Labs  Lab 10/05/17  1050 10/05/17  1008 10/05/17  0903 10/05/17  0759 10/05/17  0648 10/05/17  0556  10/03/17  0754  10/02/17  1516  10/01/17  0635  09/30/17  2037  09/30/17  1603  09/29/17  0607   GLC  --   --   --   --   --   --   --  75  --  89  --  114*  --  166*  --  136*  --  106*   * 155* 190* 77 163* 157*  < >  --   < >  --   < >  --   < >  --   < >  --   < >  --    < > = values in this interval not displayed.            Review of Systems:   See interval hx          Medications:       Active Diet Order      Low Fiber Diet     Physical Exam:  Gen: alert, up in chair, participating in conversation  HEENT: NC/AT, mucous membranes are moist, NJ feeding tube bridled  Resp: Unlabored  Neuro: alert, answering and speaking in English though  present, also speaking in Hebrew  /64  Pulse 71  Temp 98.2  F (36.8  C) (Oral)  Resp 16  Wt 75.8 kg (167 lb 3.2 oz)  SpO2 97%  BMI 26.24 kg/m2           Data:     Lab Results   Component Value Date    A1C 8.4 09/10/2017    A1C 9.2 06/01/2016    A1C 9.8 05/23/2016    A1C 7.4 04/23/2016    A1C 7.2 04/19/2016            Recent Labs   Lab Test  10/03/17   0754  10/02/17   1516   NA  136  136   POTASSIUM  3.6  3.2*   CHLORIDE  104  104   CO2  22  25   ANIONGAP  10  7   GLC   75  89   BUN  4*  1*   CR  0.34*  0.36*   LILLIAM  7.6*  7.5*     CBC RESULTS:   Recent Labs   Lab Test  09/30/17   1603   WBC  13.4*   RBC  3.73*   HGB  10.2*   HCT  31.0*   MCV  83   MCH  27.3   MCHC  32.9   RDW  15.6*   PLT  205     Total face-to-face time today was 65 minutes.  Extended time spent in care team and family conference to clarify pt's course of illness and expectations going forward.   Melba Dalton APRN -[id number]    Diabetes Management job code [id number]

## 2017-10-05 NOTE — PROGRESS NOTES
Antepartum progress note  10/5/2017   HD#6     S:  Anne has no complaints today. Resting this morning. Denies any abdominal pain. Reports some nausea this morning.  Otherwise no concerns.    PE:  Vitals:    10/04/17 2021 10/04/17 2345 10/05/17 0600 10/05/17 0900   BP: 113/57 94/56 99/50 103/64   Pulse:       Resp:  16  16   Temp: 98.3  F (36.8  C) 98  F (36.7  C)  98.2  F (36.8  C)   TempSrc: Oral Oral  Oral   SpO2:       Weight:         Gen:                NAD. NJ tube in place.   CV:                 Well perfused  Pulm:              Breathing comfortably  Abd:                Soft, gravid, nontender     FHT:              Baseline 140 BPM, moderate variability, + accelerations, no decelerations                   [place]:             no contractions     10/2 BPP: 8/8, EDMOND 16.4 cm, breech     Assessment/Plan  Anne Gunter is a 27 year old , at 29w1d by stated JUANJO c/w 25w5d US, admitted with severe abdominal pain, nausea, and vomiting. She has had multiple similar admissions. PMHx is notable for poorly controlled T1DM with multiple admissions for DKA, severe gastroparesis with post-pyloric feeding tube in place, limited prenatal care in [place], history of PLTCS x1 at 32 weeks for fetal indications in the setting of PTD, and preeclampsia without severe features.     Plan for care conference today including social work,  psych, MFM, endocrine, nutrition, and patients family.     1. Poorly controlled T1DM, recurrent DKA:   - Multiple recent admissions for poorly controlled T1DM and DKA.  Serum ketones were elevated on admission to 1.3, although BG was 102 without gap and normal bicarb, not c/w DKA at this time.   - On insulin drip at this time managed by endocrinology. Additional coverage with insulin SQ for meals.   - Will discuss insulin needs with endo with cycling tube feeds  - Close BG monitoring.      2. Gastroparesis, recurrent abdominal pain, malnutrition:  -TF restarted on 10/2/17 and advanced to  regular diet yesterday.   - Plan to start cycling tube feeds today, per nutrition will start with 18 hour cycle.  - Scheduled IV reglan.  Continue daily protonix, pepcid. PRN IV dilaudid > decreased to every 4 hours   - S/p GI consult during previous admission.   - NJ tube in place   - Bowel regimen when taking PO meds.   - Follow cycling instructions outline in nutritions : begin with 65ml/hr for 18 hours followed by 85ml/hr for 14 hours followed by 100mls/hr for 12 hours. Refer to Yesenia Covarrubias's Note from 17.     3. Preeclampsia without severe features:   - BPs normotensive on admission.  Diagnosed last admission due to mild range BPs and UPC 0.6, with UPC of 0.38 in 2016.  Continue to monitor BPs, HELLP labs wnl on admission. Dx is unclear given pain at time of previous elevated BP and baseline proteinuria.     4. Anxiety:   - S/p  psych/SW consult last admission. Seen weekly by  psychologist. Continue to monitor.      5. PNC:             - Rh positive, Rubella immune, GBS positive- will need PCN if delivery is imminent, placenta posterior      6. FWB:                         - Category I FHT, reactive. TID monitoring   - Plan for twice weekly BPPs, last 10/2. Last noted to be breech  - Tdap if inpatient next week.     Jeannie Disla MD  OB/GYN PGY3

## 2017-10-05 NOTE — PLAN OF CARE
Problem: Diabetes in Pregnancy (Adult,Obstetrics,Pediatric)  Goal: Signs and Symptoms of Listed Potential Problems Will be Absent, Minimized or Managed (Diabetes in Pregnancy)  Signs and symptoms of listed potential problems will be absent, minimized or managed by discharge/transition of care (reference Diabetes in Pregnancy (Adult,Obstetrics,Pediatric) CPG).   Blood sugars ranging from 77 to 188. Insulin drip continues with SQ insulin coverage for meals and snacks. Patient does not like to eat food for breakfast or lunch, just liquids. .

## 2017-10-05 NOTE — PROVIDER NOTIFICATION
10/05/17 0245   Provider Notification   Provider Name/Title Dr. Covarrubias   Method of Notification Electronic Page   Notification Reason Lab/Diagnostic Study     Provider notified that patient moved up to algorithm 3 after 3rd consecutive rising blood sugar.

## 2017-10-05 NOTE — PLAN OF CARE
Problem: Pain, Acute (Adult)  Goal: Identify Related Risk Factors and Signs and Symptoms  Related risk factors and signs and symptoms are identified upon initiation of Human Response Clinical Practice Guideline (CPG).   Patient taking diladid 0.3 mg IV at 0700 and 1250 for  Abdominal pain. Patient has only had chicken broth and apple juice this morning. Then she had a small bag of potato chips for a snack. She felt nauseated after the care conference. No vomiting. Patient had been sitting up for about 2 1/2 hours in the family lounge for the conference and was very emotional talking about her son and , getting weepy. Tube feeding have started to be increased at 1300; up from 50 ml/hr to 60 ml /hr and will cont to be increased every 2 hours until we reach 100 ml/hr.

## 2017-10-05 NOTE — PROGRESS NOTES
CLINICAL NUTRITION SERVICES - REASSESSMENT NOTE     Nutrition Prescription    RECOMMENDATIONS FOR MDs/PROVIDERS TO ORDER:  1. Recommend checking phos as previously low (2.2 mg/dL) on 10/2. Replace PRN pending lab value.     2. Continue low fiber diet. Continue to encourage PO intake of meals TID with appropriate food choices.     3. Recommend pt follow with an OP RD for ongoing nutrition education and potential tube feeding management.     4. Recommend endocrine continue to follow for insulin recommendations with cycled tube feeds and PO intake.     Malnutrition Status:    Patient does not meet two of the above criteria necessary for diagnosing malnutrition    Recommendations already ordered by Registered Dietitian (RD):  1. Ordered Glucerna @ 10am and 2pm     2. Modified order to cycle tube feeds, increasing 10 mL q 2 hrs to eventual goal of TwoCal HN @ 100 mL/hr x 12 hrs (8pm-8am) to provide same nutritional provisions as current regimen. If pt does not tolerate feeds of 100 mL/hr x 12 hrs, would recommend 85 mL/hr x 14 hrs (8pm-10am)     3. Continue free water flushes of 30 mL q 4 hrs for patency. Increase as needed pending hydration status/po intake of fluid.     4. Continue 15 mL Certavite to meet micronutrient needs.     Future/Additional Recommendations:  1. As PO intake improves, consider ordering calorie counts to assess adequacy of po and ability to wean/adjust tube feeds.     2. Review diet education for gastroparesis PRN prior to discharge.      EVALUATION OF THE PROGRESS TOWARD GOALS   Diet: Low Fiber     Nutrition Support: EN via NJ-tube of TwoCal HN @ 50 mL/hr, continuous (1200 ml/day) to provide 2400 kcals (32 Kacls/kg/day), 101 g PRO (1.3 gms/kg/day), 840 ml free H2O, 263 g CHO and 6 g Fiber daily.  -Free water flush 30 mL q 4 hrs  -15 mL Certavite daily via TF   -Per discussion with Anne and the team, Anne has been tolerating tube feeds well at goal.     Intake: Over the past week, diet has  advanced from NPO -> Clear Liquid-> Full Liquid -> Low-Fiber diet. Per discussion with Anne and the team, Anne has thus far tolerated all PO as her diet has advanced over the past week. Diet recently advanced from full liquids to low fiber yesterday. Per team, Anne tolerated lunch, dinner, and breakfast AM thus far on low-fiber diet. RD completed diet education on gastroparesis diet with Anne, Anne's mother, and the  10/4. RD gave Anne and family ideas for meal options at home.      NEW FINDINGS   -Tube feeds initiated 10/2, reached goal 10/4  -Gastroparesis diet education completed 10/4   -Weight: Most recent wt 75.8 kg, trending up 5 lbs since admission wt at 73.5 kg.   -Labs: K+ WNL, Mg++ WNL, Phos 2.2 (L) on 10/2; BG  mg/dL   -Medication: Per discussion with endocrine, plan is for Anne to remain on insulin drip until able to demonstrate ability to tolerate cycled feeds. Once tube feeds are cycled will require NPH for insulin coverage during 12-14 hr tube feeds.     MALNUTRITION  % Intake: < 75% for > 7 days (non-severe)  % Weight Loss: None noted over the past week. Pt previously with wt loss PTA, however, did not meet criteria, though was likely non-severe given pregnancy   Subcutaneous Fat Loss: None observed  Muscle Loss: None observed  Fluid Accumulation/Edema: None noted  Malnutrition Diagnosis: Patient does not meet two of the above criteria necessary for diagnosing malnutrition    Previous Goals   1. Total avg nutritional intake to meet a minimum of 30 kcal/kg and 1.2 g PRO/kg daily (per dosing wt 75 kg).  Evaluation: Not met, tube feeds were just started 10/2, and recently advanced to goal   2. Weight gains of 0.3 kg (0.7 lbs) per week (15-25 lbs total wt gain during this pregnancy)   Evaluation: Met  3. BG </= 180  Evaluation: Met    Previous Nutrition Diagnosis  Inadequate oral intake related to N/V and restrictive diet as evidenced by pt vomiting >5 times today and unable to  keep food/fluids down and current NPO status    Evaluation: Improving    CURRENT NUTRITION DIAGNOSIS  Predicted inadequate nutrient intake (calories/protein) related to nausea/vomiting, restrictive diet hindering po intake, reliant on tube feeds to meet nutrition needs with potential for intolerance or interruptions.       INTERVENTIONS  Implementation  Collaboration with other providers - Discussed nutrition POC with endocrine and OB team. Discussed insulin regimen and tube feed regimen (per above) and diabetes diet education with endocrine. Per discussion with endocrine, will not do education for carb counting and plans for set dose insulin regimen at home. Also discussed with team, translating diet education handout to Malay language for family to have at home. Spoke with RN on cycling TF and nutrition POC.   Enteral Nutrition - Modify rate and schedule per above   Medical food supplement therapy - Ordered Glucerna BID between meals.     Goals  1. Total avg nutritional intake to meet a minimum of 30 kcal/kg and 1.2 g PRO/kg daily (per dosing wt 75 kg).  2. Weight gains of 0.3 kg (0.7 lbs) per week (15-25 lbs total wt gain during this pregnancy)   3. BG </= 180    Monitoring/Evaluation  Progress toward goals will be monitored and evaluated per protocol.      Yesenia Covarrubias RD, LD  Unit Pager: [phone]

## 2017-10-05 NOTE — PLAN OF CARE
Problem: Pain, Acute (Adult)  Goal: Identify Related Risk Factors and Signs and Symptoms  Related risk factors and signs and symptoms are identified upon initiation of Human Response Clinical Practice Guideline (CPG).   Outcome: No Change  Patient able to sleep part of the night; interrupted by hourly blood sugars and pain at 0300. Relief with dilaudid 0.3. VSS; EFM as charted; Blood sugars as charted. Tolerated NJ feeding well. Continue expectant management.

## 2017-10-06 DIAGNOSIS — O24.012 TYPE 1 DIABETES MELLITUS IN PREGNANCY, SECOND TRIMESTER: ICD-10-CM

## 2017-10-06 DIAGNOSIS — O09.93 HIGH-RISK PREGNANCY IN THIRD TRIMESTER: Primary | ICD-10-CM

## 2017-10-06 LAB
GLUCOSE BLDC GLUCOMTR-MCNC: 101 MG/DL (ref 70–99)
GLUCOSE BLDC GLUCOMTR-MCNC: 103 MG/DL (ref 70–99)
GLUCOSE BLDC GLUCOMTR-MCNC: 105 MG/DL (ref 70–99)
GLUCOSE BLDC GLUCOMTR-MCNC: 114 MG/DL (ref 70–99)
GLUCOSE BLDC GLUCOMTR-MCNC: 118 MG/DL (ref 70–99)
GLUCOSE BLDC GLUCOMTR-MCNC: 124 MG/DL (ref 70–99)
GLUCOSE BLDC GLUCOMTR-MCNC: 125 MG/DL (ref 70–99)
GLUCOSE BLDC GLUCOMTR-MCNC: 128 MG/DL (ref 70–99)
GLUCOSE BLDC GLUCOMTR-MCNC: 133 MG/DL (ref 70–99)
GLUCOSE BLDC GLUCOMTR-MCNC: 140 MG/DL (ref 70–99)
GLUCOSE BLDC GLUCOMTR-MCNC: 156 MG/DL (ref 70–99)
GLUCOSE BLDC GLUCOMTR-MCNC: 167 MG/DL (ref 70–99)
GLUCOSE BLDC GLUCOMTR-MCNC: 167 MG/DL (ref 70–99)
GLUCOSE BLDC GLUCOMTR-MCNC: 184 MG/DL (ref 70–99)
GLUCOSE BLDC GLUCOMTR-MCNC: 186 MG/DL (ref 70–99)
GLUCOSE BLDC GLUCOMTR-MCNC: 189 MG/DL (ref 70–99)
GLUCOSE BLDC GLUCOMTR-MCNC: 190 MG/DL (ref 70–99)
GLUCOSE BLDC GLUCOMTR-MCNC: 198 MG/DL (ref 70–99)
GLUCOSE BLDC GLUCOMTR-MCNC: 50 MG/DL (ref 70–99)
GLUCOSE BLDC GLUCOMTR-MCNC: 58 MG/DL (ref 70–99)
GLUCOSE BLDC GLUCOMTR-MCNC: 76 MG/DL (ref 70–99)
GLUCOSE BLDC GLUCOMTR-MCNC: 79 MG/DL (ref 70–99)
GLUCOSE BLDC GLUCOMTR-MCNC: 85 MG/DL (ref 70–99)
GLUCOSE BLDC GLUCOMTR-MCNC: 98 MG/DL (ref 70–99)
PHOSPHATE SERPL-MCNC: 1.3 MG/DL (ref 2.5–4.5)

## 2017-10-06 PROCEDURE — 12000032 ZZH R&B OB CRITICAL UMMC

## 2017-10-06 PROCEDURE — 25000128 H RX IP 250 OP 636: Performed by: OBSTETRICS & GYNECOLOGY

## 2017-10-06 PROCEDURE — 25000132 ZZH RX MED GY IP 250 OP 250 PS 637: Performed by: OBSTETRICS & GYNECOLOGY

## 2017-10-06 PROCEDURE — 84100 ASSAY OF PHOSPHORUS: CPT | Performed by: OBSTETRICS & GYNECOLOGY

## 2017-10-06 PROCEDURE — 25000125 ZZHC RX 250: Performed by: OBSTETRICS & GYNECOLOGY

## 2017-10-06 PROCEDURE — 25000125 ZZHC RX 250: Performed by: PHYSICIAN ASSISTANT

## 2017-10-06 PROCEDURE — 00000146 ZZHCL STATISTIC GLUCOSE BY METER IP

## 2017-10-06 PROCEDURE — T1013 SIGN LANG/ORAL INTERPRETER: HCPCS | Mod: U3

## 2017-10-06 PROCEDURE — 12000030 ZZH R&B OB INTERMEDIATE UMMC

## 2017-10-06 PROCEDURE — S0028 INJECTION, FAMOTIDINE, 20 MG: HCPCS | Performed by: OBSTETRICS & GYNECOLOGY

## 2017-10-06 RX ORDER — ACETAMINOPHEN 325 MG/1
650 TABLET ORAL EVERY 4 HOURS PRN
Status: DISCONTINUED | OUTPATIENT
Start: 2017-10-06 | End: 2017-10-08 | Stop reason: HOSPADM

## 2017-10-06 RX ORDER — HYDROMORPHONE HYDROCHLORIDE 1 MG/ML
.3-.5 INJECTION, SOLUTION INTRAMUSCULAR; INTRAVENOUS; SUBCUTANEOUS EVERY 6 HOURS PRN
Status: DISCONTINUED | OUTPATIENT
Start: 2017-10-06 | End: 2017-10-08 | Stop reason: HOSPADM

## 2017-10-06 RX ADMIN — HUMAN INSULIN 7 UNITS/HR: 100 INJECTION, SOLUTION SUBCUTANEOUS at 01:11

## 2017-10-06 RX ADMIN — HYDROMORPHONE HYDROCHLORIDE 0.3 MG: 1 INJECTION, SOLUTION INTRAMUSCULAR; INTRAVENOUS; SUBCUTANEOUS at 01:05

## 2017-10-06 RX ADMIN — HYDROMORPHONE HYDROCHLORIDE 0.3 MG: 1 INJECTION, SOLUTION INTRAMUSCULAR; INTRAVENOUS; SUBCUTANEOUS at 22:32

## 2017-10-06 RX ADMIN — METOCLOPRAMIDE HYDROCHLORIDE 10 MG: 5 INJECTION INTRAMUSCULAR; INTRAVENOUS at 18:27

## 2017-10-06 RX ADMIN — DIBASIC SODIUM PHOSPHATE, MONOBASIC POTASSIUM PHOSPHATE AND MONOBASIC SODIUM PHOSPHATE 250 MG: 852; 155; 130 TABLET ORAL at 12:54

## 2017-10-06 RX ADMIN — HUMAN INSULIN 4 UNITS/HR: 100 INJECTION, SOLUTION SUBCUTANEOUS at 15:21

## 2017-10-06 RX ADMIN — INSULIN ASPART 10 UNITS: 100 INJECTION, SOLUTION INTRAVENOUS; SUBCUTANEOUS at 15:05

## 2017-10-06 RX ADMIN — DIBASIC SODIUM PHOSPHATE, MONOBASIC POTASSIUM PHOSPHATE AND MONOBASIC SODIUM PHOSPHATE 250 MG: 852; 155; 130 TABLET ORAL at 22:21

## 2017-10-06 RX ADMIN — HUMAN INSULIN 7 UNITS/HR: 100 INJECTION, SOLUTION SUBCUTANEOUS at 07:43

## 2017-10-06 RX ADMIN — PANTOPRAZOLE SODIUM 40 MG: 40 INJECTION, POWDER, FOR SOLUTION INTRAVENOUS at 09:15

## 2017-10-06 RX ADMIN — METOCLOPRAMIDE HYDROCHLORIDE 10 MG: 5 INJECTION INTRAMUSCULAR; INTRAVENOUS at 07:00

## 2017-10-06 RX ADMIN — HYDROMORPHONE HYDROCHLORIDE 0.3 MG: 1 INJECTION, SOLUTION INTRAMUSCULAR; INTRAVENOUS; SUBCUTANEOUS at 14:05

## 2017-10-06 RX ADMIN — INSULIN ASPART 2 UNITS: 100 INJECTION, SOLUTION INTRAVENOUS; SUBCUTANEOUS at 00:14

## 2017-10-06 RX ADMIN — FAMOTIDINE 20 MG: 10 INJECTION, SOLUTION INTRAVENOUS at 05:57

## 2017-10-06 RX ADMIN — METOCLOPRAMIDE HYDROCHLORIDE 10 MG: 5 INJECTION INTRAMUSCULAR; INTRAVENOUS at 12:43

## 2017-10-06 RX ADMIN — FAMOTIDINE 20 MG: 10 INJECTION, SOLUTION INTRAVENOUS at 18:27

## 2017-10-06 RX ADMIN — INSULIN ASPART 5 UNITS: 100 INJECTION, SOLUTION INTRAVENOUS; SUBCUTANEOUS at 05:08

## 2017-10-06 RX ADMIN — HYDROMORPHONE HYDROCHLORIDE 0.3 MG: 1 INJECTION, SOLUTION INTRAMUSCULAR; INTRAVENOUS; SUBCUTANEOUS at 09:10

## 2017-10-06 RX ADMIN — HYDROMORPHONE HYDROCHLORIDE 0.3 MG: 1 INJECTION, SOLUTION INTRAMUSCULAR; INTRAVENOUS; SUBCUTANEOUS at 05:00

## 2017-10-06 RX ADMIN — METOCLOPRAMIDE HYDROCHLORIDE 10 MG: 5 INJECTION INTRAMUSCULAR; INTRAVENOUS at 01:05

## 2017-10-06 NOTE — PROVIDER NOTIFICATION
10/06/17 0005   Intake (mL)   P.O. 400 mL  (chicken broth)   Carbohydrate Intake (gm) 16 gm  (vanilla ice cream cup)     Patient requested both broth and ice cream. States is hungry and in pain. Spoke about ice cream not necessarily being a great choice but patient adamant that she have it.

## 2017-10-06 NOTE — PROVIDER NOTIFICATION
10/06/17 0625   Provider Notification   Provider Name/Title Dr. Mcmanus   Method of Notification Electronic Page   Notification Reason Lab/Diagnostic Study     Provider notified that patient declined lab draw and am monitoring.

## 2017-10-06 NOTE — PROGRESS NOTES
Diabetes Consult Daily  Progress Note          Assessment/Plan:     Ms. Anne Gunter is a 26 yo woman at 28w4d of pregnancy, with uncontrolled type 1 diabetes and history of multiple episodes of DKA during previous and current pregnancy, recent diagnosis of esophagitis, and possible gastroparesis, known to our diabetes service from several past admissions, who was readmitted on 9/30/17 with abdominal pain, nausea and vomiting after leaving AMA on 9/29/17.  No evidence of DKA on this admission.    Glucose controlled with variability around PO intake and significant insulin rate changes.  TF rate increased last night for 12 hour cycled feed  BG range   IV rates off tube feeds  with no oral intake 1.5 units/hr for 3 sequential hours  with alternating increase to 4 units and next hour to 1.5 units x 2   IV rate on TF @ 100 ml/hr starting @2100  0-7 units per hour  Taking in little solid PO   ++ feeding tube was clogged late this AM, partially cleared but with resistance.  Will postpone transition to SQ insulin for tube feeds in case unable to meet goal rates or tube non functional  Plan:  -Continue IV insulin infusion (Ok for RN to titrate above or below algorithm rate) -- anticipate this will need to continue at least for the 24hrs of cycling feeds, then will evaluate for transition to subcutaneous insulin.  -Meal aspart  1unit/6g CHO with meals and snacks     Expect to devise a regimen of detemir in the AM (dosed for basal needs) along with NPH in the evening with cycled TFs.  Aspart doses will be pre-figured for small, frequent meals (maybe including Glucerna, Magic Cup)       Outpatient diabetes follow up: has appt with Dr. Colunga on 10/12/17.  Discussed with patient, bedside RN, dietician and primary team.           Interval History:   The last 24 hours progress and nursing notes reviewed.  Anne had the TF teaching 10-04-17- her mom did the hands-on practice.  + feeding tube  "clogged this AM, partially cleared but had \"resistance\"  Insulin drip remains advanced to algorithm 3 since 10/04/17  Some high BGs following PO intake.  Drip rates up to 7 units/h, then to 0.  The 1 per 6 grams aspart for carbs seems to help stabilize when has po intake.  BG range  last 24 hours  TwoCal feeding  has been tolerated at goal rate (100 cc/h).  Taking in chicken broth and juice no solid food , but had ice cream @12:25 AM  in last 24h.   cycle feeds last night (12 h versus 14h,  And total volume may be lower eventually, pending food tolerance).  Had  pain during night, not contractions  PTA took detemir 14 units BID,  Fixed 5 units novalog for meals and correction insulin    During care conference Dr. Lopez emphasized the goal to get Anne out of hospital and on a successful nutrition plan, with her taking her own insulin.  This involves sticking to gastroparesis diet and TFs-- he gave examples of well-tolerated foods.  Anne said she likes to have salads.  She did not offer low fiber foods she could eat.  Her mother said she would help Anne with food choices.  Anne's mother also expressed that Anne's worry and stress also raise glucose (namely missing her son and knowing her son misses her).    Anne said nothing helps her sadness, but she was willing to come weekly and see psychologist when she sees M.  Her  asked after the advocacy letter for the [employer].  [facility] team estimates discharge on Monday would be reasonable.            Recent Labs  Lab 10/06/17  1459 10/06/17  1354 10/06/17  1247 10/06/17  1146 10/06/17  1047 10/06/17  1009  10/03/17  0754  10/02/17  1516  10/01/17  0635  09/30/17  2037  09/30/17  1603   GLC  --   --   --   --   --   --   --  75  --  89  --  114*  --  166*  --  136*   * 105* 98 128* 101* 85  < >  --   < >  --   < >  --   < >  --   < >  --    < > = values in this interval not displayed.            Review of Systems:   See interval hx          Medications: "       Active Diet Order      Low Fiber Diet     Physical Exam:  Gen: alert,lying in bed, participating in conversation  HEENT: NC/AT, mucous membranes are moist, NJ feeding tube bridled  Resp: Unlabored  Neuro: alert, answering and speaking in English , no  present   /58  Pulse 106  Temp 97.8  F (36.6  C) (Oral)  Resp 16  Wt 75.8 kg (167 lb 3.2 oz)  SpO2 97%  BMI 26.24 kg/m2           Data:     Lab Results   Component Value Date    A1C 8.4 09/10/2017    A1C 9.2 06/01/2016    A1C 9.8 05/23/2016    A1C 7.4 04/23/2016    A1C 7.2 04/19/2016            Recent Labs   Lab Test  10/03/17   0754  10/02/17   1516   NA  136  136   POTASSIUM  3.6  3.2*   CHLORIDE  104  104   CO2  22  25   ANIONGAP  10  7   GLC  75  89   BUN  4*  1*   CR  0.34*  0.36*   LILLIAM  7.6*  7.5*     CBC RESULTS:   Recent Labs   Lab Test  09/30/17   1603   WBC  13.4*   RBC  3.73*   HGB  10.2*   HCT  31.0*   MCV  83   MCH  27.3   MCHC  32.9   RDW  15.6*   PLT  205       Kerry Stoll APRN [id number]    Diabetes Management job code [id number]

## 2017-10-06 NOTE — PLAN OF CARE
Problem: Diabetes in Pregnancy (Adult,Obstetrics,Pediatric)  Goal: Signs and Symptoms of Listed Potential Problems Will be Absent, Minimized or Managed (Diabetes in Pregnancy)  Signs and symptoms of listed potential problems will be absent, minimized or managed by discharge/transition of care (reference Diabetes in Pregnancy (Adult,Obstetrics,Pediatric) CPG).   Outcome: No Change  Pt on insulin drip, on algorithm 3. Blood sugars elevated. Finger stick blood glucose levels every hour. Pt tolerated well chicken broth X2 times, apple juice, declines ordering dinner or any solid food. Running NJ with formula feeding up to 100ml/hour started at 2100; plan to run at this amount overnight and stop at 0800. Low fiber menu printed and given to patient  for ordering. Pt declines SCDs, collect urine on hat and to take Carafate suspension. Last BM 10/3

## 2017-10-06 NOTE — PLAN OF CARE
Problem: Diabetes in Pregnancy (Adult,Obstetrics,Pediatric)  Goal: Signs and Symptoms of Listed Potential Problems Will be Absent, Minimized or Managed (Diabetes in Pregnancy)  Signs and symptoms of listed potential problems will be absent, minimized or managed by discharge/transition of care (reference Diabetes in Pregnancy (Adult,Obstetrics,Pediatric) CPG).   Outcome: No Change  BG 58.  Pt asymptomatic.  Insulin infusion stopped. 240 ml of apple juice given orally per pt request. Dr. Disla notified.     BG 50 @ 15 min recheck.  Pt refusing intervention and wants to wait another 15 minutes for another BG level.  RN remained @ BS. Pt remain asymptomatic. FHR reactive.    Pt did get insulin coverage for meal @ 1500, but the pt ended up not eating the bun of her chicken sandwich.

## 2017-10-06 NOTE — PLAN OF CARE
"Problem: Diabetes in Pregnancy (Adult,Obstetrics,Pediatric)  Goal: Signs and Symptoms of Listed Potential Problems Will be Absent, Minimized or Managed (Diabetes in Pregnancy)  Signs and symptoms of listed potential problems will be absent, minimized or managed by discharge/transition of care (reference Diabetes in Pregnancy (Adult,Obstetrics,Pediatric) CPG).   Outcome: No Change  Patient awake part of the night with pain. Denies contractions, bleeding, loss of fluid. VSS. Declines being monitored at 0600. Says \"later\". Continue diabetic management.      "

## 2017-10-06 NOTE — PLAN OF CARE
Problem: Patient Care Overview  Goal: Plan of Care/Patient Progress Review  Outcome: Therapy, progress towards functional goals is fair  Tube feeding stopped and 30 mL water flushed.  About 5 minutes later attempted to administer liquid vitamin via NJ, but could not flush tube.  Omar JOAQUIN flyer notified and came to help flush tube and assess situation.  Small amounts of warm water flushing, but still meeting resistance. RN flyer plans to come back and reassess tube again later today.

## 2017-10-06 NOTE — PROGRESS NOTES
Antepartum progress note  10/6/2017   HD#7     S:  Doing ok this morning. Denies any pain or nausea, denies vomiting.  Did not eat much yesterday because she did not feel hungry.    PE:  Vitals:    10/05/17 1720 10/05/17 2011 10/06/17 0005 10/06/17 0410   BP: 119/72 96/52 93/52 108/68   Pulse: 106      Resp:  18 16   Temp: 97.9  F (36.6  C) 98.2  F (36.8  C) 98.2  F (36.8  C)    TempSrc: Axillary Oral Oral    SpO2:       Weight:         Gen:                NAD. NJ tube in place.   CV:                 Well perfused  Pulm:              Breathing comfortably  Abd:                Soft, gravid, nontender     FHT:              Baseline 140 BPM, moderate variability, + accelerations, no decelerations                   [place]:             no contractions     10/2 BPP: 8/8, EDMOND 16.4 cm, breech     Assessment/Plan  Anne Gunter is a 27 year old , at 29w2d by stated JUANJO c/w 25w5d US, admitted with severe abdominal pain, nausea, and vomiting. She has had multiple similar admissions. PMHx is notable for poorly controlled T1DM with multiple admissions for DKA, severe gastroparesis with post-pyloric feeding tube in place, limited prenatal care in [place], history of PLTCS x1 at 32 weeks for fetal indications in the setting of PTD, and preeclampsia without severe features.      1. Poorly controlled T1DM, recurrent DKA:   - Multiple recent admissions for poorly controlled T1DM and DKA.  Serum ketones were elevated on admission to 1.3, although BG was 102 without gap and normal bicarb, not c/w DKA at this time.   - On insulin drip at this time managed by endocrinology. Additional coverage with insulin SQ for meals.   - Endocrine to manage insulin needs during tube feeds  - Close BG monitoring.      2. Gastroparesis, recurrent abdominal pain, malnutrition:  -TF restarted on 10/2/17 and advanced to regular diet yesterday.   - Plan to continue cycling tube feeds today, 12 hour cycle tonight  - Scheduled IV reglan.  Continue daily  protonix, pepcid. Tylenol PRN, PRN IV dilaudid > decreased to every 4 hours   - S/p GI consult during previous admission.   - NJ tube in place   - Bowel regimen when taking PO meds.   - Follow cycling instructions outline in nutritions : begin with 65ml/hr for 18 hours followed by 85ml/hr for 14 hours followed by 100mls/hr for 12 hours. Refer to Yesenia Covarrubias's Note from 17.     3. Preeclampsia without severe features:   - BPs normotensive on admission.  Diagnosed last admission due to mild range BPs and UPC 0.6, with UPC of 0.38 in 2016.  Continue to monitor BPs, HELLP labs wnl on admission. Dx is unclear given pain at time of previous elevated BP and baseline proteinuria.     4. Anxiety:   - S/p  psych/SW consult last admission. Seen weekly by  psychologist. Continue to monitor.      5. PNC:             - Rh positive, Rubella immune, GBS positive- will need PCN if delivery is imminent, placenta posterior      6. FWB:                         - Category I FHT, reactive. TID monitoring   - Plan for twice weekly BPPs, last 10/2. Last noted to be breech  - Tdap if inpatient next week.     Jeannie Disla MD  OB/GYN PGY3

## 2017-10-06 NOTE — PLAN OF CARE
Pt is in better spirits today that last week. Pt is trying to eat solid food and does like the boost-like supplements. Pt has had some pain today but is spacing out pain medication per MD orders.  NJ continues to be unflushable despite using clog zapper x2 and trying multiple position changes.  Dr. Disla notified and is making a plan to assess NJ further.

## 2017-10-06 NOTE — PLAN OF CARE
Problem: Pain, Acute (Adult)  Goal: Identify Related Risk Factors and Signs and Symptoms  Related risk factors and signs and symptoms are identified upon initiation of Human Response Clinical Practice Guideline (CPG).   Outcome: Improving  Pt having upper abdominal pain dilaudid 0.3mg IV given (SEE MAR) pt states pain improves. Denies leaking bleeding or feeling ctx's. Baby reactive on monitor. Ctx's X5. Pt overall calm and cooperative with cares. Talking face to face with family.

## 2017-10-07 ENCOUNTER — OFFICE VISIT (OUTPATIENT)
Dept: INTERPRETER SERVICES | Facility: CLINIC | Age: 27
End: 2017-10-07

## 2017-10-07 LAB
GLUCOSE BLDC GLUCOMTR-MCNC: 100 MG/DL (ref 70–99)
GLUCOSE BLDC GLUCOMTR-MCNC: 101 MG/DL (ref 70–99)
GLUCOSE BLDC GLUCOMTR-MCNC: 101 MG/DL (ref 70–99)
GLUCOSE BLDC GLUCOMTR-MCNC: 110 MG/DL (ref 70–99)
GLUCOSE BLDC GLUCOMTR-MCNC: 110 MG/DL (ref 70–99)
GLUCOSE BLDC GLUCOMTR-MCNC: 114 MG/DL (ref 70–99)
GLUCOSE BLDC GLUCOMTR-MCNC: 122 MG/DL (ref 70–99)
GLUCOSE BLDC GLUCOMTR-MCNC: 124 MG/DL (ref 70–99)
GLUCOSE BLDC GLUCOMTR-MCNC: 129 MG/DL (ref 70–99)
GLUCOSE BLDC GLUCOMTR-MCNC: 135 MG/DL (ref 70–99)
GLUCOSE BLDC GLUCOMTR-MCNC: 138 MG/DL (ref 70–99)
GLUCOSE BLDC GLUCOMTR-MCNC: 165 MG/DL (ref 70–99)
GLUCOSE BLDC GLUCOMTR-MCNC: 172 MG/DL (ref 70–99)
GLUCOSE BLDC GLUCOMTR-MCNC: 86 MG/DL (ref 70–99)
GLUCOSE BLDC GLUCOMTR-MCNC: 89 MG/DL (ref 70–99)
GLUCOSE BLDC GLUCOMTR-MCNC: 91 MG/DL (ref 70–99)
GLUCOSE BLDC GLUCOMTR-MCNC: 93 MG/DL (ref 70–99)
GLUCOSE BLDC GLUCOMTR-MCNC: 94 MG/DL (ref 70–99)

## 2017-10-07 PROCEDURE — T1013 SIGN LANG/ORAL INTERPRETER: HCPCS | Mod: U3

## 2017-10-07 PROCEDURE — 25000125 ZZHC RX 250: Performed by: PHYSICIAN ASSISTANT

## 2017-10-07 PROCEDURE — 00000146 ZZHCL STATISTIC GLUCOSE BY METER IP

## 2017-10-07 PROCEDURE — 12000030 ZZH R&B OB INTERMEDIATE UMMC

## 2017-10-07 PROCEDURE — 25000128 H RX IP 250 OP 636: Performed by: OBSTETRICS & GYNECOLOGY

## 2017-10-07 PROCEDURE — 25000125 ZZHC RX 250: Performed by: OBSTETRICS & GYNECOLOGY

## 2017-10-07 PROCEDURE — S0028 INJECTION, FAMOTIDINE, 20 MG: HCPCS | Performed by: OBSTETRICS & GYNECOLOGY

## 2017-10-07 PROCEDURE — 25000132 ZZH RX MED GY IP 250 OP 250 PS 637: Performed by: OBSTETRICS & GYNECOLOGY

## 2017-10-07 PROCEDURE — 25000128 H RX IP 250 OP 636: Performed by: PHYSICIAN ASSISTANT

## 2017-10-07 PROCEDURE — 25000131 ZZH RX MED GY IP 250 OP 636 PS 637: Performed by: INTERNAL MEDICINE

## 2017-10-07 RX ORDER — HYDROMORPHONE HCL/0.9% NACL/PF 0.2MG/0.2
0.2 SYRINGE (ML) INTRAVENOUS
Status: DISCONTINUED | OUTPATIENT
Start: 2017-10-07 | End: 2017-10-08 | Stop reason: HOSPADM

## 2017-10-07 RX ORDER — HYDROMORPHONE HYDROCHLORIDE 1 MG/ML
.3-.5 INJECTION, SOLUTION INTRAMUSCULAR; INTRAVENOUS; SUBCUTANEOUS ONCE
Status: COMPLETED | OUTPATIENT
Start: 2017-10-07 | End: 2017-10-07

## 2017-10-07 RX ADMIN — HYDROMORPHONE HYDROCHLORIDE 0.3 MG: 1 INJECTION, SOLUTION INTRAMUSCULAR; INTRAVENOUS; SUBCUTANEOUS at 19:38

## 2017-10-07 RX ADMIN — FAMOTIDINE 20 MG: 10 INJECTION, SOLUTION INTRAVENOUS at 06:37

## 2017-10-07 RX ADMIN — INSULIN GLARGINE 10 UNITS: 100 INJECTION, SOLUTION SUBCUTANEOUS at 20:47

## 2017-10-07 RX ADMIN — SODIUM CHLORIDE 250 ML: 9 INJECTION, SOLUTION INTRAVENOUS at 08:49

## 2017-10-07 RX ADMIN — INSULIN ASPART 8 UNITS: 100 INJECTION, SOLUTION INTRAVENOUS; SUBCUTANEOUS at 15:24

## 2017-10-07 RX ADMIN — METOCLOPRAMIDE HYDROCHLORIDE 10 MG: 5 INJECTION INTRAMUSCULAR; INTRAVENOUS at 02:45

## 2017-10-07 RX ADMIN — PANTOPRAZOLE SODIUM 40 MG: 40 INJECTION, POWDER, FOR SOLUTION INTRAVENOUS at 08:43

## 2017-10-07 RX ADMIN — METOCLOPRAMIDE HYDROCHLORIDE 10 MG: 5 INJECTION INTRAMUSCULAR; INTRAVENOUS at 19:27

## 2017-10-07 RX ADMIN — INSULIN ASPART 4 UNITS: 100 INJECTION, SOLUTION INTRAVENOUS; SUBCUTANEOUS at 19:26

## 2017-10-07 RX ADMIN — HYDROMORPHONE HYDROCHLORIDE 0.3 MG: 1 INJECTION, SOLUTION INTRAMUSCULAR; INTRAVENOUS; SUBCUTANEOUS at 13:05

## 2017-10-07 RX ADMIN — DIBASIC SODIUM PHOSPHATE, MONOBASIC POTASSIUM PHOSPHATE AND MONOBASIC SODIUM PHOSPHATE 250 MG: 852; 155; 130 TABLET ORAL at 19:32

## 2017-10-07 RX ADMIN — HYDROMORPHONE HYDROCHLORIDE 0.3 MG: 1 INJECTION, SOLUTION INTRAMUSCULAR; INTRAVENOUS; SUBCUTANEOUS at 03:07

## 2017-10-07 RX ADMIN — HYDROMORPHONE HYDROCHLORIDE 0.5 MG: 1 INJECTION, SOLUTION INTRAMUSCULAR; INTRAVENOUS; SUBCUTANEOUS at 06:58

## 2017-10-07 RX ADMIN — FAMOTIDINE 20 MG: 10 INJECTION, SOLUTION INTRAVENOUS at 19:39

## 2017-10-07 RX ADMIN — DIBASIC SODIUM PHOSPHATE, MONOBASIC POTASSIUM PHOSPHATE AND MONOBASIC SODIUM PHOSPHATE 250 MG: 852; 155; 130 TABLET ORAL at 09:18

## 2017-10-07 RX ADMIN — HUMAN INSULIN 2 UNITS/HR: 100 INJECTION, SOLUTION SUBCUTANEOUS at 08:33

## 2017-10-07 RX ADMIN — METOCLOPRAMIDE HYDROCHLORIDE 10 MG: 5 INJECTION INTRAMUSCULAR; INTRAVENOUS at 13:26

## 2017-10-07 RX ADMIN — METOCLOPRAMIDE HYDROCHLORIDE 10 MG: 5 INJECTION INTRAMUSCULAR; INTRAVENOUS at 08:40

## 2017-10-07 RX ADMIN — INSULIN ASPART 12 UNITS: 100 INJECTION, SOLUTION INTRAVENOUS; SUBCUTANEOUS at 22:26

## 2017-10-07 RX ADMIN — INSULIN GLARGINE 10 UNITS: 100 INJECTION, SOLUTION SUBCUTANEOUS at 14:39

## 2017-10-07 NOTE — PROGRESS NOTES
Pt. States she does not want her NJ tube replaced.  She states it bothers her and she will eat the food her mother brings her.  Pt told that eating small frequent meals is better than 3 large ones.  Dr. Verde states she is worried about patients nutrition and pt stated if this happens again she is just going to have her baby.  Pt has drank a cup of broth this am.

## 2017-10-07 NOTE — PROGRESS NOTES
Maternal-Fetal Medicine BRIEF Progress Note    Anne Gunter MRN# [id number]   Age: 27 year old  Gestational age: 29w3d YOB: 1990       Date of Admission: 2017          Subjective:        NJ tube was unable to be unclogged. Patient adamant this morning that she does not want it unclogged but wants it removed, and states she is now positive that she can comply with the recommended diet to keep up with nutrition at home without the tube. No contractions or other concerns identified.           Objective:        Patient Vitals for the past 24 hrs:   BP Temp Temp src Resp   10/07/17 0936 - - - 16   10/07/17 0900 103/66 98  F (36.7  C) Oral 16   10/07/17 0705 121/55 97.9  F (36.6  C) Oral 16   10/06/17 1745 102/58 97.9  F (36.6  C) Oral -   10/06/17 1130 110/58 97.8  F (36.6  C) Oral -          Abdomen: Gravid, Soft, Non-tender            Fetal Heart Rate Tracing: reassuring            Tocometer: none         Assessment:        27 year old y.o.  at 29w3d with pregnancy complicated by type I diabetes with suboptimal control, as well as severe gastroparesis complicated by noncompliance with recommended diet, and with NJ feeding tube that is now clogged.             Plan:       Conversation today with assistance of an Bengali .      Diabetic gastroparesis - we are currently discussing case with both GI and IR, neither of which is currently able to replace the NJ tube today. Prior placement required GI assistance with procedure for IR to complete, and IR fellow today states that GI would need to replace. Conversation with GI by resident that they are unable to place today, and that they recommend further discussion with IR first. Likely inability to replace today discussed with patient, also recommended option to try and clear blockage with a stylet. Patient informed me she is unwilling to have the tube replaced and now wants it removed. I expressed that I do not think that this is a good  idea based on her course thus far and I am very worried about what next steps will be if it removed. I recommend trying to unplug it first. She is again stating that she wants it removed today. We will discuss with RN from outside floor who is familiar with this NJ tube to see if stylet clearage is an option. If it is not, will discuss how we can safely remove the tube- patient requests some type of sedation or anxiolytic with removal if at all possible. I will try to see if this is an option. I am very concerned about her ability to comply with the recommended diet and I think she will end up readmitting again very soon with the same concerns and I have made her aware of this. Patient states 'If I come back with the same concerns, I will demand that you deliver me because then I will get better and I am done dealing with this'. I did not further discuss whether this would be an option, although I am very hesitant to deliver her at 29 weeks gestation, if there are other options for treatment that we might entertain. Will need further discussion when this happens.     Diabetes - appreciate ongoing assistance by endocrinology. Glucose with good control on insulin drip, but if tube pulled will need to discuss with them how we will transition her regiment to SQ insulin, potentially to go home Monday. She has agreed to stay in the hospital until Monday at this time (has left numerous times AMA in this pregnancy).               Attestation:            Mateo Verde MD  Maternal-Fetal Medicine    October 7, 2017

## 2017-10-07 NOTE — PROVIDER NOTIFICATION
Pt requesting to speak with doctor.  Pt visiting with her parents and is tearful, stating that she wants to go home.

## 2017-10-07 NOTE — PLAN OF CARE
At about 1930 patient went for a wheelchair ride with family, was to return about 2000 she did not return then. 2020 Dr Benton notified. 2030 Security called to look for her, they found her and requested she return to the unit which she did do at about 2100. A blood sugar was taken, insulin drip adjusted but as she was going to leave the floor again it was not changed to algorithm of 4 units per hour but to 3 units per nurse discression, per endocrines note (ie. Dalton RN) of 10/5/17, a second nurse and Dr Benton concur.  She understands and verbalizes she will return to floor by 2200.

## 2017-10-07 NOTE — PLAN OF CARE
Problem: Pain, Acute (Adult)  Goal: Identify Related Risk Factors and Signs and Symptoms  Related risk factors and signs and symptoms are identified upon initiation of Human Response Clinical Practice Guideline (CPG).   Outcome: Improving  Pt. Continues to have intermittent nausea and vomiting.  She had a small emesis after her NG was pulled as it caused her to gag and dry heave, but no other.  She has not needed pain meds but, requested them for the NG removal procedure. PT was fearful of pain and became anxious.  After emesis and a drink of water she felt relief. reglan was also given.

## 2017-10-07 NOTE — PROGRESS NOTES
NG/ NJ tube Removal  RN Thompson called to remove NG/ NJ tube- unable to unclogged after multiple attempts, ?2nd day. Pt ok with plan and was medicated prior. Pt placed in Fowlers position and informed of procedure to remove tube. Nasal bridge cut and tube removed without resistance and without difficulty. Pt nauseated after, Lavender offered, seem to have helped a little. Will continue to monitor nausea and intake.

## 2017-10-08 VITALS
DIASTOLIC BLOOD PRESSURE: 56 MMHG | RESPIRATION RATE: 18 BRPM | HEART RATE: 106 BPM | OXYGEN SATURATION: 97 % | WEIGHT: 163.4 LBS | SYSTOLIC BLOOD PRESSURE: 104 MMHG | TEMPERATURE: 97.8 F | BODY MASS INDEX: 25.65 KG/M2

## 2017-10-08 LAB
GLUCOSE BLDC GLUCOMTR-MCNC: 101 MG/DL (ref 70–99)
GLUCOSE BLDC GLUCOMTR-MCNC: 178 MG/DL (ref 70–99)

## 2017-10-08 PROCEDURE — 25000131 ZZH RX MED GY IP 250 OP 636 PS 637: Performed by: CLINICAL NURSE SPECIALIST

## 2017-10-08 PROCEDURE — 25000132 ZZH RX MED GY IP 250 OP 250 PS 637: Performed by: OBSTETRICS & GYNECOLOGY

## 2017-10-08 PROCEDURE — 25000132 ZZH RX MED GY IP 250 OP 250 PS 637: Performed by: STUDENT IN AN ORGANIZED HEALTH CARE EDUCATION/TRAINING PROGRAM

## 2017-10-08 PROCEDURE — 25000128 H RX IP 250 OP 636: Performed by: OBSTETRICS & GYNECOLOGY

## 2017-10-08 PROCEDURE — 25000125 ZZHC RX 250: Performed by: OBSTETRICS & GYNECOLOGY

## 2017-10-08 PROCEDURE — S0028 INJECTION, FAMOTIDINE, 20 MG: HCPCS | Performed by: OBSTETRICS & GYNECOLOGY

## 2017-10-08 PROCEDURE — 00000146 ZZHCL STATISTIC GLUCOSE BY METER IP

## 2017-10-08 RX ORDER — OXYCODONE HYDROCHLORIDE 5 MG/1
5-10 TABLET ORAL EVERY 4 HOURS PRN
Qty: 25 TABLET | Refills: 0 | Status: ON HOLD | OUTPATIENT
Start: 2017-10-08 | End: 2017-10-13

## 2017-10-08 RX ORDER — OXYCODONE HYDROCHLORIDE 5 MG/1
5-10 TABLET ORAL EVERY 4 HOURS PRN
Qty: 25 TABLET | Refills: 0 | Status: SHIPPED | OUTPATIENT
Start: 2017-10-08 | End: 2017-10-08

## 2017-10-08 RX ORDER — OXYCODONE HYDROCHLORIDE 5 MG/1
5 TABLET ORAL EVERY 4 HOURS PRN
Status: DISCONTINUED | OUTPATIENT
Start: 2017-10-08 | End: 2017-10-08 | Stop reason: HOSPADM

## 2017-10-08 RX ADMIN — PANTOPRAZOLE SODIUM 40 MG: 40 INJECTION, POWDER, FOR SOLUTION INTRAVENOUS at 08:29

## 2017-10-08 RX ADMIN — METOCLOPRAMIDE HYDROCHLORIDE 10 MG: 5 INJECTION INTRAMUSCULAR; INTRAVENOUS at 13:53

## 2017-10-08 RX ADMIN — FAMOTIDINE 20 MG: 10 INJECTION, SOLUTION INTRAVENOUS at 06:05

## 2017-10-08 RX ADMIN — HYDROMORPHONE HYDROCHLORIDE 0.3 MG: 1 INJECTION, SOLUTION INTRAMUSCULAR; INTRAVENOUS; SUBCUTANEOUS at 14:40

## 2017-10-08 RX ADMIN — INSULIN ASPART 4 UNITS: 100 INJECTION, SOLUTION INTRAVENOUS; SUBCUTANEOUS at 14:05

## 2017-10-08 RX ADMIN — METOCLOPRAMIDE HYDROCHLORIDE 10 MG: 5 INJECTION INTRAMUSCULAR; INTRAVENOUS at 02:00

## 2017-10-08 RX ADMIN — HYDROMORPHONE HYDROCHLORIDE 0.3 MG: 1 INJECTION, SOLUTION INTRAMUSCULAR; INTRAVENOUS; SUBCUTANEOUS at 08:47

## 2017-10-08 RX ADMIN — INSULIN GLARGINE 10 UNITS: 100 INJECTION, SOLUTION SUBCUTANEOUS at 08:52

## 2017-10-08 RX ADMIN — INSULIN ASPART: 100 INJECTION, SOLUTION INTRAVENOUS; SUBCUTANEOUS at 06:33

## 2017-10-08 RX ADMIN — METOCLOPRAMIDE HYDROCHLORIDE 10 MG: 5 INJECTION INTRAMUSCULAR; INTRAVENOUS at 08:28

## 2017-10-08 RX ADMIN — HYDROMORPHONE HYDROCHLORIDE 0.3 MG: 1 INJECTION, SOLUTION INTRAMUSCULAR; INTRAVENOUS; SUBCUTANEOUS at 02:00

## 2017-10-08 RX ADMIN — DIBASIC SODIUM PHOSPHATE, MONOBASIC POTASSIUM PHOSPHATE AND MONOBASIC SODIUM PHOSPHATE 250 MG: 852; 155; 130 TABLET ORAL at 08:30

## 2017-10-08 NOTE — PROGRESS NOTES
Antepartum interim progress note  10/7/2017     Asked by Anne to come discuss her plan. She tells me via  phone that she does not want to stay at the hopsital; now that her NJ tube is removed she does not see the point in staying any longer and wishes to go home. I expressed that we do not yet feel safe discharging her; we do not have a precise plan for her insulin, we do not know that she can tolerate PO intake without abdominal pain, and her NJ tube was just removed this morning. Her mother is present, and they had a verbal argument that the  was not able to follow. Her father arrived. I told Anne that she should continue to discuss her plans with her family and I would return later.     When I returned, Anne said she decided to stay until tomorrow but no longer than that.     I discussed this with Dr. Verde, who agrees that Anne should stay, ideally until Monday but at least as long as she will tolerate. Dr. Verde also recommends she discharges with her PICC in place. Obviously this is a complex decision, but because Anne has high risk of returning in DKA and has notoriously difficulty IV access, this outweighs the risks of infection. She tells me that she is willing to perform cares and cleaning at home. We will have antepartum RN staff or flyer RN teach her cares tomorrow. Anne agrees with this plan but insists on going tomorrow (Sunday).     Criss Jernigan, PGY3  OB/Gyn  10/7/2017, 8:11 PM

## 2017-10-08 NOTE — PLAN OF CARE
Patient given discharge medications of insulin pens, needles, oxycodone, phosphorous, and reglan.  Teaching done on new medications and medications previously prescribed, gastroparesis and diabetic diets, and follow up appointments.  Patient was given phone number for MFM clinic and M nurse line.

## 2017-10-08 NOTE — PROVIDER NOTIFICATION
10/08/17 0900   Provider Notification   Provider Name/Title Dr. Verde    Method of Notification At Bedside   Request Evaluate in Person   Notification Reason Status Update   AM rounds completed.  present. Plan of care discussed. Pt requested to be d/c today. Plan to transition pt to oral pain control today, eat at least two meals today. MD to order home care for PICC care. Pt questions asked and answered.

## 2017-10-08 NOTE — PROGRESS NOTES
Care Coordinator Progress Note     Admission Date/Time:  9/30/2017  Attending MD:  Mateo Verde MD     Data  Chart reviewed, discussed with interdisciplinary team.   Patient was admitted for:    Type 1 diabetes mellitus in third trimester, antepartum  Epigastric pain  High-risk pregnancy in third trimester  Hyperemesis gravidarum  Nausea and vomiting, intractability of vomiting not specified, unspecified vomiting type.    Concerns with insurance coverage for discharge needs: None.  Current Living Situation: Patient lives with spouse.  Support System: Involved  Services Involved: Home Care and Home Infusion  Transportation: Family or Friend will provide  Barriers to Discharge: None    Coordination of Care and Referrals: Home Care orders placed by provider for HHA. Referral to [facility] for skilled nursing for weekly PICC dressing changes and cares. PICC is not currently being used. No need to start PICC cares until 10/12/2017. Spoke with Esha from [facility], patient is open to their services. No further discharge concerns at this time. CC to follow as needed.    [facility]  Phone # [phone]  Fax # [phone]        Assessment  PICC placed for potential TPN and fluid administration. Patient has had hyperemesis in the past and malnutrition with pregnancy.     Plan  Anticipated Discharge Date:  10/8/2017  Anticipated Discharge Plan:  Home with Home Care and Home Infusion    Anjali Gomez RN, BSN  Care Coordinator, 8A  Phone [phone]  Pager [phone]

## 2017-10-08 NOTE — PROGRESS NOTES
I spoke with Esha from [facility] [phone], she stated that she is unsure if the pt. Insurance will cover a PICC line dressing change and a flush 1x/week.  She asked why the pt. Could not do it.  The pt. Cannot reach the PICC line one handed.  The pt. Mother is very afraid of the PICC line and refuses to work with it.  The current plan is for Anne to go to the [facility] clinic Tuesday for a MD assessment and a PICC line dsg change and flush.  She is also to meet with the Endocrine team Tuesday.  Both clinic have been notified by the on call DR. Crouch and Dr. Benton.    I had a very extensive conversation with Anne about how going home this time will be different and how she will eat differently.  She was able to tell me how and what to eat.  Also about fluid intake.  She will call if the baby is not moving, PTL or concerns with her PICC line.  She verbalizes understanding of the PICC line signs of infection and when to call and not to get it wet.

## 2017-10-08 NOTE — PROGRESS NOTES
Pt. Refused to take the OxyContin, she wanted the dilaudid.  Pt told that she cannot take the dilaudid at home and needs to transition to the oral meds.  She said she cant and that it doesn't work.  Pt. Told that she hasn't taken it and how does she know.  Expressed concern of her trying the meds for reaction and pain relief prior to leaving.  She still denied stating it doesn't work for her that she took it with her first son.  I told the patient that her and I do not know what she took and that this may work and she need to try it.  She continued to refuse.  Dilaudid given. I asked the pt. What she was going to do at home then for pain, she said she didn't know.  I told her that she might end up back in the hospital for pain.  She said she didn't know and that she would try it but not now.

## 2017-10-08 NOTE — PLAN OF CARE
Problem: Diabetes in Pregnancy (Adult,Obstetrics,Pediatric)  Goal: Signs and Symptoms of Listed Potential Problems Will be Absent, Minimized or Managed (Diabetes in Pregnancy)  Signs and symptoms of listed potential problems will be absent, minimized or managed by discharge/transition of care (reference Diabetes in Pregnancy (Adult,Obstetrics,Pediatric) CPG).   Outcome: No Change  Pt requested 60g or 480 mL of apple juice this AM. Gave sliding scale Novolog for snacks (10u). BG @ 0635 before juice was 178. Pt will have full breakfast later and will get sched Novolog w/sliding scale at that time. Continue to monitor.

## 2017-10-08 NOTE — PROGRESS NOTES
Diabetes Consult Daily  Progress Note          Assessment/Plan:     Ms. Anne Gunter is a 28 yo woman at 28w5d of pregnancy, with uncontrolled type 1 diabetes and history of multiple episodes of DKA during previous and current pregnancy, recent diagnosis of esophagitis, and possible gastroparesis, known to our diabetes service from several past admissions, who was readmitted on 9/30/17 with abdominal pain, nausea and vomiting after leaving AMA on 9/29/17.  No evidence of DKA on this admission.    She ate ok yesterday. PICC in placed. No TPN or tube feeding will be restarted at this time.   BGs were relatively controlled    Plan:  -increase Lantus 11 units bid   -Meal aspart 1unit/6g CHO with meals and snacks       Discharge planning  - Please discharge her on Lantus 11 units bid and Novolog 6 units with meals tid  - Correction sliding scale   continue Novolog sliding scale three time a day  If glucose 141-190, give Novolog 1units   If glucose 191-240, give Novolog 2 units    If glucose 241-290, give Novolog 3 units    If glucose 291-340, give Novolog 4 units    If glucose 341-290, give Novolog 5 units  If glucose >390, give Novolog 6 units    - she should follow up with diabetes/endocrine clinic weekly. Please call [phone] for appointment.               Interval History:     Recent Labs  Lab 10/08/17  0634 10/07/17  2318 10/07/17  2045 10/07/17  1624 10/07/17  1531 10/07/17  1419  10/03/17  0754  10/02/17  1516   GLC  --   --   --   --   --   --   --  75  --  89   * 172* 129* 135* 165* 124*  < >  --   < >  --    < > = values in this interval not displayed.            Medications:       Active Diet Order      Low Fiber Diet     Physical Exam:  Gen: alert,lying in bed, participating in conversation  HEENT: NC/AT, mucous membranes are moist, NJ feeding tube bridled  Resp: Unlabored  Neuro: alert, answering and speaking in English , no  present   BP 99/61  Pulse 106   Temp 98.2  F (36.8  C)  Resp 18  Wt 74.1 kg (163 lb 6.4 oz)  SpO2 97%  BMI 25.65 kg/m2           Data:     Lab Results   Component Value Date    A1C 8.4 09/10/2017    A1C 9.2 06/01/2016    A1C 9.8 05/23/2016    A1C 7.4 04/23/2016    A1C 7.2 04/19/2016        Recent Labs  Lab 10/08/17  0634 10/07/17  2318 10/07/17  2045 10/07/17  1624 10/07/17  1531 10/07/17  1419  10/03/17  0754  10/02/17  1516   GLC  --   --   --   --   --   --   --  75  --  89   * 172* 129* 135* 165* 124*  < >  --   < >  --    < > = values in this interval not displayed.      I spent a total of 25 minutes bedside and on the inpatient unit managing the glycemic care of Jani Rodríguez. Over 50% of my time on the unit was spent counseling the patient  and/or coordinating care regarding steroid hyperglycemia, exercise, and medications for glucose controlSee note for details.    Ashlee Cortez MD    Division of Diabetes and Endocrinology  Department of Medicine  [phone]

## 2017-10-08 NOTE — PROGRESS NOTES
[facility] Antepartum Progress Note    Subjective:   Patient is feeling well today.  Notes that her pain is minimal.  Feels like she can switch to oral pain medications.  Yesterday tolerated noodles with meat sauce.  Is going to have lemonade this morning then try some yogurt.  Denies contractions, vaginal bleeding, LOF.  Is unwilling to stay until tomorrow, wants to discharge home today.       Objective:  Vitals:    10/07/17 1630 10/07/17 1945 10/07/17 2337 10/08/17 0856   BP: 90/59 101/65 98/56 99/61   Pulse:       Resp: 18 18 18 18   Temp: 98.6  F (37  C) 98.2  F (36.8  C) 98.5  F (36.9  C) 98.2  F (36.8  C)   TempSrc: Oral Oral Oral    SpO2:       Weight:           I/O last 3 completed shifts:  In: 2830 [P.O.:2760; I.V.:70]  Out: 50 [Emesis/NG output:50]    Gen: Resting comfortably in bed, NAD  CV: Regular rate  Resp: Normal respiratory effort   Abd: Gravid, non-tender, non-distended  Ext: non-tender, no edema      FHT: , moderate variability, + accels, no decels  [place]: quiet     10/ BPP: 8/8, EDMOND 16.4 cm, breech      Assessment/Plan  Anne Gunter is a 27 year old , at 29w4d by stated JUANJO c/w 25w5d US, admitted with severe abdominal pain, nausea, and vomiting. She has had multiple similar admissions. PMHx is notable for poorly controlled T1DM with multiple admissions for DKA, severe gastroparesis with post-pyloric feeding tube in place, limited prenatal care in [place], history of PLTCS x1 at 32 weeks for fetal indications in the setting of PTD, and preeclampsia without severe features.       1. Poorly controlled T1DM, recurrent DKA:   - Multiple recent admissions for poorly controlled T1DM and DKA.   - S/p insulin gtt, transitioned to subQ insulin by endocrinology.  Appreciate recs.    - Plan to discharge on lantus 11U BID, meal aspart 1U/6g CHO with meals and snacks.  Will f/u weekly in DM clinic.   - Close BG monitoring.       2. Gastroparesis, recurrent abdominal pain, malnutrition:  - TF restarted on  10/2/17, unfortunately NJ tube clogged and unwilling to have replaced 10/6.  Removed yesterday.    - Tolerating regular diet.    - Scheduled reglan.  Continue daily protonix, pepcid. Tylenol PRN, PRN IV dilaudid > decreased to every 4 hours.  Will transition to PO oxycodone.   - Scheduled bowel regimen.   - S/p GI consult during previous admission.       3. Preeclampsia without severe features:   - BPs normotensive on admission.  Diagnosed last admission due to mild range BPs and UPC 0.6, with UPC of 0.38 in 2016.  Continue to monitor BPs, HELLP labs wnl on admission. Dx is unclear given pain at time of previous elevated BP and baseline proteinuria.      4. Anxiety:   - S/p  psych/SW consult last admission. Seen weekly by  psychologist. Continue to monitor.       5. PNC:             - Rh positive, Rubella immune, GBS positive- will need PCN if delivery is imminent, placenta posterior       6. FWB:                         - Category I FHT, reactive. TID monitoring   - Plan for twice weekly BPPs, last 10/2. Last noted to be breech.  - Tdap if inpatient next week.   - Discussed timing of delivery with goal of 34 weeks given poorly controlled T1DM.      7. PICC line:   - Placed due to difficulty obtaining access, PICC education prior to discharge.     Dispo: discharge home today, discussed discharge instructions and reasons to return.  Will f/u in clinic Monday or Tuesday with MFM.    Denise Benton MD   OB/GYN PGY-3

## 2017-10-08 NOTE — PLAN OF CARE
Problem: Diabetes in Pregnancy (Adult,Obstetrics,Pediatric)  Goal: Signs and Symptoms of Listed Potential Problems Will be Absent, Minimized or Managed (Diabetes in Pregnancy)  Signs and symptoms of listed potential problems will be absent, minimized or managed by discharge/transition of care (reference Diabetes in Pregnancy (Adult,Obstetrics,Pediatric) CPG).   Outcome: Improving  Data:  Pt declined fetal monitoring this shift.  Maternal vital signs stable.  Intervention:  Continue uterine/fetal assessment 3 times daily.  Activity level:Regular activity.  Pt ambulated off the unit this evening with her parents.   Plan:  Continue expectant management.  Notify provider of signs of maternal/fetal compromise.       Pt states that she had a good day and was wanting to be discharged this evening just after her insulin drip was discontinued. Her parents and the resident spoke with her for a long time and she agreed to stay overnight, but is adamant about being discharged tomorrow. We explained that if she leaves AMA that the PICC line would have to be removed and we are concerned that if she were to come back so sick that it would be difficult to obtain venous access. Pt agreed and is willing to keep the PICC line in upon discharge and is willing to learn how to care for it.  The only other concern was that pt was having abd pain after eating this evening and was medicated with PRN meds.

## 2017-10-08 NOTE — PROVIDER NOTIFICATION
Pt received 0.3 mg of Dilaudid for stomach pain at 1938. Upon reassessment pt still c/o pain and requesting more pain medication. Text page sent for one time order of 0.2 mg of Dilaudid.

## 2017-10-08 NOTE — PLAN OF CARE
Problem: Patient Care Overview  Goal: Plan of Care/Patient Progress Review  Outcome: No Change  Afebrile, VSS. Pt denies bldg, LOF, ctx. Refusing EFM this AM; discussed with pt why we do monitoring and pt agreed to do EFM sometime this morning during day shift. Insists she is going home today. Plan is to discuss with pt the recommendations of the care team and try and convince her to not leave AMA. Contact MD with questions/concerns.    Problem: Pain, Acute (Adult)  Goal: Identify Related Risk Factors and Signs and Symptoms  Related risk factors and signs and symptoms are identified upon initiation of Human Response Clinical Practice Guideline (CPG).   Pt requiring IV Dilaudid for abdominal pain management. MDs recommending switching over to PO Oxycodone q4 and will discuss with pt during rounds.

## 2017-10-08 NOTE — PLAN OF CARE
Problem: Diabetes in Pregnancy (Adult,Obstetrics,Pediatric)  Goal: Signs and Symptoms of Listed Potential Problems Will be Absent, Minimized or Managed (Diabetes in Pregnancy)  Signs and symptoms of listed potential problems will be absent, minimized or managed by discharge/transition of care (reference Diabetes in Pregnancy (Adult,Obstetrics,Pediatric) CPG).   Outcome: Improving  Pt. Able to verbalize foods that are healthy without or with little amounts of sugar. Also foods that are low in fiber.  She states all of her questions have been addressed

## 2017-10-08 NOTE — PROVIDER NOTIFICATION
10/08/17 1000   Provider Notification   Provider Name/Title Dr. Ashlee NASSAR   Method of Notification In Department   Request Evaluate in Person   Notification Reason Other (Comment)  (Endocrine )   Plan for d/c today discussed with provider. Provider to coordinate with PA to see patient simultaneous to MFM clinic visits.

## 2017-10-09 ENCOUNTER — TELEPHONE (OUTPATIENT)
Dept: ENDOCRINOLOGY | Facility: CLINIC | Age: 27
End: 2017-10-09

## 2017-10-09 ENCOUNTER — TELEPHONE (OUTPATIENT)
Dept: MATERNAL FETAL MEDICINE | Facility: CLINIC | Age: 27
End: 2017-10-09

## 2017-10-09 ENCOUNTER — HOSPITAL ENCOUNTER (INPATIENT)
Facility: CLINIC | Age: 27
LOS: 1 days | Discharge: LEFT BEFORE DISCHARGE | End: 2017-10-09
Attending: OBSTETRICS & GYNECOLOGY | Admitting: OBSTETRICS & GYNECOLOGY
Payer: COMMERCIAL

## 2017-10-09 VITALS — RESPIRATION RATE: 16 BRPM | DIASTOLIC BLOOD PRESSURE: 75 MMHG | TEMPERATURE: 98.5 F | SYSTOLIC BLOOD PRESSURE: 115 MMHG

## 2017-10-09 DIAGNOSIS — O24.013 TYPE 1 DIABETES MELLITUS IN PREGNANCY, THIRD TRIMESTER: ICD-10-CM

## 2017-10-09 DIAGNOSIS — Z91.199 PERSONAL HISTORY OF NONCOMPLIANCE WITH MEDICAL TREATMENT, PRESENTING HAZARDS TO HEALTH: ICD-10-CM

## 2017-10-09 DIAGNOSIS — O09.90 HIGH-RISK PREGNANCY, UNSPECIFIED TRIMESTER: Primary | ICD-10-CM

## 2017-10-09 LAB
ABO + RH BLD: NORMAL
ABO + RH BLD: NORMAL
ANION GAP SERPL CALCULATED.3IONS-SCNC: 10 MMOL/L (ref 3–14)
ANION GAP SERPL CALCULATED.3IONS-SCNC: NORMAL MMOL/L (ref 6–17)
BASOPHILS # BLD AUTO: 0 10E9/L (ref 0–0.2)
BASOPHILS NFR BLD AUTO: 0.1 %
BLD GP AB SCN SERPL QL: NORMAL
BLOOD BANK CMNT PATIENT-IMP: NORMAL
BUN SERPL-MCNC: 5 MG/DL (ref 7–30)
BUN SERPL-MCNC: NORMAL MG/DL (ref 7–30)
CALCIUM SERPL-MCNC: 8.3 MG/DL (ref 8.5–10.1)
CALCIUM SERPL-MCNC: NORMAL MG/DL (ref 8.5–10.1)
CHLORIDE SERPL-SCNC: 102 MMOL/L (ref 94–109)
CHLORIDE SERPL-SCNC: NORMAL MMOL/L (ref 94–109)
CO2 SERPL-SCNC: 23 MMOL/L (ref 20–32)
CO2 SERPL-SCNC: NORMAL MMOL/L (ref 20–32)
CREAT SERPL-MCNC: 0.41 MG/DL (ref 0.52–1.04)
CREAT SERPL-MCNC: NORMAL MG/DL (ref 0.52–1.04)
DIFFERENTIAL METHOD BLD: ABNORMAL
EOSINOPHIL # BLD AUTO: 0.1 10E9/L (ref 0–0.7)
EOSINOPHIL NFR BLD AUTO: 0.7 %
ERYTHROCYTE [DISTWIDTH] IN BLOOD BY AUTOMATED COUNT: 15.6 % (ref 10–15)
GFR SERPL CREATININE-BSD FRML MDRD: >90 ML/MIN/1.7M2
GFR SERPL CREATININE-BSD FRML MDRD: NORMAL ML/MIN/1.7M2
GLUCOSE BLDC GLUCOMTR-MCNC: 103 MG/DL (ref 70–99)
GLUCOSE BLDC GLUCOMTR-MCNC: 106 MG/DL (ref 70–99)
GLUCOSE BLDC GLUCOMTR-MCNC: 112 MG/DL (ref 70–99)
GLUCOSE BLDC GLUCOMTR-MCNC: 120 MG/DL (ref 70–99)
GLUCOSE BLDC GLUCOMTR-MCNC: 121 MG/DL (ref 70–99)
GLUCOSE BLDC GLUCOMTR-MCNC: 128 MG/DL (ref 70–99)
GLUCOSE BLDC GLUCOMTR-MCNC: 130 MG/DL (ref 70–99)
GLUCOSE BLDC GLUCOMTR-MCNC: 139 MG/DL (ref 70–99)
GLUCOSE BLDC GLUCOMTR-MCNC: 171 MG/DL (ref 70–99)
GLUCOSE BLDC GLUCOMTR-MCNC: 205 MG/DL (ref 70–99)
GLUCOSE SERPL-MCNC: 201 MG/DL (ref 70–99)
GLUCOSE SERPL-MCNC: NORMAL MG/DL (ref 70–99)
HCT VFR BLD AUTO: 31.1 % (ref 35–47)
HGB BLD-MCNC: 9.9 G/DL (ref 11.7–15.7)
IMM GRANULOCYTES # BLD: 0 10E9/L (ref 0–0.4)
IMM GRANULOCYTES NFR BLD: 0.4 %
KETONES BLD-SCNC: 0.1 MMOL/L (ref 0–0.6)
LYMPHOCYTES # BLD AUTO: 1.6 10E9/L (ref 0.8–5.3)
LYMPHOCYTES NFR BLD AUTO: 16.4 %
MAGNESIUM SERPL-MCNC: 1.9 MG/DL (ref 1.6–2.3)
MCH RBC QN AUTO: 25.8 PG (ref 26.5–33)
MCHC RBC AUTO-ENTMCNC: 31.8 G/DL (ref 31.5–36.5)
MCV RBC AUTO: 81 FL (ref 78–100)
MONOCYTES # BLD AUTO: 0.5 10E9/L (ref 0–1.3)
MONOCYTES NFR BLD AUTO: 5.3 %
NEUTROPHILS # BLD AUTO: 7.4 10E9/L (ref 1.6–8.3)
NEUTROPHILS NFR BLD AUTO: 77.1 %
NRBC # BLD AUTO: 0 10*3/UL
NRBC BLD AUTO-RTO: 0 /100
PHOSPHATE SERPL-MCNC: 3.2 MG/DL (ref 2.5–4.5)
PLATELET # BLD AUTO: 209 10E9/L (ref 150–450)
POTASSIUM SERPL-SCNC: 3.7 MMOL/L (ref 3.4–5.3)
POTASSIUM SERPL-SCNC: NORMAL MMOL/L (ref 3.4–5.3)
RBC # BLD AUTO: 3.84 10E12/L (ref 3.8–5.2)
SODIUM SERPL-SCNC: 135 MMOL/L (ref 133–144)
SODIUM SERPL-SCNC: NORMAL MMOL/L (ref 133–144)
SPECIMEN EXP DATE BLD: NORMAL
WBC # BLD AUTO: 9.7 10E9/L (ref 4–11)

## 2017-10-09 PROCEDURE — 86900 BLOOD TYPING SEROLOGIC ABO: CPT | Performed by: OBSTETRICS & GYNECOLOGY

## 2017-10-09 PROCEDURE — 83735 ASSAY OF MAGNESIUM: CPT | Performed by: OBSTETRICS & GYNECOLOGY

## 2017-10-09 PROCEDURE — 80048 BASIC METABOLIC PNL TOTAL CA: CPT | Performed by: OBSTETRICS & GYNECOLOGY

## 2017-10-09 PROCEDURE — 12000028 ZZH R&B OB UMMC

## 2017-10-09 PROCEDURE — 85025 COMPLETE CBC W/AUTO DIFF WBC: CPT | Performed by: OBSTETRICS & GYNECOLOGY

## 2017-10-09 PROCEDURE — 25800025 ZZH RX 258: Performed by: OBSTETRICS & GYNECOLOGY

## 2017-10-09 PROCEDURE — 25000128 H RX IP 250 OP 636: Performed by: OBSTETRICS & GYNECOLOGY

## 2017-10-09 PROCEDURE — 86850 RBC ANTIBODY SCREEN: CPT | Performed by: OBSTETRICS & GYNECOLOGY

## 2017-10-09 PROCEDURE — 86901 BLOOD TYPING SEROLOGIC RH(D): CPT | Performed by: OBSTETRICS & GYNECOLOGY

## 2017-10-09 PROCEDURE — 25000125 ZZHC RX 250: Performed by: OBSTETRICS & GYNECOLOGY

## 2017-10-09 PROCEDURE — 82010 KETONE BODYS QUAN: CPT | Performed by: OBSTETRICS & GYNECOLOGY

## 2017-10-09 PROCEDURE — 84100 ASSAY OF PHOSPHORUS: CPT | Performed by: OBSTETRICS & GYNECOLOGY

## 2017-10-09 PROCEDURE — 00000146 ZZHCL STATISTIC GLUCOSE BY METER IP

## 2017-10-09 RX ORDER — SODIUM CHLORIDE 9 MG/ML
INJECTION, SOLUTION INTRAVENOUS CONTINUOUS
Status: DISCONTINUED | OUTPATIENT
Start: 2017-10-09 | End: 2017-10-10 | Stop reason: HOSPADM

## 2017-10-09 RX ORDER — HYDROMORPHONE HYDROCHLORIDE 1 MG/ML
.3-.5 INJECTION, SOLUTION INTRAMUSCULAR; INTRAVENOUS; SUBCUTANEOUS
Status: DISCONTINUED | OUTPATIENT
Start: 2017-10-09 | End: 2017-10-10 | Stop reason: HOSPADM

## 2017-10-09 RX ORDER — NALOXONE HYDROCHLORIDE 0.4 MG/ML
.1-.4 INJECTION, SOLUTION INTRAMUSCULAR; INTRAVENOUS; SUBCUTANEOUS
Status: DISCONTINUED | OUTPATIENT
Start: 2017-10-09 | End: 2017-10-10 | Stop reason: HOSPADM

## 2017-10-09 RX ORDER — SODIUM CHLORIDE, SODIUM LACTATE, POTASSIUM CHLORIDE, CALCIUM CHLORIDE 600; 310; 30; 20 MG/100ML; MG/100ML; MG/100ML; MG/100ML
INJECTION, SOLUTION INTRAVENOUS CONTINUOUS
Status: DISCONTINUED | OUTPATIENT
Start: 2017-10-09 | End: 2017-10-10 | Stop reason: HOSPADM

## 2017-10-09 RX ORDER — HYDROMORPHONE HYDROCHLORIDE 1 MG/ML
0.3 INJECTION, SOLUTION INTRAMUSCULAR; INTRAVENOUS; SUBCUTANEOUS ONCE
Status: COMPLETED | OUTPATIENT
Start: 2017-10-09 | End: 2017-10-09

## 2017-10-09 RX ORDER — METOCLOPRAMIDE HYDROCHLORIDE 5 MG/ML
10 INJECTION INTRAMUSCULAR; INTRAVENOUS EVERY 6 HOURS
Status: DISCONTINUED | OUTPATIENT
Start: 2017-10-09 | End: 2017-10-10 | Stop reason: HOSPADM

## 2017-10-09 RX ORDER — NICOTINE POLACRILEX 4 MG
15-30 LOZENGE BUCCAL
Status: DISCONTINUED | OUTPATIENT
Start: 2017-10-09 | End: 2017-10-10 | Stop reason: HOSPADM

## 2017-10-09 RX ORDER — DEXTROSE MONOHYDRATE, SODIUM CHLORIDE, AND POTASSIUM CHLORIDE 50; 1.49; 9 G/1000ML; G/1000ML; G/1000ML
INJECTION, SOLUTION INTRAVENOUS CONTINUOUS
Status: DISCONTINUED | OUTPATIENT
Start: 2017-10-09 | End: 2017-10-10 | Stop reason: HOSPADM

## 2017-10-09 RX ORDER — AMOXICILLIN 250 MG
1-2 CAPSULE ORAL 2 TIMES DAILY
Status: DISCONTINUED | OUTPATIENT
Start: 2017-10-09 | End: 2017-10-10 | Stop reason: HOSPADM

## 2017-10-09 RX ORDER — ONDANSETRON 2 MG/ML
4 INJECTION INTRAMUSCULAR; INTRAVENOUS EVERY 6 HOURS PRN
Status: DISCONTINUED | OUTPATIENT
Start: 2017-10-09 | End: 2017-10-10 | Stop reason: HOSPADM

## 2017-10-09 RX ORDER — DEXTROSE MONOHYDRATE 25 G/50ML
25-50 INJECTION, SOLUTION INTRAVENOUS
Status: DISCONTINUED | OUTPATIENT
Start: 2017-10-09 | End: 2017-10-10 | Stop reason: HOSPADM

## 2017-10-09 RX ADMIN — HUMAN INSULIN 1.5 UNITS/HR: 100 INJECTION, SOLUTION SUBCUTANEOUS at 13:01

## 2017-10-09 RX ADMIN — POTASSIUM CHLORIDE, DEXTROSE MONOHYDRATE AND SODIUM CHLORIDE 1000 ML: 150; 5; 900 INJECTION, SOLUTION INTRAVENOUS at 13:01

## 2017-10-09 RX ADMIN — HYDROMORPHONE HYDROCHLORIDE 0.5 MG: 1 INJECTION, SOLUTION INTRAMUSCULAR; INTRAVENOUS; SUBCUTANEOUS at 12:13

## 2017-10-09 RX ADMIN — HYDROMORPHONE HYDROCHLORIDE 0.5 MG: 1 INJECTION, SOLUTION INTRAMUSCULAR; INTRAVENOUS; SUBCUTANEOUS at 15:42

## 2017-10-09 RX ADMIN — SODIUM CHLORIDE 250 ML: 9 INJECTION, SOLUTION INTRAVENOUS at 12:16

## 2017-10-09 RX ADMIN — METOCLOPRAMIDE 10 MG: 5 INJECTION, SOLUTION INTRAMUSCULAR; INTRAVENOUS at 18:06

## 2017-10-09 RX ADMIN — HYDROMORPHONE HYDROCHLORIDE 0.3 MG: 1 INJECTION, SOLUTION INTRAMUSCULAR; INTRAVENOUS; SUBCUTANEOUS at 13:26

## 2017-10-09 NOTE — PLAN OF CARE
Problem: Patient Care Overview  Goal: Plan of Care/Patient Progress Review  Outcome: No Change  Patient refusing to wear SCD's. States she did not wear them last week when she was here. Explained to her that she is not up walking and in bed all day so she should wear them to prevent clots. Patient continues to refuse them. SCD's remain in room. Encouraged her to wear. Continue to monitor.

## 2017-10-09 NOTE — TELEPHONE ENCOUNTER
Message left for Anne with use of  services to call [phone].  Need to inform patient of appt times and place on 10/12/17.  PCC also spoke with Nutrition Services at [phone] to schedule nutrition consult.  Nutrition services will reach out to patient with time and location of appt most suitable.    Negra Sánchez RN

## 2017-10-09 NOTE — H&P
[facility]  OB History and Physical      Anne Gunter MRN# [id number]   Age: 27 year old YOB: 1990     CC:  Abdominal pain    HPI:  Ms. Anne Gunter is a 27 year old  at 29w5d by 25w5d US who presents with abdominal pain, nausea and vomiting.  The patient's medical history is notable for poorly controlled T1DM with multiple admissions for DKA, severe gastroparesis with post-pyloric feeding tube in place, limited prenatal care in [place], history of PLTCS x1 at 32 weeks for fetal indications in the setting of PTD, preeclampsia without severe features.  She has been admitted multiple times, most recently discharged on 10/8.  The patient and her mother report that she was doing well until yesterday evening when she started having epigastric pain.  Had rice and yogurt last night.  Had nausea and vomiting this morning x3, clear emesis. Vomiting makes her epigastric pain worse. Took Lantus 11 units this morning. Denies any contractions, vaginal bleeding, leaking of fluid.  + fetal movement.    Pregnancy Complications:  - Poorly controlled Type 1 DM: multiple recent admissions for DKA and abdominal pain, most recently discharged 10/8  - History of PLTCS x1 at 32w5d for fetal indications in the setting of DKA   - Gastroparesis, recurrent abdominal pain   - Pre-eclampsia without severe features based on mild range Bps, UPC 0.6   - Malnutrition, post-pyloric feeding tube in place   - Anxiety, s/p SW and  psych consult last admission   - GBS positive     Prenatal Labs:   Lab Results   Component Value Date    ABO A 2017    RH Pos 2017    AS Neg 2017    HEPBANG Nonreactive 2017    CHPCRT Negative 09/10/2017    GCPCRT Negative 09/10/2017    TREPAB Negative 09/10/2017    HGB 10.2 (L) 2017     GBS Status:   Lab Results   Component Value Date    GBS Positive (A) 09/10/2017     OB History  Obstetric History       T0      L1     SAB0    TAB0   Ectopic0   Multiple0   Live Births1       # Outcome Date GA Lbr Walter/2nd Weight Sex Delivery Anes PTL Lv   2 Current            1  06/10/16 32w5d  2.37 kg (5 lb 3.6 oz) M CS-LTranv Gen  BARBARA        PMHx:   Past Medical History:   Diagnosis Date     Diabetes (H)      Microalbuminuria 2016     Type I diabetes mellitus, uncontrolled (H) 2016     PSHx:   Past Surgical History:   Procedure Laterality Date      SECTION N/A 6/10/2016    Procedure:  SECTION;  Surgeon: Richardson Duarn MD;  Location: [facility] OR     ESOPHAGOSCOPY, GASTROSCOPY, DUODENOSCOPY (EGD), COMBINED N/A 2017    Procedure: COMBINED ESOPHAGOSCOPY, GASTROSCOPY, DUODENOSCOPY (EGD);  Upper Endoscopy with biopsies;  Surgeon: Nile Sanchez MD;  Location: [facility] OR     INSERT TUBE NASOJEJUNOSTOMY  2017    Procedure: INSERT TUBE NASOJEJUNOSTOMY;  nasojejunal feeding tube placement with upper endoscopy assistance by Dr. Sanchez and Dr. Cristino Bennett, Radiology;  Surgeon: Nile Sanchez MD;  Location: [facility] OR     Meds:   Prescriptions Prior to Admission   Medication Sig Dispense Refill Last Dose     insulin aspart (NOVOLOG PEN) 100 UNIT/ML injection Inject 1 Units Subcutaneous 3 times daily (with meals) 9 mL 1      insulin glargine (LANTUS) 100 UNIT/ML injection Inject 11 Units Subcutaneous 2 times daily 6.6 mL 1      insulin aspart (NOVOLOG PEN) 100 UNIT/ML injection Inject 6 Units Subcutaneous 3 times daily (with meals) 5.4 mL 1      phosphorus tablet 250 mg (K PHOS NEUTRAL) 250 MG per tablet Take 1 tablet (250 mg) by mouth 2 times daily 60 tablet 1      oxyCODONE (ROXICODONE) 5 MG IR tablet Take 1-2 tablets (5-10 mg) by mouth every 4 hours as needed for pain maximum 12 tablet(s) per day 25 tablet 0      alum & mag hydroxide-simethicone (MYLANTA ES/MAALOX  ES) 400-400-40 MG/5ML SUSP suspension Take 30 mLs by mouth 4 times daily (with meals and nightly) 355 mL 1      blood glucose monitoring (NO BRAND SPECIFIED) test strip  Use to test blood sugars 4 times daily or as directed 100 strip 3 9/24/2017 at Unknown time     blood glucose monitoring (NO BRAND SPECIFIED) test strip Use to test blood sugar 4 times daily or as directed. 100 strip 3 9/24/2017 at Unknown time     blood glucose monitoring (ONE TOUCH DELICA) lancets Use to test blood sugar 4 times daily or as directed. 100 each 3      blood glucose monitoring (ONETOUCH VERIO) meter device kit Use to test blood sugar 4 times daily or as directed. 1 kit 0 9/23/2017 at Unknown time     polyethylene glycol (MIRALAX/GLYCOLAX) Packet Take 17 g by mouth daily as needed for constipation (titrate to one bowel movement daily) 7 packet 1 9/23/2017 at Unknown time     senna-docusate (SENOKOT-S;PERICOLACE) 8.6-50 MG per tablet Take 1 tablet by mouth 2 times daily as needed for constipation 100 tablet 0 Past Week at Unknown time     metoclopramide (REGLAN) 10 MG tablet Take 1 tablet (10 mg) by mouth 4 times daily as needed (4x Daily AC & HS prn nausea and abdominal pain) 60 tablet 0 9/23/2017 at Unknown time     famotidine (PEPCID) 20 MG tablet Take 1 tablet (20 mg) by mouth 2 times daily 40 tablet 1      pantoprazole (PROTONIX) 40 MG EC tablet Take 1 tablet (40 mg) by mouth 2 times daily 30 tablet 1 9/23/2017 at Unknown time     ondansetron (ZOFRAN ODT) 4 MG ODT tab Take 1 tablet (4 mg) by mouth every 8 hours as needed for nausea 20 tablet 1 9/23/2017 at Unknown time     mirtazapine (REMERON SOL-TAB) 15 MG disintegrating tablet 1 tablet (15 mg) by Orally disintegrating tablet route At Bedtime 60 tablet 2 6/1/2016 at Bedtime     OLANZapine zydis (ZYPREXA) 5 MG disintegrating tablet Take 1 tablet (5 mg) by mouth 2 times daily 60 tablet 3 9/23/2017 at Unknown time     Prenatal Vit-Fe Fumarate-FA (PRENATAL VITAMIN) 27-0.8 MG TABS Take 1 tablet by mouth daily 90 tablet 3 Past Week at Unknown time     folic acid (FOLVITE) 1 MG tablet Take 1 tablet (1 mg) by mouth daily 30 tablet 0 9/23/2017 at Unknown  time     Allergies:  No Known Allergies   FmHx:   Family History   Problem Relation Age of Onset     DIABETES Maternal Grandmother      CEREBROVASCULAR DISEASE Paternal Grandmother      Coronary Artery Disease Paternal Grandmother      Hypertension No family hx of      Hyperlipidemia No family hx of      Breast Cancer No family hx of      Colon Cancer No family hx of      Prostate Cancer No family hx of      Other Cancer No family hx of      Depression No family hx of      Anxiety Disorder No family hx of      MENTAL ILLNESS No family hx of      Substance Abuse No family hx of      Anesthesia Reaction No family hx of      Asthma No family hx of      OSTEOPOROSIS No family hx of      Genetic Disorder No family hx of      Thyroid Disease No family hx of      Obesity No family hx of      SocHx: She denies any tobacco, alcohol, or other drug use during this pregnancy.    ROS:   Complete 10-point ROS negative except as noted in HPI    PE:  Vit:   Patient Vitals for the past 4 hrs:   BP Temp Temp src Resp   10/09/17 1130 111/74 97.7  F (36.5  C) Oral 20      Gen: Well-appearing, NAD, comfortable   CV: rrr, no mrg  Pulm: Ctab, no wheezes or crackles  Abd: Soft, gravid, non-tender,  Cx: Deferred       FHT: Baseline 125, moderate variability, + accelerations, no decelerations   Runge: 3 contractions in 10 minutes      Assessment  Ms. Anne Gunter is a 27 year old , at 29w5d by 25w5d ultrasound, who presents with abdominal pain in the setting of type I DM, gastroparesis. Has had numerous hospital stays for the same indication. Abdominal pain likely related to underlying gastroparesis, previously consulted GI and underwent EGD notable for esophagitis related to vomiting. Type I DM has been poorly controlled this pregnancy with recent admits for DKA and elevated BG, has been on insulin drips for acute control of blood sugars as well. At this time no concerns for fetal distress.    Type I DM  - Consult endocrinology  -  OB High intensity insulin drip, Q1H glucose checks  - IVF, D5 NS with 20 meq KCL @ 100 cc/hr with insulin drip  - BMP, Mag, Phos  - Blood glucose in 200s on admit, per patient nothing to eat since last night.  Took Lantus 11 units this AM.    Abdominal Pain:   - IV dilaudid for now until N/V under control then attempt PO dilaudid  - IV pepcid, protonix, reglan scheduled, PRN zofran  - NPO for now  - Nutrition consulted  - Refusing replacement of NJ tube    FWB: Category I, reactive  - Continuous monitoring for now until stabilized, then TID    The patient was discussed with Dr. Verde who is in agreement with the treatment plan.    Jeannie Disla MD  OBGYN PGY-3

## 2017-10-09 NOTE — TELEPHONE ENCOUNTER
----- Message from Ashlee Cortez MD sent at 10/8/2017 12:25 PM CDT -----  Hello,  Please help making f/u appt with one of of the PA this week and then weekly for type 1 diabetes in pregnancy. She is quite complicated with frequent admission. She will see M this Tuesday.    Thanks,  Ashlee

## 2017-10-09 NOTE — PROGRESS NOTES
CLINICAL NUTRITION SERVICES - ASSESSMENT NOTE     Nutrition Prescription    RECOMMENDATIONS FOR MDs/PROVIDERS TO ORDER:  1. Highly recommend phos replacement per protocol, low value of 1.3 mg/dL today. Would also recommend checking K+ and Mg++ and replacing pending lab value.     2. As tolerated advance diet > NPO within 24-48 hrs. Recommend diet advancement as tolerated to low-fiber diet.     3. Recommend resume oral prenatal multivitamin with iron as pt no longer receiving tube feeds and 15 mL Certavite via TF to meet micronutrient needs.     4. Recommend endocrine continue to follow for insulin recommendations with PO intake.     Malnutrition Status:    Patient does not meet two of the criteria necessary for diagnosing malnutrition but is at risk for malnutrition    Recommendations already ordered by Registered Dietitian (RD):  1. Recommend weight check on admission today (10/9)     Future/Additional Recommendations:  1. When able to advance diet > NPO/Clear Liquid, order Glucerna BID between meals @ 10am, 2pm, HS snack.     2. When able to advance diet > full liquids, recommend ordering calorie counts to assess adequacy of po intake and need for re-initiation of nutrition support.     3. If pt unable to demonstrate ability to consume adequate po intake once diet advanced per calorie counts and/or weight loss occurs, recommend replacement of feeding tube and reinitiating of tube feeds pending acceptable lytes and if pt in agreement. Pending acceptable lytes, would recommend resuming tube feeds of TwoCal HN @ 50 mL/hr, continuous. Would recommend initiate feeds at 10 mL/hr and increase 10 mL/hr q 2 hrs pending tolerance/lytes. Pending lytes, tolerance, and insulin regimen, may transition to cycled feeds of TwoCal HN @ 100 mL/hr x 12 hrs. If feeds resumed, would recommend free water flushes of 30 mL q 4 hrs for patency and adjust PRN pending oral intake of fluid.     4. Continue to review diet education for  "gastroparesis/gestational diabetes diet education prior to discharge.      REASON FOR ASSESSMENT  Anne Gunter is a/an 27 year old female assessed by the dietitian for Provider Order - \"Gastroparesis\"     NUTRITION HISTORY  - Pt with hx of  at 29w5d, who presents to [facility] antepartum with severe abdominal pain and nausea/vomiting.    - Per chart, pt with hx of abdominal pain, N/V r/t diabetic gastroparesis, which is worsened by hyperglycemia as well as non-adherence to dietary modifications. Pt also with poorly controlled type 1 DM. Recent admission 9/10- for DKA, pt left  and admitted again - for abdominal pain, BG management and left AMA for unknown reasons. Most recently admitted from - and she D/C'ed AMA again. During this admission, pt was started on tube feeds 2/2 poor po intake/tolerance and weight loss after feeding tube placement , however, feeds were stopped  on discharge. Pt readmitted -10/8 for abdominal pain, nausea, vomiting, and restarted on tube feeds 2/2 poor po/tolerance and weight loss. She reached TF goal of 50 mL/hr, continuous of TwoCal HN (10/4) then cycled tube feeds to TwoCal HN @ 100 mL/hr x 12 hrs, however during past admission NJ-tube became clogged and pt refused replacement of feeding tube and tube feeds were stopped on 10/7. During admission, pt was given education on gastroparesis diet (10/4) and able to verbalize foods low in fiber and appropriate for diabetes management. Per discussion with endocrine during past admission, recommended not do education for carb counting and plans for set dose insulin regimen at home. Endocrine recommend home regimen of 11U BID Lantus and 6units Novolog with meals. At time of discharge 10/8, per chart notes pt was able to tolerate PO (small amounts) and able to verbalize recommend foods to consume. Pt then discharged yesterday AM 10/8 as she was able to demonstrate ability to eat and drink. Per discussion " "with the team, pt ate rice and yogurt yesterday per Anne's moms report and readmitted today with signs and symptoms of nausea, vomiting, and abdominal pain. Per team, pt continues to refuse replacement of feeding tube.     CURRENT NUTRITION ORDERS  Diet: NPO  Intake/Tolerance: Pt recently admitted and NPO. No po intake.     LABS  Labs reviewed  Phos 1.3 mg/dL (L)   BG previously 101-178 mg/dL on past admission 10/7-10/8; no new BG obtained per chart  Hgb A1c 8.4 on 9/10/17    MEDICATIONS  Medications reviewed  Insulin drip  Reglan     ANTHROPOMETRICS  Height: Per RD note 9/19: Ht: 1.7 m (5' 6.93\")   Current Weight: Most recent weight 74.1 kg on 10/7 during previous admission   Current BMI: 25.65 kg/m^2  Pre-pregnancy Weight: 75 kg (165 lbs) based on pt report per past RD notes   Pre-pregnancy BMI: 26.00 kg/m^2  IBW: 61.4 kg (135 lb)  IBW %: 122%  Weight History: Weight hx from previous admission shown below. Pt presented with 6% wt loss x 1 week prior to past admission (was 77.9 kg on 9/23 and presented at 73.5 kg on 10/1 - admit of past admission). During past admission, pt was started on tube feeds and gained ~5 lb during past admission up to 75.8 kg on 10/5.However, most recent weight on 10/7 suggests pt lost 2% in two days, question accuracy of wt on 10/7 as pts weight was previously trending up and receiving tube feeds at goal + PO intake. Based on pre-pregnancy wt of 75 kg, pt has gained 0 lbs during this pregnancy, which is below the recommended wt gain of 15-25 lbs     10/07/17 1322 74.1 kg (163 lb 6.4 oz) CC     10/05/17 0900 75.8 kg (167 lb 3.2 oz) MH     10/03/17 1945 75.3 kg (165 lb 14.4 oz) TH     10/01/17 0959 73.5 kg (162 lb) CS     Dosing Weight: 75 kg - based on UBW prior to pregnancy     ASSESSED NUTRITION NEEDS  Estimated Energy Needs: 2325+ kcals/day (25 kcal/kg + 450 kcal/day; PN energy needs: 20-25 kcal/kg)  Justification: Increased needs 2/2 second trimester of pregnancy and repletion "   Estimated Protein Needs: 78-98 grams protein/day (1.2 - 1.5 grams of pro/kg)  Justification: Increased needs 2/2 second trimester of pregnancy and repletion  Estimated Fluid Needs: 1 mL/kcal  Justification: Maintenance    PHYSICAL FINDINGS  See malnutrition section below.    MALNUTRITION  % Intake: Decreased intake does not meet criteria (Pt with likely inadequate po intake x 2 days, previously receiving tube feeds at goal to meet 100% nutrition needs)  % Weight Loss: > 2% in 1 week (severe)  Subcutaneous Fat Loss: None observed  Muscle Loss: None observed  Fluid Accumulation/Edema: None noted  Malnutrition Diagnosis: Patient does not meet two of the above criteria necessary for diagnosing malnutrition but is at risk for malnutrition    NUTRITION DIAGNOSIS  Inadequate oral intake related to N/V, abdominal pain, and restrictive diet hindering po and continuing to refuse replacement of TF to meet nutrition needs as evidenced by small amounts of PO intake noted since 10/6 with pt refusal to replace feeding tube for re-initiation of tube feeds, and current NPO status      INTERVENTIONS  Implementation  Nutrition Education: Unable to complete due to inability to visit with pt.   Collaboration with other providers - Discussed pt/nutrition POC with team. Per discussion with MD, plans to continue NPO diet order today and potentially advance tomorrow. Per team, Anne continues to refuse feeding tube replacement.     Goals  1. Diet advancement vs initiation of nutrition support within 24-48 hrs.   2. Total avg nutritional intake to meet a minimum of 30 kcal/kg and 1.2 g PRO/kg daily (per dosing wt 75 kg) per PO/nutrition support.   3. Weight gains of 0.3 kg (0.7 lbs) per week (15-25 lbs total wt gain during this pregnancy)   4. BG </= 180     Monitoring/Evaluation  Progress toward goals will be monitored and evaluated per protocol.    Yesenia Covarrubias RD, LD  Unit Pager: [phone]

## 2017-10-09 NOTE — PLAN OF CARE
Problem: Patient Care Overview  Goal: Plan of Care/Patient Progress Review  Outcome: No Change  Patient initially declined Reglan. RN explained to patient that she is going to be having clear liquids and she should take it to prevent nausea. Patient agreed. Continue to monitor.

## 2017-10-09 NOTE — PROVIDER NOTIFICATION
10/09/17 1320   Provider Notification   Provider Name/Title    Method of Notification In Department   Notification Reason Pain   Pt complaining of pain. Dilaudid ordered for q2 hours. Only been 1 hour since given. Pt states she needs more.  updated on pt status. Will order 1 dose of .3mg x1. Then dilaudid to be given q2 prn. Continue close monitoring.

## 2017-10-09 NOTE — TELEPHONE ENCOUNTER
This was given to the  Clinic coordinators for   Setting up weekly visit with   a PA  due to pregnancy.

## 2017-10-09 NOTE — PLAN OF CARE
Problem: Patient Care Overview  Goal: Plan of Care/Patient Progress Review  Outcome: No Change  Patient asked this RN to push Dilaudid quickly and to flush afterwards so that the medication will get to patient sooner. This RN explained that there are protocols to follow for pain administration and the Dilaudid needs to be given slowly. Explained that it is dangerous to push it fast. Continue to reinforce.

## 2017-10-09 NOTE — PLAN OF CARE
Data: Patient presented to [facility] at 1128.   Reason for maternal/fetal assessment per patient is Abdominal Pain  .  Patient is a . Prenatal record reviewed.      Obstetric History       T0      L1     SAB0   TAB0   Ectopic0   Multiple0   Live Births1       # Outcome Date GA Lbr Walter/2nd Weight Sex Delivery Anes PTL Lv   2 Current            1  06/10/16 32w5d  2.37 kg (5 lb 3.6 oz) M CS-LTranv Gen  BARBARA      . Medical history:   Past Medical History:   Diagnosis Date     Diabetes (H)      Microalbuminuria 2016     Type I diabetes mellitus, uncontrolled (H) 2016   . Gestational Age 29w5d. VSS. Fetal movement present. Patient has abdominal pain since 0830 with 3 emesis. Patient has not eaten since last evening. Patient denies cramping, backache, vaginal discharge, pelvic pressure, UTI symptoms, GI problems, bloody show, vaginal bleeding, edema, headache, visual disturbances, epigastric or URQ pain, rupture of membranes. Support persons, mother and father, present.  Action: Verbal consent for EFM. Triage assessment completed. EFM applied for fetal assessment. Uterine assessment occasional with irritability. Fetal assessment: Presumed adequate fetal oxygenation documented (see flow record).   Response: Dr. Disla and  informed of pt arrival. Plan per provider is admit as inpatient, start insulin drip and give pain medication. Patient verbalized agreement with plan. Patient oriented to room and call light.

## 2017-10-09 NOTE — PLAN OF CARE
Problem: High-Risk/Critically Ill Patient (Obstetrics)  Goal: Signs and Symptoms of Listed Potential Problems Will be Absent, Minimized or Managed (High-Risk/Critically Ill Patient)  Signs and symptoms of listed potential problems will be absent, minimized or managed by discharge/transition of care (reference High-Risk/Critically Ill Patient (Obstetrics) CPG).   Outcome: No Change  Pt started on insulin drip, Algorithm 1. Pt c/o pain. Requesting more pain medication. Pt did not like that RN didn't push Dilaudid fast enough and in the closest port. RN explained administration of narcotics. Pt educated on use of Dilaudid every 2 hours PRN. Pt did not like that and said she would need more medication than that. Will reassess as needed. Continue to closely monitor.

## 2017-10-09 NOTE — PROGRESS NOTES
Asked to review dietary orders for patient Anne. Currently placed on NPO status but is refusing to have an N-J tube replaced. She has only a single lumen PICC line and therefore is not really a candidate for TPN. Will see how she does with slow advance of gastroparesis diet overnight. If insufficient intake, we will need to address caloric intake with a nutrition consultation tomorrow, for either replacement of PICC to double lumen to allow for TPN, vs. NJ tube vs. Percutaneous G-J tube to allow for nutrition. I do not feel that delivery at this early gestational age is a viable option, given that she was previously improving on the NJ tube until it clogged and she insisted that it be pulled rather than trying to unclog it.     I am concerned also that her symptoms at presentation may be in some way related to opioid withdrawal since she went 21 hours at home with no opioids and has been on chronic IV opioids while hospitalized. I will plan to have a conversation with her tomorrow about these issues, since she requested from two RNs today that she have faster IV push dilaudid, which is concerning, given her refusal to try oral opioids and her frequent requests for extra opioid IV dosing outside the scheduled time.

## 2017-10-10 ENCOUNTER — HOSPITAL ENCOUNTER (EMERGENCY)
Facility: CLINIC | Age: 27
Discharge: HOME | End: 2017-10-10
Attending: EMERGENCY MEDICINE | Admitting: EMERGENCY MEDICINE
Payer: COMMERCIAL

## 2017-10-10 ENCOUNTER — HOSPITAL ENCOUNTER (INPATIENT)
Facility: CLINIC | Age: 27
LOS: 3 days | Discharge: HOME | End: 2017-10-13
Attending: OBSTETRICS & GYNECOLOGY | Admitting: OBSTETRICS & GYNECOLOGY
Payer: COMMERCIAL

## 2017-10-10 VITALS
HEART RATE: 87 BPM | SYSTOLIC BLOOD PRESSURE: 118 MMHG | DIASTOLIC BLOOD PRESSURE: 87 MMHG | TEMPERATURE: 99.3 F | OXYGEN SATURATION: 99 % | RESPIRATION RATE: 16 BRPM

## 2017-10-10 DIAGNOSIS — O26.899 ABDOMINAL PAIN AFFECTING PREGNANCY, ANTEPARTUM: ICD-10-CM

## 2017-10-10 DIAGNOSIS — O24.013 PRE-EXISTING TYPE 1 DIABETES MELLITUS DURING PREGNANCY IN THIRD TRIMESTER: ICD-10-CM

## 2017-10-10 DIAGNOSIS — G89.29 CHRONIC ABDOMINAL PAIN: ICD-10-CM

## 2017-10-10 DIAGNOSIS — O09.93 SUPERVISION OF HIGH-RISK PREGNANCY, THIRD TRIMESTER: ICD-10-CM

## 2017-10-10 DIAGNOSIS — R10.9 ABDOMINAL PAIN AFFECTING PREGNANCY, ANTEPARTUM: ICD-10-CM

## 2017-10-10 DIAGNOSIS — R10.9 CHRONIC ABDOMINAL PAIN: ICD-10-CM

## 2017-10-10 DIAGNOSIS — O24.319 PREGESTATIONAL DIABETES MELLITUS, MODIFIED WHITE CLASS C: ICD-10-CM

## 2017-10-10 DIAGNOSIS — R10.13 EPIGASTRIC PAIN: Primary | ICD-10-CM

## 2017-10-10 DIAGNOSIS — R11.2 NAUSEA AND VOMITING, INTRACTABILITY OF VOMITING NOT SPECIFIED, UNSPECIFIED VOMITING TYPE: ICD-10-CM

## 2017-10-10 LAB
ANION GAP SERPL CALCULATED.3IONS-SCNC: 8 MMOL/L (ref 3–14)
BASOPHILS # BLD AUTO: 0 10E9/L (ref 0–0.2)
BASOPHILS NFR BLD AUTO: 0.4 %
BUN SERPL-MCNC: 4 MG/DL (ref 7–30)
CALCIUM SERPL-MCNC: 8.6 MG/DL (ref 8.5–10.1)
CHLORIDE SERPL-SCNC: 101 MMOL/L (ref 94–109)
CO2 SERPL-SCNC: 23 MMOL/L (ref 20–32)
CREAT SERPL-MCNC: 0.42 MG/DL (ref 0.52–1.04)
DIFFERENTIAL METHOD BLD: ABNORMAL
EOSINOPHIL # BLD AUTO: 0.1 10E9/L (ref 0–0.7)
EOSINOPHIL NFR BLD AUTO: 0.7 %
ERYTHROCYTE [DISTWIDTH] IN BLOOD BY AUTOMATED COUNT: 15.5 % (ref 10–15)
GFR SERPL CREATININE-BSD FRML MDRD: >90 ML/MIN/1.7M2
GLUCOSE BLDC GLUCOMTR-MCNC: 100 MG/DL (ref 70–99)
GLUCOSE BLDC GLUCOMTR-MCNC: 126 MG/DL (ref 70–99)
GLUCOSE BLDC GLUCOMTR-MCNC: 128 MG/DL (ref 70–99)
GLUCOSE BLDC GLUCOMTR-MCNC: 129 MG/DL (ref 70–99)
GLUCOSE BLDC GLUCOMTR-MCNC: 130 MG/DL (ref 70–99)
GLUCOSE BLDC GLUCOMTR-MCNC: 142 MG/DL (ref 70–99)
GLUCOSE BLDC GLUCOMTR-MCNC: 154 MG/DL (ref 70–99)
GLUCOSE BLDC GLUCOMTR-MCNC: 160 MG/DL (ref 70–99)
GLUCOSE BLDC GLUCOMTR-MCNC: 172 MG/DL (ref 70–99)
GLUCOSE BLDC GLUCOMTR-MCNC: 186 MG/DL (ref 70–99)
GLUCOSE SERPL-MCNC: 149 MG/DL (ref 70–99)
HCT VFR BLD AUTO: 31.4 % (ref 35–47)
HGB BLD-MCNC: 9.9 G/DL (ref 11.7–15.7)
IMM GRANULOCYTES # BLD: 0.1 10E9/L (ref 0–0.4)
IMM GRANULOCYTES NFR BLD: 0.6 %
LYMPHOCYTES # BLD AUTO: 1.4 10E9/L (ref 0.8–5.3)
LYMPHOCYTES NFR BLD AUTO: 17.3 %
MCH RBC QN AUTO: 26.1 PG (ref 26.5–33)
MCHC RBC AUTO-ENTMCNC: 31.5 G/DL (ref 31.5–36.5)
MCV RBC AUTO: 83 FL (ref 78–100)
MONOCYTES # BLD AUTO: 0.6 10E9/L (ref 0–1.3)
MONOCYTES NFR BLD AUTO: 6.7 %
NEUTROPHILS # BLD AUTO: 6.1 10E9/L (ref 1.6–8.3)
NEUTROPHILS NFR BLD AUTO: 74.3 %
NRBC # BLD AUTO: 0 10*3/UL
NRBC BLD AUTO-RTO: 0 /100
PLATELET # BLD AUTO: 231 10E9/L (ref 150–450)
POTASSIUM SERPL-SCNC: 3.4 MMOL/L (ref 3.4–5.3)
RBC # BLD AUTO: 3.8 10E12/L (ref 3.8–5.2)
SODIUM SERPL-SCNC: 132 MMOL/L (ref 133–144)
WBC # BLD AUTO: 8.2 10E9/L (ref 4–11)

## 2017-10-10 PROCEDURE — 25000132 ZZH RX MED GY IP 250 OP 250 PS 637: Performed by: OBSTETRICS & GYNECOLOGY

## 2017-10-10 PROCEDURE — 25000128 H RX IP 250 OP 636

## 2017-10-10 PROCEDURE — 25000128 H RX IP 250 OP 636: Performed by: EMERGENCY MEDICINE

## 2017-10-10 PROCEDURE — 86901 BLOOD TYPING SEROLOGIC RH(D): CPT | Performed by: OBSTETRICS & GYNECOLOGY

## 2017-10-10 PROCEDURE — 85025 COMPLETE CBC W/AUTO DIFF WBC: CPT | Performed by: EMERGENCY MEDICINE

## 2017-10-10 PROCEDURE — 96374 THER/PROPH/DIAG INJ IV PUSH: CPT

## 2017-10-10 PROCEDURE — 12000028 ZZH R&B OB UMMC

## 2017-10-10 PROCEDURE — 12000032 ZZH R&B OB CRITICAL UMMC

## 2017-10-10 PROCEDURE — 86850 RBC ANTIBODY SCREEN: CPT | Performed by: OBSTETRICS & GYNECOLOGY

## 2017-10-10 PROCEDURE — 25800025 ZZH RX 258: Performed by: OBSTETRICS & GYNECOLOGY

## 2017-10-10 PROCEDURE — 25000128 H RX IP 250 OP 636: Performed by: OBSTETRICS & GYNECOLOGY

## 2017-10-10 PROCEDURE — S0164 INJECTION PANTROPRAZOLE: HCPCS

## 2017-10-10 PROCEDURE — 25000125 ZZHC RX 250: Performed by: OBSTETRICS & GYNECOLOGY

## 2017-10-10 PROCEDURE — 00000146 ZZHCL STATISTIC GLUCOSE BY METER IP

## 2017-10-10 PROCEDURE — 80048 BASIC METABOLIC PNL TOTAL CA: CPT | Performed by: EMERGENCY MEDICINE

## 2017-10-10 PROCEDURE — 86900 BLOOD TYPING SEROLOGIC ABO: CPT | Performed by: OBSTETRICS & GYNECOLOGY

## 2017-10-10 PROCEDURE — 59025 FETAL NON-STRESS TEST: CPT

## 2017-10-10 PROCEDURE — 96361 HYDRATE IV INFUSION ADD-ON: CPT

## 2017-10-10 PROCEDURE — 99284 EMERGENCY DEPT VISIT MOD MDM: CPT | Mod: 25

## 2017-10-10 PROCEDURE — 25000125 ZZHC RX 250

## 2017-10-10 PROCEDURE — 96375 TX/PRO/DX INJ NEW DRUG ADDON: CPT

## 2017-10-10 RX ORDER — NALOXONE HYDROCHLORIDE 0.4 MG/ML
.1-.4 INJECTION, SOLUTION INTRAMUSCULAR; INTRAVENOUS; SUBCUTANEOUS
Status: DISCONTINUED | OUTPATIENT
Start: 2017-10-10 | End: 2017-10-13 | Stop reason: HOSPADM

## 2017-10-10 RX ORDER — METOCLOPRAMIDE HYDROCHLORIDE 5 MG/ML
10 INJECTION INTRAMUSCULAR; INTRAVENOUS EVERY 6 HOURS PRN
Status: DISCONTINUED | OUTPATIENT
Start: 2017-10-10 | End: 2017-10-11

## 2017-10-10 RX ORDER — SODIUM CHLORIDE 9 MG/ML
INJECTION, SOLUTION INTRAVENOUS CONTINUOUS
Status: DISCONTINUED | OUTPATIENT
Start: 2017-10-10 | End: 2017-10-13

## 2017-10-10 RX ORDER — HYDROMORPHONE HYDROCHLORIDE 1 MG/ML
2 SOLUTION ORAL EVERY 4 HOURS PRN
Status: DISCONTINUED | OUTPATIENT
Start: 2017-10-10 | End: 2017-10-10 | Stop reason: ALTCHOICE

## 2017-10-10 RX ORDER — METOCLOPRAMIDE 10 MG/1
10 TABLET ORAL 4 TIMES DAILY PRN
Status: DISCONTINUED | OUTPATIENT
Start: 2017-10-10 | End: 2017-10-11 | Stop reason: ALTCHOICE

## 2017-10-10 RX ORDER — DEXTROSE MONOHYDRATE 25 G/50ML
25-50 INJECTION, SOLUTION INTRAVENOUS
Status: DISCONTINUED | OUTPATIENT
Start: 2017-10-10 | End: 2017-10-10

## 2017-10-10 RX ORDER — HYDROMORPHONE HYDROCHLORIDE 1 MG/ML
0.3 INJECTION, SOLUTION INTRAMUSCULAR; INTRAVENOUS; SUBCUTANEOUS EVERY 4 HOURS PRN
Status: DISCONTINUED | OUTPATIENT
Start: 2017-10-10 | End: 2017-10-13 | Stop reason: HOSPADM

## 2017-10-10 RX ORDER — OXYCODONE HCL 5 MG/5 ML
10 SOLUTION, ORAL ORAL ONCE
Status: DISCONTINUED | OUTPATIENT
Start: 2017-10-10 | End: 2017-10-10 | Stop reason: HOSPADM

## 2017-10-10 RX ORDER — ALUMINA, MAGNESIA, AND SIMETHICONE 2400; 2400; 240 MG/30ML; MG/30ML; MG/30ML
30 SUSPENSION ORAL
Status: DISCONTINUED | OUTPATIENT
Start: 2017-10-10 | End: 2017-10-13 | Stop reason: HOSPADM

## 2017-10-10 RX ORDER — NICOTINE POLACRILEX 4 MG
15-30 LOZENGE BUCCAL
Status: DISCONTINUED | OUTPATIENT
Start: 2017-10-10 | End: 2017-10-10

## 2017-10-10 RX ORDER — DOCUSATE SODIUM 100 MG/1
100 CAPSULE, LIQUID FILLED ORAL 2 TIMES DAILY
Status: DISCONTINUED | OUTPATIENT
Start: 2017-10-10 | End: 2017-10-13 | Stop reason: HOSPADM

## 2017-10-10 RX ORDER — DEXTROSE MONOHYDRATE 25 G/50ML
25-50 INJECTION, SOLUTION INTRAVENOUS
Status: DISCONTINUED | OUTPATIENT
Start: 2017-10-10 | End: 2017-10-13 | Stop reason: HOSPADM

## 2017-10-10 RX ORDER — DEXTROSE MONOHYDRATE, SODIUM CHLORIDE, AND POTASSIUM CHLORIDE 50; 1.49; 4.5 G/1000ML; G/1000ML; G/1000ML
INJECTION, SOLUTION INTRAVENOUS CONTINUOUS
Status: DISCONTINUED | OUTPATIENT
Start: 2017-10-10 | End: 2017-10-13

## 2017-10-10 RX ORDER — NICOTINE POLACRILEX 4 MG
15-30 LOZENGE BUCCAL
Status: DISCONTINUED | OUTPATIENT
Start: 2017-10-10 | End: 2017-10-13 | Stop reason: HOSPADM

## 2017-10-10 RX ORDER — ALUMINA, MAGNESIA, AND SIMETHICONE 2400; 2400; 240 MG/30ML; MG/30ML; MG/30ML
30 SUSPENSION ORAL EVERY 6 HOURS PRN
Status: DISCONTINUED | OUTPATIENT
Start: 2017-10-10 | End: 2017-10-13 | Stop reason: HOSPADM

## 2017-10-10 RX ORDER — ONDANSETRON 2 MG/ML
4 INJECTION INTRAMUSCULAR; INTRAVENOUS EVERY 6 HOURS PRN
Status: DISCONTINUED | OUTPATIENT
Start: 2017-10-10 | End: 2017-10-13 | Stop reason: HOSPADM

## 2017-10-10 RX ORDER — FAMOTIDINE 10 MG
20 TABLET ORAL 2 TIMES DAILY
Status: DISCONTINUED | OUTPATIENT
Start: 2017-10-10 | End: 2017-10-13 | Stop reason: HOSPADM

## 2017-10-10 RX ORDER — DEXTROSE, SODIUM CHLORIDE, SODIUM LACTATE, POTASSIUM CHLORIDE, AND CALCIUM CHLORIDE 5; .6; .31; .03; .02 G/100ML; G/100ML; G/100ML; G/100ML; G/100ML
INJECTION, SOLUTION INTRAVENOUS CONTINUOUS
Status: DISCONTINUED | OUTPATIENT
Start: 2017-10-10 | End: 2017-10-10 | Stop reason: ALTCHOICE

## 2017-10-10 RX ORDER — PROCHLORPERAZINE 25 MG
25 SUPPOSITORY, RECTAL RECTAL EVERY 12 HOURS PRN
Status: DISCONTINUED | OUTPATIENT
Start: 2017-10-10 | End: 2017-10-13 | Stop reason: HOSPADM

## 2017-10-10 RX ORDER — POLYETHYLENE GLYCOL 3350 17 G/17G
17 POWDER, FOR SOLUTION ORAL DAILY PRN
Status: DISCONTINUED | OUTPATIENT
Start: 2017-10-10 | End: 2017-10-13 | Stop reason: HOSPADM

## 2017-10-10 RX ORDER — DIPHENHYDRAMINE HYDROCHLORIDE 50 MG/ML
25 INJECTION INTRAMUSCULAR; INTRAVENOUS ONCE
Status: COMPLETED | OUTPATIENT
Start: 2017-10-10 | End: 2017-10-10

## 2017-10-10 RX ORDER — METOCLOPRAMIDE HYDROCHLORIDE 5 MG/ML
10 INJECTION INTRAMUSCULAR; INTRAVENOUS ONCE
Status: COMPLETED | OUTPATIENT
Start: 2017-10-10 | End: 2017-10-10

## 2017-10-10 RX ADMIN — POTASSIUM CHLORIDE, DEXTROSE MONOHYDRATE AND SODIUM CHLORIDE 1000 ML: 150; 5; 450 INJECTION, SOLUTION INTRAVENOUS at 14:45

## 2017-10-10 RX ADMIN — FAMOTIDINE 20 MG: 10 TABLET ORAL at 20:01

## 2017-10-10 RX ADMIN — HUMAN INSULIN 1.5 UNITS/HR: 100 INJECTION, SOLUTION SUBCUTANEOUS at 14:45

## 2017-10-10 RX ADMIN — SODIUM CHLORIDE 1000 ML: 9 INJECTION, SOLUTION INTRAVENOUS at 09:22

## 2017-10-10 RX ADMIN — DIPHENHYDRAMINE HYDROCHLORIDE 25 MG: 50 INJECTION, SOLUTION INTRAMUSCULAR; INTRAVENOUS at 09:23

## 2017-10-10 RX ADMIN — PANTOPRAZOLE SODIUM 80 MG: 40 INJECTION, POWDER, FOR SOLUTION INTRAVENOUS at 16:25

## 2017-10-10 RX ADMIN — HYDROMORPHONE HYDROCHLORIDE 2 MG: 1 SOLUTION ORAL at 14:50

## 2017-10-10 RX ADMIN — HUMAN INSULIN 1.5 UNITS/HR: 100 INJECTION, SOLUTION SUBCUTANEOUS at 16:18

## 2017-10-10 RX ADMIN — HYDROMORPHONE HYDROCHLORIDE 0.3 MG: 1 INJECTION, SOLUTION INTRAMUSCULAR; INTRAVENOUS; SUBCUTANEOUS at 20:42

## 2017-10-10 RX ADMIN — HYDROMORPHONE HYDROCHLORIDE 0.3 MG: 1 INJECTION, SOLUTION INTRAMUSCULAR; INTRAVENOUS; SUBCUTANEOUS at 16:24

## 2017-10-10 RX ADMIN — METOCLOPRAMIDE 10 MG: 5 INJECTION, SOLUTION INTRAMUSCULAR; INTRAVENOUS at 09:24

## 2017-10-10 RX ADMIN — DIBASIC SODIUM PHOSPHATE, MONOBASIC POTASSIUM PHOSPHATE AND MONOBASIC SODIUM PHOSPHATE 250 MG: 852; 155; 130 TABLET ORAL at 20:02

## 2017-10-10 RX ADMIN — ALUMINUM HYDROXIDE, MAGNESIUM HYDROXIDE, AND DIMETHICONE 30 ML: 400; 400; 40 SUSPENSION ORAL at 16:00

## 2017-10-10 RX ADMIN — ALUMINUM HYDROXIDE, MAGNESIUM HYDROXIDE, AND DIMETHICONE 30 ML: 400; 400; 40 SUSPENSION ORAL at 22:06

## 2017-10-10 ASSESSMENT — ENCOUNTER SYMPTOMS
ABDOMINAL PAIN: 1
NAUSEA: 1
VOMITING: 1

## 2017-10-10 NOTE — ED NOTES
Pt refusing to take dilaudid PO. Explained to pt that this is how the MD ordered it and will not do dilaudid IV as he told her intialy. MD updated.

## 2017-10-10 NOTE — PLAN OF CARE
NST reviewed with Dr. Alcala. Patient ok to D/C from ED to home and make appointment to follow up with MD for routine OB visit. ER-B called to update

## 2017-10-10 NOTE — ED NOTES
Pt refusing oral oxycodone. Informed pt MD will not given IV pain meds. Pt asking why. Will send MD in to talk with pt and family.

## 2017-10-10 NOTE — PROGRESS NOTES
"Called to patient's room. She is adamant that she must leave now. Unable to give reason why. Discussed concern for her health and health of her baby but patient states she \"does not care and needs to leave\". Per RN patient has called father and he is on his way to bring her home. GILBERTOA paper signed at bedside. Recommended she continue medications/diet prescribed yesterday and follow up in clinic. Will wait to DC until ride has arrived as patient was arguing with family on the phone about whether they would come to pick her up.    Mariann Dockery PGY3  10/9/2017 9:49 PM   "

## 2017-10-10 NOTE — PROVIDER NOTIFICATION
10/10/17 1035   Provider Notification   Provider Name/Title Dr. alcala   Method of Notification Electronic Page   Request Evaluate - Remote   Notification Reason Status Update   Dr. Alcala paged to review reactive NST with uterine irritability, not felt by patient

## 2017-10-10 NOTE — ED PROVIDER NOTES
History     Chief Complaint:  Abdominal Pain and Vomiting    HPI   Anne Gunter is a 27 year old female with a history of type I diabetes mellitus, who is approximately 7 months pregnant and presents to the emergency department today for evaluation of constant upper abdominal pain and nausea with vomiting. The patient left the [facility] on 10/8/17 before her care team wanted her to, but then returned the next day at which time she again became frustrated and left when she was not receiving the care she was requesting. The patient's father reports that her OB/GYN is in the [facility] system. The patient reports that she has not checked her glucose today, but was found to be 154 in triage. Of note, the patient does have a PICC line placed. No vaginal bleeding noted.    Allergies:  No Known Drug Allergies      Medications:    insulin aspart (NOVOLOG PEN) 100 UNIT/ML injection  phosphorus tablet 250 mg (K PHOS NEUTRAL) 250 MG per tablet  oxyCODONE (ROXICODONE) 5 MG IR tablet  polyethylene glycol (MIRALAX/GLYCOLAX) Packet  senna-docusate (SENOKOT-S;PERICOLACE) 8.6-50 MG per tablet  metoclopramide (REGLAN) 10 MG tablet  famotidine (PEPCID) 20 MG tablet  pantoprazole (PROTONIX) 40 MG EC tablet  mirtazapine (REMERON SOL-TAB) 15 MG disintegrating tablet  OLANZapine zydis (ZYPREXA) 5 MG disintegrating tablet  Prenatal Vit-Fe Fumarate-FA (PRENATAL VITAMIN) 27-0.8 MG TABS  folic acid (FOLVITE) 1 MG tablet     Past Medical History:    Type I diabetes mellitus    Past Surgical History:      EGD-scopy (2017)  Naso-jejunostomy feeding tube insertion (2017)    Family History:    Diabetes  Cerebrovascular disease  CAD    Social History:  The patient was accompanied to the ED by her parents.  Smoking Status: Never  Alcohol Use: No   Marital Status:   [2]     Review of Systems   Gastrointestinal: Positive for abdominal pain, nausea and vomiting.   Genitourinary: Negative for vaginal  bleeding.   All other systems reviewed and are negative.    Physical Exam     Patient Vitals for the past 24 hrs:   BP Temp Temp src Pulse Heart Rate Resp SpO2   10/10/17 1115 - - - - - - 98 %   10/10/17 1100 - - - - - - 98 %   10/10/17 1045 - - - - - - 100 %   10/10/17 1030 - - - - - - 99 %   10/10/17 1015 - - - 88 - 18 100 %   10/10/17 1000 - - - - - - 100 %   10/10/17 0945 - - - - - - 99 %   10/10/17 0902 113/78 99.3  F (37.4  C) Temporal 107 107 16 99 %      Physical Exam  Constitutional: Alert, attentive, intermittently moaning but appears to be resting comfortably  HENT:    Nose: Nose normal.    Mouth/Throat: Oropharynx is clear, mucous membranes are moist   Eyes: EOM are normal. Pupils are equal, round, and reactive to light.   CV: regular rate and rhythm; no murmurs, rubs or gallups  Chest: Effort normal and breath sounds normal.   GI:  There is no tenderness. Gravid consistent with dates. Soft abdomen. Normal bowel sounds  MSK: Normal range of motion.   Neurological: Alert, attentive  Skin: Skin is warm and dry.    Emergency Department Course     Laboratory:  Laboratory findings were communicated with the patient who voiced understanding of the findings.  CBC: WBC 8.2, HGB 9.9 (L),   BMP: Creatinine 0.42 (L), BUN 4 (L), Na 132 (L), Glucose 149 (H), o/w WNL  Glucose: 154 (H)    Interventions:  0922 Normal saline 1 L IV  0923 Benadryl 25 mg IV  0924 Reglan 10 mg IV   Offered dilaudid 1 mg PO -- declined  Offered oxycodone 10 mg elixir PO -- declined    Emergency Department Course:  Nursing notes and vitals reviewed.  0910 I entered the room.  0913 I performed an exam of the patient as documented above.   IV was inserted and blood was drawn for laboratory testing, results above.  The patient received the above intervention(s).   1024 the patient was rechecked and she was updated on the results of her laboratory studies.  1119 the patient was rechecked and discussed with her that Labor and Delivery said  she could go home.  1129 the patient was rechecked as she and her parents would like to be discharged.   Patient discharged after extensive discussion; return precautions advised for vomiting, fever, abdominal pain, bleeding or any other concerns; advised clinic follow-up with Dr. Naidu in 1 day.      Impression & Plan      Medical Decision Making:  Anne Gunter is a 27 year old female with a history of frequently uncontrolled type I diabetes, gastroparesis, and multiple admits (many associated with AMA discharge for chronic recurrent epigastric pain), who presents to the emergency department today for evaluation of epigastric pain. The patient is currently 7 months pregnant and is not is not vomiting in the ED. She is subsequently requesting IV opioids. Screening labs showed no evidence of acute processes including no evidence of DKA. Her anemia is stable. She has not provided a urinalysis and declines to do so. Tocometry by L&D RN in the ED is reassuring and reviewed by Dr. Alcala, who recommends outpatient follow-up with Dr. Naidu (her primary OBGYN). I explained the [facility] and  chronic pain policy, including its underpinnings and goals for optimal management of chronic pain, and that I would be unable to provide IV or prescription narcotics in this visit.  The patient demonstrated understanding of this policy and my explanation, and I provided chronic pain resources. She unfortunately declined any oral opioids, in pill or elixir form. On final recheck, the patent and family note they would like to be discharged and indicate they will go to the [facility] for further cares.    Diagnosis:    ICD-10-CM    1. Chronic abdominal pain R10.9     G89.29    2. Abdominal pain affecting pregnancy, antepartum O26.899     R10.9    3. Pre-existing type 1 diabetes mellitus during pregnancy in third trimester O24.013      Disposition:  Home in improved condition      Anthony Pineda MD  [facility]  [facility] Emergency  Department    Scribe Disclosure:  I, Jordi Fletcher, am serving as a scribe at 9:06 AM on 10/10/2017 to document services personally performed by Anthony Pineda MD, based on my observations and the provider's statements to me.   [facility] EMERGENCY DEPARTMENT       Anthony Pineda MD  10/10/17 0478

## 2017-10-10 NOTE — ED NOTES
Patient is a type 1 diabetic, 7 months pregnant, recently discharged from [facility]. Here with abdominal pain and vomiting, has not checked her glucose yet today. Labor and delivery notified and would like us to start her care in the ED and they will come down to monitor baby. Blood glucose in triage 154. Abdominal pain is constand, does not come and go, and is the same pain she had last week while hospitalized, upper abdominal area.

## 2017-10-10 NOTE — PROGRESS NOTES
GI Fellow Brief Note    Patient known to the GI service.  See multiple consultation noted dated 9/25/2017, 9/11/2017.      Chart reviewed and the clinical scenario discussed with MFM attending Dr. Mateo Verde.  In short, 27 year old pregnant female with hx of DM1 poorly controlled, recurrent admissions for diabetic ketoacidosis, probable gastroparesis, hx of non-adherence to therapeutic plan presents again with persistent epigastric abdominal pain, nausea/vomting.  Also with IV narcotic dependence, refusing oral pain medications.  Inadequate oral intake due to symptoms above, now requesting NJ removal.  Refusing further NG/NJ feeds.      Workup has included upper endoscopy performed 9/27/17 with grade B and C esophagitis.  Localized mild erythema of the gastric cardia likely emetogenic patch in the setting of persistent nausea.  Additionally ultrasound performed with mild hepatomegaly, splenomegaly which is not unexpected given hepatic congestion in the setting of pregnancy.  No other significant pathology.      Please see recommendations detailed below.      - Strongly recommend enteral feeding as able. The patient is refusing NG/NJ at this time.  If continued refusal, TPN will be the next step for nutrition.     - Continue to encourage soft/liquid bland foods (i.e. Boost/Ensure) as able/tolerated. If the patient   - Recommend high-dose PPI BID for at least 2-3 months for management of esophagitis seen on endoscopy 9/27.  The patient may require ongoing acid suppression for the duration of pregnancy based on clinical symptoms.    - Recommend consideration for medications which may provide some mucosal protection as esophagitis heals; typically sucralfate suspension, Maalox, or Pepto-Bismol used BID-QID after meals is recommended. Will defer to OB/MFM team regarding risk/safety of use in pregnancy.   - Could also consider using viscous lidocaine QID or as-needed after PO intakes for comfort as well (and if low-risk  for fetus), particularly as this might provide some additional comfort and reduce narcotic pain medication dependence (worsening propensity for reflux with narcotic-induced intestinal dysmotilty).   - Recommend weaning narcotic pain medications as able, agree with pain management consultation  - Agree with psychiatry involvement to assist with coping   - Noted background B12 deficiency on recent lab testing, concerning for other micronutrient deficiencies (particularly B vitamins) if present. Recommend MVI and B vitamin replacement accordingly, ongoing efforts by primary team and Nutrition.     Case discussed with Dr. Sanchez  GI will sign off at this time.      Please page with ? Or change in clinical status.

## 2017-10-10 NOTE — PLAN OF CARE
Problem: Patient Care Overview  Goal: Plan of Care/Patient Progress Review  Outcome: No Change  Called into patient's room. She wants to see MD as she wants to go home. States no reason for leaving except she doesn't want to be here anymore. Dr. Dockery notified and asked to come and see patient.

## 2017-10-10 NOTE — PROGRESS NOTES
CLINICAL NUTRITION SERVICES - ASSESSMENT NOTE      Nutrition Prescription     RECOMMENDATIONS FOR MDs/PROVIDERS TO ORDER:  1. Recommend resume oral prenatal multivitamin with iron as pt no longer receiving tube feeds and 15 mL Certavite via TF to meet micronutrient needs.      2. Recommend endocrine continue to follow for insulin recommendations with PO intake vs potential initiation of nutrition support.     3. Daily total fluid goals of 1554-6565 mL/day (9-10 cups per day)      Malnutrition Status:    Patient does not meet two of the criteria necessary for diagnosing malnutrition but is at risk for malnutrition     Recommendations already ordered by Registered Dietitian (RD):  1. Ordered weight check on admission today (10/10)   2. Ordered Glucerna BID between meals @ 10am, 2pm, HS snack.   3. Modified diet order to low fiber diet per discussion with MD. If pt with improving po intake and elevated BG may consider restricted CHO diet.     Future/Additional Recommendations:  1. If pt with improvement in PO intake and consuming at least 25-50% of meals TID, recommend ordering calorie counts to assess adequacy of po intake      2. Given likely inadequate po intake x 3 days and hx of inadequate po intake/tolerance and wt loss with previous reliance on enteral nutrition to meet nutrition needs, would recommend initiaion of enteral nutrition if pt unable to demonstrate ability to consume adequate po intake, recommend replacement of feeding tube and reinitiating of tube feeds pending acceptable lytes and if pt in agreement. Pending acceptable lytes, would recommend resuming tube feeds of TwoCal HN @ 50 mL/hr, continuous. Would recommend initiate feeds at 10 mL/hr and increase 10 mL/hr q 2 hrs pending tolerance/lytes. Pending lytes, tolerance, and insulin regimen, may transition to cycled feeds of TwoCal HN @ 100 mL/hr x 12 hrs. If feeds resumed, would recommend free water flushes of 30 mL q 4 hrs for patency and adjust  "PRN pending oral intake of fluid.      3. If pt continues to refuse tube feeding and parenteral nutrition becomes POC with poor po intake/tolerance and/or weight loss, would recommend checking baseline labs of Phos, Mg++, K+ to monitor for signs of refeeding syndrome. If lytes are acceptable (K+, Mg++ >/= normal, Phos > 1.9), would recommend initiate PN of 160 g dextrose (GIR 1.5), 90g AA (1.2 g/kg), 250 mL daily IL. Pending acceptable lytes per recs above, recommend advance by 50 g dextrose daily to goal of 300 g dextrose (GIR 2.78). Goal of 300 g dextrose (GIR 2.78), 90g AA (1.2 g/kg), 250 mL daily IL (27% kcals from fat) to provide 1880 kcals (25 kcal/kg) to meet 100% est kcal needs and protein needs.     4. Continue to review diet education for gastroparesis/gestational diabetes diet education prior to discharge.       REASON FOR ASSESSMENT  Anne Gunter is a/an 27 year old female assessed by the dietitian for Provider Order - \"Patient with limited/inconsistent PO intake with type I diabetes with gastroparesis in pregnancy\"      NUTRITION HISTORY  - Pt with hx of  at 29w6d, who presents to [facility] antepartum with severe abdominal pain and nausea/vomiting.    - Per chart, pt with hx of abdominal pain, N/V r/t diabetic gastroparesis, which is worsened by hyperglycemia as well as non-adherence to dietary modifications. Pt also with poorly controlled type 1 DM. Recent admission 9/10- for DKA, pt left  and admitted again - for abdominal pain, BG management and left AMA for unknown reasons. Most recently admitted from - and she D/C'ed AMA again. During this admission, pt was started on tube feeds 2/2 poor po intake/tolerance and weight loss after feeding tube placement , however, feeds were stopped  on discharge. Pt readmitted -10/8 for abdominal pain, nausea, vomiting, and restarted on tube feeds 2/2 poor po/tolerance and weight loss. She reached TF goal of 50 mL/hr, " continuous of TwoCal HN (10/4) then cycled tube feeds to TwoCal HN @ 100 mL/hr x 12 hrs, however during past admission NJ-tube became clogged and pt refused replacement of feeding tube and tube feeds were stopped on 10/7. During admission, pt was given education on gastroparesis diet (10/4) and able to verbalize foods low in fiber and appropriate for diabetes management. Per discussion with endocrine during past admission, recommended not do education for carb counting and plans for set dose insulin regimen at home. Endocrine recommend home regimen of 11U BID Lantus and 6units Novolog with meals. At time of discharge 10/8, per chart notes pt was able to tolerate PO (small amounts) and able to verbalize recommend foods to consume. Pt then discharged 10/8 as she was able to demonstrate ability to eat and drink. Per discussion with the team, pt ate rice and yogurt 10/8 per Anne's moms report and readmitted 10/9 with signs and symptoms of nausea, vomiting, and abdominal pain and left AMA 10/9 evening. Currently readmitted today with complaints of abdominal pain. Per team, pt continues to refuse replacement of feeding tube. Pt currently 3 days without nutrition support and likely inadequate po intake with nausea, vomiting, abdominal pain.  Per MD notes/chart review 10/9, MD concerned also that her symptoms may be in some way related to opioid withdrawal since she went 21 hours at home with no opioids and has been on chronic IV opioids while hospitalized.     CURRENT NUTRITION ORDERS  Diet: Low Consistent CHO diet   Intake/Tolerance: Pt recently admitted. Per MD, pt drank a large amount of water and apple juice (~1L) and experienced emesis after. No other po intake recorded.      LABS  Labs reviewed  Phos WNL mg/dL (10/9); K+ WNL 10/10; Mg++ WNL on 10/9  BG previously 112-154 mg/dL   Hgb A1c 8.4 on 9/10/17     MEDICATIONS  Medications reviewed  Insulin drip  NeutraPhos BID daily      ANTHROPOMETRICS  Height: Per RD note  "9/19: Ht: 1.7 m (5' 6.93\")   Current Weight: Most recent weight 74.1 kg on 10/7 during previous admission   Current BMI: 25.65 kg/m^2  Pre-pregnancy Weight: 75 kg (165 lbs) based on pt report per past RD notes   Pre-pregnancy BMI: 26.00 kg/m^2  IBW: 61.4 kg (135 lb)  IBW %: 122%  Weight History: Weight hx from previous admission shown below. Pt presented with 6% wt loss x 1 week prior to past admission (was 77.9 kg on 9/23 and presented at 73.5 kg on 10/1). During past admission, pt was started on tube feeds and gained ~5 lb during past admission up to 75.8 kg on 10/5. However, most recent weight on 10/7 suggests pt lost 2% in two days, question accuracy of wt on 10/7 as pts weight was previously trending up and receiving tube feeds at goal + PO intake. Based on pre-pregnancy wt of 75 kg, pt has gained 0 lbs during this pregnancy, which is below the recommended wt gain of 15-25 lbs. No updated weight obtained since 10/7 to assess weight trends.     10/07/17 1322 74.1 kg (163 lb 6.4 oz) CC      10/05/17 0900 75.8 kg (167 lb 3.2 oz) MH      10/03/17 1945 75.3 kg (165 lb 14.4 oz) TH      10/01/17 0959 73.5 kg (162 lb) CS      Dosing Weight: 75 kg - based on UBW prior to pregnancy      ASSESSED NUTRITION NEEDS  Estimated Energy Needs: 2325+ kcals/day (25 kcal/kg + 450 kcal/day PO/EN; PN energy needs: 20-25 kcal/kg)  Justification: Increased needs 2/2 second trimester of pregnancy and repletion   Estimated Protein Needs: 78-98 grams protein/day (1. - 1.2 grams of pro/kg)  Justification: Increased needs 2/2 second trimester of pregnancy and repletion  Estimated Fluid Needs: 8201-9255 mL/day (30-35 mL/kg)  Justification: Increased needs per pregnancy      PHYSICAL FINDINGS  See malnutrition section below.     MALNUTRITION  % Intake: Decreased intake does not meet criteria (Pt with likely inadequate po intake x 3 days, previously receiving tube feeds at goal 10/6 to meet 100% nutrition needs)  % Weight Loss: > 2% in 1 week " (severe)  Subcutaneous Fat Loss: None observed  Muscle Loss: None observed  Fluid Accumulation/Edema: None noted  Malnutrition Diagnosis: Patient does not meet two of the above criteria necessary for diagnosing malnutrition but is at risk for malnutrition     NUTRITION DIAGNOSIS  Inadequate oral intake related to N/V, abdominal pain, and restrictive diet hindering po and continuing to refuse replacement of TF to meet nutrition needs as evidenced by small amounts of PO intake noted since 10/6 with pt refusal to replace feeding tube for re-initiation of tube feeds, and current low-consistent CHO diet      INTERVENTIONS  Implementation  Nutrition Education: Unable to complete due to inability to visit with pt.   Collaboration with other providers - Discussed pt/nutrition POC with MD. Discussed diet order with MD as pt with minimal po intake and on insulin drip about liberalizing diet to low fiber and if po intake improves with elevated BG, could then consider CHO restriction. Per MD, Anne continues to refuse feeding tube replacement and resumption of tube feeds, and pt is currently not a candidate for TPN as she has a single lumen line. However, per team plans for double lumen placement tomorrow and potentially initiating TPN tomorrow afternoon/evening to provide nutrition given inadequate po intakes since loss of enteral nutrition access 10/6.      Goals  1. Total avg nutritional intake to meet a minimum of 30 kcal/kg and 1.2 g PRO/kg daily (per dosing wt 75 kg) per PO vs initiation of nutrition support.   2. Weight gains of 0.3 kg (0.7 lbs) per week (15-25 lbs total wt gain during this pregnancy)   3. BG </= 180     Monitoring/Evaluation  Progress toward goals will be monitored and evaluated per protocol.     Yesenia Covarrubias RD, LD  Unit Pager: [phone]

## 2017-10-10 NOTE — PLAN OF CARE
"Problem: Patient Care Overview  Goal: Plan of Care/Patient Progress Review  Outcome: Declining  Patient adamant about leaving. Can give no reason for wanting to leave. Just states that she \"wants to go home, it's a feeling I have\"  Crying inconsolably. Stated she was going to pull IV if I didn't take it out. Patient has a PICC line in and this RN stopped the IV fluids and insulin drip. New cap was placed and flushed with 10 cc normal saline. RN was able to calm patient down. Patient called her father to come and pick her up. From the yelling at this end, RN assumed that her father told her he was not going to pick her up. The yelling escalated so this RN went in the room and asked to talk with her father. RN told Anne's father that she wanted to leave and we couldn't stop her from leaving. We explained to her that we are concerned about her and her baby. She said she didn't care. Her father did not want to come and pick her up because he said \"she is sick, and has diabetes, and he is not a doctor\". He doesn't know if he can get her back to the hospital. I told Anne's father that I would talk with the doctor again, and Anne took the phone from me and started screaming at her father. This went on for several minutes and then she was quiet. RN went back in the room and Anne said that her father is going to pick her up and she will meet him downstairs. I asked Anne about her pain and if she had any, and she said, \"I don't even know\". Dr. Dockery notified of situation and again talked with patient about leaving. Paper was signed that patient was leaving AMA. AMA paper was read out loud to patient.  Patient has discharge papers from yesterday and told to review them for her appointments and for insulin needs. Dr. Verde will call patient tomorrow to make appointment to be seen in [facility] clinic. Patient left room and walked to lobby to wait for her father.       "

## 2017-10-10 NOTE — ED AVS SNAPSHOT
[facility] Emergency Department    [address]    [facility] [address]    Phone:  [phone]    Fax:  [phone]                                       Anne Gunter   MRN: [id number]    Department:  [facility] Emergency Department   Date of Visit:  10/10/2017           Patient Information     Date Of Birth          1990        Your diagnoses for this visit were:     Chronic abdominal pain     Abdominal pain affecting pregnancy, antepartum     Pre-existing type 1 diabetes mellitus during pregnancy in third trimester        You were seen by Anthony Pineda MD.      Follow-up Information     Follow up with Isiah Naidu MD In 1 day.    Specialty:  OB/Gyn    Contact information:    [address]  [place] [address]  [phone]          Discharge Instructions       Discharge Instructions  Chronic Pain Management  You were seen today for an issue regarding chronic or recurrent pain. You may have a condition that gives you pain every day, or a condition that causes pain that keeps coming back, or several conditions involving pain.   We will evaluate you for your symptoms to ensure that there is no medical emergency. This evaluation usually takes the form of a good medical history (interview) and physical examination. Rarely, imaging (x-rays or CAT scans) and lab work (blood tests) may be necessary in addition to the history and examination. This approach is similar to our approach to other chronic/recurrent diseases where we evaluate for emergencies but leave much of the management of the problem to the regular provider/clinic.  We will offer suggestions to manage your pain but we do not generally utilize opiate pain medications for recurrent or chronic pain. There is an ongoing public health crisis with respect to opiates and we are aware of the risks to our patients from opiate pain medications. Opiates are strong pain relievers but they carry  significant risks including sedation, confusion, constipation, falls, as well as dependence, addiction, and overdose-related deaths. These medications represent a significant risk to your health and are therefore reserved for the management of select conditions associated with acute, severe pain. For many conditions, the risks of opiate treatment simply outweigh the benefits. Additionally, for patients with chronic/recurrent pain or who frequently use opiates, management of pain needs to be performed by a single physician/provider who can follow their care consistently. As a result, we generally do not provide refills of opiates or treat chronic pain with injected opiates in the Emergency Department. It is with your best interests in mind that we adhere to these widely accepted best practices.    As with other chronic diseases like diabetes and asthma, management of chronic pain is best performed by regular visits to the same provider. In the case of chronic pain, this may be your primary physician/provider, your neurologist or other specialist, or with a chronic pain clinic/provider.  If you have concerns about our policy, please discuss them with your primary care provider or pain specialist, who can contact us if necessary for further information or clarification.  If you were given a prescription for medicine here today, be sure to read all of the information (including the package insert) that comes with your prescription.  This will include important information about the medicine, its side effects, and any warnings that you need to know about.  The pharmacist who fills the prescription can provide more information and answer questions you may have about the medicine.  If you have questions or concerns that the pharmacist cannot address, please call or return to the Emergency Department.   Remember that you can always come back to the Emergency Department if you develop any new symptoms or if there is anything  that worries you.      Future Appointments        Provider Department Dept Phone Center    10/12/2017 10:30 AM Isidro Paredes; UR BED 01  Services Department [phone] [place]    10/12/2017 11:30 AM Danny Rodriguez  Services Department [phone] [place]    10/12/2017 11:45 AM UR US Room 4 [facility] Ultrasound - [place] [phone] [place]    10/12/2017 12:30 PM MFM Psychologist [facility] [place] [address] [phone] [place]    10/12/2017 12:30 PM UR  EXAM RM 1 [facility] [place] [address] [phone] [place]    10/12/2017 2:15 PM Marla Colunga MD; [facility] LANGUAGE SERVICES [facility] [address] [facility] [phone] [place]    10/16/2017 11:00 AM Trinh Olivia PA-C [facility] Endocrinology [phone] [facility]      24 Hour Appointment Hotline       To make an appointment at [facility], call [phone] ([phone]). If you don't have a family doctor or clinic, we will help you find one. [facility] are conveniently located to serve the needs of you and your family.             Review of your medicines      Our records show that you are taking the medicines listed below. If these are incorrect, please call your family doctor or clinic.        Dose / Directions Last dose taken    alum & mag hydroxide-simethicone 400-400-40 MG/5ML Susp suspension   Commonly known as:  MYLANTA ES/MAALOX  ES   Dose:  30 mL   Quantity:  355 mL        Take 30 mLs by mouth 4 times daily (with meals and nightly)   Refills:  1        blood glucose monitoring lancets   Quantity:  100 each        Use to test blood sugar 4 times daily or as directed.   Refills:  3        blood glucose monitoring meter device kit   Quantity:  1 kit        Use to test blood sugar 4 times daily or as directed.   Refills:  0        * blood glucose monitoring test strip   Commonly known as:  no brand specified   Quantity:  100 strip        Use to  test blood sugars 4 times daily or as directed   Refills:  3        * blood glucose monitoring test strip   Commonly known as:  no brand specified   Quantity:  100 strip        Use to test blood sugar 4 times daily or as directed.   Refills:  3        famotidine 20 MG tablet   Commonly known as:  PEPCID   Dose:  20 mg   Quantity:  40 tablet        Take 1 tablet (20 mg) by mouth 2 times daily   Refills:  1        folic acid 1 MG tablet   Commonly known as:  FOLVITE   Dose:  1 mg   Quantity:  30 tablet        Take 1 tablet (1 mg) by mouth daily   Refills:  0        * insulin aspart 100 UNIT/ML injection   Commonly known as:  NovoLOG PEN   Dose:  1 Units   Quantity:  9 mL        Inject 1 Units Subcutaneous 3 times daily (with meals)   Refills:  1        * insulin aspart 100 UNIT/ML injection   Commonly known as:  NovoLOG PEN   Dose:  6 Units   Quantity:  5.4 mL        Inject 6 Units Subcutaneous 3 times daily (with meals)   Refills:  1        insulin glargine 100 UNIT/ML injection   Commonly known as:  LANTUS   Dose:  11 Units   Quantity:  6.6 mL        Inject 11 Units Subcutaneous 2 times daily   Refills:  1        metoclopramide 10 MG tablet   Commonly known as:  REGLAN   Dose:  10 mg   Quantity:  60 tablet        Take 1 tablet (10 mg) by mouth 4 times daily as needed (4x Daily AC & HS prn nausea and abdominal pain)   Refills:  0        mirtazapine 15 MG ODT tab   Commonly known as:  REMERON SOL-TAB   Dose:  15 mg   Quantity:  60 tablet        1 tablet (15 mg) by Orally disintegrating tablet route At Bedtime   Refills:  2        OLANZapine zydis 5 MG ODT tab   Commonly known as:  zyPREXA   Dose:  5 mg   Quantity:  60 tablet        Take 1 tablet (5 mg) by mouth 2 times daily   Refills:  3        ondansetron 4 MG ODT tab   Commonly known as:  ZOFRAN ODT   Dose:  4 mg   Quantity:  20 tablet        Take 1 tablet (4 mg) by mouth every 8 hours as needed for nausea   Refills:  1        oxyCODONE 5 MG IR tablet   Commonly  known as:  ROXICODONE   Dose:  5-10 mg   Quantity:  25 tablet        Take 1-2 tablets (5-10 mg) by mouth every 4 hours as needed for pain maximum 12 tablet(s) per day   Refills:  0        pantoprazole 40 MG EC tablet   Commonly known as:  PROTONIX   Dose:  40 mg   Quantity:  30 tablet        Take 1 tablet (40 mg) by mouth 2 times daily   Refills:  1        phosphorus tablet 250 mg 250 MG per tablet   Commonly known as:  K PHOS NEUTRAL   Dose:  250 mg   Quantity:  60 tablet        Take 1 tablet (250 mg) by mouth 2 times daily   Refills:  1        polyethylene glycol Packet   Commonly known as:  MIRALAX/GLYCOLAX   Dose:  17 g   Quantity:  7 packet        Take 17 g by mouth daily as needed for constipation (titrate to one bowel movement daily)   Refills:  1        Prenatal Vitamin 27-0.8 MG Tabs   Dose:  1 tablet   Quantity:  90 tablet        Take 1 tablet by mouth daily   Refills:  3        senna-docusate 8.6-50 MG per tablet   Commonly known as:  SENOKOT-S;PERICOLACE   Dose:  1 tablet   Quantity:  100 tablet        Take 1 tablet by mouth 2 times daily as needed for constipation   Refills:  0        * Notice:  This list has 4 medication(s) that are the same as other medications prescribed for you. Read the directions carefully, and ask your doctor or other care provider to review them with you.            Procedures and tests performed during your visit     Basic metabolic panel (BMP)    CBC + differential    Fetal non-stress test by MD/RN    Glucose by meter      Orders Needing Specimen Collection     Ordered          10/10/17 0948  UA with Microscopic - STAT, Pri[place]: STAT, Needs to be Collected     Scheduled Task Status   10/10/17 0949 Collect UA with Microscopic Open   Order Class:  PCU Collect                  Pending Results     Date and Time Order Name Status Description    10/9/2017 0001 ABO/Rh type and screen In process     10/9/2017 0001 ABO/Rh type and screen In process             Pending Culture Results      No orders found from 10/8/2017 to 10/11/2017.            Pending Results Instructions     If you had any lab results that were not finalized at the time of your Discharge, you can call the ED Lab Result RN at [phone]. You will be contacted by this team for any positive Lab results or changes in treatment. The nurses are available 7 days a week from 10A to 6:30P.  You can leave a message 24 hours per day and they will return your call.        Test Results From Your Hospital Stay        10/10/2017  9:06 AM      Component Results     Component Value Ref Range & Units Status    Glucose 154 (H) 70 - 99 mg/dL Final    Dr/RN Notified         10/10/2017  9:47 AM      Component Results     Component Value Ref Range & Units Status    WBC 8.2 4.0 - 11.0 10e9/L Final    RBC Count 3.80 3.8 - 5.2 10e12/L Final    Hemoglobin 9.9 (L) 11.7 - 15.7 g/dL Final    Hematocrit 31.4 (L) 35.0 - 47.0 % Final    MCV 83 78 - 100 fl Final    MCH 26.1 (L) 26.5 - 33.0 pg Final    MCHC 31.5 31.5 - 36.5 g/dL Final    RDW 15.5 (H) 10.0 - 15.0 % Final    Platelet Count 231 150 - 450 10e9/L Final    Diff Method Automated Method  Final    % Neutrophils 74.3 % Final    % Lymphocytes 17.3 % Final    % Monocytes 6.7 % Final    % Eosinophils 0.7 % Final    % Basophils 0.4 % Final    % Immature Granulocytes 0.6 % Final    Nucleated RBCs 0 0 /100 Final    Absolute Neutrophil 6.1 1.6 - 8.3 10e9/L Final    Absolute Lymphocytes 1.4 0.8 - 5.3 10e9/L Final    Absolute Monocytes 0.6 0.0 - 1.3 10e9/L Final    Absolute Eosinophils 0.1 0.0 - 0.7 10e9/L Final    Absolute Basophils 0.0 0.0 - 0.2 10e9/L Final    Abs Immature Granulocytes 0.1 0 - 0.4 10e9/L Final    Absolute Nucleated RBC 0.0  Final         10/10/2017 10:03 AM      Component Results     Component Value Ref Range & Units Status    Sodium 132 (L) 133 - 144 mmol/L Final    Potassium 3.4 3.4 - 5.3 mmol/L Final    Chloride 101 94 - 109 mmol/L Final    Carbon Dioxide 23 20 - 32 mmol/L Final    Anion Gap 8  3 - 14 mmol/L Final    Glucose 149 (H) 70 - 99 mg/dL Final    Urea Nitrogen 4 (L) 7 - 30 mg/dL Final    Creatinine 0.42 (L) 0.52 - 1.04 mg/dL Final    GFR Estimate >90 >60 mL/min/1.7m2 Final    Non  GFR Calc    GFR Estimate If Black >90 >60 mL/min/1.7m2 Final    African American GFR Calc    Calcium 8.6 8.5 - 10.1 mg/dL Final                Clinical Quality Measure: Blood Pressure Screening     Your blood pressure was checked while you were in the emergency department today. The last reading we obtained was  BP: 113/78 . Please read the guidelines below about what these numbers mean and what you should do about them.  If your systolic blood pressure (the top number) is less than 120 and your diastolic blood pressure (the bottom number) is less than 80, then your blood pressure is normal. There is nothing more that you need to do about it.  If your systolic blood pressure (the top number) is 120-139 or your diastolic blood pressure (the bottom number) is 80-89, your blood pressure may be higher than it should be. You should have your blood pressure rechecked within a year by a primary care provider.  If your systolic blood pressure (the top number) is 140 or greater or your diastolic blood pressure (the bottom number) is 90 or greater, you may have high blood pressure. High blood pressure is treatable, but if left untreated over time it can put you at risk for heart attack, stroke, or kidney failure. You should have your blood pressure rechecked by a primary care provider within the next 4 weeks.  If your provider in the emergency department today gave you specific instructions to follow-up with your doctor or provider even sooner than that, you should follow that instruction and not wait for up to 4 weeks for your follow-up visit.        Thank you for choosing [facility]       Thank you for choosing [facility] for your care. Our goal is always to provide you with excellent care. Hearing back from our  patients is one way we can continue to improve our services. Please take a few minutes to complete the written survey that you may receive in the mail after you visit with us. Thank you!        Care EveryWhere ID     This is your Care EveryWhere ID. This could be used by other organizations to access your [facility] medical records  [id number]        Equal Access to Services     VALENTINO ONEILL : Jigar Root, desean farias, brea corbett . So [facility] [phone].    ATENCIÓN: Si habla español, tiene a monreal disposición servicios gratuitos de asistencia lingüística. Caioame al [phone].    We comply with applicable federal civil rights laws and [place] laws. We do not discriminate on the basis of race, color, national origin, age, disability, sex, sexual orientation, or gender identity.            After Visit Summary       This is your record. Keep this with you and show to your community pharmacist(s) and doctor(s) at your next visit.

## 2017-10-10 NOTE — IP AVS SNAPSHOT
[facility]    [address]    [place] [address]    Phone:  [phone]                                       After Visit Summary   10/10/2017    Anne Gunter    MRN: [id number]           After Visit Summary Signature Page     I have received my discharge instructions, and my questions have been answered. I have discussed any challenges I see with this plan with the nurse or doctor.    ..........................................................................................................................................  Patient/Patient Representative Signature      ..........................................................................................................................................  Patient Representative Print Name and Relationship to Patient    ..................................................               ................................................  Date                                            Time    ..........................................................................................................................................  Reviewed by Signature/Title    ...................................................              ..............................................  Date                                                            Time

## 2017-10-10 NOTE — ED NOTES
"Pt states \"I need pain meds\" and continues to moan. Pt's father and mother in room. Father states \"they don't know that nothing works for her except the strong stuff\". RN states she will voice these concerns to the MD. Call light in reach, side rails up x2. Warm blanket given.   "

## 2017-10-10 NOTE — H&P
"Expand All Collapse All    []Hide copied text  [facility]  OB History and Physical        Anne Gunter MRN# [id number]   Age: 27 year old YOB: 1990      CC:                 Abdominal pain, \"I need pain medicine now\"     HPI:  Ms. Anne Gunter is a 27 year old  at 29w6d by 25w5d US who presents with abdominal pain located mid epigastric. She initally presented to [facility] Emergency Department this morning with the same symptoms. At that time she was evaluated, had no evidence of DKA and no vomiting, and was not given the IV dilaudid that she requested, as she was felt to have an acute exacerbation of chronic abdominal pain. She was offered PO dilaudid and another PO oral narcotic as well as other GI meds, all of which were declined.  She was discharged to home with instructions to follow up with her OB after review of her NST by the ob staff. She then arrived at this hospital directly through the labor and delivery unit at [facility] for admission.  The patient's medical history is notable for poorly controlled T1DM with multiple admissions for DKA, gastroparesis, limited prenatal care in [place], history of PLTCS x1 at 32 weeks for fetal indications in the setting of PTD, preeclampsia without severe features.  She has been admitted multiple times, most recently discharged on 10/8 and left AMA on 10/9.   The patient did not communicate today with staff but moaned in her bed during the conversation. She asked for pain medicine upon arrival and asked for Dr. Verde to give her one dose of IV pain meds before she be given any other medications. She has not had any vomiting since admission and per [facility] ER nurse notes she had no episodes of vomiting in the emergency room this morning. She refuses fetal monitoring at this time.      Patient denies contractions, vaginal bleeding and loss of fluid. States baby is moving well.    An extensive chart review of this patient that " "included all of the notes from this pregnancy and all of the notes from her previous pregnancy, after she left AMA yesterday for the 4th time in her pregnancy with the following history:     2016 - C/S for nonreassuring fetal status after decision for delivery at 32w6d. Uncontrolled diabetes, with multiple admissions for DKA and abdominal pain requiring narcotics. Extensive evaluation with consultations by GI, diabetes and pain management in that pregnancy. Patient declined numerous interventions and was noted to negative abdominal US, negative H pylori, did have one episode of reported hematemesis but never a clear etiology. Declined an EGD, decline oral pain medication and many GI meds, demanding that only IV push dilaudid worked. Described as concerns for \"manipulative behavior' and \"drug seeking behavior\" in that pregnancy. She was treated with intermittent narcotic analgesia but ultimately it was not chronically prescribed.     2017 - Pregnancy hospitalizations  9/9 - 9/13 - Diabetes control and abdominal pain, N/V - left AMA with narcotics changed to PO dilaudid  9/18-9/23 Transfer from [facility], diabetes control and N/V - left AMA when narcotics changed to PO dilaudid from IV  9/24 - 9/29 - diabetes control and N/V. Initially treated with IM morphine \"didn't work\", had desats with IV dilaudid , extensive evaluation by GI ,nutrition, endocrine. Given IV tylenol and significant GI medications - often declined by patient. Pain not felt to be explained by gastroparesis, Had EGD with esophagitis and 'emetogenic patch in proximal stomach\". Recommended carafate/mylanta/Pepto and PPI high dose for at least 2- 3 months, and viscous lidocain for pain. Left AMA when dilated decreased from every 1-2 hours.   9/30-10/8 - had NJ tube placed with tube feeds, able to wean down IV dilaudid to 0.3 mg every 4 hours. Had single lumen PICC line placed. NJ tube clogged and unable to have unclogged over the weekend. Patient demanded " that it be removed and declined having replaced. Requested d/c home but declined trial of oral analgesics prior to d/c. Went home with Rx but did not fill.     10/9-10/9 - admitted with symptoms of severe pain and also concerning for possible opioid withdrawal, left AMA, still complaining of GI pain and making requests to RN for increased speed of IV push and higher doses, declined by team. Left after significant argument with parents over the phone and noted unhappiness and yelling towards floor staff.     10/10 - went to [facility] ED - declined recommended PO meds and was not given requested dilaudid IV push. Presented to [facility] service.       Pregnancy Complications:  - Poorly controlled Type 1 DM: multiple recent admissions for DKA and abdominal pain, most recently left AMA on 10/9/2017 but no evidence of DKA on last two admits.   - History of PLTCS x1 at 32w5d for fetal indications in the setting of DKA, noncompliance with treatment and recurrent abdominal pain  - Gastroparesis, recurrent abdominal pain which appears out of proportion to symptoms, declines recommended GI therapy in multiple hospitalizations.  - Pre-eclampsia without severe features based on mild range Bps, UPC 0.6   - Malnutrition  - Anxiety, s/p SW and  psych consult last admission - depression and adjustment disorder  - GBS positive      Prenatal Labs:         Lab Results   Component Value Date     ABO A 2017     RH Pos 2017     AS Neg 2017     HEPBANG Nonreactive 2017     CHPCRT Negative 09/10/2017     GCPCRT Negative 09/10/2017     TREPAB Negative 09/10/2017     HGB 10.2 (L) 2017      GBS Status:         Lab Results   Component Value Date     GBS Positive (A) 09/10/2017      OB History               Obstetric History       T0      L1     SAB0   TAB0   Ectopic0   Multiple0   Live Births1        # Outcome Date GA Lbr Walter/2nd Weight Sex Delivery Anes PTL Lv   2 Current                      1  06/10/16 32w5d   2.37 kg (5 lb 3.6 oz) M CS-LTranv Gen   BARBARA          PMHx:               Past Medical History         Past Medical History:   Diagnosis Date     Diabetes (H)       Microalbuminuria 2016     Type I diabetes mellitus, uncontrolled (H) 2016         PSHx:                Past Surgical History          Past Surgical History:   Procedure Laterality Date      SECTION N/A 6/10/2016     Procedure:  SECTION;  Surgeon: Richardson Duran MD;  Location: [facility] OR     ESOPHAGOSCOPY, GASTROSCOPY, DUODENOSCOPY (EGD), COMBINED N/A 2017     Procedure: COMBINED ESOPHAGOSCOPY, GASTROSCOPY, DUODENOSCOPY (EGD);  Upper Endoscopy with biopsies;  Surgeon: Nile Sanchez MD;  Location: [facility] OR     INSERT TUBE NASOJEJUNOSTOMY   2017     Procedure: INSERT TUBE NASOJEJUNOSTOMY;  nasojejunal feeding tube placement with upper endoscopy assistance by Dr. Sanchez and Dr. Cristino Bennett, Radiology;  Surgeon: Nile Sanchez MD;  Location: [facility] OR         Meds:                Prescriptions Prior to Admission            Prescriptions Prior to Admission   Medication Sig Dispense Refill Last Dose     insulin aspart (NOVOLOG PEN) 100 UNIT/ML injection Inject 1 Units Subcutaneous 3 times daily (with meals) 9 mL 1       insulin glargine (LANTUS) 100 UNIT/ML injection Inject 11 Units Subcutaneous 2 times daily 6.6 mL 1       insulin aspart (NOVOLOG PEN) 100 UNIT/ML injection Inject 6 Units Subcutaneous 3 times daily (with meals) 5.4 mL 1       phosphorus tablet 250 mg (K PHOS NEUTRAL) 250 MG per tablet Take 1 tablet (250 mg) by mouth 2 times daily 60 tablet 1       oxyCODONE (ROXICODONE) 5 MG IR tablet Take 1-2 tablets (5-10 mg) by mouth every 4 hours as needed for pain maximum 12 tablet(s) per day 25 tablet 0       alum & mag hydroxide-simethicone (MYLANTA ES/MAALOX  ES) 400-400-40 MG/5ML SUSP suspension Take 30 mLs by mouth 4 times daily (with meals and nightly) 355 mL 1       blood glucose monitoring  (NO BRAND SPECIFIED) test strip Use to test blood sugars 4 times daily or as directed 100 strip 3 9/24/2017 at Unknown time     blood glucose monitoring (NO BRAND SPECIFIED) test strip Use to test blood sugar 4 times daily or as directed. 100 strip 3 9/24/2017 at Unknown time     blood glucose monitoring (ONE TOUCH DELICA) lancets Use to test blood sugar 4 times daily or as directed. 100 each 3       blood glucose monitoring (ONETOUCH VERIO) meter device kit Use to test blood sugar 4 times daily or as directed. 1 kit 0 9/23/2017 at Unknown time     polyethylene glycol (MIRALAX/GLYCOLAX) Packet Take 17 g by mouth daily as needed for constipation (titrate to one bowel movement daily) 7 packet 1 9/23/2017 at Unknown time     senna-docusate (SENOKOT-S;PERICOLACE) 8.6-50 MG per tablet Take 1 tablet by mouth 2 times daily as needed for constipation 100 tablet 0 Past Week at Unknown time     metoclopramide (REGLAN) 10 MG tablet Take 1 tablet (10 mg) by mouth 4 times daily as needed (4x Daily AC & HS prn nausea and abdominal pain) 60 tablet 0 9/23/2017 at Unknown time     famotidine (PEPCID) 20 MG tablet Take 1 tablet (20 mg) by mouth 2 times daily 40 tablet 1       pantoprazole (PROTONIX) 40 MG EC tablet Take 1 tablet (40 mg) by mouth 2 times daily 30 tablet 1 9/23/2017 at Unknown time     ondansetron (ZOFRAN ODT) 4 MG ODT tab Take 1 tablet (4 mg) by mouth every 8 hours as needed for nausea 20 tablet 1 9/23/2017 at Unknown time     mirtazapine (REMERON SOL-TAB) 15 MG disintegrating tablet 1 tablet (15 mg) by Orally disintegrating tablet route At Bedtime 60 tablet 2 6/1/2016 at Bedtime     OLANZapine zydis (ZYPREXA) 5 MG disintegrating tablet Take 1 tablet (5 mg) by mouth 2 times daily 60 tablet 3 9/23/2017 at Unknown time     Prenatal Vit-Fe Fumarate-FA (PRENATAL VITAMIN) 27-0.8 MG TABS Take 1 tablet by mouth daily 90 tablet 3 Past Week at Unknown time     folic acid (FOLVITE) 1 MG tablet Take 1 tablet (1 mg) by mouth  "daily 30 tablet 0 9/23/2017 at Unknown time         Allergies:  No Known Allergies   FmHx:               Family History          Family History   Problem Relation Age of Onset     DIABETES Maternal Grandmother       CEREBROVASCULAR DISEASE Paternal Grandmother       Coronary Artery Disease Paternal Grandmother       Hypertension No family hx of       Hyperlipidemia No family hx of       Breast Cancer No family hx of       Colon Cancer No family hx of       Prostate Cancer No family hx of       Other Cancer No family hx of       Depression No family hx of       Anxiety Disorder No family hx of       MENTAL ILLNESS No family hx of       Substance Abuse No family hx of       Anesthesia Reaction No family hx of       Asthma No family hx of       OSTEOPOROSIS No family hx of       Genetic Disorder No family hx of       Thyroid Disease No family hx of       Obesity No family hx of           SocHx:           She denies any tobacco, alcohol, or other drug use during this pregnancy.     ROS:              Complete 10-point ROS negative except as noted in HPI     PE:  Vit:                    Patient Vitals for the past 4 hrs:  Vital signs:  Temp: 97.7  F (36.5  C) Temp src: Oral BP: 124/76     Resp: 18            Estimated body mass index is 25.65 kg/(m^2) as calculated from the following:    Height as of 9/10/17: 1.7 m (5' 6.93\").    Weight as of 10/7/17: 74.1 kg (163 lb 6.4 oz).        Gen:                Groaning in bed while staff in the room. Able to understand questions and responds to questions appropriately. Repeatedly requests only IV pain medication. Writhing in bed, sweating with runny nose, unable to sit still.   CV:                 rrr, no mrg  Pulm:              Ctab, no wheezes or crackles  Abd:                Soft, gravid, non-tender abdomen.   Cx:                  Deferred         FHT:          Patient declined fetal monitoring at this time.                          [place]:              Patient " declines     Assessment  Ms. Anne Gunter is a 27 year old , at 29w6d by 25w5d ultrasound, who presents with abdominal pain in the setting of type I DM, gastroparesis, with clinical picture consistent with possible withdrawal from longer term IV opioid use, with unwillingness to comply with recommended treatment for GP on repeated occasionsl. After complete review of all records there is strong concern for opioid dependence and opoid withdrawal. GI notes that the abdominal pain severity does not seem to coincide with the pain expected with gastroparesis. Patient continues to have malnutrition in the setting of decreased oral intake.  At this time fetal assessment has been declined by patient.      Type I DM  - Consult endocrinology  - OB High intensity insulin drip, Q1H glucose checks  - IVF, D5 NS with 20 meq KCL @ 100 cc/hr with insulin drip  - BMP showed abnormal values of Na 132 Urea nitrogen 4 and creatinine 0.42   - CBC showed abnormal values of Hb 9.9, Hematocrit 31.4, MCH 26.1, RDW 15.5   - Blood glucose 186.      Abdominal Pain:   - NO IV pain meds  - oral dilaudid 2mg every 4 hours as needed. Patient with emesis but noted to drink substantial amount of PO immediately after taking and then vomiting of large amount of liquid consumed.   - IV pepcid, protonix scheduled, PRN zofran and reglan  - Nutrition consulted  - Refusing replacement of NJ tube.   - PICC  Line exchange ordered for 10/11 to replace single lumen with double lumen. Begin TPN after this has been placed.   GI contacted by Dr. Verde - they have nothing new to offer, they strongly recommend weaning off narcotics which are likely worsening her abdominal pain and not helping. Needs to be compliant with recommended GI treatment. TPN recommended if unable/unwilling to have NJ tube or to follow GP diet.     Opioid dependence, physical +/- opioid used disorder. Patient on comprehensive review of limited chart from last two years appears to  have drug seeking behavior and multiple concerns about use of opioids in fashion that is likely worsening her abdominal pain while refusing treatment that will actually improve the pain, per diagnostic EGD findings. Patient appears to have some clinical symptoms of withdrawal from long term clinical opioid use, > 2 weeks, and we will need to slowly lower her dose given the pregnancy and the use in some fashion as a component of her treatment.     I had a lengthy conversation with the patient with the assistance of in person and phone Serbian interpreters today. I explained that I had reviewed her chart from her prior pregnancy, from all admissions this pregnancy and from all consultants, with overwhelming evidence to suggest that opioids need to be tapered and she must accept recommended treatment to address source of pain not to mask pain with ineffective and habit forming opioids. Does not appear willing or able to have MAT for opioid use disorder at this time, nor am I convinced that she meets criteria. Brittle clinical status with diabetes need treatment with our team. I recommend delivery by C/S at 32 weeks, after speaking with Dr. Lopez about her entire clinical picture (he is medical director of L/D and MFM division member).     Patient has agreed to the followin. Will have double lumen PICC placed tomorrow  2. Will start TPN tomorrow  3. Will accept GI medication to include twice daily IV pantoprazole, maalox or mylanta or carafate  4. Will get 0.3 mg of IV dilaudid slow infusion every 4  Hours - will not get more frequently or extra/higher doses. Will not ask the nurses to increase dose or increase speed. Will not yell at nurses if they are not increasing speed or dose.  5. Will not have any increases in narcotics which are likely exacerbating issues  6. Will accept pain medicine consult to discuss slow wean of IV dilaudid in a patient who cannot/willnot accept oral medications.     Patient may need  additional treatment after pregnancy, possible treatment for opioid dependence depending on how clinical picture progresses.      FWB  - patient declined fetal monitoring    I was present and performed exam and H/P    Risk management and SD for [place] and [facility] notified. Nurse manager Sabina notified.

## 2017-10-10 NOTE — IP AVS SNAPSHOT
MRN:[id number]                      After Visit Summary   10/10/2017    Anne Gunter    MRN: [id number]           Thank you!     Thank you for choosing [facility] for your care. Our goal is always to provide you with excellent care. Hearing back from our patients is one way we can continue to improve our services. Please take a few minutes to complete the written survey that you may receive in the mail after you visit with us. Thank you!        Patient Information     Date Of Birth          1990        Designated Caregiver       Most Recent Value    Caregiver    Will someone help with your care after discharge? no      About your hospital stay     You were admitted on:  October 10, 2017 You last received care in the:  [facility] 4[facility]    You were discharged on:  October 13, 2017       Who to Call     For medical emergencies, please call 911.  For non-urgent questions about your medical care, please call your primary care provider or clinic, [phone]          Attending Provider     Provider Specialty    Mateo Verde MD Obstetrics & Gynecology, Maternal & Fetal Medicine       Primary Care Provider Office Phone # Fax #    Isiah Naidu [phone]      Your next 10 appointments already scheduled     Oct 16, 2017 12:45 PM CDT   Level 1 with UR CH 05   [facility] ([facility])    [address] [place] [address] S.  [address]  [facility] [address]   [phone]            Oct 16, 2017  2:00 PM CDT   Ob First Visit with UR EXAM RM 2   [facility] ([facility])    [address]  [facility] [address]   [phone]            Oct 25, 2017   Procedure with Alicja Rodríguez MD   [facility] (--)    [address]  [facility] [place] [id number] [phone]              Further instructions from your care team       Discharge Instruction for Undelivered  Patients      You were seen for: pain, nausea and vomiting and blood sugar monitoring.  We Consulted: Maternal Fetal Medicine   You had (Test or Medicine): labs, pain medication and insulin infusion    Diet:   Drink 8 to 12 glasses of liquids (milk, juice, water) every day.  To manager your diabetes, follow the guidelines for eating and drinking given to you by your Clinic Provider or Diabetes Educator.       Activity:  Count fetal kicks everyday (see handout)  Call your doctor or nurse midwife if your baby is moving less than usual.     Call your provider if you notice:  Swelling in your face or increased swelling in your hands or legs.  Headaches that are not relieved by Tylenol (acetaminophen).  Changes in your vision (blurring: seeing spots or stars.)  Nausea (sick to your stomach) and vomiting (throwing up).   Weight gain of 5 pounds or more per week.  Heartburn that doesn't go away.  Signs of bladder infection: pain when you urinate (use the toilet), need to go more often and more urgently.  The bag of cobos (rupture of membranes) breaks, or you notice leaking in your underwear.  Bright red blood in your underwear.  Abdominal (lower belly) or stomach pain.  For first baby: Contractions (tightening) less than 5 minutes apart for one hour or more.  Second (plus) baby: Contractions (tightening) less than 10 minutes apart and getting stronger.  *If less than 34 weeks: Contractions (tightenings) more than 6 times in one hour.  Increase or change in vaginal discharge (note the color and amount)    Follow-up:  As scheduled in the clinic      DIABETES- Insulin plan for home:  -Levemir 12 units around 9am, 12 units around 9pm  -Novolog for meals: 6 units per meal  -Check blood sugar before meals and 1 hour after meals     Call to reschedule your follow up appointment with Dr. Colunga - should be for later this week.     Follow up on Monday with appointments in the [facility] here:   1pm Have  your PICC line checked at the Infusion Clinic on 2nd floor  Come up to [facility] (Maternal Fetal Medicine) afterwards for an ultrasound and a clinic visit (4th floor of the same building) - we will do all your C/S planning that day too - remember your C/s is scheduled on labor and delivery 10/25/17 at 0830 - you should get there around 630 in the morning.     If you are having bad nausea and vomiting or other concerns, please come back to the hospital.     Pending Results     Date and Time Order Name Status Description    10/9/2017 0001 ABO/Rh type and screen In process     10/9/2017 0001 ABO/Rh type and screen In process             Statement of Approval     Ordered          10/13/17 1409  I have reviewed and agree with all the recommendations and orders detailed in this document.  EFFECTIVE NOW     Approved and electronically signed by:  Mateo Verde MD             Admission Information     Date & Time Department Dept. Phone    10/10/2017 UR 4BOB [phone]      Your Vitals Were     Blood Pressure Pulse Temperature Respirations Weight Pulse Oximetry    114/71 80 97.4  F (36.3  C) (Oral) 16 78.8 kg (173 lb 12.8 oz) 100%    BMI (Body Mass Index)                   27.28 kg/m2           Care EveryWhere ID     This is your Care EveryWhere ID. This could be used by other organizations to access your [facility] medical records  [id number]        Equal Access to Services     VALENTINO ONEILL AH: Hadii buster souzao Socee, waaxda luqadaha, qaybta kaalmada adeegliam, brea abreu. So [facility] [phone].    ATENCIÓN: Si habla español, tiene a monreal disposición servicios gratuitos de asistencia lingüística. Llame al [phone].    We comply with applicable federal civil rights laws and [place] laws. We do not discriminate on the basis of race, color, national origin, age, disability, sex, sexual orientation, or gender identity.               Review of your medicines      START taking        Dose  / Directions    docusate sodium 100 MG capsule   Commonly known as:  COLACE   Used for:  Nausea and vomiting, intractability of vomiting not specified, unspecified vomiting type, Supervision of high-risk pregnancy, third trimester        Dose:  100 mg   Take 1 capsule (100 mg) by mouth 2 times daily   Quantity:  60 capsule   Refills:  1       * HYDROmorphone (HIGH CONC) 10 mg/mL Liqd oral   Commonly known as:  DILAUDID   Used for:  Epigastric pain, Nausea and vomiting, intractability of vomiting not specified, unspecified vomiting type, Supervision of high-risk pregnancy, third trimester, Abdominal pain affecting pregnancy, antepartum        Dose:  4 mg   Place 0.4 mLs (4 mg) under the tongue every 4 hours as needed for moderate to severe pain   Quantity:  16.8 mL   Refills:  0       * HYDROmorphone (STANDARD CONC) 1 MG/ML Liqd liquid   Commonly known as:  DILAUDID   Used for:  Nausea and vomiting, intractability of vomiting not specified, unspecified vomiting type        Dose:  4 mg   Take 4 mLs (4 mg) by mouth every 4 hours as needed for moderate to severe pain   Quantity:  170 mL   Refills:  0       lidocaine (viscous) 2 % solution   Commonly known as:  XYLOCAINE   Used for:  Nausea and vomiting, intractability of vomiting not specified, unspecified vomiting type, Epigastric pain        Dose:  5 mL   Take 5 mLs by mouth every 6 hours as needed for moderate pain (moderate to severe epigastric pain. May sip slowly if associated nausea) swish and spit every 3-8 hours as needed; max 8 doses/24 hour period   Quantity:  100 mL   Refills:  0       * Notice:  This list has 2 medication(s) that are the same as other medications prescribed for you. Read the directions carefully, and ask your doctor or other care provider to review them with you.      CONTINUE these medicines which may have CHANGED, or have new prescriptions. If we are uncertain of the size of tablets/capsules you have at home, strength may be listed as  something that might have changed.        Dose / Directions    * alum & mag hydroxide-simethicone 400-400-40 MG/5ML Susp suspension   Commonly known as:  MYLANTA ES/MAALOX  ES   This may have changed:  Another medication with the same name was added. Make sure you understand how and when to take each.   Used for:  Nausea and vomiting, intractability of vomiting not specified, unspecified vomiting type        Dose:  30 mL   Take 30 mLs by mouth 4 times daily (with meals and nightly)   Quantity:  355 mL   Refills:  1       * alum & mag hydroxide-simethicone 400-400-40 MG/5ML Susp suspension   Commonly known as:  MYLANTA ES/MAALOX  ES   This may have changed:  You were already taking a medication with the same name, and this prescription was added. Make sure you understand how and when to take each.   Used for:  Nausea and vomiting, intractability of vomiting not specified, unspecified vomiting type        Dose:  30 mL   Take 30 mLs by mouth 4 times daily (with meals and nightly)   Quantity:  1 Bottle   Refills:  1       insulin glargine 100 UNIT/ML injection   Commonly known as:  LANTUS SOLOSTAR   This may have changed:  how much to take   Used for:  Supervision of high-risk pregnancy, third trimester        Dose:  12 Units   Inject 12 Units Subcutaneous 2 times daily   Quantity:  6 mL   Refills:  3       * Notice:  This list has 2 medication(s) that are the same as other medications prescribed for you. Read the directions carefully, and ask your doctor or other care provider to review them with you.      CONTINUE these medicines which have NOT CHANGED        Dose / Directions    blood glucose monitoring lancets   Used for:  Type 1 diabetes mellitus in pregnancy, second trimester        Use to test blood sugar 4 times daily or as directed.   Quantity:  100 each   Refills:  3       blood glucose monitoring meter device kit   Used for:  Type 1 diabetes mellitus in pregnancy, second trimester        Use to test blood  sugar 4 times daily or as directed.   Quantity:  1 kit   Refills:  0       * blood glucose monitoring test strip   Commonly known as:  no brand specified   Used for:  Type 1 diabetes mellitus in pregnancy, second trimester        Use to test blood sugars 4 times daily or as directed   Quantity:  100 strip   Refills:  3       * blood glucose monitoring test strip   Commonly known as:  no brand specified   Used for:  Type 1 diabetes mellitus in pregnancy, second trimester        Use to test blood sugar 4 times daily or as directed.   Quantity:  100 strip   Refills:  3       famotidine 20 MG tablet   Commonly known as:  PEPCID   Used for:  Type 1 diabetes mellitus in pregnancy, second trimester        Dose:  20 mg   Take 1 tablet (20 mg) by mouth 2 times daily   Quantity:  40 tablet   Refills:  1       folic acid 1 MG tablet   Commonly known as:  FOLVITE   Used for:  High-risk pregnancy, first trimester        Dose:  1 mg   Take 1 tablet (1 mg) by mouth daily   Quantity:  30 tablet   Refills:  0       phosphorus tablet 250 mg 250 MG per tablet   Commonly known as:  K PHOS NEUTRAL   Used for:  High-risk pregnancy in third trimester        Dose:  250 mg   Take 1 tablet (250 mg) by mouth 2 times daily   Quantity:  60 tablet   Refills:  1       polyethylene glycol Packet   Commonly known as:  MIRALAX/GLYCOLAX   Used for:  Type 1 diabetes mellitus in pregnancy, second trimester        Dose:  17 g   Take 17 g by mouth daily as needed for constipation (titrate to one bowel movement daily)   Quantity:  7 packet   Refills:  1       Prenatal Vitamin 27-0.8 MG Tabs   Used for:  High-risk pregnancy, first trimester        Dose:  1 tablet   Take 1 tablet by mouth daily   Quantity:  90 tablet   Refills:  3       * Notice:  This list has 2 medication(s) that are the same as other medications prescribed for you. Read the directions carefully, and ask your doctor or other care provider to review them with you.      STOP taking      insulin aspart 100 UNIT/ML injection   Commonly known as:  NovoLOG PEN           metoclopramide 10 MG tablet   Commonly known as:  REGLAN           mirtazapine 15 MG ODT tab   Commonly known as:  REMERON SOL-TAB           OLANZapine zydis 5 MG ODT tab   Commonly known as:  zyPREXA           ondansetron 4 MG ODT tab   Commonly known as:  ZOFRAN ODT           oxyCODONE 5 MG IR tablet   Commonly known as:  ROXICODONE           pantoprazole 40 MG EC tablet   Commonly known as:  PROTONIX           senna-docusate 8.6-50 MG per tablet   Commonly known as:  SENOKOT-S;PERICOLACE                Where to get your medicines      These medications were sent to [place], [place] - [address]  [address] [address]     Phone:  [phone]     alum & mag hydroxide-simethicone 400-400-40 MG/5ML Susp suspension    docusate sodium 100 MG capsule         These medications were sent to [facility] [address] [address]  AT [address] [facility] [address]     Phone:  [phone]     alum & mag hydroxide-simethicone 400-400-40 MG/5ML Susp suspension    insulin glargine 100 UNIT/ML injection         Some of these will need a paper prescription and others can be bought over the counter. Ask your nurse if you have questions.     Bring a paper prescription for each of these medications     HYDROmorphone (HIGH CONC) 10 mg/mL Liqd oral    HYDROmorphone (STANDARD CONC) 1 MG/ML Liqd liquid    lidocaine (viscous) 2 % solution                Protect others around you: Learn how to safely use, store and throw away your medicines at www.disposemymeds.org.             Medication List: This is a list of all your medications and when to take them. Check marks below indicate your daily home schedule. Keep this list as a reference.      Medications           Morning Afternoon Evening Bedtime As Needed    * alum & mag  hydroxide-simethicone 400-400-40 MG/5ML Susp suspension   Commonly known as:  MYLANTA ES/MAALOX  ES   Take 30 mLs by mouth 4 times daily (with meals and nightly)   Last time this was given:  30 mLs on 10/13/2017  1:31 PM                                * alum & mag hydroxide-simethicone 400-400-40 MG/5ML Susp suspension   Commonly known as:  MYLANTA ES/MAALOX  ES   Take 30 mLs by mouth 4 times daily (with meals and nightly)   Last time this was given:  30 mLs on 10/13/2017  1:31 PM                                blood glucose monitoring lancets   Use to test blood sugar 4 times daily or as directed.                                blood glucose monitoring meter device kit   Use to test blood sugar 4 times daily or as directed.                                * blood glucose monitoring test strip   Commonly known as:  no brand specified   Use to test blood sugars 4 times daily or as directed                                * blood glucose monitoring test strip   Commonly known as:  no brand specified   Use to test blood sugar 4 times daily or as directed.                                docusate sodium 100 MG capsule   Commonly known as:  COLACE   Take 1 capsule (100 mg) by mouth 2 times daily   Last time this was given:  100 mg on 10/12/2017  7:27 PM                                famotidine 20 MG tablet   Commonly known as:  PEPCID   Take 1 tablet (20 mg) by mouth 2 times daily   Last time this was given:  20 mg on 10/13/2017 10:48 AM                                folic acid 1 MG tablet   Commonly known as:  FOLVITE   Take 1 tablet (1 mg) by mouth daily                                * HYDROmorphone (HIGH CONC) 10 mg/mL Liqd oral   Commonly known as:  DILAUDID   Place 0.4 mLs (4 mg) under the tongue every 4 hours as needed for moderate to severe pain                                * HYDROmorphone (STANDARD CONC) 1 MG/ML Liqd liquid   Commonly known as:  DILAUDID   Take 4 mLs (4 mg) by mouth every 4 hours as needed for  moderate to severe pain   Last time this was given:  4 mg on 10/13/2017 11:20 AM                                insulin glargine 100 UNIT/ML injection   Commonly known as:  LANTUS SOLOSTAR   Inject 12 Units Subcutaneous 2 times daily                                lidocaine (viscous) 2 % solution   Commonly known as:  XYLOCAINE   Take 5 mLs by mouth every 6 hours as needed for moderate pain (moderate to severe epigastric pain. May sip slowly if associated nausea) swish and spit every 3-8 hours as needed; max 8 doses/24 hour period                                phosphorus tablet 250 mg 250 MG per tablet   Commonly known as:  K PHOS NEUTRAL   Take 1 tablet (250 mg) by mouth 2 times daily   Last time this was given:  250 mg on 10/13/2017 10:48 AM                                polyethylene glycol Packet   Commonly known as:  MIRALAX/GLYCOLAX   Take 17 g by mouth daily as needed for constipation (titrate to one bowel movement daily)                                Prenatal Vitamin 27-0.8 MG Tabs   Take 1 tablet by mouth daily                                * Notice:  This list has 6 medication(s) that are the same as other medications prescribed for you. Read the directions carefully, and ask your doctor or other care provider to review them with you.

## 2017-10-10 NOTE — DISCHARGE INSTRUCTIONS
Discharge Instructions  Chronic Pain Management  You were seen today for an issue regarding chronic or recurrent pain. You may have a condition that gives you pain every day, or a condition that causes pain that keeps coming back, or several conditions involving pain.   We will evaluate you for your symptoms to ensure that there is no medical emergency. This evaluation usually takes the form of a good medical history (interview) and physical examination. Rarely, imaging (x-rays or CAT scans) and lab work (blood tests) may be necessary in addition to the history and examination. This approach is similar to our approach to other chronic/recurrent diseases where we evaluate for emergencies but leave much of the management of the problem to the regular provider/clinic.  We will offer suggestions to manage your pain but we do not generally utilize opiate pain medications for recurrent or chronic pain. There is an ongoing public health crisis with respect to opiates and we are aware of the risks to our patients from opiate pain medications. Opiates are strong pain relievers but they carry significant risks including sedation, confusion, constipation, falls, as well as dependence, addiction, and overdose-related deaths. These medications represent a significant risk to your health and are therefore reserved for the management of select conditions associated with acute, severe pain. For many conditions, the risks of opiate treatment simply outweigh the benefits. Additionally, for patients with chronic/recurrent pain or who frequently use opiates, management of pain needs to be performed by a single physician/provider who can follow their care consistently. As a result, we generally do not provide refills of opiates or treat chronic pain with injected opiates in the Emergency Department. It is with your best interests in mind that we adhere to these widely accepted best practices.    As with other chronic diseases like  diabetes and asthma, management of chronic pain is best performed by regular visits to the same provider. In the case of chronic pain, this may be your primary physician/provider, your neurologist or other specialist, or with a chronic pain clinic/provider.  If you have concerns about our policy, please discuss them with your primary care provider or pain specialist, who can contact us if necessary for further information or clarification.  If you were given a prescription for medicine here today, be sure to read all of the information (including the package insert) that comes with your prescription.  This will include important information about the medicine, its side effects, and any warnings that you need to know about.  The pharmacist who fills the prescription can provide more information and answer questions you may have about the medicine.  If you have questions or concerns that the pharmacist cannot address, please call or return to the Emergency Department.   Remember that you can always come back to the Emergency Department if you develop any new symptoms or if there is anything that worries you.

## 2017-10-10 NOTE — ED AVS SNAPSHOT
[facility] Emergency Department    [address]    [facility] [address]    Phone:  [phone]    Fax:  [phone]                                       Anne Gunter   MRN: [id number]    Department:  [facility] Emergency Department   Date of Visit:  10/10/2017           After Visit Summary Signature Page     I have received my discharge instructions, and my questions have been answered. I have discussed any challenges I see with this plan with the nurse or doctor.    ..........................................................................................................................................  Patient/Patient Representative Signature      ..........................................................................................................................................  Patient Representative Print Name and Relationship to Patient    ..................................................               ................................................  Date                                            Time    ..........................................................................................................................................  Reviewed by Signature/Title    ...................................................              ..............................................  Date                                                            Time

## 2017-10-11 ENCOUNTER — HOSPITAL ENCOUNTER (INPATIENT)
Facility: CLINIC | Age: 27
Setting detail: SURGERY ADMIT
End: 2017-10-11
Attending: OBSTETRICS & GYNECOLOGY | Admitting: OBSTETRICS & GYNECOLOGY
Payer: COMMERCIAL

## 2017-10-11 ENCOUNTER — OFFICE VISIT (OUTPATIENT)
Dept: INTERPRETER SERVICES | Facility: CLINIC | Age: 27
End: 2017-10-11

## 2017-10-11 LAB
GLUCOSE BLDC GLUCOMTR-MCNC: 100 MG/DL (ref 70–99)
GLUCOSE BLDC GLUCOMTR-MCNC: 101 MG/DL (ref 70–99)
GLUCOSE BLDC GLUCOMTR-MCNC: 101 MG/DL (ref 70–99)
GLUCOSE BLDC GLUCOMTR-MCNC: 104 MG/DL (ref 70–99)
GLUCOSE BLDC GLUCOMTR-MCNC: 106 MG/DL (ref 70–99)
GLUCOSE BLDC GLUCOMTR-MCNC: 109 MG/DL (ref 70–99)
GLUCOSE BLDC GLUCOMTR-MCNC: 117 MG/DL (ref 70–99)
GLUCOSE BLDC GLUCOMTR-MCNC: 121 MG/DL (ref 70–99)
GLUCOSE BLDC GLUCOMTR-MCNC: 126 MG/DL (ref 70–99)
GLUCOSE BLDC GLUCOMTR-MCNC: 130 MG/DL (ref 70–99)
GLUCOSE BLDC GLUCOMTR-MCNC: 131 MG/DL (ref 70–99)
GLUCOSE BLDC GLUCOMTR-MCNC: 135 MG/DL (ref 70–99)
GLUCOSE BLDC GLUCOMTR-MCNC: 140 MG/DL (ref 70–99)
GLUCOSE BLDC GLUCOMTR-MCNC: 153 MG/DL (ref 70–99)
GLUCOSE BLDC GLUCOMTR-MCNC: 45 MG/DL (ref 70–99)
GLUCOSE BLDC GLUCOMTR-MCNC: 50 MG/DL (ref 70–99)
GLUCOSE BLDC GLUCOMTR-MCNC: 57 MG/DL (ref 70–99)
GLUCOSE BLDC GLUCOMTR-MCNC: 66 MG/DL (ref 70–99)
GLUCOSE BLDC GLUCOMTR-MCNC: 77 MG/DL (ref 70–99)
GLUCOSE BLDC GLUCOMTR-MCNC: 80 MG/DL (ref 70–99)
GLUCOSE BLDC GLUCOMTR-MCNC: 83 MG/DL (ref 70–99)
GLUCOSE BLDC GLUCOMTR-MCNC: 89 MG/DL (ref 70–99)
GLUCOSE BLDC GLUCOMTR-MCNC: 94 MG/DL (ref 70–99)
GLUCOSE BLDC GLUCOMTR-MCNC: 96 MG/DL (ref 70–99)
GLUCOSE BLDC GLUCOMTR-MCNC: 97 MG/DL (ref 70–99)
GLUCOSE BLDC GLUCOMTR-MCNC: 99 MG/DL (ref 70–99)
HBA1C MFR BLD: 7.4 % (ref 4.3–6)

## 2017-10-11 PROCEDURE — 99207 ZZC CONSULT E&M CHANGED TO INITIAL LEVEL: CPT | Performed by: NURSE PRACTITIONER

## 2017-10-11 PROCEDURE — T1013 SIGN LANG/ORAL INTERPRETER: HCPCS | Mod: U3

## 2017-10-11 PROCEDURE — 12000032 ZZH R&B OB CRITICAL UMMC

## 2017-10-11 PROCEDURE — 25000128 H RX IP 250 OP 636: Performed by: OBSTETRICS & GYNECOLOGY

## 2017-10-11 PROCEDURE — 25000125 ZZHC RX 250

## 2017-10-11 PROCEDURE — 25000132 ZZH RX MED GY IP 250 OP 250 PS 637: Performed by: OBSTETRICS & GYNECOLOGY

## 2017-10-11 PROCEDURE — 25800025 ZZH RX 258: Performed by: OBSTETRICS & GYNECOLOGY

## 2017-10-11 PROCEDURE — 12000028 ZZH R&B OB UMMC

## 2017-10-11 PROCEDURE — 90834 PSYTX W PT 45 MINUTES: CPT | Performed by: PSYCHOLOGIST

## 2017-10-11 PROCEDURE — 25000128 H RX IP 250 OP 636

## 2017-10-11 PROCEDURE — 25000125 ZZHC RX 250: Performed by: STUDENT IN AN ORGANIZED HEALTH CARE EDUCATION/TRAINING PROGRAM

## 2017-10-11 PROCEDURE — 00000146 ZZHCL STATISTIC GLUCOSE BY METER IP

## 2017-10-11 PROCEDURE — 25000125 ZZHC RX 250: Performed by: OBSTETRICS & GYNECOLOGY

## 2017-10-11 PROCEDURE — 99233 SBSQ HOSP IP/OBS HIGH 50: CPT | Mod: 25 | Performed by: PSYCHIATRY & NEUROLOGY

## 2017-10-11 PROCEDURE — 83036 HEMOGLOBIN GLYCOSYLATED A1C: CPT | Performed by: OBSTETRICS & GYNECOLOGY

## 2017-10-11 PROCEDURE — 25000128 H RX IP 250 OP 636: Performed by: STUDENT IN AN ORGANIZED HEALTH CARE EDUCATION/TRAINING PROGRAM

## 2017-10-11 PROCEDURE — 25000131 ZZH RX MED GY IP 250 OP 636 PS 637: Performed by: NURSE PRACTITIONER

## 2017-10-11 PROCEDURE — 99223 1ST HOSP IP/OBS HIGH 75: CPT | Performed by: NURSE PRACTITIONER

## 2017-10-11 PROCEDURE — S0164 INJECTION PANTROPRAZOLE: HCPCS

## 2017-10-11 RX ORDER — METOCLOPRAMIDE HYDROCHLORIDE 5 MG/ML
10 INJECTION INTRAMUSCULAR; INTRAVENOUS EVERY 6 HOURS
Status: DISCONTINUED | OUTPATIENT
Start: 2017-10-11 | End: 2017-10-13 | Stop reason: HOSPADM

## 2017-10-11 RX ORDER — CEFAZOLIN SODIUM 1 G/3ML
1 INJECTION, POWDER, FOR SOLUTION INTRAMUSCULAR; INTRAVENOUS SEE ADMIN INSTRUCTIONS
Status: CANCELLED | OUTPATIENT
Start: 2017-10-11

## 2017-10-11 RX ORDER — DEXTROSE, SODIUM CHLORIDE, SODIUM LACTATE, POTASSIUM CHLORIDE, AND CALCIUM CHLORIDE 5; .6; .31; .03; .02 G/100ML; G/100ML; G/100ML; G/100ML; G/100ML
500 INJECTION, SOLUTION INTRAVENOUS ONCE
Status: COMPLETED | OUTPATIENT
Start: 2017-10-11 | End: 2017-10-12

## 2017-10-11 RX ORDER — CEFAZOLIN SODIUM 2 G/100ML
2 INJECTION, SOLUTION INTRAVENOUS
Status: CANCELLED | OUTPATIENT
Start: 2017-10-11

## 2017-10-11 RX ORDER — LIDOCAINE 40 MG/G
CREAM TOPICAL
Status: CANCELLED | OUTPATIENT
Start: 2017-10-11

## 2017-10-11 RX ORDER — CITRIC ACID/SODIUM CITRATE 334-500MG
30 SOLUTION, ORAL ORAL
Status: CANCELLED | OUTPATIENT
Start: 2017-10-11

## 2017-10-11 RX ORDER — SODIUM CHLORIDE, SODIUM LACTATE, POTASSIUM CHLORIDE, CALCIUM CHLORIDE 600; 310; 30; 20 MG/100ML; MG/100ML; MG/100ML; MG/100ML
INJECTION, SOLUTION INTRAVENOUS CONTINUOUS
Status: CANCELLED | OUTPATIENT
Start: 2017-10-11

## 2017-10-11 RX ADMIN — HYDROMORPHONE HYDROCHLORIDE 0.3 MG: 1 INJECTION, SOLUTION INTRAMUSCULAR; INTRAVENOUS; SUBCUTANEOUS at 21:23

## 2017-10-11 RX ADMIN — HYDROMORPHONE HYDROCHLORIDE 0.3 MG: 1 INJECTION, SOLUTION INTRAMUSCULAR; INTRAVENOUS; SUBCUTANEOUS at 01:21

## 2017-10-11 RX ADMIN — METOCLOPRAMIDE 10 MG: 5 INJECTION, SOLUTION INTRAMUSCULAR; INTRAVENOUS at 19:53

## 2017-10-11 RX ADMIN — SODIUM CHLORIDE 500 ML: 9 INJECTION, SOLUTION INTRAVENOUS at 17:56

## 2017-10-11 RX ADMIN — ALUMINUM HYDROXIDE, MAGNESIUM HYDROXIDE, AND DIMETHICONE 30 ML: 400; 400; 40 SUSPENSION ORAL at 21:22

## 2017-10-11 RX ADMIN — DIBASIC SODIUM PHOSPHATE, MONOBASIC POTASSIUM PHOSPHATE AND MONOBASIC SODIUM PHOSPHATE 250 MG: 852; 155; 130 TABLET ORAL at 09:46

## 2017-10-11 RX ADMIN — POTASSIUM CHLORIDE, DEXTROSE MONOHYDRATE AND SODIUM CHLORIDE: 150; 5; 450 INJECTION, SOLUTION INTRAVENOUS at 01:09

## 2017-10-11 RX ADMIN — DOCUSATE SODIUM 100 MG: 100 CAPSULE, LIQUID FILLED ORAL at 09:46

## 2017-10-11 RX ADMIN — HYDROMORPHONE HYDROCHLORIDE 0.3 MG: 1 INJECTION, SOLUTION INTRAMUSCULAR; INTRAVENOUS; SUBCUTANEOUS at 13:29

## 2017-10-11 RX ADMIN — INSULIN ASPART 13 UNITS: 100 INJECTION, SOLUTION INTRAVENOUS; SUBCUTANEOUS at 18:52

## 2017-10-11 RX ADMIN — POTASSIUM CHLORIDE, DEXTROSE MONOHYDRATE AND SODIUM CHLORIDE: 150; 5; 450 INJECTION, SOLUTION INTRAVENOUS at 12:08

## 2017-10-11 RX ADMIN — FAMOTIDINE 20 MG: 10 TABLET ORAL at 19:53

## 2017-10-11 RX ADMIN — HYDROMORPHONE HYDROCHLORIDE 0.3 MG: 1 INJECTION, SOLUTION INTRAMUSCULAR; INTRAVENOUS; SUBCUTANEOUS at 05:25

## 2017-10-11 RX ADMIN — METOCLOPRAMIDE 10 MG: 5 INJECTION, SOLUTION INTRAMUSCULAR; INTRAVENOUS at 14:40

## 2017-10-11 RX ADMIN — ALUMINUM HYDROXIDE, MAGNESIUM HYDROXIDE, AND DIMETHICONE 30 ML: 400; 400; 40 SUSPENSION ORAL at 17:56

## 2017-10-11 RX ADMIN — ALUMINUM HYDROXIDE, MAGNESIUM HYDROXIDE, AND DIMETHICONE 30 ML: 400; 400; 40 SUSPENSION ORAL at 09:44

## 2017-10-11 RX ADMIN — HYDROMORPHONE HYDROCHLORIDE 0.3 MG: 1 INJECTION, SOLUTION INTRAMUSCULAR; INTRAVENOUS; SUBCUTANEOUS at 09:39

## 2017-10-11 RX ADMIN — DIBASIC SODIUM PHOSPHATE, MONOBASIC POTASSIUM PHOSPHATE AND MONOBASIC SODIUM PHOSPHATE 250 MG: 852; 155; 130 TABLET ORAL at 19:53

## 2017-10-11 RX ADMIN — POTASSIUM CHLORIDE, DEXTROSE MONOHYDRATE AND SODIUM CHLORIDE: 150; 5; 450 INJECTION, SOLUTION INTRAVENOUS at 22:06

## 2017-10-11 RX ADMIN — METOCLOPRAMIDE 10 MG: 5 INJECTION, SOLUTION INTRAMUSCULAR; INTRAVENOUS at 08:17

## 2017-10-11 RX ADMIN — FAMOTIDINE 20 MG: 10 TABLET ORAL at 09:46

## 2017-10-11 RX ADMIN — INSULIN ASPART 10 UNITS: 100 INJECTION, SOLUTION INTRAVENOUS; SUBCUTANEOUS at 14:42

## 2017-10-11 RX ADMIN — DOCUSATE SODIUM 100 MG: 100 CAPSULE, LIQUID FILLED ORAL at 19:53

## 2017-10-11 RX ADMIN — PANTOPRAZOLE SODIUM 80 MG: 40 INJECTION, POWDER, FOR SOLUTION INTRAVENOUS at 04:06

## 2017-10-11 RX ADMIN — SODIUM CHLORIDE, SODIUM LACTATE, POTASSIUM CHLORIDE, CALCIUM CHLORIDE, AND DEXTROSE MONOHYDRATE 500 ML: 600; 310; 30; 20; 5 INJECTION, SOLUTION INTRAVENOUS at 23:12

## 2017-10-11 RX ADMIN — HUMAN INSULIN 1 UNITS/HR: 100 INJECTION, SOLUTION SUBCUTANEOUS at 10:22

## 2017-10-11 RX ADMIN — ONDANSETRON 4 MG: 2 INJECTION INTRAMUSCULAR; INTRAVENOUS at 11:06

## 2017-10-11 RX ADMIN — PANTOPRAZOLE SODIUM 40 MG: 40 INJECTION, POWDER, FOR SOLUTION INTRAVENOUS at 17:57

## 2017-10-11 NOTE — PROGRESS NOTES
MFM Antepartum Progress Note     Subjective:   Patient is resting comfortably and awoken for discussion, in presence of her mother and Pashto . Has met with  psychology and pain management today and open to changes in care at this time. Is accepting of current plan and has taken all meds offered today. Has not asked for increase in pain medication.         Objective:BP 90/58  Temp 98.1  F (36.7  C) (Oral)  Resp 16  Wt 73.5 kg (162 lb 1 oz)  BMI 25.44 kg/m2                 Gen: Resting comfortably in bed, NAD  CV: Regular rate  Resp: Normal respiratory effort   Abd: Gravid, non-tender, non-distended  Ext: non-tender, no edema        FHT: category 1 [place]: no contractions      Assessment/Plan  Anne Gunter is a 27 year old , at 30w0d admitted with abdominal pain, nausea, and vomiting complicated by poorly controlled type I diabetes and gastroparesis with concerns for inappropriate opioid treatment. She has had multiple similar admissions and has left against medical advice 4 times this pregnancy. PMHx is notable for poorly controlled T1DM with multiple admissions for DKA,  gastroparesis with post-pyloric feeding tube in place which clogged and was removed, and she refuses replacement, limited prenatal care in [place], history of PLTCS x1 at 32 weeks for fetal indications in the setting of PTD, and preeclampsia without severe features baserd on BP and urine protein. Improved on current regimen.         1. Poorly controlled T1DM, recurrent DKA:   - Multiple recent admissions for poorly controlled T1DM and DKA.   - Insulin gtt - will await recs from endocrinology for changing ,but will keep on gtt given hope to start TPN today or tomorrow. Would prefer to maintain in house until planned delivery but aware unlikely based on patient history.   - Close BG monitoring.       2. Gastroparesis, recurrent abdominal pain, malnutrition:  - Minimal PO, declines any type of feeding tube, per my discussions  with GI, they recommend TPN until after the pregnancy if she refuses feeding tube.    - Scheduled reglan.  Continue daily protonix, pepcid. Tylenol PRN. Current plan and agreement with patient to have no more than dilaudid 0.3 mg IV every 4 hours with long term goal of changing to rapid sublingual liquid and eventual wean. Appreciate pain management visit today we discussed with them and her our plans to change but given difficulties with patient compliance with care will continue with IV today in our negotiations with her for PICC change to double lumen   - Scheduled bowel regimen.   - S/p GI consult during previous admission. Phone call to them by me this visit - they left a note but no change in their recommendations - strongly recommend reducing and eliminating narcotics and TPN if declines feeding tube.       3. Preeclampsia without severe features:   - BPs normotensive on admission.  Diagnosed last admission due to mild range BPs and UPC 0.6, with UPC of 0.38 in 2016.  Continue to monitor BPs, HELLP labs wnl on admission. Dx is unclear given pain at time of previous elevated BP and baseline proteinuria.      4. Anxiety:   . Seen today by  psychologist. Concern for borderline personality d/o after conversation today. Patient accepts psychiatry consult as recommended today (to see tomorrow) for possible medication treatment. Mother present and agrees with plan.       5. PNC:             - Rh positive, Rubella immune, GBS positive- will need PCN if delivery is imminent, placenta posterior       6. FWB:                         - Category I FHT, reactive. TID monitoring   - Consider 1-2 x week BPPs given DM and 30 weeks gestation  - Tdap to be considered and offered   - Discussed timing of delivery with goal of 32 weeks after discussion with Dr. Lopez and reviewing course from last pregnancy (also delivered at 32 weeks)  - wants tubal ligation - need to have her sign papers with  - will likely  need to be tomorrow     7. PICC line:   - scheduled to change over wire to double lumen catheter which patient agrees to at this time. Aware that if she declines this (prior reasoning that she can take enough PO, which she has not been observed to do) then I will change her to oral liquid/SL dilaudid as recommended by pain and she will get no more IV dilaudid. Agrees at this time and understands.     Dispo: I have strongly recommended that she remain in house until delivery based on her complications but she states that she does not know if she will be able to do that. Mother agrees with recommendation to stay in house until delivery to optimize maternal and fetal care.

## 2017-10-11 NOTE — CONSULTS
"      [facility]   Mental Health Inpatient Consult Follow-up        Client Name: Anne Gunter                 Date:   10/11/17      Consult Start Time: 10:00 am       Session End Time: 10:45 am      Session Length:        45min     Attendees:    Patient and her mother         DATA     Current Stressors / Issues:    This is a follow-up from patient's initial  mental health inpatient consult on 17, which was requested intially by Dr. Melba Mckee at [facility] Ante[facility], due to concerns about \"poorly controlled Diabetes Mellitus (DM), flat affect, poor involvement in care,\" where it was recommended that the client receive weekly individual therapy sessions.  Pt. since was discharged AMA several times and readmitted. Responding today to a consult order placed by Dr. Mateo Verde on 10/10/17 due to \"recurrent departure against medical advice, drug seeking behavior.\"      Pt. and her mother reported understanding this provider's Thai dialect/Frisian. Pt.'s mother started communicating with this provider about concerns she has for her daughter during this pregnancy and in general, while Anne was slowly waking up. Described a pattern of emotional reactivity, intense anger that is disproportionate to the situation, and interpersonal challenges in trying to communicate with pt. even when not pregnant. Explained that pt. dwells and ruminates about something her mother,  or someone else might have said, and 2 days later or so initiates an argument about what was meant by a certain statement. Also indicated that pt. might throw or brake objects when angry or upset. Her mother stated that pt. is a very kind person who is very much loved and liked but those who meet her, yet who struggles with emotional dysregulation at times because of perceived slights (which pt. confirmed). Pt's mother would love for her daughter to get the MH services she needs, which pt agreed with by the end " of this discussion, because they explained that when they return to [place] this will be hard to do due to stigma around MH, and pt.'s  being reserved about her seeking similar services (i.e. psychotherapy & psychiatry).    Pt.'s mother indicated that hey have been following the dietary recommendation which were provided, but that when pt last ate upon discharge at home she vomited and this causes her usually intense pain. Mother verbalized concern about drug seeking behavior and stated that she does not want her daughter to get addicted to pain meds. Explained that in patient's 1st pregnancy, the pain was worse but the treating doctors at the time in a different hospital refused to give her narcotics and delivered her early.     Pt. reported feeling down and sad for missing her toddler and  back home. She indicated feeling nauseous at some point of this discussion. She denied having been practicing regularly the deep breathing exercises which were introduced last week, with the exception of once or twice. She completed the PHQ-9 & KELLI-7 questionnaires which this provider translated for her, and agreed with her mother's support to follow interventions and recommendations discussed today during this consult (please see interventions and recommendations below).      Treatment Objective(s) Addressed in This Session:  Reassess symptoms and risk issues  Provide education on relaxation strategies and distress tolerance skills  Provide education on mental health symptoms and recommendations  Attempt to elicit commitment to following medical and MH recommendations     Intervention and Summary of Session:     Interventions:   Translated & administered the PHQ-9 & KELLI-7.    Patient endorsed the following depressive symptoms on the PHQ-9: little interest or pleasure, feeling down/depressed, little energy, poor appetite, disrupted sleep, feeling bad about self/worthlessness, difficulty concentrating, and  psychomotor agitation. Her score of 20 on the PHQ-9 suggests depressive symptoms in the severe range for the past 2 weeks.   Pt. endorsed the following anxiety symptoms on the KELLI-7: feeling nervous anxious or on edge, not being able to stop or control worrying, worrying too much about different things, trouble relaxing, restlessness, becoming easily annoyed or irritable, and feeling afraid as if something awful might happen. Her score of 19 on the KELLI-7 suggests anxiety symptoms in the severe range for the past 2 weeks.    Discussed that patient's scores remains in the severe range and a psychotropic medication evaluation is strongly recommended.    Provided psychoeducation on KELLI, Borderline personality traits and depression.     CBT & DBT informed: engaged in cognitive restructuring; provided psychoeducation on emotion regulation and introduced DBT informed skills involving distress tolerance (TIP/with temperature and deep breathing).  Provided Support & Validation.    Explored possible benefits of following medical and mental health recommendations. Discussed LT and ST mental health recommendations.          ASSESSMENT: Current Emotional / Mental Status (status of significant symptoms):                          Risk status (Self / Other harm or suicidal ideation)     Client denies current fears or concerns for personal safety.  Client denies current or recent suicidal ideation or behaviors.   Client denies current or recent homicidal ideation or behaviors.  Client denies current or recent self injurious behavior or ideation.  Client denies other safety concerns.     Writer has reviewed safety planning with patient. Reviewed with patient that if she develops any thoughts to hurt herself or others, that she should promptly inform her care team providers (if in the hospital) or once discharged, go to the emergency department or call 911. Client expressed understanding of this and agreed to access emergency  resources if safety concerns arise.                            Appearance:                                                          Appropriate (laying in hospital bed)                        Eye Contact:                                                         Good                         Psychomotor Behavior:                                        Slowed down (drowsy, waking up)                        Attitude:                                                                 Cooperative with the session                        Orientation:                                                           All                        Speech                                              Rate / Production:                           Normal                                               Volume:                                           Normal                         Mood:                                                                     Sad, Anxious                        Affect:                                                                    Constricted                        Thought Content:                                                  Clear                         Thought Form:                                                      Coherent                         Insight:                                                                   Fair to Good                 Collateral Reports Completed:              Routed note to Care Team Member(s) and verbally communicated with OB resident and Dr. Mateo Verde on findings and recommendations         Recommendations and Plan: (Homework, other)    Continued individual psychotherapy follow-up is recommended to help monitor and target pt.'s symptoms of depression and anxiety, and provide intervention while she remains hospitalized, which patient is agreeable to. Patient would benefit from CBT & DBT informed interventions and coping skills.  Either this writer or one of my colleagues  from [facility]'s  Mental Health Team will continue to follow patient and provide weekly individual therapy sessions while patient is antepartum.   It was recommended that she seeks outpatient psychotherapy when discharged (patient agreeable to f/u with OB clinic where Dr. Lopez mentioned at care conference last week that patient would be able to see a psychotherapist).     A psychotropic medication evaluation is recommended while inpatient for the management of severe anxiety and depression sxs. Pt. indicated being open to a psychiatric med. evaluation at this point with her mother's support. It was identified that while pt. remains in the [place] this would be optimal time to start and address her current and long-term MH issues (ongoing KELLI and borderline personality traits mainly involving mood reactivity/affective instability, interpersonal difficulties, intense/inappropriate anger & difficulty controlling anger).     Please do not hesitate to contact [facility]'s  mental health inpatient consult team if you have any questions or concerns.  We may be reached via placing a  mental health inpatient consult order, or by contacting [phone].         Candy Alegre PsyD, LP        10/11/17  [facility]     45 minutes was spent providing this consult.  Over 75% of the time was spent providing intervention/counseling, and 25% of the time was spent in assessing for sxs and safety

## 2017-10-11 NOTE — PROGRESS NOTES
[facility]  MATERNAL CHILD HEALTH   SOCIAL WORK PROGRESS NOTE      DATA:     Pt discharged  PM, readmitted Monday AM, left AMA Monday PM, then readmitted on Tuesday to our Antepartum Unit. Also presented to [facility] ED Tuesday AM, but left without admission to that hospital. Please see medical notes and today's  mental health therapist's notes for continued discussion around the work with Anne to understand her frequent admissions and AMA discharges. Recent discussions around appropriate pain management have begun given concerns for drug seeking behavior/withdrawal symptoms and pt only accepting IV Dilaudid for pain management and not any meds by mouth.   Mental Health, CICI, M medical team, GI, Endocrine, nutrition, and now Pain Management teams involved with her care while hospitalized. Given her high PHQ and KELLI scores with  mental health today, a psychiatry consult has been placed for possible medication management of her mood.     INTERVENTION:     Chart review and collaboration with the health care team. SW attempted to see Anne today with the [place] , but she was asleep and unable to be roused by my saying her name. CICI made a plan with the bedside nurse to attempt another visit tomorrow.     ASSESSMENT:     Unable to assess today.     PLAN:     SW will attempt another visit tomorrow.       Marilu Hernandez [facility]   Social Worker  Maternal Child Health    Phone: [phone]  Pager:  [phone]

## 2017-10-11 NOTE — CONSULTS
"      [facility]   Mental Health Inpatient Consult Follow-up        Client Name: Anne Gunter                 Date:   10/11/17      Consult Start Time: 10:00 am       Session End Time: 10:45 am      Session Length:        45min     Attendees:    Patient and her mother         DATA     Current Stressors / Issues:  This is a follow-up from patient's initial  mental health inpatient consult on 17, which was requested intially by Dr. Melba Mckee at [facility], due to concerns about \"poorly controlled Diabetes Mellitus (DM), flat affect, poor involvement in care,\" where it was recommended that the client receive weekly individual therapy sessions.  Pt. since was discharged AMA several times and readmitted. Responding today to a consult order placed by Dr. Mateo Verde on 10/10/17 due to \"recurrent departure against medical advice, drug seeking behavior.\"      Pt. and her mother reported understanding this provider's Macanese dialect/Urdu. Pt.'s mother started communicating with this provider about concerns she has for her daughter during this pregnancy and in general, while Anne was slowly waking up. Described a pattern of emotional reactivity, intense anger that is disproportionate to the situation, and interpersonal challenges in trying to communicate with pt. even when not pregnant. Explained that pt. dwells and ruminates about something her mother,  or someone else might have said, and 2 days later or so initiates an argument about what was meant by a certain statement. Also indicated that pt. might throw or brake objects when angry or upset. Her mother stated that pt. is a very kind person who is very much loved and liked but those who meet her, yet who struggles with emotional dysregulation at times because of perceived slights (which pt. confirmed). Pt's mother would love for her daughter to get the MH services she needs, which pt agreed with by the end of " this discussion, because they explained that when they return to [place] this will be hard to do due to stigma around MH, and pt.'s  being reserved about her seeking similar services (i.e. psychotherapy & psychiatry).    Pt.'s mother indicated that hey have been following the dietary recommendation which were provided, but that when pt last ate upon discharge at home she vomited and this causes her usually intense pain. Mother verbalized concern about drug seeking behavior and stated that she does not want her daughter to get addicted to pain meds. Explained that in patient's 1st pregnancy, the pain was worse but the treating doctors at the time in a different hospital refused to give her narcotics and delivered her early.     Pt. reported feeling down and sad for missing her toddler and  back home. She indicated feeling nauseous at some point of this discussion. She denied having been practicing regularly the deep breathing exercises which were introduced last week, with the exception of once or twice. She completed the PHQ-9 & KELLI-7 questionnaires which this provider translated for her, and agreed with her mother's support to follow interventions and recommendations discussed today during this consult (please see interventions and recommendations below).      Treatment Objective(s) Addressed in This Session:  Reassess symptoms and risk issues  Provide education on relaxation strategies and distress tolerance skills  Provide education on mental health symptoms and recommendations  Attempt to elicit commitment to following medical and MH recommendations     Intervention and Summary of Session:     Interventions:   Translated & administered the PHQ-9 & KELLI-7.    Patient endorsed the following depressive symptoms on the PHQ-9: little interest or pleasure, feeling down/depressed, little energy, poor appetite, disrupted sleep, feeling bad about self/worthlessness, difficulty concentrating, and psychomotor  agitation. Her score of 20 on the PHQ-9 suggests depressive symptoms in the severe range for the past 2 weeks.   Pt. endorsed the following anxiety symptoms on the KELLI-7: feeling nervous anxious or on edge, not being able to stop or control worrying, worrying too much about different things, trouble relaxing, restlessness, becoming easily annoyed or irritable, and feeling afraid as if something awful might happen. Her score of 19 on the KELLI-7 suggests anxiety symptoms in the severe range for the past 2 weeks.    Discussed that patient's scores remains in the severe range and a psychotropic medication evaluation is strongly recommended.    Provided psychoeducation on KELLI, Borderline personality traits and depression.     CBT & DBT informed: engaged in cognitive restructuring; provided psychoeducation on emotion regulation and introduced DBT informed skills involving distress tolerance (TIP/with temperature and deep breathing).  Provided Support & Validation.    Explored possible benefits of following medical and mental health recommendations. Discussed LT and ST mental health recommendations.             ASSESSMENT: Current Emotional / Mental Status (status of significant symptoms):                          Risk status (Self / Other harm or suicidal ideation)     Client denies current fears or concerns for personal safety.  Client denies current or recent suicidal ideation or behaviors.   Client denies current or recent homicidal ideation or behaviors.  Client denies current or recent self injurious behavior or ideation.  Client denies other safety concerns.     Writer has reviewed safety planning with patient. Reviewed with patient that if she develops any thoughts to hurt herself or others, that she should promptly inform her care team providers (if in the hospital) or once discharged, go to the emergency department or call 911. Client expressed understanding of this and agreed to access emergency resources if  safety concerns arise.                            Appearance:                                                          Appropriate (laying in hospital bed)                        Eye Contact:                                                         Good                         Psychomotor Behavior:                                        Slowed down (drowsy, waking up)                        Attitude:                                                                 Cooperative with the session                        Orientation:                                                           All                        Speech                                              Rate / Production:                           Normal                                               Volume:                                           Normal                         Mood:                                                                     Sad, Anxious                        Affect:                                                                    Constricted                        Thought Content:                                                  Clear                         Thought Form:                                                      Coherent                         Insight:                                                                   Fair to Good                 Collateral Reports Completed:              Routed note to Care Team Member(s) and verbally communicated with OB resident and Dr. Mateo Verde on findings and recommendations         Recommendations and Plan: (Homework, other)  Continued individual psychotherapy follow-up is recommended to help monitor and target pt.'s symptoms of depression and anxiety, and provide intervention while she remains hospitalized, which patient is agreeable to.    Patient would benefit learning CBT & DBT informed interventions and coping skills.  Either this writer or one of my colleagues from  [facility]'s  Mental Health Team will continue to follow patient and provide weekly individual therapy sessions while patient is antepartum. It was recommended that she seeks counseling if she chooses to leave the hospital (patient agreeable to f/u with OB clinic where Dr. Lopez mentioned at care conference last week that patient would be able to see a psychotherapist).     A psychotropic medication evaluation is recommended for the management of severe anxiety and depression sxs. Pt. indicated being open to evaluation at this point with her mother's support. It was identified that while pt. remains in the [place] this would be optimal time to start and address her current and long-term MH issues (ongoing KELLI and borderline personality traits mainly involving mood reactivity/affective instability, interpersonal difficulties, intense/inappropriate anger & difficulty controlling anger).     Please do not hesitate to contact [facility]'s  mental health inpatient consult team if you have any questions or concerns.  We may be reached via placing a  mental health inpatient consult order, or by contacting [phone].         Candy Alegre PsyD, LP        10/11/17  [facility]     45 minutes was spent providing this consult.  Over 75% of the time was spent providing intervention/counseling, and 25% of the time was spent in assessing for sxs and safety

## 2017-10-11 NOTE — CONSULTS
Diabetes/Hyperglycemia Management Consult    Chief Complaint glycemic management recommendations  Consult requested by: Dr. Verde  History of Present Illness Jani Rodríguez is a 27 year old female  at 29w6d who presented with abdominal pain. Medical history includes poorly controlled type 1 diabetic with multiple admissions for DKA, probable gastroparesis. She has been admitted multiple times with most recent admission on 10/9/2017 and left AMA. Hx of non-adherence to plan of care and concern for IV narcotic dependence.    Information was obtained via review of medical records,  left and Ms. Gunter is sleeping. Per nursing, has been sleepy most of the  day.    Ms. Gunter has had multiple admission during this pregnancy for abdominal pain, nausea and vomiting along with  multiple episodes of DKA during previous and current pregnancy. GI had been consulted and upper endoscopy performed (17) with grade B and C esophagitis. NJ was placed and started on enteral feedings. Feedings were discontinued and feeding tube removed prior to Ms. Gunter leaving the hospital. Ms. Gunter refusing further feeding tube placement.  Please refer to Dr. Verde H&P dated (10/10/2017) for full chart review and documentation of events during pregnancy.    Was discharged on 10/8/2017, recommendations per Endocrinology were to d/c on  lantus ( previously was on levemir) 11 units twice daily. Novolog 6 units with meals and Novolog correction 1 unit per 50 > 140 before meals. Per documentation, ms. Gunter reports taking her 11 units of Lantus prior to admission.   No evidence by laboratory studies indicating DKA during current admission, IV insulin was initiated.   Oral intake is poor, taking in some juice per nursing documentation.  IV insulin rates at 1-3 units per hour, with glutose in good control.      Recent Labs  Lab 10/11/17  0706 10/11/17  0610 10/11/17  0519 10/11/17  0408 10/11/17  0305 10/11/17  0207   10/10/17  0926  10/09/17  1205   GLC  --   --   --   --   --   --   --  149*  --  201*  Canceled, Test credited   BGM 99 101* 117* 89 106* 104*  < >  --   < >  --    < > = values in this interval not displayed.      Diabetes Type:   Type 1 Diabetes  Diabetes Duration: 19 years  Usual Diabetes Regimen: recently d/c on Lantus ( previously on Levemir) 11 units twice dialy, Novolog 6 units with meals and novolog correction scale 1 unit per 50 > 140 before meals    Ability to [place] Prescribed Regimen: unclear  Diabetes Control:   Lab Results   Component Value Date    A1C 8.4 09/10/2017    A1C 9.2 06/01/2016    A1C 9.8 05/23/2016    A1C 7.4 04/23/2016    A1C 7.2 04/19/2016     Diabetes Complications: probable  gastroparesis   History of DKA: multiple admissionsfor DKA  Able to Detect Hypoglycemia: yes  Usual Diabetes Care Provider: has seen Dr. Colunga in the past  Factors Impacting Glucose Control: pregnancy, severe abdominal pain and nausea      Review of Systems  Unable to to ROS, patient sleeping    Past medical, family and social histories are reviewed and updated.    Past Medical History, reviewed and updated as indicated  Past Medical History:   Diagnosis Date     Diabetes (H)      Microalbuminuria 6/21/2016     Type I diabetes mellitus, uncontrolled (H) 6/21/2016       Family History, reviewed and updated as indicated  Family History   Problem Relation Age of Onset     DIABETES Maternal Grandmother      CEREBROVASCULAR DISEASE Paternal Grandmother      Coronary Artery Disease Paternal Grandmother      Hypertension No family hx of      Hyperlipidemia No family hx of      Breast Cancer No family hx of      Colon Cancer No family hx of      Prostate Cancer No family hx of      Other Cancer No family hx of      Depression No family hx of      Anxiety Disorder No family hx of      MENTAL ILLNESS No family hx of      Substance Abuse No family hx of      Anesthesia Reaction No family hx of      Asthma No family hx  of      OSTEOPOROSIS No family hx of      Genetic Disorder No family hx of      Thyroid Disease No family hx of      Obesity No family hx of        Social History  Social History     Social History     Marital status:      Spouse name: N/A     Number of children: N/A     Years of education: N/A     Social History Main Topics     Smoking status: Never Smoker     Smokeless tobacco: Never Used     Alcohol use No     Drug use: No     Sexual activity: Yes     Partners: Male     Other Topics Concern     None     Social History Narrative         Physical Exam  BP (!) 88/53  Temp 97.8  F (36.6  C) (Oral)  Resp 16  Wt 73.5 kg (162 lb 1 oz)  BMI 25.44 kg/m2    General:  NAD.   HEENT: mucous membranes moist.   Lungs: unlabored  ABD: gravid  Skin: warm and dry,   Mental status: sleeping      Laboratory  Recent Labs   Lab Test  10/10/17   0926  10/09/17   1205   NA  132*  135  Canceled, Test credited   POTASSIUM  3.4  3.7  Canceled, Test credited   CHLORIDE  101  102  Canceled, Test credited   CO2  23  23  Canceled, Test credited   ANIONGAP  8  10  Canceled, Test credited   GLC  149*  201*  Canceled, Test credited   BUN  4*  5*  Canceled, Test credited   CR  0.42*  0.41*  Canceled, Test credited   LILLIAM  8.6  8.3*  Canceled, Test credited     CBC RESULTS:   Recent Labs   Lab Test  10/10/17   0926   WBC  8.2   RBC  3.80   HGB  9.9*   HCT  31.4*   MCV  83   MCH  26.1*   MCHC  31.5   RDW  15.5*   PLT  231       Liver Function Studies -   Recent Labs   Lab Test  09/30/17   1603   PROTTOTAL  7.1   ALBUMIN  2.5*   BILITOTAL  0.4   ALKPHOS  70   AST  7   ALT  11       Active Medications  Current Facility-Administered Medications   Medication     pantoprazole (PROTONIX) 40 mg IV push injection     ondansetron (ZOFRAN) injection 4 mg     docusate sodium (COLACE) capsule 100 mg     metoclopramide (REGLAN) injection 10 mg     prochlorperazine (COMPAZINE) injection 5-10 mg    Or     prochlorperazine (COMPAZINE) Suppository  25 mg     alum & mag hydroxide-simethicone (MYLANTA ES/MAALOX  ES) suspension 30 mL     No Tdap Needed - Assessment: Patient does not need Tdap vaccine     dextrose 10 % 1,000 mL infusion     glucose 40 % gel 15-30 g    Or     dextrose 50 % injection 25-50 mL    Or     glucagon injection 1 mg     acetaminophen (TYLENOL) solution 650 mg     naloxone (NARCAN) injection 0.1-0.4 mg     dextrose 5% and 0.45% NaCl + KCl 20 mEq/L infusion     famotidine (PEPCID) tablet 20 mg     alum & mag hydroxide-simethicone (MYLANTA ES/MAALOX  ES) suspension 30 mL     metoclopramide (REGLAN) tablet 10 mg     polyethylene glycol (MIRALAX/GLYCOLAX) Packet 17 g     phosphorus tablet 250 mg (K PHOS NEUTRAL) per tablet 250 mg     dextrose 10 % 1,000 mL infusion     0.9% sodium chloride infusion     insulin 1 unit/mL in saline (novoLIN-Regular) drip - High Intensity Infusion     HYDROmorphone (PF) (DILAUDID) injection 0.3 mg     No current outpatient prescriptions on file.       Current Diet    Active Diet Order      Low Fiber Diet      Assessment: Jani Rodríguez is a 27 year old female  at 29w6d who presented with abdominal pain. Medical history includes poorly controlled type 1 diabetic with multiple admissions for DKA, probable gastroparesis. She has been admitted multiple times with most recent admission on 10/9/2017 and left AMA. Hx of non-adherence to plan of care and concern for IV narcotic dependence.    Type 1 diabetic: poorly controlled     Plan  -continue on IV insulin and dextrose fluids  -added Novolog 1 unit per 6 grams of CHO for meals and sancks    -will continue to follow    Fouzia Solroio, [phone]    Diabetes Management Team job code: [id number]

## 2017-10-11 NOTE — CONSULTS
Inpatient Pain Management Service: Consultation      DATE OF CONSULT: 2017      REASON FOR PAIN CONSULTATION:  Anne Gunter is a 27 year old female I am seeing in consultation at the request of Dr. Mateo Verde MD for evaluation and recommendations for her acute pain.       CHIEF PAIN COMPLAINT: abdominal pain      ASSESSMENT:   1. Acute abdominal pain, currently pregnant, , at 29 weeks. Admitted on 10/10/17. With multiple admissions to the hospital for uncontrolled abdominal pain. GI consulted during this admission diagnosis per GI esophagitis, and gastroparesis. Upper endoscopy from 17 shows esophagitis. I agree with GI opioids are only going to worsen her gastroparesis, and opioids are not indicated for esophagitis, need to maximize non-opioid adjuvants for management of esophagitis pain, and given her pregnancy we are limited in non-opioid adjuvants available.   2. Per discussion with primary team, plan to take patient for  in approximately 14 days. It would be optimal to taper off opioids entirely prior to surgery as this will assist with postoperative pain management.   3. History of gastroparesis  4. Poorly controlled DM type I.   5. History of anxiety  6. Psychosocial overlay: see health psychology note 10/10/17.   7. History of adjustment disorder see Psychiatry note from 17.  8. Concern for possible opioid dependence, Patient requesting only IV Dilaudid during all of her admissions. Patient requesting nurses administer IV Dilaudid at the closes port of her IV and to push it fast. Patient witnessed drinking large amounts of water (about 1 liter) at one time with subsequent vomiting, and reports she is unable to take orals due to nausea/vomiting. Patient is sleeping between cares, and requests IV Dilaudid and when the nurse comes back to give the medication to her the patient is sleeping, Per my conversation with her nurse today. Patient's mother reports she is  concerned the patient craves the IV Dilaudid and feels the patient is requesting the IV sometimes when she is not in pain.    9. OF NOTE: patient reports she may have the prescription for oxycodone that was given to her last admission upon discharge, but she reports she doesn't know for sure, and reports it may be in a stack of papers she has at home. Per review of the [place]  it appears the patient never filled that prescription for oxycodone 5-10mg PO Q 4 hours PRN #25 tablets written on 10/08.    Outpatient medications related to pain prior to admission:   --Not receiving any prescriptions for opioids per review of the [facility].   Outpatient opioids prescribed by: NONE  PRIMARY CARE PROVIDER: Isiah Naidu  PAIN SPECIALIST: NONE    [place]  database reviewed      TREATMENT RECOMMENDATIONS/PLAN:   1. Discontinue  IV hydromorphone as soon as possible,     Patient reports she is unable to tolerate orals due to severe nausea, patient is taking all other medications orally.     Would recommend oral route as the preferred route of administration. This is challenging as this patient reports she is unable to tolerate orals, due to nausea and vomiting.     If patient declines to take orals opioids due to severe nausea, I would recommend using sublingual or rectal route instead IV route for opioid administration.    Agree with GI, need to minimize opioids as they will only worsen gastroparesis.     Would recommend tapering off opioids completely prior to planned  in 14 days, in order to optimize postoperative pain control.   2. Start hydromorphone high concentration liquid (10mg/ml), 2 mg SL Q 4 hours PRN    Patient to hold medication under her tongue as long as she can, 10 minutes ideally.    This is only to be use inpatient, I do not recommend high concentration liquid dilaudid outpatient due to safety risks.     If patient unable to do sublingual, start hydromorphone 3 mg rectally, Q 6 hours PRN.  Nurse to administer hydromorphone suppository.  3. Need to consider functionality along with subjective report when assessing her pain.    4. Example of possible opioid taper:     continue hydromorphone 2 mg SL Q 4 hours PRN (maximum of 6 doses per day). Continue this x 1 day    Then change to maximum of 5 doses per day x 1 day, continue to decrease by one dose per day until off.   5. If patient able to tolerate orals would recommend changing acetaminophen to 1,000mg PO Q 8 hours scheduled.   6. Agree with Gastroenterology, IF ok with OB/Gyn start viscous lidocaine PO QID.  7. Bowel regimen for any possible opioid induced constipation while on opioids.  8. IF patient remains inpatient until her surgery () in 14 days please consult the pain service prior to her surgery to form a juana-operative pain plan. Otherwise if patient discharges or leaves AMA, primary team can consult the inpatient pain consult service POD#1 for pain recommendations.   9. Pain service will sign off at this time.       ASSESSMENT AND RECOMMENDATIONS DISCUSSED WITH: Dr. Mateo Verde, plan discussed with Dr. Kelsey Alba MD from the pain service.       Thank you for consulting the Inpatient Pain Management Service.   The above recommendations are to be acted upon at the primary team s discretion.    To reach us:  Mon - Friday 8 AM - 3 PM: Pager [phone]   After hours, weekends and holidays: Primary service should call [phone] for the on-call pain specialist    HISTORY OF PRESENT ILLNESS: Anne Gunter is a 27 year old female with history of DM type I, anxiety, gastroparesis, adjustment disorder, admitted on 10/10/17 for reports of abdominal pain, uncontrolled nausea and vomiting, patient is .    Today patient is resting in bed no acute distress. She appears comfortable. Per report from her nurse today she is sleeping between cares, and dozes off after IV Dilaudid doses. Patient reports her pain is located in her  epigastric area, and describes the pain as sharp, stabbing, comes and goes, worse with vomiting, and sometimes when she stands. She reports improvement in her pain with IV Dilaudid doses. She reports she is unable to tolerate any oral opioids. She is up ambulating in the room. She reports her last bowel movement was 10/09 and reports it was medium sized and normal for her. She denies any history of illicit drug use and denies any ETOH abuse.     PAIN HISTORY:   Location: abdomen  Duration: comes and goes  Quality of the pain: sharp  Aggravating factors: vomiting  Relieving factors : IV Dilaudid    CAPA (Clinically Aligned Pain Assessment)  Comfort (How is your pain?): Tolerable with discomfort  Change in Pain (Since your last medication/intervention?): About the same  Pain Control (How are your pain treatments working?): Partially effective pain control  Functioning (Are you able to do activities to get better?) : Pain keeps me from doing most of what I need to do  Sleep (Does your pain management allow you to sleep or rest?): Awake with occasional pain       FUNCTIONAL STATUS:  Change:      staying the same  Oral intake:     Regular  Bowel function:    Normal  Activity level:     Up ambulating independently in room  Sleep:      Sleeping between cares  Mood:      having problems adjusting      REVIEW OF 10 BODY SYSTEMS: 10 point ROS of systems including Constitutional, Eyes, Respiratory, Cardiovascular, Gastroenterology, Genitourinary, Integumentary, Musculoskeletal, Psychiatric were all negative except for pertinent positives noted in my HPI.       INPATIENT MEDICATIONS PERTINENT TO PAIN CONSULT:   Medications related to Pain Management (Future)    Start     Dose/Rate Route Frequency Ordered Stop    10/10/17 2000  docusate sodium (COLACE) capsule 100 mg      100 mg Oral 2 TIMES DAILY 10/10/17 1407      10/10/17 1600  HYDROmorphone (PF) (DILAUDID) injection 0.3 mg      0.3 mg Intravenous EVERY 4 HOURS PRN 10/10/17  1602      10/10/17 1451  polyethylene glycol (MIRALAX/GLYCOLAX) Packet 17 g      17 g Oral DAILY PRN 10/10/17 1451      10/10/17 1408  acetaminophen (TYLENOL) solution 650 mg      650 mg Oral EVERY 4 HOURS PRN 10/10/17 1408            PAST PAIN TREATMENTS:   --Hydromorphone IV- helps        CURRENT MEDICATIONS:   Current Facility-Administered Medications Ordered in Epic   Medication Dose Route Frequency Provider Last Rate Last Dose     pantoprazole (PROTONIX) 40 mg IV push injection  40 mg Intravenous Q12H Regi Parrish MD         metoclopramide (REGLAN) injection 10 mg  10 mg Intravenous Q6H Mateo Verde MD         ondansetron (ZOFRAN) injection 4 mg  4 mg Intravenous Q6H PRN Mateo Verde MD         docusate sodium (COLACE) capsule 100 mg  100 mg Oral BID Mateo Verde MD   100 mg at 10/11/17 0946     prochlorperazine (COMPAZINE) injection 5-10 mg  5-10 mg Intravenous Q6H PRN Mateo Verde MD        Or     prochlorperazine (COMPAZINE) Suppository 25 mg  25 mg Rectal Q12H PRN Mateo Verde MD         alum & mag hydroxide-simethicone (MYLANTA ES/MAALOX  ES) suspension 30 mL  30 mL Oral Q6H PRN Mateo Verde MD         No Tdap Needed - Assessment: Patient does not need Tdap vaccine   Does not apply Continuous PRN Mateo Verde MD         dextrose 10 % 1,000 mL infusion   Intravenous Continuous PRN Mateo Verde MD         glucose 40 % gel 15-30 g  15-30 g Oral Q15 Min PRN Mateo Verde MD        Or     dextrose 50 % injection 25-50 mL  25-50 mL Intravenous Q15 Min PRN Mateo Verde MD        Or     glucagon injection 1 mg  1 mg Subcutaneous Q15 Min PRN Mateo Verde MD         acetaminophen (TYLENOL) solution 650 mg  650 mg Oral Q4H PRN Mateo Verde MD         naloxone (NARCAN) injection 0.1-0.4 mg  0.1-0.4 mg Intravenous Q2 Min PRN Mateo Verde MD         dextrose 5% and 0.45% NaCl + KCl 20 mEq/L infusion   Intravenous  Continuous Mateo Verde  mL/hr at 10/11/17 0109       famotidine (PEPCID) tablet 20 mg  20 mg Oral BID Satya Stern MD   20 mg at 10/11/17 0946     alum & mag hydroxide-simethicone (MYLANTA ES/MAALOX  ES) suspension 30 mL  30 mL Oral 4x Daily w/meals Satya Stern MD   30 mL at 10/11/17 0944     polyethylene glycol (MIRALAX/GLYCOLAX) Packet 17 g  17 g Oral Daily PRN Satya Stern MD         phosphorus tablet 250 mg (K PHOS NEUTRAL) per tablet 250 mg  250 mg Oral BID Satya Stern MD   250 mg at 10/11/17 0946     dextrose 10 % 1,000 mL infusion   Intravenous Continuous PRN Mateo Verde MD         0.9% sodium chloride infusion   Intravenous Continuous Mateo Verde MD   Stopped at 10/10/17 1600     insulin 1 unit/mL in saline (novoLIN-Regular) drip - High Intensity Infusion  0-26 Units/hr Intravenous Continuous Mateo Verde MD 1 mL/hr at 10/11/17 1022 1 Units/hr at 10/11/17 1022     HYDROmorphone (PF) (DILAUDID) injection 0.3 mg  0.3 mg Intravenous Q4H PRN Mateo Verde MD   0.3 mg at 10/11/17 0939     No current [facility]-ordered outpatient prescriptions on file.           HOME/PREVIOUS MEDICATIONS:   Prior to Admission medications    Medication Sig Start Date End Date Taking? Authorizing Provider   phosphorus tablet 250 mg (K PHOS NEUTRAL) 250 MG per tablet Take 1 tablet (250 mg) by mouth 2 times daily 10/8/17  Yes Mateo Verde MD   alum & mag hydroxide-simethicone (MYLANTA ES/MAALOX  ES) 400-400-40 MG/5ML SUSP suspension Take 30 mLs by mouth 4 times daily (with meals and nightly) 9/29/17  Yes Jeannie Disla MD   insulin aspart (NOVOLOG PEN) 100 UNIT/ML injection Inject 1 Units Subcutaneous 3 times daily (with meals) 10/8/17   Mateo Verde MD   insulin glargine (LANTUS) 100 UNIT/ML injection Inject 11 Units Subcutaneous 2 times daily 10/8/17   Mateo Verde MD   insulin aspart (NOVOLOG PEN) 100 UNIT/ML injection  Inject 6 Units Subcutaneous 3 times daily (with meals) 10/8/17   Mateo Verde MD   oxyCODONE (ROXICODONE) 5 MG IR tablet Take 1-2 tablets (5-10 mg) by mouth every 4 hours as needed for pain maximum 12 tablet(s) per day 10/8/17   Mateo Verde MD   blood glucose monitoring (NO BRAND SPECIFIED) test strip Use to test blood sugars 4 times daily or as directed 9/23/17   Sonia Hart, DO   blood glucose monitoring (NO BRAND SPECIFIED) test strip Use to test blood sugar 4 times daily or as directed. 9/23/17   Sonia Hart, DO   blood glucose monitoring (ONE TOUCH DELICA) lancets Use to test blood sugar 4 times daily or as directed. 9/23/17   Sonia Hart, DO   blood glucose monitoring (ONETOUCH VERIO) meter device kit Use to test blood sugar 4 times daily or as directed. 9/23/17   Sonia Hart DO   polyethylene glycol (MIRALAX/GLYCOLAX) Packet Take 17 g by mouth daily as needed for constipation (titrate to one bowel movement daily) 9/13/17   Jose Luevano MD   senna-docusate (SENOKOT-S;PERICOLACE) 8.6-50 MG per tablet Take 1 tablet by mouth 2 times daily as needed for constipation 9/13/17   Jose Luevano MD   metoclopramide (REGLAN) 10 MG tablet Take 1 tablet (10 mg) by mouth 4 times daily as needed (4x Daily AC & HS prn nausea and abdominal pain) 9/13/17   Jose Luevano MD   famotidine (PEPCID) 20 MG tablet Take 1 tablet (20 mg) by mouth 2 times daily 9/13/17   Jose Luevano MD   pantoprazole (PROTONIX) 40 MG EC tablet Take 1 tablet (40 mg) by mouth 2 times daily 9/13/17   Jose Luevano MD   ondansetron (ZOFRAN ODT) 4 MG ODT tab Take 1 tablet (4 mg) by mouth every 8 hours as needed for nausea 9/13/17   Jose Luevano MD   mirtazapine (REMERON SOL-TAB) 15 MG disintegrating tablet 1 tablet (15 mg) by Orally disintegrating tablet route At Bedtime 4/28/16   Valencia Tovar MD   OLANZapine zydis (ZYPREXA) 5 MG disintegrating tablet Take  1 tablet (5 mg) by mouth 2 times daily 16   Valencia Tovar MD   Prenatal Vit-Fe Fumarate-FA (PRENATAL VITAMIN) 27-0.8 MG TABS Take 1 tablet by mouth daily 16   Isiah Naidu MD   folic acid (FOLVITE) 1 MG tablet Take 1 tablet (1 mg) by mouth daily 16   Leoncio Linton MD         ALLERGIES:  No Known Allergies         PAST MEDICAL AND PSYCHIATRIC HISTORY:    Past Medical History:   Diagnosis Date     Diabetes (H)      Microalbuminuria 2016     Type I diabetes mellitus, uncontrolled (H) 2016           PAST SURGICAL HISTORY:   Past Surgical History:   Procedure Laterality Date      SECTION N/A 6/10/2016    Procedure:  SECTION;  Surgeon: Richardson Duran MD;  Location:  OR     ESOPHAGOSCOPY, GASTROSCOPY, DUODENOSCOPY (EGD), COMBINED N/A 2017    Procedure: COMBINED ESOPHAGOSCOPY, GASTROSCOPY, DUODENOSCOPY (EGD);  Upper Endoscopy with biopsies;  Surgeon: Nile Sanchez MD;  Location:  OR     INSERT TUBE NASOJEJUNOSTOMY  2017    Procedure: INSERT TUBE NASOJEJUNOSTOMY;  nasojejunal feeding tube placement with upper endoscopy assistance by Dr. Sanchez and Dr. Cristino Bennett, Radiology;  Surgeon: Nile Sanchez MD;  Location:  OR           FAMILY HISTORY: family history includes CEREBROVASCULAR DISEASE in her paternal grandmother; Coronary Artery Disease in her paternal grandmother; DIABETES in her maternal grandmother. There is no history of Hypertension, Hyperlipidemia, Breast Cancer, Colon Cancer, Prostate Cancer, Other Cancer, Depression, Anxiety Disorder, MENTAL ILLNESS, Substance Abuse, Anesthesia Reaction, Asthma, OSTEOPOROSIS, Genetic Disorder, Thyroid Disease, or Obesity.      HEALTH & LIFESTYLE PRACTICES:  Tobacco:  reports that she has never smoked. She has never used smokeless tobacco.  Alcohol:  reports that she does not drink alcohol.  Illicit drugs:  reports that she does not use illicit drugs.       SOCIAL HISTORY: Lives in  [place], [place]      LABORATORY VALUES:   Last Basic Metabolic Panel:  Lab Results   Component Value Date     10/10/2017      Lab Results   Component Value Date    POTASSIUM 3.4 10/10/2017     Lab Results   Component Value Date    CHLORIDE 101 10/10/2017     Lab Results   Component Value Date    LILLIAM 8.6 10/10/2017     Lab Results   Component Value Date    CO2 23 10/10/2017     Lab Results   Component Value Date    BUN 4 10/10/2017     Lab Results   Component Value Date    CR 0.42 10/10/2017     Lab Results   Component Value Date     10/10/2017       CBC results:  Lab Results   Component Value Date    WBC 8.2 10/10/2017     Lab Results   Component Value Date    HGB 9.9 10/10/2017     Lab Results   Component Value Date    HCT 31.4 10/10/2017     Lab Results   Component Value Date     10/10/2017       LFT results:  Lab Results   Component Value Date    AST 7 09/30/2017     Lab Results   Component Value Date    ALT 11 09/30/2017     Lab Results   Component Value Date    ALKPHOS 70 09/30/2017         Lab Results   Component Value Date    ALBUMIN 2.5 09/30/2017         DIAGNOSTIC TESTS:     Labs above reviewed as well as additional relevant diagnostic studies from the EPIC record.       PHYSICAL EXAMINATION:  VITAL SIGNS:  B/P: 90/58, T: 98.1, P: Data Unavailable, R: 16    CONSTITUTIONAL/GENERAL APPEARANCE: Alert and Oriented x3 and no acute distress  ENT: moist mucous membranes  EYES: PERRLA and Pupils 4mm  PULMONARY:non-labored, clear to auscultation and regular respiratory rate  CARDIOVASCULAR:Regular rate and rhythm, acyanotic and peripheral pulses +2 all extremities  ABDOMINAL:hypoactive bowel sounds, round abdomen, non-tender to palpation, no allodynia  MUSCULOSKELETAL/BACK/SPINE/EXTREMITIES: gross motor function intact all extremities, negative Carnett's sign.    GAIT: not assessed, patient resting in bed  NEURO:  No allodynia and SILT all extremities  SKIN:  normal, warm,  dry  PSYCHIATRIC/BEHAVIORAL/OBSERVATIONS:     Judgment/Insight - intact   Orientation - oriented x3   Memory - intact   Mood and affect - tired poor energy      TIME SPENT: 60 minutes including 35 minutes of face-to-face time counseling her  about her pain management treatment options, and coordinating care with the primary team.    Edwin Fournier, CNP  October 11, 2017, 10:31 AM  Inpatient Pain Management Service

## 2017-10-12 ENCOUNTER — HOSPITAL ENCOUNTER (INPATIENT)
Dept: ULTRASOUND IMAGING | Facility: CLINIC | Age: 27
End: 2017-10-12
Attending: OBSTETRICS & GYNECOLOGY
Payer: COMMERCIAL

## 2017-10-12 ENCOUNTER — APPOINTMENT (OUTPATIENT)
Dept: INTERVENTIONAL RADIOLOGY/VASCULAR | Facility: CLINIC | Age: 27
End: 2017-10-12
Payer: COMMERCIAL

## 2017-10-12 LAB
GLUCOSE BLDC GLUCOMTR-MCNC: 103 MG/DL (ref 70–99)
GLUCOSE BLDC GLUCOMTR-MCNC: 120 MG/DL (ref 70–99)
GLUCOSE BLDC GLUCOMTR-MCNC: 122 MG/DL (ref 70–99)
GLUCOSE BLDC GLUCOMTR-MCNC: 123 MG/DL (ref 70–99)
GLUCOSE BLDC GLUCOMTR-MCNC: 129 MG/DL (ref 70–99)
GLUCOSE BLDC GLUCOMTR-MCNC: 133 MG/DL (ref 70–99)
GLUCOSE BLDC GLUCOMTR-MCNC: 134 MG/DL (ref 70–99)
GLUCOSE BLDC GLUCOMTR-MCNC: 134 MG/DL (ref 70–99)
GLUCOSE BLDC GLUCOMTR-MCNC: 135 MG/DL (ref 70–99)
GLUCOSE BLDC GLUCOMTR-MCNC: 136 MG/DL (ref 70–99)
GLUCOSE BLDC GLUCOMTR-MCNC: 137 MG/DL (ref 70–99)
GLUCOSE BLDC GLUCOMTR-MCNC: 138 MG/DL (ref 70–99)
GLUCOSE BLDC GLUCOMTR-MCNC: 142 MG/DL (ref 70–99)
GLUCOSE BLDC GLUCOMTR-MCNC: 150 MG/DL (ref 70–99)
GLUCOSE BLDC GLUCOMTR-MCNC: 151 MG/DL (ref 70–99)
GLUCOSE BLDC GLUCOMTR-MCNC: 154 MG/DL (ref 70–99)
GLUCOSE BLDC GLUCOMTR-MCNC: 158 MG/DL (ref 70–99)
GLUCOSE BLDC GLUCOMTR-MCNC: 160 MG/DL (ref 70–99)
GLUCOSE BLDC GLUCOMTR-MCNC: 164 MG/DL (ref 70–99)
GLUCOSE BLDC GLUCOMTR-MCNC: 79 MG/DL (ref 70–99)
GLUCOSE BLDC GLUCOMTR-MCNC: 87 MG/DL (ref 70–99)
GLUCOSE BLDC GLUCOMTR-MCNC: 91 MG/DL (ref 70–99)

## 2017-10-12 PROCEDURE — 76816 OB US FOLLOW-UP PER FETUS: CPT

## 2017-10-12 PROCEDURE — 25000132 ZZH RX MED GY IP 250 OP 250 PS 637: Performed by: OBSTETRICS & GYNECOLOGY

## 2017-10-12 PROCEDURE — C1751 CATH, INF, PER/CENT/MIDLINE: HCPCS

## 2017-10-12 PROCEDURE — 25000125 ZZHC RX 250: Performed by: RADIOLOGY

## 2017-10-12 PROCEDURE — 25000125 ZZHC RX 250: Performed by: STUDENT IN AN ORGANIZED HEALTH CARE EDUCATION/TRAINING PROGRAM

## 2017-10-12 PROCEDURE — 12000032 ZZH R&B OB CRITICAL UMMC

## 2017-10-12 PROCEDURE — 25000128 H RX IP 250 OP 636: Performed by: RADIOLOGY

## 2017-10-12 PROCEDURE — 76819 FETAL BIOPHYS PROFIL W/O NST: CPT | Performed by: OBSTETRICS & GYNECOLOGY

## 2017-10-12 PROCEDURE — 25000128 H RX IP 250 OP 636: Performed by: OBSTETRICS & GYNECOLOGY

## 2017-10-12 PROCEDURE — C1769 GUIDE WIRE: HCPCS

## 2017-10-12 PROCEDURE — 25000125 ZZHC RX 250: Performed by: OBSTETRICS & GYNECOLOGY

## 2017-10-12 PROCEDURE — 00000146 ZZHCL STATISTIC GLUCOSE BY METER IP

## 2017-10-12 PROCEDURE — 27210905 ZZH KIT CR7

## 2017-10-12 PROCEDURE — 25800025 ZZH RX 258: Performed by: OBSTETRICS & GYNECOLOGY

## 2017-10-12 PROCEDURE — 36584 COMPL RPLCMT PICC RS&I: CPT

## 2017-10-12 RX ORDER — HEPARIN SODIUM,PORCINE 10 UNIT/ML
5 VIAL (ML) INTRAVENOUS ONCE
Status: COMPLETED | OUTPATIENT
Start: 2017-10-12 | End: 2017-10-12

## 2017-10-12 RX ADMIN — POTASSIUM CHLORIDE, DEXTROSE MONOHYDRATE AND SODIUM CHLORIDE: 150; 5; 450 INJECTION, SOLUTION INTRAVENOUS at 19:52

## 2017-10-12 RX ADMIN — HYDROMORPHONE HYDROCHLORIDE 0.3 MG: 1 INJECTION, SOLUTION INTRAMUSCULAR; INTRAVENOUS; SUBCUTANEOUS at 09:59

## 2017-10-12 RX ADMIN — HUMAN INSULIN 1 UNITS/HR: 100 INJECTION, SOLUTION SUBCUTANEOUS at 01:51

## 2017-10-12 RX ADMIN — INSULIN ASPART 10 UNITS: 100 INJECTION, SOLUTION INTRAVENOUS; SUBCUTANEOUS at 14:36

## 2017-10-12 RX ADMIN — PANTOPRAZOLE SODIUM 40 MG: 40 INJECTION, POWDER, FOR SOLUTION INTRAVENOUS at 06:46

## 2017-10-12 RX ADMIN — DIBASIC SODIUM PHOSPHATE, MONOBASIC POTASSIUM PHOSPHATE AND MONOBASIC SODIUM PHOSPHATE 250 MG: 852; 155; 130 TABLET ORAL at 19:27

## 2017-10-12 RX ADMIN — INSULIN ASPART 3 UNITS: 100 INJECTION, SOLUTION INTRAVENOUS; SUBCUTANEOUS at 19:02

## 2017-10-12 RX ADMIN — FAMOTIDINE 20 MG: 10 TABLET ORAL at 09:36

## 2017-10-12 RX ADMIN — ALUMINUM HYDROXIDE, MAGNESIUM HYDROXIDE, AND DIMETHICONE 30 ML: 400; 400; 40 SUSPENSION ORAL at 17:40

## 2017-10-12 RX ADMIN — PANTOPRAZOLE SODIUM 40 MG: 40 INJECTION, POWDER, FOR SOLUTION INTRAVENOUS at 17:41

## 2017-10-12 RX ADMIN — METOCLOPRAMIDE 10 MG: 5 INJECTION, SOLUTION INTRAMUSCULAR; INTRAVENOUS at 14:03

## 2017-10-12 RX ADMIN — HYDROMORPHONE HYDROCHLORIDE 0.3 MG: 1 INJECTION, SOLUTION INTRAMUSCULAR; INTRAVENOUS; SUBCUTANEOUS at 14:03

## 2017-10-12 RX ADMIN — HUMAN INSULIN 1.5 UNITS/HR: 100 INJECTION, SOLUTION SUBCUTANEOUS at 04:46

## 2017-10-12 RX ADMIN — ALUMINUM HYDROXIDE, MAGNESIUM HYDROXIDE, AND DIMETHICONE 30 ML: 400; 400; 40 SUSPENSION ORAL at 09:35

## 2017-10-12 RX ADMIN — ALUMINUM HYDROXIDE, MAGNESIUM HYDROXIDE, AND DIMETHICONE 30 ML: 400; 400; 40 SUSPENSION ORAL at 21:49

## 2017-10-12 RX ADMIN — HYDROMORPHONE HYDROCHLORIDE 0.3 MG: 1 INJECTION, SOLUTION INTRAMUSCULAR; INTRAVENOUS; SUBCUTANEOUS at 22:29

## 2017-10-12 RX ADMIN — METOCLOPRAMIDE 10 MG: 5 INJECTION, SOLUTION INTRAMUSCULAR; INTRAVENOUS at 02:50

## 2017-10-12 RX ADMIN — SODIUM CHLORIDE, PRESERVATIVE FREE: 5 INJECTION INTRAVENOUS at 09:37

## 2017-10-12 RX ADMIN — METOCLOPRAMIDE 10 MG: 5 INJECTION, SOLUTION INTRAMUSCULAR; INTRAVENOUS at 09:37

## 2017-10-12 RX ADMIN — FAMOTIDINE 20 MG: 10 TABLET ORAL at 19:27

## 2017-10-12 RX ADMIN — DOCUSATE SODIUM 100 MG: 100 CAPSULE, LIQUID FILLED ORAL at 19:27

## 2017-10-12 RX ADMIN — HUMAN INSULIN 0.2 UNITS/HR: 100 INJECTION, SOLUTION SUBCUTANEOUS at 06:46

## 2017-10-12 RX ADMIN — METOCLOPRAMIDE 10 MG: 5 INJECTION, SOLUTION INTRAMUSCULAR; INTRAVENOUS at 19:30

## 2017-10-12 RX ADMIN — LIDOCAINE HYDROCHLORIDE 1 ML: 10 INJECTION, SOLUTION EPIDURAL; INFILTRATION; INTRACAUDAL; PERINEURAL at 08:13

## 2017-10-12 RX ADMIN — HYDROMORPHONE HYDROCHLORIDE 0.3 MG: 1 INJECTION, SOLUTION INTRAMUSCULAR; INTRAVENOUS; SUBCUTANEOUS at 01:40

## 2017-10-12 RX ADMIN — DOCUSATE SODIUM 100 MG: 100 CAPSULE, LIQUID FILLED ORAL at 09:36

## 2017-10-12 RX ADMIN — HUMAN INSULIN 1 UNITS/HR: 100 INJECTION, SOLUTION SUBCUTANEOUS at 05:46

## 2017-10-12 RX ADMIN — ALUMINUM HYDROXIDE, MAGNESIUM HYDROXIDE, AND DIMETHICONE 30 ML: 400; 400; 40 SUSPENSION ORAL at 13:02

## 2017-10-12 RX ADMIN — HYDROMORPHONE HYDROCHLORIDE 0.3 MG: 1 INJECTION, SOLUTION INTRAMUSCULAR; INTRAVENOUS; SUBCUTANEOUS at 05:51

## 2017-10-12 RX ADMIN — HUMAN INSULIN 1 UNITS/HR: 100 INJECTION, SOLUTION SUBCUTANEOUS at 09:28

## 2017-10-12 RX ADMIN — HUMAN INSULIN 1.5 UNITS/HR: 100 INJECTION, SOLUTION SUBCUTANEOUS at 00:46

## 2017-10-12 RX ADMIN — DIBASIC SODIUM PHOSPHATE, MONOBASIC POTASSIUM PHOSPHATE AND MONOBASIC SODIUM PHOSPHATE 250 MG: 852; 155; 130 TABLET ORAL at 09:55

## 2017-10-12 RX ADMIN — SODIUM CHLORIDE, PRESERVATIVE FREE 5 ML: 5 INJECTION INTRAVENOUS at 08:13

## 2017-10-12 RX ADMIN — HYDROMORPHONE HYDROCHLORIDE 0.3 MG: 1 INJECTION, SOLUTION INTRAMUSCULAR; INTRAVENOUS; SUBCUTANEOUS at 18:33

## 2017-10-12 NOTE — CONSULTS
"IDENTIFICATION:  Ms. Anne Gunter is a 27-year-old female from [place] who is planning to deliver her baby here at [place].  I am asked to evaluate her psychiatric status by Dr. Candy Alegre, Health Psychology.      Prior to interviewing this patient, I had an opportunity to review the Health Psychology notes completed by Dr. Candy Alegre.  Psychology has been concerned this patient has had some depression and also significant borderline traits.  The patient has had severe intermittent anger issues, often screaming and yelling.  At times at home she throws objects.  She also reports to me that when she becomes particularly angry she will have episodes of feeling paranoid or episodes of out of body or dissociative experiences.  She denies self-injurious behavior, but does report a remarkable mood liability.  She has signed out AMA multiple times, usually she comes back with abdominal pain and nausea.  The patient has diabetes which has been very hard to control.  She has gastroparesis with significant abdominal pain.      In discussing this case with the nursing staff, apparently the patient has had some stereotypic drug-seeking behaviors even to the point of asking that the Dilaudid be pushed more rapidly.  In reviewing the psychology note, she has been \"feeling down\" with a sense of emotional heaviness, feeling stressed out and losing interest in things that used to be better for her including music, shows, etc.  It appears that the patient has symptoms of both depression and also symptoms of Axis II borderline traits.  Along with this, she has very significant nausea and vomiting which frequently bring her back to the hospital.      It does appear that the patient is at some risk of postpartum depression.  She has a number of depressive symptoms and it seems reasonable to me to start Zoloft 50 mg as an antidepressant.  However, the treatment for her nausea, mood lability and insomnia will be somewhat more difficult.  " Typically I will recommend Zyprexa Zydis for severe nausea, particularly in patients with anxiety and insomnia.  Unfortunately, Zyprexa does have the potential of influencing blood sugars and may make her diabetes more difficult to control.  I did discuss this issue with both the obstetric resident and the nursing staff.  They seem to be in agreement that the bigger problems are behavioral.  The patient is currently on an insulin drip and that would be relatively easy to adjust and the concern is that the patient's behavior including noncompliance is putting the child at a great deal of risk.  They think that the diabetes is also a very important risk factor, but they believe they are more likely to be able to control the diabetes if they are able to get her behavior under control.  This should be discussed with the attending obstetrician to weigh the risk/benefit analysis for Zyprexa, but appears to me the patient might benefit from Zyprexa Zydis 5 mg p.o. each night at bedtime.  This should be discontinued when the patient's nausea and anxiety issues have resolved.  The patient is aware of potential side effects including weight gain and movement disorder.      PAST MEDICAL HISTORY:  I am told that the patient initially presented at least at one of these hospitalizations with DKA and was put in the Intensive Care Unit where I am told she wanted so much Dilaudid that she had respiratory distress.  She has a diagnosis of type 1 diabetes mellitus and microalbuminuria.      PAST SURGICAL HISTORY INCLUDES:   1.   x1.     2.  EGD.     3.  Nasal jejunostomy.      FAMILY HISTORY:  The patient denies any psychiatric family history.      SOCIAL HISTORY:  The patient denies drug abuse, use of alcohol or cigarettes.  She lives with her  and her child in [place] and apparently has family in the area and is hoping to deliver her baby here at [place].      PHYSICAL REVIEW OF SYSTEMS:  The patient denied headache  "or problems with vision or hearing.  She denied chest pain or shortness of breath.  She reports significant epigastric pain.  She denied diarrhea, constipation, genitourinary symptoms or problems with muscles, skin or joints.  She does have diabetes which has been difficult to control.      MENTAL STATUS EXAMINATION:  On my interview, the patient was a pleasant, cooperative white female.  Her mood was reported as okay.  Her affect was somewhat dysphoric and also she seemed to present with \"uriel indifference.\"  Her speech was coherent and goal oriented.  Her associations tight.  Thought processes were generally logical and linear.  Content of thought was without psychosis or suicidal ideation.  Recent and remote memory, concentration, fund of knowledge and use of language were within normal limits.  She is alert and oriented x3.  Insight and judgment are quite guarded.  Muscle strength and tone are at baseline.  Recent vitals include a blood pressure of 103/53, heart rate is 75, temperature is 98.0.      ASSESSMENT:  Mood disorder, not otherwise specified.   Axis II:  Cluster B traits.      RECOMMENDATIONS:   1.  Zoloft 50 mg p.o. q.a.m.   2.  Zyprexa Zydis 5 mg p.o. each night at bedtime.  Please monitor blood sugar carefully and assess the risk/benefit ratio with the Zyprexa.         BRYCE DEAN MD             D: 10/11/2017 19:06   T: 10/11/2017 20:06   MT:       Name:     LORRAINE DAY   MRN:      [id number]        Account:       [id number]   :      1990           Consult Date:  10/11/2017      Document: [id number]      "

## 2017-10-12 NOTE — PROGRESS NOTES
"[facility] Antepartum Progress Note      Subjective:   Patient is resting comfortably and awoken for discussion, in presence an [place]ian .  Is accepting of current plan and has taken all meds offered today. Has not asked for increase in pain medication and actually says that \"the white medicine\" - viscous lidocaine and maalox TID - is working the best. Is interested in going home tomorrow although she is aware that I would like her to stay until delivery.           Objective:/69  Pulse 80  Temp 97.4  F (36.3  C) (Oral)  Resp 18  Wt 73.5 kg (162 lb 1 oz)  SpO2 100%  BMI 25.44 kg/m2                      Gen: Resting comfortably in bed, NAD, smailing  CV: Regular rate  Resp: Normal respiratory effort   Abd: Gravid, non-tender, non-distended  Ext: non-tender, no edema          FHT: category 1 [place]: no contractions  US: Normal fetal growth, normal EDMOND, BPP 8/8 today      Assessment/Plan  Anne Gunter is a 27 year old , at 30w0d admitted with abdominal pain, nausea, and vomiting complicated by poorly controlled type I diabetes and gastroparesis with concerns for inappropriate opioid treatment. She has had multiple similar admissions and has left against medical advice 4 times this pregnancy. PMHx is notable for poorly controlled T1DM with multiple admissions for DKA,  gastroparesis with post-pyloric feeding tube in place which clogged and was removed, and she refuses replacement, limited prenatal care in [place], history of PLTCS x1 at 32 weeks for fetal indications in the setting of PTD, and preeclampsia without severe features baserd on BP and urine protein. Improved on current regimen. She now has a double lumen PICC placed successfully today although her PO intake today makes it likely that we may not need to initiate TPN.      1. Poorly controlled T1DM, recurrent DKA:   - Multiple recent admissions for poorly controlled T1DM and DKA.   - Insulin gtt - will await recs from endocrinology for changing " ,but will keep on gtt given hope to start TPN today or tomorrow. Would prefer to maintain in house until planned delivery but aware unlikely based on patient history.   - Close BG monitoring.   - since she is eating well today - if this continues throughout the night, I would appreciate endocrinology recommendations for tomorrow for multidose SQ insulin in anticipation of her request to go home.       2. Gastroparesis, recurrent abdominal pain, malnutrition:  - Improved PO today - ate several meals without abdominal pain.   - Scheduled reglan.  Continue daily protonix, pepcid. Tylenol PRN. Current plan and agreement with patient to have no more than dilaudid 0.3 mg IV every 4 hours with long term goal of changing to liquid dilaudid - 2-4 mg PO every 6 hours if she chooses to go home. Aware that she needs to fill some type of pain medication to try at home, or I do not feel comfortable d/c her as she cannot have IV dilaudid at home. Has PICC if abdominal pain prevents PO and we can start TPN at a later date. Reports significant improvement with the maalox/lidocaine combo and would like to take at home. Has not been resistent to continued dilaudid IV not to change from 0.3 mg every 4 hours- Scheduled bowel regimen.       3. Preeclampsia without severe features:   - BPs normotensive on admission.  Diagnosed last admission due to mild range BPs and UPC 0.6, with UPC of 0.38 in 4/2016.  Continue to monitor BPs, HELLP labs wnl on admission. Dx is unclear given pain at time of previous elevated BP and baseline proteinuria.   No change during this admission    4. Mental health -   .borderline personality disorder, depression and adjustment disorder. Appreciate psychology and psychiatry input. Meds recommended were zyprexa and zoloft. Now that the patient is feeling better, I will try to discuss with her starting zoloft. Given the concern that zyprexa can make diabetes control more challenging, and with her history and the  risk of IUFD with uncontrolled diabetes, I would like to hold on zyprexa until after she delivers in 13 days. .       5. PNC:             - Rh positive, Rubella immune, GBS positive- will need PCN if delivery is imminent, placenta posterior       6. FWB:                         - Category I FHT, reactive. TID monitoring   - Consider 1-2 x week BPPs given DM and 30 weeks gestation. She agrees to come to the clinic 2 x per week for BPPs and for OB visits.   - Tdap to be considered   - Discussed timing of delivery with goal of 32 weeks after discussion with Dr. Lopez and reviewing course from last pregnancy (also delivered at 32 weeks) scheduled 10/25/17 at 0830 for C/S with tubal ligation - consent signed. Agrees to tubal ligation, not opportunistic salpingectomy.       7. PICC line:   Changed to double lumen - very difficult IV access and I recommend leaving in until after  - will need instruction from flyer RN before d/c, and also will potentially need home visit for PICC cares - although she is highly likely to return for admission in the few days after d/c based on her history.       Dispo: I have strongly recommended that she remain in house until delivery based on her complications but she states that she does not know if she will be able to do that. Mother agrees with recommendation to stay in house until delivery to optimize maternal and fetal care. However, if patient continues to improve I am willing to consider d/c with specific details on home care, rather than having her leave AMA in a more antagonistic environment - will need to coordinate twice weekly visits in our clinic if she leaves tomorrow.     Mateo Verde MD                                                   User Provider Type Action

## 2017-10-12 NOTE — PROGRESS NOTES
Diabetes Consult Daily  Progress Note          Assessment/Plan:     Jani Rodríguez is a 27 year old female  at 29w6d who presented with abdominal pain. Medical history includes poorly controlled type 1 diabetic with multiple admissions for DKA, probable gastroparesis. She has been admitted multiple times with most recent admission on 10/9/2017 and left AMA. Hx of non-adherence to plan of care and concern for IV narcotic dependence.     Type 1 diabetic: poorly controlled      Plan  -continue on IV insulin and dextrose fluids for now  -Novolog 1 unit per 6 grams of CHO for meals and sancks     -will continue to follow                           Plan discussed with primary team, Dr. Verde           Interval History:   The last 24 hours progress and nursing notes reviewed.  PICC line placed, today  Discussed with Dr. Verde, plan is to start TPN in the next few days ( awaiting nutritions input) for nutritional support.  Ms. Gunter did eat yesterday afternoon and drank juice. Did not want anything to eat earlier today.   She received her IV dilaudid prior to visit and was sleeping.   IV insulin range  0.2-1.5 units per hour, glucose range         Recent Labs  Lab 10/12/17  1238 10/12/17  1135 10/12/17  1032 10/12/17  0915 10/12/17  0640 10/12/17  0544  10/10/17  0926  10/09/17  1205   GLC  --   --   --   --   --   --   --  149*  --  201*  Canceled, Test credited   * 138* 154* 137* 87 134*  < >  --   < >  --    < > = values in this interval not displayed.            Review of Systems:   See interval hx          Medications:       Active Diet Order      Low Fiber Diet     Physical Exam:  Gen: NAD  HEENT: mucous membranes are moist  Resp: Unlabored  Neuro: sleeping  /69  Pulse 80  Temp 97.4  F (36.3  C) (Oral)  Resp 18  Wt 73.5 kg (162 lb 1 oz)  SpO2 100%  BMI 25.44 kg/m2           Data:     Lab Results   Component Value Date    A1C 7.4 10/11/2017    A1C 8.4 09/10/2017     A1C 9.2 06/01/2016    A1C 9.8 05/23/2016    A1C 7.4 04/23/2016              CBC RESULTS:   Recent Labs   Lab Test  10/10/17   0926   WBC  8.2   RBC  3.80   HGB  9.9*   HCT  31.4*   MCV  83   MCH  26.1*   MCHC  31.5   RDW  15.5*   PLT  231     Recent Labs   Lab Test  10/10/17   0926  10/09/17   1205   NA  132*  135  Canceled, Test credited   POTASSIUM  3.4  3.7  Canceled, Test credited   CHLORIDE  101  102  Canceled, Test credited   CO2  23  23  Canceled, Test credited   ANIONGAP  8  10  Canceled, Test credited   GLC  149*  201*  Canceled, Test credited   BUN  4*  5*  Canceled, Test credited   CR  0.42*  0.41*  Canceled, Test credited   LILLIAM  8.6  8.3*  Canceled, Test credited     Liver Function Studies -   Recent Labs   Lab Test  09/30/17   1603   PROTTOTAL  7.1   ALBUMIN  2.5*   BILITOTAL  0.4   ALKPHOS  70   AST  7   ALT  11     No results found for: INR      I spent a total of 25 minutes bedside and on the inpatient unit managing the glycemic care of Anne Gunter. Over 50% of my time on the unit was spent counseling the patient  and/or coordinating care.      Fouzia Solorio -[phone]  Diabetes Management job code [id number]

## 2017-10-13 ENCOUNTER — OFFICE VISIT (OUTPATIENT)
Dept: INTERPRETER SERVICES | Facility: CLINIC | Age: 27
End: 2017-10-13

## 2017-10-13 VITALS
DIASTOLIC BLOOD PRESSURE: 71 MMHG | SYSTOLIC BLOOD PRESSURE: 114 MMHG | RESPIRATION RATE: 16 BRPM | HEART RATE: 80 BPM | TEMPERATURE: 97.4 F | OXYGEN SATURATION: 100 % | WEIGHT: 173.8 LBS | BODY MASS INDEX: 27.28 KG/M2

## 2017-10-13 DIAGNOSIS — O09.90 HIGH-RISK PREGNANCY, UNSPECIFIED TRIMESTER: Primary | ICD-10-CM

## 2017-10-13 PROBLEM — O09.93 SUPERVISION OF HIGH-RISK PREGNANCY, THIRD TRIMESTER: Status: ACTIVE | Noted: 2017-10-13

## 2017-10-13 PROBLEM — R11.2 NAUSEA AND VOMITING, INTRACTABILITY OF VOMITING NOT SPECIFIED, UNSPECIFIED VOMITING TYPE: Status: ACTIVE | Noted: 2017-10-13

## 2017-10-13 LAB
GLUCOSE BLDC GLUCOMTR-MCNC: 102 MG/DL (ref 70–99)
GLUCOSE BLDC GLUCOMTR-MCNC: 108 MG/DL (ref 70–99)
GLUCOSE BLDC GLUCOMTR-MCNC: 114 MG/DL (ref 70–99)
GLUCOSE BLDC GLUCOMTR-MCNC: 125 MG/DL (ref 70–99)
GLUCOSE BLDC GLUCOMTR-MCNC: 126 MG/DL (ref 70–99)
GLUCOSE BLDC GLUCOMTR-MCNC: 129 MG/DL (ref 70–99)
GLUCOSE BLDC GLUCOMTR-MCNC: 130 MG/DL (ref 70–99)
GLUCOSE BLDC GLUCOMTR-MCNC: 137 MG/DL (ref 70–99)
GLUCOSE BLDC GLUCOMTR-MCNC: 141 MG/DL (ref 70–99)
GLUCOSE BLDC GLUCOMTR-MCNC: 152 MG/DL (ref 70–99)
GLUCOSE BLDC GLUCOMTR-MCNC: 71 MG/DL (ref 70–99)
GLUCOSE BLDC GLUCOMTR-MCNC: 88 MG/DL (ref 70–99)
GLUCOSE BLDC GLUCOMTR-MCNC: 91 MG/DL (ref 70–99)

## 2017-10-13 PROCEDURE — 25000132 ZZH RX MED GY IP 250 OP 250 PS 637: Performed by: OBSTETRICS & GYNECOLOGY

## 2017-10-13 PROCEDURE — 25000125 ZZHC RX 250: Performed by: STUDENT IN AN ORGANIZED HEALTH CARE EDUCATION/TRAINING PROGRAM

## 2017-10-13 PROCEDURE — T1013 SIGN LANG/ORAL INTERPRETER: HCPCS | Mod: U3

## 2017-10-13 PROCEDURE — 25000131 ZZH RX MED GY IP 250 OP 636 PS 637: Performed by: PHYSICIAN ASSISTANT

## 2017-10-13 PROCEDURE — 25000125 ZZHC RX 250: Performed by: OBSTETRICS & GYNECOLOGY

## 2017-10-13 PROCEDURE — 25000128 H RX IP 250 OP 636: Performed by: OBSTETRICS & GYNECOLOGY

## 2017-10-13 PROCEDURE — 25800025 ZZH RX 258: Performed by: OBSTETRICS & GYNECOLOGY

## 2017-10-13 PROCEDURE — 00000146 ZZHCL STATISTIC GLUCOSE BY METER IP

## 2017-10-13 RX ORDER — ALUMINA, MAGNESIA, AND SIMETHICONE 2400; 2400; 240 MG/30ML; MG/30ML; MG/30ML
30 SUSPENSION ORAL
Qty: 355 ML | Refills: 1 | Status: SHIPPED | OUTPATIENT
Start: 2017-10-13 | End: 2017-12-06

## 2017-10-13 RX ORDER — HYDROMORPHONE HYDROCHLORIDE 1 MG/ML
4 SOLUTION ORAL EVERY 4 HOURS PRN
Qty: 170 ML | Refills: 0 | Status: ON HOLD | OUTPATIENT
Start: 2017-10-13 | End: 2017-10-26

## 2017-10-13 RX ORDER — ALUMINA, MAGNESIA, AND SIMETHICONE 2400; 2400; 240 MG/30ML; MG/30ML; MG/30ML
30 SUSPENSION ORAL
Qty: 1 BOTTLE | Refills: 1 | Status: SHIPPED | OUTPATIENT
Start: 2017-10-13 | End: 2017-12-06

## 2017-10-13 RX ORDER — HYDROMORPHONE HYDROCHLORIDE 1 MG/ML
4 SOLUTION ORAL EVERY 4 HOURS PRN
Status: DISCONTINUED | OUTPATIENT
Start: 2017-10-13 | End: 2017-10-13 | Stop reason: HOSPADM

## 2017-10-13 RX ORDER — SODIUM CHLORIDE 9 MG/ML
INJECTION, SOLUTION INTRAVENOUS CONTINUOUS
Status: DISCONTINUED | OUTPATIENT
Start: 2017-10-13 | End: 2017-10-13 | Stop reason: HOSPADM

## 2017-10-13 RX ORDER — HYDROMORPHONE HYDROCHLORIDE 1 MG/ML
4 SOLUTION ORAL EVERY 4 HOURS PRN
Qty: 16.8 ML | Refills: 0 | Status: ON HOLD | OUTPATIENT
Start: 2017-10-13 | End: 2017-10-26

## 2017-10-13 RX ORDER — DOCUSATE SODIUM 100 MG/1
100 CAPSULE, LIQUID FILLED ORAL 2 TIMES DAILY
Qty: 60 CAPSULE | Refills: 1 | Status: SHIPPED | OUTPATIENT
Start: 2017-10-13 | End: 2017-12-06

## 2017-10-13 RX ADMIN — HYDROMORPHONE HYDROCHLORIDE 0.3 MG: 1 INJECTION, SOLUTION INTRAMUSCULAR; INTRAVENOUS; SUBCUTANEOUS at 02:58

## 2017-10-13 RX ADMIN — PANTOPRAZOLE SODIUM 40 MG: 40 INJECTION, POWDER, FOR SOLUTION INTRAVENOUS at 06:32

## 2017-10-13 RX ADMIN — ALUMINUM HYDROXIDE, MAGNESIUM HYDROXIDE, AND DIMETHICONE 30 ML: 400; 400; 40 SUSPENSION ORAL at 13:31

## 2017-10-13 RX ADMIN — HUMAN INSULIN 2 UNITS/HR: 100 INJECTION, SOLUTION SUBCUTANEOUS at 06:31

## 2017-10-13 RX ADMIN — METOCLOPRAMIDE 10 MG: 5 INJECTION, SOLUTION INTRAMUSCULAR; INTRAVENOUS at 10:49

## 2017-10-13 RX ADMIN — HYDROMORPHONE HYDROCHLORIDE 0.3 MG: 1 INJECTION, SOLUTION INTRAMUSCULAR; INTRAVENOUS; SUBCUTANEOUS at 07:11

## 2017-10-13 RX ADMIN — INSULIN ASPART 7 UNITS: 100 INJECTION, SOLUTION INTRAVENOUS; SUBCUTANEOUS at 13:36

## 2017-10-13 RX ADMIN — METOCLOPRAMIDE 10 MG: 5 INJECTION, SOLUTION INTRAMUSCULAR; INTRAVENOUS at 02:48

## 2017-10-13 RX ADMIN — Medication: at 10:10

## 2017-10-13 RX ADMIN — DIBASIC SODIUM PHOSPHATE, MONOBASIC POTASSIUM PHOSPHATE AND MONOBASIC SODIUM PHOSPHATE 250 MG: 852; 155; 130 TABLET ORAL at 10:48

## 2017-10-13 RX ADMIN — POTASSIUM CHLORIDE, DEXTROSE MONOHYDRATE AND SODIUM CHLORIDE: 150; 5; 450 INJECTION, SOLUTION INTRAVENOUS at 05:48

## 2017-10-13 RX ADMIN — FAMOTIDINE 20 MG: 10 TABLET ORAL at 10:48

## 2017-10-13 RX ADMIN — INSULIN HUMAN 6 UNITS: 100 INJECTION, SUSPENSION SUBCUTANEOUS at 10:10

## 2017-10-13 RX ADMIN — HYDROMORPHONE HYDROCHLORIDE 4 MG: 1 SOLUTION ORAL at 11:20

## 2017-10-13 RX ADMIN — HUMAN INSULIN 4 UNITS/HR: 100 INJECTION, SOLUTION SUBCUTANEOUS at 05:32

## 2017-10-13 RX ADMIN — INSULIN DETEMIR 12 UNITS: 100 INJECTION, SOLUTION SUBCUTANEOUS at 10:10

## 2017-10-13 NOTE — PROGRESS NOTES
Brief Diabetes Team Note    We were informed that Anne will be discharging today.  IV insulin rates variable (0.2- 4 units/h in past 24h, often around 1-1.5 units/h), D5LR running at 100ml/h (now switching to just LR).  PO intake good.  BG above target last week when she was receiving glargine 10 units BID.    Plan:  -NPH 6 units x 1 given this morning  -Levemir 12 units given this  Morning, plan for 12 units this evening.  -meal aspart 1unit/6g CHO with meals and snacks while in hospital  -correction aspart 1unit/25 for BG >100 premeal, >120 HS while in hospital  -monitor glucose ac, 2 h pp, HS and 0200 while in hospital    Plan for home:  -Levemir 12 units at 9am, 12 units at 9pm  -Novolog for meals: 6 units per meal  -Check blood sugar before meals and 1 hour after meals     Call to reschedule your follow up appointment with Dr. Colunga for the this next week.    Plan reviewed with bedside RN and resident, Stephenie, from [facility] team.  Plan written out in AVS for pt.    Please page if questions/concerns.  Carlene Jones PA-C [phone]

## 2017-10-13 NOTE — DISCHARGE INSTRUCTIONS
Discharge Instruction for Undelivered Patients      You were seen for: pain, nausea and vomiting and blood sugar monitoring.  We Consulted: Maternal Fetal Medicine   You had (Test or Medicine): labs, pain medication and insulin infusion    Diet:   Drink 8 to 12 glasses of liquids (milk, juice, water) every day.  To manager your diabetes, follow the guidelines for eating and drinking given to you by your Clinic Provider or Diabetes Educator.       Activity:  Count fetal kicks everyday (see handout)  Call your doctor or nurse midwife if your baby is moving less than usual.     Call your provider if you notice:  Swelling in your face or increased swelling in your hands or legs.  Headaches that are not relieved by Tylenol (acetaminophen).  Changes in your vision (blurring: seeing spots or stars.)  Nausea (sick to your stomach) and vomiting (throwing up).   Weight gain of 5 pounds or more per week.  Heartburn that doesn't go away.  Signs of bladder infection: pain when you urinate (use the toilet), need to go more often and more urgently.  The bag of cobos (rupture of membranes) breaks, or you notice leaking in your underwear.  Bright red blood in your underwear.  Abdominal (lower belly) or stomach pain.  For first baby: Contractions (tightening) less than 5 minutes apart for one hour or more.  Second (plus) baby: Contractions (tightening) less than 10 minutes apart and getting stronger.  *If less than 34 weeks: Contractions (tightenings) more than 6 times in one hour.  Increase or change in vaginal discharge (note the color and amount)    Follow-up:  As scheduled in the clinic      DIABETES- Insulin plan for home:  -Levemir 12 units around 9am, 12 units around 9pm  -Novolog for meals: 6 units per meal  -Check blood sugar before meals and 1 hour after meals     Call to reschedule your follow up appointment with Dr. Colunga - should be for later this week.     Follow up on Monday with appointments in the [place]  [facility] here:   1pm Have your PICC line checked at the Infusion Clinic on 2nd floor  Come up to MFM (Maternal Fetal Medicine) afterwards for an ultrasound and a clinic visit (4th floor of the same building) - we will do all your C/S planning that day too - remember your C/s is scheduled on labor and delivery 10/25/17 at 0830 - you should get there around 630 in the morning.     If you are having bad nausea and vomiting or other concerns, please come back to the hospital.

## 2017-10-13 NOTE — PROVIDER NOTIFICATION
10/12/17 2342   Provider Notification   Provider Name/Title G3 Sarkis   Method of Notification Electronic Page   Notification Reason Maternal Vital Sign Change   BP 89/47 and notified provider. No new orders at this time and will recheck blood pressure in 2 hours

## 2017-10-13 NOTE — PROGRESS NOTES
Maternal-Fetal Medicine BRIEF Progress Note    Anne Gunter MRN# [id number]   Age: 27 year old  Gestational age: 30w2d YOB: 1990       Date of Admission: 10/10/2017          Subjective:        Patient resting today, without complaints, requesting again to go home. States ate dinner overnight and even some icecream without nausea.           Objective:        Patient Vitals for the past 24 hrs:   BP Temp Temp src Resp Weight   10/13/17 0300 93/54 - - - -   10/12/17 2342 (!) 89/47 98.6  F (37  C) Oral 16 -   10/12/17 1954 103/59 97.8  F (36.6  C) Oral 16 -   10/12/17 1557 109/70 98  F (36.7  C) Oral 18 -   10/12/17 1548 - - - - 78.8 kg (173 lb 12.8 oz)          Abdomen: Gravid, Soft, Non-tender            Fetal Heart Rate Tracing: reassuring when allows us to monitor            Tocometer: no contractions  BG - on high intensity insulin drip, range overnight 102 - 164.   Abdomen gravid non tender         Assessment:        27 year old y.o.  at 30w2d with complex medical issues in pregnancy and complicated by maternal noncompliance with medical care (frequently leaves AMA) and possible opioid seeking behavior. Currently improved on regimen of insulin drip and IV dilaudid, but wanting d/c and needing to meet outpatient goals today before this can happen. Reassuring fetal status by monitoring and US (when allowed)            Plan:        Diabetes - type I - she needs to return to multidose SQ insulin before she can be discharged later today - diabetes consulted and will start this am so we have a d/c regimen for her. She agrees to this.     Gastroparesis  - substantially improved with her agreement to take all scheduled GI medications. She has had little to no emesis over the last 24 hours and states that GI cocktail has worked the best. Amenable to continuing at home. She does take scheduled IV dilaudid and understands that she must demonstrate effective use of PO liquid dilaudid (she throws up  every time we try to give her pills) before I can discharge her, as she has returned from each time she leaves [place] (where she has refused outpatient pills) with some evidence of severe pain requiring IV meds be restarted. She agrees to try. Will attempt to order the elixir as well for home, giving her 4 mg PO every 4 hours. Will give her enough medication for one week, then will need to return to our clinic for additional medication, and not go to ED or other site for refill. Must also be taking all the recommended PO GI meds.     Nutrition - eating again. Will keep PICC line in in case she requires TPN at a follow up visit. She is a very difficult IV placement. Will have appointment Monday with infusion clinic to check her PICC status.     OBC - will see MFM clinic Dr. Verde on Monday for BPP and follow up visit. Will see right after infusion clinic. Patient agrees at this time to see me on Monday.     Patient again offered hospitalization until her scheduled C/S at 32 weeks, to maintain optimal care and nutrition until delivery based on OB course thus far. She declines.     Patient requests repeat C/S and tubal ligation - consent signed. Being done at 32 weeks based on noncompliance with care.     NEEDS BETAMETHASONE in the week prior to her delivery. Can schedule when she is seen Monday in clinic, or if she returns for another hospitalization, at least 48 hours     Would be good candidate for TAPS block for anesthesia in addition to regional anesthesia for  section. Given chronic opioids, will likely need a PCA after delivery for 24-48 hours, with wean over the course of hospitalization and then d/c on oral dilaudid elixir or pills, but no more than one week supply - patient adamant that she never needs narcotics for this type of pain after she is not pregnant, but I would wean off over a week given long term use as inpatient.     PreE without severe features - based on mild HTN and PC ratio > 0.3.  Already being delivered at 32 weeks, consider earlier if progression to HELLP syndrome or other severe complications.     Fetal wellbeing - will need twice weekly BPPs as outpatient, first on Monday. Normal fetal growth this week.           Attestation:            Mateo Verde MD  Maternal-Fetal Medicine    October 13, 2017

## 2017-10-13 NOTE — DISCHARGE SUMMARY
[facility] DISCHARGE SUMMARY  [facility]  Anne Gunter  [id number]      Date of Admission: 10/10/2017       Date of Discharge: 10/13/2017     Admission Diagnoses:   - IUP at 29w6d   - Poorly controlled Type 1 DM: multiple recent admissions for DKA  - History of PLTCS x1 at 32w5d for fetal indications in the setting of DKA   - Gastroparesis  - Recurrent abdominal pain   - Mild range BPs, unclear diagnosis of pre-eclampsia without severe features   - Malnutrition, post-pyloric feeding tube in place   - Anxiety  - Limited support/difficult social situation   - GBS positive status   - PICC line in place  - s/p [place] tube  - drug seeking behavior       Discharge Diagnoses:   IUP at 30w2d  Type I diabetes  Diabetes associated gastroparesis  Esophagitis related to recurrent vomiting  Opioid physical dependence   Borderline personality traits  Depression          Provider for admission:   Mateo Verde MD       Provider for discharge:   Mateo Verde MD     Procedures:   PICC line exchange - 10/12 - double lumen catheter      Admission History:  Ms. Anne Gunter is a 27 year old  at 28w3d by 25w5d , who presents with severe upper abdominal pain, nausea, and vomiting.  The patient's medical history is notable for poorly controlled T1DM with multiple admissions for DKA, severe gastroparesis with post-pyloric feeding tube in place, limited prenatal care in [place], history of PLTCS x1 at 32 weeks for fetal indications in the setting of PTD, preeclampsia without severe features.  She has been admitted multiple times, most recently signed out AMA on the evening of .  She presents today with similar symptoms after eating macaroni and cheese last night.  She notes that she took the recommended insulin overnight and her last BG was 84.  She notes that the pain is the same with sharp pain in her upper abdomen.  She denies lower abdominal pain, contractions, vaginal bleeding, and loss of fluid.   Notes  normal fetal movement.  Denies chest pain, SOB, headache, vision changes, urinary symptoms, diarrhea, constipation.  Last BM was yesterday. See admission history and physical for complete details.       Hospital Course:   She was admitted for pain control and IV anti-emetics.  Her serum ketones negative on admission. She was kept on diabetic diet and endocrine consulted. GI, pain, nutrition teams were also consulted at the time of admission. During this admission it was felt that there was some narcotic dependency issues that needed to be addressed. She was given 0.3mg dilaudid IV every 4 hours and the dose was never increased during this stay. Pain management was consulted during this stay and asked that they be consulted the day prior to her  delivery in order to recommend perioperative and post operative pain mangement. GI was consulted during this hospital stay and suggested that her abdominal pain was not being treated by narcotic medicine and urged the use of antiemetics, proton pump inhibitors and GI cocktails.  psycology was consulted and made the diagnosis of borderline personality disorder and urged a a psychiatrist consult for medication management and that psychology continue care for emotional response training. Psychiatry suggested that Zyprexa and Zoloft be started. It was felt that Zyprexa's effect on altering blood sugars made the risk too high to initiate during pregnancy and it was suggested that it be started in the postpartum period. On 10/12 she was given a double lumen PICC line. She was continued on her insulin drip and was eating per diet instructions. On 10/12 she showed improvement and asked to be discharged. She agreed to using oral liquid dilaudid and Maalox and agreed to continue with close follow up including biweekly fetal assesments.       Consults:   - Endocrinology   - Nutrition   - GI  - Pain  -  psych  - Social work     Discharge Instructions:  - Follow  diabetic diet per nutrition   - Check BGs at least 4 times daily (postprandial and fasting), ideally pre-meal as well   - Follow-up in MFM clinic on Monday for BPP, BP check and followup  -- First PICC line dressing change to be performed Monday at 1 pm in infusion clinic  - Follow-up with endocrinology need to make appointment  - Activity as tolerated       Discharge medications:   Insulin   Maalox/viscous lidocaine  Oral dilaudid  protonix  reglan  Colace  Compazine  Pepcid  Prenatal vitamin                    Revision History       Date/Time User Provider Type Action     10/12/2017  2:45 PM Judy Carmichael Medical Student Share     10/10/2017 11:40 PM Mariann Dockery MD Resident Share     View Details Report                      Discharged to home in stable condition with mother.

## 2017-10-16 ENCOUNTER — CARE COORDINATION (OUTPATIENT)
Dept: CARE COORDINATION | Facility: CLINIC | Age: 27
End: 2017-10-16

## 2017-10-16 ENCOUNTER — PRE VISIT (OUTPATIENT)
Dept: MATERNAL FETAL MEDICINE | Facility: CLINIC | Age: 27
End: 2017-10-16

## 2017-10-16 ENCOUNTER — HOSPITAL ENCOUNTER (OUTPATIENT)
Dept: ULTRASOUND IMAGING | Facility: CLINIC | Age: 27
Discharge: HOME | End: 2017-10-16
Attending: OBSTETRICS & GYNECOLOGY | Admitting: OBSTETRICS & GYNECOLOGY
Payer: COMMERCIAL

## 2017-10-16 ENCOUNTER — OFFICE VISIT (OUTPATIENT)
Dept: MATERNAL FETAL MEDICINE | Facility: CLINIC | Age: 27
End: 2017-10-16
Attending: OBSTETRICS & GYNECOLOGY
Payer: COMMERCIAL

## 2017-10-16 ENCOUNTER — INFUSION THERAPY VISIT (OUTPATIENT)
Dept: INFUSION THERAPY | Facility: CLINIC | Age: 27
End: 2017-10-16
Attending: OBSTETRICS & GYNECOLOGY
Payer: COMMERCIAL

## 2017-10-16 VITALS
SYSTOLIC BLOOD PRESSURE: 119 MMHG | WEIGHT: 168.2 LBS | OXYGEN SATURATION: 100 % | HEART RATE: 88 BPM | DIASTOLIC BLOOD PRESSURE: 73 MMHG | BODY MASS INDEX: 26.4 KG/M2

## 2017-10-16 DIAGNOSIS — Z45.2 ENCOUNTER FOR REMOVAL OF PERIPHERALLY INSERTED CENTRAL CATHETER: Primary | ICD-10-CM

## 2017-10-16 DIAGNOSIS — O24.012 TYPE 1 DIABETES MELLITUS IN PREGNANCY, SECOND TRIMESTER: ICD-10-CM

## 2017-10-16 DIAGNOSIS — O09.90 HIGH-RISK PREGNANCY, UNSPECIFIED TRIMESTER: ICD-10-CM

## 2017-10-16 DIAGNOSIS — K31.84 GASTROPARESIS: Primary | ICD-10-CM

## 2017-10-16 DIAGNOSIS — O09.93 HIGH-RISK PREGNANCY IN THIRD TRIMESTER: ICD-10-CM

## 2017-10-16 PROCEDURE — 76819 FETAL BIOPHYS PROFIL W/O NST: CPT

## 2017-10-16 PROCEDURE — 25000128 H RX IP 250 OP 636

## 2017-10-16 PROCEDURE — T1013 SIGN LANG/ORAL INTERPRETER: HCPCS | Mod: U3

## 2017-10-16 PROCEDURE — 99211 OFF/OP EST MAY X REQ PHY/QHP: CPT | Mod: 25,ZF

## 2017-10-16 PROCEDURE — G0008 ADMIN INFLUENZA VIRUS VAC: HCPCS | Mod: ZF

## 2017-10-16 PROCEDURE — 90662 IIV NO PRSV INCREASED AG IM: CPT

## 2017-10-16 PROCEDURE — 59025 FETAL NON-STRESS TEST: CPT | Mod: ZF

## 2017-10-16 RX ORDER — HYDROMORPHONE HYDROCHLORIDE 1 MG/ML
4 SOLUTION ORAL EVERY 4 HOURS PRN
Qty: 170 ML | Refills: 0 | Status: ON HOLD | OUTPATIENT
Start: 2017-10-16 | End: 2017-10-26

## 2017-10-16 ASSESSMENT — PAIN SCALES - GENERAL: PAINLEVEL: NO PAIN (0)

## 2017-10-16 NOTE — PROGRESS NOTES
Patient here for PICC dressing change.  Was discharged from antepartum unit Friday, 10/13.    PICC dressing appears intact, however 40 cm of catheter is external and outside of the PICC dressing.  Securacath securement device is in place and appears to be fastened around the catheter correctly.  Patient is unsure how this has happened.  She does state (via ) that her skin on the upper arm under and around the PICC dressing has been very itchy.    Spoke with Dr. Mateo Verde and recommended PICC be removed, which she agreed to.    PICC and securacath were removed without problem.  Sterile occlusive dressing was applied with instructions to keep this clean, dry and in place  for 24 hours.  Patient agrees with this plan.  It was explained that PICC may need to be replaced at another time, patient states she understands this.    Contacted antepartum unit to talk to RN that observed PICC prior to her discharge on 10/13, but none were available.    She was discharged from the infusion area in stable condition in the company of the .  She has 2pm appointment with OB/GYN on 4th floor

## 2017-10-16 NOTE — MR AVS SNAPSHOT
After Visit Summary   10/16/2017    Anne Gunter    MRN: [id number]           Patient Information     Date Of Birth          1990        Visit Information        Provider Department      10/16/2017 2:00 PM Danny Rodriguez [facility] [place]; Mateo Verde MD [facility] Maternal Fetal Medicine [facility]        Today's Diagnoses     Gastroparesis    -  1    High-risk pregnancy, unspecified trimester        Abnormal fetal heart rate           Follow-ups after your visit        Your next 10 appointments already scheduled     Oct 25, 2017   Procedure with Alicja Rodríguez MD   [facility] 4COB (--)    [address] [facility] [address] [phone]              Who to contact     If you have questions or need follow up information about today's clinic visit or your schedule please contact [facility] MATERNAL FETAL MEDICINE [facility] directly at [phone].  Normal or non-critical lab and imaging results will be communicated to you by MyChart, letter or phone within 4 business days after the clinic has received the results. If you do not hear from us within 7 days, please contact the clinic through MyChart or phone. If you have a critical or abnormal lab result, we will notify you by phone as soon as possible.  Submit refill requests through "[employer] or call your pharmacy and they will forward the refill request to us. Please allow 3 business days for your refill to be completed.          Additional Information About Your Visit        Care EveryWhere ID     This is your Care EveryWhere ID. This could be used by other organizations to access your [place] medical records  [id number]        Your Vitals Were     Pulse Pulse Oximetry BMI (Body Mass Index)             88 100% 26.4 kg/m2          Blood Pressure from Last 3 Encounters:   10/16/17 119/73   10/13/17 114/71   10/10/17 118/87    Weight from Last 3 Encounters:   10/16/17 76.3 kg (168 lb 3.2 oz)   10/12/17 78.8 kg (173 lb 12.8 oz)   10/07/17 74.1 kg (163  lb 6.4 oz)              We Performed the Following     FETAL NON-STRESS TEST     HC FLU VAC PRESRV FREE QUAD SPLIT VIR 3+YRS IM     MFM Office Visit          Today's Medication Changes          These changes are accurate as of: 10/16/17  5:22 PM.  If you have any questions, ask your nurse or doctor.               Start taking these medicines.        Dose/Directions    influenza quadrivalent (PF) vacc age 3 yrs and older 0.5 ML injection   Commonly known as:  FLUZONE or Flulaval   Used for:  High-risk pregnancy, unspecified trimester   Started by:  Mateo Verde MD        Dose:  0.5 mL   Inject 0.5 mLs into the muscle once for 1 dose   Quantity:  0.5 mL   Refills:  0         These medicines have changed or have updated prescriptions.        Dose/Directions    * HYDROmorphone (HIGH CONC) 10 mg/mL Liqd oral   Commonly known as:  DILAUDID   This may have changed:  Another medication with the same name was added. Make sure you understand how and when to take each.   Used for:  Epigastric pain, Nausea and vomiting, intractability of vomiting not specified, unspecified vomiting type, Supervision of high-risk pregnancy, third trimester, Abdominal pain affecting pregnancy, antepartum        Dose:  4 mg   Place 0.4 mLs (4 mg) under the tongue every 4 hours as needed for moderate to severe pain   Quantity:  16.8 mL   Refills:  0       * HYDROmorphone (STANDARD CONC) 1 MG/ML Liqd liquid   Commonly known as:  DILAUDID   This may have changed:  Another medication with the same name was added. Make sure you understand how and when to take each.   Used for:  Nausea and vomiting, intractability of vomiting not specified, unspecified vomiting type        Dose:  4 mg   Take 4 mLs (4 mg) by mouth every 4 hours as needed for moderate to severe pain   Quantity:  170 mL   Refills:  0       * HYDROmorphone (STANDARD CONC) 1 MG/ML Liqd liquid   Commonly known as:  DILAUDID   This may have changed:  You were already taking a  medication with the same name, and this prescription was added. Make sure you understand how and when to take each.   Used for:  High-risk pregnancy, unspecified trimester, Gastroparesis   Changed by:  Mateo Verde MD        Dose:  4 mg   Take 4 mLs (4 mg) by mouth every 4 hours as needed for moderate to severe pain   Quantity:  170 mL   Refills:  0       * Notice:  This list has 3 medication(s) that are the same as other medications prescribed for you. Read the directions carefully, and ask your doctor or other care provider to review them with you.         Where to get your medicines      Some of these will need a paper prescription and others can be bought over the counter.  Ask your nurse if you have questions.     Bring a paper prescription for each of these medications     HYDROmorphone (STANDARD CONC) 1 MG/ML Liqd liquid    influenza quadrivalent (PF) vacc age 3 yrs and older 0.5 ML injection                Primary Care Provider Office Phone # Fax #    Isiah Naidu [phone]       [address]  [facility] [address]        Equal Access to Services     [facility]: Hadii buster weir hadasho Soomaali, waaxda luqadaha, qaybta kaalmada adeegyanila, brea lópez . So [facility] [phone].    ATENCIÓN: Si habla español, tiene a monreal disposición servicios gratuitos de asistencia lingüística. Llame al [phone].    We comply with applicable federal civil rights laws and [place] laws. We do not discriminate on the basis of race, color, national origin, age, disability, sex, sexual orientation, or gender identity.            Thank you!     Thank you for choosing [facility] MATERNAL FETAL MEDIC[facility]  for your care. Our goal is always to provide you with excellent care. Hearing back from our patients is one way we can continue to improve our services. Please take a few minutes to complete the written survey that you may receive in the mail after your  visit with us. Thank you!             Your Updated Medication List - Protect others around you: Learn how to safely use, store and throw away your medicines at www.disposemymeds.org.          This list is accurate as of: 10/16/17  5:22 PM.  Always use your most recent med list.                   Brand Name Dispense Instructions for use Diagnosis    * alum & mag hydroxide-simethicone 400-400-40 MG/5ML Susp suspension    MYLANTA ES/MAALOX  ES    355 mL    Take 30 mLs by mouth 4 times daily (with meals and nightly)    Nausea and vomiting, intractability of vomiting not specified, unspecified vomiting type       * alum & mag hydroxide-simethicone 400-400-40 MG/5ML Susp suspension    MYLANTA ES/MAALOX  ES    1 Bottle    Take 30 mLs by mouth 4 times daily (with meals and nightly)    Nausea and vomiting, intractability of vomiting not specified, unspecified vomiting type       blood glucose monitoring lancets     100 each    Use to test blood sugar 4 times daily or as directed.    Type 1 diabetes mellitus in pregnancy, second trimester       blood glucose monitoring meter device kit     1 kit    Use to test blood sugar 4 times daily or as directed.    Type 1 diabetes mellitus in pregnancy, second trimester       * blood glucose monitoring test strip    no brand specified    100 strip    Use to test blood sugars 4 times daily or as directed    Type 1 diabetes mellitus in pregnancy, second trimester       * blood glucose monitoring test strip    no brand specified    100 strip    Use to test blood sugar 4 times daily or as directed.    Type 1 diabetes mellitus in pregnancy, second trimester       docusate sodium 100 MG capsule    COLACE    60 capsule    Take 1 capsule (100 mg) by mouth 2 times daily    Nausea and vomiting, intractability of vomiting not specified, unspecified vomiting type, Supervision of high-risk pregnancy, third trimester       famotidine 20 MG tablet    PEPCID    40 tablet    Take 1 tablet (20 mg) by  mouth 2 times daily    Type 1 diabetes mellitus in pregnancy, second trimester       folic acid 1 MG tablet    FOLVITE    30 tablet    Take 1 tablet (1 mg) by mouth daily    High-risk pregnancy, first trimester       * HYDROmorphone (HIGH CONC) 10 mg/mL Liqd oral    DILAUDID    16.8 mL    Place 0.4 mLs (4 mg) under the tongue every 4 hours as needed for moderate to severe pain    Epigastric pain, Nausea and vomiting, intractability of vomiting not specified, unspecified vomiting type, Supervision of high-risk pregnancy, third trimester, Abdominal pain affecting pregnancy, antepartum       * HYDROmorphone (STANDARD CONC) 1 MG/ML Liqd liquid    DILAUDID    170 mL    Take 4 mLs (4 mg) by mouth every 4 hours as needed for moderate to severe pain    Nausea and vomiting, intractability of vomiting not specified, unspecified vomiting type       * HYDROmorphone (STANDARD CONC) 1 MG/ML Liqd liquid    DILAUDID    170 mL    Take 4 mLs (4 mg) by mouth every 4 hours as needed for moderate to severe pain    High-risk pregnancy, unspecified trimester, Gastroparesis       influenza quadrivalent (PF) vacc age 3 yrs and older 0.5 ML injection    FLUZONE or Flulaval    0.5 mL    Inject 0.5 mLs into the muscle once for 1 dose    High-risk pregnancy, unspecified trimester       insulin glargine 100 UNIT/ML injection    LANTUS SOLOSTAR    6 mL    Inject 12 Units Subcutaneous 2 times daily    Supervision of high-risk pregnancy, third trimester       lidocaine (viscous) 2 % solution    XYLOCAINE    100 mL    Take 5 mLs by mouth every 6 hours as needed for moderate pain (moderate to severe epigastric pain. May sip slowly if associated nausea) swish and spit every 3-8 hours as needed; max 8 doses/24 hour period    Nausea and vomiting, intractability of vomiting not specified, unspecified vomiting type, Epigastric pain       phosphorus tablet 250 mg 250 MG per tablet    K PHOS NEUTRAL    60 tablet    Take 1 tablet (250 mg) by mouth 2 times  daily    High-risk pregnancy in third trimester       polyethylene glycol Packet    MIRALAX/GLYCOLAX    7 packet    Take 17 g by mouth daily as needed for constipation (titrate to one bowel movement daily)    Type 1 diabetes mellitus in pregnancy, second trimester       Prenatal Vitamin 27-0.8 MG Tabs     90 tablet    Take 1 tablet by mouth daily    High-risk pregnancy, first trimester       * Notice:  This list has 7 medication(s) that are the same as other medications prescribed for you. Read the directions carefully, and ask your doctor or other care provider to review them with you.

## 2017-10-16 NOTE — MR AVS SNAPSHOT
After Visit Summary   10/16/2017    Anne Gunter    MRN: [id number]           Patient Information     Date Of Birth          1990        Visit Information        Provider Department      10/16/2017 12:45 PM Danny Rodriguez [facility] [facility]  Services Department        Today's Diagnoses     Encounter for removal of peripherally inserted central catheter    -  1       Follow-ups after your visit        Your next 10 appointments already scheduled     Oct 25, 2017   Procedure with Alicja Rodríguez MD   [facility] (--)    [address]  [place] [address] [phone]              Who to contact     If you have questions or need follow up information about today's clinic visit or your schedule please contact [facility], INFUSION SERVICES directly at [phone].  Normal or non-critical lab and imaging results will be communicated to you by MyChart, letter or phone within 4 business days after the clinic has received the results. If you do not hear from us within 7 days, please contact the clinic through MyChart or phone. If you have a critical or abnormal lab result, we will notify you by phone as soon as possible.  Submit refill requests through [employer] or call your pharmacy and they will forward the refill request to us. Please allow 3 business days for your refill to be completed.          Additional Information About Your Visit        Care EveryWhere ID     This is your Care EveryWhere ID. This could be used by other organizations to access your [place] medical records  [id number]         Blood Pressure from Last 3 Encounters:   10/16/17 119/73   10/13/17 114/71   10/10/17 118/87    Weight from Last 3 Encounters:   10/16/17 76.3 kg (168 lb 3.2 oz)   10/12/17 78.8 kg (173 lb 12.8 oz)   10/07/17 74.1 kg (163 lb 6.4 oz)              We Performed the Following     PICC removal        Primary Care Provider Office Phone # Fax #    Isiah Naidu [phone]        [address] [facility] [address]        Equal Access to Services     [facility]JUANA NINO : Hadii aad ku hadallyno Sopravinali, waaxda luqadaha, qaybta kaalmada georginanathanielnila, brea levine carlostabby olivarezdeannstacy abreu. So [facility] [phone].    ATENCIÓN: Si habla español, tiene a monreal disposición servicios gratuitos de asistencia lingüística. Llame al [phone].    We comply with applicable federal civil rights laws and [place] laws. We do not discriminate on the basis of race, color, national origin, age, disability, sex, sexual orientation, or gender identity.            Thank you!     Thank you for choosing [facility]  for your care. Our goal is always to provide you with excellent care. Hearing back from our patients is one way we can continue to improve our services. Please take a few minutes to complete the written survey that you may receive in the mail after your visit with us. Thank you!             Your Updated Medication List - Protect others around you: Learn how to safely use, store and throw away your medicines at www.disposemymeds.org.          This list is accurate as of: 10/16/17  2:47 PM.  Always use your most recent med list.                   Brand Name Dispense Instructions for use Diagnosis    * alum & mag hydroxide-simethicone 400-400-40 MG/5ML Susp suspension    MYLANTA ES/MAALOX  ES    355 mL    Take 30 mLs by mouth 4 times daily (with meals and nightly)    Nausea and vomiting, intractability of vomiting not specified, unspecified vomiting type       * alum & mag hydroxide-simethicone 400-400-40 MG/5ML Susp suspension    MYLANTA ES/MAALOX  ES    1 Bottle    Take 30 mLs by mouth 4 times daily (with meals and nightly)    Nausea and vomiting, intractability of vomiting not specified, unspecified vomiting type       blood glucose monitoring lancets     100 each    Use to test blood sugar 4 times daily or as directed.    Type 1 diabetes mellitus in pregnancy, second trimester        blood glucose monitoring meter device kit     1 kit    Use to test blood sugar 4 times daily or as directed.    Type 1 diabetes mellitus in pregnancy, second trimester       * blood glucose monitoring test strip    no brand specified    100 strip    Use to test blood sugars 4 times daily or as directed    Type 1 diabetes mellitus in pregnancy, second trimester       * blood glucose monitoring test strip    no brand specified    100 strip    Use to test blood sugar 4 times daily or as directed.    Type 1 diabetes mellitus in pregnancy, second trimester       docusate sodium 100 MG capsule    COLACE    60 capsule    Take 1 capsule (100 mg) by mouth 2 times daily    Nausea and vomiting, intractability of vomiting not specified, unspecified vomiting type, Supervision of high-risk pregnancy, third trimester       famotidine 20 MG tablet    PEPCID    40 tablet    Take 1 tablet (20 mg) by mouth 2 times daily    Type 1 diabetes mellitus in pregnancy, second trimester       folic acid 1 MG tablet    FOLVITE    30 tablet    Take 1 tablet (1 mg) by mouth daily    High-risk pregnancy, first trimester       * HYDROmorphone (HIGH CONC) 10 mg/mL Liqd oral    DILAUDID    16.8 mL    Place 0.4 mLs (4 mg) under the tongue every 4 hours as needed for moderate to severe pain    Epigastric pain, Nausea and vomiting, intractability of vomiting not specified, unspecified vomiting type, Supervision of high-risk pregnancy, third trimester, Abdominal pain affecting pregnancy, antepartum       * HYDROmorphone (STANDARD CONC) 1 MG/ML Liqd liquid    DILAUDID    170 mL    Take 4 mLs (4 mg) by mouth every 4 hours as needed for moderate to severe pain    Nausea and vomiting, intractability of vomiting not specified, unspecified vomiting type       insulin glargine 100 UNIT/ML injection    LANTUS SOLOSTAR    6 mL    Inject 12 Units Subcutaneous 2 times daily    Supervision of high-risk pregnancy, third trimester       lidocaine (viscous) 2 %  solution    XYLOCAINE    100 mL    Take 5 mLs by mouth every 6 hours as needed for moderate pain (moderate to severe epigastric pain. May sip slowly if associated nausea) swish and spit every 3-8 hours as needed; max 8 doses/24 hour period    Nausea and vomiting, intractability of vomiting not specified, unspecified vomiting type, Epigastric pain       phosphorus tablet 250 mg 250 MG per tablet    K PHOS NEUTRAL    60 tablet    Take 1 tablet (250 mg) by mouth 2 times daily    High-risk pregnancy in third trimester       polyethylene glycol Packet    MIRALAX/GLYCOLAX    7 packet    Take 17 g by mouth daily as needed for constipation (titrate to one bowel movement daily)    Type 1 diabetes mellitus in pregnancy, second trimester       Prenatal Vitamin 27-0.8 MG Tabs     90 tablet    Take 1 tablet by mouth daily    High-risk pregnancy, first trimester       * Notice:  This list has 6 medication(s) that are the same as other medications prescribed for you. Read the directions carefully, and ask your doctor or other care provider to review them with you.

## 2017-10-16 NOTE — PROGRESS NOTES
"Patient seen today for OB patient visit at 30w5d, pregnancy complicated by uncontrolled/poorly controlled type I diabetes, chronic pain in pregnancy from gastroparesis, esophagitis from chronic vomiting and new diagnosis of borderline personality disorder as well as depression and adjustment disorder. Also on chronic opioids through verbal agreement with Dr. Verde     No contractions, leaking of fluid. Reports normal fetal movement /73  Pulse 88  Wt 76.3 kg (168 lb 3.2 oz)  SpO2 100%  BMI 26.4 kg/m2  Reports swollen feet over last two days but no headache, vision changes or epigastric pain. Reports glycemic control is \"okay\" today and plans to see diabetes management as well.     Prior to her visit today I was called by the infusion clinic to let me know that her PICC had dislodged by 10 cm even though it is sewn into the skin. Patient unsure how this happened. Was not dislodged at d/c on Friday. They recommended it be removed and this was done.     She reports eating regular food with vomiting only in the morning. Reports is taking all her gastroparesis and esophagitis meds and is taking dilaudid as scheduled (was given enough to get to visit today only). Does not appear over sedated, does not appear to be withdrawing.     Alert and oriented, smiling and asking appropriate questions about preop for her C/S scheduled at 32 weeks.      Please see \"Imaging\" tab under \"Chart Review\" for details of today's US.  Gen: Pt AAOx3, NAD  Abd: Gravid, soft, NT  Ext: No edema  GBS Not done     A/P [id number] 30w5d with multiple medical complications, s/p inpatient d/c Friday, improved GI status and reports improved diabetes control, now with removal of PICC.  Maternal and fetal status -BPP 8/8 but low baseline - NST reactive but low baseline 100-110 but with good variability and accelerations. Suspect low baseline due to chronic opioid treatment of gastroparesis. \  Diabetes - will continue with current treatment, and " we will assess her overall glycemic control along with diabetes.   Gastroparesis - significant improvement  IV access - is difficult but PICC needed to be pulled due to migration today. SHe is aware she will be difficult IV access when she is hospitalized again, will address with her at upcoming visit whether she should be admitted the night before her C/S for glycemic control and possible IV access before delivery (did not discuss today)  Having  C/S due to significant medical complications in pregnancy, scheduled 10/25/17  Chronic opioid outpatient - is only getting from Dr. Verde - takes liquid dilaudid as if she vomits is less likely to need redose. Aware that if she presents with recurrent pain, can only get 0.3 mg dilaudid slow IV every 4 hours, no more frequent. Chronic pain team inpatient aware of her and can be called the day before delivery if she is in hospital to make recs for post op pain control  Plans early delivery - will need betamethasone later this week or next week in anticipation of  C/S.   Follow up Thursday  for BPP and OB visit - given one week of opioids today so should not need more at that visit, should not need until Wed.   Plan delivery at 32 weeks. Has signed consent for tubal ligation and repeat C/S.     Mateo Verde MD     I spent a total of 20 minutes face-to-face with Anne and an Kyrgyz  during today's office visit.  Over 50% of this time was spent counseling the patient and/or coordinating care regarding pregnancy complicated by multiple maternal medical issues..  See note for details.

## 2017-10-16 NOTE — NURSING NOTE
RN F:F 15 min  Anne here with an  for OBV and BPP due to preg c/b poorly controlled Type 1 diabetes and gastroparesis and opioid dependence in pregnancy. Pt reports +FM, denies ctx, denies sROM, and denies vag bleeding. Pt was given new patient packet of information.  BPP done and 8/8, but FHR was 105 and 107.  NST done for low FHR.  NST was reactive, but baseline in 110's due to narcotic use with accels.  Pt received flu vaccine today-see med note. SW Kjersten in to see pt and talk with her. Dr. Verde in to see pt. Pt was given another prescription for oral narcotic meds. Pt made apts for Thurs and Mon for BPP and OBv. Pt is aware to call or come into the hospital with questions or concerns.  Pt reports pain rating of 0 today.  Pt reports eating as well. Pt left amb and stable. Negra Betancourt RN

## 2017-10-17 NOTE — PROGRESS NOTES
[facility]  MATERNAL [facility]ILD HEALTH   SOCIAL WORK PROGRESS NOTE      DATA:     SW met with pt in [facility] clinic, communicating with Malay . Our visit was cut short due to medical need to monitor baby's heart beat.     Per chart review Anne has received updated mental health diagnosis of borderline personality disorder, previously known depression and adjustment disorder.    Her pregnancy continues to be complicated by poorly controlled type I diabetes, chronic pain and chronic use of of opioids, preferring dilaudid.     Anne indicates that things are going well at home and prefers to not being in the hospital. Anne states that her  was denied a visa [place] come to the [place].     Anne acknowledges feeling somewhat anxious with the anticipation of scheduled delivery of her baby on 10/25/17. She also recognizes looking forward to feeling better as she is confident that not being pregnant will improve her medical condition.    SW had intended to check in with her mother but  was unavailable due to the extended length of time of Anne's visit.       INTERVENTION:     This  reviewed the chart and coordinated with the health care team. This  introduced myself and my role as their Maternal-Child Health , including role and scope of practice. I met with the patient today to assess for needs, offer support, assess for coping.   Validated and normalized expressed emotions.   Provided emotional support and active listening.    ASSESSMENT:     Anne appears to be receptive of SW visit. She denies any SW needs at this time.   Anne did not appear to have eye contact at times during the conversation. She appears relaxed and comfortable with how baby is doing, and was not concerned with need for additional monitoring.     PLAN:     SW to follow for needs and support during maternal child health journey.      Kjerstin Rydeen,  [facility]   Social Worker  Maternal Child Health   Direct: [phone]  Pager: [phone]

## 2017-10-19 ENCOUNTER — OFFICE VISIT (OUTPATIENT)
Dept: MATERNAL FETAL MEDICINE | Facility: CLINIC | Age: 27
End: 2017-10-19
Attending: OBSTETRICS & GYNECOLOGY
Payer: COMMERCIAL

## 2017-10-19 ENCOUNTER — HOSPITAL ENCOUNTER (OUTPATIENT)
Dept: ULTRASOUND IMAGING | Facility: CLINIC | Age: 27
Discharge: HOME | End: 2017-10-19
Attending: OBSTETRICS & GYNECOLOGY | Admitting: OBSTETRICS & GYNECOLOGY
Payer: COMMERCIAL

## 2017-10-19 VITALS
WEIGHT: 166.6 LBS | OXYGEN SATURATION: 100 % | BODY MASS INDEX: 26.15 KG/M2 | HEART RATE: 94 BPM | DIASTOLIC BLOOD PRESSURE: 68 MMHG | RESPIRATION RATE: 20 BRPM | SYSTOLIC BLOOD PRESSURE: 107 MMHG

## 2017-10-19 DIAGNOSIS — O24.012 TYPE 1 DIABETES MELLITUS IN PREGNANCY, SECOND TRIMESTER: ICD-10-CM

## 2017-10-19 DIAGNOSIS — O24.012 TYPE 1 DIABETES MELLITUS IN PREGNANCY, SECOND TRIMESTER: Primary | ICD-10-CM

## 2017-10-19 PROCEDURE — 99211 OFF/OP EST MAY X REQ PHY/QHP: CPT | Mod: 25,ZF | Performed by: OBSTETRICS & GYNECOLOGY

## 2017-10-19 PROCEDURE — 76819 FETAL BIOPHYS PROFIL W/O NST: CPT

## 2017-10-19 NOTE — NURSING NOTE
Rn F:F 15 min  Anne was here today for BP check, mood check, and check in for diabetes.  Pt reports +FM, denies SROM, denies vag bleeding and denies contractions. Pt reports that she is using oral dilaudid and her pain is under control. Pt reports that she is vomiting today, but it may be because she is eating grape leaves with meat and that was greasy on her stomach. Pt reported that she ate toast with cheese on it the other day and felt very good, so writer encouraged her to find non greasy foods that she can tolerate and eat those.  BPP 8/8 today. Dr. Lopez met briefly with pt today. Pt will need to be admitted on Monday for Betamethasone steroids prior to her C/S with BTL surgery on Wed 10/25.  Pt will also need another PICC line placed. Writer will work on getting these scheduled. Pt left amb and stable. Negra Betancourt RN

## 2017-10-19 NOTE — MR AVS SNAPSHOT
After Visit Summary   10/19/2017    Anne Gunter    MRN: [id number]           Patient Information     Date Of Birth          1990        Visit Information        Provider Department      10/19/2017 3:00 PM Edd Lopez MD [facility] Maternal Fetal Medicine [facility]        Today's Diagnoses     Type 1 diabetes mellitus in pregnancy, second trimester           Follow-ups after your visit        Your next 10 appointments already scheduled     Oct 23, 2017  1:30 PM CDT   MFM BPP SINGLE with [id number]   [facility] Maternal Fetal Medicine Ultrasound [facility] ([facility])    [address]  [place] [address] [phone]            Oct 23, 2017  2:15 PM CDT   Ob Follow Up with UR EXAM RM 2   [facility] Maternal Fetal Medicine - [facility])    [address]  [facility] [place] [address]   [phone]            Oct 25, 2017   Procedure with Alicja Rodríguez MD   [facility] (--)    [address]  [place] [address] [phone]              Future tests that were ordered for you today     Open Standing Orders        Priority Remaining Interval Expires Ordered    MFM Office Visit Routine 1/2 Weekly 10/19/2018 10/19/2017          Open Future Orders        Priority Expected Expires Ordered    MFM BPP Single Routine  8/19/2018 10/19/2017    MFM BPP Single Routine  8/19/2018 10/19/2017            Who to contact     If you have questions or need follow up information about today's clinic visit or your schedule please contact [facility] MATERNAL FETAL MEDICINE [facility] directly at [phone].  Normal or non-critical lab and imaging results will be communicated to you by MyChart, letter or phone within 4 business days after the clinic has received the results. If you do not hear from us within 7 days, please contact the clinic through MyChart or phone. If you have a critical or abnormal lab result,  we will notify you by phone as soon as possible.  Submit refill requests through [employer] or call your pharmacy and they will forward the refill request to us. Please allow 3 business days for your refill to be completed.          Additional Information About Your Visit        Care EveryWhere ID     This is your Care EveryWhere ID. This could be used by other organizations to access your [facility] medical records  [id number]        Your Vitals Were     Pulse Respirations Pulse Oximetry BMI (Body Mass Index)          94 20 100% 26.15 kg/m2         Blood Pressure from Last 3 Encounters:   10/19/17 107/68   10/16/17 119/73   10/13/17 114/71    Weight from Last 3 Encounters:   10/19/17 75.6 kg (166 lb 9.6 oz)   10/16/17 76.3 kg (168 lb 3.2 oz)   10/12/17 78.8 kg (173 lb 12.8 oz)              We Performed the Following     MFM Office Visit        Primary Care Provider Office Phone # Fax #    Isiah Naidu [phone]       [address] [address]        Equal Access to Services     [facility]: Hadii aad ku hadasho Soomaali, waaxda luqadaha, qaybta kaalmada adecele, brea lópez . So [facility] [phone].    ATENCIÓN: Si habla español, tiene a monreal disposición servicios gratuitos de asistencia lingüística. [facility] [phone].    We comply with applicable federal civil rights laws and [place] laws. We do not discriminate on the basis of race, color, national origin, age, disability, sex, sexual orientation, or gender identity.            Thank you!     Thank you for choosing [facility]  for your care. Our goal is always to provide you with excellent care. Hearing back from our patients is one way we can continue to improve our services. Please take a few minutes to complete the written survey that you may receive in the mail after your visit with us. Thank you!             Your Updated Medication List - Protect others  around you: Learn how to safely use, store and throw away your medicines at www.disposemymeds.org.          This list is accurate as of: 10/19/17 11:59 PM.  Always use your most recent med list.                   Brand Name Dispense Instructions for use Diagnosis    * alum & mag hydroxide-simethicone 400-400-40 MG/5ML Susp suspension    MYLANTA ES/MAALOX  ES    355 mL    Take 30 mLs by mouth 4 times daily (with meals and nightly)    Nausea and vomiting, intractability of vomiting not specified, unspecified vomiting type       * alum & mag hydroxide-simethicone 400-400-40 MG/5ML Susp suspension    MYLANTA ES/MAALOX  ES    1 Bottle    Take 30 mLs by mouth 4 times daily (with meals and nightly)    Nausea and vomiting, intractability of vomiting not specified, unspecified vomiting type       blood glucose monitoring lancets     100 each    Use to test blood sugar 4 times daily or as directed.    Type 1 diabetes mellitus in pregnancy, second trimester       blood glucose monitoring meter device kit     1 kit    Use to test blood sugar 4 times daily or as directed.    Type 1 diabetes mellitus in pregnancy, second trimester       * blood glucose monitoring test strip    no brand specified    100 strip    Use to test blood sugars 4 times daily or as directed    Type 1 diabetes mellitus in pregnancy, second trimester       * blood glucose monitoring test strip    no brand specified    100 strip    Use to test blood sugar 4 times daily or as directed.    Type 1 diabetes mellitus in pregnancy, second trimester       docusate sodium 100 MG capsule    COLACE    60 capsule    Take 1 capsule (100 mg) by mouth 2 times daily    Nausea and vomiting, intractability of vomiting not specified, unspecified vomiting type, Supervision of high-risk pregnancy, third trimester       famotidine 20 MG tablet    PEPCID    40 tablet    Take 1 tablet (20 mg) by mouth 2 times daily    Type 1 diabetes mellitus in pregnancy, second trimester        folic acid 1 MG tablet    FOLVITE    30 tablet    Take 1 tablet (1 mg) by mouth daily    High-risk pregnancy, first trimester       * HYDROmorphone (HIGH CONC) 10 mg/mL Liqd oral    DILAUDID    16.8 mL    Place 0.4 mLs (4 mg) under the tongue every 4 hours as needed for moderate to severe pain    Epigastric pain, Nausea and vomiting, intractability of vomiting not specified, unspecified vomiting type, Supervision of high-risk pregnancy, third trimester, Abdominal pain affecting pregnancy, antepartum       * HYDROmorphone (STANDARD CONC) 1 MG/ML Liqd liquid    DILAUDID    170 mL    Take 4 mLs (4 mg) by mouth every 4 hours as needed for moderate to severe pain    Nausea and vomiting, intractability of vomiting not specified, unspecified vomiting type       * HYDROmorphone (STANDARD CONC) 1 MG/ML Liqd liquid    DILAUDID    170 mL    Take 4 mLs (4 mg) by mouth every 4 hours as needed for moderate to severe pain    High-risk pregnancy, unspecified trimester, Gastroparesis       insulin glargine 100 UNIT/ML injection    LANTUS SOLOSTAR    6 mL    Inject 12 Units Subcutaneous 2 times daily    Supervision of high-risk pregnancy, third trimester       lidocaine (viscous) 2 % solution    XYLOCAINE    100 mL    Take 5 mLs by mouth every 6 hours as needed for moderate pain (moderate to severe epigastric pain. May sip slowly if associated nausea) swish and spit every 3-8 hours as needed; max 8 doses/24 hour period    Nausea and vomiting, intractability of vomiting not specified, unspecified vomiting type, Epigastric pain       phosphorus tablet 250 mg 250 MG per tablet    K PHOS NEUTRAL    60 tablet    Take 1 tablet (250 mg) by mouth 2 times daily    High-risk pregnancy in third trimester       polyethylene glycol Packet    MIRALAX/GLYCOLAX    7 packet    Take 17 g by mouth daily as needed for constipation (titrate to one bowel movement daily)    Type 1 diabetes mellitus in pregnancy, second trimester       Prenatal Vitamin  27-0.8 MG Tabs     90 tablet    Take 1 tablet by mouth daily    High-risk pregnancy, first trimester       * Notice:  This list has 7 medication(s) that are the same as other medications prescribed for you. Read the directions carefully, and ask your doctor or other care provider to review them with you.

## 2017-10-20 ENCOUNTER — APPOINTMENT (OUTPATIENT)
Dept: INTERVENTIONAL RADIOLOGY/VASCULAR | Facility: CLINIC | Age: 27
End: 2017-10-20
Attending: PHYSICIAN ASSISTANT
Payer: COMMERCIAL

## 2017-10-20 ENCOUNTER — TELEPHONE (OUTPATIENT)
Dept: MATERNAL FETAL MEDICINE | Facility: CLINIC | Age: 27
End: 2017-10-20

## 2017-10-20 ENCOUNTER — HOSPITAL ENCOUNTER (INPATIENT)
Facility: CLINIC | Age: 27
LOS: 8 days | Discharge: HOME | End: 2017-10-28
Attending: OBSTETRICS & GYNECOLOGY | Admitting: OBSTETRICS & GYNECOLOGY
Payer: COMMERCIAL

## 2017-10-20 DIAGNOSIS — O09.93 SUPERVISION OF HIGH-RISK PREGNANCY, THIRD TRIMESTER: ICD-10-CM

## 2017-10-20 DIAGNOSIS — Z79.4 ENCOUNTER FOR LONG-TERM (CURRENT) USE OF INSULIN (H): ICD-10-CM

## 2017-10-20 DIAGNOSIS — Z98.891 S/P CESAREAN SECTION: Primary | ICD-10-CM

## 2017-10-20 LAB
ABO + RH BLD: NORMAL
ABO + RH BLD: NORMAL
ALBUMIN SERPL-MCNC: 2.6 G/DL (ref 3.4–5)
ALP SERPL-CCNC: 111 U/L (ref 40–150)
ALT SERPL W P-5'-P-CCNC: 13 U/L (ref 0–50)
ANION GAP SERPL CALCULATED.3IONS-SCNC: 11 MMOL/L (ref 3–14)
AST SERPL W P-5'-P-CCNC: 12 U/L (ref 0–45)
BILIRUB SERPL-MCNC: 0.4 MG/DL (ref 0.2–1.3)
BLD GP AB SCN SERPL QL: NORMAL
BLOOD BANK CMNT PATIENT-IMP: NORMAL
BUN SERPL-MCNC: 4 MG/DL (ref 7–30)
CALCIUM SERPL-MCNC: 8.9 MG/DL (ref 8.5–10.1)
CHLORIDE SERPL-SCNC: 103 MMOL/L (ref 94–109)
CO2 SERPL-SCNC: 24 MMOL/L (ref 20–32)
CREAT SERPL-MCNC: 0.42 MG/DL (ref 0.52–1.04)
ERYTHROCYTE [DISTWIDTH] IN BLOOD BY AUTOMATED COUNT: 15.2 % (ref 10–15)
GFR SERPL CREATININE-BSD FRML MDRD: >90 ML/MIN/1.7M2
GLUCOSE BLDC GLUCOMTR-MCNC: 102 MG/DL (ref 70–99)
GLUCOSE BLDC GLUCOMTR-MCNC: 102 MG/DL (ref 70–99)
GLUCOSE BLDC GLUCOMTR-MCNC: 208 MG/DL (ref 70–99)
GLUCOSE SERPL-MCNC: 100 MG/DL (ref 70–99)
HCT VFR BLD AUTO: 30.5 % (ref 35–47)
HGB BLD-MCNC: 9.7 G/DL (ref 11.7–15.7)
KETONES BLD-SCNC: 1.2 MMOL/L (ref 0–0.6)
MAGNESIUM SERPL-MCNC: 2 MG/DL (ref 1.6–2.3)
MCH RBC QN AUTO: 25.8 PG (ref 26.5–33)
MCHC RBC AUTO-ENTMCNC: 31.8 G/DL (ref 31.5–36.5)
MCV RBC AUTO: 81 FL (ref 78–100)
PHOSPHATE SERPL-MCNC: 3.2 MG/DL (ref 2.5–4.5)
PLATELET # BLD AUTO: 256 10E9/L (ref 150–450)
POTASSIUM SERPL-SCNC: 3.8 MMOL/L (ref 3.4–5.3)
PROT SERPL-MCNC: 7.7 G/DL (ref 6.8–8.8)
RBC # BLD AUTO: 3.76 10E12/L (ref 3.8–5.2)
SODIUM SERPL-SCNC: 138 MMOL/L (ref 133–144)
SPECIMEN EXP DATE BLD: NORMAL
WBC # BLD AUTO: 8.5 10E9/L (ref 4–11)

## 2017-10-20 PROCEDURE — 86850 RBC ANTIBODY SCREEN: CPT | Performed by: OBSTETRICS & GYNECOLOGY

## 2017-10-20 PROCEDURE — 36569 INSJ PICC 5 YR+ W/O IMAGING: CPT

## 2017-10-20 PROCEDURE — 12000030 ZZH R&B OB INTERMEDIATE UMMC

## 2017-10-20 PROCEDURE — 25000125 ZZHC RX 250: Performed by: RADIOLOGY

## 2017-10-20 PROCEDURE — 25000131 ZZH RX MED GY IP 250 OP 636 PS 637: Performed by: PHYSICIAN ASSISTANT

## 2017-10-20 PROCEDURE — 27210905 ZZH KIT CR7

## 2017-10-20 PROCEDURE — 86901 BLOOD TYPING SEROLOGIC RH(D): CPT | Performed by: OBSTETRICS & GYNECOLOGY

## 2017-10-20 PROCEDURE — 25000128 H RX IP 250 OP 636: Performed by: OBSTETRICS & GYNECOLOGY

## 2017-10-20 PROCEDURE — C1751 CATH, INF, PER/CENT/MIDLINE: HCPCS

## 2017-10-20 PROCEDURE — 86900 BLOOD TYPING SEROLOGIC ABO: CPT | Performed by: OBSTETRICS & GYNECOLOGY

## 2017-10-20 PROCEDURE — 84100 ASSAY OF PHOSPHORUS: CPT | Performed by: OBSTETRICS & GYNECOLOGY

## 2017-10-20 PROCEDURE — 76937 US GUIDE VASCULAR ACCESS: CPT

## 2017-10-20 PROCEDURE — 25000132 ZZH RX MED GY IP 250 OP 250 PS 637: Performed by: OBSTETRICS & GYNECOLOGY

## 2017-10-20 PROCEDURE — 00000146 ZZHCL STATISTIC GLUCOSE BY METER IP

## 2017-10-20 PROCEDURE — 83735 ASSAY OF MAGNESIUM: CPT | Performed by: OBSTETRICS & GYNECOLOGY

## 2017-10-20 PROCEDURE — 27210886 ZZH ACCESSORY CR5

## 2017-10-20 PROCEDURE — 25000125 ZZHC RX 250: Performed by: OBSTETRICS & GYNECOLOGY

## 2017-10-20 PROCEDURE — 80053 COMPREHEN METABOLIC PANEL: CPT | Performed by: OBSTETRICS & GYNECOLOGY

## 2017-10-20 PROCEDURE — S0028 INJECTION, FAMOTIDINE, 20 MG: HCPCS | Performed by: OBSTETRICS & GYNECOLOGY

## 2017-10-20 PROCEDURE — 82010 KETONE BODYS QUAN: CPT | Performed by: OBSTETRICS & GYNECOLOGY

## 2017-10-20 PROCEDURE — 25000128 H RX IP 250 OP 636: Performed by: RADIOLOGY

## 2017-10-20 PROCEDURE — 85027 COMPLETE CBC AUTOMATED: CPT | Performed by: OBSTETRICS & GYNECOLOGY

## 2017-10-20 PROCEDURE — T1013 SIGN LANG/ORAL INTERPRETER: HCPCS | Mod: U3

## 2017-10-20 RX ORDER — FOLIC ACID 1 MG/1
1 TABLET ORAL DAILY
Status: DISCONTINUED | OUTPATIENT
Start: 2017-10-20 | End: 2017-10-25

## 2017-10-20 RX ORDER — ONDANSETRON 2 MG/ML
8 INJECTION INTRAMUSCULAR; INTRAVENOUS ONCE
Status: DISCONTINUED | OUTPATIENT
Start: 2017-10-20 | End: 2017-10-20

## 2017-10-20 RX ORDER — ALUMINA, MAGNESIA, AND SIMETHICONE 2400; 2400; 240 MG/30ML; MG/30ML; MG/30ML
30 SUSPENSION ORAL
Status: DISCONTINUED | OUTPATIENT
Start: 2017-10-20 | End: 2017-10-25

## 2017-10-20 RX ORDER — HYDROMORPHONE HCL/0.9% NACL/PF 0.2MG/0.2
.1-.3 SYRINGE (ML) INTRAVENOUS EVERY 4 HOURS PRN
Status: DISCONTINUED | OUTPATIENT
Start: 2017-10-20 | End: 2017-10-25

## 2017-10-20 RX ORDER — NICOTINE POLACRILEX 4 MG
15-30 LOZENGE BUCCAL
Status: DISCONTINUED | OUTPATIENT
Start: 2017-10-20 | End: 2017-10-28 | Stop reason: HOSPADM

## 2017-10-20 RX ORDER — HYDROMORPHONE HYDROCHLORIDE 1 MG/ML
4 SOLUTION ORAL EVERY 4 HOURS PRN
Status: DISCONTINUED | OUTPATIENT
Start: 2017-10-20 | End: 2017-10-22 | Stop reason: CLARIF

## 2017-10-20 RX ORDER — MORPHINE SULFATE 10 MG/ML
5 INJECTION, SOLUTION INTRAMUSCULAR; INTRAVENOUS ONCE
Status: COMPLETED | OUTPATIENT
Start: 2017-10-20 | End: 2017-10-20

## 2017-10-20 RX ORDER — DEXTROSE, SODIUM CHLORIDE, SODIUM LACTATE, POTASSIUM CHLORIDE, AND CALCIUM CHLORIDE 5; .6; .31; .03; .02 G/100ML; G/100ML; G/100ML; G/100ML; G/100ML
INJECTION, SOLUTION INTRAVENOUS CONTINUOUS
Status: DISCONTINUED | OUTPATIENT
Start: 2017-10-20 | End: 2017-10-24

## 2017-10-20 RX ORDER — NALOXONE HYDROCHLORIDE 0.4 MG/ML
0.4 INJECTION, SOLUTION INTRAMUSCULAR; INTRAVENOUS; SUBCUTANEOUS
Status: DISCONTINUED | OUTPATIENT
Start: 2017-10-20 | End: 2017-10-25

## 2017-10-20 RX ORDER — ONDANSETRON 2 MG/ML
8 INJECTION INTRAMUSCULAR; INTRAVENOUS EVERY 8 HOURS PRN
Status: DISCONTINUED | OUTPATIENT
Start: 2017-10-20 | End: 2017-10-20

## 2017-10-20 RX ORDER — ONDANSETRON 2 MG/ML
4 INJECTION INTRAMUSCULAR; INTRAVENOUS EVERY 6 HOURS PRN
Status: DISCONTINUED | OUTPATIENT
Start: 2017-10-20 | End: 2017-10-25

## 2017-10-20 RX ORDER — DEXTROSE MONOHYDRATE 25 G/50ML
25-50 INJECTION, SOLUTION INTRAVENOUS
Status: DISCONTINUED | OUTPATIENT
Start: 2017-10-20 | End: 2017-10-28 | Stop reason: HOSPADM

## 2017-10-20 RX ORDER — PROMETHAZINE HYDROCHLORIDE 12.5 MG/1
12.5 SUPPOSITORY RECTAL EVERY 6 HOURS PRN
Status: DISCONTINUED | OUTPATIENT
Start: 2017-10-20 | End: 2017-10-25

## 2017-10-20 RX ORDER — METOCLOPRAMIDE HYDROCHLORIDE 5 MG/ML
10 INJECTION INTRAMUSCULAR; INTRAVENOUS EVERY 6 HOURS
Status: DISCONTINUED | OUTPATIENT
Start: 2017-10-20 | End: 2017-10-26

## 2017-10-20 RX ORDER — HEPARIN SODIUM,PORCINE 10 UNIT/ML
3 VIAL (ML) INTRAVENOUS ONCE
Status: DISCONTINUED | OUTPATIENT
Start: 2017-10-20 | End: 2017-10-28 | Stop reason: HOSPADM

## 2017-10-20 RX ORDER — HEPARIN SODIUM (PORCINE) LOCK FLUSH IV SOLN 100 UNIT/ML 100 UNIT/ML
5 SOLUTION INTRAVENOUS ONCE
Status: COMPLETED | OUTPATIENT
Start: 2017-10-20 | End: 2017-10-20

## 2017-10-20 RX ORDER — PRENATAL VIT/IRON FUM/FOLIC AC 27MG-0.8MG
1 TABLET ORAL DAILY
Status: DISCONTINUED | OUTPATIENT
Start: 2017-10-20 | End: 2017-10-28 | Stop reason: HOSPADM

## 2017-10-20 RX ORDER — DOCUSATE SODIUM 100 MG/1
100 CAPSULE, LIQUID FILLED ORAL 2 TIMES DAILY
Status: DISCONTINUED | OUTPATIENT
Start: 2017-10-20 | End: 2017-10-28 | Stop reason: HOSPADM

## 2017-10-20 RX ADMIN — INSULIN ASPART 2 UNITS: 100 INJECTION, SOLUTION INTRAVENOUS; SUBCUTANEOUS at 22:21

## 2017-10-20 RX ADMIN — ONDANSETRON 4 MG: 2 INJECTION INTRAMUSCULAR; INTRAVENOUS at 23:53

## 2017-10-20 RX ADMIN — PROCHLORPERAZINE EDISYLATE 5 MG: 5 INJECTION INTRAMUSCULAR; INTRAVENOUS at 21:18

## 2017-10-20 RX ADMIN — ONDANSETRON 8 MG: 2 INJECTION INTRAMUSCULAR; INTRAVENOUS at 18:10

## 2017-10-20 RX ADMIN — Medication 0.2 MG: at 18:31

## 2017-10-20 RX ADMIN — INSULIN DETEMIR 10 UNITS: 100 INJECTION, SOLUTION SUBCUTANEOUS at 22:20

## 2017-10-20 RX ADMIN — FAMOTIDINE 20 MG: 10 INJECTION, SOLUTION INTRAVENOUS at 18:39

## 2017-10-20 RX ADMIN — PANTOPRAZOLE SODIUM 40 MG: 40 INJECTION, POWDER, FOR SOLUTION INTRAVENOUS at 20:04

## 2017-10-20 RX ADMIN — SODIUM CHLORIDE, POTASSIUM CHLORIDE, SODIUM LACTATE AND CALCIUM CHLORIDE 1000 ML: 600; 310; 30; 20 INJECTION, SOLUTION INTRAVENOUS at 18:55

## 2017-10-20 RX ADMIN — MORPHINE SULFATE 5 MG: 10 INJECTION, SOLUTION INTRAMUSCULAR; INTRAVENOUS at 16:56

## 2017-10-20 RX ADMIN — METOCLOPRAMIDE 10 MG: 5 INJECTION, SOLUTION INTRAMUSCULAR; INTRAVENOUS at 18:34

## 2017-10-20 RX ADMIN — SODIUM CHLORIDE, PRESERVATIVE FREE 3 ML: 5 INJECTION INTRAVENOUS at 18:25

## 2017-10-20 RX ADMIN — SODIUM CHLORIDE, SODIUM LACTATE, POTASSIUM CHLORIDE, CALCIUM CHLORIDE, AND DEXTROSE MONOHYDRATE: 600; 310; 30; 20; 5 INJECTION, SOLUTION INTRAVENOUS at 19:50

## 2017-10-20 RX ADMIN — Medication 0.1 MG: at 19:18

## 2017-10-20 RX ADMIN — ONDANSETRON 4 MG: 2 INJECTION INTRAMUSCULAR; INTRAVENOUS at 18:10

## 2017-10-20 RX ADMIN — Medication 0.3 MG: at 23:00

## 2017-10-20 RX ADMIN — LIDOCAINE HYDROCHLORIDE 3 ML: 10 INJECTION, SOLUTION EPIDURAL; INFILTRATION; INTRACAUDAL; PERINEURAL at 18:00

## 2017-10-20 RX ADMIN — ALUMINUM HYDROXIDE, MAGNESIUM HYDROXIDE, AND DIMETHICONE 30 ML: 400; 400; 40 SUSPENSION ORAL at 21:24

## 2017-10-20 NOTE — PROCEDURES
RUE 4 Fr, 40 cm DL PICC to RA/SVC junction. Upper extremity veins partially thrombosed.   8 mg IV Zofran given after PICC line in place.  PICC hep-locked with 150 units (1.5 cc) per port  No complications except recurrent nausea and vomiting.  Catheter ready for immediate use.

## 2017-10-20 NOTE — H&P
[facility]  OB History and Physical      Anne Gunter MRN# [id number]   Age: 27 year old YOB: 1990     CC: abdominal pain, nausea, vomiting     HPI:  Ms. Anne Gunter is a 27 year old  at 31w2d by 25w5d US, who presents with epigastric pain, nausea, vomiting and inability to tolerate PO intake since this morning. She is moaning in bed throughout the conversation, her mother is at bedside. She states this pain started this morning and was accompanied by nausea and vomiting that was watery in consistency.  She last ate pizza last night and greasy meal the night before, which she hasn't had since before discharge on 10/13. She reported in her nursing appointment yesterday that her episodes of vomiting were associated with eating greasy food. At that time, she was told to avoid greasy food and to follow her diet instructions. Up until today, she notes that her pain was controlled using oral dilaudid every 4 hours. She states that she was able to take all of her medications as prescribed this morning but had emesis right after. On 10/16, Anne had to her PICC line removed from her right arm due to the catheter being partially pulled out. She is asking that she be given IV pain medication before PICC line is placed.     Today, she denies contractions, cramping, vaginal bleeding, and loss of fluid.   Notes normal fetal movement. Denies constipation, diarrhea, urinary symptoms, fevers, chills, chest pain, SOB.  Notes mild swelling in her ankles.      Please refer to the H&P note form 10/10 by Dr. Mateo Verde for information on her previous hospitalizations and narcotic usage discussion and plan.     2017 Pregnancy hospitalizations   -  - Diabetes control and abdominal pain, N/V - left AMA with narcotics changed to PO dilaudid  - Transfer from [facility], diabetes control and N/V - left AMA when narcotics changed to PO dilaudid from IV   -  - diabetes  "control and N/V. Initially treated with IM morphine \"didn't work\", had desats with IV dilaudid , extensive evaluation by GI ,nutrition, endocrine. Given IV tylenol and significant GI medications - often declined by patient. Pain not felt to be explained by gastroparesis, Had EGD with esophagitis and 'emetogenic patch in proximal stomach\". Recommended carafate/mylanta/Pepto and PPI high dose for at least 2- 3 months, and viscous lidocain for pain. Left AMA when dilated decreased from every 1-2 hours.   9/30-10/8 - had NJ tube placed with tube feeds, able to wean down IV dilaudid to 0.3 mg every 4 hours. Had single lumen PICC line placed. NJ tube clogged and unable to have unclogged over the weekend. Patient demanded that it be removed and declined having replaced. Requested d/c home but declined trial of oral analgesics prior to d/c. Went home with Rx but did not fill.   10/9-10/9 - admitted with symptoms of severe pain and also concerning for possible opioid withdrawal, left AMA, still complaining of GI pain and making requests to RN for increased speed of IV push and higher doses, declined by team. Left after significant argument with parents over the phone and noted unhappiness and yelling towards floor staff.   10/10 - went to [facility] ED - declined recommended PO meds and was not given requested dilaudid IV push. Presented to [facility] service.   10/10 - 10/13 - admitted for pain, IV anti-emetics, concern for narcotic dependence  10/16 - Clinic follow-up - PICC noted to be migrated, removed     Pregnancy Complications:  - Poorly controlled Type 1 DM: multiple recent admissions for DKA and abdominal pain  - History of PLTCS x1 at 32w5d for fetal indications in the setting of DKA, noncompliance with treatment and recurrent abdominal pain  - Gastroparesis, recurrent abdominal pain, s/p GI consult   - Presumed pre-eclampsia without severe features based on mild range BPs, UPC 0.6 (unknown if she has proteinuria at " baseline)   - Malnutrition  - Anxiety/depression/adjustment disorder, s/p SW and  psych consult during previous admission   - Borderline personality disorder   - GBS positive   - History of opioid physical dependence and chronic opioid use in pregnancy   - Desires permanent sterilization, signed FTP on 10/10/17    Prenatal Labs:   Lab Results   Component Value Date    ABO A 10/09/2017    RH Pos 10/09/2017    AS Neg 10/09/2017    HEPBANG Nonreactive 2017    CHPCRT Negative 09/10/2017    GCPCRT Negative 09/10/2017    TREPAB Negative 09/10/2017    HGB 9.9 (L) 10/10/2017     GBS Status:   Lab Results   Component Value Date    GBS Positive (A) 09/10/2017       OB History  Obstetric History       T0      L1     SAB0   TAB0   Ectopic0   Multiple0   Live Births1       # Outcome Date GA Lbr Walter/2nd Weight Sex Delivery Anes PTL Lv   2 Current            1  06/10/16 32w5d  2.37 kg (5 lb 3.6 oz) M CS-LTranv Gen  BARBARA          PMHx:   Past Medical History:   Diagnosis Date     Diabetes (H)      Microalbuminuria 2016     Type I diabetes mellitus, uncontrolled (H) 2016     PSHx:   Past Surgical History:   Procedure Laterality Date      SECTION N/A 6/10/2016    Procedure:  SECTION;  Surgeon: Richardson Duran MD;  Location:  OR     ESOPHAGOSCOPY, GASTROSCOPY, DUODENOSCOPY (EGD), COMBINED N/A 2017    Procedure: COMBINED ESOPHAGOSCOPY, GASTROSCOPY, DUODENOSCOPY (EGD);  Upper Endoscopy with biopsies;  Surgeon: Nile Sanchez MD;  Location:  OR     INSERT TUBE NASOJEJUNOSTOMY  2017    Procedure: INSERT TUBE NASOJEJUNOSTOMY;  nasojejunal feeding tube placement with upper endoscopy assistance by Dr. Sanchez and Dr. Cristino Bennett, Radiology;  Surgeon: Nile Sanchez MD;  Location: [facility] OR     Meds:   Prescriptions Prior to Admission   Medication Sig Dispense Refill Last Dose     HYDROmorphone, STANDARD CONC, (DILAUDID) 1 MG/ML LIQD liquid Take 4 mLs (4 mg) by mouth  every 4 hours as needed for moderate to severe pain 170 mL 0 10/20/2017 at Unknown time     alum & mag hydroxide-simethicone (MYLANTA ES/MAALOX  ES) 400-400-40 MG/5ML SUSP suspension Take 30 mLs by mouth 4 times daily (with meals and nightly) 355 mL 1 Taking     docusate sodium (COLACE) 100 MG capsule Take 1 capsule (100 mg) by mouth 2 times daily 60 capsule 1 Taking     HYDROmorphone, HIGH CONC, (DILAUDID) 10 mg/mL LIQD oral Place 0.4 mLs (4 mg) under the tongue every 4 hours as needed for moderate to severe pain 16.8 mL 0      lidocaine, viscous, (XYLOCAINE) 2 % solution Take 5 mLs by mouth every 6 hours as needed for moderate pain (moderate to severe epigastric pain. May sip slowly if associated nausea) swish and spit every 3-8 hours as needed; max 8 doses/24 hour period 100 mL 0 Taking     HYDROmorphone, STANDARD CONC, (DILAUDID) 1 MG/ML LIQD liquid Take 4 mLs (4 mg) by mouth every 4 hours as needed for moderate to severe pain 170 mL 0 Taking     alum & mag hydroxide-simethicone (MYLANTA ES/MAALOX  ES) 400-400-40 MG/5ML SUSP suspension Take 30 mLs by mouth 4 times daily (with meals and nightly) 1 Bottle 1 Taking     insulin glargine (LANTUS SOLOSTAR) 100 UNIT/ML injection Inject 12 Units Subcutaneous 2 times daily 6 mL 3      phosphorus tablet 250 mg (K PHOS NEUTRAL) 250 MG per tablet Take 1 tablet (250 mg) by mouth 2 times daily 60 tablet 1 Past Week at Unknown time     blood glucose monitoring (NO BRAND SPECIFIED) test strip Use to test blood sugars 4 times daily or as directed 100 strip 3 Unknown at Unknown time     blood glucose monitoring (NO BRAND SPECIFIED) test strip Use to test blood sugar 4 times daily or as directed. 100 strip 3 Unknown at Unknown time     blood glucose monitoring (ONE TOUCH DELICA) lancets Use to test blood sugar 4 times daily or as directed. 100 each 3 Taking     blood glucose monitoring (ONETOUCH VERIO) meter device kit Use to test blood sugar 4 times daily or as directed. 1 kit 0  Unknown at Unknown time     polyethylene glycol (MIRALAX/GLYCOLAX) Packet Take 17 g by mouth daily as needed for constipation (titrate to one bowel movement daily) 7 packet 1 Taking     famotidine (PEPCID) 20 MG tablet Take 1 tablet (20 mg) by mouth 2 times daily 40 tablet 1 Taking     Prenatal Vit-Fe Fumarate-FA (PRENATAL VITAMIN) 27-0.8 MG TABS Take 1 tablet by mouth daily 90 tablet 3 Taking     folic acid (FOLVITE) 1 MG tablet Take 1 tablet (1 mg) by mouth daily 30 tablet 0 Unknown at Unknown time     Allergies:  No Known Allergies     FmHx:   Family History   Problem Relation Age of Onset     DIABETES Maternal Grandmother      CEREBROVASCULAR DISEASE Paternal Grandmother      Coronary Artery Disease Paternal Grandmother      Hypertension No family hx of      Hyperlipidemia No family hx of      Breast Cancer No family hx of      Colon Cancer No family hx of      Prostate Cancer No family hx of      Other Cancer No family hx of      Depression No family hx of      Anxiety Disorder No family hx of      MENTAL ILLNESS No family hx of      Substance Abuse No family hx of      Anesthesia Reaction No family hx of      Asthma No family hx of      OSTEOPOROSIS No family hx of      Genetic Disorder No family hx of      Thyroid Disease No family hx of      Obesity No family hx of      SocHx: She denies any tobacco, alcohol, or other drug use during this pregnancy (see prior social work noted for further details as they have been closely following due to multiple admissions).    ROS:   Complete 10-point ROS negative except as noted in HPI. She denies headache, blurry vision, chest pain, shortness of breath, dysuria, hematuria or extremity edema.    PE:   Patient Vitals for the past 4 hrs:   BP Temp Temp src Pulse Resp   10/20/17 1706 153/85 - - - -   10/20/17 1700 (!) 175/97 - - - -   10/20/17 1528 117/78 97.5  F (36.4  C) Oral 98 16   10/20/17 1525 117/78 97.5  F (36.4  C) Oral - 18      Gen: Well-appearing, moaning in bed    CV: RRR, no mrg   Pulm: CTAB, no wheezes or crackles   Abd: Soft, gravid, non-tender, +BS   Ext: 1+ pedal edema b/l, otherwise trace LE edema, non-tender     Pres:  Breech by 10/19 US  FHT: Baseline 120bpm, moderate variability, present accelerations , absent decelerations   [place]: 0 contractions in 10 minutes      Growth US (10/12): EFW 1599 grams, 57%ile,   US (10/19): Breech, posterior placenta, EDMOND 12.1 cm, BPP     Assessment/Plan  Anne Gunter is a 27 year old , at 31w2d by stated JUANJO c/w 25w5d US, who presents with severe epigastric pain, nausea and vomiting. She has been admitted multiple times during her pregnancy for these same symptoms.  Her pregnancy is complicated by poorly controlled T1DM with multiple admissions for DKA, severe gastroparesis, narcoticseeking/dependency, malnutrition, mood disorder, borderline personality disorder, history of PLTCS x1 at 32 weeks for fetal indications.     1. Poorly controlled T1DM, recurrent DKA:   - Multiple recent admissions for poorly controlled T1DM and DKA.  Last HA1C 7.4%.  on admission, low suspicion for DKA at this time.   - Electrolytes and serum ketones ordered.   - Endocrinology consult ordered.  Per phone discussion, will start levemir 10U BID with SSI with parameters to transition to insulin gtt if needed.  Will likely need insulin gtt for course of betamethasone planned on 10/23.   - QID blood glucose checks, unless insulin gtt is ordered then will need Q1 hour.    - Plan for delivery at 32 weeks due to medical co-morbidities.  RLTCS, BTL scheduled on 10/25 at 0830 with Dr. Rodríguez. FTP signed 10/10/17, s/p C/S and BTL consent.     2. Gastroparesis, recurrent abdominal pain, nausea and vomiting:   - NPO until nausea, vomiting and pain are controlled.  When controlled, will plan to advance diet slowly.   - Scheduled IV pepcid, protonix and reglan, PRN zofran.  - IR consulted for PICC line placement given difficulty with previous IV  access.  See pain plan below.   - S/p GI on multiple admission.   - Bowel regimen to resume when tolerating PO.     3. Chronic opioid use, physical dependence:   - Limit IV dilaudid to 0.3 mg every 4 hours with slow pushes as part of pain contract with Dr. Verde. Will change to 4 mg liquid dilaudid Q4 hours when acute pain is controlled.   - PRN pain consult postpartum given chronic opioids.     4. Likely preeclampsia without severe features:   - BPs poorly controlled with pain.  Previously has had mild range BPs with pain.  Will treat sustained severe range BPs.   - HELLP labs, UPC ordered. Previously normal with UPC 0.6.   - Close BP monitoring.    5. Depression, adjustment disorder, borderline personality disorder:   - S/p  psych/psychiatry consults on previous admission. Considering starting zyprexa and zoloft postpartum.     6. FWB:   - Category 1 FHT, reactive NST.  Continuous monitoring until stable then transition to TID monitoring.   - Plan for BMZ course next Monday with PRN insulin gtt prior to  delivery scheduled at 32 weeks.   - IV magnesium for neuroprotection if delivery is imminent prior to 32 weeks.   - Twice weekly BPPs next week.      7. PNC:   - A pos, Janna negative, Rubella immune, HIV negative, RPR negative, HepB sAg negative, GBS positive previously.   GBS swab, will recollect.  - Home phosphorus, PNV, folic acid ordered.   - Desires permanent sterilization with BTL at time of C/S.  Federal tubal papers and consent signed.     The patient was discussed with Dr. Lora who is in agreement with the treatment plan.    Denise Benton MD  Ob/Gyn, PGY-3  10/20/2017, 3:17 PM      Attending Attestation:    Late entry for patient visit/encounter on 10/20.    I agree with the residents note above.      I spent 30 minutes total face-to-face with this inpatient, and >50% of the time was spent in counseling and/or coordination of care.    Michelle Lora, DO  Maternal Fetal Medicine

## 2017-10-20 NOTE — PROGRESS NOTES
Brief Diabetes Management Team Note    Anne was admitted this afternoon with abdominal pain.  She is now NPO and plan to start D5 fluids at 100ml/h.  She has no IV access at this point but will get PICC tonight.  She has been on detemir 12 units BID at home, uncertain how glucose control was on this dose, but BG on admission is close to goal range at 102.    Plan:  -detemir 10 units BID to start tonight at 10pm and 10am.  -correction aspart 1 unit/50 >120 q4h  -continuous prn order for high intensity OB IV insulin infusion to start (once PICC placed) if BG >180 x 2.  -monitor glucose q4h    Full consult to follow tomorrow.  Carlene HOLCOMBC [phone]

## 2017-10-20 NOTE — TELEPHONE ENCOUNTER
Writer called Anne's phone number with [place]  and asked for Anne. Anne's father answered and Anne didn't want to come to the phone, so her father said he would give Anne the message. Writer explained that Anne needs Betamethasone on 10/23 and 10/24 for 2 doses 48 hours prior to her scheduled C/S surgery on Wed 10/25.  Writer explained that [facility] at 8:00 am for admission. Pt will need a BPP and will need a PICC line placement as the Betamethasone will increase her BS and she will most likely need an insulin drip. Pt will need a PICC line as it has been difficult to get IV's in [place].  Writer explained that if Anne doesn't come to the hospital until her C/S surgery date of 10/25 then her C/S would be delayed for 48 hours so that she can get Betamethasone.  Anne's father verbalized understanding and he was given the address and directions for the [facility] as well as date and time again.  [facility] phone number was also given in case Anne had any further questions or concerns.  [facility] Ante sonographer was notified of BPP for 10/23 and [facility] charge nurse was notified of plan.  Negra Betancourt RN

## 2017-10-20 NOTE — IP AVS SNAPSHOT
MRN:[id number]                      After Visit Summary   10/20/2017    Anne Gunter    MRN: [id number]           Thank you!     Thank you for choosing [facility] for your care. Our goal is always to provide you with excellent care. Hearing back from our patients is one way we can continue to improve our services. Please take a few minutes to complete the written survey that you may receive in the mail after you visit with us. Thank you!        Patient Information     Date Of Birth          1990        About your hospital stay     You were admitted on:  October 20, 2017 You last received care in the:  [facility]    You were discharged on:  October 28, 2017        Reason for your hospital stay       Maternity care            Reason for your hospital stay       You were in the hospital for delivery of your baby                  Who to Call     For medical emergencies, please call 911.  For non-urgent questions about your medical care, please call your primary care provider or clinic, [phone]  For questions related to your surgery, please call your surgery clinic        Attending Provider     Provider Specialty    Michelle Lora DO OB/Gyn    Alicja Rodríguez MD OB/Gyn       Primary Care Provider Office Phone # Fax #    Isiah Naidu [phone]      After Care Instructions     Activity       Review discharge instructions            Diet       Resume previous diet            Discharge Instructions       No lifting >15 lbs for 6 weeks  No strenuous activity or exercise for 6 weeks  Pelvic rest (no intercourse, tampons or douching) for 6 weeks            Discharge Instructions - Postpartum visit       Schedule postpartum visit with your provider and return to clinic in 6 weeks.                  Follow-up Appointments     Follow Up and recommended labs and tests       Follow up with Dr. Naidu in clinic (or [facility] clinic-- [phone]) in 1 week for a mood check and in  6 weeks for a postpartum visit                  Additional Services     Mental Health Referral                 Further instructions from your care team       Postop  Birth Instructions    Activity       Do not lift more than 10 pounds for 6 weeks after surgery.  Ask family and friends for help when you need it.    No driving until you have stopped taking your pain medications (usually two weeks after surgery).    No heavy exercise or activity for 6 weeks.  Don't do anything that will put a strain on your surgery site.    Don't strain when using the toilet.  Your care team may prescribe a stool softener if you have problems with your bowel movements.     To care for your incision:       Keep the incision clean and dry.    Do not soak your incision in water. No swimming or hot tubs until it has fully healed. You may soak in the bathtub if the water level is below your incision.    Do not use peroxide, gel, cream, lotion, or ointment on your incision.    Adjust your clothes to avoid pressure on your surgery site (check the elastic in your underwear for example).     You may see a small amount of clear or pink drainage and this is normal.  Check with your health care provider:       If the drainage increases or has an odor.    If the incision reddens, you have swelling, or develop a rash.    If you have increased pain and the medicine we prescribed doesn't help.    If you have a fever above 100.4 F (38 C) with or without chills when placing thermometer under your tongue.   The area around your incision (surgery wound), will feel numb.  This is normal. The numbness should go away in less than a year.     Keep your hands clean:  Always wash your hands before touching your incision (surgery wound). This helps reduce your risk of infection. If your hands aren't dirty, you may use an alcohol hand-rub to clean your hands. Keep your nails clean and short.    Call your healthcare provider if you have any of these  symptoms:       You soak a sanitary pad with blood within 1 hour, or you see blood clots larger than a golf ball.    Bleeding that lasts more than 6 weeks.    Vaginal discharge that smells bad.    Severe pain, cramping or tenderness in your lower belly area.    A need to urinate more frequently (use the toilet more often), more urgently (use the toilet very quickly), or it burns when you urinate.    Nausea and vomiting.    Redness, swelling or pain around a vein in your leg.    Problems breastfeeding or a red or painful area on your breast.    Chest pain and cough or are gasping for air.    Problems with coping with sadness, anxiety or depression. If you have concerns about hurting yourself or the baby, call your provider immediately.      You have questions or concerns after you return home.       DIABETES  For appointment with Dr. Colunga at [facility] call [phone].  Recommend you be seen within 1-2 weeks of discharge.    Pending Results     Date and Time Order Name Status Description    10/25/2017 1131 Placenta path order and indications In process     10/25/2017 1104 Surgical Path Exam In process     10/24/2017 2130 Referral sensitivity Preliminary     10/20/2017 1732 IR PICC Placement > 5 Yrs of Age In process             Statement of Approval     Ordered          10/28/17 0834  I have reviewed and agree with all the recommendations and orders detailed in this document.  EFFECTIVE NOW     Approved and electronically signed by:  Valencia Tovar MD             Admission Information     Date & Time Provider Department Dept. Phone    10/20/2017 Alicja Rodríguez MD [facility] [phone]      Your Vitals Were     Blood Pressure Pulse Temperature Respirations Weight Pulse Oximetry    123/76 75 98.6  F (37  C) (Oral) 18 73.5 kg (162 lb 1.6 oz) 99%    BMI (Body Mass Index)                   25.44 kg/m2           Care EveryWhere ID     This is your Care EveryWhere ID. This could be used by  other organizations to access your [facility] medical records  [id number]        Equal Access to Services     VALENTINO ONEILL : Jigar Root, desean farias, clementina constantinomanila mckenzie, brea olivarezdeannstacy abreu. So [facility] [phone].    ATENCIÓN: Si habla español, tiene a monreal disposición servicios gratuitos de asistencia lingüística. Llame al [phone].    We comply with applicable federal civil rights laws and [place] laws. We do not discriminate on the basis of race, color, national origin, age, disability, sex, sexual orientation, or gender identity.               Review of your medicines      START taking        Dose / Directions    HYDROmorphone 2 MG tablet   Commonly known as:  DILAUDID   Replaces:  HYDROmorphone (HIGH CONC) 10 mg/mL Liqd oral        Dose:  2 mg   Take 1 tablet (2 mg) by mouth every 6 hours as needed for moderate to severe pain   Quantity:  10 tablet   Refills:  0       ibuprofen 600 MG tablet   Commonly known as:  ADVIL/MOTRIN        Dose:  600 mg   Take 1 tablet (600 mg) by mouth every 6 hours as needed for other (cramping)   Quantity:  40 tablet   Refills:  0       senna-docusate 8.6-50 MG per tablet   Commonly known as:  SENOKOT-S;PERICOLACE        Dose:  1-2 tablet   Take 1-2 tablets by mouth 2 times daily   Quantity:  100 tablet   Refills:  0         CONTINUE these medicines which have NOT CHANGED        Dose / Directions    * alum & mag hydroxide-simethicone 400-400-40 MG/5ML Susp suspension   Commonly known as:  MYLANTA ES/MAALOX  ES   Used for:  Nausea and vomiting, intractability of vomiting not specified, unspecified vomiting type        Dose:  30 mL   Take 30 mLs by mouth 4 times daily (with meals and nightly)   Quantity:  355 mL   Refills:  1       * alum & mag hydroxide-simethicone 400-400-40 MG/5ML Susp suspension   Commonly known as:  MYLANTA ES/MAALOX  ES   Used for:  Nausea and vomiting, intractability of vomiting not specified, unspecified  vomiting type        Dose:  30 mL   Take 30 mLs by mouth 4 times daily (with meals and nightly)   Quantity:  1 Bottle   Refills:  1       blood glucose monitoring lancets   Used for:  Type 1 diabetes mellitus in pregnancy, second trimester        Use to test blood sugar 4 times daily or as directed.   Quantity:  100 each   Refills:  3       blood glucose monitoring meter device kit   Used for:  Type 1 diabetes mellitus in pregnancy, second trimester        Use to test blood sugar 4 times daily or as directed.   Quantity:  1 kit   Refills:  0       * blood glucose monitoring test strip   Commonly known as:  no brand specified   Used for:  Type 1 diabetes mellitus in pregnancy, second trimester        Use to test blood sugars 4 times daily or as directed   Quantity:  100 strip   Refills:  3       * blood glucose monitoring test strip   Commonly known as:  no brand specified   Used for:  Type 1 diabetes mellitus in pregnancy, second trimester        Use to test blood sugar 4 times daily or as directed.   Quantity:  100 strip   Refills:  3       docusate sodium 100 MG capsule   Commonly known as:  COLACE   Used for:  Nausea and vomiting, intractability of vomiting not specified, unspecified vomiting type, Supervision of high-risk pregnancy, third trimester        Dose:  100 mg   Take 1 capsule (100 mg) by mouth 2 times daily   Quantity:  60 capsule   Refills:  1       famotidine 20 MG tablet   Commonly known as:  PEPCID   Used for:  Type 1 diabetes mellitus in pregnancy, second trimester        Dose:  20 mg   Take 1 tablet (20 mg) by mouth 2 times daily   Quantity:  40 tablet   Refills:  1       folic acid 1 MG tablet   Commonly known as:  FOLVITE   Used for:  High-risk pregnancy, first trimester        Dose:  1 mg   Take 1 tablet (1 mg) by mouth daily   Quantity:  30 tablet   Refills:  0       insulin glargine 100 UNIT/ML injection   Commonly known as:  LANTUS   Used for:  Encounter for long-term (current) use of  insulin (H)        Dose:  12 Units   Inject 12 Units Subcutaneous 2 times daily   Quantity:  3 mL   Refills:  3       lidocaine (viscous) 2 % solution   Commonly known as:  XYLOCAINE   Used for:  Nausea and vomiting, intractability of vomiting not specified, unspecified vomiting type, Epigastric pain        Dose:  5 mL   Take 5 mLs by mouth every 6 hours as needed for moderate pain (moderate to severe epigastric pain. May sip slowly if associated nausea) swish and spit every 3-8 hours as needed; max 8 doses/24 hour period   Quantity:  100 mL   Refills:  0       phosphorus tablet 250 mg 250 MG per tablet   Commonly known as:  K PHOS NEUTRAL        Dose:  250 mg   Take 1 tablet (250 mg) by mouth 2 times daily   Quantity:  60 tablet   Refills:  1       polyethylene glycol Packet   Commonly known as:  MIRALAX/GLYCOLAX   Used for:  Type 1 diabetes mellitus in pregnancy, second trimester        Dose:  17 g   Take 17 g by mouth daily as needed for constipation (titrate to one bowel movement daily)   Quantity:  7 packet   Refills:  1       Prenatal Vitamin 27-0.8 MG Tabs   Used for:  High-risk pregnancy, first trimester        Dose:  1 tablet   Take 1 tablet by mouth daily   Quantity:  90 tablet   Refills:  3       * Notice:  This list has 4 medication(s) that are the same as other medications prescribed for you. Read the directions carefully, and ask your doctor or other care provider to review them with you.      STOP taking     HYDROmorphone (HIGH CONC) 10 mg/mL Liqd oral   Commonly known as:  DILAUDID   Replaced by:  HYDROmorphone 2 MG tablet           HYDROmorphone (STANDARD CONC) 1 MG/ML Liqd liquid   Commonly known as:  DILAUDID                Where to get your medicines      These medications were sent to [facility] [place], [place] - [address]  [address] [address]     Phone:  [phone]     ibuprofen 600 MG tablet    insulin glargine 100 UNIT/ML injection     senna-docusate 8.6-50 MG per tablet         Some of these will need a paper prescription and others can be bought over the counter. Ask your nurse if you have questions.     Bring a paper prescription for each of these medications     HYDROmorphone 2 MG tablet                Protect others around you: Learn how to safely use, store and throw away your medicines at www.disposemymeds.org.             Medication List: This is a list of all your medications and when to take them. Check marks below indicate your daily home schedule. Keep this list as a reference.      Medications           Morning Afternoon Evening Bedtime As Needed    * alum & mag hydroxide-simethicone 400-400-40 MG/5ML Susp suspension   Commonly known as:  MYLANTA ES/MAALOX  ES   Take 30 mLs by mouth 4 times daily (with meals and nightly)   Last time this was given:  30 mLs on 10/23/2017  9:24 AM                                * alum & mag hydroxide-simethicone 400-400-40 MG/5ML Susp suspension   Commonly known as:  MYLANTA ES/MAALOX  ES   Take 30 mLs by mouth 4 times daily (with meals and nightly)   Last time this was given:  30 mLs on 10/23/2017  9:24 AM                                blood glucose monitoring lancets   Use to test blood sugar 4 times daily or as directed.                                blood glucose monitoring meter device kit   Use to test blood sugar 4 times daily or as directed.                                * blood glucose monitoring test strip   Commonly known as:  no brand specified   Use to test blood sugars 4 times daily or as directed                                * blood glucose monitoring test strip   Commonly known as:  no brand specified   Use to test blood sugar 4 times daily or as directed.                                docusate sodium 100 MG capsule   Commonly known as:  COLACE   Take 1 capsule (100 mg) by mouth 2 times daily                                famotidine 20 MG tablet   Commonly known as:  PEPCID   Take  1 tablet (20 mg) by mouth 2 times daily                                folic acid 1 MG tablet   Commonly known as:  FOLVITE   Take 1 tablet (1 mg) by mouth daily                                HYDROmorphone 2 MG tablet   Commonly known as:  DILAUDID   Take 1 tablet (2 mg) by mouth every 6 hours as needed for moderate to severe pain   Last time this was given:  2 mg on 10/26/2017  5:07 AM                                ibuprofen 600 MG tablet   Commonly known as:  ADVIL/MOTRIN   Take 1 tablet (600 mg) by mouth every 6 hours as needed for other (cramping)   Last time this was given:  800 mg on 10/26/2017  7:29 PM                                insulin glargine 100 UNIT/ML injection   Commonly known as:  LANTUS   Inject 12 Units Subcutaneous 2 times daily   Last time this was given:  12 Units on 10/28/2017  8:56 AM                                lidocaine (viscous) 2 % solution   Commonly known as:  XYLOCAINE   Take 5 mLs by mouth every 6 hours as needed for moderate pain (moderate to severe epigastric pain. May sip slowly if associated nausea) swish and spit every 3-8 hours as needed; max 8 doses/24 hour period   Last time this was given:  5 mLs on 10/23/2017  9:25 AM                                phosphorus tablet 250 mg 250 MG per tablet   Commonly known as:  K PHOS NEUTRAL   Take 1 tablet (250 mg) by mouth 2 times daily                                polyethylene glycol Packet   Commonly known as:  MIRALAX/GLYCOLAX   Take 17 g by mouth daily as needed for constipation (titrate to one bowel movement daily)                                Prenatal Vitamin 27-0.8 MG Tabs   Take 1 tablet by mouth daily                                senna-docusate 8.6-50 MG per tablet   Commonly known as:  SENOKOT-S;PERICOLACE   Take 1-2 tablets by mouth 2 times daily                                * Notice:  This list has 4 medication(s) that are the same as other medications prescribed for you. Read the directions carefully, and ask  your doctor or other care provider to review them with you.

## 2017-10-20 NOTE — IP AVS SNAPSHOT
[facility]    [address] [facility] [address]    Phone:  [phone]                                       After Visit Summary   10/20/2017    Anne Gunter    MRN: [id number]           After Visit Summary Signature Page     I have received my discharge instructions, and my questions have been answered. I have discussed any challenges I see with this plan with the nurse or doctor.    ..........................................................................................................................................  Patient/Patient Representative Signature      ..........................................................................................................................................  Patient Representative Print Name and Relationship to Patient    ..................................................               ................................................  Date                                            Time    ..........................................................................................................................................  Reviewed by Signature/Title    ...................................................              ..............................................  Date                                                            Time

## 2017-10-20 NOTE — DISCHARGE SUMMARY
[facility] Discharge Summary    Anne Gunter MRN# [id number]   Age: 27 year old YOB: 1990     Date of Admission:  10/20/2017  Date of Discharge:  10/28/2017  Admitting Physician:  Michelle Lora,   Discharge Physician:  Valencia Tovar MD    Admit Dx:   - Intrauterine pregnancy at 31w2d by 25w5d US  - Type 1 DM, poorly controlled  - pre-eclampsia without severe features   - Gastroparesis  - Chronic Opiod Use, physical dependence  - History of PLTCS x 1 at 32w5d for fetal indications in the setting of DKA, non compliance with treatment  - desires sterilization   - anxiety/depression/adjustment disorder  - borderline personality disorder    Discharge Dx:  - Same as above, s/p repeat low transverse  section  - Acute blood loss anemia    Procedures:  - Repeat low transverse  section with two layer uterine closure via Pfannenstiel incision  - bilateral salpingectomy      Admit HPI:  Anne is a 27 year old  at 31w2d by 25w5d US who presented to [facility] labor and delivery for abdominal pain, nausea and vomiting. This was her 7th hospitalization during this pregnancy for diabetes control and abdominal pain. Her pregnancy is complicated by poorly controlled Type 1 DM, gastroparesis, chronic opoid use and dependence, pre-eclampsia without severe features, borderline personality disorder and depression/anxiety. She has a history of primary low transverse  delivery at 32w5d for fetal indications in the setting of DKA and non compliance with treatment. Upon admission she was complaining of nausea and vomiting x 1 day and mid epigastric pain that occurred after eating pizza the night before. She stated she had no food intake on the day of admission but was taking her oral medications as prescribed including her oral dilaudid every 4 hours.  The day previous to admission she had reported to clinic nursing staff that she had good pain control but some  nausea and vomiting with greasy food. At that time she was told to avoid greasy food and to follow her diet recommendations. Upon admission she requested IV pain medication and stated that oral pain medication would not work due to her nausea and vomiting. She denied contractions, vaginal bleeding, loss of fluid, constipation/diarrhea, dysuria and chest pain. She admitted to good fetal movement.     On a previous hospitalization her  delivery was scheduled for 10/25/17 at 32w0d for severe non-compliance with medical treatment with poorly controlled Type I DM. Also in a previous hospitalization she voiced her desire for permanent sterilization and both  section and federal tubal papers consents were signed.      In regards to her poorly controlled Type I DM with multiple prior admissions with DKA, she was started on an insulin drip per protocol until delivery at 32w0d.  The insulin drip was managed by endocrinology. Her beta-hydroxy-butyrate was 1.2 on admission. She was not noted to be in DKA on this admission.    In regards to her abdominal pain and nausea and vomiting, there was questionable concern that she had an eating disorder, vs. Munchausen's vs. gastroparesis from her poorly controlled Type I DM and pregnancy, she was noted to have very poor PO intake.  She was placed on IV Pepcid, protonix, reglan, PRN zofran.     In regards to her opioid dependence related to abdominal pain control from her gastroparesis, she was continued on IV dilaudid 0.3mg Q4hrs PRN and when tolerating PO intake was switched to 4mg sublingual dilaudid PRN. She also had PO lidocaine PRN as needed.     She had previously had a psychiatry consult on prior admissions for which she was diagnosed with MDD and borderline personality disorder.  She was recommended to start on a regimen of zyprexa and zoloft in the postpartum period.  Please see her postpartum course for psychiatry consult and recommended regimen for discharge.      In regards to her elevated blood pressures, she was diagnosed with pre-eclampsia without severe features based on mild range BPs in addition to a urine protein-creatinine ratio of 0.6. Her HELLP labs were normal on admission.  Her blood pressures during her antepartum course continued to be normal to mild range until delivery.     The patient also had a PICC line in place on admission that was accessed.  The PICC had been placed on prior admissions given difficulties with IV placement.     Please see her admit H&P for full details of her PMH, PSH, Meds, Allergies and exam on admit.    Operative Course:  Surgery was uncomplicated. EBL from the delivery was 800ml. Please see her  Section Operative Note for full details regarding her delivery.    Operative Findings:   1. Moderate to dense rectofascial adhesions and no significant intraabdominal adhesions.   2. Clear amniotic fluid.   3. Liveborn male infant in breech presentation. Apgars 6 and 9.  4. Normal uterus, fallopian tubes and ovaries except for 1 small cm posterior subserosal fibroid.     Postoperative Course:  Her postoperative course was uncomplicated. On POD#3, she was meeting all of her postpartum goals and deemed stable for discharge. She was voiding without difficulty and tolerating a regular diet without nausea and vomiting and refusing all of her GI medications. Her pain was well controlled without any pain medicines and her lochia was appropriate. Her hemoglobin prior to delivery was 8.5 and after delivery was 7.7. She was asymptomatic of acute blood loss anemia. Her Rh status was postive and Rhogam was not indicated.    Postoperatively, she was transitioned to subcutaneous insulin per endocrinology and instructed to follow up in clinic in one week.  She was seen by psychiatry due to an EPDS of 21. She declined all medications and was amenable to follow up as an outpatient.     Discharge Medications:  Current Discharge Medication List       START taking these medications    Details   HYDROmorphone (DILAUDID) 2 MG tablet Take 1 tablet (2 mg) by mouth every 6 hours as needed for moderate to severe pain  Qty: 10 tablet, Refills: 0    Associated Diagnoses: S/P  section      ibuprofen (ADVIL/MOTRIN) 600 MG tablet Take 1 tablet (600 mg) by mouth every 6 hours as needed for other (cramping)  Qty: 40 tablet, Refills: 0    Associated Diagnoses: S/P  section      senna-docusate (SENOKOT-S;PERICOLACE) 8.6-50 MG per tablet Take 1-2 tablets by mouth 2 times daily  Qty: 100 tablet, Refills: 0    Associated Diagnoses: S/P  section         CONTINUE these medications which have CHANGED    Details   !! insulin glargine (LANTUS) 100 UNIT/ML injection Inject 10 Units Subcutaneous At Bedtime  Qty: 3 mL, Refills: 3    Associated Diagnoses: Encounter for long-term (current) use of insulin (H)      !! insulin glargine (LANTUS) 100 UNIT/ML injection Inject 12 Units Subcutaneous every morning  Qty: 3 mL, Refills: 3    Associated Diagnoses: Encounter for long-term (current) use of insulin (H)       !! - Potential duplicate medications found. Please discuss with provider.      CONTINUE these medications which have NOT CHANGED    Details   !! alum & mag hydroxide-simethicone (MYLANTA ES/MAALOX  ES) 400-400-40 MG/5ML SUSP suspension Take 30 mLs by mouth 4 times daily (with meals and nightly)  Qty: 355 mL, Refills: 1    Associated Diagnoses: Nausea and vomiting, intractability of vomiting not specified, unspecified vomiting type      docusate sodium (COLACE) 100 MG capsule Take 1 capsule (100 mg) by mouth 2 times daily  Qty: 60 capsule, Refills: 1    Associated Diagnoses: Nausea and vomiting, intractability of vomiting not specified, unspecified vomiting type; Supervision of high-risk pregnancy, third trimester      lidocaine, viscous, (XYLOCAINE) 2 % solution Take 5 mLs by mouth every 6 hours as needed for moderate pain (moderate to severe epigastric pain.  May sip slowly if associated nausea) swish and spit every 3-8 hours as needed; max 8 doses/24 hour period  Qty: 100 mL, Refills: 0    Associated Diagnoses: Nausea and vomiting, intractability of vomiting not specified, unspecified vomiting type; Epigastric pain      !! alum & mag hydroxide-simethicone (MYLANTA ES/MAALOX  ES) 400-400-40 MG/5ML SUSP suspension Take 30 mLs by mouth 4 times daily (with meals and nightly)  Qty: 1 Bottle, Refills: 1    Associated Diagnoses: Nausea and vomiting, intractability of vomiting not specified, unspecified vomiting type      phosphorus tablet 250 mg (K PHOS NEUTRAL) 250 MG per tablet Take 1 tablet (250 mg) by mouth 2 times daily  Qty: 60 tablet, Refills: 1    Associated Diagnoses: High-risk pregnancy in third trimester      !! blood glucose monitoring (NO BRAND SPECIFIED) test strip Use to test blood sugars 4 times daily or as directed  Qty: 100 strip, Refills: 3    Associated Diagnoses: Type 1 diabetes mellitus in pregnancy, second trimester      !! blood glucose monitoring (NO BRAND SPECIFIED) test strip Use to test blood sugar 4 times daily or as directed.  Qty: 100 strip, Refills: 3    Associated Diagnoses: Type 1 diabetes mellitus in pregnancy, second trimester      blood glucose monitoring (ONE TOUCH DELICA) lancets Use to test blood sugar 4 times daily or as directed.  Qty: 100 each, Refills: 3    Associated Diagnoses: Type 1 diabetes mellitus in pregnancy, second trimester      blood glucose monitoring (ONETOUCH VERIO) meter device kit Use to test blood sugar 4 times daily or as directed.  Qty: 1 kit, Refills: 0    Associated Diagnoses: Type 1 diabetes mellitus in pregnancy, second trimester      polyethylene glycol (MIRALAX/GLYCOLAX) Packet Take 17 g by mouth daily as needed for constipation (titrate to one bowel movement daily)  Qty: 7 packet, Refills: 1    Associated Diagnoses: Type 1 diabetes mellitus in pregnancy, second trimester      famotidine (PEPCID) 20 MG tablet  Take 1 tablet (20 mg) by mouth 2 times daily  Qty: 40 tablet, Refills: 1    Associated Diagnoses: Type 1 diabetes mellitus in pregnancy, second trimester      Prenatal Vit-Fe Fumarate-FA (PRENATAL VITAMIN) 27-0.8 MG TABS Take 1 tablet by mouth daily  Qty: 90 tablet, Refills: 3    Associated Diagnoses: High-risk pregnancy, first trimester      folic acid (FOLVITE) 1 MG tablet Take 1 tablet (1 mg) by mouth daily  Qty: 30 tablet, Refills: 0    Associated Diagnoses: High-risk pregnancy, first trimester          Discharge/Disposition:  Anne Gunter was discharged to home in stable condition with the following instructions/medications:  1) Call for temperature > 100.4, bright red vaginal bleeding >1 pad an hour x 2 hours, foul smelling vaginal discharge, pain not controlled by usual oral pain meds, persistent nausea and vomiting not controlled on medications, drainage or redness from incision site  2) She was s/p tubal ligation for contraception.  3) She was instructed to follow-up with her primary OB in 6 weeks for a routine postpartum visit and one week for a mood check..  4) Discharge activity:  No heavy lifting >15 lbs or strenuous activity for 6 weeks, pelvic rest for 6 weeks, no driving or operating machinery while on narcotics.    Valencia Tovar MD

## 2017-10-20 NOTE — PROGRESS NOTES
Pt was seen for nurse only visit, but Dr. Lopez went to give [place] u/s results.  Negra Betancourt RN

## 2017-10-21 PROBLEM — E10.65: Status: ACTIVE | Noted: 2017-10-21

## 2017-10-21 PROBLEM — E10.39: Status: ACTIVE | Noted: 2017-10-21

## 2017-10-21 LAB
GLUCOSE BLDC GLUCOMTR-MCNC: 101 MG/DL (ref 70–99)
GLUCOSE BLDC GLUCOMTR-MCNC: 105 MG/DL (ref 70–99)
GLUCOSE BLDC GLUCOMTR-MCNC: 106 MG/DL (ref 70–99)
GLUCOSE BLDC GLUCOMTR-MCNC: 106 MG/DL (ref 70–99)
GLUCOSE BLDC GLUCOMTR-MCNC: 109 MG/DL (ref 70–99)
GLUCOSE BLDC GLUCOMTR-MCNC: 110 MG/DL (ref 70–99)
GLUCOSE BLDC GLUCOMTR-MCNC: 110 MG/DL (ref 70–99)
GLUCOSE BLDC GLUCOMTR-MCNC: 113 MG/DL (ref 70–99)
GLUCOSE BLDC GLUCOMTR-MCNC: 118 MG/DL (ref 70–99)
GLUCOSE BLDC GLUCOMTR-MCNC: 119 MG/DL (ref 70–99)
GLUCOSE BLDC GLUCOMTR-MCNC: 122 MG/DL (ref 70–99)
GLUCOSE BLDC GLUCOMTR-MCNC: 127 MG/DL (ref 70–99)
GLUCOSE BLDC GLUCOMTR-MCNC: 130 MG/DL (ref 70–99)
GLUCOSE BLDC GLUCOMTR-MCNC: 143 MG/DL (ref 70–99)
GLUCOSE BLDC GLUCOMTR-MCNC: 162 MG/DL (ref 70–99)
GLUCOSE BLDC GLUCOMTR-MCNC: 209 MG/DL (ref 70–99)
GLUCOSE BLDC GLUCOMTR-MCNC: 254 MG/DL (ref 70–99)
GLUCOSE BLDC GLUCOMTR-MCNC: 273 MG/DL (ref 70–99)
GLUCOSE BLDC GLUCOMTR-MCNC: 96 MG/DL (ref 70–99)
GLUCOSE BLDC GLUCOMTR-MCNC: 96 MG/DL (ref 70–99)
GLUCOSE BLDC GLUCOMTR-MCNC: 99 MG/DL (ref 70–99)
PROT UR-MCNC: 0.18 G/L
PROT/CREAT 24H UR: 0.5 G/G CR (ref 0–0.2)

## 2017-10-21 PROCEDURE — S0028 INJECTION, FAMOTIDINE, 20 MG: HCPCS | Performed by: OBSTETRICS & GYNECOLOGY

## 2017-10-21 PROCEDURE — 25000125 ZZHC RX 250: Performed by: PHYSICIAN ASSISTANT

## 2017-10-21 PROCEDURE — 12000032 ZZH R&B OB CRITICAL UMMC

## 2017-10-21 PROCEDURE — 25000132 ZZH RX MED GY IP 250 OP 250 PS 637: Performed by: OBSTETRICS & GYNECOLOGY

## 2017-10-21 PROCEDURE — 00000146 ZZHCL STATISTIC GLUCOSE BY METER IP

## 2017-10-21 PROCEDURE — 84156 ASSAY OF PROTEIN URINE: CPT | Performed by: OBSTETRICS & GYNECOLOGY

## 2017-10-21 PROCEDURE — 25000125 ZZHC RX 250: Performed by: OBSTETRICS & GYNECOLOGY

## 2017-10-21 PROCEDURE — 25000128 H RX IP 250 OP 636: Performed by: OBSTETRICS & GYNECOLOGY

## 2017-10-21 RX ORDER — BETAMETHASONE SODIUM PHOSPHATE AND BETAMETHASONE ACETATE 3; 3 MG/ML; MG/ML
12 INJECTION, SUSPENSION INTRA-ARTICULAR; INTRALESIONAL; INTRAMUSCULAR; SOFT TISSUE EVERY 24 HOURS
Status: COMPLETED | OUTPATIENT
Start: 2017-10-21 | End: 2017-10-22

## 2017-10-21 RX ORDER — SODIUM CHLORIDE, SODIUM LACTATE, POTASSIUM CHLORIDE, CALCIUM CHLORIDE 600; 310; 30; 20 MG/100ML; MG/100ML; MG/100ML; MG/100ML
INJECTION, SOLUTION INTRAVENOUS CONTINUOUS
Status: DISCONTINUED | OUTPATIENT
Start: 2017-10-21 | End: 2017-10-23

## 2017-10-21 RX ADMIN — SODIUM CHLORIDE, SODIUM LACTATE, POTASSIUM CHLORIDE, CALCIUM CHLORIDE, AND DEXTROSE MONOHYDRATE: 600; 310; 30; 20; 5 INJECTION, SOLUTION INTRAVENOUS at 05:14

## 2017-10-21 RX ADMIN — METOCLOPRAMIDE 10 MG: 5 INJECTION, SOLUTION INTRAMUSCULAR; INTRAVENOUS at 06:57

## 2017-10-21 RX ADMIN — Medication 0.3 MG: at 23:02

## 2017-10-21 RX ADMIN — FAMOTIDINE 20 MG: 10 INJECTION, SOLUTION INTRAVENOUS at 17:05

## 2017-10-21 RX ADMIN — LIDOCAINE HYDROCHLORIDE 5 ML: 20 SOLUTION ORAL; TOPICAL at 09:38

## 2017-10-21 RX ADMIN — Medication 0.3 MG: at 14:57

## 2017-10-21 RX ADMIN — SODIUM CHLORIDE, SODIUM LACTATE, POTASSIUM CHLORIDE, CALCIUM CHLORIDE, AND DEXTROSE MONOHYDRATE: 600; 310; 30; 20; 5 INJECTION, SOLUTION INTRAVENOUS at 14:51

## 2017-10-21 RX ADMIN — LIDOCAINE HYDROCHLORIDE 5 ML: 20 SOLUTION ORAL; TOPICAL at 17:07

## 2017-10-21 RX ADMIN — FAMOTIDINE 20 MG: 10 INJECTION, SOLUTION INTRAVENOUS at 05:30

## 2017-10-21 RX ADMIN — ALUMINUM HYDROXIDE, MAGNESIUM HYDROXIDE, AND DIMETHICONE 30 ML: 400; 400; 40 SUSPENSION ORAL at 09:12

## 2017-10-21 RX ADMIN — PANTOPRAZOLE SODIUM 40 MG: 40 INJECTION, POWDER, FOR SOLUTION INTRAVENOUS at 07:51

## 2017-10-21 RX ADMIN — ALUMINUM HYDROXIDE, MAGNESIUM HYDROXIDE, AND DIMETHICONE 30 ML: 400; 400; 40 SUSPENSION ORAL at 14:19

## 2017-10-21 RX ADMIN — Medication 0.3 MG: at 03:04

## 2017-10-21 RX ADMIN — HUMAN INSULIN 4 UNITS/HR: 100 INJECTION, SOLUTION SUBCUTANEOUS at 06:07

## 2017-10-21 RX ADMIN — METOCLOPRAMIDE 10 MG: 5 INJECTION, SOLUTION INTRAMUSCULAR; INTRAVENOUS at 13:07

## 2017-10-21 RX ADMIN — Medication 0.3 MG: at 19:00

## 2017-10-21 RX ADMIN — HUMAN INSULIN 3 UNITS/HR: 100 INJECTION, SOLUTION SUBCUTANEOUS at 09:31

## 2017-10-21 RX ADMIN — METOCLOPRAMIDE 10 MG: 5 INJECTION, SOLUTION INTRAMUSCULAR; INTRAVENOUS at 18:16

## 2017-10-21 RX ADMIN — HUMAN INSULIN 3 UNITS/HR: 100 INJECTION, SOLUTION SUBCUTANEOUS at 18:25

## 2017-10-21 RX ADMIN — LIDOCAINE HYDROCHLORIDE 5 ML: 20 SOLUTION ORAL; TOPICAL at 11:46

## 2017-10-21 RX ADMIN — Medication 0.3 MG: at 06:56

## 2017-10-21 RX ADMIN — PANTOPRAZOLE SODIUM 40 MG: 40 INJECTION, POWDER, FOR SOLUTION INTRAVENOUS at 21:00

## 2017-10-21 RX ADMIN — PROCHLORPERAZINE EDISYLATE 5 MG: 5 INJECTION INTRAMUSCULAR; INTRAVENOUS at 03:26

## 2017-10-21 RX ADMIN — HUMAN INSULIN 3.5 UNITS/HR: 100 INJECTION, SOLUTION SUBCUTANEOUS at 01:58

## 2017-10-21 RX ADMIN — LIDOCAINE HYDROCHLORIDE 5 ML: 20 SOLUTION ORAL; TOPICAL at 13:43

## 2017-10-21 RX ADMIN — Medication 0.3 MG: at 11:06

## 2017-10-21 RX ADMIN — BETAMETHASONE SODIUM PHOSPHATE AND BETAMETHASONE ACETATE 12 MG: 3; 3 INJECTION, SUSPENSION INTRA-ARTICULAR; INTRALESIONAL; INTRAMUSCULAR at 01:41

## 2017-10-21 RX ADMIN — METOCLOPRAMIDE 10 MG: 5 INJECTION, SOLUTION INTRAMUSCULAR; INTRAVENOUS at 00:51

## 2017-10-21 NOTE — PLAN OF CARE
Problem: Diabetes in Pregnancy (Adult,Obstetrics,Pediatric)  Goal: Signs and Symptoms of Listed Potential Problems Will be Absent, Minimized or Managed (Diabetes in Pregnancy)  Signs and symptoms of listed potential problems will be absent, minimized or managed by discharge/transition of care (reference Diabetes in Pregnancy (Adult,Obstetrics,Pediatric) CPG).   Pt continues to contract every 2-3 minutes. Plan is to give betamethasone and start insulin drip. Pt agreed to the betamethasone.  and pt started on insulin drip.

## 2017-10-21 NOTE — PLAN OF CARE
Pt rosy every 2-3 minutes, states she does not feel them. Pt unwilling to use the restroom.  Bolus given per verbal order by Dr. Olmos.  Dr. Olmos it to assess pt, pt refuses to have her cervix assessed. Fetal EFM applied. Baby category one, tolerating the contractions. Will continue to monitor.

## 2017-10-21 NOTE — PLAN OF CARE
Problem: Patient Care Overview  Goal: Individualization & Mutuality  Outcome: No Change  Stable vital signs, afebrile. Category 1 fetal tracing, denies ctrx. Administering dilaudid  every 4 hours, after medication wears off  pt starts moaning, rocking, and vomiting.   Pt requesting  Lidocaine ( viscous)  every two hours and maloox adminstered x2 this shifts. Adequate oral intake and output. Continues on IV insulin drip, BG WNL see flow sheet.

## 2017-10-21 NOTE — CONSULTS
Diabetes/Hyperglycemia Management Consult    Chief Complaint uncontrolled type 1 diabetes in pregnancy  Consult requested by: Dr. Denise Benton, attending: Dr. Michelle Lora  History of Present Illness Ms. Anne Gunter is a 28 yo woman at 31w3d of pregnancy, with a history of uncontrolled type 1 diabetes with multiple episodes of DKA in pregnancy, presumed gastroparesis, esophagitis with several recent admissions for recurrent abdominal pain with N/V, borderline personality disorder, and chronic opioid use in pregnancy, who was admitted on 10/20/17 with abdominal pain, N/V.  Anne had been following the gastroparesis diet at home, until 2 nights ago when she had pizza.  The next day her abdominal pain and N/V recurred and she could not tolerated po so she was admitted to the hospital.  Her PICC line was removed on 10/16 because the catheter had been partially pulled out.  When she was admitted yesterday her glucose was 102.  I spoke with Dr. Benton and the plan was to try dosing SC insulin injections given glucose was near target range and PICC would not be placed until later last night.  Pt was NPO and D5LR was starting at 100ml/h once PICC was placed.  We dosed detemir 10 units BID and correction aspart 1unit/50.  Anne was then have contractions q2-3minutes last evening, so decision was made to dose betamethasone (initially was going to start on 10/23, but got first dose on 10/21 at 0100 instead).  Contractions have now stopped.  Even before starting the betamethasone pt's glucose was up to 208 last evening (although had been steady in the low 100s for hours prior to this-- unclear what times she was taking detemir at home but dose from previous night may have been running out).  IV insulin was started around 0200.  Glucose is now down to the 90s this morning with IV Insulin rate at 1.5 units/h.  She remains NPO.  She will get second dose of betamethasone on 10/22 at 0100.    When Anne discharged a week ago the  "insulin plan was as follows: detemir 12 units BID, aspart 6 units TID with meals.  Anne reports today that she was taking these doses consistently (but did not clarify what time she was taking detemir doses).  When asked what her glucoses were running she answered \"all good\".  I asked for specifics but she moaned with increased pain.  I reviewed the target ranges for glucoses fasting and post prandial and she said her home glucoses were all near those target numbers.    Despite moaning with pain during my visit, Anne reported the pain was mostly gone now this morning and currently she is having no nausea (after getting meds for both).        Recent Labs  Lab 10/21/17  1010 10/21/17  0913 10/21/17  0805 10/21/17  0708 10/21/17  0609 10/21/17  0509  10/20/17  1900   GLC  --   --   --   --   --   --   --  100*   BGM 96 118* 127* 130* 143* 162*  < >  --    < > = values in this interval not displayed.      Diabetes Type:   Type 1 Diabetes  Diabetes Duration: 19 years   Usual Diabetes Regimen: prepregnancy: glargine 30 units HS, aspart 10 units with bfast, 10 units with lunch.   Most recently discharged 10/13/17 on:  detemir 12 units BID, aspart 6 units TID with meals.  Ability to [place] Prescribed Regimen: uncertain- Anne has never been able to give specific details on insulin administration and glucoses at home.  Diabetes Control:   Lab Results   Component Value Date    A1C 7.4 10/11/2017    A1C 8.4 09/10/2017    A1C 9.2 06/01/2016    A1C 9.8 05/23/2016    A1C 7.4 04/23/2016     Diabetes Complications: peripheral neuropathy, presumed gastroparesis  History of DKA: multiple past episodes during this pregnancy and first pregnancy  Able to Detect Hypoglycemia: yes  Usual Diabetes Care Provider: Dr. Colunga- saw during first pregnancy, had appointment on Oct 12 but was admitted to hospital then, uncertain if she has rescheduled appointment yet (was told to see Dr. Colunga within 1 week of discharge on " 10/13).  Factors Impacting Glucose Control: pregnancy, high dose steroids (has received 1/2 doses of betamethasone 12mg), NPO, dextrose containing IV fluids.      Review of Systems  10 point ROS completed with pertinent positives and negatives noted in the HPI    Past medical, family and social histories are reviewed and updated.    Past Medical History  Past Medical History:   Diagnosis Date     Diabetes (H)      Microalbuminuria 6/21/2016     Type I diabetes mellitus, uncontrolled (H) 6/21/2016       Family History  Family History   Problem Relation Age of Onset     DIABETES Maternal Grandmother      CEREBROVASCULAR DISEASE Paternal Grandmother      Coronary Artery Disease Paternal Grandmother      Hypertension No family hx of      Hyperlipidemia No family hx of      Breast Cancer No family hx of      Colon Cancer No family hx of      Prostate Cancer No family hx of      Other Cancer No family hx of      Depression No family hx of      Anxiety Disorder No family hx of      MENTAL ILLNESS No family hx of      Substance Abuse No family hx of      Anesthesia Reaction No family hx of      Asthma No family hx of      OSTEOPOROSIS No family hx of      Genetic Disorder No family hx of      Thyroid Disease No family hx of      Obesity No family hx of        Social History  Social History     Social History     Marital status:      Spouse name: N/A     Number of children: N/A     Years of education: N/A     Social History Main Topics     Smoking status: Never Smoker     Smokeless tobacco: Never Used     Alcohol use No     Drug use: No     Sexual activity: Yes     Partners: Male     Other Topics Concern     None     Social History Narrative     Anne's  and son are in [place], she is living with her parents and sister while in the [place]    Physical Exam  /88  Pulse 98  Temp 99.1  F (37.3  C) (Oral)  Resp 18  SpO2 96%    General:  Tired appearing, resting in bed, in no distress.   HEENT: NC/AT, PER and  anicteric, non-injected, oral mucous membranes moist.   Lungs: unlabored respiration, no cough  Skin: warm and dry, no obvious lesions  MSK:  fluid movement of all extremities  Lymp: trace bilateral LE edema   Mental status:  alert, oriented x3, communicating clearly  Psych:  calm, even mood    Laboratory  Recent Labs   Lab Test  10/20/17   1900  10/10/17   0926   NA  138  132*   POTASSIUM  3.8  3.4   CHLORIDE  103  101   CO2  24  23   ANIONGAP  11  8   GLC  100*  149*   BUN  4*  4*   CR  0.42*  0.42*   LILLIAM  8.9  8.6     CBC RESULTS:   Recent Labs   Lab Test  10/20/17   1900   WBC  8.5   RBC  3.76*   HGB  9.7*   HCT  30.5*   MCV  81   MCH  25.8*   MCHC  31.8   RDW  15.2*   PLT  256       Liver Function Studies -   Recent Labs   Lab Test  10/20/17   1900   PROTTOTAL  7.7   ALBUMIN  2.6*   BILITOTAL  0.4   ALKPHOS  111   AST  12   ALT  13       Active Medications  Current Facility-Administered Medications   Medication     betamethasone acet & sod phos (CELESTONE) injection 12 mg     insulin 1 unit/mL in saline (novoLIN-Regular) drip - High Intensity Infusion     lidocaine (viscous) (XYLOCAINE) 2 % solution 5 mL     alum & mag hydroxide-simethicone (MYLANTA ES/MAALOX  ES) suspension 30 mL     docusate sodium (COLACE) capsule 100 mg     HYDROmorphone (HIGH CONC) (DILAUDID) oral solution 4 mg     prenatal multivitamin plus iron per tablet 1 tablet     folic acid (FOLVITE) tablet 1 mg     dextrose 5% in lactated ringers infusion     No Tdap Needed - Assessment: Patient does not need Tdap vaccine     famotidine (PEPCID) injection 20 mg     metoclopramide (REGLAN) injection 10 mg     ondansetron (ZOFRAN) injection 4 mg     HYDROmorphone (DILAUDID) injection 0.1-0.3 mg     naloxone (NARCAN) injection 0.4 mg     pantoprazole (PROTONIX) 40 mg IV push injection     phosphorus tablet 250 mg (K PHOS NEUTRAL) per tablet 500 mg     glucose 40 % gel 15-30 g    Or     dextrose 50 % injection 25-50 mL    Or     glucagon injection 1 mg      heparin lock flush 10 UNIT/ML injection 3 mL     prochlorperazine (COMPAZINE) injection 5 mg     promethazine (PHENERGAN) Suppository 12.5 mg     No current outpatient prescriptions on file.       Current Diet    Active Diet Order      NPO for Medical/Clinical Reasons Except for: Meds      Assessment  Ms. Anne Gunter is a 28 yo woman at 31w3d of pregnancy, with a history of uncontrolled type 1 diabetes with multiple episodes of DKA in pregnancy, presumed gastroparesis, esophagitis with several recent admissions for recurrent abdominal pain with N/V, borderline personality disorder, and chronic opioid use in pregnancy, who was admitted on 10/20/17 with abdominal pain, N/V.  NPO currently with D5LR running at 100ml/h, s/p first dose of betamethasone.  Required IV insulin infusion overnight due to glucoses rising to 200s last evening after getting detemir 10 units in the evening (there may have been gap in detemir doses b/c do not know timing of detemir at home, dextrose fluids and then later betamethasone all contributed to higher glucoses).      Plan  -Continue high intensity OB IV insulin infusion at this time.  She will need to stay on IV insulin at least until Monday morning (because of betamethasone).  If she is tolerating po intake at that time we will evaluate for transition to SC insulin.  -currently we do not have meal aspart ordered b/c pt is NPO.  If diet is started would need to start meal aspart.  Her usual dose is 1unit/6g CHO but with steroids on board would start with 1unit/5g CHO.    We will continue to follow.    Carlene Jones PA-C [phone]    Diabetes Management Team job code: [id number]  ADDENDUM: Note that primary team is planning for repeat Csection on 10/25 at 0830-- if pt is transitioned off IV Insulin on Monday we would likely want to restart this Tuesday evening in preparation for Csection the next morning. Alternatively pt could stay on IV insulin until after delivery.

## 2017-10-21 NOTE — PROGRESS NOTES
Patient continues to decline exam, declines getting up to restroom to empty her bladder as instructed. Given ongoing contractions,  status, plan BMZ course at this time. Given type I DM, hyperglycemia, worsening of blood sugars expected in setting of steroid administration, plan to start insulin drip at this time.    Plan of care discussed with MFM Attending MD Honey Jefferson MD  OBGYN PGY-3  [phone]  1:07 AM 10/21/2017

## 2017-10-21 NOTE — PROGRESS NOTES
Called to evaluate patient as tocometry is notable for contractions. Notably, she is rosy every 2-3 minutes. She denies cramping, abdominal tightening, contraction pain, increased pelvic pressure. She reports ongoing persistent abdominal pain that is the usual for her. She is s/p 1 L LR bolus and has been on maintenance fluids since arrival. She is not tolerating anything by mouth yet. Discussed with patient that we would recommend speculum exam to evaluate for  labor. She adamantly declines this intervention. Asked patient if it would be OK to perform digital cervical exam, and again she is unwilling to undergo this exam. We discussed the risks of undiagnosed  labor as she has a history of prior  section and her fetus was breech on most recent US on 10/19. Plan additional liter bolus now. Plan continuous fetal monitoring which patient had been declining before this time. Plan to reassess patient in 45 minutes to 1 hour to see if she would be able to tolerate exam.    Honey Olmos MD  OBGYN PGY-3  [phone]  11:37 PM 10/20/2017

## 2017-10-21 NOTE — PLAN OF CARE
Problem: Pain, Acute (Adult)  Goal: Identify Related Risk Factors and Signs and Symptoms  Related risk factors and signs and symptoms are identified upon initiation of Human Response Clinical Practice Guideline (CPG).   Outcome: No Change  Data:  Contraction pattern: increased.  FHT's: Reactive. Pt ordered a BPP in AM. Maternal vital signs stable.  Intervention:  Continue uterine/fetal assessment-Pt declining to have fetal heart tracing done after   due to abd discomfort, but allowed us to keep the TOCO on.  Activity level:Up with assist. But has only been out of bed to go for her PICC insertion.   Plan:  Continue expectant management.  Notify provider of signs of maternal/fetal compromise.  Provide support for parents for potential early delivery of compromised  infant(s).   Continuing to monitor her pain and medicate per order parameters. Also continuing to monitor Blood Sugars and pt medicated with both long and short acting insulin, but did not need insulin drip started. Vomiting decreased throughout the evening. Pt's parent here until pt stable, but remain supportive.

## 2017-10-21 NOTE — PROGRESS NOTES
[facility]  Maternal-Fetal Medicine Progress Note    Anne Gunter      MRN: [id number]   Age: 27 year old YOB: 1990   Date of Admission: 10/20/2017    Subjective:  Anne began moaning and writhing upon the team entering her room today. She is complaining of midline epigastric pain. She repeatedly asked for more pain medications. She was again informed that her pain medication dose will not be changed and she will only be receiving it every 4 hours.  She was offered Maalox and viscous lidocaine as these have worked well for her in the past, which she initially refused, but accepted after we left her room. Last night she was having contractions on the monitor every 2-3 minutes. She refused a sterile speculum exam and a digital exam to assess for labor. Due to her refusal BMZ was given in case of an eminent delivery. This morning she denies any further contractions.  Upon passing her room 10 minutes after encounter she was no longer writhing or moaning.    Objective:  Vitals:    10/20/17 2100 10/20/17 2157 10/20/17 2330 10/21/17 0759   BP:   142/78 137/88   Pulse:       Resp:   16 18   Temp:   98.1  F (36.7  C) 99.1  F (37.3  C)   TempSrc:   Oral Oral   SpO2: 97% 98% 96%      Gen: Groaning in bed upon the staff entering the room  Pulm: Breathing comfortably on room air  Abd: Soft, gravid, non-tender  Ext: Some bilateral feet/ankle edema     FHT: BL 120bpm, moderate variability, present accels, no decels  [place]: contractions every 2-3 minutes over night  Refused pelvic exam    Assessment/Plan:  Anne Gunter is a 27 year old , at 31w3d by stated JUANJO c/w 25w5d US, who presents with severe epigastric pain, nausea and vomiting. She has been admitted multiple times during her pregnancy for these same symptoms.  Her pregnancy is complicated by poorly controlled T1DM with multiple admissions for DKA, severe gastroparesis, narcotic seeking/dependency, malnutrition, mood  disorder, borderline personality disorder, history of PLTCS x1 at 32 weeks for fetal indications.      1. Poorly controlled T1DM, recurrent DKA:   - Multiple recent admissions for poorly controlled T1DM and DKA.  Last HA1C 7.4%. BG  today, low suspicion for DKA at this time.   - Electrolytes wnl (10/20) and serum ketones 1.2 (10/20).   - Endocrinology consult ordered.   - Insulin drip started due to BMZ administration over night  - Q1hr insulin checks.    - Plan for delivery at 32 weeks due to medical co-morbidities.  RLTCS, BTL scheduled on 10/25 at 0830 with Dr. Rodríguez. FTP signed 10/10/17, s/p C/S and BTL consent, will likely be inpatient until delivery due to need for glucose control following betamethasone administration.     2. Gastroparesis, recurrent abdominal pain, nausea and vomiting:   - NPO until nausea, vomiting and pain are controlled.  When controlled, will plan to advance diet slowly.   - Scheduled IV pepcid, protonix and reglan, PRN zofran.  - oral liquid lidocaine and maalox for pain relief between dilaudid doses  - PICC line in place  - S/p GI on multiple admission.   - Bowel regimen to resume when tolerating PO.      3. Chronic opioid use, physical dependence:   - Limit IV dilaudid to 0.3 mg every 4 hours with slow pushes as part of pain contract with Dr. Verde. Will change to 4 mg liquid dilaudid Q4 hours when acute pain is controlled.   - PRN pain consult postpartum given chronic opioids.      4. Likely preeclampsia without severe features versus chronic underlying nephrotic kidney disease:   - BPs poorly controlled with pain.  Previously has had mild range BPs with pain.  Will treat sustained severe range BPs.   - HELLP labs, UPC 0.50 (10/20). Previously normal with UPC 0.6.   - Close BP monitoring.  - Will need post-partum renal evaluation for proteinuria.     5. Depression, adjustment disorder, borderline personality disorder:   - S/p  psych/psychiatry consults on previous  admission. Considering starting zyprexa and zoloft postpartum.      6. FWB:   - Category 1 FHT, reactive NST.  Continuous monitoring until stable then transition to TID monitoring.   - Plan for BMZ course next Monday with PRN insulin gtt prior to  delivery scheduled at 32 weeks.   - IV magnesium for neuroprotection if delivery is imminent prior to 32 weeks.   - Twice weekly BPPs (Monday).       7. PNC:             - A pos, Janna negative, Rubella immune, HIV negative, RPR negative, HepB sAg negative, GBS positive previously.   GBS swab, will recollect today if she allows.  - Home phosphorus, PNV, folic acid ordered.   - Desires permanent sterilization with BTL at time of C/S.  Federal tubal papers and consent signed.   - Social work following    I, Judy Carmichael, acted as a scribe for Dr. Lora.   Judy Carmichael, MS4    Attending Attestation:    The documentation recorded by the scribe accurately reflects the services I personally performed and the decisions made by me.      I spent 15 minutes total face-to-face with this inpatient, and >50% of the time was spent in counseling and/or coordination of care.    Michelle Lora, DO  Maternal Fetal Medicine

## 2017-10-21 NOTE — PLAN OF CARE
Problem: Diabetes in Pregnancy (Adult,Obstetrics,Pediatric)  Goal: Signs and Symptoms of Listed Potential Problems Will be Absent, Minimized or Managed (Diabetes in Pregnancy)  Signs and symptoms of listed potential problems will be absent, minimized or managed by discharge/transition of care (reference Diabetes in Pregnancy (Adult,Obstetrics,Pediatric) CPG).   Anne complains of upper abdominal pain throughtout the night. Multiply emesis' during the night of green to yellow bile. Pt has received zofran, reglan, compazine, and pepcid, with little relief of nausea. Pt states the only that helps is the dilaudid, which she gets every 4 hours.

## 2017-10-21 NOTE — PROGRESS NOTES
STAR Gunter arrived in triage this afternoon c/o severe abd pain (epigastric), nausea and vomiting. Pt is a  at 31.2 weeks. Pt states she has been unable to eat at all today and has been vomiting since this morning. Pt is a known Type I DM. Pt denies feeling any cramping, contractions, LOF, or vaginal bleeding today. She also denies any HA, blurred vision, RUQ pain.  She reports fetal movement. Pt is lying on the stretcher moaning constantly. Dr Benton and Dr Lora notified of pt's arrival. VSS. FHT's 120's. No contractions noted.

## 2017-10-21 NOTE — PLAN OF CARE
Problem: Diabetes in Pregnancy (Adult,Obstetrics,Pediatric)  Goal: Signs and Symptoms of Listed Potential Problems Will be Absent, Minimized or Managed (Diabetes in Pregnancy)  Signs and symptoms of listed potential problems will be absent, minimized or managed by discharge/transition of care (reference Diabetes in Pregnancy (Adult,Obstetrics,Pediatric) CPG).   . Insulin gtt started at 3.5 units. An hour afterwards, . Algorithm 2 started due to not decreasing by 60mg/dl in the hour. Next . Algorithm 3 started at 8.5 units. BSG continues to decrease and is currenty 130 in Algorithm 3.

## 2017-10-22 LAB
GLUCOSE BLDC GLUCOMTR-MCNC: 102 MG/DL (ref 70–99)
GLUCOSE BLDC GLUCOMTR-MCNC: 102 MG/DL (ref 70–99)
GLUCOSE BLDC GLUCOMTR-MCNC: 103 MG/DL (ref 70–99)
GLUCOSE BLDC GLUCOMTR-MCNC: 105 MG/DL (ref 70–99)
GLUCOSE BLDC GLUCOMTR-MCNC: 106 MG/DL (ref 70–99)
GLUCOSE BLDC GLUCOMTR-MCNC: 107 MG/DL (ref 70–99)
GLUCOSE BLDC GLUCOMTR-MCNC: 108 MG/DL (ref 70–99)
GLUCOSE BLDC GLUCOMTR-MCNC: 108 MG/DL (ref 70–99)
GLUCOSE BLDC GLUCOMTR-MCNC: 109 MG/DL (ref 70–99)
GLUCOSE BLDC GLUCOMTR-MCNC: 109 MG/DL (ref 70–99)
GLUCOSE BLDC GLUCOMTR-MCNC: 110 MG/DL (ref 70–99)
GLUCOSE BLDC GLUCOMTR-MCNC: 113 MG/DL (ref 70–99)
GLUCOSE BLDC GLUCOMTR-MCNC: 115 MG/DL (ref 70–99)
GLUCOSE BLDC GLUCOMTR-MCNC: 118 MG/DL (ref 70–99)
GLUCOSE BLDC GLUCOMTR-MCNC: 121 MG/DL (ref 70–99)
GLUCOSE BLDC GLUCOMTR-MCNC: 123 MG/DL (ref 70–99)
GLUCOSE BLDC GLUCOMTR-MCNC: 123 MG/DL (ref 70–99)
GLUCOSE BLDC GLUCOMTR-MCNC: 124 MG/DL (ref 70–99)
GLUCOSE BLDC GLUCOMTR-MCNC: 125 MG/DL (ref 70–99)
GLUCOSE BLDC GLUCOMTR-MCNC: 95 MG/DL (ref 70–99)
GLUCOSE BLDC GLUCOMTR-MCNC: 97 MG/DL (ref 70–99)
GLUCOSE BLDC GLUCOMTR-MCNC: 98 MG/DL (ref 70–99)

## 2017-10-22 PROCEDURE — 00000146 ZZHCL STATISTIC GLUCOSE BY METER IP

## 2017-10-22 PROCEDURE — 25000125 ZZHC RX 250: Performed by: OBSTETRICS & GYNECOLOGY

## 2017-10-22 PROCEDURE — 25000132 ZZH RX MED GY IP 250 OP 250 PS 637: Performed by: OBSTETRICS & GYNECOLOGY

## 2017-10-22 PROCEDURE — 25000128 H RX IP 250 OP 636: Performed by: OBSTETRICS & GYNECOLOGY

## 2017-10-22 PROCEDURE — 25000125 ZZHC RX 250: Performed by: PHYSICIAN ASSISTANT

## 2017-10-22 PROCEDURE — 12000032 ZZH R&B OB CRITICAL UMMC

## 2017-10-22 PROCEDURE — S0028 INJECTION, FAMOTIDINE, 20 MG: HCPCS | Performed by: OBSTETRICS & GYNECOLOGY

## 2017-10-22 RX ADMIN — LIDOCAINE HYDROCHLORIDE 5 ML: 20 SOLUTION ORAL; TOPICAL at 23:56

## 2017-10-22 RX ADMIN — ALUMINUM HYDROXIDE, MAGNESIUM HYDROXIDE, AND DIMETHICONE 30 ML: 400; 400; 40 SUSPENSION ORAL at 16:15

## 2017-10-22 RX ADMIN — METOCLOPRAMIDE 10 MG: 5 INJECTION, SOLUTION INTRAMUSCULAR; INTRAVENOUS at 18:57

## 2017-10-22 RX ADMIN — SODIUM CHLORIDE, SODIUM LACTATE, POTASSIUM CHLORIDE, CALCIUM CHLORIDE, AND DEXTROSE MONOHYDRATE: 600; 310; 30; 20; 5 INJECTION, SOLUTION INTRAVENOUS at 00:06

## 2017-10-22 RX ADMIN — LIDOCAINE HYDROCHLORIDE 5 ML: 20 SOLUTION ORAL; TOPICAL at 11:17

## 2017-10-22 RX ADMIN — Medication 0.3 MG: at 02:55

## 2017-10-22 RX ADMIN — ONDANSETRON 4 MG: 2 INJECTION INTRAMUSCULAR; INTRAVENOUS at 02:53

## 2017-10-22 RX ADMIN — LIDOCAINE HYDROCHLORIDE 5 ML: 20 SOLUTION ORAL; TOPICAL at 01:17

## 2017-10-22 RX ADMIN — ALUMINUM HYDROXIDE, MAGNESIUM HYDROXIDE, AND DIMETHICONE 30 ML: 400; 400; 40 SUSPENSION ORAL at 23:56

## 2017-10-22 RX ADMIN — Medication 0.2 MG: at 15:00

## 2017-10-22 RX ADMIN — PROCHLORPERAZINE EDISYLATE 5 MG: 5 INJECTION INTRAMUSCULAR; INTRAVENOUS at 05:28

## 2017-10-22 RX ADMIN — ALUMINUM HYDROXIDE, MAGNESIUM HYDROXIDE, AND DIMETHICONE 30 ML: 400; 400; 40 SUSPENSION ORAL at 01:17

## 2017-10-22 RX ADMIN — LIDOCAINE HYDROCHLORIDE 5 ML: 20 SOLUTION ORAL; TOPICAL at 13:03

## 2017-10-22 RX ADMIN — LIDOCAINE HYDROCHLORIDE 5 ML: 20 SOLUTION ORAL; TOPICAL at 04:19

## 2017-10-22 RX ADMIN — Medication 0.2 MG: at 10:59

## 2017-10-22 RX ADMIN — ALUMINUM HYDROXIDE, MAGNESIUM HYDROXIDE, AND DIMETHICONE 30 ML: 400; 400; 40 SUSPENSION ORAL at 21:31

## 2017-10-22 RX ADMIN — FAMOTIDINE 20 MG: 10 INJECTION, SOLUTION INTRAVENOUS at 05:23

## 2017-10-22 RX ADMIN — PANTOPRAZOLE SODIUM 40 MG: 40 INJECTION, POWDER, FOR SOLUTION INTRAVENOUS at 08:30

## 2017-10-22 RX ADMIN — METOCLOPRAMIDE 10 MG: 5 INJECTION, SOLUTION INTRAMUSCULAR; INTRAVENOUS at 13:03

## 2017-10-22 RX ADMIN — HUMAN INSULIN 3 UNITS/HR: 100 INJECTION, SOLUTION SUBCUTANEOUS at 08:38

## 2017-10-22 RX ADMIN — ALUMINUM HYDROXIDE, MAGNESIUM HYDROXIDE, AND DIMETHICONE 30 ML: 400; 400; 40 SUSPENSION ORAL at 04:19

## 2017-10-22 RX ADMIN — PANTOPRAZOLE SODIUM 40 MG: 40 INJECTION, POWDER, FOR SOLUTION INTRAVENOUS at 20:21

## 2017-10-22 RX ADMIN — Medication 0.3 MG: at 18:57

## 2017-10-22 RX ADMIN — SODIUM CHLORIDE, SODIUM LACTATE, POTASSIUM CHLORIDE, CALCIUM CHLORIDE, AND DEXTROSE MONOHYDRATE: 600; 310; 30; 20; 5 INJECTION, SOLUTION INTRAVENOUS at 20:22

## 2017-10-22 RX ADMIN — BETAMETHASONE SODIUM PHOSPHATE AND BETAMETHASONE ACETATE 12 MG: 3; 3 INJECTION, SUSPENSION INTRA-ARTICULAR; INTRALESIONAL; INTRAMUSCULAR at 02:59

## 2017-10-22 RX ADMIN — METOCLOPRAMIDE 10 MG: 5 INJECTION, SOLUTION INTRAMUSCULAR; INTRAVENOUS at 07:00

## 2017-10-22 RX ADMIN — Medication 0.3 MG: at 07:03

## 2017-10-22 RX ADMIN — METOCLOPRAMIDE 10 MG: 5 INJECTION, SOLUTION INTRAMUSCULAR; INTRAVENOUS at 01:05

## 2017-10-22 RX ADMIN — SODIUM CHLORIDE, SODIUM LACTATE, POTASSIUM CHLORIDE, CALCIUM CHLORIDE, AND DEXTROSE MONOHYDRATE: 600; 310; 30; 20; 5 INJECTION, SOLUTION INTRAVENOUS at 08:40

## 2017-10-22 RX ADMIN — LIDOCAINE HYDROCHLORIDE 5 ML: 20 SOLUTION ORAL; TOPICAL at 16:15

## 2017-10-22 RX ADMIN — ALUMINUM HYDROXIDE, MAGNESIUM HYDROXIDE, AND DIMETHICONE 30 ML: 400; 400; 40 SUSPENSION ORAL at 11:17

## 2017-10-22 RX ADMIN — HUMAN INSULIN 3 UNITS/HR: 100 INJECTION, SOLUTION SUBCUTANEOUS at 22:28

## 2017-10-22 RX ADMIN — LIDOCAINE HYDROCHLORIDE 5 ML: 20 SOLUTION ORAL; TOPICAL at 21:31

## 2017-10-22 RX ADMIN — Medication 0.2 MG: at 23:00

## 2017-10-22 RX ADMIN — FAMOTIDINE 20 MG: 10 INJECTION, SOLUTION INTRAVENOUS at 17:03

## 2017-10-22 NOTE — PLAN OF CARE
Problem: Patient Care Overview  Goal: Individualization & Mutuality  Outcome: No Change  [place] has been refusing to be monitored this shift , multiple attempts and different times offered.  Continue to have upper abdominal pain, Dilaudid is being administered every 4 hrs. BG WNL, continues on insulin drip. Continue with plan of care.

## 2017-10-22 NOTE — PROGRESS NOTES
[facility]  Maternal-Fetal Medicine Progress Note     Anne Gunter        MRN: [id number]   Age: 27 year old YOB: 1990   Date of Admission: 10/20/2017     Subjective:  Anne was resting in bed upon our arrival this morning. Her mother is at bedside and sleeping. Anne had no complaints this morning. We reaffirmed that she will only be getting IV dilaudid every 4 ours and that she can use liquid lidocaine in between. She states she had no contractions, loss of fluid or bleeding over night. She reports good fetal movement. She had no questions this morning.      Objective:  Vitals:    10/21/17 1700 10/21/17 2136 10/22/17 0000 10/22/17 0300   BP: 150/77 129/77 90/50 120/60   Pulse: 83      Resp:    18   Temp: 98.1  F (36.7  C) 98.4  F (36.9  C) 98.4  F (36.9  C) 98.2  F (36.8  C)   TempSrc: Oral Oral Oral Oral   SpO2:         Gen:                Groaning in bed upon the staff entering the room  Pulm:              Breathing comfortably on room air  Abd:                Soft, gravid, non-tender  Ext:                 Some bilateral feet/ankle edema      FHT:               BL  120bpm, moderate variability, present accels, no decels overnight.   Toast:              0 contractions over night, quiet        Assessment/Plan:  Anne Gunter is a 27 year old , at 31w4d by stated JUANJO c/w 25w5d US, who presents with severe epigastric pain, nausea and vomiting. She has been admitted multiple times during her pregnancy for these same symptoms.  Her pregnancy is complicated by poorly controlled T1DM with multiple admissions for DKA, severe gastroparesis, narcotic seeking/dependency, malnutrition, mood disorder, borderline personality disorder, history of PLTCS x1 at 32 weeks for fetal indications.       1. Poorly controlled T1DM, recurrent DKA:   - Multiple recent admissions for poorly controlled T1DM and DKA.  Last HA1C 7.4%. BG  today, low suspicion for DKA at this time.   -  Electrolytes wnl (10/20) and serum ketones 1.2 (10/20).   - Endocrinology consult ordered.   - Insulin drip started due to BMZ administration over night  - Q1hr insulin checks.    - Plan for delivery at 32 weeks due to medical co-morbidities.  RLTCS, BTL scheduled on 10/25 at 0830 with Dr. Rodríguez. FTP signed 10/10/17, s/p C/S and BTL consent, will likely be inpatient until delivery due to need for glucose control following betamethasone administration.      2. Gastroparesis, recurrent abdominal pain, nausea and vomiting:   - NPO until nausea, vomiting and pain are controlled.  When controlled, will plan to advance diet slowly.   - Scheduled IV pepcid, protonix and reglan, PRN zofran.  - oral liquid lidocaine and maalox for pain relief between dilaudid doses  - PICC line in place  - S/p GI on multiple admission.   - Bowel regimen to resume when tolerating PO.       3. Chronic opioid use, physical dependence:   - Limit IV dilaudid to 0.3 mg every 4 hours with slow pushes as part of pain contract with Dr. Verde. Will change to 4 mg liquid dilaudid Q4 hours when acute pain is controlled.   - PRN pain consult postpartum given chronic opioids.       4. Likely preeclampsia without severe features versus chronic underlying nephrotic kidney disease:   - BPs poorly controlled with pain.  Previously has had mild range BPs with pain.  Will treat sustained severe range BPs.   - HELLP labs, UPC 0.50 (10/20). Previously normal with UPC 0.6.   - Close BP monitoring.  - Will need post-partum renal evaluation for proteinuria.      5. Depression, adjustment disorder, borderline personality disorder:   - S/p  psych/psychiatry consults on previous admission. Considering starting zyprexa and zoloft postpartum.       6. FWB:   - Category 1 FHT, reactive NST.  Continuous monitoring until stable then transition to TID monitoring.   - Plan for BMZ course next Monday with PRN insulin gtt prior to  delivery scheduled at 32  weeks.   - IV magnesium for neuroprotection if delivery is imminent prior to 32 weeks.   - Twice weekly BPPs (Monday).       7. PNC:             - A pos, Janna negative, Rubella immune, HIV negative, RPR negative, HepB sAg negative, GBS positive previously.   GBS swab, will recollect today if she allows.  - Home phosphorus, PNV, folic acid ordered.   - Desires permanent sterilization with BTL at time of C/S.  Federal tubal papers and consent signed.   - Social work following     I, Judy Carmichael, acted as a scribe for Dr. Lora.   Judy Carmichael, MS4      Attending Attestation:    The documentation recorded by the scribe accurately reflects the services I personally performed and the decisions made by me.      I spent 15 minutes total face-to-face with this inpatient, and >50% of the time was spent in counseling and/or coordination of care.    Michelle Lora, DO  Maternal Fetal Medicine

## 2017-10-22 NOTE — PLAN OF CARE
"Problem: Pain, Acute (Adult)  Goal: Identify Related Risk Factors and Signs and Symptoms  Related risk factors and signs and symptoms are identified upon initiation of Human Response Clinical Practice Guideline (CPG).   Anne is moaning in bed, states she needs to see the doctor \"now\". Anne's mother is with her, tearful, requesting for the doctor too.  Offered to give Anne Mylanta and lidocaine, which she refused. Her mother had a discussion with her and Anne opted to take it.  Discussed with Anne that the doctor will be in as soon as she can after she started to request the doctor again. After a few minutes, Anne laid in bed and has fallen asleep.        "

## 2017-10-22 NOTE — PLAN OF CARE
Problem: Pain, Acute (Adult)  Goal: Acceptable Pain Control/Comfort Level  Patient will demonstrate the desired outcomes by discharge/transition of care.   Outcome: No Change  Anne's mother approached the nurse's desk this evening with great concern regarding her daughter's pain control. Dr Disla present for this family discussion using  IPad. Plan of Diluadid .3 IV q4hrs with po viscous lidocaine q2hrs explained to mother. Anne states she continues to have too much nausea and pain. This, in turn, upsets her mother. Explanation given through  that pt was receiving four different meds already to combat her nausea and GI pain. Anne and mother agreed to q4hr Diluadid plan, although Anne would like dose given early. Continue with q4hr Diludid plan for now.

## 2017-10-22 NOTE — PROGRESS NOTES
CLINICAL NUTRITION SERVICES - ASSESSMENT NOTE     Nutrition Prescription    RECOMMENDATIONS FOR MDs/PROVIDERS TO ORDER:  Per team discretion advance diet > NPO within 24-48 hrs. Recommend diet advancement as tolerated to low-fiber diet.     Malnutrition Status:    Unable to determine due to incomplete nutrition status validation.      Recommendations already ordered by Registered Dietitian (RD):  -None today    Future/Additional Recommendations:  1. When able to advance diet > NPO/Clear Liquid, order Glucerna TID between meals @ 10am, 2pm, HS snack.      2. When able to advance diet > full liquids, recommend ordering calorie counts to assess adequacy of po intake and need for re-initiation of nutrition support.      3. If pt unable to demonstrate ability to consume adequate po intake once diet advanced per calorie counts and/or weight loss occurs, recommend replacement of feeding tube and reinitiating of tube feeds pending acceptable lytes and if pt in agreement. Pending acceptable lytes, would recommend resuming tube feeds of TwoCal HN @ 50 mL/hr, continuous. Would recommend initiate feeds at 10 mL/hr and increase 10 mL/hr q 8 hrs pending tolerance/lytes. Pending lytes, tolerance, and insulin regimen, may transition to cycled feeds of TwoCal HN @ 100 mL/hr x 12 hrs. If feeds resumed, would recommend free water flushes of 30 mL q 4 hrs for patency and adjust PRN pending oral intake of fluid.   4 Obtain current weight check (currently has weights ordered for qod, but none since admission).   5. Continue to review diet education for gastroparesis/diabetes as appropriate prior to discharge or consider outpatient referral.     REASON FOR ASSESSMENT  Anne Gunter is a/an 27 year old female assessed by the dietitian for Admission Nutrition Risk Screen for new/uncontrolled diabetes    NUTRITION HISTORY  Pt with a hx of  at 31w4d pregnancy admitted with abdominal pain, N/V.  Per chart review, pt reported eating  pizza the night before admission and greasy food the previous night.       - Per chart and previous RD notes, pt with hx of abdominal pain likely r/t diabetic gastroparesis, which is worsened by hyperglycemia as well as non-adherence to dietary modifications. Pt also with poorly controlled type 1 DM. Recent admission 9/10-9/13 for DKA, pt left 9/13 and admitted again 9/18-9/23 for abdominal pain, BG management and left AMA for unknown reasons.  Again admitted from 9/24-9/29 and she D/C'ed AMA again. ND tube was placed on 9/27 and reached  TF goal of 50 mL/hr by 9/29, but turned off at D/C.  Per RD nutrition assessment (10/10/17), pt readmitted 9/30-10/8 for abdominal pain, nausea, vomiting, and restarted on tube feeds 2/2 poor po/tolerance and weight loss. She reached TF goal of 50 mL/hr, continuous of TwoCal HN (10/4) then cycled tube feeds to TwoCal HN @ 100 mL/hr x 12 hrs, however, NJ-tube became clogged and pt refused replacement of feeding tube and tube feeds were stopped on 10/7. Pt was given education on gastroparesis diet (10/4) and able to verbalize foods low in fiber and appropriate for diabetes management. Per discussion with endocrine during past admission, recommended not do education for carb counting and plans for set dose insulin regimen at home.   Pt then discharged 10/8 as she was able to demonstrate ability to eat and drink. Readmitted 10/9 with signs and symptoms of nausea, vomiting, and abdominal pain and left AMA 10/9 evening. \Readmitted 10/10 with complaints of abdominal pain. Per team, pt refused replacement of feeding tube. Pt noted to be 3 days without nutrition support and likely inadequate po intake with nausea, vomiting, abdominal pain.  Per MD notes/chart review 10/9, MD concerned also that her symptoms may be in some way related to opioid withdrawal since she went 21 hours at home with no opioids and has been on chronic IV opioids while hospitalized.  By discharge 10/13, pt able to  "tolerate po.    CURRENT NUTRITION ORDERS  Diet: NPO  Intake/Tolerance: Per chart review, plan for slow diet advancement as able. Per Endocrine note this am at time of visit, pain and nausea were controlled.      Plan for delivery at 32 weeks due to medical co-morbidities. Scheduled  for 10/25 at 8:30.    LABS  Labs reviewed  (10/22)  BG  today so far    MEDICATIONS  Medications reviewed  Insulin Drip-Endocrine following-  D5LR@100 mL/hr  Neutrophos bid  Prenatal vitamin plus iron  Folic Acid    ANTHROPOMETRICS  Height: 0 cm (Data Unavailable)-per previous record-170 cm (66.9\")  Most Recent Weight:   No weight this admission-most recent weight 75.6 kg/166 lbs 9.6 oz on 10/19.  IBW: 135 lbs  BMI: 26 kg/m2  Prepregnancy weight:  75 kg/165 lbs based on pt report per past RD notes  Weight History:   Based on prepregnancy weight and last available, weight appears increased overall about 1.5 lbs (with recommended weight gain of 15-25 lbs). No updated weight this admission to assess trends.  Per previous RD assessment 10/10, pt was started on TF during past admission and had gained ~ 5 lbs up to 75.8 kg on 10/5.   Wt Readings from Last 10 Encounters:   10/19/17 75.6 kg (166 lb 9.6 oz)   10/16/17 76.3 kg (168 lb 3.2 oz)   10/12/17 78.8 kg (173 lb 12.8 oz)   10/07/17 74.1 kg (163 lb 6.4 oz)   17 75 kg (165 lb 6.4 oz)   17 77.9 kg (171 lb 12.8 oz)   17 76.5 kg (168 lb 10.4 oz)   17 76 kg (167 lb 8.8 oz)   16 68.9 kg (152 lb)   16 79.6 kg (175 lb 7.8 oz)       Dosing Weight: 75 kg (based on usual body weight prepregnancy.    ASSESSED NUTRITION NEEDS  Estimated Energy Needs: 2325+ kcals/day (25 kcals per kg + 450 kcals/day PO/EN: PN energy needs 20-25 kcals/kg)  Justification: Increased needs for pregnancy and repletion.  Estimated Protein Needs: 78-98 grams protein/day (1 - 1.2 grams of pro/kg)  Justification: increased needs for pregnancy and repletion.    Estimated Fluid " Needs: 8894-6087  mL/day (30 - 35 mL/kg)   Justification: Increased needs for pregnancy    PHYSICAL FINDINGS  See malnutrition section below.  1+ pedal edema BL     MALNUTRITION  % Intake: unable to assess  % Weight Loss: Unable to assess-need current weight check.  Subcutaneous Fat Loss: Unable to assess   Muscle Loss: Unable to assess   Fluid Accumulation/Edema: Mild  Malnutrition Diagnosis: Unable to determine due to incomplete nutrition status validation (RD on call status).      NUTRITION DIAGNOSIS  Inadequate oral intake related to abdominal pain, N/V as evidenced by NPO x2 days and inadequate wt gain during pregnancy.     INTERVENTIONS  Implementation  Nutrition Education: Unable to complete due to RD on call status.     Goals  Diet advancement in 24-48 hours.     Monitoring/Evaluation  Progress toward goals will be monitored and evaluated per protocol.    Ewelina Gallegos RD, LD

## 2017-10-22 NOTE — CONSULTS
INTERVENTIONAL RADIOLOGY CONSULT    Patient has already undergone PICC placement by Dr. Lomas on 10/20/17.    Manuela Sandoval MD  Interventional Radiology   Pager [phone]

## 2017-10-22 NOTE — PROGRESS NOTES
Diabetes Consult Daily  Progress Note          Assessment/Plan:     Ms. Anne Gunter is a 26 yo woman at 31w3d of pregnancy, with a history of uncontrolled type 1 diabetes with multiple episodes of DKA in pregnancy, presumed gastroparesis, esophagitis with several recent admissions for recurrent abdominal pain with N/V, borderline personality disorder, and chronic opioid use in pregnancy, who was admitted on 10/20/17 with abdominal pain, N/V. S/p betamethasone on 10/21 and 10/22.    Insulin rates increased to 1.5-3 units/h following second dose of betamethasone this morning.  Pt remains NPO with D5 LR running.    Plan:  -continue IV insulin infusion at this time.  It may be most prudent to keep IV insulin infusion going until after delivery (would want to keep running for at least several hour after delivery to determine post partum insulin needs).  If diet is started in the next 1-2 days and pt is wanting to be off IV insulin we could consider transitioning to SC insulin.  In either case, we want to make sure IV insulin is restarted/continued starting Tuesday night in prep for Csection Wed morning.  -no meal aspart at this time b/c NPO- please page DM team if diet is started so we can order aspart for carbs.     Outpatient diabetes follow up: with Dr. Colunga- recommend pt be seen within 1-2 weeks of discharge.  Plan discussed with patient, bedside RN.           Interval History:   The last 24 hours progress and nursing notes reviewed.  Anne got second dose of beta 12mg at 0300 today.  Insulin rates since that time are 1.5-3 units/h with D5LR running at 100ml/h and pt still NPO.  Brandon in pain during the night.  From reading RN notes it seems pt recurrently wants dilaudid dosed more frequently than q4h, but Mylanta and viscous lidocaine seems to help (she has refused at times).  No plan to start diet at this time.  When I saw Anne her pain and nausea were controlled.  Planning for  repeat C section on Wed 10/25 at 0830.          Recent Labs  Lab 10/22/17  1029 10/22/17  0924 10/22/17  0826 10/22/17  0703 10/22/17  0611 10/22/17  0522  10/20/17  1900   GLC  --   --   --   --   --   --   --  100*   * 125* 123* 106* 108* 115*  < >  --    < > = values in this interval not displayed.            Review of Systems:   See interval hx          Medications:       Active Diet Order      NPO for Medical/Clinical Reasons Except for: Meds     Physical Exam:  Gen: Drowsy, resting on side in bed, NAD.  HEENT: NC/AT, mucous membranes are moist  Resp: Unlabored  Neuro:oriented x3, communicating clearly  /75  Pulse 83  Temp 98  F (36.7  C) (Oral)  Resp 18  SpO2 96%           Data:     Lab Results   Component Value Date    A1C 7.4 10/11/2017    A1C 8.4 09/10/2017    A1C 9.2 06/01/2016    A1C 9.8 05/23/2016    A1C 7.4 04/23/2016              CBC RESULTS:   Recent Labs   Lab Test  10/20/17   1900   WBC  8.5   RBC  3.76*   HGB  9.7*   HCT  30.5*   MCV  81   MCH  25.8*   MCHC  31.8   RDW  15.2*   PLT  256     Recent Labs   Lab Test  10/20/17   1900  10/10/17   0926   NA  138  132*   POTASSIUM  3.8  3.4   CHLORIDE  103  101   CO2  24  23   ANIONGAP  11  8   GLC  100*  149*   BUN  4*  4*   CR  0.42*  0.42*   LILLIAM  8.9  8.6     Liver Function Studies -   Recent Labs   Lab Test  10/20/17   1900   PROTTOTAL  7.7   ALBUMIN  2.6*   BILITOTAL  0.4   ALKPHOS  111   AST  12   ALT  13     No results found for: INR    Carlene Jones PA-C [phone]  Diabetes Management job code [id number]

## 2017-10-23 ENCOUNTER — HOSPITAL ENCOUNTER (INPATIENT)
Dept: ULTRASOUND IMAGING | Facility: CLINIC | Age: 27
End: 2017-10-23
Attending: OBSTETRICS & GYNECOLOGY
Payer: COMMERCIAL

## 2017-10-23 LAB
GLUCOSE BLDC GLUCOMTR-MCNC: 100 MG/DL (ref 70–99)
GLUCOSE BLDC GLUCOMTR-MCNC: 101 MG/DL (ref 70–99)
GLUCOSE BLDC GLUCOMTR-MCNC: 104 MG/DL (ref 70–99)
GLUCOSE BLDC GLUCOMTR-MCNC: 109 MG/DL (ref 70–99)
GLUCOSE BLDC GLUCOMTR-MCNC: 114 MG/DL (ref 70–99)
GLUCOSE BLDC GLUCOMTR-MCNC: 118 MG/DL (ref 70–99)
GLUCOSE BLDC GLUCOMTR-MCNC: 121 MG/DL (ref 70–99)
GLUCOSE BLDC GLUCOMTR-MCNC: 131 MG/DL (ref 70–99)
GLUCOSE BLDC GLUCOMTR-MCNC: 132 MG/DL (ref 70–99)
GLUCOSE BLDC GLUCOMTR-MCNC: 151 MG/DL (ref 70–99)
GLUCOSE BLDC GLUCOMTR-MCNC: 60 MG/DL (ref 70–99)
GLUCOSE BLDC GLUCOMTR-MCNC: 75 MG/DL (ref 70–99)
GLUCOSE BLDC GLUCOMTR-MCNC: 79 MG/DL (ref 70–99)
GLUCOSE BLDC GLUCOMTR-MCNC: 80 MG/DL (ref 70–99)
GLUCOSE BLDC GLUCOMTR-MCNC: 80 MG/DL (ref 70–99)
GLUCOSE BLDC GLUCOMTR-MCNC: 82 MG/DL (ref 70–99)
GLUCOSE BLDC GLUCOMTR-MCNC: 84 MG/DL (ref 70–99)
GLUCOSE BLDC GLUCOMTR-MCNC: 92 MG/DL (ref 70–99)
GLUCOSE BLDC GLUCOMTR-MCNC: 92 MG/DL (ref 70–99)
GLUCOSE BLDC GLUCOMTR-MCNC: 96 MG/DL (ref 70–99)
GLUCOSE BLDC GLUCOMTR-MCNC: 97 MG/DL (ref 70–99)
GLUCOSE BLDC GLUCOMTR-MCNC: 98 MG/DL (ref 70–99)
GLUCOSE BLDC GLUCOMTR-MCNC: 99 MG/DL (ref 70–99)

## 2017-10-23 PROCEDURE — 12000032 ZZH R&B OB CRITICAL UMMC

## 2017-10-23 PROCEDURE — 00000146 ZZHCL STATISTIC GLUCOSE BY METER IP

## 2017-10-23 PROCEDURE — 25000125 ZZHC RX 250: Performed by: PHYSICIAN ASSISTANT

## 2017-10-23 PROCEDURE — 25000132 ZZH RX MED GY IP 250 OP 250 PS 637: Performed by: OBSTETRICS & GYNECOLOGY

## 2017-10-23 PROCEDURE — 25000128 H RX IP 250 OP 636: Performed by: OBSTETRICS & GYNECOLOGY

## 2017-10-23 PROCEDURE — T1013 SIGN LANG/ORAL INTERPRETER: HCPCS | Mod: U3

## 2017-10-23 PROCEDURE — 76819 FETAL BIOPHYS PROFIL W/O NST: CPT

## 2017-10-23 PROCEDURE — 25000125 ZZHC RX 250: Performed by: OBSTETRICS & GYNECOLOGY

## 2017-10-23 PROCEDURE — S0028 INJECTION, FAMOTIDINE, 20 MG: HCPCS | Performed by: OBSTETRICS & GYNECOLOGY

## 2017-10-23 RX ORDER — SODIUM CHLORIDE 9 MG/ML
INJECTION, SOLUTION INTRAVENOUS CONTINUOUS
Status: DISCONTINUED | OUTPATIENT
Start: 2017-10-24 | End: 2017-10-25

## 2017-10-23 RX ADMIN — LIDOCAINE HYDROCHLORIDE 5 ML: 20 SOLUTION ORAL; TOPICAL at 09:25

## 2017-10-23 RX ADMIN — PROCHLORPERAZINE EDISYLATE 5 MG: 5 INJECTION INTRAMUSCULAR; INTRAVENOUS at 05:07

## 2017-10-23 RX ADMIN — METOCLOPRAMIDE 10 MG: 5 INJECTION, SOLUTION INTRAMUSCULAR; INTRAVENOUS at 13:19

## 2017-10-23 RX ADMIN — LIDOCAINE HYDROCHLORIDE 5 ML: 20 SOLUTION ORAL; TOPICAL at 06:06

## 2017-10-23 RX ADMIN — Medication 0.3 MG: at 03:13

## 2017-10-23 RX ADMIN — Medication 0.2 MG: at 11:22

## 2017-10-23 RX ADMIN — METOCLOPRAMIDE 10 MG: 5 INJECTION, SOLUTION INTRAMUSCULAR; INTRAVENOUS at 01:43

## 2017-10-23 RX ADMIN — Medication 0.2 MG: at 20:03

## 2017-10-23 RX ADMIN — FAMOTIDINE 20 MG: 10 INJECTION, SOLUTION INTRAVENOUS at 04:36

## 2017-10-23 RX ADMIN — Medication 0.3 MG: at 15:39

## 2017-10-23 RX ADMIN — ALUMINUM HYDROXIDE, MAGNESIUM HYDROXIDE, AND DIMETHICONE 30 ML: 400; 400; 40 SUSPENSION ORAL at 09:24

## 2017-10-23 RX ADMIN — SODIUM CHLORIDE, SODIUM LACTATE, POTASSIUM CHLORIDE, CALCIUM CHLORIDE, AND DEXTROSE MONOHYDRATE: 600; 310; 30; 20; 5 INJECTION, SOLUTION INTRAVENOUS at 14:08

## 2017-10-23 RX ADMIN — ALUMINUM HYDROXIDE, MAGNESIUM HYDROXIDE, AND DIMETHICONE 30 ML: 400; 400; 40 SUSPENSION ORAL at 06:06

## 2017-10-23 RX ADMIN — PANTOPRAZOLE SODIUM 40 MG: 40 INJECTION, POWDER, FOR SOLUTION INTRAVENOUS at 20:04

## 2017-10-23 RX ADMIN — PANTOPRAZOLE SODIUM 40 MG: 40 INJECTION, POWDER, FOR SOLUTION INTRAVENOUS at 09:05

## 2017-10-23 RX ADMIN — METOCLOPRAMIDE 10 MG: 5 INJECTION, SOLUTION INTRAMUSCULAR; INTRAVENOUS at 19:02

## 2017-10-23 RX ADMIN — LIDOCAINE HYDROCHLORIDE 5 ML: 20 SOLUTION ORAL; TOPICAL at 04:07

## 2017-10-23 RX ADMIN — SODIUM CHLORIDE, SODIUM LACTATE, POTASSIUM CHLORIDE, CALCIUM CHLORIDE, AND DEXTROSE MONOHYDRATE: 600; 310; 30; 20; 5 INJECTION, SOLUTION INTRAVENOUS at 04:03

## 2017-10-23 RX ADMIN — LIDOCAINE HYDROCHLORIDE 5 ML: 20 SOLUTION ORAL; TOPICAL at 01:48

## 2017-10-23 RX ADMIN — METOCLOPRAMIDE 10 MG: 5 INJECTION, SOLUTION INTRAMUSCULAR; INTRAVENOUS at 07:27

## 2017-10-23 RX ADMIN — Medication 0.2 MG: at 07:21

## 2017-10-23 RX ADMIN — FAMOTIDINE 20 MG: 10 INJECTION, SOLUTION INTRAVENOUS at 17:04

## 2017-10-23 RX ADMIN — HUMAN INSULIN 3 UNITS/HR: 100 INJECTION, SOLUTION SUBCUTANEOUS at 16:11

## 2017-10-23 NOTE — PROGRESS NOTES
Fetal Strip Review    FHT:   Baseline 120bpm, moderate variability, present accels, absent decels - appropriate for GA.   [place]: 1contraction/20mins    Fabiana Chavez MD MPH  OB/GYN, PGY3  Pager: [phone]  10/22/2017  7:27 PM

## 2017-10-23 NOTE — PLAN OF CARE
Problem: Patient Care Overview  Goal: Plan of Care/Patient Progress Review  Pt continues to have abdominal discomfort. She is turning down all of her po meds, although occasionally she will take a GI cocktail. Pt has frequent emesis, usually after drinking large amounts of water. Pt must be encouraged to empty bladder, and when she does there is 1000-1200cc output. Pt weighed today for 5 pound weight loss since September.    Problem: Pain, Acute (Adult)  Intervention: Monitor/Manage Analgesia  Pt refusing Colace. Pt is not forthcoming as to when she had her last bowel movement. + bowel sounds. Diluadid being given q 4 hours prn. Continue to monitor for constipation.    Goal: Identify Related Risk Factors and Signs and Symptoms  Related risk factors and signs and symptoms are identified upon initiation of Human Response Clinical Practice Guideline (CPG).   Although pt spends majority of time in bed, she is experiencing sleep deprivation since blood glucose must be obtained every hour while on the insulin drip. Attempt to cluster cares.

## 2017-10-23 NOTE — PROVIDER NOTIFICATION
10/23/17 1634   Provider Notification   Provider Name/Title David Concepcion   Method of Notification Electronic Page   Request Evaluate - Remote   Notification Reason Status Update   Patient can have regular diet. Do you want Novolog coverage.

## 2017-10-23 NOTE — PROGRESS NOTES
[facility] Antepartum Progress Note    Subjective:   Anne states she is doing ok today. She was not very talkative. She states she thinks her pain is coming back and she has nausea and vomiting. She denies contractions, vaginal bleeding, and loss of fluid. She admits to good fetal movement.       Objective:  Vitals:    10/22/17 2000 10/22/17 2358 10/23/17 0005 10/23/17 0730   BP: 130/65  116/65    Pulse: 80      Resp: 16  16    Temp: 98.8  F (37.1  C) 98.5  F (36.9  C)  98.1  F (36.7  C)   TempSrc: Oral Oral  Oral   SpO2:       Weight:           Gen: Resting comfortably in bed, NAD  Resp: Normal respiratory effort   Abd: Gravid, non-tender, non-distended  Ext: warm, well-perfused, small amounts of edema in her feet and ankles bilaterally    FHT: Baseline 120, moderate variability, + accels, no decels  [place]: 2 contractions in one hour over night, not felt by patient.     Assessment/Plan:   Anne Gunter is a 27 year old , at 31w5d by stated JUANJO c/w 25w5d US, who presents with severe epigastric pain, nausea and vomiting. She has been admitted multiple times during her pregnancy for these same symptoms.  Her pregnancy is complicated by poorly controlled T1DM with multiple admissions for DKA, severe gastroparesis, narcotic seeking/dependency, malnutrition, mood disorder, borderline personality disorder, history of PLTCS x1 at 32 weeks for fetal indications.       1. Poorly controlled T1DM, recurrent DKA:   - Multiple recent admissions for poorly controlled T1DM and DKA.  Last HA1C 7.4% (10/11). BG  today, low suspicion for DKA at this time.   - Electrolytes wnl (10/20) and serum ketones 1.2 (10/20).   - Endocrinology consult ordered.   - On Insulin drip  - Q1hr insulin checks.    - Plan for delivery at 32 weeks due to medical co-morbidities.  RLTCS, BTL scheduled on 10/25 at 0830 with Dr. Rodríguez. FTP signed 10/10/17, s/p C/S and BTL consent, will likely be inpatient until delivery due to need for glucose  control following betamethasone administration.      2. Gastroparesis, recurrent abdominal pain, nausea and vomiting:   - Advance diet to full diet as tolerated. Encourage following gastroparesis diet as outlined by dieticians on previous admissions, advanced today at patient request.   - Scheduled IV pepcid, protonix and reglan, PRN zofran.  - oral liquid lidocaine and maalox for pain relief between dilaudid doses  - PICC line in place  - S/p GI on multiple admission.   - Bowel regimen to resume when tolerating PO.       3. Chronic opioid use, physical dependence:   - Limit IV dilaudid to 0.3 mg every 4 hours with slow pushes as part of pain contract with Dr. Verde. Will change to 4 mg liquid dilaudid Q4 hours when acute pain is controlled.   - PRN pain consult postpartum given chronic opioids.       4. Likely gestational hypertension with chronic underlying kidney disease related to long-standing diabetes:   - BPs poorly controlled with pain.  Previously has had mild range BPs with pain.  Will treat sustained severe range BPs.   - HELLP labs normal.   - UPC 0.38 prior to pregnancy, 0.60 (9/10/17), most recent UPC 0.50 (10/20/17).   - Close BP monitoring.  - Will need post-partum renal evaluation for proteinuria.      5. Depression, adjustment disorder, borderline personality disorder:   - S/p  psych/psychiatry consults on previous admission. Considering starting zyprexa and zoloft postpartum.       6. FWB:   - Category 1 FHT, reactive NST.  TID monitoring.   - IV magnesium for neuroprotection if delivery is imminent prior to 32 weeks.   - Twice weekly BPPs (Monday).   - s/p BMZ (10/21 & 10/22)      7. PNC:             - A pos, Janna negative, Rubella immune, HIV negative, RPR negative, HepB sAg negative, GBS positive previously.   GBS swab, will recollect today if she allows.  - Home phosphorus, PNV, folic acid ordered.   - Desires permanent sterilization with BTL at time of C/S.  Federal tubal  papers and consent signed.   - Social work following  - Collect new GBS ( Pt has refused thus far)    I, Judy Carmichael, acted as a scribe for Dr. Greene.   Judy Carmichael, MS4    Maternal-Fetal Medicine Attending Addendum    Late entry, patient seen several hours ago.      I have discussed the care of Ms. Gunter with the medical student/resident/fellow during morning rounds.  Patient seen & examined by me.  Agree with above, I wish to note the following:      S: Pt requests to eat.  Does not offer significant complaints at this time, not very talkative.    O:  /72  Pulse 80  Temp 98.1  F (36.7  C) (Oral)  Resp 14  Wt 73.5 kg (162 lb 1.6 oz)  SpO2 96%  BMI 25.44 kg/m2  FS: 118, 97, 104, 121, 131  GEN: NAD  ABD: gravid, NT  FHT: 125 w/ moderate variability, + A, no D  TOCO: no contractions  NST interpretation for today: reactive, appropriate for GA    A/P: 27 year old  31w5d admitted with recurrent nausea/vomiting and abdominal pain which has been a chronic issue for her this pregnancy.  Pregnancy also complicated by type 1 diabetes with delivery planned at 32 weeks due to medical co morbidities.  Is s/p betamethasone 10/21 and 10/22 in anticipation of delivery.  Plan for repeat CD and BTL.  Will advanced diet at patient request.  Appreciate endocrinology management.      I spent a total of 15 minutes with Anne Gunter, >50% of which was spent in counseling and/or coordination of care.    Date of service (when I saw the patient): 2017      Negra Greene MD  , OB/GYN  Maternal-Fetal Medicine  [email or web address]  [phone] (Academic office)  [phone] (Pager)

## 2017-10-23 NOTE — PROGRESS NOTES
Diabetes Consult Daily  Progress Note          Assessment/Plan:     Ms. Anne Gunter is a 26 yo woman at 31w3d of pregnancy, with a history of uncontrolled type 1 diabetes with multiple episodes of DKA in pregnancy, presumed gastroparesis, esophagitis with several recent admissions for recurrent abdominal pain with N/V, borderline personality disorder, and chronic opioid use in pregnancy, who was admitted on 10/20/17 with abdominal pain, N/V. S/p betamethasone on 10/21 and 10/22.    Insulin rates came down to 1.5 units/h overnight, 0-3 units/h today.  Pt remains NPO.  At this point we will plan to continue IV insulin infusion until after delivery, pt ok with this plan.    Plan:  -continue IV insulin infusion.  Preliminary plan is to continue IV insulin going after delivery (we will switch to regular adult algorithm post partum), give first dose of glargine in the evening of 10/25, keep IV Insulin running overnight, give second dose of glargine in the morning and then stop IV insulin.    -no meal aspart at this time b/c NPO- please page DM team if diet is started so we can order aspart for carbs.     Outpatient diabetes follow up: with Dr. Colunga- recommend pt be seen within 1-2 weeks of discharge.  Plan discussed with patient, bedside RN, and primary team (Dr. Benton).           Interval History:   The last 24 hours progress and nursing notes reviewed.  Now finished with betamethasone, IV insulin rates came down to 1.5 units/h overnight, now today 0-3 units/h.  She remains on D5LR at 100ml/h continuous.  Ongoing pain and nausea.  She will drink water, then have emesis.  No plans to start diet at this time.  Reviewed insulin plan with Anne today (see above) and she said she would like to go back on bid glargine after delivery.           Recent Labs  Lab 10/23/17  1608 10/23/17  1509 10/23/17  1420 10/23/17  1318 10/23/17  1223 10/23/17  1113  10/20/17  1900   GLC  --   --   --   --   --    --   --  100*   * 104* 97 118* 114* 75  < >  --    < > = values in this interval not displayed.            Review of Systems:   See interval hx          Medications:       Active Diet Order      Regular Diet Adult     Physical Exam:  Gen: Drowsy, resting on side in bed, NAD.  HEENT: NC/AT, mucous membranes are moist  Resp: Unlabored  Neuro:oriented x3, communicating clearly  /72  Pulse 80  Temp 98.1  F (36.7  C) (Oral)  Resp 14  Wt 73.5 kg (162 lb 1.6 oz)  SpO2 96%  BMI 25.44 kg/m2           Data:     Lab Results   Component Value Date    A1C 7.4 10/11/2017    A1C 8.4 09/10/2017    A1C 9.2 06/01/2016    A1C 9.8 05/23/2016    A1C 7.4 04/23/2016            Recent Labs   Lab Test  10/20/17   1900  10/10/17   0926   NA  138  132*   POTASSIUM  3.8  3.4   CHLORIDE  103  101   CO2  24  23   ANIONGAP  11  8   GLC  100*  149*   BUN  4*  4*   CR  0.42*  0.42*   LILLIAM  8.9  8.6     CBC RESULTS:   Recent Labs   Lab Test  10/20/17   1900   WBC  8.5   RBC  3.76*   HGB  9.7*   HCT  30.5*   MCV  81   MCH  25.8*   MCHC  31.8   RDW  15.2*   PLT  256       Carlene Jones PA-C [phone]  Diabetes Management job code [id number]

## 2017-10-23 NOTE — PLAN OF CARE
Problem: Diabetes in Pregnancy (Adult,Obstetrics,Pediatric)  Goal: Signs and Symptoms of Listed Potential Problems Will be Absent, Minimized or Managed (Diabetes in Pregnancy)  Signs and symptoms of listed potential problems will be absent, minimized or managed by discharge/transition of care (reference Diabetes in Pregnancy (Adult,Obstetrics,Pediatric) CPG).   Patient was hungry and wanted to eat. Discussed with Dr. Greene and she advanced her to a regular diet. I paged Carlene from Endocrine at 1634 saying that a regular diet was placed and we needed Novolog coverage. Patient received her meal and started eating around 1715. I paged the endocrine on call, Dr. Andrews, around 1725 and heard back from him around 1735. I explained the situation to him. I told him what Carlene had written in her notes about starting coverage at 1 units/5 CHO since she had received Betamethasone. He wanted to start the patient at 1 unit/10 CHO. This RN then had to wait for orders to be placed prior to administration. The Novolog was administered about 40 minutes after her meal was started. She continues on the insulin drip. RN informed patient that when she eats she needs to have carb coverage. Patient understands.

## 2017-10-23 NOTE — PLAN OF CARE
Problem: Pain, Acute (Adult)  Goal: Acceptable Pain Control/Comfort Level  Patient will demonstrate the desired outcomes by discharge/transition of care.   Outcome: No Change  Pt stable, VS WDL. BG within goal range, continuous insulin drip using algorithm 3. Requesting IV dilaudid q4hr, lidocaine & maalox q 2hr. Frequent nausea/vomiting, refuses to stop drinking water. Pt refused FHR monitoring, denies ctx.

## 2017-10-23 NOTE — PLAN OF CARE
Problem: Pain, Acute (Adult)  Goal: Identify Related Risk Factors and Signs and Symptoms  Related risk factors and signs and symptoms are identified upon initiation of Human Response Clinical Practice Guideline (CPG).   Patient continues to ask for dilaudid 2mg IV every 4 hours for pain. Has been in bed sleeping most of he day except to get up to void. Declined her fetal monitoring until later this afternoon when she let me monitor her for 20 minutes.Declines GBS test and BP check. Asking for apple juice this afternoon. Drinks a full jug of water a few times today and then she vomits up part of that. BG range has been .

## 2017-10-23 NOTE — PLAN OF CARE
Problem: Diabetes in Pregnancy (Adult,Obstetrics,Pediatric)  Goal: Signs and Symptoms of Listed Potential Problems Will be Absent, Minimized or Managed (Diabetes in Pregnancy)  Signs and symptoms of listed potential problems will be absent, minimized or managed by discharge/transition of care (reference Diabetes in Pregnancy (Adult,Obstetrics,Pediatric) CPG).   Outcome: No Change  Patient finished her meal off 22 CHO, and was covered with 2 units of Novolog.

## 2017-10-24 ENCOUNTER — ANESTHESIA EVENT (OUTPATIENT)
Dept: OBGYN | Facility: CLINIC | Age: 27
End: 2017-10-24
Payer: COMMERCIAL

## 2017-10-24 ENCOUNTER — OFFICE VISIT (OUTPATIENT)
Dept: INTERPRETER SERVICES | Facility: CLINIC | Age: 27
End: 2017-10-24

## 2017-10-24 LAB
GLUCOSE BLDC GLUCOMTR-MCNC: 101 MG/DL (ref 70–99)
GLUCOSE BLDC GLUCOMTR-MCNC: 107 MG/DL (ref 70–99)
GLUCOSE BLDC GLUCOMTR-MCNC: 125 MG/DL (ref 70–99)
GLUCOSE BLDC GLUCOMTR-MCNC: 127 MG/DL (ref 70–99)
GLUCOSE BLDC GLUCOMTR-MCNC: 139 MG/DL (ref 70–99)
GLUCOSE BLDC GLUCOMTR-MCNC: 143 MG/DL (ref 70–99)
GLUCOSE BLDC GLUCOMTR-MCNC: 148 MG/DL (ref 70–99)
GLUCOSE BLDC GLUCOMTR-MCNC: 168 MG/DL (ref 70–99)
GLUCOSE BLDC GLUCOMTR-MCNC: 178 MG/DL (ref 70–99)
GLUCOSE BLDC GLUCOMTR-MCNC: 187 MG/DL (ref 70–99)
GLUCOSE BLDC GLUCOMTR-MCNC: 206 MG/DL (ref 70–99)
GLUCOSE BLDC GLUCOMTR-MCNC: 50 MG/DL (ref 70–99)
GLUCOSE BLDC GLUCOMTR-MCNC: 58 MG/DL (ref 70–99)
GLUCOSE BLDC GLUCOMTR-MCNC: 61 MG/DL (ref 70–99)
GLUCOSE BLDC GLUCOMTR-MCNC: 69 MG/DL (ref 70–99)
GLUCOSE BLDC GLUCOMTR-MCNC: 70 MG/DL (ref 70–99)
GLUCOSE BLDC GLUCOMTR-MCNC: 72 MG/DL (ref 70–99)
GLUCOSE BLDC GLUCOMTR-MCNC: 74 MG/DL (ref 70–99)
GLUCOSE BLDC GLUCOMTR-MCNC: 91 MG/DL (ref 70–99)
GLUCOSE BLDC GLUCOMTR-MCNC: 92 MG/DL (ref 70–99)
GLUCOSE BLDC GLUCOMTR-MCNC: 92 MG/DL (ref 70–99)
GLUCOSE BLDC GLUCOMTR-MCNC: 94 MG/DL (ref 70–99)
GLUCOSE BLDC GLUCOMTR-MCNC: 96 MG/DL (ref 70–99)
GLUCOSE BLDC GLUCOMTR-MCNC: 96 MG/DL (ref 70–99)

## 2017-10-24 PROCEDURE — 12000032 ZZH R&B OB CRITICAL UMMC

## 2017-10-24 PROCEDURE — 25000125 ZZHC RX 250: Performed by: PHYSICIAN ASSISTANT

## 2017-10-24 PROCEDURE — S0028 INJECTION, FAMOTIDINE, 20 MG: HCPCS | Performed by: OBSTETRICS & GYNECOLOGY

## 2017-10-24 PROCEDURE — 87653 STREP B DNA AMP PROBE: CPT | Performed by: OBSTETRICS & GYNECOLOGY

## 2017-10-24 PROCEDURE — T1013 SIGN LANG/ORAL INTERPRETER: HCPCS | Mod: U3

## 2017-10-24 PROCEDURE — 00000146 ZZHCL STATISTIC GLUCOSE BY METER IP

## 2017-10-24 PROCEDURE — 87186 SC STD MICRODIL/AGAR DIL: CPT | Performed by: OBSTETRICS & GYNECOLOGY

## 2017-10-24 PROCEDURE — 25000125 ZZHC RX 250: Performed by: OBSTETRICS & GYNECOLOGY

## 2017-10-24 PROCEDURE — 25000128 H RX IP 250 OP 636: Performed by: OBSTETRICS & GYNECOLOGY

## 2017-10-24 RX ORDER — SODIUM CHLORIDE, SODIUM LACTATE, POTASSIUM CHLORIDE, CALCIUM CHLORIDE 600; 310; 30; 20 MG/100ML; MG/100ML; MG/100ML; MG/100ML
INJECTION, SOLUTION INTRAVENOUS CONTINUOUS
Status: DISCONTINUED | OUTPATIENT
Start: 2017-10-24 | End: 2017-10-24

## 2017-10-24 RX ORDER — DEXTROSE, SODIUM CHLORIDE, SODIUM LACTATE, POTASSIUM CHLORIDE, AND CALCIUM CHLORIDE 5; .6; .31; .03; .02 G/100ML; G/100ML; G/100ML; G/100ML; G/100ML
INJECTION, SOLUTION INTRAVENOUS CONTINUOUS
Status: DISCONTINUED | OUTPATIENT
Start: 2017-10-24 | End: 2017-10-26

## 2017-10-24 RX ADMIN — METOCLOPRAMIDE 10 MG: 5 INJECTION, SOLUTION INTRAMUSCULAR; INTRAVENOUS at 15:09

## 2017-10-24 RX ADMIN — METOCLOPRAMIDE 10 MG: 5 INJECTION, SOLUTION INTRAMUSCULAR; INTRAVENOUS at 02:11

## 2017-10-24 RX ADMIN — FAMOTIDINE 20 MG: 10 INJECTION, SOLUTION INTRAVENOUS at 17:13

## 2017-10-24 RX ADMIN — PANTOPRAZOLE SODIUM 40 MG: 40 INJECTION, POWDER, FOR SOLUTION INTRAVENOUS at 08:02

## 2017-10-24 RX ADMIN — SODIUM CHLORIDE, POTASSIUM CHLORIDE, SODIUM LACTATE AND CALCIUM CHLORIDE: 600; 310; 30; 20 INJECTION, SOLUTION INTRAVENOUS at 00:16

## 2017-10-24 RX ADMIN — Medication 0.2 MG: at 03:59

## 2017-10-24 RX ADMIN — Medication 0.2 MG: at 20:11

## 2017-10-24 RX ADMIN — Medication 0.2 MG: at 08:24

## 2017-10-24 RX ADMIN — Medication 0.2 MG: at 12:14

## 2017-10-24 RX ADMIN — Medication 0.2 MG: at 00:05

## 2017-10-24 RX ADMIN — SODIUM CHLORIDE: 9 INJECTION, SOLUTION INTRAVENOUS at 00:09

## 2017-10-24 RX ADMIN — METOCLOPRAMIDE 10 MG: 5 INJECTION, SOLUTION INTRAMUSCULAR; INTRAVENOUS at 08:02

## 2017-10-24 RX ADMIN — PANTOPRAZOLE SODIUM 40 MG: 40 INJECTION, POWDER, FOR SOLUTION INTRAVENOUS at 20:19

## 2017-10-24 RX ADMIN — HUMAN INSULIN 5 UNITS/HR: 100 INJECTION, SOLUTION SUBCUTANEOUS at 20:47

## 2017-10-24 RX ADMIN — HUMAN INSULIN 2 UNITS/HR: 100 INJECTION, SOLUTION SUBCUTANEOUS at 00:05

## 2017-10-24 RX ADMIN — Medication 0.2 MG: at 16:09

## 2017-10-24 RX ADMIN — FAMOTIDINE 20 MG: 10 INJECTION, SOLUTION INTRAVENOUS at 05:04

## 2017-10-24 NOTE — PROGRESS NOTES
Brief Progress Note    Patient refusing to be placed on monitor for TID FHT.      Declined GBS swab this evening, stated she will be open to completing this in am.  Eating ice cream and appears comfortable at this time. Agreed to test at 10am.     Tolerated significant PO intake this evening.  BGs following meal was 60s - insulin drip stopped.  Since BG have improved 79>109.  Resume insulin drip PRN for glucose control.      Fabiana Chavez MD MPH  OB/GYN, PGY3  Pager: [phone]  10/23/2017  10:32 PM

## 2017-10-24 NOTE — PLAN OF CARE
Problem: Patient Care Overview  Goal: Plan of Care/Patient Progress Review  Outcome: No Change  Patient refused monitoring tonight. Dr. Chavez notified.

## 2017-10-24 NOTE — PLAN OF CARE
Problem: Pain, Acute (Adult)  Goal: Identify Related Risk Factors and Signs and Symptoms  Related risk factors and signs and symptoms are identified upon initiation of Human Response Clinical Practice Guideline (CPG).   Outcome: No Change  Continues to ask for pain medication every 4 hours.

## 2017-10-24 NOTE — PROGRESS NOTES
"VS stable, pt able to sleep intermittently throughout night. Pt tolerating regular diet, insulin drip running with carb coverage for snacks. IV maintenance fluid switched from D5LR to LR since no longer NPO. Hypoglycemia x2 throughout night, apple juice given and BG increased. See flow sheet for insulin drip titrations, started on algorithm 4, switched to algorithm 3 at 0537. Pt up to bathroom frequently. Pt refused fetal monitoring, stating \"I need to sleep\".  "

## 2017-10-24 NOTE — PROGRESS NOTES
MFM Antepartum Progress Note     Subjective:   We spoke with Anne today through the aid of an . She states that she is feeling less pain, nausea and vomiting. She denies contractions, vaginal bleeding and loss of fluid. She reports good fetal movement. She declined fetal monitoring last night and this morning. She was switched to a full diet yesterday and ate pasta and french fries with no vomiting or nausea. She had no questions about her delivery scheduled for tomorrow and no other complaints.         Objective:  Vitals:    10/23/17 0730 10/23/17 1345 10/23/17 2045 10/24/17 0010   BP:  118/72 97/63 105/67   Pulse:       Resp:  14 16 16   Temp: 98.1  F (36.7  C)  98.1  F (36.7  C) 98.1  F (36.7  C)   TempSrc: Oral  Oral Oral   SpO2:       Weight:            Gen: Resting comfortably in bed, NAD  Resp: Normal respiratory effort   Ext: warm, well-perfused, trace edema     Pt Declined fetal monitoring this morning.      Assessment/Plan:   Anne Gunter is a 27 year old , at 31w6d by [facility] c/w 25w5d US, who presents with severe epigastric pain, nausea and vomiting. She has been admitted multiple times during her pregnancy for these same symptoms.  Her pregnancy is complicated by poorly controlled T1DM with multiple admissions for DKA, gastroparesis, narcotic seeking/dependency, malnutrition, mood disorder, borderline personality disorder, history of PLTCS x1 at 32 weeks for fetal indications.       1. Poorly controlled T1DM, recurrent DKA:   - Multiple recent admissions for poorly controlled T1DM and DKA.  Last HA1C 7.4% (10/11/17). Not noted to be in DKA on admission.   - Electrolytes wnl (10/20/17) and serum ketones 1.2 (10/20/17).   - Endocrinology consulted, managing insulin drip and 1:10 carb ratio NovoLog added with meals.  Appreciate recommendations. Plan to continue until postpartum with Q1 hr BS checks.   - Plan for delivery at 32 weeks due to medical co-morbidities and medical non  compliance.  RLTCS, BTL scheduled on 10/25 at 0830 with Dr. Rodríguez. FTP signed 10/10/17 with aid of , s/p C/S and BTL consent.       2. Gastroparesis, recurrent abdominal pain, nausea and vomiting:   - Patient on full diet and tolerated PO intake yesterday. Continue to encourage gastroparesis diet outlines set by dieticians on previous admissions. There is concern for eating disorder vs gastroparesis, will discuss with psych.    - Scheduled IV pepcid, protonix and reglan, PRN zofran. Oral liquid lidocaine and maalox for pain relief between dilaudid doses  - PICC line in place given difficult IV access.   - S/p GI consult during multiple admissions.      3. Chronic opioid use, physical dependence:   - Limit IV dilaudid to 0.3 mg every 4 hours with slow pushes as part of pain contract with Dr. Verde. Will change to 4 mg liquid dilaudid Q4 hours when acute pain is controlled and tolerating PO medications.   - PRN pain consult postpartum given chronic opioids. Has pain management plan.       4. Likely gestational hypertension with chronic underlying kidney disease related to long-standing diabetes:   - BPs poorly controlled with pain.  Previously has had mild range BPs with pain.  Close BP monitoring. Will treat sustained severe range BPs. BP's normal for last 24 hours.   - HELLP labs normal.   - UPC 0.38 prior to pregnancy, 0.60 (9/10/17), most recent UPC 0.50 (10/20/17).   - Will need post-partum renal evaluation for baseline proteinuria.      5. Depression, adjustment disorder, borderline personality disorder:   - S/p  psych/psychiatry consults on previous admission. Considering starting zyprexa and zoloft postpartum.       6. FWB:   - TID monitoring.   - IV magnesium for neuroprotection if delivery is imminent prior to 32 weeks.   - s/p Twice weekly BPPs     - S/p BMZ (10/21 & 10/22).       7. PNC:             - A pos, Janna negative, Rubella immune, HIV negative, RPR negative, HepB sAg negative,  GBS positive previously.  Plan to collect GBS swab this morning.   - Home phosphorus, PNV, folic acid ordered.   - Desires permanent sterilization with BTL at time of C/S.  Federal tubal papers and consent signed.   - Social work following     I, Judy Carmichael, acted as a scribe for Dr. Greene.   Judy Carmichael, MS4    Maternal-Fetal Medicine Attending Addendum    Late entry, patient seen several hours ago.      I have discussed the care of Ms. Gunter with the medical student/resident/fellow during morning rounds.  Patient seen by me.  Agree with above, I wish to note the following:      S: Pt denies contractions, LOF, VB.  Reports + FM. Was able to eat yesterday.  Pain has improved.      O:  /67  Pulse 80  Temp 98.1  F (36.7  C) (Oral)  Resp 16  Wt 73.5 kg (162 lb 1.6 oz)  SpO2 96%  BMI 25.44 kg/m2  FS: 74, 58, 61, 101, 127, 96, 69, 50, 61, 91  GEN: NAD, sleeping  FHT: pt declined monitoring  TOCO: pt declined monitoring    A/P: 27 year old  31w6d admitted with recurrent nausea/vomiting and abdominal pain which has been a chronic issue for her this pregnancy.  Pregnancy also complicated by type 1 diabetes with delivery planned at 32 weeks due to medical co morbidities.  Is s/p betamethasone 10/21 and 10/22 in anticipation of delivery.  Plan for repeat CD and BTL.  Has been scheduled at 32 weeks due to significant medical co-morbidities and non-compliance, please see Dr. Verde' note from 10/10/17 for details .  Appreciate endocrinology management of diabetes.  Pt reported no questions regarding her delivery for tomorrow.  She confirms her desire for a BTL.  Will decrease IVF now.  Plan to consult pain management.  Discuss starting recommended psych medications postpartum.  Pt agrees to have us collect a GBS.  She is declining fetal monitoring at present.      Spanish  present throughout.      I spent a total of 15 minutes with Anne Gunter, >50% of which was spent in counseling and/or  coordination of care.    Date of service (when I saw the patient): October 24, 2017      Negra Greene MD  , OB/GYN  Maternal-Fetal Medicine  [email or web address]  [phone] (Academic office)  [phone] (Pager)

## 2017-10-24 NOTE — PLAN OF CARE
Anne Gunter is states the pain is much better now, but still is asking for dilaudid every 4 hours, 0.2 mg, IV. She is able to tolerate lunch: salad, chicken, egg, jello, this afternoon.   Vitals stable.   No contractions.   FHT: WNL   DMI: Went down to Algorithm 1, from 3 then 2. Blood sugars have been dropping past 70 frequently today and through the night, so will try Algorithm 1. Endocrine also adjusted supplement carb coverage. Will continue to monitor blood sugars every hour.

## 2017-10-24 NOTE — PROVIDER NOTIFICATION
10/24/17 0509   Provider Notification   Provider Name/Title Dr. Chavez   Method of Notification Electronic Page   Notification Reason Status Update   BG have been stable since last hypoglycemic drop, most recent was 58 after insulin pump off for 1 hr. Pt given 3 units subQ insulin with requested apple juice at 0303. Drinking apple juice now & will recheck in 10 min

## 2017-10-24 NOTE — PROGRESS NOTES
Diabetes Consult Daily  Progress Note          Assessment/Plan:     Ms. Anne Gunter is a 28 yo woman at admitted at 31w3d of pregnancy on 10/20/17 with abdominal pain, N/V.  PMH significant for history of uncontrolled type 1 diabetes with multiple episodes of DKA in pregnancy, presumed gastroparesis, esophagitis with several recent admissions for recurrent abdominal pain with N/V, borderline personality disorder, and chronic opioid use in pregnancy,     Increased glucose variability since PO intake started and dextrose fluids stopped.    Plan:  -continue IV insulin infusion.  Plan is to continue IV insulin after delivery (*switching to regular adult IV insulin infusion per protocol), give first dose of glargine in the evening of 10/25, keep IV Insulin running overnight, give second dose of glargine in the morning and then stop IV insulin.    -meal aspart reduced to 1 unit per 13 grams carbohydrate  -add dextrose to fluids if it will aid RN is stabilizing glucose (RN will first try dropping to algorithm 1)     Outpatient diabetes follow up: with Dr. Colunga- recommend pt be seen within 1-2 weeks of discharge.  Plan discussed with patient, bedside RN           Interval History:   The last 24 hours progress and nursing notes reviewed.  10/21 and 10/22 received betamethasone.    Diet started last night 1630.  Tolerated regular food well.  Eating foods not on gastroparesis diet (ie. Salad).  D5LR changed to LR at midnight.  Glucose dipping after aspart 1 per 10 grams and once off dextrose fluids.  RN already dialed back from alg 3 to 2 to 1.  Anne feeling pretty well and seems excited for delivery tomorrow.          Recent Labs  Lab 10/24/17  1057 10/24/17  1036 10/24/17  1021 10/24/17  1003 10/24/17  0856 10/24/17  0759  10/20/17  1900   GLC  --   --   --   --   --   --   --  100*   BGM 91 61* 50* 69* 96 70  < >  --    < > = values in this interval not displayed.            Review of  Systems:   See interval hx          Medications:       Active Diet Order      Regular Diet Adult     Physical Exam:  Gen: alert, speaking w/  via Skype  HEENT: NC/AT, mucous membranes are moist  Resp: Unlabored  Neuro:oriented x3, communicating clearly  BP 90/54  Pulse 80  Temp 97.8  F (36.6  C) (Oral)  Resp 16  Wt 73.5 kg (162 lb 1.6 oz)  SpO2 96%  BMI 25.44 kg/m2           Data:     Lab Results   Component Value Date    A1C 7.4 10/11/2017    A1C 8.4 09/10/2017    A1C 9.2 06/01/2016    A1C 9.8 05/23/2016    A1C 7.4 04/23/2016            Recent Labs   Lab Test  10/20/17   1900  10/10/17   0926   NA  138  132*   POTASSIUM  3.8  3.4   CHLORIDE  103  101   CO2  24  23   ANIONGAP  11  8   GLC  100*  149*   BUN  4*  4*   CR  0.42*  0.42*   LILLIAM  8.9  8.6     CBC RESULTS:   Recent Labs   Lab Test  10/20/17   1900   WBC  8.5   RBC  3.76*   HGB  9.7*   HCT  30.5*   MCV  81   MCH  25.8*   MCHC  31.8   RDW  15.2*   PLT  256     Melba Hoffman APRN -[id number]    Diabetes Management job code [id number]

## 2017-10-25 ENCOUNTER — OFFICE VISIT (OUTPATIENT)
Dept: INTERPRETER SERVICES | Facility: CLINIC | Age: 27
End: 2017-10-25

## 2017-10-25 ENCOUNTER — ANESTHESIA (OUTPATIENT)
Dept: OBGYN | Facility: CLINIC | Age: 27
End: 2017-10-25
Payer: COMMERCIAL

## 2017-10-25 PROBLEM — Z98.891 S/P CESAREAN SECTION: Status: ACTIVE | Noted: 2017-10-25

## 2017-10-25 LAB
ABO + RH BLD: NORMAL
ABO + RH BLD: NORMAL
ANION GAP SERPL CALCULATED.3IONS-SCNC: 5 MMOL/L (ref 3–14)
BASOPHILS # BLD AUTO: 0 10E9/L (ref 0–0.2)
BASOPHILS NFR BLD AUTO: 0.1 %
BLD GP AB SCN SERPL QL: NORMAL
BLD PROD TYP BPU: NORMAL
BLD UNIT ID BPU: 0
BLD UNIT ID BPU: 0
BLOOD BANK CMNT PATIENT-IMP: NORMAL
BLOOD PRODUCT CODE: NORMAL
BLOOD PRODUCT CODE: NORMAL
BPU ID: NORMAL
BPU ID: NORMAL
BUN SERPL-MCNC: 4 MG/DL (ref 7–30)
CALCIUM SERPL-MCNC: 7.9 MG/DL (ref 8.5–10.1)
CHLORIDE SERPL-SCNC: 106 MMOL/L (ref 94–109)
CO2 SERPL-SCNC: 26 MMOL/L (ref 20–32)
CREAT SERPL-MCNC: 0.37 MG/DL (ref 0.52–1.04)
DIFFERENTIAL METHOD BLD: ABNORMAL
EOSINOPHIL # BLD AUTO: 0.1 10E9/L (ref 0–0.7)
EOSINOPHIL NFR BLD AUTO: 1.2 %
ERYTHROCYTE [DISTWIDTH] IN BLOOD BY AUTOMATED COUNT: 15.1 % (ref 10–15)
GFR SERPL CREATININE-BSD FRML MDRD: >90 ML/MIN/1.7M2
GLUCOSE BLDC GLUCOMTR-MCNC: 104 MG/DL (ref 70–99)
GLUCOSE BLDC GLUCOMTR-MCNC: 107 MG/DL (ref 70–99)
GLUCOSE BLDC GLUCOMTR-MCNC: 107 MG/DL (ref 70–99)
GLUCOSE BLDC GLUCOMTR-MCNC: 111 MG/DL (ref 70–99)
GLUCOSE BLDC GLUCOMTR-MCNC: 111 MG/DL (ref 70–99)
GLUCOSE BLDC GLUCOMTR-MCNC: 116 MG/DL (ref 70–99)
GLUCOSE BLDC GLUCOMTR-MCNC: 118 MG/DL (ref 70–99)
GLUCOSE BLDC GLUCOMTR-MCNC: 125 MG/DL (ref 70–99)
GLUCOSE BLDC GLUCOMTR-MCNC: 137 MG/DL (ref 70–99)
GLUCOSE BLDC GLUCOMTR-MCNC: 138 MG/DL (ref 70–99)
GLUCOSE BLDC GLUCOMTR-MCNC: 140 MG/DL (ref 70–99)
GLUCOSE BLDC GLUCOMTR-MCNC: 141 MG/DL (ref 70–99)
GLUCOSE BLDC GLUCOMTR-MCNC: 143 MG/DL (ref 70–99)
GLUCOSE BLDC GLUCOMTR-MCNC: 144 MG/DL (ref 70–99)
GLUCOSE BLDC GLUCOMTR-MCNC: 154 MG/DL (ref 70–99)
GLUCOSE BLDC GLUCOMTR-MCNC: 160 MG/DL (ref 70–99)
GLUCOSE BLDC GLUCOMTR-MCNC: 167 MG/DL (ref 70–99)
GLUCOSE BLDC GLUCOMTR-MCNC: 174 MG/DL (ref 70–99)
GLUCOSE BLDC GLUCOMTR-MCNC: 175 MG/DL (ref 70–99)
GLUCOSE BLDC GLUCOMTR-MCNC: 180 MG/DL (ref 70–99)
GLUCOSE BLDC GLUCOMTR-MCNC: 50 MG/DL (ref 70–99)
GLUCOSE BLDC GLUCOMTR-MCNC: 55 MG/DL (ref 70–99)
GLUCOSE BLDC GLUCOMTR-MCNC: 65 MG/DL (ref 70–99)
GLUCOSE BLDC GLUCOMTR-MCNC: 85 MG/DL (ref 70–99)
GLUCOSE BLDC GLUCOMTR-MCNC: 98 MG/DL (ref 70–99)
GLUCOSE BLDC GLUCOMTR-MCNC: 98 MG/DL (ref 70–99)
GLUCOSE SERPL-MCNC: 102 MG/DL (ref 70–99)
GP B STREP DNA SPEC QL NAA+PROBE: POSITIVE
HCT VFR BLD AUTO: 27 % (ref 35–47)
HGB BLD-MCNC: 8.5 G/DL (ref 11.7–15.7)
IMM GRANULOCYTES # BLD: 0 10E9/L (ref 0–0.4)
IMM GRANULOCYTES NFR BLD: 0.6 %
LYMPHOCYTES # BLD AUTO: 1.9 10E9/L (ref 0.8–5.3)
LYMPHOCYTES NFR BLD AUTO: 27 %
MCH RBC QN AUTO: 25.6 PG (ref 26.5–33)
MCHC RBC AUTO-ENTMCNC: 31.5 G/DL (ref 31.5–36.5)
MCV RBC AUTO: 81 FL (ref 78–100)
MONOCYTES # BLD AUTO: 0.5 10E9/L (ref 0–1.3)
MONOCYTES NFR BLD AUTO: 6.6 %
NEUTROPHILS # BLD AUTO: 4.4 10E9/L (ref 1.6–8.3)
NEUTROPHILS NFR BLD AUTO: 64.5 %
NRBC # BLD AUTO: 0 10*3/UL
NRBC BLD AUTO-RTO: 0 /100
NUM BPU REQUESTED: 2
PLATELET # BLD AUTO: 240 10E9/L (ref 150–450)
POTASSIUM SERPL-SCNC: 3.5 MMOL/L (ref 3.4–5.3)
RBC # BLD AUTO: 3.32 10E12/L (ref 3.8–5.2)
SODIUM SERPL-SCNC: 137 MMOL/L (ref 133–144)
SPECIMEN EXP DATE BLD: NORMAL
SPECIMEN SOURCE: ABNORMAL
T PALLIDUM IGG+IGM SER QL: NEGATIVE
TRANSFUSION STATUS PATIENT QL: NORMAL
WBC # BLD AUTO: 6.8 10E9/L (ref 4–11)

## 2017-10-25 PROCEDURE — 86901 BLOOD TYPING SEROLOGIC RH(D): CPT | Performed by: STUDENT IN AN ORGANIZED HEALTH CARE EDUCATION/TRAINING PROGRAM

## 2017-10-25 PROCEDURE — 40000170 ZZH STATISTIC PRE-PROCEDURE ASSESSMENT II: Performed by: OBSTETRICS & GYNECOLOGY

## 2017-10-25 PROCEDURE — 25000128 H RX IP 250 OP 636: Performed by: NURSE ANESTHETIST, CERTIFIED REGISTERED

## 2017-10-25 PROCEDURE — T1013 SIGN LANG/ORAL INTERPRETER: HCPCS | Mod: U3

## 2017-10-25 PROCEDURE — S0028 INJECTION, FAMOTIDINE, 20 MG: HCPCS | Performed by: OBSTETRICS & GYNECOLOGY

## 2017-10-25 PROCEDURE — P9016 RBC LEUKOCYTES REDUCED: HCPCS | Performed by: STUDENT IN AN ORGANIZED HEALTH CARE EDUCATION/TRAINING PROGRAM

## 2017-10-25 PROCEDURE — 37000008 ZZH ANESTHESIA TECHNICAL FEE, 1ST 30 MIN: Performed by: OBSTETRICS & GYNECOLOGY

## 2017-10-25 PROCEDURE — 0UB70ZZ EXCISION OF BILATERAL FALLOPIAN TUBES, OPEN APPROACH: ICD-10-PCS | Performed by: OBSTETRICS & GYNECOLOGY

## 2017-10-25 PROCEDURE — C9290 INJ, BUPIVACAINE LIPOSOME: HCPCS | Performed by: ANESTHESIOLOGY

## 2017-10-25 PROCEDURE — 25000125 ZZHC RX 250: Performed by: CLINICAL NURSE SPECIALIST

## 2017-10-25 PROCEDURE — 25000125 ZZHC RX 250: Performed by: OBSTETRICS & GYNECOLOGY

## 2017-10-25 PROCEDURE — 88302 TISSUE EXAM BY PATHOLOGIST: CPT | Performed by: OBSTETRICS & GYNECOLOGY

## 2017-10-25 PROCEDURE — 40000977 ZZH STATISTIC ATTENDANCE AT DELIVERY

## 2017-10-25 PROCEDURE — 71000013 ZZH RECOVERY PHASE 1 LEVEL 1 EA ADDTL HR: Performed by: OBSTETRICS & GYNECOLOGY

## 2017-10-25 PROCEDURE — 25000128 H RX IP 250 OP 636: Performed by: STUDENT IN AN ORGANIZED HEALTH CARE EDUCATION/TRAINING PROGRAM

## 2017-10-25 PROCEDURE — 99207 ZZC NO CHARGE COORDINATED CARE PS: CPT

## 2017-10-25 PROCEDURE — 25000128 H RX IP 250 OP 636: Performed by: ANESTHESIOLOGY

## 2017-10-25 PROCEDURE — 25000125 ZZHC RX 250: Performed by: NURSE ANESTHETIST, CERTIFIED REGISTERED

## 2017-10-25 PROCEDURE — 25000128 H RX IP 250 OP 636: Performed by: OBSTETRICS & GYNECOLOGY

## 2017-10-25 PROCEDURE — 25000125 ZZHC RX 250: Performed by: PHYSICIAN ASSISTANT

## 2017-10-25 PROCEDURE — 88302 TISSUE EXAM BY PATHOLOGIST: CPT | Mod: 26 | Performed by: OBSTETRICS & GYNECOLOGY

## 2017-10-25 PROCEDURE — 86780 TREPONEMA PALLIDUM: CPT | Performed by: STUDENT IN AN ORGANIZED HEALTH CARE EDUCATION/TRAINING PROGRAM

## 2017-10-25 PROCEDURE — 37000009 ZZH ANESTHESIA TECHNICAL FEE, EACH ADDTL 15 MIN: Performed by: OBSTETRICS & GYNECOLOGY

## 2017-10-25 PROCEDURE — 25000125 ZZHC RX 250: Performed by: ANESTHESIOLOGY

## 2017-10-25 PROCEDURE — 27110028 ZZH OR GENERAL SUPPLY NON-STERILE: Performed by: OBSTETRICS & GYNECOLOGY

## 2017-10-25 PROCEDURE — 36000057 ZZH SURGERY LEVEL 3 1ST 30 MIN - UMMC: Performed by: OBSTETRICS & GYNECOLOGY

## 2017-10-25 PROCEDURE — 00000146 ZZHCL STATISTIC GLUCOSE BY METER IP

## 2017-10-25 PROCEDURE — 12000032 ZZH R&B OB CRITICAL UMMC

## 2017-10-25 PROCEDURE — 80048 BASIC METABOLIC PNL TOTAL CA: CPT | Performed by: STUDENT IN AN ORGANIZED HEALTH CARE EDUCATION/TRAINING PROGRAM

## 2017-10-25 PROCEDURE — 86850 RBC ANTIBODY SCREEN: CPT | Performed by: STUDENT IN AN ORGANIZED HEALTH CARE EDUCATION/TRAINING PROGRAM

## 2017-10-25 PROCEDURE — 27210794 ZZH OR GENERAL SUPPLY STERILE: Performed by: OBSTETRICS & GYNECOLOGY

## 2017-10-25 PROCEDURE — 36000059 ZZH SURGERY LEVEL 3 EA 15 ADDTL MIN UMMC: Performed by: OBSTETRICS & GYNECOLOGY

## 2017-10-25 PROCEDURE — 85025 COMPLETE CBC W/AUTO DIFF WBC: CPT | Performed by: ANESTHESIOLOGY

## 2017-10-25 PROCEDURE — 86900 BLOOD TYPING SEROLOGIC ABO: CPT | Performed by: STUDENT IN AN ORGANIZED HEALTH CARE EDUCATION/TRAINING PROGRAM

## 2017-10-25 PROCEDURE — 71000012 ZZH RECOVERY PHASE 1 LEVEL 1 FIRST HR: Performed by: OBSTETRICS & GYNECOLOGY

## 2017-10-25 PROCEDURE — 88307 TISSUE EXAM BY PATHOLOGIST: CPT | Performed by: OBSTETRICS & GYNECOLOGY

## 2017-10-25 PROCEDURE — 25000128 H RX IP 250 OP 636

## 2017-10-25 PROCEDURE — 88307 TISSUE EXAM BY PATHOLOGIST: CPT | Mod: 26 | Performed by: OBSTETRICS & GYNECOLOGY

## 2017-10-25 PROCEDURE — 86923 COMPATIBILITY TEST ELECTRIC: CPT | Performed by: STUDENT IN AN ORGANIZED HEALTH CARE EDUCATION/TRAINING PROGRAM

## 2017-10-25 PROCEDURE — 25000131 ZZH RX MED GY IP 250 OP 636 PS 637: Performed by: CLINICAL NURSE SPECIALIST

## 2017-10-25 PROCEDURE — 25000132 ZZH RX MED GY IP 250 OP 250 PS 637: Performed by: OBSTETRICS & GYNECOLOGY

## 2017-10-25 RX ORDER — FENTANYL CITRATE 50 UG/ML
INJECTION, SOLUTION INTRAMUSCULAR; INTRAVENOUS PRN
Status: DISCONTINUED | OUTPATIENT
Start: 2017-10-25 | End: 2017-10-25

## 2017-10-25 RX ORDER — LIDOCAINE 40 MG/G
CREAM TOPICAL
Status: DISCONTINUED | OUTPATIENT
Start: 2017-10-25 | End: 2017-10-25 | Stop reason: HOSPADM

## 2017-10-25 RX ORDER — SODIUM CHLORIDE, SODIUM LACTATE, POTASSIUM CHLORIDE, CALCIUM CHLORIDE 600; 310; 30; 20 MG/100ML; MG/100ML; MG/100ML; MG/100ML
INJECTION, SOLUTION INTRAVENOUS CONTINUOUS
Status: DISCONTINUED | OUTPATIENT
Start: 2017-10-25 | End: 2017-10-25 | Stop reason: HOSPADM

## 2017-10-25 RX ORDER — NALOXONE HYDROCHLORIDE 0.4 MG/ML
.1-.4 INJECTION, SOLUTION INTRAMUSCULAR; INTRAVENOUS; SUBCUTANEOUS
Status: DISCONTINUED | OUTPATIENT
Start: 2017-10-25 | End: 2017-10-28 | Stop reason: HOSPADM

## 2017-10-25 RX ORDER — NALOXONE HYDROCHLORIDE 0.4 MG/ML
.1-.4 INJECTION, SOLUTION INTRAMUSCULAR; INTRAVENOUS; SUBCUTANEOUS
Status: DISCONTINUED | OUTPATIENT
Start: 2017-10-25 | End: 2017-10-25

## 2017-10-25 RX ORDER — ONDANSETRON 2 MG/ML
4 INJECTION INTRAMUSCULAR; INTRAVENOUS EVERY 30 MIN PRN
Status: DISCONTINUED | OUTPATIENT
Start: 2017-10-25 | End: 2017-10-25 | Stop reason: HOSPADM

## 2017-10-25 RX ORDER — ONDANSETRON 4 MG/1
4 TABLET, ORALLY DISINTEGRATING ORAL EVERY 30 MIN PRN
Status: DISCONTINUED | OUTPATIENT
Start: 2017-10-25 | End: 2017-10-25 | Stop reason: HOSPADM

## 2017-10-25 RX ORDER — AMOXICILLIN 250 MG
1-2 CAPSULE ORAL 2 TIMES DAILY
Status: DISCONTINUED | OUTPATIENT
Start: 2017-10-25 | End: 2017-10-28 | Stop reason: HOSPADM

## 2017-10-25 RX ORDER — CEFAZOLIN SODIUM 1 G/3ML
1 INJECTION, POWDER, FOR SOLUTION INTRAMUSCULAR; INTRAVENOUS SEE ADMIN INSTRUCTIONS
Status: DISCONTINUED | OUTPATIENT
Start: 2017-10-25 | End: 2017-10-25 | Stop reason: HOSPADM

## 2017-10-25 RX ORDER — SIMETHICONE 80 MG
80 TABLET,CHEWABLE ORAL 4 TIMES DAILY PRN
Status: DISCONTINUED | OUTPATIENT
Start: 2017-10-25 | End: 2017-10-28 | Stop reason: HOSPADM

## 2017-10-25 RX ORDER — BUPIVACAINE HYDROCHLORIDE AND EPINEPHRINE 2.5; 5 MG/ML; UG/ML
INJECTION, SOLUTION INFILTRATION; PERINEURAL PRN
Status: DISCONTINUED | OUTPATIENT
Start: 2017-10-25 | End: 2017-10-25

## 2017-10-25 RX ORDER — LIDOCAINE 40 MG/G
CREAM TOPICAL
Status: DISCONTINUED | OUTPATIENT
Start: 2017-10-25 | End: 2017-10-28 | Stop reason: HOSPADM

## 2017-10-25 RX ORDER — DEXTROSE, SODIUM CHLORIDE, SODIUM LACTATE, POTASSIUM CHLORIDE, AND CALCIUM CHLORIDE 5; .6; .31; .03; .02 G/100ML; G/100ML; G/100ML; G/100ML; G/100ML
INJECTION, SOLUTION INTRAVENOUS CONTINUOUS
Status: DISCONTINUED | OUTPATIENT
Start: 2017-10-25 | End: 2017-10-28 | Stop reason: HOSPADM

## 2017-10-25 RX ORDER — MORPHINE SULFATE 1 MG/ML
INJECTION, SOLUTION EPIDURAL; INTRATHECAL; INTRAVENOUS PRN
Status: DISCONTINUED | OUTPATIENT
Start: 2017-10-25 | End: 2017-10-25

## 2017-10-25 RX ORDER — CEFAZOLIN SODIUM 2 G/100ML
2 INJECTION, SOLUTION INTRAVENOUS
Status: COMPLETED | OUTPATIENT
Start: 2017-10-25 | End: 2017-10-25

## 2017-10-25 RX ORDER — DIPHENHYDRAMINE HCL 25 MG
25 CAPSULE ORAL EVERY 6 HOURS PRN
Status: DISCONTINUED | OUTPATIENT
Start: 2017-10-25 | End: 2017-10-28 | Stop reason: HOSPADM

## 2017-10-25 RX ORDER — CARBOPROST TROMETHAMINE 250 UG/ML
250 INJECTION, SOLUTION INTRAMUSCULAR
Status: DISCONTINUED | OUTPATIENT
Start: 2017-10-25 | End: 2017-10-28 | Stop reason: HOSPADM

## 2017-10-25 RX ORDER — LIDOCAINE 40 MG/G
CREAM TOPICAL
Status: DISCONTINUED | OUTPATIENT
Start: 2017-10-25 | End: 2017-10-25

## 2017-10-25 RX ORDER — EPHEDRINE SULFATE 50 MG/ML
5 INJECTION, SOLUTION INTRAMUSCULAR; INTRAVENOUS; SUBCUTANEOUS
Status: DISCONTINUED | OUTPATIENT
Start: 2017-10-25 | End: 2017-10-25

## 2017-10-25 RX ORDER — OXYTOCIN/0.9 % SODIUM CHLORIDE 30/500 ML
340 PLASTIC BAG, INJECTION (ML) INTRAVENOUS CONTINUOUS PRN
Status: DISCONTINUED | OUTPATIENT
Start: 2017-10-25 | End: 2017-10-28 | Stop reason: HOSPADM

## 2017-10-25 RX ORDER — ONDANSETRON 2 MG/ML
4 INJECTION INTRAMUSCULAR; INTRAVENOUS EVERY 6 HOURS PRN
Status: DISCONTINUED | OUTPATIENT
Start: 2017-10-25 | End: 2017-10-28 | Stop reason: HOSPADM

## 2017-10-25 RX ORDER — IBUPROFEN 400 MG/1
400-800 TABLET, FILM COATED ORAL EVERY 6 HOURS PRN
Status: DISCONTINUED | OUTPATIENT
Start: 2017-10-25 | End: 2017-10-28 | Stop reason: HOSPADM

## 2017-10-25 RX ORDER — LANOLIN 100 %
OINTMENT (GRAM) TOPICAL
Status: DISCONTINUED | OUTPATIENT
Start: 2017-10-25 | End: 2017-10-28 | Stop reason: HOSPADM

## 2017-10-25 RX ORDER — KETOROLAC TROMETHAMINE 30 MG/ML
30 INJECTION, SOLUTION INTRAMUSCULAR; INTRAVENOUS EVERY 6 HOURS
Status: COMPLETED | OUTPATIENT
Start: 2017-10-25 | End: 2017-10-26

## 2017-10-25 RX ORDER — DIPHENHYDRAMINE HYDROCHLORIDE 50 MG/ML
25 INJECTION INTRAMUSCULAR; INTRAVENOUS EVERY 6 HOURS PRN
Status: DISCONTINUED | OUTPATIENT
Start: 2017-10-25 | End: 2017-10-28 | Stop reason: HOSPADM

## 2017-10-25 RX ORDER — MEPERIDINE HYDROCHLORIDE 25 MG/ML
12.5 INJECTION INTRAMUSCULAR; INTRAVENOUS; SUBCUTANEOUS EVERY 5 MIN PRN
Status: DISCONTINUED | OUTPATIENT
Start: 2017-10-25 | End: 2017-10-25 | Stop reason: HOSPADM

## 2017-10-25 RX ORDER — OXYTOCIN/0.9 % SODIUM CHLORIDE 30/500 ML
100 PLASTIC BAG, INJECTION (ML) INTRAVENOUS CONTINUOUS
Status: DISCONTINUED | OUTPATIENT
Start: 2017-10-25 | End: 2017-10-28 | Stop reason: HOSPADM

## 2017-10-25 RX ORDER — MISOPROSTOL 200 UG/1
400 TABLET ORAL
Status: DISCONTINUED | OUTPATIENT
Start: 2017-10-25 | End: 2017-10-28 | Stop reason: HOSPADM

## 2017-10-25 RX ORDER — OXYTOCIN 10 [USP'U]/ML
10 INJECTION, SOLUTION INTRAMUSCULAR; INTRAVENOUS
Status: DISCONTINUED | OUTPATIENT
Start: 2017-10-25 | End: 2017-10-28 | Stop reason: HOSPADM

## 2017-10-25 RX ORDER — NALBUPHINE HYDROCHLORIDE 10 MG/ML
2.5-5 INJECTION, SOLUTION INTRAMUSCULAR; INTRAVENOUS; SUBCUTANEOUS EVERY 6 HOURS PRN
Status: DISCONTINUED | OUTPATIENT
Start: 2017-10-25 | End: 2017-10-25

## 2017-10-25 RX ORDER — KETOROLAC TROMETHAMINE 30 MG/ML
INJECTION, SOLUTION INTRAMUSCULAR; INTRAVENOUS PRN
Status: DISCONTINUED | OUTPATIENT
Start: 2017-10-25 | End: 2017-10-25

## 2017-10-25 RX ORDER — OXYTOCIN/0.9 % SODIUM CHLORIDE 30/500 ML
PLASTIC BAG, INJECTION (ML) INTRAVENOUS CONTINUOUS PRN
Status: DISCONTINUED | OUTPATIENT
Start: 2017-10-25 | End: 2017-10-25

## 2017-10-25 RX ORDER — BUPIVACAINE HYDROCHLORIDE 7.5 MG/ML
INJECTION, SOLUTION INTRASPINAL PRN
Status: DISCONTINUED | OUTPATIENT
Start: 2017-10-25 | End: 2017-10-25

## 2017-10-25 RX ORDER — CITRIC ACID/SODIUM CITRATE 334-500MG
30 SOLUTION, ORAL ORAL
Status: DISCONTINUED | OUTPATIENT
Start: 2017-10-25 | End: 2017-10-25 | Stop reason: HOSPADM

## 2017-10-25 RX ORDER — ACETAMINOPHEN 325 MG/1
650 TABLET ORAL EVERY 4 HOURS PRN
Status: DISCONTINUED | OUTPATIENT
Start: 2017-10-28 | End: 2017-10-26

## 2017-10-25 RX ORDER — ACETAMINOPHEN 325 MG/1
975 TABLET ORAL EVERY 8 HOURS
Status: DISPENSED | OUTPATIENT
Start: 2017-10-25 | End: 2017-10-28

## 2017-10-25 RX ORDER — SODIUM CHLORIDE 9 MG/ML
INJECTION, SOLUTION INTRAVENOUS
Status: COMPLETED
Start: 2017-10-25 | End: 2017-10-25

## 2017-10-25 RX ORDER — CEFAZOLIN SODIUM 2 G/100ML
2 INJECTION, SOLUTION INTRAVENOUS
Status: DISCONTINUED | OUTPATIENT
Start: 2017-10-25 | End: 2017-10-25 | Stop reason: HOSPADM

## 2017-10-25 RX ORDER — HYDROMORPHONE HYDROCHLORIDE 2 MG/1
2-4 TABLET ORAL
Status: DISCONTINUED | OUTPATIENT
Start: 2017-10-25 | End: 2017-10-27

## 2017-10-25 RX ORDER — HYDROCORTISONE 2.5 %
CREAM (GRAM) TOPICAL 3 TIMES DAILY PRN
Status: DISCONTINUED | OUTPATIENT
Start: 2017-10-25 | End: 2017-10-28 | Stop reason: HOSPADM

## 2017-10-25 RX ORDER — BISACODYL 10 MG
10 SUPPOSITORY, RECTAL RECTAL DAILY PRN
Status: DISCONTINUED | OUTPATIENT
Start: 2017-10-27 | End: 2017-10-28 | Stop reason: HOSPADM

## 2017-10-25 RX ORDER — MORPHINE SULFATE 1 MG/ML
0.2 INJECTION, SOLUTION EPIDURAL; INTRATHECAL; INTRAVENOUS ONCE
Status: DISCONTINUED | OUTPATIENT
Start: 2017-10-25 | End: 2017-10-25

## 2017-10-25 RX ADMIN — Medication 0.2 MG: at 00:08

## 2017-10-25 RX ADMIN — BUPIVACAINE 20 ML: 13.3 INJECTION, SUSPENSION, LIPOSOMAL INFILTRATION at 11:11

## 2017-10-25 RX ADMIN — METOCLOPRAMIDE 10 MG: 5 INJECTION, SOLUTION INTRAMUSCULAR; INTRAVENOUS at 03:13

## 2017-10-25 RX ADMIN — ONDANSETRON 4 MG: 2 INJECTION INTRAMUSCULAR; INTRAVENOUS at 09:56

## 2017-10-25 RX ADMIN — METOCLOPRAMIDE 10 MG: 5 INJECTION, SOLUTION INTRAMUSCULAR; INTRAVENOUS at 22:51

## 2017-10-25 RX ADMIN — SODIUM CHLORIDE: 9 INJECTION, SOLUTION INTRAVENOUS at 01:19

## 2017-10-25 RX ADMIN — SODIUM CHLORIDE 100 ML/HR: 0.9 INJECTION, SOLUTION INTRAVENOUS at 18:00

## 2017-10-25 RX ADMIN — FENTANYL CITRATE 50 MCG: 50 INJECTION, SOLUTION INTRAMUSCULAR; INTRAVENOUS at 10:11

## 2017-10-25 RX ADMIN — KETOROLAC TROMETHAMINE 30 MG: 30 INJECTION, SOLUTION INTRAMUSCULAR at 10:42

## 2017-10-25 RX ADMIN — ACETAMINOPHEN 975 MG: 325 TABLET, FILM COATED ORAL at 15:20

## 2017-10-25 RX ADMIN — PANTOPRAZOLE SODIUM 40 MG: 40 INJECTION, POWDER, FOR SOLUTION INTRAVENOUS at 19:57

## 2017-10-25 RX ADMIN — CEFAZOLIN SODIUM 2 G: 2 INJECTION, SOLUTION INTRAVENOUS at 09:51

## 2017-10-25 RX ADMIN — PHENYLEPHRINE HYDROCHLORIDE 0.4 MCG/KG/MIN: 10 INJECTION, SOLUTION INTRAMUSCULAR; INTRAVENOUS; SUBCUTANEOUS at 09:45

## 2017-10-25 RX ADMIN — KETOROLAC TROMETHAMINE 30 MG: 30 INJECTION, SOLUTION INTRAMUSCULAR at 21:56

## 2017-10-25 RX ADMIN — Medication 0.5 MG: at 13:41

## 2017-10-25 RX ADMIN — SODIUM CHLORIDE, POTASSIUM CHLORIDE, SODIUM LACTATE AND CALCIUM CHLORIDE: 600; 310; 30; 20 INJECTION, SOLUTION INTRAVENOUS at 09:35

## 2017-10-25 RX ADMIN — BUPIVACAINE HYDROCHLORIDE IN DEXTROSE 1.7 ML: 7.5 INJECTION, SOLUTION SUBARACHNOID at 09:40

## 2017-10-25 RX ADMIN — ACETAMINOPHEN 975 MG: 325 TABLET, FILM COATED ORAL at 22:51

## 2017-10-25 RX ADMIN — HYDROMORPHONE HYDROCHLORIDE 2 MG: 2 TABLET ORAL at 22:51

## 2017-10-25 RX ADMIN — Medication 0.2 MG: at 04:06

## 2017-10-25 RX ADMIN — Medication 0.2 MG: at 09:40

## 2017-10-25 RX ADMIN — OXYTOCIN-SODIUM CHLORIDE 0.9% IV SOLN 30 UNIT/500ML 100 ML/HR: 30-0.9/5 SOLUTION at 12:34

## 2017-10-25 RX ADMIN — SODIUM CHLORIDE, SODIUM LACTATE, POTASSIUM CHLORIDE, CALCIUM CHLORIDE, AND DEXTROSE MONOHYDRATE: 600; 310; 30; 20; 5 INJECTION, SOLUTION INTRAVENOUS at 01:19

## 2017-10-25 RX ADMIN — KETOROLAC TROMETHAMINE 30 MG: 30 INJECTION, SOLUTION INTRAMUSCULAR at 16:10

## 2017-10-25 RX ADMIN — HUMAN INSULIN 0.2 UNITS/HR: 100 INJECTION, SOLUTION SUBCUTANEOUS at 12:30

## 2017-10-25 RX ADMIN — SODIUM CHLORIDE, SODIUM LACTATE, POTASSIUM CHLORIDE, CALCIUM CHLORIDE, AND DEXTROSE MONOHYDRATE: 600; 310; 30; 20; 5 INJECTION, SOLUTION INTRAVENOUS at 13:51

## 2017-10-25 RX ADMIN — Medication 0.4 MG: at 12:42

## 2017-10-25 RX ADMIN — BUPIVACAINE HYDROCHLORIDE AND EPINEPHRINE BITARTRATE 20 ML: 2.5; .005 INJECTION, SOLUTION INFILTRATION; PERINEURAL at 11:21

## 2017-10-25 RX ADMIN — OXYTOCIN-SODIUM CHLORIDE 0.9% IV SOLN 30 UNIT/500ML 300 ML/HR: 30-0.9/5 SOLUTION at 10:10

## 2017-10-25 RX ADMIN — MORPHINE SULFATE 0.2 MG: 5 INJECTION, SOLUTION INTRAVENOUS at 09:40

## 2017-10-25 RX ADMIN — INSULIN GLARGINE 10 UNITS: 100 INJECTION, SOLUTION SUBCUTANEOUS at 19:58

## 2017-10-25 RX ADMIN — HUMAN INSULIN 3 UNITS/HR: 100 INJECTION, SOLUTION SUBCUTANEOUS at 09:35

## 2017-10-25 RX ADMIN — METOCLOPRAMIDE 10 MG: 5 INJECTION, SOLUTION INTRAMUSCULAR; INTRAVENOUS at 16:51

## 2017-10-25 RX ADMIN — FAMOTIDINE 20 MG: 10 INJECTION, SOLUTION INTRAVENOUS at 16:51

## 2017-10-25 RX ADMIN — FENTANYL CITRATE 50 MCG: 50 INJECTION, SOLUTION INTRAMUSCULAR; INTRAVENOUS at 10:15

## 2017-10-25 NOTE — ANESTHESIA PREPROCEDURE EVALUATION
Anesthesia Evaluation     . Pt has had prior anesthetic. Type: General    No history of anesthetic complications          ROS/MED HX    ENT/Pulmonary:  - neg pulmonary ROS     Neurologic:  - neg neurologic ROS     Cardiovascular:  - neg cardiovascular ROS       METS/Exercise Tolerance:     Hematologic:  - neg hematologic  ROS       Musculoskeletal:  - neg musculoskeletal ROS       GI/Hepatic:  - neg GI/hepatic ROS       Renal/Genitourinary:  - ROS Renal section negative       Endo:     (+) type I DM, Using insulin - not using insulin pump Diabetic complications: gastroparesis, .      Psychiatric:     (+) psychiatric history depression      Infectious Disease:  - neg infectious disease ROS       Malignancy:         Other:                     Physical Exam  Normal systems: cardiovascular, pulmonary and dental    Airway   Mallampati: II  TM distance: >3 FB  Neck ROM: full    Dental     Cardiovascular       Pulmonary                     Anesthesia Plan      History & Physical Review  History and physical reviewed and following examination; no interval change.    ASA Status:  3 .        Plan for Spinal   PONV prophylaxis:  Ondansetron (or other 5HT-3)       Postoperative Care  Postoperative pain management:  IV analgesics, Oral pain medications, Neuraxial analgesia and Peripheral nerve block (Single Shot).      Consents  Anesthetic plan, risks, benefits and alternatives discussed with:  Patient.  Use of blood products discussed: Yes.   Use of blood products discussed with Patient.  Consented to blood products.  .

## 2017-10-25 NOTE — PLAN OF CARE
Problem: Patient Care Overview  Goal: Plan of Care/Patient Progress Review  Outcome: No Change  VSS.  Blood glucose stable currently on insulin pump, after episodes of hyperglycemia and hypoglycemia overnight.  Dr. Chavez aware of blood glucoses, and MD notified that patient refusing to monitor on EFM and toco, and refusing to measure voids.  Reports active fetal movement, and denies uc's overnight.  Plan is for Azeri  to arrive at 0630, and repeat  Section with PPTL at 0830.

## 2017-10-25 NOTE — ANESTHESIA POSTPROCEDURE EVALUATION
Patient: Anne Gunter    Procedure(s):  Repeat  Section, Tubal Ligation  - Wound Class: II-Clean Contaminated    Diagnosis:Previous  Section, Desires Permenant Sterlization  Diagnosis Additional Information: No value filed.    Anesthesia Type:  Spinal    Note:  Anesthesia Post Evaluation    Patient location: Obstetric PACU.  Patient participation: Able to fully participate in evaluation  Level of consciousness: awake and alert  Pain management: adequate  Airway patency: patent  Cardiovascular status: acceptable  Respiratory status: acceptable  Hydration status: acceptable  PONV: none     Anesthetic complications: None          Last vitals:  Vitals:    10/24/17 2053 10/25/17 0013 10/25/17 0753   BP: 106/63 104/63 110/68   Pulse:      Resp: 16 18 18   Temp: 36.9  C (98.5  F) 37.1  C (98.7  F) 36.7  C (98  F)   SpO2:            Electronically Signed By: Mehul Stoner MD, MD  2017  11:34 AM

## 2017-10-25 NOTE — BRIEF OP NOTE
[facility]   Brief Operative Note     Surgery Date: 10/25/2017    Surgeon:  Alicja Rodríguez MD     Assistants:  Denise Benton MD, PGY-3        Pre-op Diagnosis:    - Intrauterine pregnancy at 32w0d  - Poorly controlled type 1 diabetes mellitus   - Severe gastroparesis, malnutrition   - Opioid dependency   - Desires permanent sterilization     Post-op Diagnosis:    - Same   - Liveborn male infant     Procedure:  Repeat low-transverse  section with two layer uterine closure via Pfannenstiel skin incision, bilateral salpingectomy     Anesthesia: Spinal     EBL:  800 mL    IVF:  400 mL crystalloid    UOP:  250 mL clear yellow urine at the end of the case    Drains: Yates Catheter     Specimens:  Right and left fallopian tubes, placenta     Complications: None apparent    Findings:   1. Moderate to dense rectofascial adhesions and no signficant intraabdominal adhesions.   2. Clear amniotic fluid.  3. Liveborn male infant in breech presentation. Apgars and weight per NICU.   4. Normal uterus, fallopian tubes, and ovaries except for small 1 cm posterior subserosal fibroid.     Disposition:  Transferred in stable condition to GIULIANA Benton MD  Ob/Gyn, PGY-3

## 2017-10-25 NOTE — ANESTHESIA PROCEDURE NOTES
Spinal/LP Procedure Note    Spinal Block  Staff:     Anesthesiologist:  MIREILLE HERNANDEZ  Location: OB  Procedure Start/Stop Times:     patient identified, IV checked, site marked, risks and benefits discussed, informed consent, monitors and equipment checked, pre-op evaluation and at physician/surgeon's request      Correct Patient: Yes      Correct Position: Yes      Correct Site: Yes      Correct Procedure: Yes      Correct Laterality:  N/A    Site Marked:  N/A  Procedure:     Procedure:  Intrathecal    ASA:  2 and 3    Position:  Sitting    Sterile Prep: chloraprep      Insertion site:  L3-4    Approach:  Midline    Needle Type:  Bryn    Needle gauge (G):  25    Local Skin Infiltration:  1% lidocaine    amount (ml):  1    Needle Length (in):  3.5    Introducer used: Yes      Introducer gauge:  20 G    Attempts:  1    Redirects:  0    CSF:  Clear  Assessment/Narrative:     Sensory Level:  T4

## 2017-10-25 NOTE — PLAN OF CARE
Patient arrived to [facility] unit via zoom cart at 1430,with belongings, accompanied by family,  Bedside report received from transferring RN.  Unit and room orientation started. Call light within arms reach. Continue with plan of care.

## 2017-10-25 NOTE — PLAN OF CARE
Problem: Patient Care Overview  Goal: Plan of Care/Patient Progress Review  Outcome: No Change  VSS.  Patient declined monitoring this evening.  Patient denies uc's, reports active FM.  Dr. Chavez notified.  Insulin pump infusing.  Plan to continue hourly blood glucoses and 0830 am C/S.

## 2017-10-25 NOTE — ANESTHESIA CARE TRANSFER NOTE
Patient: Anne Gunter    Procedure(s):  Repeat  Section, Tubal Ligation  - Wound Class: II-Clean Contaminated    Diagnosis: Previous  Section, Desires Permenant Sterlization  Diagnosis Additional Information: No value filed.    Anesthesia Type:   Spinal     Note:  Airway :Room Air  Patient transferred to:Labor and Delivery  Comments: Report to JEMAL Ramires  Pt awake, requesting mother,  present, SaO2 99 % RA  114/71  68 SR    IV infusionsa through PICC line x2:  1) Insulin @ 3 u / hr  And 2) Pitocin 300 u / hr  SAB [id number] Report: Identifed the Patient, Identified the Reponsible Provider, Reviewed the pertinent medical history, Discussed the surgical course, Reviewed Intra-OP anesthesia mangement and issues during anesthesia, Set expectations for post-procedure period and Allowed opportunity for questions and acknowledgement of understanding      Vitals: (Last set prior to Anesthesia Care Transfer)    CRNA VITALS  10/25/2017 1024 - 10/25/2017 1110      10/25/2017             NIBP: 108/73    NIBP Mean: 87                Electronically Signed By: KING Giordano CRNA  2017  11:10 AM

## 2017-10-25 NOTE — OP NOTE
[facility]  Full Operative Progress Note     Surgery Date:  10/25/2017    Surgeon:  Alicja Rodríguez MD     Assistants:  Denise Benton MD, PGY-3    Pre-op Diagnosis:    - Intrauterine pregnancy at 32w0d  - Poorly controlled type 1 diabetes mellitus   - Severe gastroparesis, malnutrition   - Opioid dependency   - History of  section x1  - Desires permanent sterilization     Post-op Diagnosis:    - Same   - Liveborn male infant     Procedure:  Repeat low-transverse  section with double layer uterine closure via Pfannenstiel skin incision, bilateral salpingectomy     Anesthesia: Spinal     EBL:  800 mL    IVF:  400 mL crystalloid    UOP:  250 mL clear yellow urine at the end of the case    Drains: Yates Catheter     Specimens:  Right and left fallopian tubes, placenta     Complications: None apparent    Indications:   Anne Gunter is a 27 year old  at 32w0d admitted for abdominal pain, nausea, and vomiting in the setting of gastroparesis and poorly controlled Type 1 diabetes mellitus.  She was admitted for anti-emetics and pain control, as her seventh admission for these issues.  During her admission, she was started on an insulin drip for control of her blood glucoses while she received a course of betamethasone for fetal lung maturity.  The decision was made to proceed with delivery at 32 weeks due to her poorly controlled diabetes and malnutrition.  Given her history of a  section and desire for sterilization, repeat  section and bilateral tubal ligation was recommended. The risks, benefits, and alternatives of  section were discussed with the patient, and she agreed to proceed. Consent was signed.     Findings:   1. Moderate to dense rectofascial adhesions.  No significant intraabdominal adhesions.   2. Clear amniotic fluid.  3. Liveborn male infant in kaye breech presentation. Apgars 6 at 1 minute & 9 at 5 minutes. Weight pending per  NICU.  4. Arterial cord pH 7.26, base deficit 2.4. Venous cord pH 7.38, base deficit 0.2.  5. Normal appearing uterus, fallopian tubes, and ovaries except for small 1 cm posterior subserosal fibroid.     Procedure Details:   The patient was brought to the OR, where adequate spinal anesthesia was administered.  The patient was given 2 gram of Ancef for antibiotic prophylaxis and had sequential compression devices in place for VTE prophylaxis.  She was placed in the dorsal supine position with a slight leftward tilt. She was prepped and draped in the usual sterile fashion. A surgical time out was performed. A pfannenstiel skin incision was made with the scalpel, and carried down to the underlying fascia with sharp and blunt dissection. The fascia was incised in the midline, and the incision was extended laterally with the Nunes scissors. The superior aspect of the fascia was grasped with the Kocher clamps and dissected off of the underlying rectus muscles with blunt and sharp dissection. Attention was then turned to the inferior aspect of the fascia, which was similarly dissected off of the underlying rectus muscles. The rectus muscles were  in the midline, and the peritoneum was entered bluntly, and the opening was extended with digital pressure and electrocautery. The bladder blade was placed. A transverse hysterotomy was made with the scalpel in the lower uterine segment, and the incision was extended with digital pressure. The infant was noted to be in the kaye breech position.  The fetal sacrum was elevated to the level of the hysterotomy and standard breech maneuvers were used to delivery the body.  The head was flexed and delivered with fundal pressure.No nuchal cord was noted. The cord was doubly clamped and cut immediately, and the infant was handed off to the awaiting NICU staff. A segment of cord was cut and handed off. The placenta was delivered with gentle traction on the umbilical cord and  uterine massage. The uterus was exteriorized and cleared of all clots and debris. Uterine tone was noted to be firm with IV pitocin and uterine massage.  The hysterotomy was closed with a running locked suture of 0 Vicryl.  The hysterotomy was then imbricated using an 0 Monocryl suture. The hysterotomy was noted to be hemostatic. Attention was then turned to the bilateral salpingectomy.  The right fallopian tube was grasped with a Krys clamp and the mesosalpinx immediately inferior to the tube was serially cauterized using the handheld Ligasure.  The tube was  at the cornua.  The left fallopian tube was then grasped with a Krys clamp and serially cauterized along the mesosalpinx and amputated at the cornua.  The surgical sites including the hysterotomy were noted to be hemostatic. The posterior cul-de-sac was cleared of all clots and debris. The uterus was returned to the abdomen. The pericolic gutters were cleared of all clots and debris. The hysterotomy was reexamined and noted to be hemostatic. The fascia and rectus muscles were examined and areas of oozing were controlled with electrocautery. The fascia was closed with a running 0 Vicryl suture. The subcutaneous tissue was irrigated and areas of oozing were controlled with electrocautery. The subcutaneous tissue was less than 2 cm in thickness, and was therefore not closed. The skin was closed with 4-0 Monocryl and covered with a sterile dressing.    All sponge, needle, and instrument counts were correct. The patient tolerated the procedure well, and was transferred to recovery in stable condition. Dr. Rodríguez was present and scrubbed for the entirety of the procedure.     Denise Benton MD  Ob/Gyn, PGY-3    Staff:  I was scrubbed and present for entire case and agree w/ above note.    Alicja Rodríguez MD

## 2017-10-25 NOTE — PROGRESS NOTES
Brief Progress Note    Patient refusing fetal monitoring tonight.     Currently doing wash for CS in am.  Will personally collect GBS.  Strongly declines having staff collect GBS over past 1 week.    Plan for delivery via CS tomorrow at 8:30am. Continue insulin drip overnight. NPO at midnight.     Fabiana Chavez MD MPH  OB/GYN, PGY3  Pager: [phone]  10/24/2017  10:52 PM

## 2017-10-25 NOTE — PROVIDER NOTIFICATION
10/25/17 0115   Provider Notification   Provider Name/Title Dr. Chavez   Method of Notification In Department   Request Evaluate - Remote   Notification Reason Lab/Diagnostic Study       Notified provider that patients blood sugar was 50. Provider ok with patient having 15 gms of sugar or 1 apple juice despite being NPO.     Bedside nurse with patient.

## 2017-10-25 NOTE — PROVIDER NOTIFICATION
10/25/17 0129   Provider Notification   Provider Name/Title Dr. Chavez    Method of Notification In Department   Request Evaluate - Remote   Notification Reason Lab/Diagnostic Study   MD notified of Blood Gucose 55, patient asymptomatic.  Per MD, may give additional apple juice now, to treat hypoglycemia.

## 2017-10-25 NOTE — PLAN OF CARE
Pt prepped for surgery, at this time refusing fetal monitoring and compression boots.  FHR check was 130.   Dr. Stoner and Dr. Rodríguez both saw pt. Transfer to OR @ 6719

## 2017-10-25 NOTE — PROGRESS NOTES
Diabetes Consult Daily  Progress Note          Assessment/Plan:     Ms. Anne Gunter is a 26 yo woman at admitted at 31w3d of pregnancy on 10/20/17 with abdominal pain, N/V.  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    Expect decrease in insulin need post-partum.  Insulin needs prior to pregnancy not well established, so:      -continue IV insulin infusion, but switching to regular adult IV insulin infusion per protocol  -dextrose fluids will stop when taking PO  -will order give first dose of glargine in the evening today, keep IV Insulin running overnight, give second dose of glargine in the morning tomorrow and then stop IV insulin.    -meal aspart reduced to 1 unit per 15 grams carbohydrate       Outpatient diabetes follow up: with Dr. Colunga- recommend pt be seen within 1-2 weeks of discharge.  Plan discussed with patient, bedside RN           Interval History:   The last 24 hours progress and nursing notes reviewed.  Delivered baby boy this morning.  In pain and drowsy after receiving analgesia.  Still NPO and on dextrose fluids 100 cc/h.        Recent Labs  Lab 10/25/17  1030 10/25/17  0856 10/25/17  0730 10/25/17  0625 10/25/17  0531 10/25/17  0411  10/20/17  1900   GLC  --   --  102*  --   --   --   --  100*   BGM 98 138* 107* 144* 118* 104*  < >  --    < > = values in this interval not displayed.            Review of Systems:   See interval hx          Medications:     None       Physical Exam:  Gen: resting on gurney in PACU  HEENT: NC/AT, mucous membranes are sl dry  Resp: Unlabored  Neuro:arouses to voice, communicating clearly  /68  Pulse 80  Temp 98  F (36.7  C) (Oral)  Resp 18  Wt 73.5 kg (162 lb  1.6 oz)  SpO2 96%  BMI 25.44 kg/m2           Data:     Lab Results   Component Value Date    A1C 7.4 10/11/2017    A1C 8.4 09/10/2017    A1C 9.2 06/01/2016    A1C 9.8 05/23/2016    A1C 7.4 04/23/2016            Recent Labs   Lab Test  10/25/17   0730  10/20/17   1900   NA  137  138   POTASSIUM  3.5  3.8   CHLORIDE  106  103   CO2  26  24   ANIONGAP  5  11   GLC  102*  100*   BUN  4*  4*   CR  0.37*  0.42*   LILLIAM  7.9*  8.9     CBC RESULTS:   Recent Labs   Lab Test  10/25/17   0730   WBC  6.8   RBC  3.32*   HGB  8.5*   HCT  27.0*   MCV  81   MCH  25.6*   MCHC  31.5   RDW  15.1*   PLT  240         Melba Ashtonpton APRN [facility] [phone]    Diabetes Management job code [id number]

## 2017-10-25 NOTE — ANESTHESIA PROCEDURE NOTES
Peripheral Nerve Block Procedure Note    Staff:     Anesthesiologist:  MIREILLE HERNANDEZ  Location: OB and PACU  Procedure Start/Stop TImes:      10/25/2017 11:21 AM     10/25/2017 11:49 AM    patient identified, IV checked, site marked, risks and benefits discussed, informed consent, monitors and equipment checked, pre-op evaluation, at physician/surgeon's request and post-op pain management      Correct Patient: Yes      Correct Position: Yes      Correct Site: Yes      Correct Procedure: Yes      Correct Laterality:  Yes    Site Marked:  Yes  Procedure details:     Procedure:  TAP    ASA:  3    Laterality:  Bilateral    Sterile Prep: chloraprep, mask and sterile gloves      Local skin infiltration:  None    Insertion Site:  T10-11    Needle gauge:  21    Needle length (mm):  110    Ultrasound: Yes      Ultrasound used to identify targeted nerve, plexus, or vascular structure and placed a needle adjacent to it      Permanent Image entered into patiient's record      Abnormal pain on injection: No      Blood Aspirated: No      Paresthesias:  No    Bleeding at site: No      Test dose negative for signs of intravascular injection: Yes      Infusion Method:  Single Shot    Complications:  None

## 2017-10-26 ENCOUNTER — OFFICE VISIT (OUTPATIENT)
Dept: INTERPRETER SERVICES | Facility: CLINIC | Age: 27
End: 2017-10-26

## 2017-10-26 LAB
GLUCOSE BLDC GLUCOMTR-MCNC: 106 MG/DL (ref 70–99)
GLUCOSE BLDC GLUCOMTR-MCNC: 106 MG/DL (ref 70–99)
GLUCOSE BLDC GLUCOMTR-MCNC: 109 MG/DL (ref 70–99)
GLUCOSE BLDC GLUCOMTR-MCNC: 111 MG/DL (ref 70–99)
GLUCOSE BLDC GLUCOMTR-MCNC: 122 MG/DL (ref 70–99)
GLUCOSE BLDC GLUCOMTR-MCNC: 124 MG/DL (ref 70–99)
GLUCOSE BLDC GLUCOMTR-MCNC: 134 MG/DL (ref 70–99)
GLUCOSE BLDC GLUCOMTR-MCNC: 154 MG/DL (ref 70–99)
GLUCOSE BLDC GLUCOMTR-MCNC: 177 MG/DL (ref 70–99)
GLUCOSE BLDC GLUCOMTR-MCNC: 235 MG/DL (ref 70–99)
GLUCOSE BLDC GLUCOMTR-MCNC: 273 MG/DL (ref 70–99)
GLUCOSE BLDC GLUCOMTR-MCNC: 81 MG/DL (ref 70–99)
GLUCOSE BLDC GLUCOMTR-MCNC: 92 MG/DL (ref 70–99)
GLUCOSE BLDC GLUCOMTR-MCNC: 93 MG/DL (ref 70–99)
HGB BLD-MCNC: 7.7 G/DL (ref 11.7–15.7)
HGB BLD-MCNC: 7.7 G/DL (ref 11.7–15.7)

## 2017-10-26 PROCEDURE — 99222 1ST HOSP IP/OBS MODERATE 55: CPT | Performed by: PHYSICIAN ASSISTANT

## 2017-10-26 PROCEDURE — 85018 HEMOGLOBIN: CPT | Performed by: OBSTETRICS & GYNECOLOGY

## 2017-10-26 PROCEDURE — 25000131 ZZH RX MED GY IP 250 OP 636 PS 637: Performed by: CLINICAL NURSE SPECIALIST

## 2017-10-26 PROCEDURE — 00000146 ZZHCL STATISTIC GLUCOSE BY METER IP

## 2017-10-26 PROCEDURE — 25000132 ZZH RX MED GY IP 250 OP 250 PS 637: Performed by: OBSTETRICS & GYNECOLOGY

## 2017-10-26 PROCEDURE — 99207 ZZC CONSULT E&M CHANGED TO INITIAL LEVEL: CPT | Performed by: PHYSICIAN ASSISTANT

## 2017-10-26 PROCEDURE — T1013 SIGN LANG/ORAL INTERPRETER: HCPCS | Mod: U3

## 2017-10-26 PROCEDURE — 25000128 H RX IP 250 OP 636: Performed by: OBSTETRICS & GYNECOLOGY

## 2017-10-26 PROCEDURE — 12000030 ZZH R&B OB INTERMEDIATE UMMC

## 2017-10-26 PROCEDURE — 85018 HEMOGLOBIN: CPT | Performed by: STUDENT IN AN ORGANIZED HEALTH CARE EDUCATION/TRAINING PROGRAM

## 2017-10-26 RX ORDER — METOCLOPRAMIDE 10 MG/1
10 TABLET ORAL EVERY 6 HOURS
Status: DISCONTINUED | OUTPATIENT
Start: 2017-10-26 | End: 2017-10-28 | Stop reason: HOSPADM

## 2017-10-26 RX ORDER — SODIUM CHLORIDE 9 MG/ML
INJECTION, SOLUTION INTRAVENOUS CONTINUOUS
Status: DISCONTINUED | OUTPATIENT
Start: 2017-10-26 | End: 2017-10-28 | Stop reason: HOSPADM

## 2017-10-26 RX ORDER — SODIUM CHLORIDE 9 MG/ML
INJECTION, SOLUTION INTRAVENOUS
Status: DISPENSED
Start: 2017-10-26 | End: 2017-10-26

## 2017-10-26 RX ORDER — PANTOPRAZOLE SODIUM 40 MG/1
40 TABLET, DELAYED RELEASE ORAL
Status: DISCONTINUED | OUTPATIENT
Start: 2017-10-26 | End: 2017-10-28 | Stop reason: HOSPADM

## 2017-10-26 RX ORDER — HYDROMORPHONE HYDROCHLORIDE 2 MG/1
2 TABLET ORAL EVERY 6 HOURS PRN
Qty: 20 TABLET | Refills: 0 | Status: SHIPPED | OUTPATIENT
Start: 2017-10-26 | End: 2017-10-26

## 2017-10-26 RX ORDER — HYDROMORPHONE HYDROCHLORIDE 2 MG/1
2 TABLET ORAL EVERY 6 HOURS PRN
Qty: 20 TABLET | Refills: 0 | Status: SHIPPED | OUTPATIENT
Start: 2017-10-26 | End: 2017-10-28

## 2017-10-26 RX ORDER — SODIUM CHLORIDE, SODIUM LACTATE, POTASSIUM CHLORIDE, CALCIUM CHLORIDE 600; 310; 30; 20 MG/100ML; MG/100ML; MG/100ML; MG/100ML
INJECTION, SOLUTION INTRAVENOUS CONTINUOUS
Status: DISCONTINUED | OUTPATIENT
Start: 2017-10-26 | End: 2017-10-26

## 2017-10-26 RX ORDER — FAMOTIDINE 10 MG
10 TABLET ORAL 2 TIMES DAILY
Status: DISCONTINUED | OUTPATIENT
Start: 2017-10-27 | End: 2017-10-28 | Stop reason: HOSPADM

## 2017-10-26 RX ADMIN — HYDROMORPHONE HYDROCHLORIDE 2 MG: 2 TABLET ORAL at 05:07

## 2017-10-26 RX ADMIN — HYDROMORPHONE HYDROCHLORIDE 2 MG: 2 TABLET ORAL at 01:53

## 2017-10-26 RX ADMIN — KETOROLAC TROMETHAMINE 30 MG: 30 INJECTION, SOLUTION INTRAMUSCULAR at 09:56

## 2017-10-26 RX ADMIN — ONDANSETRON 4 MG: 2 INJECTION INTRAMUSCULAR; INTRAVENOUS at 20:49

## 2017-10-26 RX ADMIN — IBUPROFEN 800 MG: 400 TABLET ORAL at 19:29

## 2017-10-26 RX ADMIN — ACETAMINOPHEN 975 MG: 325 TABLET, FILM COATED ORAL at 06:51

## 2017-10-26 RX ADMIN — INSULIN GLARGINE 12 UNITS: 100 INJECTION, SOLUTION SUBCUTANEOUS at 09:56

## 2017-10-26 RX ADMIN — SODIUM CHLORIDE: 9 INJECTION, SOLUTION INTRAVENOUS at 06:51

## 2017-10-26 RX ADMIN — KETOROLAC TROMETHAMINE 30 MG: 30 INJECTION, SOLUTION INTRAMUSCULAR at 03:58

## 2017-10-26 RX ADMIN — Medication: at 10:00

## 2017-10-26 NOTE — PLAN OF CARE
VSS. Postpartum checks WDL. Incisional drsg CDI. Yates in place good output overnight. Removed at 0545. Ambulated to bathroom with SBA, tolerated well without dizziness. Was able to have BM this morning. Insulin gtt infusing- see MAR and BG results. Carb coverage insulin given. Pain managed with PO dilaudid, toradol, and tylenol. No nausea/vomiting. Is declining to pump at this time. Will continue to monitor.

## 2017-10-26 NOTE — PLAN OF CARE
"Problem: Patient Care Overview  Goal: Plan of Care/Patient Progress Review  Outcome: Improving  Patient continues to declined activity. Patient has been ambulating to bathroom to empty her bladder. Not complaint with most of the care. Declined to shower or to go and see infant. Patient kept stating will do all this \"later\". Pain well control. Fundus and assessment wdl. Will continue with current plan of care, intervene as needed and notify provider with any change of condition.       "

## 2017-10-26 NOTE — PLAN OF CARE
Mother transferred to postpartum unit at 1430 via cart immediate PACU recovery period. Patient oriented to room and report given to Shanti Urias RN who assumes patient care. Mother in stable condition upon transfer. Visited NICU during transfer.

## 2017-10-26 NOTE — CONSULTS
"Inpatient Pain Management Service: Consultation      DATE OF CONSULT: 2017      REASON FOR PAIN CONSULTATION:  Anne Gunter is a 27 year old female I am seeing in consultation at the request of Denise Benton MD for evaluation and recommendations for her acute post op pain, POD#1  section.       CHIEF PAIN COMPLAINT: incisional abdominal pain      ASSESSMENT:   1. Acute post op abdominal pain, POD #1 of  section at 32 weeks due to poorly controlled diabetes and malnutrition. At this time, she only complains of incisional pain which is tolerable.  2. Chronic opioid use during pregnancy. Concerns with drug seeking behavior with requests of IV Dilaudid pushes with increased dose, frequency and speed.    2017 - Pregnancy hospitalizations:   -  - Diabetes control and abdominal pain, N/V - left AMA with narcotics changed to PO dilaudid  - Transfer from [facility], diabetes control and N/V - left AMA when narcotics changed to PO dilaudid from IV   -  - diabetes control and N/V. Initially treated with IM morphine \"didn't work\", had desats with IV dilaudid , extensive evaluation by GI ,nutrition, endocrine. Given IV tylenol and significant GI medications - often declined by patient. Pain not felt to be explained by gastroparesis, Had EGD with esophagitis and 'emetogenic patch in proximal stomach\". Recommended carafate/mylanta/Pepto and PPI high dose for at least 2- 3 months, and viscous lidocain for pain. Left AMA when dilated decreased from every 1-2 hours.   -10/8 - had NJ tube placed with tube feeds, able to wean down IV dilaudid to 0.3 mg every 4 hours. Had single lumen PICC line placed. NJ tube clogged and unable to have unclogged over the weekend. Patient demanded that it be removed and declined having replaced. Requested d/c home but declined trial of oral analgesics prior to d/c. Went home with Rx but did not fill.   10/9-10/9 - admitted with symptoms of severe pain " and also concerning for possible opioid withdrawal, left AMA, still complaining of GI pain and making requests to RN for increased speed of IV push and higher doses, declined by team. Left after significant argument with parents over the phone and noted unhappiness and yelling towards floor staff.    10/10 - went to [facility] ED - declined recommended PO meds and was not given requested dilaudid IV push. Presented to [facility] service.        3. H/o gastroparesis and abdominal pain  4. H/o DM1-endocrinology following  5. H/o borderline personality disorder    -- Outpatient opioid requirements prior to admission:  :  10/13/17  Hydromorphone 1mg/ml solution #170ml  8days  10/08/17  Oxycodone 5mg  #25 3 days    Primary Care Provider: Isiah Naidu  Chronic Pain Provider: none    TREATMENT RECOMMENDATIONS/PLAN:   1. Continue Dilaudid 2-4mg PO Q 3 hours PRN x next 24 hours. Patient states that pain is very well managed with this regimen. Able to sleep when not interrupted.   -on 10/27/17. Decrease  Dilaudid 2mg PO Q 4 hours PRN x 24hours.              -on 10/28/17.  Decrease Dilaudid 2mg PO Q6 hours PRN.              -Upon discharge, may provide short script of Dilaudid 2mg PO Q 6-8 hours PRN x 5-7days or what is customary for post  section pain care.              -Patient states that with her first  section (2016), she did not require/use any opioid pain medication after leaving the hospital.  However, she did receive Oxycodone 5mg 20 tablets                 1-2 p.o. q.4 h. P.r.n. For pain at discharge.             2. She does not endorse any gastritis/ abdominal pain that she experienced during this pregnancy as she feels that this pain has resolved since the baby was delivered on 10/25/17.  She is pleased           about this improvement.  3. After 10/28/17 (72 hours after Exparel was used ), if she continues to have incisional pain, can consider Lidoderm patches up to 3 patches, 12 hours  on and 12 hours off around the incision.  4. May continue with Tylenol 975mg PO Q 8 hours.  5. Discontinue the Tylenol 650mg PO Q 4 hours PRN due to above scheduled Tylenol.   6. Defer NSAIDs to primary team.  Hgb 7.7 on 10/26/17. Primary team monitoring.  7. Patient endorses diarrheal BM on 10/26/17 (she declined stool softener on evening of 10/251/7). Defer Bowel regimen to primary team.  8. Continue behavioral health- continue to monitor depression post partem   9. Will defer other outpatient pain medications at this time as patient is undecided if she will breast feed or not.  If she decides to breast feed-her medication options may be limited.     Pain Service will Sign Off at this time.     Recommendations were discussed with pain team and Ob/gyn team  Plan was reviewed by the Pain Service consisting of Kelsey Alba MD.    Thank you for consulting the Inpatient Pain Management Service.   The above recommendations are to be acted upon at the primary team s discretion.    To reach us:  Mon - Friday 8 AM - 3 PM: Pager [phone] (Text Page)  After hours, weekends and holidays: Primary service should call [phone] for the on-call pain specialist    HISTORY OF PRESENT ILLNESS: Anne Gunter is a 27 year old female with history of abdominal pain, gastroparesis, DM1-poorly controlled, borderline personality disorder, opioid use during pregnancy with concerns for drug seeking behavior due to multiple ER visits with requests for solely IV opioids admitted on 10/20/17 for abdominal pain, nausea, vomiting in setting of gastroparesis.  She  received a course of betamethasone for fetal lung maturity and had  section at 32 weeks of pregnancy on 10/25/17 due to her poorly controlled diabetes and malnutrition.    During the visit today (10/26/17), pt was sleeping but easily roused with voice.  We were able to communicate with Anne using Hungarian  via the IPad. She states that her pain is improving.   She only endorses pain at the surgery site.  States that her chronic abdominal pain during her pregnancy has resolved and is happy about this.  States that with her last  C section in June 2016, she had the same incisional pain that did not require opioid use after she was discharged from the hospital.  She feels that pain is currently at 5/10 on the VAS is manageable.  She is using oral Dilaudid 2mg Q 4 hours. Her mood appeared improved from when pain service saw her several weeks ago.    PAIN HISTORY:  Location: lower abdomen  Duration: constant  Pain intensity: 5/10  Quality of the pain: tightness  Aggravating factors: movements  Relieving factors : rest, oral Dilaudid    CAPA (Clinically Aligned Pain Assessment)  Comfort (How is your pain?): Comfortably manageable  Change in Pain (Since your last medication/intervention?): Getting better  Pain Control (How are your pain treatments working?): Fully effective pain control  Functioning (Are you able to do activities to get better?) : Can do most things, but pain gets in the way of some   Sleep (Does your pain management allow you to sleep or rest?): Awake with occasional pain and awake due to nursing cares      FUNCTIONAL STATUS:  Change:      improving  Oral intake:     Liquid:juince and water this morning-not hungry  Bowel function:    Liquid or diarrhea 1 time this morning (10/26/17)  Activity level:     Able to ambulate to bathroom  Sleep:      Sleeping here and there due to pain and cares from RN  Mood:      Tired, pleasant, cooperative-smiling some.      REVIEW OF 10 BODY SYSTEMS: 10 point ROS of systems including Constitutional, Eyes, Respiratory, Cardiovascular, Gastroenterology, Genitourinary, Integumentary, Musculoskeletal, Psychiatric were all negative except for pertinent positives noted in my HPI.       INPATIENT MEDICATIONS PERTINENT TO PAIN CONSULT:   Medications related to Pain Management (Future)    Start     Dose/Rate Route Frequency Ordered Stop  "   10/28/17 0000  acetaminophen (TYLENOL) tablet 650 mg      650 mg Oral EVERY 4 HOURS PRN 10/25/17 1500      10/27/17 0000  bisacodyl (DULCOLAX) Suppository 10 mg      10 mg Rectal DAILY PRN 10/25/17 1500      10/27/17 0000  sodium phosphate (FLEET ENEMA) 1 enema      1 enema Rectal DAILY PRN 10/25/17 1500      10/25/17 2000  senna-docusate (SENOKOT-S;PERICOLACE) 8.6-50 MG per tablet 1-2 tablet      1-2 tablet Oral 2 TIMES DAILY 10/25/17 1500      10/25/17 1500  acetaminophen (TYLENOL) tablet 975 mg      975 mg Oral EVERY 8 HOURS 10/25/17 1459 10/28/17 1059    10/25/17 1459  diphenhydrAMINE (BENADRYL) capsule 25 mg      25 mg Oral EVERY 6 HOURS PRN 10/25/17 1500      10/25/17 1459  diphenhydrAMINE (BENADRYL) injection 25 mg      25 mg  over 1-2 Minutes Intravenous EVERY 6 HOURS PRN 10/25/17 1500      10/25/17 1459  lidocaine 1 % 1 mL      1 mL Other EVERY 1 HOUR PRN 10/25/17 1459      10/25/17 1459  simethicone (MYLICON) chewable tablet 80 mg      80 mg Oral 4 TIMES DAILY PRN 10/25/17 1500      10/25/17 1459  HYDROmorphone (DILAUDID) tablet 2-4 mg      2-4 mg Oral EVERY 3 HOURS PRN 10/25/17 1500      10/25/17 1459  ibuprofen (ADVIL/MOTRIN) tablet 400-800 mg      400-800 mg Oral EVERY 6 HOURS PRN 10/25/17 1500      10/25/17 1459  lidocaine (LMX4) kit       Topical EVERY 1 HOUR PRN 10/25/17 1459      10/25/17 1459  bupivacaine liposome (EXPAREL) LONG ACTING injection was administered into the infiltration site to produce postsurgical analgesia. Duration of action is up to 72 hours, and other \"constantino\" medications should not be given for 96 hours with the exception of the lidocaine 5% patch (LIDODERM) and the lidocaine 10mg in potassium infusions. This entry is for INFORMATION ONLY.       Does not apply CONTINUOUS PRN 10/25/17 1459 10/29/17 1458    10/20/17 2000  docusate sodium (COLACE) capsule 100 mg      100 mg Oral 2 TIMES DAILY 10/20/17 1656              CURRENT MEDICATIONS:   Current Facility-Administered " Medications Ordered in Epic   Medication Dose Route Frequency Provider Last Rate Last Dose     0.9% sodium chloride infusion   Intravenous Continuous Fabiana Chavez MD 30 mL/hr at 10/26/17 0651       NaCl 0.9 % infusion              insulin glargine (LANTUS) injection 12 Units  12 Units Subcutaneous QAM Melba Hoffman APRN CNS   12 Units at 10/26/17 0956     insulin aspart (NovoLOG) inj (RAPID ACTING)  1-7 Units Subcutaneous TID AC Melba Hoffman APRN CNS         insulin aspart (NovoLOG) inj (RAPID ACTING)  1-5 Units Subcutaneous At Bedtime Melba Hoffman APRN CNS         insulin glargine (LANTUS) injection 10 Units  10 Units Subcutaneous At Bedtime Melba Hoffman APRN CNS         pantoprazole (PROTONIX) EC tablet 40 mg  40 mg Oral BID AC Holly De Santiago MD         lidocaine 1 % 1 mL  1 mL Other Q1H PRN Denise Benton MD         lidocaine (LMX4) kit   Topical Q1H PRN Denise Benton MD         sodium chloride (PF) 0.9% PF flush 3 mL  3 mL Intracatheter Q1H PRN Denise Benton MD         sodium chloride (PF) 0.9% PF flush 3 mL  3 mL Intracatheter Q8H Denise Benton MD   3 mL at 10/26/17 1006     oxytocin (PITOCIN) 30 units in 500 mL 0.9% NaCl infusion  100 mL/hr Intravenous Continuous Denise Benton MD   Stopped at 10/25/17 2200     dextrose 5% in lactated ringers infusion   Intravenous Continuous Denise Benton MD   Stopped at 10/25/17 1612     naloxone (NARCAN) injection 0.1-0.4 mg  0.1-0.4 mg Intravenous Q2 Min PRN Denise Benton MD         senna-docusate (SENOKOT-S;PERICOLACE) 8.6-50 MG per tablet 1-2 tablet  1-2 tablet Oral BID Denise Benton MD         [START ON 10/27/2017] bisacodyl (DULCOLAX) Suppository 10 mg  10 mg Rectal Daily PRN Denise Benton MD         [START ON 10/27/2017] sodium phosphate (FLEET ENEMA) 1 enema  1 enema Rectal Daily PRN Denise Benton MD         ondansetron (ZOFRAN) injection 4 mg  4 mg Intravenous Q6H PRN Denise Benton MD         hydrocortisone 2.5 % cream   Rectal TID  "PRN Denise Benton MD         diphenhydrAMINE (BENADRYL) capsule 25 mg  25 mg Oral Q6H PRN Denise Benton MD        Or     diphenhydrAMINE (BENADRYL) injection 25 mg  25 mg Intravenous Q6H PRN Denise Benton MD         lanolin ointment   Topical Q1H PRN Denise Benton MD         simethicone (MYLICON) chewable tablet 80 mg  80 mg Oral 4x Daily PRN Denise Benton MD         lactated ringers BOLUS 1,000 mL  1,000 mL Intravenous Once PRN Denise Benton MD         oxytocin (PITOCIN) 30 units in 500 mL 0.9% NaCl infusion  340 mL/hr Intravenous Continuous PRN Denise Benton MD         oxytocin (PITOCIN) injection 10 Units  10 Units Intramuscular Once PRN Denise Benton MD         misoprostol (CYTOTEC) tablet 400 mcg  400 mcg Oral Once PRN Denise Benton MD         NO Rho (D) immune globulin (RhoGam) needed - mother Rh POSITIVE   Does not apply Continuous PRN Denise Benton MD         acetaminophen (TYLENOL) tablet 975 mg  975 mg Oral Q8H Denise Benton MD   975 mg at 10/26/17 0651     [START ON 10/28/2017] acetaminophen (TYLENOL) tablet 650 mg  650 mg Oral Q4H PRN Denise Benton MD         HYDROmorphone (DILAUDID) tablet 2-4 mg  2-4 mg Oral Q3H PRN Denise Benton MD   2 mg at 10/26/17 0507     ibuprofen (ADVIL/MOTRIN) tablet 400-800 mg  400-800 mg Oral Q6H PRN Denise Benton MD         No MMR Needed -  Assessment: Patient does not need MMR vaccine   Does not apply Continuous PRN Denise Benton MD         No Tdap Needed - Assessment: Patient does not need Tdap vaccine   Does not apply Continuous PRN Denise Benton MD         carboprost (HEMABATE) injection 250 mcg  250 mcg Intramuscular Once PRN Denise Benton MD         bupivacaine liposome (EXPAREL) LONG ACTING injection was administered into the infiltration site to produce postsurgical analgesia. Duration of action is up to 72 hours, and other \"constantino\" medications should not be given for 96 hours with the exception of the lidocaine 5% patch (LIDODERM) and the lidocaine 10mg in potassium infusions. This " entry is for INFORMATION ONLY.   Does not apply Continuous PRN Mehul Stoner MD         insulin aspart (NovoLOG) inj (RAPID ACTING)   Subcutaneous QAM AC Melba Hoffman APRN CNS   Stopped at 10/24/17 0920     insulin aspart (NovoLOG) inj (RAPID ACTING)   Subcutaneous Daily with lunch HoffmanMelba quintana APRN CNS   2 Units at 10/24/17 1315     insulin aspart (NovoLOG) inj (RAPID ACTING)   Subcutaneous Daily with supper Melba Hoffman APRN CNS   4 Units at 10/25/17 1832     insulin aspart (NovoLOG) inj (RAPID ACTING)   Subcutaneous With Snacks or Supplements Melba Hoffman APRN CNS   2 Units at 10/26/17 0406     lidocaine (viscous) (XYLOCAINE) 2 % solution 5 mL  5 mL Mouth/Throat Q2H PRN Jeannie Disla MD   5 mL at 10/23/17 0925     docusate sodium (COLACE) capsule 100 mg  100 mg Oral BID Denise Benton MD         prenatal multivitamin plus iron per tablet 1 tablet  1 tablet Oral Daily Denise Benton MD         famotidine (PEPCID) injection 20 mg  20 mg Intravenous Q12H Denise Benton MD   20 mg at 10/25/17 1651     metoclopramide (REGLAN) injection 10 mg  10 mg Intravenous Q6H Denise Benton MD   10 mg at 10/25/17 2251     phosphorus tablet 250 mg (K PHOS NEUTRAL) per tablet 500 mg  500 mg Oral BID Denise Benton MD         glucose 40 % gel 15-30 g  15-30 g Oral Q15 Min PRN Carlene Jones PA-C        Or     dextrose 50 % injection 25-50 mL  25-50 mL Intravenous Q15 Min PRN Carlene Jones PA-C        Or     glucagon injection 1 mg  1 mg Subcutaneous Q15 Min PRN Carlene Jones PA-C         heparin lock flush 10 UNIT/ML injection 3 mL  3 mL Intracatheter Once Claudio Lomas MD   Stopped at 10/20/17 1959     No current [facility]-ordered outpatient prescriptions on file.           HOME/PREVIOUS MEDICATIONS:   Prior to Admission medications    Medication Sig Start Date End Date Taking? Authorizing Provider   HYDROmorphone, STANDARD CONC, (DILAUDID) 1 MG/ML LIQD liquid Take 4 mLs (4 mg) by mouth  every 4 hours as needed for moderate to severe pain 10/16/17  Yes Mateo Verde MD   alum & mag hydroxide-simethicone (MYLANTA ES/MAALOX  ES) 400-400-40 MG/5ML SUSP suspension Take 30 mLs by mouth 4 times daily (with meals and nightly) 10/13/17   Stephenie Buchanan MD   docusate sodium (COLACE) 100 MG capsule Take 1 capsule (100 mg) by mouth 2 times daily 10/13/17   Stephenie Buchanan MD   HYDROmorphone, HIGH CONC, (DILAUDID) 10 mg/mL LIQD oral Place 0.4 mLs (4 mg) under the tongue every 4 hours as needed for moderate to severe pain 10/13/17   Stephenie Buchanan MD   lidocaine, viscous, (XYLOCAINE) 2 % solution Take 5 mLs by mouth every 6 hours as needed for moderate pain (moderate to severe epigastric pain. May sip slowly if associated nausea) swish and spit every 3-8 hours as needed; max 8 doses/24 hour period 10/13/17   Stephenie Buchanan MD   HYDROmorphone, STANDARD CONC, (DILAUDID) 1 MG/ML LIQD liquid Take 4 mLs (4 mg) by mouth every 4 hours as needed for moderate to severe pain 10/13/17   Mateo Verde MD   alum & mag hydroxide-simethicone (MYLANTA ES/MAALOX  ES) 400-400-40 MG/5ML SUSP suspension Take 30 mLs by mouth 4 times daily (with meals and nightly) 10/13/17   Satya Stern MD   insulin glargine (LANTUS SOLOSTAR) 100 UNIT/ML injection Inject 12 Units Subcutaneous 2 times daily 10/13/17   Satya Stern MD   phosphorus tablet 250 mg (K PHOS NEUTRAL) 250 MG per tablet Take 1 tablet (250 mg) by mouth 2 times daily 10/8/17   Mateo Verde MD   blood glucose monitoring (NO BRAND SPECIFIED) test strip Use to test blood sugars 4 times daily or as directed 9/23/17   Sonia Hart,    blood glucose monitoring (NO BRAND SPECIFIED) test strip Use to test blood sugar 4 times daily or as directed. 9/23/17   Sonia Hart, DO   blood glucose monitoring (ONE TOUCH DELICA) lancets Use to test blood sugar 4 times daily or as directed. 9/23/17   Sonia Hart  DO Gisselle   blood glucose monitoring (ONETOUCH VERIO) meter device kit Use to test blood sugar 4 times daily or as directed. 17   Sonia Hart DO   polyethylene glycol (MIRALAX/GLYCOLAX) Packet Take 17 g by mouth daily as needed for constipation (titrate to one bowel movement daily) 17   Jose Luevano MD   famotidine (PEPCID) 20 MG tablet Take 1 tablet (20 mg) by mouth 2 times daily 17   Jose Luevano MD   Prenatal Vit-Fe Fumarate-FA (PRENATAL VITAMIN) 27-0.8 MG TABS Take 1 tablet by mouth daily 16   Isiah Naidu MD   folic acid (FOLVITE) 1 MG tablet Take 1 tablet (1 mg) by mouth daily 16   Leoncio Linton MD           PAST PAIN TREATMENT:   Behavioral health  IV opioids        ALLERGIES:  No Known Allergies         PAST MEDICAL AND PSYCHIATRIC HISTORY:    Past Medical History:   Diagnosis Date     Diabetes (H)      Microalbuminuria 2016     Type I diabetes mellitus, uncontrolled (H) 2016           PAST SURGICAL HISTORY:   Past Surgical History:   Procedure Laterality Date      SECTION N/A 6/10/2016    Procedure:  SECTION;  Surgeon: Richardson Duran MD;  Location:  OR     ESOPHAGOSCOPY, GASTROSCOPY, DUODENOSCOPY (EGD), COMBINED N/A 2017    Procedure: COMBINED ESOPHAGOSCOPY, GASTROSCOPY, DUODENOSCOPY (EGD);  Upper Endoscopy with biopsies;  Surgeon: Nile Sanchez MD;  Location:  OR     INSERT TUBE NASOJEJUNOSTOMY  2017    Procedure: INSERT TUBE NASOJEJUNOSTOMY;  nasojejunal feeding tube placement with upper endoscopy assistance by Dr. Sanchez and Dr. Cristino Bennett, Radiology;  Surgeon: Nile Sanchez MD;  Location:  OR           FAMILY HISTORY: family history includes CEREBROVASCULAR DISEASE in her paternal grandmother; Coronary Artery Disease in her paternal grandmother; DIABETES in her maternal grandmother. There is no history of Hypertension, Hyperlipidemia, Breast Cancer, Colon Cancer, Prostate Cancer, Other  Cancer, Depression, Anxiety Disorder, MENTAL ILLNESS, Substance Abuse, Anesthesia Reaction, Asthma, OSTEOPOROSIS, Genetic Disorder, Thyroid Disease, or Obesity.      HEALTH & LIFESTYLE PRACTICES:   Tobacco:  reports that she has never smoked. She has never used smokeless tobacco.  Alcohol:  reports that she does not drink alcohol.  Illicit drugs:  reports that she does not use illicit drugs.      SOCIAL HISTORY:   Social History     Social History     Marital status:      Spouse name: N/A     Number of children: N/A     Years of education: N/A     Occupational History     Not on file.     Social History Main Topics     Smoking status: Never Smoker     Smokeless tobacco: Never Used     Alcohol use No     Drug use: No     Sexual activity: Yes     Partners: Male     Other Topics Concern     Not on file     Social History Narrative         LABORATORY VALUES:   Last Basic Metabolic Panel:  Lab Results   Component Value Date     10/25/2017      Lab Results   Component Value Date    POTASSIUM 3.5 10/25/2017     Lab Results   Component Value Date    CHLORIDE 106 10/25/2017     Lab Results   Component Value Date    LILLIAM 7.9 10/25/2017     Lab Results   Component Value Date    CO2 26 10/25/2017     Lab Results   Component Value Date    BUN 4 10/25/2017     Lab Results   Component Value Date    CR 0.37 10/25/2017     Lab Results   Component Value Date     10/25/2017       CBC results:  Lab Results   Component Value Date    WBC 6.8 10/25/2017     Lab Results   Component Value Date    HGB 7.7 10/26/2017     Lab Results   Component Value Date    HCT 27.0 10/25/2017     Lab Results   Component Value Date     10/25/2017       DIAGNOSTIC TESTS:       Labs above reviewed as well as additional relevant diagnostic studies from the EPIC record.       PHYSICAL EXAMINATION:  VITAL SIGNS:  B/P: 106/75, T: 97.7, P: 71, R: 20    CONSTITUTIONAL/GENERAL APPEARANCE: Pale female, sleeping in bed, appears  comfortable  EYES: Pupils equal, EOMIs  ENT/NECK: Supple  RESPIRATORY: normal effort  CARDIOVASCULAR: S1 and S2 appreciated  GI: Round, soft, active bowel sounds, sensation intact.  Lower abdominal dressing dry, clean and intact.    MUSCULOSKELETAL/BACK/SPINE/EXTREMITIES: Moving all 4 extremities. No LE edema.     NEURO:  SILT in UE and LE  SKIN/VASCULAR EXAM:  Dry and warm  PSYCHIATRIC/BEHAVIORAL/OBSERVATIONS:  No objective signs of pain observed during our interview. Pt was tired and sleepy   Judgment/Insight - fair   Orientation - x 3   Memory -fair   Mood and affect - pleasant, appropriate, cooperative             TIME SPENT: 50 minutes including 25 minutes of face-to-face time counseling her  about her pain management treatment options, and coordinating care with the primary team.    Pau Gomez PA-C  October 26, 2017, 11:15 AM  Inpatient Pain Management Service

## 2017-10-26 NOTE — CONSULTS
"Copied from baby's chart:    [facility]  MATERNAL CHILD HEALTH   SOCIAL WORK INITIAL PSYCHOSOCIAL ASSESSMENT       DATA:     Baby boy \"Sweetie Tadeo\" is a 32 week infant delivered via  to mom Anne Gunter. SW has been following Anne prenatally through multiple antepartum admissions this pregnancy along with the [facility] physician team,  Mental Health, GI, Endocrine, Nutrition, Pain Management and Psychiatry teams involved with her care while hospitalized. Please reference our multitude of notes in Anne's chart for further reference if needed.     Anne lives with her parents, Vinayak and Rima, in [place], [place]. She reportedly came to the [place] from [place] during this pregnancy to seek further medical care for her diabetes management.  Anne is  to Vianey Ruby. Per Anne, he had applied for a visa to visit this summer, but was denied. Anne communicates with him often via phone/skype. They are [place] and speak Hebrew, requiring an . Vianey is still currently living in [place] with their first son, Carmelo. Carmelo was born at 32.5 weeks and spent time in the NICU.     Anne reports she will discharge from the [facility] to her parent's house. She reports her parents will be able to drive her to/from the NICU daily to visit Sweetie. She does not anticipate any barriers to visiting the NICU.     Anne has a health history significant for poorly managed diabetes with significant complications and pain resulting in multiple antepartum admissions. For many of those admission, Anne left the hospital AMA prior to becoming fully stabilized on an outpatient medical plan of care. During her most recent antepartum hospitalization, she was diagnosed with an opioid dependence, due to her frequent prescribed IV dilaudid use for pain while hospitalized. Anne is working with the MFM team and Pain teams to help her with a weaning " "plan from IV pain medication now that she is to be discharged from the hospital without further anticipated inpatient admissions.     Anne has a mental health history significant for depression, anxiety, and borderline personality traits. She has endorsed to her  therapist a long history of mood lability with negative impacts to relationships. She has reported that it is very difficult for her to get mental health care in [place] due to access and stigma. She reports today that she did have postpartum depression symptoms with her first son, which she felt almost immediately after delivering. Today, Anne denies any current symptoms or concerns about developing further mental health symptoms. Although she was seen by psychiatry during her antepartum admission, they deferred starting the recommended mental health medications until she was postpartum. Today, Anne states that she is no longer interested in starting those medications. She does report she is interested in continuing to see a  psychologist such as Candy Alegre, who she has seen prenatally.     INTERVENTION:     This clinician reviewed the chart. This  met with mom bedside to assess for needs and offer support based on SW consult for \"NICU admission\" as well as an [facility] consult for \"psychosocial assessment.\" SW reviewed orientation to the NICU, including: parking passes, boarding rooms, rounding, treatment teams, primary nursing, and our welcoming policy of visitation. Provided the work sheet, \"Meeting Your Basic Needs While Your Child is Hospitalized.\"     Reviewed information on  Mood and Anxiety Disorders and provided her psychoeducation on her heightened risk for developing postpartum mood complications given psychosocial stressors such as her mental health history, separation from /child, and baby's NICU admission. Assessed pt's readiness and willingness to continue therapy and start recommended psychotropic " medication. Provided psychoeducation that the evidence based treatment of choice is a combination of therapy and medication to manage significant mental health symptoms.     Assessed for family strengths and for any potential barriers that may impact their baby's NICU admission. Pt declined any concerns here.     Introduced the following community programs that may be available to the family during pt's NICU stay such as help with parking passes if needed.     Although SW has been following prenatally, this writer re-introduced social work's role in emotional support for the family through their anticipated NICU stay. Re-introduced my role in a job share with Kjerstin Rydeen, including our schedules and contact information. Provided our cards and encouraged family to reach out for concerns or questions prior to our next meeting if needed.     ASSESSMENT:     Anne presented as guarded and with limited engagement. She presents with a flat affect and somewhat depressed mood, tired. Much of the information available to this writer around Anne's psychosocial history has been gathered over several months of visits between several providers. She has limited insight into how her mental health status impacts her functioning as well as her ability to comply with her own medical care.     PLAN:     This  will continue to follow throughout pt's NICU stay as needs arise. SW will continue to attempt to build rapport and engagement with Anne through pt's NICU admission. SW will reach out to Candy Alegre [facility], to see if she can continue seeing Anne in therapy as an outpatient.       Marilu Hernandez [facility]   Social Worker  Maternal Child Health    Phone: [phone]  Pager:  [phone]

## 2017-10-26 NOTE — PLAN OF CARE
Problem: Postpartum ( Delivery) (Adult,Obstetrics,Pediatric)  Goal: Signs and Symptoms of Listed Potential Problems Will be Absent, Minimized or Managed (Postpartum)  Signs and symptoms of listed potential problems will be absent, minimized or managed by discharge/transition of care (reference Postpartum ( Delivery) (Adult,Obstetrics,Pediatric) CPG).  Outcome: No Change  Data: Vital signs and postpartum checks WDL - see flow record. Continues on insulin drip see MAR. Patient eating and drinking normally.   Action: Patient medicated during the shift for pain. Patient education done see education record.  Response: Support persons are present.    Plan: Continue with the plan of care and notify provider if decline in status. Will continue to monitor and assess.

## 2017-10-26 NOTE — LACTATION NOTE
D: Met with Anne and  in her [facility] room. She has not yet decided how she will feed Sweetie. She said she pumped for a month with her other child until her supply disappeared. She agreed to receive the information I could provide regarding lactation/breastfeeding. She states is normally in good health other than Type I DM. She said she takes no other medications beside insulin. Her medical record indicates history of anxiety/depression, borderline personality, and opioid seeking behavior. She has no history of breast/chest surgery or trauma.   I:  I gave her a folder of introductory materials and briefly reviewed physiology of colostrum and milk production, as well as pumping guidelines. We discussed skin to skin holding. She verbalized understanding. Offered support and encouragement. She signed agreement for donor milk.   A:  Mom has information she needs to initiate her supply as well as information regarding benefits of breastfeeding/breast milk. She has not started to pump; has not decided on feeding method.   P:  Will continue to provide lactation support.  Darhsana Barfield, RNC, IBCLC

## 2017-10-26 NOTE — PROVIDER NOTIFICATION
10/26/17 0738   Provider Notification   Provider Name/Title G 2 resident    Method of Notification Electronic Page   Request Evaluate-Remote   Notification Reason Lab Results;Medication Request   pt continues to have very loose stool , requesting medication, and current hg 7.7..

## 2017-10-26 NOTE — PROGRESS NOTES
[facility]  MATERNAL CHILD HEALTH   SOCIAL WORK PROGRESS NOTE      DATA:     Pt continues her stay on the [facility] following her c/section at 32 weeks yesterday. Pt sleeping this morning, and unable to be roused for an assessment. Pt followed closely by endocrine/diabetes, pain management, MFMARINO MD's, social work,  psychology, nutrition teams. Status post psychiatry consult with recommendations to begin psychotropic medications after delivery.     INTERVENTION:     Chart review and coordination with the health care team. SW attempted to see the pt this morning with the scheduled Italian  at 8:45AM. Pt was unable to be roused despite attempts, as there were multiple providers to see her this morning. SW asked the  to schedule another appointment this afternoon from 12pm to 1pm.     ASSESSMENT:     Anne frequently declines visits or is sleeping and unable to be roused during provider attempts to visit.      PLAN:     SW will attempt to see the pt again at 12pm today to complete an updated assessment now that baby is in the NICU.       Marilu Hernandez [facility]   Social Worker  Maternal Child Health    Phone: [phone]  Pager:  [phone]

## 2017-10-26 NOTE — PROGRESS NOTES
OB Progress Note  Anne Gunter  [id number]      Subjective: Pt doing well with no complaints. States that pain well controlled with oral medications. She is happy regarding her baby, and was able to see baby in NICU. Has not ambulated, but denies dizziness or lightheadedness.  Lochia is appropriately decreasing. Pt eating solid foods without difficulty. Not yet passing flatus. Undecided regarding breastfeeding or bottle feeding. Pt s/p salpingectomy for permanent sterilization.     Objective:   Patient Vitals for the past 12 hrs:   BP Temp Temp src Pulse Heart Rate Resp SpO2   10/26/17 0800 (P) 106/75 (P) 97.7  F (36.5  C) (P) Oral (P) 71 - (P) 20 (P) 99 %   10/26/17 0543 97/69 98.5  F (36.9  C) Oral - 82 14 98 %   10/26/17 0146 99/61 98.5  F (36.9  C) Oral - 84 14 98 %   10/25/17 2148 103/65 98  F (36.7  C) Oral - 71 14 99 %   ]    Gen: pleasant, NAD, smiling, in pleasant mood  CV: RRR, no murmurs  Pulm: CTAB, no r/r/w  Abd: soft, moderately-distended, uterine fundus firm and inferior to umbilicus, appropriately tender, hypoactive BS  Incision: c/d/i, bandage dressing with minimal shadowing   Extr: 1+  edema    I/O last 3 completed shifts:  In: 3720.81 [P.O.:1940; I.V.:1780.81]  Out: 3950 [Urine:3950]       Hemoglobin   Date Value Ref Range Status   10/26/2017 7.7 (L) 11.7 - 15.7 g/dL Final   10/25/2017 8.5 (L) 11.7 - 15.7 g/dL Final   ]    Assessment and Plan: Anne Gunter 27 year old   POD#1 s/p RLTCS and Bilateral salpingectomy for poorly controlled Type I DM in pregnancy with multiple admissions for DKA, questionable eating disorder vs. Gastroparesis.  Patient is doing well - advance post-op goals this am.      Type I DM: poor control in outpatient setting prior to delivery. hgb A1C last 7.4%. Managed with assistance with endocrine which is greatly appreciated at this time. Insulin drip stopped overnight as BGs tolerated around 0100. Restarted and then turned off again around 0500 this am.      -  Continue glargine and mealtime aspart per endocrine recs   - BGs 80s-170s overnight    MDD/Borderline personality with questionable eating disorder: s/p psych consult prior to delivery that recommended starting Zyprexa and or zoloft in the PP period.     - Will contact psych today to assist with medication recommendations.     Opioid Dependence:   - Pain - well controlled on Toradol, scheduled tylenol, PRN PO dilaudid (only 2mg).  Did not require IV dilaudid overnight or additional pain meds   - transition from toradol to ibuprofen today   - will discuss pain plan for postpartum period   - Pain medicine to assist with recs for postpartum period    Pre-eclampsia without severe features: BPs normal range overnight, UOP adequate.     Abdominal pain/nausea/vomiting: tolerating PO intake overnight.  Still on pregnancy regimen overnight of IV Pepcid, protonix, reglan.  Did not require PRN zofran.    - Transition to PO antiemetics today    Routine Postpartum cares:  - Pain - see above  - Hb 8.5>EBL 800mL > 7.7, Acute blood loss anemia from CS.  Pt asymptomatic, however has not yet ambulated at this time. Will continue to monitor and transfuse if symptomatic.  PO iron on discharge as Hgb less than 10  - FEN/GI: regular diet, continue bowel regimen as patient allows - declined stool softener overnight  - : suero in place -to remove this am and follow-up trial of void, no concerns  - Rubella immune - no MMR needed; Rh positive - no rhogam needed  - Contraception: s/p BS at time of CS for desired contraception  - Baby: doing well in NICU per patient, patient undecided regarding breast or bottle feeding  - Routine post-partum care    - Dispo to home POD#3-4 when meeting all post-operative goals.    Fabiana Chavez MD, MPH  PGY3, OB/GYN  Pager: [phone]  10/26/2017    The patient was seen and examined by me separately from the team.  I have reviewed and agree with the above note.  She is generally doing well-notes fatigue  and some dizziness. Pain is controlled, hungry-ordering food now, +flatus.   She has acute on chronic blood loss anemia with an appropriate hgb drop given her surgical EBL.   She is not tachycardic.  Will continue to monitor and offer PRBC transfusion if she continues to be symptomatic.     Katharine Combs MD, FACOG

## 2017-10-27 LAB
GLUCOSE BLDC GLUCOMTR-MCNC: 198 MG/DL (ref 70–99)
GLUCOSE BLDC GLUCOMTR-MCNC: 203 MG/DL (ref 70–99)
GLUCOSE BLDC GLUCOMTR-MCNC: 222 MG/DL (ref 70–99)
GLUCOSE BLDC GLUCOMTR-MCNC: 301 MG/DL (ref 70–99)
GLUCOSE BLDC GLUCOMTR-MCNC: 330 MG/DL (ref 70–99)
GLUCOSE BLDC GLUCOMTR-MCNC: 340 MG/DL (ref 70–99)

## 2017-10-27 PROCEDURE — 12000030 ZZH R&B OB INTERMEDIATE UMMC

## 2017-10-27 PROCEDURE — 00000146 ZZHCL STATISTIC GLUCOSE BY METER IP

## 2017-10-27 PROCEDURE — 99207 ZZC NO CHARGE SIGN-OFF PS: CPT

## 2017-10-27 RX ORDER — HYDROMORPHONE HYDROCHLORIDE 2 MG/1
2-4 TABLET ORAL EVERY 4 HOURS PRN
Status: DISCONTINUED | OUTPATIENT
Start: 2017-10-27 | End: 2017-10-27

## 2017-10-27 RX ORDER — IBUPROFEN 600 MG/1
600 TABLET, FILM COATED ORAL EVERY 6 HOURS PRN
Qty: 40 TABLET | Refills: 0 | Status: SHIPPED | OUTPATIENT
Start: 2017-10-27 | End: 2017-12-06

## 2017-10-27 RX ORDER — ACETAMINOPHEN 325 MG/1
650 TABLET ORAL EVERY 6 HOURS PRN
Qty: 100 TABLET | Refills: 0 | Status: SHIPPED | OUTPATIENT
Start: 2017-10-27 | End: 2017-10-28

## 2017-10-27 RX ORDER — HYDROMORPHONE HYDROCHLORIDE 2 MG/1
2 TABLET ORAL EVERY 4 HOURS PRN
Status: DISCONTINUED | OUTPATIENT
Start: 2017-10-27 | End: 2017-10-28 | Stop reason: HOSPADM

## 2017-10-27 RX ORDER — AMOXICILLIN 250 MG
1-2 CAPSULE ORAL 2 TIMES DAILY
Qty: 100 TABLET | Refills: 0 | Status: SHIPPED | OUTPATIENT
Start: 2017-10-27 | End: 2017-12-06

## 2017-10-27 RX ADMIN — INSULIN GLARGINE 12 UNITS: 100 INJECTION, SOLUTION SUBCUTANEOUS at 08:20

## 2017-10-27 NOTE — PLAN OF CARE
"Problem: Patient Care Overview  Goal: Plan of Care/Patient Progress Review  Patient called me to room at 1030. Wanting 3 apple juice. Does not want to order any breakfast. BG = 202. Insulin coverage given for BG and carb coverage for AJ. I encouraged her and discussed importance of eating a good diet (protein,fruits, etc). Up to BR ad ramila and voided. While up I offered to take her to NICU to see her baby, she said, \"later\". I asked her if she wanted to breast feed her baby and she gave me no answer. I discussed pumping and she said, \"not now\". Has declined to take all medications ordered this morning except insulin. Declines any pain medication when offered. Offered to set up for shower, patient declined.  Ate lunch and received coverage per order. Still declines to go down to NICU when I again offered to take her. Has declined the need for any pain medication this shift.     Data: Vital signs within normal limits. Postpartum checks within normal limits - see flow record.  Patient able to empty bladder independently and is up ambulating in room. No apparent signs of infection. Incision with dressing in place, declined to have me remove. I offered to set up and help her with a shower, she said, \"maybe later\".  Response: Support person (mother) present.     "

## 2017-10-27 NOTE — PLAN OF CARE
VSS. Postpartum checks WDL. Incisional drsg CDI- encouraged to shower and take drsg off this morning. Up independently, voiding without difficulty- refusing measuring hat so unable to measure output. Pain controlled without medications per pt- refusing all ordered medications this shift. Had one episode of emesis, no nausea/vomiting since. Declined to pump this shift. See flowsheet regarding BGs this shift. Received carb coverage overnight for dinner, juice, and ice cream. Pt visited infant in NICU last evening. Mother supportive at the bedside. Will continue to monitor.

## 2017-10-27 NOTE — PROGRESS NOTES
Diabetes Consult Daily  Progress Note          Assessment/Plan:     Ms. Anne Gunter is a 28 yo woman at admitted at 31w3d of pregnancy on 10/20/17 with abdominal pain, N/V.  PMH significant for history of uncontrolled type 1 diabetes with multiple episodes of DKA in pregnancy, presumed gastroparesis, esophagitis with several recent admissions for recurrent abdominal pain with N/V, borderline personality disorder, and chronic opioid use in pregnancy, now s/p  10/25.      High glucoses since yesterday evening seem to be due to delayed dosing of meal aspart (pt eating and not informing RN staff).  BG now down to the high 100s.    Plan- will make slight increase in glargine only given that hyperglycemia mainly due to timing of aspart.  - glargine 12 units this morning and increased to 12 units qPM starting tonight (from 10 units).  -meal aspart to 1 unit per 15 grams carbohydrate (will revert to fixed meal dosing at home)  -aspart correction 1 per 50 mg/dL qAC, HS  -BG monitor qAC, HS 0200       Outpatient diabetes follow up: with Dr. Colunga- recommend pt be seen within 1-2 weeks of discharge (ph # included in AVS).  Plan discussed with patient and her mother, bedside RN, primary team.           Interval History:   The last 24 hours progress and nursing notes reviewed.  Anne transitioned to SC insulin yesterday.  BG mo to the 200s yesterday evening and then overnight in the 200s-300s.  Anne admitted that Novolog doses for meals were delayed overnight (snacked 3x during night) and suspect delyaed yesterday evening.  Per day RN she got reports from overnight nurse that Anne was notifying RN when she was eatings.  Suspect this is the biggest cause of the hyperglycemia.    Anne still has not decided if she is going to breastfeed.    Anne's mother had questions for me, we discussed using phone .  She told me she was looking into stem cell treatment for her DM 1- has  "discussed with physician in [place].  She needs \"report on Anne's diabetes\".  I explained that we have been following during pregnancy and now immediate post partum period, and insulin doses have been fluctuating quite a bit.  Recommend this be discussed with outpatient endocrinologist.  Mom also wondering if diabetes could cause poor memory- she has noted memory lapses in Anne.  I told her the diabetes would not cause this.        Recent Labs  Lab 10/27/17  1234 10/27/17  1032 10/27/17  0839 10/27/17  0207 10/26/17  2208 10/26/17  1726  10/25/17  0730  10/20/17  1900   GLC  --   --   --   --   --   --   --  102*  --  100*   * 203* 222* 301* 235* 273*  < > 107*  < >  --    < > = values in this interval not displayed.            Review of Systems:   See interval hx          Medications:       Active Diet Order      Advance Diet as Tolerated: Regular Diet Adult; 7670-8178 Calories: Moderate Consistent CHO (4-6 CHO units/meal)     Physical Exam:  Gen: resting in bed, NAD.  HEENT: NC/AT, mucous membranes moist  Resp: Unlabored  Neuro:alert, oriented x3,communicating clearly  /76  Pulse 71  Temp 98.3  F (36.8  C) (Oral)  Resp 16  Wt 73.5 kg (162 lb 1.6 oz)  SpO2 99%  Breastfeeding? Unknown  BMI 25.44 kg/m2           Data:     Lab Results   Component Value Date    A1C 7.4 10/11/2017    A1C 8.4 09/10/2017    A1C 9.2 06/01/2016    A1C 9.8 05/23/2016    A1C 7.4 04/23/2016            Recent Labs   Lab Test  10/25/17   0730  10/20/17   1900   NA  137  138   POTASSIUM  3.5  3.8   CHLORIDE  106  103   CO2  26  24   ANIONGAP  5  11   GLC  102*  100*   BUN  4*  4*   CR  0.37*  0.42*   LILLIAM  7.9*  8.9     CBC RESULTS:   Recent Labs   Lab Test  10/26/17   0645  10/25/17   0730   WBC   --   6.8   RBC   --   3.32*   HGB  7.7*  8.5*   HCT   --   27.0*   MCV   --   81   MCH   --   25.6*   MCHC   --   31.5   RDW   --   15.1*   PLT   --   240                              I spent a total of 35 minutes bedside and on " the inpatient unit managing the glycemic care of Anne Gunter. Over 50% of my time on the unit was spent counseling the patient and her mom and/or coordinating care regarding glucose management with type 1 diabetes post partem.  See note for details.        Carlene Jones PA-C [phone]    Diabetes Management job code [id number]

## 2017-10-27 NOTE — PROVIDER NOTIFICATION
10/26/17 1934   Provider Notification   Provider Name/Title G 3 resident    Method of Notification Electronic Page   Request Evaluate-Remote   Notification Reason Other   patient scored 20 on her depression screening form.

## 2017-10-27 NOTE — CONSULTS
PSYCHIATRIC CONSULTATION       IDENTIFICATION:  Ms. Anne Gunter is a 27-year-old female who is currently hospitalized after having a baby by  yesterday.  I am asked to evaluate her psychiatric status by Dr. Benton.      Prior to interviewing this patient, I had an opportunity to review my initial consultation completed on 10/11/2017.  At that time the patient was having issues with agitation, often screaming and yelling.  She had been evaluated by Psychology and was felt to have both depression and borderline traits.  On my interview, she reported a number of borderline personality disorder symptoms and also symptoms of low mood.  She was also having very severe nausea.  I suggested that brief treatment with Zyprexa Zydis for her nausea might help with anxiety and insomnia as well and I suggested Zoloft 50 as a prophylactic measure against postpartum depression, as the patient was already displaying some symptoms of depression.  The patient apparently opted not to take any of these medications until after the baby was delivered.  Apparently she asked to see psych today in the morning.  Was eager to try medications, but by the time I got to her this afternoon, she reported she was not interested in any psychiatric medications.  She reports her nausea has resolved completely and she denies depression.  She  is uninterested in any psychiatric intervention.      I did have a long discussion with the patient's primary nurse, who notes that the patient has had no narcotic medications since 7:00 this morning.  It appears to me from reading the chart that this is  a long period of time without narcotics and I did warn the treatment team that she was  at risk for withdrawal.  The treatment team is well aware of this issue.  Certainly this case will require close medical followup for her diabetes and would benefit from outpatient psychotherapy followup for her psychiatric issues and also to monitor for long-term  safety.  Perhaps she could be seen at one of the women's programs  at [facility]. Psych is also starting one at the [facility] Psychiatry Department.      MENTAL STATUS EXAMINATION:  On my interview today, the patient was pleasant and cooperative.  She reports her mood is good.  Her affect was appropriate.  Her speech was coherent and goal oriented.  Her associations were tight.  Her thought processes logical and linear.  Content of thought was without psychosis or suicidal ideation.  Recent and remote memory, concentration, fund of knowledge and use of language were within normal limits.  She is alert and oriented x3.  Insight and judgment remained guarded.  Muscle strength and tone appear to be at her baseline.  Recent vital signs include a temperature of 97.8, pulse of 71, respiration rate of 16 with 99% oxygen saturation, a blood pressure of 115/82.      ASSESSMENT:  Mood disorder, not otherwise specified and borderline traits, likely borderline personality disorder.      RECOMMENDATION:  As mentioned above the patient is not interested in psychiatric medications.  I think she would benefit from outpatient psychotherapy and she will need to be monitored for potential withdrawal from opiates.         BRYCE DEAN MD             D: 10/26/2017 17:56   T: 10/26/2017 20:00   MT: NICHO      Name:     LORRAINE DAY   MRN:      [id number]        Account:       [id number]   :      1990           Consult Date:  10/26/2017      Document: [id number]

## 2017-10-27 NOTE — PROGRESS NOTES
"OB Progress Note  Anne Gunter  [id number]      Subjective: Pt doing well with no complaints. States she would like to sleep.  At first opened eyes and then refused to open eyes again to voice for another while.  States that pain well controlled with oral medications.  Tolerating PO meds \"fine.\" Ambulating without dizziness or lightheadedness and states no concerns, but per RN report yesterday, patient difficult to get to ambulate out of bed.  Lochia is appropriately decreasing. Pt eating solid foods without difficulty. Passing flatus. Undecided regarding breastfeeding or bottle feeding. Pt s/p salpingectomy for permanent sterilization.     Objective:   Patient Vitals for the past 12 hrs:   BP Temp Temp src Heart Rate Resp   10/27/17 0807 123/76 98.3  F (36.8  C) Oral 74 16   10/27/17 0100 107/72 98.1  F (36.7  C) Oral 74 16   ]    Gen: pleasant, NAD, lying in bed at first trying to sleep and very limited with answers  CV: RRR, no murmurs  Pulm: CTAB, no r/r/w  Abd: soft, moderately-distended, uterine fundus firm and inferior to umbilicus, appropriately tender, normoactive BS  Incision: c/d/i, bandage dressing with minimal shadowing - patient refused to take bandage off now  Extr: trace to 1+  edema    I/O last 3 completed shifts:  In:  [P.O.:]  Out: -          Hemoglobin   Date Value Ref Range Status   10/26/2017 7.7 (L) 11.7 - 15.7 g/dL Final   10/26/2017 7.7 (L) 11.7 - 15.7 g/dL Final   ]    Assessment and Plan: Anne Gunter 27 year old   POD#2 s/p RLTCS and Bilateral salpingectomy for poorly controlled Type I DM in pregnancy with multiple admissions for DKA, abdominal pain related to questionable eating disorder vs. Gastroparesis.  Patient is doing well however slow to post-op. Encouraged ambulation today. Will continue to adjust insulin regimen per endocrine recs.     Type I DM: poor control in outpatient setting prior to delivery. hgb A1C last 7.4%. Managed with assistance with endocrine " which is greatly appreciated at this time. S/p Insulin drip D/Ranjith on POD#1 am. BGs escalating overnight after 5pm.  Appeared well controlled throughout the earlier day.     - Continue glargine (12U AM, 10U in PM) and mealtime aspart per endocrine recs   - BGs 200-300s overnight   - work on BG control prior to DC   - close endocrine follow-up in 1 week from discharge    MDD/Borderline personality with questionable eating disorder: s/p psych consult prior to delivery that recommended starting Zyprexa and or zoloft in the PP period and s/p reconsult yesterday in PP period.  Patient declines medications at this time.    - 1 week mood check in clinic   - arrange outpatient psychotherapy for patient per Psych recs    Opioid Dependence:   - Pain - well controlled on Toradol, scheduled tylenol, PRN PO dilaudid (only 2mg).  Did not require additional pain meds. Will discharge with 20tabs of 2mg dilaudid per prior pain plan.    - will discuss pain plan for postpartum period    Pre-eclampsia without severe features: BPs normal range overnight, UOP adequate. No concerns.     Abdominal pain/nausea/vomiting: tolerating PO intake overnight.  Continue PO antiemetics.     Routine Postpartum cares:  - Pain - see above  - Hb 8.5>EBL 800mL > 7.7>7.7, Acute blood loss anemia from CS.  Pt asymptomatic, however was brief with conversation this am. Discussed to report symptoms if present, but assured that Hgb was stable. Will continue to monitor and transfuse if symptomatic.  PO iron on discharge as Hgb less than 10  - FEN/GI: regular diet, continue bowel regimen as patient allows  - : suero in place -to remove this am and follow-up trial of void, no concerns  - Rubella immune - no MMR needed; Rh positive - no rhogam needed  - Contraception: s/p BS at time of CS for desired contraception  - Baby: doing well in NICU per patient, patient undecided regarding breast or bottle feeding  - Routine post-partum care    - Dispo to home POD#3-4  when meeting all post-operative goals.    Fabiana Chavez MD, MPH  PGY3, OB/GYN  Pager: [phone]  10/26/2017    I personally examined and evaluated Anne Gunter on 10/27/2017 independently from the resident.  I discussed the patient with Dr. Chavez and agree with the assessment and plan of care as documented in the above note with edits by me. Additional comments: patient seen with Maltese . POD#2 s/p repeat  with complicated medical/social history. Patient reports feeling well this morning, denies dizziness, feels pain is well-controlled. Denies complaints. No questions. Appears well on exam, interacting appropriately, smiling when asked about baby in NICU. Abd soft, appropriately tender, bandage c/d/i. In house for ongoing postpartum/postop cares. Appreciate endocrine management of DMI. Oral dilaudid 2 mg tabs #20 on discharge per pain service recs. Anticipate DC to home tomorrow, POD#3.     Valencia Tovar MD  11:01 AM

## 2017-10-27 NOTE — PROGRESS NOTES
CLINICAL NUTRITION SERVICES - REASSESSMENT NOTE     Nutrition Prescription    RECOMMENDATIONS FOR MDs/PROVIDERS TO ORDER:  None today     Malnutrition Status:    Does not meet criteria     Recommendations already ordered by Registered Dietitian (RD):  Ordered Glucerna TID with meals.      Future/Additional Recommendations:  1. Recommend outpatient follow-up for management of diabetes and carb counting abilities (vs ongoing fixed insulin doses).   2. Monitor PO intake, carb content of meals, and need for additional education surrounding gastroparesis if these becomes an issue.      EVALUATION OF THE PROGRESS TOWARD GOALS   Diet: Moderate Consistent Carbohydrate (4-6 CHO units/meal)  Intake: patient reports she has been eating well, ate 2 hardboiled eggs, caesar salad, orange juice, and lemonade for lunch today.      NEW FINDINGS   Pt is now POD2 s/p C/S delivery on 10/25. Appetite/intake significantly improved post-op.  Blood glucose ranges between  (better controlled 10/26 while on insulin drip, currently running in 200s and 300s while off drip). Takes 6 units Novolog set dose for carb coverage at meals. No reported pain issues surrounding meals that would point to gastroparesis at this time.   Pt unsure if she is planning to breastfeed or bottle feed.     MALNUTRITION  % Intake: No decreased intake noted  % Weight Loss: None noted - pt is s/p C/S delivery  Subcutaneous Fat Loss: None observed  Muscle Loss: None observed  Fluid Accumulation/Edema: None noted  Malnutrition Diagnosis: Patient does not meet two of the above criteria necessary for diagnosing malnutrition    Previous Goals   Diet advancement in 24-48 hours.  Evaluation: Met (diet advanced post C/S)    Previous Nutrition Diagnosis  Inadequate oral intake related to abdominal pain, N/V as evidenced by NPO x2 days and inadequate wt gain during pregnancy.   Evaluation: No longer applicable, nutrition diagnosis changed below    CURRENT NUTRITION  DIAGNOSIS  Food- and nutrition-related knowledge deficit related to history of poorly controlled diabetes with limited adherence to or lack of understanding of previous education as evidenced by discussion with patient requiring ongoing nutrition education around carb counting with type 1 diabetes.       INTERVENTIONS  Implementation  Nutrition education for recommended modifications - provided carb counting education, including how to read nutrition labels, using online resources such as [employer] for carb counting unknown foods, keeping consistent carbohydrate intake with each meal throughout the day. Encouraged TID meals + supplements as snack to promote healing post-op and if pt decides to breastfeed.   Medical Food Supplement - ordered Glucerna to be sent BID between meals to optimize nutritional intake post C/S for healing.    Goals  Patient to consume % of nutritionally adequate meal trays TID, or the equivalent with supplements/snacks.    Monitoring/Evaluation  Progress toward goals will be monitored and evaluated per protocol.    Nova Cifuentes RD, LD  Unit Pager: [phone]

## 2017-10-28 VITALS
WEIGHT: 162.1 LBS | DIASTOLIC BLOOD PRESSURE: 76 MMHG | TEMPERATURE: 98.6 F | BODY MASS INDEX: 25.44 KG/M2 | RESPIRATION RATE: 18 BRPM | OXYGEN SATURATION: 99 % | HEART RATE: 75 BPM | SYSTOLIC BLOOD PRESSURE: 123 MMHG

## 2017-10-28 LAB
BACTERIA SPEC CULT: ABNORMAL
GLUCOSE BLDC GLUCOMTR-MCNC: 259 MG/DL (ref 70–99)
GLUCOSE BLDC GLUCOMTR-MCNC: 388 MG/DL (ref 70–99)
SPECIMEN SOURCE: ABNORMAL

## 2017-10-28 PROCEDURE — 00000146 ZZHCL STATISTIC GLUCOSE BY METER IP

## 2017-10-28 RX ORDER — HYDROMORPHONE HYDROCHLORIDE 2 MG/1
2 TABLET ORAL EVERY 6 HOURS PRN
Qty: 10 TABLET | Refills: 0 | Status: SHIPPED | OUTPATIENT
Start: 2017-10-28 | End: 2017-12-06

## 2017-10-28 RX ADMIN — INSULIN GLARGINE 12 UNITS: 100 INJECTION, SOLUTION SUBCUTANEOUS at 08:56

## 2017-10-28 NOTE — PROGRESS NOTES
PICC Removal: Orders placed to remove PICC obtained. Pt aware of plan and agrees with it.  present, procedure explained to pt and teachings about PICC explained and re-iterated. With all standards/ precautions in place, PICC removed without difficulty, line intact. Pt tolerated well. Insertion site covered and occlusive dressing placed and pt told to leave on for >/= 24 hours. Pt informed of possible S & S of infection. Will called with questions/ concerns.

## 2017-10-28 NOTE — PLAN OF CARE
Problem: Postpartum ( Delivery) (Adult,Obstetrics,Pediatric)  Goal: Signs and Symptoms of Listed Potential Problems Will be Absent, Minimized or Managed (Postpartum)  Signs and symptoms of listed potential problems will be absent, minimized or managed by discharge/transition of care (reference Postpartum ( Delivery) (Adult,Obstetrics,Pediatric) [facility]).   Postpartum stable. Ambulating independently. BG's covered with insulin per MAR. At dinner, pt only had carb coverage because the BG was checked after the pt ate the meal. Educated pt to call before eating to have accurate BG checks. Snacks covered with insulin per MAR. Pt visited baby in NICU this evening. Support person in room. Declining all pain medications this shift. Continue to monitor.

## 2017-10-28 NOTE — PLAN OF CARE
Problem: Patient Care Overview  Goal: Plan of Care/Patient Progress Review  Outcome: Completed Date Met:  10/28/17  Patient refusing all medications except insulin today. Excited to go home. VS and postpartum checks WNL. Discharge instructions and medications reviewed, no further questions offered at this time. Patient to follow up 1-2 weeks with endocrine and at 6 weeks or sooner if concerns.

## 2017-10-28 NOTE — PROGRESS NOTES
"OB Progress Note  Anne Gunter  [id number]      Subjective: Patient seen with Persian . She reports feeling \"very well\" this morning. She states her pain is controlled; she is not using any narcotic pain medication. She is eating and drinking normally. She denies nausea or vomiting. She is ambulating without dizziness. Lochia is light. She denies other complaints     Objective:   Vitals:    10/27/17 0100 10/27/17 0807 10/27/17 1607 10/28/17 0020   BP: 107/72 123/76 121/78 118/69   Pulse:    79   Resp: 16 16 14 18   Temp: 98.1  F (36.7  C) 98.3  F (36.8  C) 98.8  F (37.1  C) 99.1  F (37.3  C)   TempSrc: Oral Oral Oral Oral   SpO2:       Weight:           Gen: pleasant, NAD, lying in bed   Abd: soft, non-distended, uterine fundus firm and inferior to umbilicus, appropriately tender, normoactive BS  Incision: pfannenstiel skin incision is c/d/i, steris in place  Extr: trace LE edema      Hemoglobin   Date Value Ref Range Status   10/26/2017 7.7 (L) 11.7 - 15.7 g/dL Final   10/26/2017 7.7 (L) 11.7 - 15.7 g/dL Final       Assessment and Plan:  27 year old   POD#3 s/p RLTCS and bilateral salpingectomy for poorly controlled Type I DM in pregnancy with multiple admissions for DKA, abdominal pain related to questionable eating disorder vs. gastroparesis.  Patient is doing well however postoperatively.      Type I DM: Managed with assistance with endocrine which is greatly appreciated at this time. Poor control in outpatient setting prior to delivery. hgb A1C last 7.4%. S/p Insulin drip D/Ranjith on POD#1 am.    - Continue glargine (12U AM, 12U in PM) and mealtime aspart per endocrine recs   - BGs 200-300s overnight   - close endocrine follow-up in 1 week from discharge    MDD/Borderline personality with questionable eating disorder: s/p psych consult prior to delivery that recommended starting Zyprexa and or zoloft in the PP period and s/p reconsult postpartum day 1 after EPDS screen of 21.  Patient declines " "medications at this time.    - 1 week mood check in clinic   - arrange outpatient psychotherapy for patient per Psych recs- patient states she would \"love\" to follow up with psychiatry. I again discussed with her mood symptoms at present, and she strongly declines any medication at this time, feeling that her mood is \"good.\"    Opioid Dependence:   - Pain - well controlled on nothing.  Refusing tylenol. Will discharge with 10 tabs of 2 mg po dilaudid as patient has not taken any after delivery.        Pre-eclampsia without severe features: BPs normal range overnight.No concerns.     Abdominal pain/nausea/vomiting: tolerating PO intake overnight.  Declining PO antiemetics.     Routine Postpartum cares:  - Hb 8.5>EBL 800mL > 7.7>7.7, Acute blood loss anemia from CS.  Plan PO iron on discharge  - FEN/GI: regular diet  - : s/p suero, voiding spontaneously   - Rubella immune - no MMR needed; Rh positive - no rhogam needed  - Contraception: s/p BS at time of CS for desired contraception  - Baby: doing well in NICU   - Routine post-partum care    - Dispo to home today    Valencia Tovar MD                "

## 2017-10-28 NOTE — PLAN OF CARE
Problem: Patient Care Overview  Goal: Plan of Care/Patient Progress Review  Outcome: Improving  Pt denied pain. Refused all medications. Declined to pump. 6 units of Insulin given for sacks she ate. BG checked was 388, hx Type 1 DM. Incisional area is well approximated with steri strips, CDI. No complaints. Able to make her needs known, will continue with plan of care.

## 2017-10-28 NOTE — DISCHARGE INSTRUCTIONS
Postop  Birth Instructions    Activity       Do not lift more than 10 pounds for 6 weeks after surgery.  Ask family and friends for help when you need it.    No driving until you have stopped taking your pain medications (usually two weeks after surgery).    No heavy exercise or activity for 6 weeks.  Don't do anything that will put a strain on your surgery site.    Don't strain when using the toilet.  Your care team may prescribe a stool softener if you have problems with your bowel movements.     To care for your incision:       Keep the incision clean and dry.    Do not soak your incision in water. No swimming or hot tubs until it has fully healed. You may soak in the bathtub if the water level is below your incision.    Do not use peroxide, gel, cream, lotion, or ointment on your incision.    Adjust your clothes to avoid pressure on your surgery site (check the elastic in your underwear for example).     You may see a small amount of clear or pink drainage and this is normal.  Check with your health care provider:       If the drainage increases or has an odor.    If the incision reddens, you have swelling, or develop a rash.    If you have increased pain and the medicine we prescribed doesn't help.    If you have a fever above 100.4 F (38 C) with or without chills when placing thermometer under your tongue.   The area around your incision (surgery wound), will feel numb.  This is normal. The numbness should go away in less than a year.     Keep your hands clean:  Always wash your hands before touching your incision (surgery wound). This helps reduce your risk of infection. If your hands aren't dirty, you may use an alcohol hand-rub to clean your hands. Keep your nails clean and short.    Call your healthcare provider if you have any of these symptoms:       You soak a sanitary pad with blood within 1 hour, or you see blood clots larger than a golf ball.    Bleeding that lasts more than 6  weeks.    Vaginal discharge that smells bad.    Severe pain, cramping or tenderness in your lower belly area.    A need to urinate more frequently (use the toilet more often), more urgently (use the toilet very quickly), or it burns when you urinate.    Nausea and vomiting.    Redness, swelling or pain around a vein in your leg.    Problems breastfeeding or a red or painful area on your breast.    Chest pain and cough or are gasping for air.    Problems with coping with sadness, anxiety or depression. If you have concerns about hurting yourself or the baby, call your provider immediately.      You have questions or concerns after you return home.       DIABETES  For appointment with Dr. Colunga at [facility] call [phone].  Recommend you be seen within 1-2 weeks of discharge.

## 2017-11-03 ENCOUNTER — LACTATION ENCOUNTER (OUTPATIENT)
Age: 27
End: 2017-11-03

## 2017-11-03 NOTE — LACTATION NOTE
This note was copied from a baby's chart.  Spoke with Anne at bedside in the NICU. Her parents are present and she is requesting her father interpret. Beside JEMAL Landis obtaining  phone for request or denial for . Anne is requesting equipment for pumping. She reports she has not been pumping. We discussed pumping strategies and Anne reported knowing to pump x8/day. She reports she refused a breast pump to take home (at the ) & she has a pump n style at home from previous bay 16mo ago. We reviewed pump cleaning and sterilizing x1/day. All equipment provided for pumping and bottles and labels. Anne acknowledges understanding and hopes she gets more milk than she did for last sibling. Will continue to follow and support.

## 2017-11-06 ENCOUNTER — LACTATION ENCOUNTER (OUTPATIENT)
Age: 27
End: 2017-11-06

## 2017-11-06 NOTE — LACTATION NOTE
This note was copied from a baby's chart.  Spoke with Bazin in NICU. We discussed her need for a pump. I showed her on her insurance card the number to call for obtaining a pumping. I gave her the script for letting them know she doesn't have a pump, has a need for a medical grade pump and has an Rx. We will give her the Rx when she comes tomorrow during the day. She reports she got a small amt of milk with a hand pump and she accidentally left it at home. She left shortly after are discussion. Will continue to follow and support.

## 2017-11-08 ENCOUNTER — LACTATION ENCOUNTER (OUTPATIENT)
Age: 27
End: 2017-11-08

## 2017-11-08 NOTE — LACTATION NOTE
This note was copied from a baby's chart.  Spoke with Anne by phone. She reports she has not gotten the electric breast pump but plans to pick it up today. She reports she is not getting any milk with the hand pump. I will continue to follow and support.

## 2017-11-13 ENCOUNTER — LACTATION ENCOUNTER (OUTPATIENT)
Age: 27
End: 2017-11-13

## 2017-11-17 ENCOUNTER — OFFICE VISIT (OUTPATIENT)
Dept: INTERNAL MEDICINE | Facility: CLINIC | Age: 27
End: 2017-11-17
Payer: COMMERCIAL

## 2017-11-17 VITALS
TEMPERATURE: 98.2 F | OXYGEN SATURATION: 97 % | SYSTOLIC BLOOD PRESSURE: 115 MMHG | HEIGHT: 67 IN | WEIGHT: 167 LBS | HEART RATE: 125 BPM | DIASTOLIC BLOOD PRESSURE: 75 MMHG | BODY MASS INDEX: 26.21 KG/M2

## 2017-11-17 DIAGNOSIS — E10.9 CONTROLLED DIABETES MELLITUS TYPE 1 WITHOUT COMPLICATIONS (H): Primary | ICD-10-CM

## 2017-11-17 PROBLEM — O24.319 MODIFIED WHITE CLASS B PREGESTATIONAL DIABETES MELLITUS: Status: RESOLVED | Noted: 2017-10-10 | Resolved: 2017-11-17

## 2017-11-17 PROBLEM — Z45.2: Status: RESOLVED | Noted: 2017-10-16 | Resolved: 2017-11-17

## 2017-11-17 PROBLEM — Z36.89 ENCOUNTER FOR TRIAGE IN PREGNANT PATIENT: Status: RESOLVED | Noted: 2017-09-09 | Resolved: 2017-11-17

## 2017-11-17 PROBLEM — O24.919 DIABETES IN PREGNANCY: Status: RESOLVED | Noted: 2017-09-24 | Resolved: 2017-11-17

## 2017-11-17 PROBLEM — O09.90 HIGH-RISK PREGNANCY: Status: RESOLVED | Noted: 2017-09-30 | Resolved: 2017-11-17

## 2017-11-17 PROBLEM — R10.9 ABDOMINAL PAIN AFFECTING PREGNANCY, ANTEPARTUM: Status: RESOLVED | Noted: 2017-09-18 | Resolved: 2017-11-17

## 2017-11-17 PROBLEM — E10.10 DKA, TYPE 1 (H): Status: RESOLVED | Noted: 2017-09-10 | Resolved: 2017-11-17

## 2017-11-17 PROBLEM — E11.10 DIABETIC KETO-ACIDOSIS (H): Status: RESOLVED | Noted: 2017-09-10 | Resolved: 2017-11-17

## 2017-11-17 PROBLEM — O09.93 SUPERVISION OF HIGH-RISK PREGNANCY, THIRD TRIMESTER: Status: RESOLVED | Noted: 2017-10-13 | Resolved: 2017-11-17

## 2017-11-17 PROBLEM — R73.9 HYPERGLYCEMIA: Status: RESOLVED | Noted: 2017-09-09 | Resolved: 2017-11-17

## 2017-11-17 PROBLEM — O26.899 ABDOMINAL PAIN AFFECTING PREGNANCY, ANTEPARTUM: Status: RESOLVED | Noted: 2017-09-18 | Resolved: 2017-11-17

## 2017-11-17 PROCEDURE — 99213 OFFICE O/P EST LOW 20 MIN: CPT | Performed by: FAMILY MEDICINE

## 2017-11-17 NOTE — NURSING NOTE
"  Chief Complaint   Patient presents with     Diabetes   20 days post partum   Initial BP (!) 80/66  Pulse 125  Temp 98.2  F (36.8  C) (Oral)  Ht 5' 6.93\" (1.7 m)  Wt 167 lb (75.8 kg)  SpO2 97%  BMI 26.21 kg/m2 Estimated body mass index is 26.21 kg/(m^2) as calculated from the following:    Height as of this encounter: 5' 6.93\" (1.7 m).    Weight as of this encounter: 167 lb (75.8 kg).  Medication Reconciliation: complete    "

## 2017-11-17 NOTE — MR AVS SNAPSHOT
After Visit Summary   11/17/2017    Anne Gunter    MRN: [id number]           Patient Information     Date Of Birth          1990        Visit Information        Provider Department      11/17/2017 2:30 PM Mani Ewing MD; [employer] [facility]        Today's Diagnoses     Controlled diabetes mellitus type 1 without complications (H)    -  1       Follow-ups after your visit        Additional Services     DIABETES EDUCATOR REFERRAL       DIABETES SELF MANAGEMENT TRAINING (DSMT)      Your provider has referred you to Diabetes Education: FMG: Diabetes Education - All [facility] [phone]   [email or web address]     If an urgent visit is needed or A1C is above 12, Care Team to call the Diabetes  Education Team at [phone] or send an In Basket message to the Diabetes Education Pool (P DIAB ED-PATIENT CARE).    A  will call you to make your appointment. If it has been more than 3 business days since your referral was placed, please call the above phone number to schedule.    Type of training and number of hours: Previous Diagnosis: Follow-up DSMT - 2 hours.    Medicare covers: 10 hours of initial DSMT in 12 month period from the time of first visit, plus 2 hours of follow-up DSMT annually, and additional hours as requested for insulin training.    Diabetes Type: Type 1             Diabetes Co-Morbidities: none               A1C Goal:  <8.0       A1C is: Lab Results       Component                Value               Date                       A1C                      7.4                 10/11/2017              Diabetes Education Topics: Comprehensive Knowledge Assessment and Instruction    Special Educational Needs Requiring Individual DSMT: Additional Insulin Training       MEDICAL NUTRITION THERAPY (MNT) for Diabetes    Medical Nutrition Therapy with a Registered Dietitian can be provided in  coordination with Diabetes Self-Management Training to assist in achieving optimal diabetes management.    MNT Type and Hours: Previous diagnosis: Annual follow-up MNT - 2 hours                       Medicare will cover: 3 hours initial MNT in 12 month period after first visit, plus 2 hours of follow-up MNT annually    Please be aware that coverage of these services is subject to the terms and limitations of your health insurance plan.  Call member services at your health plan to determine Diabetes Self-Management Training (Codes  &amp; ) and Medical Nutrition Therapy (Codes 08510 & 67495) benefits and ask which blood glucose monitor brands are covered by your plan.  Please bring the following with you to your appointment:    (1)  List of current medications   (2)  List of Blood Glucose Monitor brands that are covered by your insurance plan  (3)  Blood Glucose Monitor and log book  (4)   Food records for the 3 days prior to your visit    The Certified Diabetes Educator may make diabetes medication adjustments per the CDE Protocol and Collaborative Practice Agreement.                  Who to contact     If you have questions or need follow up information about today's clinic visit or your schedule please contact [facility] directly at [phone].  Normal or non-critical lab and imaging results will be communicated to you by MyChart, letter or phone within 4 business days after the clinic has received the results. If you do not hear from us within 7 days, please contact the clinic through [employer] or phone. If you have a critical or abnormal lab result, we will notify you by phone as soon as possible.  Submit refill requests through [employer] or call your pharmacy and they will forward the refill request to us. Please allow 3 business days for your refill to be completed.          Additional Information About Your Visit        Care EveryWhere ID     This is your Care EveryWhere ID. This could  "be used by other organizations to access your [place] medical records  [id number]        Your Vitals Were     Pulse Temperature Height Pulse Oximetry BMI (Body Mass Index)       125 98.2  F (36.8  C) (Oral) 5' 6.93\" (1.7 m) 97% 26.21 kg/m2        Blood Pressure from Last 3 Encounters:   11/17/17 115/75   10/28/17 123/76   10/19/17 107/68    Weight from Last 3 Encounters:   11/17/17 167 lb (75.8 kg)   10/22/17 162 lb 1.6 oz (73.5 kg)   10/19/17 166 lb 9.6 oz (75.6 kg)              We Performed the Following     DIABETES EDUCATOR REFERRAL          Today's Medication Changes          These changes are accurate as of: 11/17/17  4:21 PM.  If you have any questions, ask your nurse or doctor.               Start taking these medicines.        Dose/Directions    insulin aspart 100 UNIT/ML injection   Commonly known as:  NovoLOG FLEXPEN   Used for:  Controlled diabetes mellitus type 1 without complications (H)   Started by:  Mani Ewing MD        12 units before breakfast, 12 units before lunch, 12 units before dinner   Quantity:  30 mL   Refills:  11       insulin pen needle 31G X 6 MM   Used for:  Controlled diabetes mellitus type 1 without complications (H)   Started by:  Mani Ewing MD        Use 6 daily or as directed.   Quantity:  300 each   Refills:  11         These medicines have changed or have updated prescriptions.        Dose/Directions    blood glucose monitoring test strip   Commonly known as:  ACCU-CHEK FIDENCIO   This may have changed:    - additional instructions  - Another medication with the same name was removed. Continue taking this medication, and follow the directions you see here.   Used for:  Controlled diabetes mellitus type 1 without complications (H)   Changed by:  Mani Ewing MD        Use to test blood sugars 5 times daily or as directed.   Quantity:  450 strip   Refills:  11       insulin glargine 100 UNIT/ML injection   Commonly known as:  LANTUS SOLOSTAR   This may have " changed:  when to take this   Used for:  Controlled diabetes mellitus type 1 without complications (H)   Changed by:  Mani Ewing MD        Dose:  12 Units   Inject 12 Units Subcutaneous At Bedtime   Quantity:  21 mL   Refills:  1         Stop taking these medicines if you haven't already. Please contact your care team if you have questions.     blood glucose monitoring meter device kit   Stopped by:  Mani Ewing MD                Where to get your medicines      These medications were sent to [facility] [address]  [address] [facility] [address]  [address], [facility] [address]     Phone:  [phone]     blood glucose monitoring test strip    insulin aspart 100 UNIT/ML injection    insulin glargine 100 UNIT/ML injection    insulin pen needle 31G X 6 MM                Primary Care Provider Office Phone # Fax #    Isiah Jakob Naidu [phone]       [address] [facility] [address]        Equal Access to Services     [facility] AH: Hadii aad ku hadasho Soomaali, waaxda luqadaha, qaybta kaalmada adeegyada, waxay idiin hayaan adeeg kharash la'aan . So [facility] [phone].    ATENCIÓN: Si habla español, tiene a monreal disposición servicios gratuitos de asistencia lingüística. [facility] [phone].    We comply with applicable federal civil rights laws and [place] laws. We do not discriminate on the basis of race, color, national origin, age, disability, sex, sexual orientation, or gender identity.            Thank you!     Thank you for choosing [facility]  for your care. Our goal is always to provide you with excellent care. Hearing back from our patients is one way we can continue to improve our services. Please take a few minutes to complete the written survey that you may receive in the mail after your visit with us. Thank you!             Your Updated Medication List - Protect others around you: Learn  how to safely use, store and throw away your medicines at www.disposemymeds.org.          This list is accurate as of: 17  4:21 PM.  Always use your most recent med list.                   Brand Name Dispense Instructions for use Diagnosis    * alum & mag hydroxide-simethicone 400-400-40 MG/5ML Susp suspension    MYLANTA ES/MAALOX  ES    355 mL    Take 30 mLs by mouth 4 times daily (with meals and nightly)    Nausea and vomiting, intractability of vomiting not specified, unspecified vomiting type       * alum & mag hydroxide-simethicone 400-400-40 MG/5ML Susp suspension    MYLANTA ES/MAALOX  ES    1 Bottle    Take 30 mLs by mouth 4 times daily (with meals and nightly)    Nausea and vomiting, intractability of vomiting not specified, unspecified vomiting type       blood glucose monitoring lancets     100 each    Use to test blood sugar 4 times daily or as directed.    Type 1 diabetes mellitus in pregnancy, second trimester       blood glucose monitoring test strip    ACCU-CHEK FIDENCIO    450 strip    Use to test blood sugars 5 times daily or as directed.    Controlled diabetes mellitus type 1 without complications (H)       docusate sodium 100 MG capsule    COLACE    60 capsule    Take 1 capsule (100 mg) by mouth 2 times daily    Nausea and vomiting, intractability of vomiting not specified, unspecified vomiting type, Supervision of high-risk pregnancy, third trimester       famotidine 20 MG tablet    PEPCID    40 tablet    Take 1 tablet (20 mg) by mouth 2 times daily    Type 1 diabetes mellitus in pregnancy, second trimester       folic acid 1 MG tablet    FOLVITE    30 tablet    Take 1 tablet (1 mg) by mouth daily    High-risk pregnancy, first trimester       HYDROmorphone 2 MG tablet    DILAUDID    10 tablet    Take 1 tablet (2 mg) by mouth every 6 hours as needed for moderate to severe pain    S/P  section       ibuprofen 600 MG tablet    ADVIL/MOTRIN    40 tablet    Take 1 tablet (600 mg) by mouth  every 6 hours as needed for other (cramping)    S/P  section       insulin aspart 100 UNIT/ML injection    NovoLOG FLEXPEN    30 mL    12 units before breakfast, 12 units before lunch, 12 units before dinner    Controlled diabetes mellitus type 1 without complications (H)       insulin glargine 100 UNIT/ML injection    LANTUS SOLOSTAR    21 mL    Inject 12 Units Subcutaneous At Bedtime    Controlled diabetes mellitus type 1 without complications (H)       insulin pen needle 31G X 6 MM     300 each    Use 6 daily or as directed.    Controlled diabetes mellitus type 1 without complications (H)       lidocaine (viscous) 2 % solution    XYLOCAINE    100 mL    Take 5 mLs by mouth every 6 hours as needed for moderate pain (moderate to severe epigastric pain. May sip slowly if associated nausea) swish and spit every 3-8 hours as needed; max 8 doses/24 hour period    Nausea and vomiting, intractability of vomiting not specified, unspecified vomiting type, Epigastric pain       phosphorus tablet 250 mg 250 MG per tablet    PHOSPHA 250 NEUTRAL    60 tablet    Take 1 tablet (250 mg) by mouth 2 times daily    High-risk pregnancy in third trimester       polyethylene glycol Packet    MIRALAX/GLYCOLAX    7 packet    Take 17 g by mouth daily as needed for constipation (titrate to one bowel movement daily)    Type 1 diabetes mellitus in pregnancy, second trimester       Prenatal Vitamin 27-0.8 MG Tabs     90 tablet    Take 1 tablet by mouth daily    High-risk pregnancy, first trimester       senna-docusate 8.6-50 MG per tablet    SENOKOT-S;PERICOLACE    100 tablet    Take 1-2 tablets by mouth 2 times daily    S/P  section       * Notice:  This list has 2 medication(s) that are the same as other medications prescribed for you. Read the directions carefully, and ask your doctor or other care provider to review them with you.

## 2017-11-17 NOTE — PROGRESS NOTES
SUBJECTIVE:  Anne Gunter is a 27 year old female seen for a follow up visit; she has diabetes  Patient's glucose self monitoring/averages:  5-6 times daily, Symptoms of low blood sugar:  Yes  headache and jitteriness and What is the date of your last eye exam? 2 years ago.    Problems taking medications regularly? NO  Side effects? NO  What are you doing for exercise outside of work or your daily activities?  nothing  Do you eat breakfast regularly? YES:   Do you count carbs? YES:       BP Readings from Last 3 Encounters:   11/17/17 (!) 80/66   10/28/17 123/76   10/19/17 107/68     Health maintenance reviewed and appropriate orders placed?  Yes         Lab Results   Component Value Date    A1C 7.4 10/11/2017    A1C 8.4 09/10/2017    A1C 9.2 06/01/2016       Recent Labs   Lab Test  05/23/16   0842   CHOL  305*   HDL  57   LDL  185*   TRIG  316*       Wt Readings from Last 3 Encounters:   11/17/17 167 lb (75.8 kg)   10/22/17 162 lb 1.6 oz (73.5 kg)   10/19/17 166 lb 9.6 oz (75.6 kg)       Current Outpatient Prescriptions   Medication Sig Dispense Refill     blood glucose monitoring (ACCU-CHEK FIDENCIO) test strip Use to test blood sugars 5 times daily or as directed. 450 strip 11     insulin glargine (LANTUS SOLOSTAR) 100 UNIT/ML injection Inject 12 Units Subcutaneous At Bedtime 21 mL 1     insulin pen needle 31G X 6 MM Use 6 daily or as directed. 300 each 11     insulin aspart (NOVOLOG FLEXPEN) 100 UNIT/ML injection 12 units before breakfast, 12 units before lunch, 12 units before dinner 30 mL 11     HYDROmorphone (DILAUDID) 2 MG tablet Take 1 tablet (2 mg) by mouth every 6 hours as needed for moderate to severe pain 10 tablet 0     [DISCONTINUED] insulin glargine (LANTUS) 100 UNIT/ML injection Inject 12 Units Subcutaneous 2 times daily 3 mL 3     ibuprofen (ADVIL/MOTRIN) 600 MG tablet Take 1 tablet (600 mg) by mouth every 6 hours as needed for other (cramping) 40 tablet 0     senna-docusate (SENOKOT-S;PERICOLACE)  "8.6-50 MG per tablet Take 1-2 tablets by mouth 2 times daily 100 tablet 0     alum & mag hydroxide-simethicone (MYLANTA ES/MAALOX  ES) 400-400-40 MG/5ML SUSP suspension Take 30 mLs by mouth 4 times daily (with meals and nightly) 355 mL 1     docusate sodium (COLACE) 100 MG capsule Take 1 capsule (100 mg) by mouth 2 times daily 60 capsule 1     lidocaine, viscous, (XYLOCAINE) 2 % solution Take 5 mLs by mouth every 6 hours as needed for moderate pain (moderate to severe epigastric pain. May sip slowly if associated nausea) swish and spit every 3-8 hours as needed; max 8 doses/24 hour period 100 mL 0     alum & mag hydroxide-simethicone (MYLANTA ES/MAALOX  ES) 400-400-40 MG/5ML SUSP suspension Take 30 mLs by mouth 4 times daily (with meals and nightly) 1 Bottle 1     phosphorus tablet 250 mg (K PHOS NEUTRAL) 250 MG per tablet Take 1 tablet (250 mg) by mouth 2 times daily 60 tablet 1     blood glucose monitoring (ONE TOUCH DELICA) lancets Use to test blood sugar 4 times daily or as directed. 100 each 3     polyethylene glycol (MIRALAX/GLYCOLAX) Packet Take 17 g by mouth daily as needed for constipation (titrate to one bowel movement daily) 7 packet 1     famotidine (PEPCID) 20 MG tablet Take 1 tablet (20 mg) by mouth 2 times daily 40 tablet 1     Prenatal Vit-Fe Fumarate-FA (PRENATAL VITAMIN) 27-0.8 MG TABS Take 1 tablet by mouth daily 90 tablet 3     folic acid (FOLVITE) 1 MG tablet Take 1 tablet (1 mg) by mouth daily 30 tablet 0       Histories reviewed and updated in Epic.     REVIEW OF SYSTEMS:  CNS ROS -  no TIA or stroke-like symptoms, no amaurosis, diplopia, abnormal speech, unilateral numbness or weakness    EXAM:  Vitals: BP (!) 80/66  Pulse 125  Temp 98.2  F (36.8  C) (Oral)  Ht 5' 6.93\" (1.7 m)  Wt 167 lb (75.8 kg)  SpO2 97%  BMI 26.21 kg/m2    BMIE= Body mass index is 26.21 kg/(m^2).  GENERAL APPEARANCE: alert and no acute distress  PSYCH: mentation appears normal and affect normal/bright  RESP: lungs " clear to auscultation - no rales, rhonchi or wheezes  CV: regular rate and rhythm, normal S1 S2, no S3 or S4 and no murmur, click or rub -  EXT: no cyanosis or edema in lower extremities  SKIN: no venous stasis changes  Foot Exam: no    ASSESSMENT/PLAN:  1. Controlled diabetes mellitus type 1 without complications (H)  See back in 3 months for labs    A1c on chart is in good standing  - DIABETES EDUCATOR REFERRAL  - blood glucose monitoring (ACCU-CHEK FIDENCIO) test strip; Use to test blood sugars 5 times daily or as directed.  Dispense: 450 strip; Refill: 11  - insulin glargine (LANTUS SOLOSTAR) 100 UNIT/ML injection; Inject 12 Units Subcutaneous At Bedtime  Dispense: 21 mL; Refill: 1  - insulin pen needle 31G X 6 MM; Use 6 daily or as directed.  Dispense: 300 each; Refill: 11  - insulin aspart (NOVOLOG FLEXPEN) 100 UNIT/ML injection; 12 units before breakfast, 12 units before lunch, 12 units before dinner  Dispense: 30 mL; Refill: 11

## 2017-11-20 ENCOUNTER — TELEPHONE (OUTPATIENT)
Dept: INTERNAL MEDICINE | Facility: CLINIC | Age: 27
End: 2017-11-20

## 2017-11-20 DIAGNOSIS — E10.9 CONTROLLED DIABETES MELLITUS TYPE 1 WITHOUT COMPLICATIONS (H): Primary | ICD-10-CM

## 2017-11-20 NOTE — TELEPHONE ENCOUNTER
Althea with [facility] calls. They received order for Pen needles 31g x 6mm, but do not have these. Requesting to change to 31g x 5mm. Verbal order given to change pen needle size to 31g x 5 mm.

## 2017-11-20 NOTE — TELEPHONE ENCOUNTER
Fax from pharmacy states her Insurance covers Onetouch test strips.     Last OV 11/17/17.   Prescription approved per [facility] Refill Protocol.      Lab Results   Component Value Date    A1C 7.4 10/11/2017    A1C 8.4 09/10/2017    A1C 9.2 06/01/2016    A1C 9.8 05/23/2016    A1C 7.4 04/23/2016

## 2017-12-06 ENCOUNTER — OFFICE VISIT (OUTPATIENT)
Dept: INTERNAL MEDICINE | Facility: CLINIC | Age: 27
End: 2017-12-06
Payer: COMMERCIAL

## 2017-12-06 VITALS
WEIGHT: 167.6 LBS | OXYGEN SATURATION: 97 % | DIASTOLIC BLOOD PRESSURE: 80 MMHG | BODY MASS INDEX: 26.31 KG/M2 | TEMPERATURE: 98.5 F | HEART RATE: 117 BPM | SYSTOLIC BLOOD PRESSURE: 112 MMHG

## 2017-12-06 DIAGNOSIS — E10.9 CONTROLLED DIABETES MELLITUS TYPE 1 WITHOUT COMPLICATIONS (H): Primary | ICD-10-CM

## 2017-12-06 PROCEDURE — 99213 OFFICE O/P EST LOW 20 MIN: CPT | Performed by: INTERNAL MEDICINE

## 2017-12-06 NOTE — PROGRESS NOTES
SUBJECTIVE:   Anne Gunter is a 27 year old female who presents to clinic today for the following health issues:    Patient brought form for disability for citizenship.      Diabetes Follow-up    Patient is checking blood sugars: twice daily.    Blood sugar testing frequency justification: On insulin, frequency appropriate   Results are as follows:, On recent BG check per [facility] in 10/2017, her sugars have been high from 300-400s. Some reports of recurrent DKA recently    Diabetic concerns: None     Symptoms of hypoglycemia (low blood sugar): shaky, dizzy     Paresthesias (numbness or burning in feet) or sores: No     Date of last diabetic eye exam: two years ago, patient scheduled for one next week    BP Readings from Last 2 Encounters:   17 112/80   17 115/75     Hemoglobin A1C (%)   Date Value   10/11/2017 7.4 (H)   09/10/2017 8.4 (H)     LDL Cholesterol Calculated (mg/dL)   Date Value   2016 185 (H)           Problem list and histories reviewed & adjusted, as indicated.  Additional history: as documented    Patient Active Problem List   Diagnosis     Group B Streptococcus urinary tract infection affecting pregnancy in first trimester     Microalbuminuria     Controlled diabetes mellitus type 1 without complications (H)     S/P  section     Past Surgical History:   Procedure Laterality Date      SECTION N/A 6/10/2016    Procedure:  SECTION;  Surgeon: Richardson Duran MD;  Location: [facility] OR      SECTION, TUBAL LIGATION, COMBINED N/A 10/25/2017    Procedure: COMBINED  SECTION, TUBAL LIGATION;  Repeat  Section, Tubal Ligation ;  Surgeon: Alicja Rodríguez MD;  Location: [facility] L+D     ESOPHAGOSCOPY, GASTROSCOPY, DUODENOSCOPY (EGD), COMBINED N/A 2017    Procedure: COMBINED ESOPHAGOSCOPY, GASTROSCOPY, DUODENOSCOPY (EGD);  Upper Endoscopy with biopsies;  Surgeon: Nile Sanchez MD;  Location: [facility] OR     INSERT TUBE NASOJEJUNOSTOMY  2017     Procedure: INSERT TUBE NASOJEJUNOSTOMY;  nasojejunal feeding tube placement with upper endoscopy assistance by Dr. Sanchez and Dr. Cristino Bennett, Radiology;  Surgeon: Nile Sancehz MD;  Location:  OR       Social History   Substance Use Topics     Smoking status: Never Smoker     Smokeless tobacco: Never Used     Alcohol use No     Family History   Problem Relation Age of Onset     DIABETES Maternal Grandmother      CEREBROVASCULAR DISEASE Paternal Grandmother      Coronary Artery Disease Paternal Grandmother      Hypertension No family hx of      Hyperlipidemia No family hx of      Breast Cancer No family hx of      Colon Cancer No family hx of      Prostate Cancer No family hx of      Other Cancer No family hx of      Depression No family hx of      Anxiety Disorder No family hx of      MENTAL ILLNESS No family hx of      Substance Abuse No family hx of      Anesthesia Reaction No family hx of      Asthma No family hx of      OSTEOPOROSIS No family hx of      Genetic Disorder No family hx of      Thyroid Disease No family hx of      Obesity No family hx of              Reviewed and updated as needed this visit by clinical staffTobacco  Allergies  Med Hx  Surg Hx  Fam Hx  Soc Hx      Reviewed and updated as needed this visit by Provider         ROS:  C: NEGATIVE for fever, chills, change in weight  I: NEGATIVE for worrisome rashes, moles or lesions  E: NEGATIVE for vision changes or irritation  E/M: NEGATIVE for ear, mouth and throat problems  R: NEGATIVE for significant cough or SOB  B: NEGATIVE for masses, tenderness or discharge  CV: NEGATIVE for chest pain, palpitations or peripheral edema  GI: NEGATIVE for nausea, abdominal pain, heartburn, or change in bowel habits  E: NEGATIVE for temperature intolerance, skin/hair changes  P: NEGATIVE for changes in mood or affect    OBJECTIVE:     /80 (BP Location: Left arm, Patient Position: Sitting, Cuff Size: Adult Regular)  Pulse 117  Temp 98.5  F (36.9   C) (Oral)  Wt 167 lb 9.6 oz (76 kg)  SpO2 97%  BMI 26.31 kg/m2  Body mass index is 26.31 kg/(m^2).  GENERAL: healthy, alert and no distress  NECK: no adenopathy, no asymmetry, masses, or scars and thyroid normal to palpation  RESP: lungs clear to auscultation - no rales, rhonchi or wheezes  CV: regular rate and rhythm, normal S1 S2, no S3 or S4, no murmur, click or rub, no peripheral edema and peripheral pulses strong  ABDOMEN: soft, nontender, no hepatosplenomegaly, no masses and bowel sounds normal  MS: no gross musculoskeletal defects noted, no edema    Last Hemoglobin A1c 8.4, previous to this Hgb A1c 7.4    ASSESSMENT/PLAN:     Diabetes Type I, A1c Controlled , insulin dependent   Associated with the following complications    Renal Complications:  None    Ophthalmologic Complications: None    Neurologic Complications: None    Peripheral Vascular Complications:  None    Other: None   Plan:  No changes in the patient's current treatment plan      (E10.9) Controlled diabetes mellitus type 1 without complications (H)  (primary encounter diagnosis)  Comment: Last hgb A1c at 7.4, but previous month at 8.4.  Per chart has had some recent DKA episodes and her BG readings through epic show BG of high 300s  Plan: Recommend her to follow up with Endocrine in light of recent DKA and high BGs, assisted her with setting up appointment      Oralia Magallanes MD  [facility]

## 2017-12-06 NOTE — NURSING NOTE
"Chief Complaint   Patient presents with     Forms     Need forms filled out. No other concern.       Initial /80 (BP Location: Left arm, Patient Position: Sitting, Cuff Size: Adult Regular)  Pulse 117  Temp 98.5  F (36.9  C) (Oral)  Wt 167 lb 9.6 oz (76 kg)  SpO2 97%  BMI 26.31 kg/m2 Estimated body mass index is 26.31 kg/(m^2) as calculated from the following:    Height as of 11/17/17: 5' 6.93\" (1.7 m).    Weight as of this encounter: 167 lb 9.6 oz (76 kg).  Medication Reconciliation: complete   Martha Cazares MA      "

## 2017-12-06 NOTE — MR AVS SNAPSHOT
After Visit Summary   12/6/2017    Anne Gunter    MRN: [id number]           Patient Information     Date Of Birth          1990        Visit Information        Provider Department      12/6/2017 3:15 PM Oralia Magallanes MD; [place] [employer] [facility]        Today's Diagnoses     Controlled diabetes mellitus type 1 without complications (H)    -  1       Follow-ups after your visit        Your next 10 appointments already scheduled     Dec 11, 2017  1:40 PM CST   New Visit with Cha Jones OD   [facility]    [address] [place] [id number] [phone]              Who to contact     If you have questions or need follow up information about today's clinic visit or your schedule please contact [facility] directly at [phone].  Normal or non-critical lab and imaging results will be communicated to you by MyChart, letter or phone within 4 business days after the clinic has received the results. If you do not hear from us within 7 days, please contact the clinic through MyChart or phone. If you have a critical or abnormal lab result, we will notify you by phone as soon as possible.  Submit refill requests through [employer] or call your pharmacy and they will forward the refill request to us. Please allow 3 business days for your refill to be completed.          Additional Information About Your Visit        Care EveryWhere ID     This is your Care EveryWhere ID. This could be used by other organizations to access your [place] medical records  [id number]        Your Vitals Were     Pulse Temperature Pulse Oximetry BMI (Body Mass Index)          117 98.5  F (36.9  C) (Oral) 97% 26.31 kg/m2         Blood Pressure from Last 3 Encounters:   12/06/17 112/80   11/17/17 115/75   10/28/17 123/76    Weight from Last 3 Encounters:   12/06/17 167 lb 9.6 oz (76 kg)   11/17/17 167  lb (75.8 kg)   10/22/17 162 lb 1.6 oz (73.5 kg)              Today, you had the following     No orders found for display       Primary Care Provider Office Phone # Fax #    Isiah Naidu [phone]       [address] [place]  [facility] [address]        Equal Access to Services     [facility]: Hadii aad ku hadasho Soomaali, waaxda luqadaha, qaybta kaalmada adeegyada, waxay idiin hayaan adeeg kharash la'aan . So [facility] [phone].    ATENCIÓN: Si habla español, tiene a monreal disposición servicios gratuitos de asistencia lingüística. Llame al [phone].    We comply with applicable federal civil rights laws and [place] laws. We do not discriminate on the basis of race, color, national origin, age, disability, sex, sexual orientation, or gender identity.            Thank you!     Thank you for choosing [facility]  for your care. Our goal is always to provide you with excellent care. Hearing back from our patients is one way we can continue to improve our services. Please take a few minutes to complete the written survey that you may receive in the mail after your visit with us. Thank you!             Your Updated Medication List - Protect others around you: Learn how to safely use, store and throw away your medicines at www.disposemymeds.org.          This list is accurate as of: 12/6/17  4:11 PM.  Always use your most recent med list.                   Brand Name Dispense Instructions for use Diagnosis    insulin glargine 100 UNIT/ML injection    LANTUS SOLOSTAR    21 mL    Inject 12 Units Subcutaneous At Bedtime    Controlled diabetes mellitus type 1 without complications (H)       insulin pen needle 31G X 6 MM     300 each    Use 6 daily or as directed.    Controlled diabetes mellitus type 1 without complications (H)

## 2017-12-12 ENCOUNTER — OFFICE VISIT (OUTPATIENT)
Dept: OPTOMETRY | Facility: CLINIC | Age: 27
End: 2017-12-12
Payer: COMMERCIAL

## 2017-12-12 ENCOUNTER — APPOINTMENT (OUTPATIENT)
Dept: OPTOMETRY | Facility: CLINIC | Age: 27
End: 2017-12-12
Payer: COMMERCIAL

## 2017-12-12 DIAGNOSIS — E11.9 DIABETES MELLITUS WITHOUT OPHTHALMIC MANIFESTATIONS (H): Primary | ICD-10-CM

## 2017-12-12 DIAGNOSIS — H52.13 MYOPIA OF BOTH EYES: ICD-10-CM

## 2017-12-12 DIAGNOSIS — H04.129 DRY EYE: ICD-10-CM

## 2017-12-12 PROCEDURE — 92015 DETERMINE REFRACTIVE STATE: CPT | Performed by: OPTOMETRIST

## 2017-12-12 PROCEDURE — 92340 FIT SPECTACLES MONOFOCAL: CPT | Performed by: OPTOMETRIST

## 2017-12-12 PROCEDURE — 92004 COMPRE OPH EXAM NEW PT 1/>: CPT | Performed by: OPTOMETRIST

## 2017-12-12 ASSESSMENT — VISUAL ACUITY
OD_SC+: -1
OS_SC: 20/20
METHOD: SNELLEN - LINEAR
OD_SC: 20/60
OD_SC: 20/20
OS_SC+: -2
OS_SC: 20/30

## 2017-12-12 ASSESSMENT — REFRACTION_MANIFEST
OS_AXIS: 169
OD_SPHERE: -1.00
METHOD_AUTOREFRACTION: 1
OD_SPHERE: -1.00
OS_SPHERE: -0.25
OD_CYLINDER: SPHERE
OS_CYLINDER: +0.25
OS_AXIS: 150
OS_SPHERE: -0.75
OS_CYLINDER: +0.50

## 2017-12-12 ASSESSMENT — KERATOMETRY
OD_K1POWER_DIOPTERS: 42.25
OD_AXISANGLE_DEGREES: 113
OS_AXISANGLE2_DEGREES: 154
OS_K2POWER_DIOPTERS: 44.12
OS_K1POWER_DIOPTERS: 43.00
METHOD_AUTO_MANUAL: AUTOMATED
OD_AXISANGLE2_DEGREES: 23
OD_K2POWER_DIOPTERS: 42.87
OS_AXISANGLE_DEGREES: 64

## 2017-12-12 ASSESSMENT — CONF VISUAL FIELD
OD_NORMAL: 1
OS_NORMAL: 1

## 2017-12-12 ASSESSMENT — EXTERNAL EXAM - RIGHT EYE: OD_EXAM: NORMAL

## 2017-12-12 ASSESSMENT — CUP TO DISC RATIO
OD_RATIO: 0.5
OS_RATIO: 0.5

## 2017-12-12 ASSESSMENT — TONOMETRY
IOP_METHOD: APPLANATION
OS_IOP_MMHG: 13
OD_IOP_MMHG: 13

## 2017-12-12 ASSESSMENT — EXTERNAL EXAM - LEFT EYE: OS_EXAM: NORMAL

## 2017-12-12 NOTE — MR AVS SNAPSHOT
After Visit Summary   12/12/2017    Anne Gunter    MRN: [id number]           Patient Information     Date Of Birth          1990        Visit Information        Provider Department      12/12/2017 1:45 PM Cha Jones, OD; [place] [facility] [employer] [facility]        Today's Diagnoses     Myopia of both eyes    -  1      Care Instructions    Nearsighted , can not see as well at a distance   Glasses part time as needed  Blood glucose should be below 120 in order to prevent ocular complications of diabetes. Each 1% decrease in your A1c significantly reduces your progression of diabetic retinopathy. Controlling diseases such as cholesterol and  blood pressure will further decrease your chance of diabetic eye disease loss.           Follow-ups after your visit        Who to contact     If you have questions or need follow up information about today's clinic visit or your schedule please contact [facility] directly at [phone].  Normal or non-critical lab and imaging results will be communicated to you by MyChart, letter or phone within 4 business days after the clinic has received the results. If you do not hear from us within 7 days, please contact the clinic through MyChart or phone. If you have a critical or abnormal lab result, we will notify you by phone as soon as possible.  Submit refill requests through [employer] or call your pharmacy and they will forward the refill request to us. Please allow 3 business days for your refill to be completed.          Additional Information About Your Visit        Care EveryWhere ID     This is your Care EveryWhere ID. This could be used by other organizations to access your [place] medical records  [id number]         Blood Pressure from Last 3 Encounters:   12/06/17 112/80   11/17/17 115/75   10/28/17 123/76    Weight from Last 3 Encounters:   12/06/17 76 kg (167 lb 9.6 oz)   11/17/17 75.8 kg (167 lb)   10/22/17  73.5 kg (162 lb 1.6 oz)              Today, you had the following     No orders found for display       Primary Care Provider Office Phone # Fax #    Isiah Naidu -[phone]       [address]  [facility] [address]        Equal Access to Services     [facility]: Hadii aad ku hadasho Soomaali, waaxda luqadaha, qaybta kaalmada adeegyada, waxay idiin hayaan adeeg kharash la'aan ah. So [facility] [phone].    ATENCIÓN: Si habla español, tiene a monreal disposición servicios gratuitos de asistencia lingüística. Llame al [phone].    We comply with applicable federal civil rights laws and [place] laws. We do not discriminate on the basis of race, color, national origin, age, disability, sex, sexual orientation, or gender identity.            Thank you!     Thank you for choosing [facility]  for your care. Our goal is always to provide you with excellent care. Hearing back from our patients is one way we can continue to improve our services. Please take a few minutes to complete the written survey that you may receive in the mail after your visit with us. Thank you!             Your Updated Medication List - Protect others around you: Learn how to safely use, store and throw away your medicines at www.disposemymeds.org.          This list is accurate as of: 12/12/17  2:31 PM.  Always use your most recent med list.                   Brand Name Dispense Instructions for use Diagnosis    insulin glargine 100 UNIT/ML injection    LANTUS SOLOSTAR    21 mL    Inject 12 Units Subcutaneous At Bedtime    Controlled diabetes mellitus type 1 without complications (H)       insulin pen needle 31G X 6 MM     300 each    Use 6 daily or as directed.    Controlled diabetes mellitus type 1 without complications (H)

## 2017-12-12 NOTE — PATIENT INSTRUCTIONS
Nearsighted , can not see as well at a distance   Glasses part time as needed  Blood glucose should be below 120 in order to prevent ocular complications of diabetes. Each 1% decrease in your A1c significantly reduces your progression of diabetic retinopathy. Controlling diseases such as cholesterol and  blood pressure will further decrease your chance of diabetic eye disease loss.

## 2017-12-12 NOTE — PROGRESS NOTES
Chief Complaint   Patient presents with     Diabetic Eye Exam     Accompanied by   Lab Results   Component Value Date    A1C 7.4 10/11/2017    A1C 8.4 09/10/2017    A1C 9.2 06/01/2016    A1C 9.8 05/23/2016    A1C 7.4 04/23/2016       Last Eye Exam: 2yrs  Dilated Previously: Yes    What are you currently using to see?  does not use glasses or contacts    Distance Vision Acuity: Noticed gradual change in both eyes    Near Vision Acuity: Satisfied with vision while reading  unaided    Eye Comfort: good, dry  Do you use eye drops? : No  Occupation or Hobbies: Everyday         Medical, surgical and family histories reviewed and updated 12/12/2017.       OBJECTIVE: See Ophthalmology exam    ASSESSMENT:    ICD-10-CM    1. Myopia of both eyes H52.13    2. Diabetes mellitus without ophthalmic manifestations (H) E11.9    3. Dry eye H04.129       PLAN:  Warm compresses artificial tears  , make up below lash line inf  Cha Jones OD

## 2018-01-01 LAB — COPATH REPORT: NORMAL

## 2018-11-19 ENCOUNTER — TELEPHONE (OUTPATIENT)
Dept: INTERNAL MEDICINE | Facility: CLINIC | Age: 28
End: 2018-11-19

## 2018-11-19 NOTE — TELEPHONE ENCOUNTER
Panel Management Review      Patient has the following on her problem list:     Diabetes    ASA: Failed    Last A1C  Lab Results   Component Value Date    A1C 7.4 10/11/2017    A1C 8.4 09/10/2017    A1C 9.2 06/01/2016    A1C 9.8 05/23/2016    A1C 7.4 04/23/2016     A1C tested: FAILED    Last LDL:    Lab Results   Component Value Date    CHOL 305 05/23/2016     Lab Results   Component Value Date    HDL 57 05/23/2016     Lab Results   Component Value Date     05/23/2016     Lab Results   Component Value Date    TRIG 316 05/23/2016     No results found for: CHOLHDLRATIO  Lab Results   Component Value Date    NHDL 248 05/23/2016       Is the patient on a Statin? NO             Is the patient on Aspirin? NO        Last three blood pressure readings:  BP Readings from Last 3 Encounters:   12/06/17 112/80   11/17/17 115/75   10/28/17 123/76       Date of last diabetes office visit: 12/6/17     Tobacco History:     History   Smoking Status     Never Smoker   Smokeless Tobacco     Never Used           Composite cancer screening  Chart review shows that this patient is due/due soon for the following None  Summary:    Patient is due/failing the following:   Diabetes    Action needed:   Patient needs office visit for diabetes follow up.    Type of outreach:    Sent letter.    Questions for provider review:    None                                                                                                                                    Martha Cazares MA       Chart routed to none .

## 2018-11-19 NOTE — LETTER
[facility]  [address], [address]  [place] [address]  [phone]        December 4, 2018    Anne Gunter  [address]    [facility] [place] [address]            Dear Anne,    We sent you a letter a couple of weeks ago informing you of health maintenance that is due. We hope that you received it. This letter is just a follow up to remind you to schedule an appointment.           Sincerely,        Your [facility] Care Team

## 2018-11-19 NOTE — LETTER
[facility]  [address], [address]  [place] [address]  [phone]        November 19, 2018    Anne Gunter  [address] [facility] [place]    [place] [address]            Dear Anne,  In order to ensure we are providing the best quality care, we have reviewed your chart and see that you are due for a follow up on diabetes.  Please call the clinic at your earliest convenience to schedule an appointment.   Thank you for trusting us with your health care.      Sincerely,        Dr. Oralia Magallanes

## 2019-12-28 NOTE — PROGRESS NOTES
CLINICAL NUTRITION SERVICES - Brief Note (see assessment 9/30 for full details and recommendations)     Received Provider Order - Registered Dietitian to Assess and Order TF per Medical Nutrition Therapy Protocol     Nutrition Interventions:  Collaboration of Care - Spoke with team regarding tube feeds and nutrition POC. Per team, starting tube feeds today.   Ordered TwoCal HN via NDT @ 20 mL with advancement schedule to increase by 10 mL q 8 hrs as tolerated to goal of 50 mL/hr, continuous (1200 ml/day) to provide 2400 kcals (32 Kacls/kg/day), 101 g PRO (1.3 gms/kg/day), 840 ml free H2O, 263 g CHO and 6 g Fiber daily.  Ordered Free Water Flushes 30 mL q 4 hrs (TF + Free Water = 1020 mL/day)   Ordered 15 mL Certavite to meet micronutrient needs      Yesenia Covarrubias RD, LD  Unit Pager: [phone]     Regarding: trouble breathing, asthma, shortness of breath  ----- Message from Robyn Ch sent at 12/28/2019  1:25 PM CST -----  Patient Name: Melanie Stack  Specialist or PCP Full Name:Matilda Camargo  Pregnant (If Yes, how long?):No  Symptoms:trouble breathing, asthma, shortness of breath  Call Back #:[phone]  Is the patient’s permanent residence located in [place], [place], or a [facility]? Yes [facility][note 3] [address]  Call Center Account #:[id number]

## 2020-06-12 NOTE — IP AVS SNAPSHOT
[facility]    [address]    [place] [address]    Phone:  [phone]                                       After Visit Summary   9/10/2017    Anne Gunter    MRN: [id number]           After Visit Summary Signature Page     I have received my discharge instructions, and my questions have been answered. I have discussed any challenges I see with this plan with the nurse or doctor.    ..........................................................................................................................................  Patient/Patient Representative Signature      ..........................................................................................................................................  Patient Representative Print Name and Relationship to Patient    ..................................................               ................................................  Date                                            Time    ..........................................................................................................................................  Reviewed by Signature/Title    ...................................................              ..............................................  Date                                                            Time           Statement Selected

## 2021-05-11 NOTE — LACTATION NOTE
"This note was copied from a baby's chart.  Spoke with Anne by phone. She reports she dumped expressed milk yesterday as she has \"tummy\" troubles. I informed her that it wasn't necessary to dump but it was ultimately her choice. She reports she is feeling better today. Reviewing her pumping strategies, she states she pumps every 3 hours but indicates she is not pumping at night. She reports she is using the medical grade pump and that her pumped volume is less than half of the small bottle(35mL). She plans to come this evening when she gets a ride. Will continue to follow and support.  " 98

## 2022-10-04 PROBLEM — R11.2 NAUSEA AND VOMITING: Status: RESOLVED | Noted: 2017-10-13 | Resolved: 2017-11-17

## 2023-03-29 NOTE — ANESTHESIA CARE TRANSFER NOTE
1930: PM NURSING NARRATIVE (OPENING OF SHIFT)

BEDSIDE ROUNDS PRIOR TO REPORT. HAND-OFF REPORT RECEIVED FROM REN RUST. REPORTED: 67 YO 
FEMALE RECEIVED FROM [facility]. C/C: SOB, DX: CHF, HX: CHF, DM, HTN, AND COPD. NKA, 
FULL CODE. A/OX4. SR AND ST ON TELE JUST OVER 100. ACCU CHECKS COVERED WITH 2 UNITS, 
PU[place], BM TODAY. RFA 22 GA AND SL. REDNESS: NORMA, GROIN BUTTOCKS. BLISTER: R HIP WITH 
BARRIER CREAM. BLE NON-PITTING EDEMA. PLAN OF CARE: HEPARIN BID, CONSULTS-PULMO AND NEPHRO. 
REPEAT CXR. PERCOCET FOR PAIN.  PT RECEIVED THE SAME AS REPORTED. REQUESTING PERCOCET AT 
2000 FOR LOW BACK PAIN, R HIP PAIN AND LEG PAIN 6/10. LUNGS CLEAR. EDEMA BLE +1-+2 NON 
PITTING. PT TEACHING TO MAKE ANKLE CIRCLES AND KEGALS X10 WHEN COMMERCIALS COME ON WHILE 
LOOKING AT TV. PT INSTRUCTED EXERCISES ENCOURAGE FLUIDS IN BLE TO RETURN TO THE HEART. 
STATED UNDERSTOOD.. Patient: Anne Gunter    Procedure(s):  Upper Endoscopy with biopsies - Wound Class: II-Clean Contaminated  nasojejunal feeding tube placement with upper endoscopy assistance by Dr. Sanchez and Dr. Cristino Bennett, Radiology - Wound Class: I-Clean    Diagnosis: Severe Abdominal Pain   Diagnosis Additional Information: No value filed.    Anesthesia Type:   General, RSI, ETT     Note:  Airway :Face Mask  Patient transferred to:PACU  Comments: Patient breathing spontaneously, resting comfortably.  Som Nagy        Vitals: (Last set prior to Anesthesia Care Transfer)    CRNA VITALS  9/27/2017 1246 - 9/27/2017 1329      9/27/2017             Pulse: 97    SpO2: 99 %                Electronically Signed By: Som Nagy MD  September 27, 2017  1:29 PM

## 2024-01-14 NOTE — PLAN OF CARE
"Problem: Patient Care Overview  Goal: Plan of Care/Patient Progress Review  Outcome: No Change  Patient had earlier agreed to having a double lumen PICC line placed tomorrow. Went through the rationale with patient through . Now patient states that \"she is eating and does not want a new PICC line\". Dr. Suarez notified and will pass it along for rounds tomorrow. Patient has taken her meds that have been ordered, except for the Colace. Continue with present cares.      "
"Problem: Patient Care Overview  Goal: Plan of Care/Patient Progress Review  Pt declined 0600 monitoring- \"I'm sleeping\". Offered to come back in a little while and she refused again but said she would do it when she wakes up.      "
Anne Gunter is stable, pain is stable, not worsening. Requested dilaudid every 4 hours but not in between and did not c/o pain in between doses.   No vomiting, but had some intermittent nausea today, relieved with Zofran and Reglan.   Only had chicken broth this morning to eat, but then had a full meat at 1430, pasta, meat sauce, egg, jello, and apple juice. After and hour, she states she still feels OK without pain or N/V.   Vitals WNL.  Normal fetal movement.   Blood sugars stable. Insulin gtt ran 1-3 units/hour throughout the day and is on algorithm 2. Covering with Novolog for meals/snacks.   
Discharge education provided by Lin ZELAYA RN.  PICC dressing change at 1530.  Pt discharge at 1555.  
Problem: Diabetes, Type 1 (Adult)  Goal: Signs and Symptoms of Listed Potential Problems Will be Absent, Minimized or Managed (Diabetes, Type 1)  Signs and symptoms of listed potential problems will be absent, minimized or managed by discharge/transition of care (reference Diabetes, Type 1 (Adult) CPG).   Outcome: No Change  Pt continues on insulin drip with carb correction insulin with meals.   Abdominal pain well managed with IV dilaudid.   VSS.   Pt declines SCDs and declines ambulating this shift.       
Problem: Diabetes, Type 1 (Adult)  Goal: Signs and Symptoms of Listed Potential Problems Will be Absent, Minimized or Managed (Diabetes, Type 1)  Signs and symptoms of listed potential problems will be absent, minimized or managed by discharge/transition of care (reference Diabetes, Type 1 (Adult) CPG).   Outcome: No Change  Pt with hypoglycemia. MD Dockery notified of pt status. Pt denies signs/symptoms of hypoglycemia. Given apple juice for blood sugar or 66 and rechecked. Blood sugar was then 57. Pt declines more juice but is  currently eating grapes. Will continue to monitor and treat per hypoglycemia protocol       
Problem: Diabetes, Type 1 (Adult)  Goal: Signs and Symptoms of Listed Potential Problems Will be Absent, Minimized or Managed (Diabetes, Type 1)  Signs and symptoms of listed potential problems will be absent, minimized or managed by discharge/transition of care (reference Diabetes, Type 1 (Adult) CPG).   Pt with good appetite throughout the shift. Pain managed with single dose IV dilaudid.      Pt with hypotension throughout shift. MD Dockery aware.      Pt with hypoglycemia now resolved following hypoglycemia protocol interventions.      Per MD Dockery, to start 500mL D5 bolus now.      Continue to monitor closely.       
Problem: Pain, Acute (Adult)  Goal: Acceptable Pain Control/Comfort Level  Patient will demonstrate the desired outcomes by discharge/transition of care.   Outcome: No Change  Patient vital signs stable throughout shift and slept well. Patient complaints of upper abdomen discomfort and requests pain medications every 4 hours. States relief is found with pain medication. Patient refused monitoring and weight in the morning. Insulin drip turned on at 2330 at beginning of shift and titrated throughout. Coverage given with snacks. Patient up to bathroom and encouraged to use hat to monitor I/O. Will continue to monitor and update provider with any changes if needed.       
Problem: Pain, Acute (Adult)  Goal: Identify Related Risk Factors and Signs and Symptoms  Related risk factors and signs and symptoms are identified upon initiation of Human [employer] Clinical Practice Guideline (CPG).   Pain medication can only be given every 4 hours. Patient called out a short time after her 4 hours and requested pain medicine. Patient did not appear in any distress when RN went into room. She was resting quietly on her left side. She did look behind her to make sure this RN was giving her the medicine. Continue to monitor.       
Problem: Patient Care Overview  Goal: Plan of Care/Patient Progress Review  Outcome: Adequate for Discharge Date Met:  10/13/17  Mild pain today which was relieved with oral dilaudid. Slept late this morning and early afternoon took a nap. Stated that she slept well after her nap. No contractions, bleeding nor SROM/leaking. Insulin given as ordered. Bg's WNL. Insulin infusion discontinued at 1010 this morning as Bg was 71. Took all medications as prescribed except the colace at patients request. Last BM yesterday. VSS. EFM FHT's 130's with moderate variability and accelerations. PICC WNL and flushed after IV solutions discontinued. Adequate intake. Output uncertain as patient voided, missing the urine collection container. A small amount of light dee dee color urine was noted in the container. Ate lunch and tolerated it well. Pleasant, cooperative and appears happy to be to discharged home. Patients mother here all day.       
Problem: Patient Care Overview  Goal: Plan of Care/Patient Progress Review  Outcome: Improving  Patient feeling well again today. Patient's PICC was replaced and she received a double lumen PICC line this morning- all lines temporarily stopped and replaced. Patient continues to receive GI medications and dilaudid every 4 hours for stomach pain. Able to eat a regular diet for lunch today- was not having much of an appetite this morning but drank apple juice and broth and tolerated well.  with moderate variability, accels present, decels not present- baby moving a lot. [place] reveals no contractions. Patient denies any obstetrical complaints.  section and tubal consent signed today in the presence of an interpretor. Patient does not want to use SCDs- agrees to walking around more during the day and using SCDs if she has been in bed most of the day. Continue with current plan of care.      
Problem: Patient Care Overview  Goal: Plan of Care/Patient Progress Review  Outcome: No Change  Patient blood pressure on the lower end of parameters and informed provider. Rechecked at 0230 and WNL. Vital signs stable. Denies cramping, bleeding, contractions, leaking of fluid. Patient requesting pain medication every 4 hours with prompting. Patient on insulin and titrated as needed. Switched to Algorithm 2 this morning. Patient requesting apple juice and ice cream throughout shift and given coverage for snacks. Patient uses call light frequently and has mother in the room as support system. Will continue to monitor and update provider as needed.       
Problem: Patient Care Overview  Goal: Plan of Care/Patient Progress Review  Patient pleasant throughout shift. BG ranging from  throughout the shift, insulin adjusted per algorithm. VSS. Had one lower BP at the beginning of the shift but resolved to normal after sitting patient up for 10 minutes. Afebrile. Patient had two doses of dilaudid throughout the shift for upper abdominal pain. Rated pain 5-6 on pain scale. Dilaudid helped relieve pain. Total carb intake for shift is 88 grams. Strict I&O. Patient following 200 mL intake at a time. Patient refused fetal monitoring (see other note). Patient denies any cramping, vaginal bleeding, leaking of fluid, backache, pelvic pressure, and pre-E sx (headache, blurred vision, edema). Continue to monitor.       
[facility] with complaints of abdominal pain. Pt immediately requesting IV pain medication.  updated on pt arrival. Pt refusing fetal monitoring at this time. Moaning in pain.  at bedside for lengthy discussion about narcotic use.  present for entire conversation. Pt not engaged in conversation, only requesting medication. Pt's mother is asking appropriate questions and seems to understand. Dressing of PICC is almost off. RN flyer called to come replace. Will await orders.   
Pt blood sugar 186. Insulin drip started at 1445 on algorithm 1. Continue hourly blood sugars and adjust insulin pump per protocol.   
Pt off unit with mother, okayed with MD Mateo Verde. Pt instructed to return to unit by 1700, not to eat while off unit. Pt agrees with plan of care.   
Repeat BS = 45. Pt given 8 oz apple juice, only drank 4oz. Is currently eating chicken and grapes. Will repeat in 15 minutes. Pt denies signs/symptoms of hypoglycemia. MD Covarrubias in dept and notified of current blood sugar and status. Pt declines glucose gel at this time.   
show

## 2024-01-18 NOTE — PLAN OF CARE
A (CATHETER BLN INPACT ADMIRAL 6MM 100MM 200CM LOWPRFL OTW 6) balloon was inflated in the left superficial femoral artery. Balloon removed in tact. The balloon was inflated at 8 alfredo for 180 seconds at 1/18/2024 10:33 AM.  PROX Problem: Diabetes in Pregnancy (Adult,Obstetrics,Pediatric)  Goal: Signs and Symptoms of Listed Potential Problems Will be Absent, Minimized or Managed (Diabetes in Pregnancy)  Signs and symptoms of listed potential problems will be absent, minimized or managed by discharge/transition of care (reference Diabetes in Pregnancy (Adult,Obstetrics,Pediatric) CPG).   Outcome: Improving  Off D5 .9 NS and blood sugars have been between 70 and 84. Insulin drip have been off for 2 hours. Will restart in Algorithm 1 when needed. Continue to monitor.

## 2024-05-05 NOTE — IP AVS SNAPSHOT
[facility]    [address]    [facility] [address]    Phone:  [phone]    Fax:  [phone]                                       After Visit Summary   9[birth year]2017    Anne Gunter    MRN: [id number]           After Visit Summary Signature Page     I have received my discharge instructions, and my questions have been answered. I have discussed any challenges I see with this plan with the nurse or doctor.    ..........................................................................................................................................  Patient/Patient Representative Signature      ..........................................................................................................................................  Patient Representative Print Name and Relationship to Patient    ..................................................               ................................................  Date                                            Time    ..........................................................................................................................................  Reviewed by Signature/Title    ...................................................              ..............................................  Date                                                            Time           10 (severe pain)

## (undated) DEVICE — GLOVE ESTEEM POWDER FREE SMT 6.5  2D72PT65

## (undated) DEVICE — SOL NACL 0.9% IRRIG 1000ML BOTTLE 07138-09

## (undated) DEVICE — BASIN SET MAJOR

## (undated) DEVICE — SU VICRYL 0 CT-1 36" J346H

## (undated) DEVICE — SOL WATER IRRIG 1000ML BOTTLE 2F7114

## (undated) DEVICE — STOCKING SLEEVE COMPRESSION CALF LG

## (undated) DEVICE — SU MONOCRYL 0 CT-1 36" Y346H

## (undated) DEVICE — SPECIMEN CONTAINER 3OZ W/FORMALIN 59901

## (undated) DEVICE — SU VICRYL 4-0 PS-2 18" UND J496G

## (undated) DEVICE — GLOVE PROTEXIS BLUE W/NEU-THERA 7.0  2D73EB70

## (undated) DEVICE — TUBING SUCTION 10'X3/16" N510

## (undated) DEVICE — DRSG TELFA ISLAND 4X10"

## (undated) DEVICE — KIT ENDO FIRST STEP DISINFECTANT 200ML W/POUCH EP-4

## (undated) DEVICE — PAD CHUX UNDERPAD 23X24" 7136

## (undated) DEVICE — PREP CHLORAPREP 26ML TINTED ORANGE  260815

## (undated) DEVICE — PACK C-SECTION LF PL15OTA83B

## (undated) DEVICE — SUCTION MANIFOLD DORNOCH ULTRA CART UL-CL500

## (undated) DEVICE — TUBE NASOGASTRIC ENTRIFLEX 10FR 43"

## (undated) DEVICE — STRAP KNEE/BODY 31143004

## (undated) DEVICE — SOL WATER IRRIG 1000ML BOTTLE 07139-09

## (undated) DEVICE — SUCTION CANISTER MEDIVAC LINER 1500ML W/LID 65651-515

## (undated) DEVICE — ENDO FORCEP ENDOJAW BIOPSY 2.8MMX230CM FB-220U

## (undated) DEVICE — CATH TRAY FOLEY 16FR SILICONE 907416

## (undated) RX ORDER — OXYTOCIN 10 [USP'U]/ML
INJECTION, SOLUTION INTRAMUSCULAR; INTRAVENOUS
Status: DISPENSED
Start: 2017-10-25

## (undated) RX ORDER — HYDROMORPHONE HCL/0.9% NACL/PF 0.2MG/0.2
SYRINGE (ML) INTRAVENOUS
Status: DISPENSED
Start: 2017-10-25

## (undated) RX ORDER — OXYTOCIN/0.9 % SODIUM CHLORIDE 30/500 ML
PLASTIC BAG, INJECTION (ML) INTRAVENOUS
Status: DISPENSED
Start: 2017-10-25

## (undated) RX ORDER — FENTANYL CITRATE 50 UG/ML
INJECTION, SOLUTION INTRAMUSCULAR; INTRAVENOUS
Status: DISPENSED
Start: 2017-10-25

## (undated) RX ORDER — PROPOFOL 10 MG/ML
INJECTION, EMULSION INTRAVENOUS
Status: DISPENSED
Start: 2017-10-25

## (undated) RX ORDER — HEPARIN SODIUM (PORCINE) LOCK FLUSH IV SOLN 100 UNIT/ML 100 UNIT/ML
SOLUTION INTRAVENOUS
Status: DISPENSED
Start: 2017-10-20

## (undated) RX ORDER — HEPARIN SODIUM,PORCINE 10 UNIT/ML
VIAL (ML) INTRAVENOUS
Status: DISPENSED
Start: 2017-10-12

## (undated) RX ORDER — MORPHINE SULFATE 1 MG/ML
INJECTION, SOLUTION EPIDURAL; INTRATHECAL; INTRAVENOUS
Status: DISPENSED
Start: 2017-10-25

## (undated) RX ORDER — CEFAZOLIN SODIUM 1 G/3ML
INJECTION, POWDER, FOR SOLUTION INTRAMUSCULAR; INTRAVENOUS
Status: DISPENSED
Start: 2017-10-25

## (undated) RX ORDER — HEPARIN SODIUM,PORCINE 10 UNIT/ML
VIAL (ML) INTRAVENOUS
Status: DISPENSED
Start: 2017-10-20

## (undated) RX ORDER — LIDOCAINE HYDROCHLORIDE 10 MG/ML
INJECTION, SOLUTION EPIDURAL; INFILTRATION; INTRACAUDAL; PERINEURAL
Status: DISPENSED
Start: 2017-10-12

## (undated) RX ORDER — KETOROLAC TROMETHAMINE 30 MG/ML
INJECTION, SOLUTION INTRAMUSCULAR; INTRAVENOUS
Status: DISPENSED
Start: 2017-10-25

## (undated) RX ORDER — ONDANSETRON 2 MG/ML
INJECTION INTRAMUSCULAR; INTRAVENOUS
Status: DISPENSED
Start: 2017-10-20